# Patient Record
Sex: FEMALE | Race: WHITE | NOT HISPANIC OR LATINO | Employment: OTHER | URBAN - METROPOLITAN AREA
[De-identification: names, ages, dates, MRNs, and addresses within clinical notes are randomized per-mention and may not be internally consistent; named-entity substitution may affect disease eponyms.]

---

## 2017-01-04 ENCOUNTER — HOSPITAL ENCOUNTER (OUTPATIENT)
Dept: PHYSICAL THERAPY | Facility: HOSPITAL | Age: 77
Setting detail: THERAPIES SERIES
Discharge: HOME | End: 2017-01-04

## 2017-01-04 DIAGNOSIS — G89.29 CHRONIC BILATERAL LOW BACK PAIN, WITH SCIATICA PRESENCE UNSPECIFIED: Primary | ICD-10-CM

## 2017-01-04 DIAGNOSIS — M54.5 CHRONIC BILATERAL LOW BACK PAIN, WITH SCIATICA PRESENCE UNSPECIFIED: Primary | ICD-10-CM

## 2017-01-04 PROCEDURE — 97113 AQUATIC THERAPY/EXERCISES: CPT | Performed by: PHYSICAL THERAPIST

## 2017-01-04 NOTE — PROGRESS NOTES
Outpatient Physical Therapy Ortho Treatment Note  [facility]     Patient Name: Sachi Vora  : 1940  MRN: [id number]  Today's Date: 2017      Visit Date: 2017    Visit Dx:    ICD-10-CM ICD-9-CM   1. Chronic bilateral low back pain, with sciatica presence unspecified M54.5 724.2    G89.29 338.29       Patient Active Problem List   Diagnosis   • Osteoporosis, 2011--lumbar spine -2.8.  Right femoral neck -2.4.  Left femoral neck -2.4.  Patient receives Reclast infusions.   • Therapeutic drug monitoring   • Simple renal cyst   • Benign essential hypertension   • Carotid artery plaque, 10/03/2014--mild bilateral carotid artery plaque.   • Chronic gastritis   • Chronic lower back pain   • Chronic otitis externa   • Chronic renal insufficiency, stage II (mild), creatinine 1.12   • Depression with anxiety   • Gastroesophageal reflux disease with esophagitis   • Functional murmur, 07/15/2015--normal echocardiogram.   • Hyperlipidemia   • Menopausal state   • Chronic migraine   • Pancreatic divisum   • Pancreatic Duct Dilation   • Pedal edema   • Restless legs syndrome   • Bilateral sensorineural hearing loss   • Vitamin D deficiency   • Breast cancer screening   • Epigastric pain   • Chronic fatigue   • Hypersomnolence   • Chronic anemia        Past Medical History   Diagnosis Date   • Anemia    • Anxiety and depression    • Arthritis    • History of acute pyelonephritis 2001--patient was admitted to the hospital with fever, chills, leukocytosis and abdominal pain. Evaluation revealed acute left pyelonephritis pyelonephritis and sepsis syndrome.   • History of bone density study 2011--DEXA scan revealed a lumbar T score of -2.8, right femoral neck T score -2.4, left femoral neck T score -2.4. Osteoporosis of the lumbar spine and severe osteopenia of the hips bilaterally. Patient has been intolerant to Fosamax because of gastritis and gastroesophageal  reflux.   04/01/2009--treatment for oste   • History of cardiovascular stress test 5/13/2016 05/13/2016--exercise stress test with myocardial perfusion indicates normal study with no evidence of ischemia.  Left ventricular ejection fraction is hyperdynamic with an EF greater than 70%.  Impressions are consistent with a low risk study.  12/07/2011--stress Cardiolite negative for ischemia or previous infarction. Ejection fraction greater than 70%.   12/20/2009--stress Cardiolite negative for infarction or ischemia.   10/28/2005--stress Cardiolite showed no evidence of ischemia or infarction.   05/28/2002--unremarkable stress EKG.   • History of carotid Doppler/vascular screen 10/03/2014     10/03/2014--[facility] screening revealed mild bilateral carotid plaque, negative for atrial fibrillation, negative for AAA, negative for PAD, osteoporosis screen revealed osteopenia. Body mass index was 25 and considered to be moderate risk. 07/25/2012--vascular screen negative for carotid plaque, negative for abdominal aneurysm, negative for PAD   • History of chest pain 11/03/2014 11/03/2014--patient seen in follow-up and reports her epigastric pain/chest pain has resolved. I reviewed the results of the studies with her. It does not appear that her problem is related to biliary tract disease. She does have reflux and although the recent upper GI revealed minimal reflux, I do think her symptoms are related to esophageal reflux with esophageal spasm.   10/21/2014--air-contras   • History of chest x-ray 06/29/2015 06/29/2015--chest x-ray PA and lateral performed for exertional dyspnea reveals no active disease   • History of Dyspnea on exertion 06/29/2015 06/29/2015--patient presents with a 4-6 week history of exertional dyspnea that comes on with activity such as climbing stairs or walking her dog up an incline.  No chest pain.  Relieved with rest.  No cough.  She does have complaints of her feet and legs swelling  that is particularly worse at the end of the day and not improved overnight.  No orthopnea or PND.  Chest exam reveals faint rales at t   • History of echocardiogram 07/15/2015     07/15/2015--echocardiogram performed for murmur and dyspnea. Left ventricle size is normal. Left ventricular systolic function normal with ejection fraction 55%. Grade 1 diastolic dysfunction, abnormal relaxation pattern. There is trace tricuspid regurgitation. Estimated right ventricular systolic pressure is 25 mmHg which is normal.   • HIstory of Left lumbar radiculopathy Remote     Patient has multilevel degenerative disc disease and degenerative arthritis of the lumbar spine   • History of mammogram 03/29/2011 03/29/2011--negative mammogram.   • History of palpitations 12/07/2011     Patient has had multiple admissions to the hospital for complaints of chest pain and heart palpitations. She meant admitted at least on 3 occasions. She has had at least 3 stress Cardiolite and 1 stress ECG, the last Cardiolite being performed 12/07/2011 which was negative. Patient is also had Holter monitors which have been unremarkable.   • History of Pneumococcal vaccination given 11/20/2015 11/20/2015--PPSV 23 given. No further pneumococcal vaccinations required. 02/25/2015--Prevnar 13 given. Patient is pneumococcal vaccination jenn and therefore will need a PPSV 23 in 6-12 months.   • HIstory of Schatzki's ring 02/28/2012 02/28/2012--air-contrast upper GI revealed small to moderate sized reducible sliding hiatal herniation of the upper stomach with some demonstrated gastroesophageal reflux. No esophageal, gastric, or duodenal mass or mucosal ulceration was seen.  11/03/2008--EGD performed for evaluation of iron deficiency anemia revealed hiatal hernia without evidence of reflux, prepyloric antritis and   • History of Substernal precordial chest pain 5/5/2016 06/02/2016--patient seen in follow-up and reports she has recurrence of her  epigastric discomfort that is believed to be related to her hiatal hernia, reflux, and probably esophageal spasm.  I do think that her substernal chest pain is gastrointestinal related.  She has been off omeprazole for some time due to problems with the prescription.  She reinitiated this just yesterday.  05/13/2016--exercise stress test with myocardial perfusion indicates normal study with no evidence of ischemia.  Left ventricular ejection fraction is hyperdynamic with an EF greater than 70%.  Impressions are consistent with a low risk study.  05/05/2016--patient reports that over the past year she has had intermittent episodes of chest pain.  She describes a substernal pressure and this radiates into her left upper extremity as well as the jaw.  It lasts last than a minute.  The most recent episode was associated with pain in the right upper extremity that radiated up into the right jaw and then crossed over to the left jaw.  T   • History of Urinary urgency 11/20/2015 06/02/2016--patient seen in follow-up and reports her urinary symptoms have resolved essentially.  01/07/2016--patient seen in follow-up and reports she could not tolerate the Myrbetriq due to stomach issues.  However, she does report that her urinary symptoms have improved and are now tolerable.  11/20/2015--patient presents with approximately 2 month history of urinary urgency that is associated with intermittent hesitancy.  She describes the urge to urinate and she will go to the bathroom and then only be able to produce a few drops.  Other times she will get urgency and she will be able to completely void.  She has these symptoms at night as well and has nocturia approximately 2 times per night.  She denies dysuria.  She also has periodic nocturnal enuresis.  Urinalysis and urine culture sent.  Myrbetriq 25 mg per day initiated.  May increase to 50 mg per day if necessary.  Follow-up in 2 weeks to reassess.  Urinalysis revealed 3+ bacteria  but urine culture was negative.   • History of Zostavax administration 01/07/2016 01/07/2016--Zostavax given.   • Migraines    • Sleep apnea         Past Surgical History   Procedure Laterality Date   • Appendectomy  1965     1965   • Colonoscopy  11/03/2008 2008-- normal colonoscopy   • Colonoscopy  2001 2001--normal colonoscopy.   • Skin surgery  05/25/2010 05/25/2010--skin lesion excised from right lower extremity. 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   • Endoscopy  10/08/2014     02/28/2012--air-contrast upper GI revealed small to moderate sized reducible sliding hiatal herniation of the upper stomach with some demonstrated gastroesophageal reflux. No esophageal, gastric, or duodenal mass or mucosal ulceration was seen. 11/03/2008--EGD performed for evaluation of iron deficiency anemia revealed hiatal hernia without evidence of reflux, prepyloric antritis and   • Esophageal dilation  11/19/2001 11/19/2001--EGD revealed a very tortuous distal esophagus with a Schatzki's ring. No ulcer or erosions. No Dorado's mucosa. Stomach revealed patchy erythema and erosions in the antrum. Biopsy. Normal pylorus with no obstruction. Normal duodenum with no ulcers. The Schatzki's ring was dilated with a Rhodes dilator.;    • Knee arthroscopy Right 12/12/2011 12/12/2011--right knee arthroscopy with partial lateral and medial meniscectomies.   • Incontinence surgery  1979 1979--a bladder tack procedure for urinary stress incontinence   • Endoscopy  11/03/2008 11/03/2008--EGD performed for evaluation of iron deficiency anemia revealed hiatal hernia without evidence of reflux, prepyloric antritis and   • Endoscopy  11/19/2001 11/19/2001--EGD revealed a very tortuous distal esophagus with a Schatzki's ring. No ulcer or erosions. No Dorado's mucosa. Stomach revealed patchy erythema and erosions in the antrum. Biopsy. Normal pylorus with no obstruction. Normal duodenum  with no ulcers. The Schatzki's ring was dilated with a Rhodes dilator.;   • Total abdominal hysterectomy  1974 1974--total abdominal hysterectomy.   • Endoscopy N/A 9/27/2016     Procedure: ESOPHAGOGASTRODUODENOSCOPY with biopsy AND RESOLUTION CLIP X1;  Surgeon: Reji Johnson MD;  Location: [facility] ENDOSCOPY;  Service:                              PT Assessment/Plan       01/04/17 1402 12/28/16 1417       PT Assessment    Impairments  Balance;Impaired flexibility;Posture;Range of motion;Pain;Muscle strength  -VICKY     Assessment Comments Sachi was able to perform increased repetitions of aquatic exercises today and reported decrease in pain during treatment.  She was given information on area pools to be able to continue on her own after discharge.  -VICKY Sachi was treated in autic PT for 4 sessions, then had a 2 week lapse in treatment until returning today.  She missed some appts due to illness and travelling for [employer].  She states after her travels she did have increased back pain yesterday.  Today she reports her pain at a 5/10.  Oswestry score improved from 53% to 40%.  Significant improvement in balance is noted as follows: SLS on R and L is > 30 sec., Sharpened Rhomberg was 3 seconds, now 7 seconds.  The 5 X sit to stand test improved from 17.93 seconds to 9.75 seconds without UE assist. Sachi has been instructed in proper body mechanics and also given basic home exercises  for her back.  In addition, Sachi reports obtaining temporary decrease in her back pain from aquatic exercise, stating she feels better for the rest of the day.  Sachi demonstrates good progress towrds goals and is appropriate to continue skilled PT.  -VICKY     Please refer to paper survey for additional self-reported information  Yes  -VICKY     Rehab Potential  Good  -VICKY     Patient/caregiver participated in establishment of treatment plan and goals  Yes  -VICKY     Patient would benefit from skilled therapy intervention  Yes  -VICKY      PT Plan    PT Frequency  2x/week  -VICKY     Predicted Duration of Therapy Intervention (days/wks)  2-3 weeks  -VICKY     Planned CPT's?  PT AQUATIC THERAPY EA 15 MIN: [id number]  -VICKY     Physical Therapy Interventions (Optional Details)  aquatics exercise;stretching;strengthening;lumbar stabilization;patient/family education  -VICKY       User Key  (r) = Recorded By, (t) = Taken By, (c) = Cosigned By    Initials Name Provider Type    VICKY Gardner, PT Physical Therapist                    Exercises       01/04/17 1200          Subjective Comments    Subjective Comments My balance is better, I can stand to put my socks on now instead of sitting down to do it, but I have something nearby if I need to lean on.  -VICKY      Subjective Pain    Able to rate subjective pain? yes  -VICKY      Pre-Treatment Pain Level 7  -IVCKY      Post-Treatment Pain Level 5  -VICKY      Subjective Pain Comment I took my pain medicine about an hour ago but my back is still hurting. I think I'm always going to have pain at some level. It's been keeping me up at night.  -VICKY      Aquatics    Aquatics performed? Yes  -VICKY      Exercise 1    Exercise Name 1 See aquatic flow sheet.  -VICKY        User Key  (r) = Recorded By, (t) = Taken By, (c) = Cosigned By    Initials Name Provider Type    VICKY Gardner, PT Physical Therapist                               PT OP Goals       12/28/16 1300          PT Short Term Goals    STG 1 Pateint will report peak pain no > 4/10.  -VICKY      STG 1 Progress Ongoing  -VICKY      STG 1 Progress Comments Pain today is 5/10 and was higher yesterday after travelling for the holidays.  -VICKY      STG 2 Patient will participate in full session (40 min) of aquatic therapeutic exercise without exacerbation of symptoms.  -VICKY      STG 2 Progress Met  -VICKY      STG 3 Improve balance and proximal strength demonstrated by improvement in the 5 X sit to stand test from 17.93 sec. to 13 sec or less.  -VICKY      STG 3 Progress Met  -VICKY      STG 3 Progress  Comments 9.75 sec. without UE assist.  -VICKY      STG 4 Patient will be able to engage core muscles for resistive training in aquatic environment.  -VICKY      STG 4 Progress Met  -VICKY      Long Term Goals    LTG 1 Patient will increase overall function demonstrated by improvement in Oswestry score from 53% to 40% or less.  -VICKY      LTG 1 Progress Met  -VICKY      LTG 1 Progress Comments CASTILLO 40%  -VICKY      LTG 2 Improve balance demonstrated by SLS to 10 sec. each leg for safety with donning pants.  -VICKY      LTG 2 Progress Met  -VICKY      LTG 2 Progress Comments > 30 seconds on R and on L LE.  -VICKY      LTG 3 Improve sharpened Rhomberg from 3 sec to 8 sec.  -VICKY      LTG 3 Progress Partially Met  -VICKY      LTG 3 Progress Comments 7 seconds on 12.28.16  -VICKY      LTG 4 Patient will demonstrate independence with an advance aquatic exercise program to address core strength, hamstring flexibility and balance for self management of her condition.  -VICKY      LTG 4 Progress Ongoing  -VICKY      LTG 4 Progress Comments Patient seen 4 sessions, then had 2 week lapse in treatment due to illness and travelling for [place].   -VICKY        User Key  (r) = Recorded By, (t) = Taken By, (c) = Cosigned By    Initials Name Provider Type    VICKY Gardner, PT Physical Therapist                Therapy Education       01/04/17 1401    Therapy Education    Given Other (comment);Pain management   Area pools available for pool membership. TENS for pain control.  -VICKY    How Provided Verbal;Written  -VICKY    Provided to Patient  -VICKY    Level of Understanding Verbalized  -VICKY      User Key  (r) = Recorded By, (t) = Taken By, (c) = Cosigned By    Initials Name Provider Type    VICKY Gardner, PT Physical Therapist                Time Calculation:   Start Time: 1209  Stop Time: 1255  Time Calculation (min): 46 min    Therapy Charges for Today     Code Description Service Date Service Provider Modifiers Qty    [id number]  PT AQUATIC THERAPY EA 15 MIN 1/4/2017  Sebastian Gardner, PT GP 3                    Sebastian Gardner, PT  1/4/2017

## 2017-01-04 NOTE — PROGRESS NOTES
Outpatient Physical Therapy Ortho Treatment Note  [facility]     Patient Name: Sachi Vora  : 1940  MRN: [id number]  Today's Date: 2017      Visit Date: 2017    Visit Dx:    ICD-10-CM ICD-9-CM   1. Chronic bilateral low back pain, with sciatica presence unspecified M54.5 724.2    G89.29 338.29       Patient Active Problem List   Diagnosis   • Osteoporosis, 2011--lumbar spine -2.8.  Right femoral neck -2.4.  Left femoral neck -2.4.  Patient receives Reclast infusions.   • Therapeutic drug monitoring   • Simple renal cyst   • Benign essential hypertension   • Carotid artery plaque, 10/03/2014--mild bilateral carotid artery plaque.   • Chronic gastritis   • Chronic lower back pain   • Chronic otitis externa   • Chronic renal insufficiency, stage II (mild), creatinine 1.12   • Depression with anxiety   • Gastroesophageal reflux disease with esophagitis   • Functional murmur, 07/15/2015--normal echocardiogram.   • Hyperlipidemia   • Menopausal state   • Chronic migraine   • Pancreatic divisum   • Pancreatic Duct Dilation   • Pedal edema   • Restless legs syndrome   • Bilateral sensorineural hearing loss   • Vitamin D deficiency   • Breast cancer screening   • Epigastric pain   • Chronic fatigue   • Hypersomnolence   • Chronic anemia        Past Medical History   Diagnosis Date   • Anemia    • Anxiety and depression    • Arthritis    • History of acute pyelonephritis 2001--patient was admitted to the hospital with fever, chills, leukocytosis and abdominal pain. Evaluation revealed acute left pyelonephritis pyelonephritis and sepsis syndrome.   • History of bone density study 2011--DEXA scan revealed a lumbar T score of -2.8, right femoral neck T score -2.4, left femoral neck T score -2.4. Osteoporosis of the lumbar spine and severe osteopenia of the hips bilaterally. Patient has been intolerant to Fosamax because of gastritis and gastroesophageal  reflux.   04/01/2009--treatment for oste   • History of cardiovascular stress test 5/13/2016 05/13/2016--exercise stress test with myocardial perfusion indicates normal study with no evidence of ischemia.  Left ventricular ejection fraction is hyperdynamic with an EF greater than 70%.  Impressions are consistent with a low risk study.  12/07/2011--stress Cardiolite negative for ischemia or previous infarction. Ejection fraction greater than 70%.   12/20/2009--stress Cardiolite negative for infarction or ischemia.   10/28/2005--stress Cardiolite showed no evidence of ischemia or infarction.   05/28/2002--unremarkable stress EKG.   • History of carotid Doppler/vascular screen 10/03/2014     10/03/2014--[facility] screening revealed mild bilateral carotid plaque, negative for atrial fibrillation, negative for AAA, negative for PAD, osteoporosis screen revealed osteopenia. Body mass index was 25 and considered to be moderate risk. 07/25/2012--vascular screen negative for carotid plaque, negative for abdominal aneurysm, negative for PAD   • History of chest pain 11/03/2014 11/03/2014--patient seen in follow-up and reports her epigastric pain/chest pain has resolved. I reviewed the results of the studies with her. It does not appear that her problem is related to biliary tract disease. She does have reflux and although the recent upper GI revealed minimal reflux, I do think her symptoms are related to esophageal reflux with esophageal spasm.   10/21/2014--air-contras   • History of chest x-ray 06/29/2015 06/29/2015--chest x-ray PA and lateral performed for exertional dyspnea reveals no active disease   • History of Dyspnea on exertion 06/29/2015 06/29/2015--patient presents with a 4-6 week history of exertional dyspnea that comes on with activity such as climbing stairs or walking her dog up an incline.  No chest pain.  Relieved with rest.  No cough.  She does have complaints of her feet and legs swelling  that is particularly worse at the end of the day and not improved overnight.  No orthopnea or PND.  Chest exam reveals faint rales at t   • History of echocardiogram 07/15/2015     07/15/2015--echocardiogram performed for murmur and dyspnea. Left ventricle size is normal. Left ventricular systolic function normal with ejection fraction 55%. Grade 1 diastolic dysfunction, abnormal relaxation pattern. There is trace tricuspid regurgitation. Estimated right ventricular systolic pressure is 25 mmHg which is normal.   • HIstory of Left lumbar radiculopathy Remote     Patient has multilevel degenerative disc disease and degenerative arthritis of the lumbar spine   • History of mammogram 03/29/2011 03/29/2011--negative mammogram.   • History of palpitations 12/07/2011     Patient has had multiple admissions to the hospital for complaints of chest pain and heart palpitations. She meant admitted at least on 3 occasions. She has had at least 3 stress Cardiolite and 1 stress ECG, the last Cardiolite being performed 12/07/2011 which was negative. Patient is also had Holter monitors which have been unremarkable.   • History of Pneumococcal vaccination given 11/20/2015 11/20/2015--PPSV 23 given. No further pneumococcal vaccinations required. 02/25/2015--Prevnar 13 given. Patient is pneumococcal vaccination jenn and therefore will need a PPSV 23 in 6-12 months.   • HIstory of Schatzki's ring 02/28/2012 02/28/2012--air-contrast upper GI revealed small to moderate sized reducible sliding hiatal herniation of the upper stomach with some demonstrated gastroesophageal reflux. No esophageal, gastric, or duodenal mass or mucosal ulceration was seen.  11/03/2008--EGD performed for evaluation of iron deficiency anemia revealed hiatal hernia without evidence of reflux, prepyloric antritis and   • History of Substernal precordial chest pain 5/5/2016 06/02/2016--patient seen in follow-up and reports she has recurrence of her  epigastric discomfort that is believed to be related to her hiatal hernia, reflux, and probably esophageal spasm.  I do think that her substernal chest pain is gastrointestinal related.  She has been off omeprazole for some time due to problems with the prescription.  She reinitiated this just yesterday.  05/13/2016--exercise stress test with myocardial perfusion indicates normal study with no evidence of ischemia.  Left ventricular ejection fraction is hyperdynamic with an EF greater than 70%.  Impressions are consistent with a low risk study.  05/05/2016--patient reports that over the past year she has had intermittent episodes of chest pain.  She describes a substernal pressure and this radiates into her left upper extremity as well as the jaw.  It lasts last than a minute.  The most recent episode was associated with pain in the right upper extremity that radiated up into the right jaw and then crossed over to the left jaw.  T   • History of Urinary urgency 11/20/2015 06/02/2016--patient seen in follow-up and reports her urinary symptoms have resolved essentially.  01/07/2016--patient seen in follow-up and reports she could not tolerate the Myrbetriq due to stomach issues.  However, she does report that her urinary symptoms have improved and are now tolerable.  11/20/2015--patient presents with approximately 2 month history of urinary urgency that is associated with intermittent hesitancy.  She describes the urge to urinate and she will go to the bathroom and then only be able to produce a few drops.  Other times she will get urgency and she will be able to completely void.  She has these symptoms at night as well and has nocturia approximately 2 times per night.  She denies dysuria.  She also has periodic nocturnal enuresis.  Urinalysis and urine culture sent.  Myrbetriq 25 mg per day initiated.  May increase to 50 mg per day if necessary.  Follow-up in 2 weeks to reassess.  Urinalysis revealed 3+ bacteria  but urine culture was negative.   • History of Zostavax administration 01/07/2016 01/07/2016--Zostavax given.   • Migraines    • Sleep apnea         Past Surgical History   Procedure Laterality Date   • Appendectomy  1965     1965   • Colonoscopy  11/03/2008 2008-- normal colonoscopy   • Colonoscopy  2001 2001--normal colonoscopy.   • Skin surgery  05/25/2010 05/25/2010--skin lesion excised from right lower extremity. 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   • Endoscopy  10/08/2014     02/28/2012--air-contrast upper GI revealed small to moderate sized reducible sliding hiatal herniation of the upper stomach with some demonstrated gastroesophageal reflux. No esophageal, gastric, or duodenal mass or mucosal ulceration was seen. 11/03/2008--EGD performed for evaluation of iron deficiency anemia revealed hiatal hernia without evidence of reflux, prepyloric antritis and   • Esophageal dilation  11/19/2001 11/19/2001--EGD revealed a very tortuous distal esophagus with a Schatzki's ring. No ulcer or erosions. No Dorado's mucosa. Stomach revealed patchy erythema and erosions in the antrum. Biopsy. Normal pylorus with no obstruction. Normal duodenum with no ulcers. The Schatzki's ring was dilated with a Rhodes dilator.;    • Knee arthroscopy Right 12/12/2011 12/12/2011--right knee arthroscopy with partial lateral and medial meniscectomies.   • Incontinence surgery  1979 1979--a bladder tack procedure for urinary stress incontinence   • Endoscopy  11/03/2008 11/03/2008--EGD performed for evaluation of iron deficiency anemia revealed hiatal hernia without evidence of reflux, prepyloric antritis and   • Endoscopy  11/19/2001 11/19/2001--EGD revealed a very tortuous distal esophagus with a Schatzki's ring. No ulcer or erosions. No Dorado's mucosa. Stomach revealed patchy erythema and erosions in the antrum. Biopsy. Normal pylorus with no obstruction. Normal duodenum  with no ulcers. The Schatzki's ring was dilated with a Rhodes dilator.;   • Total abdominal hysterectomy  1974 1974--total abdominal hysterectomy.   • Endoscopy N/A 9/27/2016     Procedure: ESOPHAGOGASTRODUODENOSCOPY with biopsy AND RESOLUTION CLIP X1;  Surgeon: Reji Johnson MD;  Location: [facility] ENDOSCOPY;  Service:                              PT Assessment/Plan       01/04/17 1402 12/28/16 1417       PT Assessment    Impairments  Balance;Impaired flexibility;Posture;Range of motion;Pain;Muscle strength  -VICKY     Assessment Comments Sachi was able to perform increased repetitions of aquatic exercises today and reported decrease in pain during treatment.  She was given information on area pools to be able to continue on her own after discharge.  -VICKY Sachi was treated in autic PT for 4 sessions, then had a 2 week lapse in treatment until returning today.  She missed some appts due to illness and travelling for [place].  She states after her travels she did have increased back pain yesterday.  Today she reports her pain at a 5/10.  Oswestry score improved from 53% to 40%.  Significant improvement in balance is noted as follows: SLS on R and L is > 30 sec., Sharpened Rhomberg was 3 seconds, now 7 seconds.  The 5 X sit to stand test improved from 17.93 seconds to 9.75 seconds without UE assist. Sachi has been instructed in proper body mechanics and also given basic home exercises  for her back.  In addition, Sachi reports obtaining temporary decrease in her back pain from aquatic exercise, stating she feels better for the rest of the day.  Sachi demonstrates good progress towrds goals and is appropriate to continue skilled PT.  -VICKY     Please refer to paper survey for additional self-reported information  Yes  -VICKY     Rehab Potential  Good  -VICKY     Patient/caregiver participated in establishment of treatment plan and goals  Yes  -VICKY     Patient would benefit from skilled therapy intervention  Yes  -VICKY      PT Plan    PT Frequency  2x/week  -VICKY     Predicted Duration of Therapy Intervention (days/wks)  2-3 weeks  -VICKY     Planned CPT's?  PT AQUATIC THERAPY EA 15 MIN: 27432  -VICKY     Physical Therapy Interventions (Optional Details)  aquatics exercise;stretching;strengthening;lumbar stabilization;patient/family education  -VICKY       User Key  (r) = Recorded By, (t) = Taken By, (c) = Cosigned By    Initials Name Provider Type    VICKY Gardner, PT Physical Therapist                    Exercises       01/04/17 1200          Subjective Pain    Able to rate subjective pain? yes  -VICKY      Pre-Treatment Pain Level 7  -VICKY      Post-Treatment Pain Level 5  -VICKY      Subjective Pain Comment I took my pain medicine about an hour ago but my back is still hurting. I think I'm always going to have pain at some level.  -VICKY      Aquatics    Aquatics performed? Yes  -VICKY        User Key  (r) = Recorded By, (t) = Taken By, (c) = Cosigned By    Initials Name Provider Type    VICKY Gardner, PT Physical Therapist                               PT OP Goals       12/28/16 1300          PT Short Term Goals    STG 1 Pateint will report peak pain no > 4/10.  -VICKY      STG 1 Progress Ongoing  -VICKY      STG 1 Progress Comments Pain today is 5/10 and was higher yesterday after travelling for the holidays.  -VICKY      STG 2 Patient will participate in full session (40 min) of aquatic therapeutic exercise without exacerbation of symptoms.  -VICKY      STG 2 Progress Met  -VICKY      STG 3 Improve balance and proximal strength demonstrated by improvement in the 5 X sit to stand test from 17.93 sec. to 13 sec or less.  -VICKY      STG 3 Progress Met  -VICKY      STG 3 Progress Comments 9.75 sec. without UE assist.  -VICKY      STG 4 Patient will be able to engage core muscles for resistive training in aquatic environment.  -VICKY      STG 4 Progress Met  -VICKY      Long Term Goals    LTG 1 Patient will increase overall function demonstrated by improvement in Oswestry score  from 53% to 40% or less.  -VICKY      LTG 1 Progress Met  -VICKY      LTG 1 Progress Comments CASTILLO 40%  -VICKY      LTG 2 Improve balance demonstrated by SLS to 10 sec. each leg for safety with donning pants.  -VICKY      LTG 2 Progress Met  -VICKY      LTG 2 Progress Comments > 30 seconds on R and on L LE.  -VICKY      LTG 3 Improve sharpened Rhomberg from 3 sec to 8 sec.  -VICKY      LTG 3 Progress Partially Met  -VICKY      LTG 3 Progress Comments 7 seconds on 12.28.16  -VICKY      LTG 4 Patient will demonstrate independence with an advance aquatic exercise program to address core strength, hamstring flexibility and balance for self management of her condition.  -VICKY      LTG 4 Progress Ongoing  -VICKY      LTG 4 Progress Comments Patient seen 4 sessions, then had 2 week lapse in treatment due to illness and travelling for [place].   -VICKY        User Key  (r) = Recorded By, (t) = Taken By, (c) = Cosigned By    Initials Name Provider Type    VICKY Gardner, PT Physical Therapist                Therapy Education       01/04/17 1401    Therapy Education    Given Other (comment);Pain management   Area pools available for pool membership. TENS for pain control.  -VICKY    How Provided Verbal;Written  -VICKY    Provided to Patient  -VICKY    Level of Understanding Verbalized  -VICKY      User Key  (r) = Recorded By, (t) = Taken By, (c) = Cosigned By    Initials Name Provider Type    VICKY Gardner, PT Physical Therapist                Time Calculation:   Start Time: 1209  Stop Time: 1255  Time Calculation (min): 46 min    Therapy Charges for Today     Code Description Service Date Service Provider Modifiers Qty    [id number]  PT AQUATIC THERAPY EA 15 MIN 1/4/2017 Sebastian Gardner, PT GP 3                    Sebastian Gardner, PT  1/4/2017

## 2017-01-09 ENCOUNTER — HOSPITAL ENCOUNTER (OUTPATIENT)
Dept: PHYSICAL THERAPY | Facility: HOSPITAL | Age: 77
Setting detail: THERAPIES SERIES
End: 2017-01-09

## 2017-01-11 ENCOUNTER — HOSPITAL ENCOUNTER (OUTPATIENT)
Dept: PHYSICAL THERAPY | Facility: HOSPITAL | Age: 77
Setting detail: THERAPIES SERIES
Discharge: HOME | End: 2017-01-11

## 2017-01-11 DIAGNOSIS — G89.29 CHRONIC BILATERAL LOW BACK PAIN, WITH SCIATICA PRESENCE UNSPECIFIED: Primary | ICD-10-CM

## 2017-01-11 DIAGNOSIS — M54.5 CHRONIC BILATERAL LOW BACK PAIN, WITH SCIATICA PRESENCE UNSPECIFIED: Primary | ICD-10-CM

## 2017-01-11 PROCEDURE — 97113 AQUATIC THERAPY/EXERCISES: CPT | Performed by: PHYSICAL THERAPIST

## 2017-01-11 NOTE — PROGRESS NOTES
Outpatient Physical Therapy Ortho Treatment Note  [facility]     Patient Name: Sachi Vora  : 1940  MRN: [id number]  Today's Date: 2017      Visit Date: 2017    Visit Dx:    ICD-10-CM ICD-9-CM   1. Chronic bilateral low back pain, with sciatica presence unspecified M54.5 724.2    G89.29 338.29       Patient Active Problem List   Diagnosis   • Osteoporosis, 2011--lumbar spine -2.8.  Right femoral neck -2.4.  Left femoral neck -2.4.  Patient receives Reclast infusions.   • Therapeutic drug monitoring   • Simple renal cyst   • Benign essential hypertension   • Carotid artery plaque, 10/03/2014--mild bilateral carotid artery plaque.   • Chronic gastritis   • Chronic lower back pain   • Chronic otitis externa   • Chronic renal insufficiency, stage II (mild), creatinine 1.12   • Depression with anxiety   • Gastroesophageal reflux disease with esophagitis   • Functional murmur, 07/15/2015--normal echocardiogram.   • Hyperlipidemia   • Menopausal state   • Chronic migraine   • Pancreatic divisum   • Pancreatic Duct Dilation   • Pedal edema   • Restless legs syndrome   • Bilateral sensorineural hearing loss   • Vitamin D deficiency   • Breast cancer screening   • Epigastric pain   • Chronic fatigue   • Hypersomnolence   • Chronic anemia        Past Medical History   Diagnosis Date   • Anemia    • Anxiety and depression    • Arthritis    • History of acute pyelonephritis 2001--patient was admitted to the hospital with fever, chills, leukocytosis and abdominal pain. Evaluation revealed acute left pyelonephritis pyelonephritis and sepsis syndrome.   • History of bone density study 2011--DEXA scan revealed a lumbar T score of -2.8, right femoral neck T score -2.4, left femoral neck T score -2.4. Osteoporosis of the lumbar spine and severe osteopenia of the hips bilaterally. Patient has been intolerant to Fosamax because of gastritis and gastroesophageal  reflux.   04/01/2009--treatment for oste   • History of cardiovascular stress test 5/13/2016 05/13/2016--exercise stress test with myocardial perfusion indicates normal study with no evidence of ischemia.  Left ventricular ejection fraction is hyperdynamic with an EF greater than 70%.  Impressions are consistent with a low risk study.  12/07/2011--stress Cardiolite negative for ischemia or previous infarction. Ejection fraction greater than 70%.   12/20/2009--stress Cardiolite negative for infarction or ischemia.   10/28/2005--stress Cardiolite showed no evidence of ischemia or infarction.   05/28/2002--unremarkable stress EKG.   • History of carotid Doppler/vascular screen 10/03/2014     10/03/2014--[facility] screening revealed mild bilateral carotid plaque, negative for atrial fibrillation, negative for AAA, negative for PAD, osteoporosis screen revealed osteopenia. Body mass index was 25 and considered to be moderate risk. 07/25/2012--vascular screen negative for carotid plaque, negative for abdominal aneurysm, negative for PAD   • History of chest pain 11/03/2014 11/03/2014--patient seen in follow-up and reports her epigastric pain/chest pain has resolved. I reviewed the results of the studies with her. It does not appear that her problem is related to biliary tract disease. She does have reflux and although the recent upper GI revealed minimal reflux, I do think her symptoms are related to esophageal reflux with esophageal spasm.   10/21/2014--air-contras   • History of chest x-ray 06/29/2015 06/29/2015--chest x-ray PA and lateral performed for exertional dyspnea reveals no active disease   • History of Dyspnea on exertion 06/29/2015 06/29/2015--patient presents with a 4-6 week history of exertional dyspnea that comes on with activity such as climbing stairs or walking her dog up an incline.  No chest pain.  Relieved with rest.  No cough.  She does have complaints of her feet and legs swelling  that is particularly worse at the end of the day and not improved overnight.  No orthopnea or PND.  Chest exam reveals faint rales at t   • History of echocardiogram 07/15/2015     07/15/2015--echocardiogram performed for murmur and dyspnea. Left ventricle size is normal. Left ventricular systolic function normal with ejection fraction 55%. Grade 1 diastolic dysfunction, abnormal relaxation pattern. There is trace tricuspid regurgitation. Estimated right ventricular systolic pressure is 25 mmHg which is normal.   • HIstory of Left lumbar radiculopathy Remote     Patient has multilevel degenerative disc disease and degenerative arthritis of the lumbar spine   • History of mammogram 03/29/2011 03/29/2011--negative mammogram.   • History of palpitations 12/07/2011     Patient has had multiple admissions to the hospital for complaints of chest pain and heart palpitations. She meant admitted at least on 3 occasions. She has had at least 3 stress Cardiolite and 1 stress ECG, the last Cardiolite being performed 12/07/2011 which was negative. Patient is also had Holter monitors which have been unremarkable.   • History of Pneumococcal vaccination given 11/20/2015 11/20/2015--PPSV 23 given. No further pneumococcal vaccinations required. 02/25/2015--Prevnar 13 given. Patient is pneumococcal vaccination jenn and therefore will need a PPSV 23 in 6-12 months.   • HIstory of Schatzki's ring 02/28/2012 02/28/2012--air-contrast upper GI revealed small to moderate sized reducible sliding hiatal herniation of the upper stomach with some demonstrated gastroesophageal reflux. No esophageal, gastric, or duodenal mass or mucosal ulceration was seen.  11/03/2008--EGD performed for evaluation of iron deficiency anemia revealed hiatal hernia without evidence of reflux, prepyloric antritis and   • History of Substernal precordial chest pain 5/5/2016 06/02/2016--patient seen in follow-up and reports she has recurrence of her  epigastric discomfort that is believed to be related to her hiatal hernia, reflux, and probably esophageal spasm.  I do think that her substernal chest pain is gastrointestinal related.  She has been off omeprazole for some time due to problems with the prescription.  She reinitiated this just yesterday.  05/13/2016--exercise stress test with myocardial perfusion indicates normal study with no evidence of ischemia.  Left ventricular ejection fraction is hyperdynamic with an EF greater than 70%.  Impressions are consistent with a low risk study.  05/05/2016--patient reports that over the past year she has had intermittent episodes of chest pain.  She describes a substernal pressure and this radiates into her left upper extremity as well as the jaw.  It lasts last than a minute.  The most recent episode was associated with pain in the right upper extremity that radiated up into the right jaw and then crossed over to the left jaw.  T   • History of Urinary urgency 11/20/2015 06/02/2016--patient seen in follow-up and reports her urinary symptoms have resolved essentially.  01/07/2016--patient seen in follow-up and reports she could not tolerate the Myrbetriq due to stomach issues.  However, she does report that her urinary symptoms have improved and are now tolerable.  11/20/2015--patient presents with approximately 2 month history of urinary urgency that is associated with intermittent hesitancy.  She describes the urge to urinate and she will go to the bathroom and then only be able to produce a few drops.  Other times she will get urgency and she will be able to completely void.  She has these symptoms at night as well and has nocturia approximately 2 times per night.  She denies dysuria.  She also has periodic nocturnal enuresis.  Urinalysis and urine culture sent.  Myrbetriq 25 mg per day initiated.  May increase to 50 mg per day if necessary.  Follow-up in 2 weeks to reassess.  Urinalysis revealed 3+ bacteria  but urine culture was negative.   • History of Zostavax administration 01/07/2016 01/07/2016--Zostavax given.   • Migraines    • Sleep apnea         Past Surgical History   Procedure Laterality Date   • Appendectomy  1965     1965   • Colonoscopy  11/03/2008 2008-- normal colonoscopy   • Colonoscopy  2001 2001--normal colonoscopy.   • Skin surgery  05/25/2010 05/25/2010--skin lesion excised from right lower extremity. 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   • Endoscopy  10/08/2014     02/28/2012--air-contrast upper GI revealed small to moderate sized reducible sliding hiatal herniation of the upper stomach with some demonstrated gastroesophageal reflux. No esophageal, gastric, or duodenal mass or mucosal ulceration was seen. 11/03/2008--EGD performed for evaluation of iron deficiency anemia revealed hiatal hernia without evidence of reflux, prepyloric antritis and   • Esophageal dilation  11/19/2001 11/19/2001--EGD revealed a very tortuous distal esophagus with a Schatzki's ring. No ulcer or erosions. No Dorado's mucosa. Stomach revealed patchy erythema and erosions in the antrum. Biopsy. Normal pylorus with no obstruction. Normal duodenum with no ulcers. The Schatzki's ring was dilated with a Rhodes dilator.;    • Knee arthroscopy Right 12/12/2011 12/12/2011--right knee arthroscopy with partial lateral and medial meniscectomies.   • Incontinence surgery  1979 1979--a bladder tack procedure for urinary stress incontinence   • Endoscopy  11/03/2008 11/03/2008--EGD performed for evaluation of iron deficiency anemia revealed hiatal hernia without evidence of reflux, prepyloric antritis and   • Endoscopy  11/19/2001 11/19/2001--EGD revealed a very tortuous distal esophagus with a Schatzki's ring. No ulcer or erosions. No Dorado's mucosa. Stomach revealed patchy erythema and erosions in the antrum. Biopsy. Normal pylorus with no obstruction. Normal duodenum  with no ulcers. The Schatzki's ring was dilated with a Rhodes dilator.;   • Total abdominal hysterectomy  1974 1974--total abdominal hysterectomy.   • Endoscopy N/A 9/27/2016     Procedure: ESOPHAGOGASTRODUODENOSCOPY with biopsy AND RESOLUTION CLIP X1;  Surgeon: Reji Johnson MD;  Location: [facility] ENDOSCOPY;  Service:                              PT Assessment/Plan       01/11/17 1336 01/04/17 1402       PT Assessment    Assessment Comments Decreased pain today. Posture awareness improving. Patient feels benefit of exercise and plans to continue on her own after her last treatment which is Monday, (16th)  -VICKY Sachi was able to perform increased repetitions of aquatic exercises today and reported decrease in pain during treatment.  She was given information on area pools to be able to continue on her own after discharge.  -VICKY       User Key  (r) = Recorded By, (t) = Taken By, (c) = Cosigned By    Initials Name Provider Type    VICKY Gardner, PT Physical Therapist                    Exercises       01/11/17 1300          Subjective Pain    Able to rate subjective pain? yes  -VICKY      Pre-Treatment Pain Level 4  -VICKY      Post-Treatment Pain Level 3  -VICKY      Subjective Pain Comment Had to cancel last appt because I was sick. Back feels better today.  -VICKY      Aquatics    Aquatics performed? Yes  -VICKY      Exercise 1    Exercise Name 1 See aqua flow sheet.  -VICKY        User Key  (r) = Recorded By, (t) = Taken By, (c) = Cosigned By    Initials Name Provider Type    VICKY Gardner, PT Physical Therapist                                   Therapy Education       01/04/17 1401    Therapy Education    Given Other (comment);Pain management   Area pools available for pool membership. TENS for pain control.  -VICKY    How Provided Verbal;Written  -VICKY    Provided to Patient  -VICKY    Level of Understanding Verbalized  -VICKY      User Key  (r) = Recorded By, (t) = Taken By, (c) = Cosigned By    Initials Name Provider  Type    VICKY Sebastian Gardner, PT Physical Therapist                Time Calculation:   Start Time: 1300  Stop Time: 1345  Time Calculation (min): 45 min    Therapy Charges for Today     Code Description Service Date Service Provider Modifiers Qty    [id number] HC PT AQUATIC THERAPY EA 15 MIN 1/11/2017 Sebastian Gardner, PT GP 3                    Sebastian Gardner, PT  1/11/2017

## 2017-01-16 ENCOUNTER — HOSPITAL ENCOUNTER (OUTPATIENT)
Dept: PHYSICAL THERAPY | Facility: HOSPITAL | Age: 77
Setting detail: THERAPIES SERIES
Discharge: HOME | End: 2017-01-16

## 2017-01-16 DIAGNOSIS — M54.5 CHRONIC BILATERAL LOW BACK PAIN, WITH SCIATICA PRESENCE UNSPECIFIED: Primary | ICD-10-CM

## 2017-01-16 DIAGNOSIS — G89.29 CHRONIC BILATERAL LOW BACK PAIN, WITH SCIATICA PRESENCE UNSPECIFIED: Primary | ICD-10-CM

## 2017-01-16 PROCEDURE — 97113 AQUATIC THERAPY/EXERCISES: CPT | Performed by: PHYSICAL THERAPIST

## 2017-01-16 PROCEDURE — G8983 BODY POS D/C STATUS: HCPCS | Performed by: PHYSICAL THERAPIST

## 2017-01-16 PROCEDURE — G8982 BODY POS GOAL STATUS: HCPCS | Performed by: PHYSICAL THERAPIST

## 2017-01-16 NOTE — THERAPY DISCHARGE NOTE
Outpatient Physical Therapy Ortho Treatment Note/Discharge Summary  [facility]     Patient Name: Sachi Vora  : 1940  MRN: [id number]  Today's Date: 2017      Visit Date: 2017    Visit Dx:    ICD-10-CM ICD-9-CM   1. Chronic bilateral low back pain, with sciatica presence unspecified M54.5 724.2    G89.29 338.29       Patient Active Problem List   Diagnosis   • Osteoporosis, 2011--lumbar spine -2.8.  Right femoral neck -2.4.  Left femoral neck -2.4.  Patient receives Reclast infusions.   • Therapeutic drug monitoring   • Simple renal cyst   • Benign essential hypertension   • Carotid artery plaque, 10/03/2014--mild bilateral carotid artery plaque.   • Chronic gastritis   • Chronic lower back pain   • Chronic otitis externa   • Chronic renal insufficiency, stage II (mild), creatinine 1.12   • Depression with anxiety   • Gastroesophageal reflux disease with esophagitis   • Functional murmur, 07/15/2015--normal echocardiogram.   • Hyperlipidemia   • Menopausal state   • Chronic migraine   • Pancreatic divisum   • Pancreatic Duct Dilation   • Pedal edema   • Restless legs syndrome   • Bilateral sensorineural hearing loss   • Vitamin D deficiency   • Breast cancer screening   • Epigastric pain   • Chronic fatigue   • Hypersomnolence   • Chronic anemia        Past Medical History   Diagnosis Date   • Anemia    • Anxiety and depression    • Arthritis    • History of acute pyelonephritis 2001--patient was admitted to the hospital with fever, chills, leukocytosis and abdominal pain. Evaluation revealed acute left pyelonephritis pyelonephritis and sepsis syndrome.   • History of bone density study 2011--DEXA scan revealed a lumbar T score of -2.8, right femoral neck T score -2.4, left femoral neck T score -2.4. Osteoporosis of the lumbar spine and severe osteopenia of the hips bilaterally. Patient has been intolerant to Fosamax because of gastritis and  gastroesophageal reflux.   04/01/2009--treatment for oste   • History of cardiovascular stress test 5/13/2016 05/13/2016--exercise stress test with myocardial perfusion indicates normal study with no evidence of ischemia.  Left ventricular ejection fraction is hyperdynamic with an EF greater than 70%.  Impressions are consistent with a low risk study.  12/07/2011--stress Cardiolite negative for ischemia or previous infarction. Ejection fraction greater than 70%.   12/20/2009--stress Cardiolite negative for infarction or ischemia.   10/28/2005--stress Cardiolite showed no evidence of ischemia or infarction.   05/28/2002--unremarkable stress EKG.   • History of carotid Doppler/vascular screen 10/03/2014     10/03/2014--[facility] screening revealed mild bilateral carotid plaque, negative for atrial fibrillation, negative for AAA, negative for PAD, osteoporosis screen revealed osteopenia. Body mass index was 25 and considered to be moderate risk. 07/25/2012--vascular screen negative for carotid plaque, negative for abdominal aneurysm, negative for PAD   • History of chest pain 11/03/2014 11/03/2014--patient seen in follow-up and reports her epigastric pain/chest pain has resolved. I reviewed the results of the studies with her. It does not appear that her problem is related to biliary tract disease. She does have reflux and although the recent upper GI revealed minimal reflux, I do think her symptoms are related to esophageal reflux with esophageal spasm.   10/21/2014--air-contras   • History of chest x-ray 06/29/2015 06/29/2015--chest x-ray PA and lateral performed for exertional dyspnea reveals no active disease   • History of Dyspnea on exertion 06/29/2015 06/29/2015--patient presents with a 4-6 week history of exertional dyspnea that comes on with activity such as climbing stairs or walking her dog up an incline.  No chest pain.  Relieved with rest.  No cough.  She does have complaints of her feet and  legs swelling that is particularly worse at the end of the day and not improved overnight.  No orthopnea or PND.  Chest exam reveals faint rales at t   • History of echocardiogram 07/15/2015     07/15/2015--echocardiogram performed for murmur and dyspnea. Left ventricle size is normal. Left ventricular systolic function normal with ejection fraction 55%. Grade 1 diastolic dysfunction, abnormal relaxation pattern. There is trace tricuspid regurgitation. Estimated right ventricular systolic pressure is 25 mmHg which is normal.   • HIstory of Left lumbar radiculopathy Remote     Patient has multilevel degenerative disc disease and degenerative arthritis of the lumbar spine   • History of mammogram 03/29/2011 03/29/2011--negative mammogram.   • History of palpitations 12/07/2011     Patient has had multiple admissions to the hospital for complaints of chest pain and heart palpitations. She meant admitted at least on 3 occasions. She has had at least 3 stress Cardiolite and 1 stress ECG, the last Cardiolite being performed 12/07/2011 which was negative. Patient is also had Holter monitors which have been unremarkable.   • History of Pneumococcal vaccination given 11/20/2015 11/20/2015--PPSV 23 given. No further pneumococcal vaccinations required. 02/25/2015--Prevnar 13 given. Patient is pneumococcal vaccination jenn and therefore will need a PPSV 23 in 6-12 months.   • HIstory of Schatzki's ring 02/28/2012 02/28/2012--air-contrast upper GI revealed small to moderate sized reducible sliding hiatal herniation of the upper stomach with some demonstrated gastroesophageal reflux. No esophageal, gastric, or duodenal mass or mucosal ulceration was seen.  11/03/2008--EGD performed for evaluation of iron deficiency anemia revealed hiatal hernia without evidence of reflux, prepyloric antritis and   • History of Substernal precordial chest pain 5/5/2016 06/02/2016--patient seen in follow-up and reports she has  recurrence of her epigastric discomfort that is believed to be related to her hiatal hernia, reflux, and probably esophageal spasm.  I do think that her substernal chest pain is gastrointestinal related.  She has been off omeprazole for some time due to problems with the prescription.  She reinitiated this just yesterday.  05/13/2016--exercise stress test with myocardial perfusion indicates normal study with no evidence of ischemia.  Left ventricular ejection fraction is hyperdynamic with an EF greater than 70%.  Impressions are consistent with a low risk study.  05/05/2016--patient reports that over the past year she has had intermittent episodes of chest pain.  She describes a substernal pressure and this radiates into her left upper extremity as well as the jaw.  It lasts last than a minute.  The most recent episode was associated with pain in the right upper extremity that radiated up into the right jaw and then crossed over to the left jaw.  T   • History of Urinary urgency 11/20/2015 06/02/2016--patient seen in follow-up and reports her urinary symptoms have resolved essentially.  01/07/2016--patient seen in follow-up and reports she could not tolerate the Myrbetriq due to stomach issues.  However, she does report that her urinary symptoms have improved and are now tolerable.  11/20/2015--patient presents with approximately 2 month history of urinary urgency that is associated with intermittent hesitancy.  She describes the urge to urinate and she will go to the bathroom and then only be able to produce a few drops.  Other times she will get urgency and she will be able to completely void.  She has these symptoms at night as well and has nocturia approximately 2 times per night.  She denies dysuria.  She also has periodic nocturnal enuresis.  Urinalysis and urine culture sent.  Myrbetriq 25 mg per day initiated.  May increase to 50 mg per day if necessary.  Follow-up in 2 weeks to reassess.  Urinalysis  revealed 3+ bacteria but urine culture was negative.   • History of Zostavax administration 01/07/2016 01/07/2016--Zostavax given.   • Migraines    • Sleep apnea         Past Surgical History   Procedure Laterality Date   • Appendectomy  1965     1965   • Colonoscopy  11/03/2008 2008-- normal colonoscopy   • Colonoscopy  2001 2001--normal colonoscopy.   • Skin surgery  05/25/2010 05/25/2010--skin lesion excised from right lower extremity. 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   • Endoscopy  10/08/2014     02/28/2012--air-contrast upper GI revealed small to moderate sized reducible sliding hiatal herniation of the upper stomach with some demonstrated gastroesophageal reflux. No esophageal, gastric, or duodenal mass or mucosal ulceration was seen. 11/03/2008--EGD performed for evaluation of iron deficiency anemia revealed hiatal hernia without evidence of reflux, prepyloric antritis and   • Esophageal dilation  11/19/2001 11/19/2001--EGD revealed a very tortuous distal esophagus with a Schatzki's ring. No ulcer or erosions. No Dorado's mucosa. Stomach revealed patchy erythema and erosions in the antrum. Biopsy. Normal pylorus with no obstruction. Normal duodenum with no ulcers. The Schatzki's ring was dilated with a Rhodes dilator.;    • Knee arthroscopy Right 12/12/2011 12/12/2011--right knee arthroscopy with partial lateral and medial meniscectomies.   • Incontinence surgery  1979 1979--a bladder tack procedure for urinary stress incontinence   • Endoscopy  11/03/2008 11/03/2008--EGD performed for evaluation of iron deficiency anemia revealed hiatal hernia without evidence of reflux, prepyloric antritis and   • Endoscopy  11/19/2001 11/19/2001--EGD revealed a very tortuous distal esophagus with a Schatzki's ring. No ulcer or erosions. No Dorado's mucosa. Stomach revealed patchy erythema and erosions in the antrum. Biopsy. Normal pylorus with no  obstruction. Normal duodenum with no ulcers. The Schatzki's ring was dilated with a Rhodes dilator.;   • Total abdominal hysterectomy  1974 1974--total abdominal hysterectomy.   • Endoscopy N/A 9/27/2016     Procedure: ESOPHAGOGASTRODUODENOSCOPY with biopsy AND RESOLUTION CLIP X1;  Surgeon: Reji Johnson MD;  Location: [facility] ENDOSCOPY;  Service:              PT Ortho       01/16/17 1700    Myotomal Screen- Lower Quarter Clearing    Hip flexion (L2) Bilateral:;WNL  -VICKY    Knee extension (L3) Bilateral:;WNL  -VICKY    Ankle DF (L4) Bilateral:;WNL  -VICKY    Great toe extension (L5) Bilateral:;5 (Normal)  -VICKY    Knee flexion (S2) Bilateral:;WNL  -VICKY      User Key  (r) = Recorded By, (t) = Taken By, (c) = Cosigned By    Initials Name Provider Type    VICKY Gardner, PT Physical Therapist                            PT Assessment/Plan       01/11/17 1336          PT Assessment    Assessment Comments Decreased pain today. Posture awareness improving. Patient feels benefit of exercise and plans to continue on her own after her last treatment which is Monday, (16th)  -VICKY        User Key  (r) = Recorded By, (t) = Taken By, (c) = Cosigned By    Initials Name Provider Type    VICKY Gardner, PT Physical Therapist                    Exercises       01/16/17 1700          Subjective Comments    Subjective Comments I started having ringing in my ears and my eye hurts that is being treated for dry eye. I'm going to see the Dr about that. Not sure if I'm going to join a pool or not. I will work on the home exercises.  -VICKY      Subjective Pain    Able to rate subjective pain? yes  -VICKY      Pre-Treatment Pain Level 4  -VICKY      Post-Treatment Pain Level 3  -VICKY      Aquatics    Aquatics performed? Yes  -VICKY      Exercise 1    Exercise Name 1 See aqua flow sheet. Also issued additional home exercise with yellow theraband.  -VICKY        User Key  (r) = Recorded By, (t) = Taken By, (c) = Cosigned By    Initials Name Provider  Type    VICKY Gardner, PT Physical Therapist                               PT OP Goals       01/16/17 1700          PT Short Term Goals    STG 1 Pateint will report peak pain no > 4/10.  -VICKY      STG 1 Progress Not Met  -VICKY      STG 1 Progress Comments Peak pain can get up top 6,7/10  -VICKY      STG 2 Patient will participate in full session (40 min) of aquatic therapeutic exercise without exacerbation of symptoms.  -VICKY      STG 2 Progress Met  -VICKY      STG 3 Improve balance and proximal strength demonstrated by improvement in the 5 X sit to stand test from 17.93 sec. to 13 sec or less.  -VICKY      STG 3 Progress Met  -VICKY      STG 3 Progress Comments 9.75 sec without UE assist.  -VICKY      STG 4 Patient will be able to engage core muscles for resistive training in aquatic environment.  -VICKY      STG 4 Progress Met  -VICKY      Long Term Goals    LTG 1 Patient will increase overall function demonstrated by improvement in Oswestry score from 53% to 40% or less.  -VICKY      LTG 1 Progress Met  -VICKY      LTG 2 Improve balance demonstrated by SLS to 10 sec. each leg for safety with donning pants.  -VICKY      LTG 2 Progress Met  -VICKY      LTG 2 Progress Comments 25 sec each leg today  -VICKY      LTG 3 Improve sharpened Rhomberg from 3 sec to 8 sec.  -VICKY      LTG 3 Progress Met  -VICKY      LTG 3 Progress Comments 24 sec.  -VICKY      LTG 4 Patient will demonstrate independence with an advance aquatic exercise program to address core strength, hamstring flexibility and balance for self management of her condition.  -VICKY      LTG 4 Progress Met  -VICKY        User Key  (r) = Recorded By, (t) = Taken By, (c) = Cosigned By    Initials Name Provider Type    VICKY Gardner, PT Physical Therapist                Therapy Education       01/16/17 1828    Therapy Education    Given HEP  -VICKY    Program New  -VICKY    How Provided Verbal;Demonstration;Written  -VICKY    Provided to Patient  -VICKY    Level of Understanding Verbalized  -VICKY      User Key  (r) =  Recorded By, (t) = Taken By, (c) = Cosigned By    Initials Name Provider Type    VICKY Sebastian Gardner, PT Physical Therapist                Time Calculation:   Start Time: 1305  Stop Time: 1350  Time Calculation (min): 45 min    Therapy Charges for Today     Code Description Service Date Service Provider Modifiers Qty    97961541132 HC PT CHNG MAIN POS PROJECTED 1/16/2017 Sebastian Gardner, PT GP, CK 1    59598322042 HC PT CHNG MAIN POS DISCHARGE 1/16/2017 Sebastian Gardner, PT GP, CK 1    07404481883 HC PT AQUATIC THERAPY EA 15 MIN 1/16/2017 Sebastian Gardner, PT GP 3          PT G-Codes  PT Professional Judgement Used?: Yes  Outcome Measure Options: Shivani Dillon, Varinder Outcome Measure  Score: CASTILLO 40% 2 weeks ago and 50% today, SLS 25 sec each  Functional Limitation: Changing and maintaining body position  Changing and Maintaining Body Position Goal Status (): At least 40 percent but less than 60 percent impaired, limited or restricted  Changing and Maintaining Body Position Discharge Status (): At least 40 percent but less than 60 percent impaired, limited or restricted     OP PT Discharge Summary  Date of Discharge: 01/16/17  Reason for Discharge: Patient/Caregiver request  Outcomes Achieved: Patient able to partially acheive established goals  Discharge Destination: Home with home program  Discharge Instructions: Sachi demonstrates good progress with her balance.  She continues to report back pain that varies from 3-7/10.  She feels the water exercise is a good way to manage her symptoms and improve her strength.  Sachi may join a pool, but is not sure at this time.  She was issued home exercises as well.      Sebastian Gardner, PT  1/16/2017

## 2017-01-20 RX ORDER — CLONAZEPAM 1 MG/1
TABLET ORAL
Qty: 30 TABLET | Refills: 0 | OUTPATIENT
Start: 2017-01-20 | End: 2017-02-13 | Stop reason: SDUPTHER

## 2017-02-03 ENCOUNTER — TELEPHONE (OUTPATIENT)
Dept: INTERNAL MEDICINE | Facility: CLINIC | Age: 77
End: 2017-02-03

## 2017-02-03 RX ORDER — LEVOFLOXACIN 750 MG/1
750 TABLET ORAL DAILY
Qty: 8 TABLET | Refills: 0 | Status: SHIPPED | OUTPATIENT
Start: 2017-02-03 | End: 2017-02-07

## 2017-02-03 NOTE — TELEPHONE ENCOUNTER
Pt. C/O sinus and chest congestion. She is leaving for vacation on Monday and would like to have something called in or work her in this afternoon. What would you prefer? Call her at [phone].

## 2017-02-07 ENCOUNTER — TELEPHONE (OUTPATIENT)
Dept: INTERNAL MEDICINE | Facility: CLINIC | Age: 77
End: 2017-02-07

## 2017-02-07 RX ORDER — AZITHROMYCIN 250 MG/1
TABLET, FILM COATED ORAL
Qty: 6 TABLET | Refills: 0 | Status: SHIPPED | OUTPATIENT
Start: 2017-02-07 | End: 2017-02-15 | Stop reason: HOSPADM

## 2017-02-07 NOTE — TELEPHONE ENCOUNTER
Pt is in [place] and said that the Levaquin is causing her legs to cramp up and giving her diarrhea.. We gave her a RX on 2-3-17. Can we send something else in? [phone]

## 2017-02-13 RX ORDER — CLONAZEPAM 1 MG/1
1 TABLET ORAL NIGHTLY PRN
Qty: 30 TABLET | Refills: 2 | OUTPATIENT
Start: 2017-02-13 | End: 2017-04-11 | Stop reason: SDUPTHER

## 2017-02-15 ENCOUNTER — OFFICE VISIT (OUTPATIENT)
Dept: INTERNAL MEDICINE | Facility: CLINIC | Age: 77
End: 2017-02-15

## 2017-02-15 ENCOUNTER — HOSPITAL ENCOUNTER (OUTPATIENT)
Dept: CARDIOLOGY | Facility: HOSPITAL | Age: 77
Discharge: HOME | End: 2017-02-15
Admitting: INTERNAL MEDICINE

## 2017-02-15 VITALS
WEIGHT: 129 LBS | DIASTOLIC BLOOD PRESSURE: 62 MMHG | SYSTOLIC BLOOD PRESSURE: 108 MMHG | HEART RATE: 76 BPM | BODY MASS INDEX: 24.35 KG/M2 | OXYGEN SATURATION: 94 % | HEIGHT: 61 IN

## 2017-02-15 DIAGNOSIS — M79.662 PAIN OF LEFT CALF: ICD-10-CM

## 2017-02-15 DIAGNOSIS — R06.02 SHORTNESS OF BREATH: Primary | ICD-10-CM

## 2017-02-15 DIAGNOSIS — K29.51 CHRONIC GASTRITIS WITH BLEEDING, UNSPECIFIED GASTRITIS TYPE: Chronic | ICD-10-CM

## 2017-02-15 DIAGNOSIS — D64.9 CHRONIC ANEMIA: Chronic | ICD-10-CM

## 2017-02-15 DIAGNOSIS — N18.2 CHRONIC RENAL INSUFFICIENCY, STAGE II (MILD): Chronic | ICD-10-CM

## 2017-02-15 DIAGNOSIS — K25.7 CHRONIC GASTRIC ULCER: ICD-10-CM

## 2017-02-15 DIAGNOSIS — K21.00 GASTROESOPHAGEAL REFLUX DISEASE WITH ESOPHAGITIS: Chronic | ICD-10-CM

## 2017-02-15 DIAGNOSIS — I82.4Z2 ACUTE DEEP VEIN THROMBOSIS (DVT) OF DISTAL END OF LEFT LOWER EXTREMITY (HCC): ICD-10-CM

## 2017-02-15 DIAGNOSIS — M79.662 TENDERNESS OF LEFT CALF: ICD-10-CM

## 2017-02-15 DIAGNOSIS — I10 BENIGN ESSENTIAL HYPERTENSION: Chronic | ICD-10-CM

## 2017-02-15 DIAGNOSIS — M54.5 CHRONIC LOW BACK PAIN, UNSPECIFIED BACK PAIN LATERALITY, WITH SCIATICA PRESENCE UNSPECIFIED: Chronic | ICD-10-CM

## 2017-02-15 DIAGNOSIS — G89.29 CHRONIC LOW BACK PAIN, UNSPECIFIED BACK PAIN LATERALITY, WITH SCIATICA PRESENCE UNSPECIFIED: Chronic | ICD-10-CM

## 2017-02-15 DIAGNOSIS — M79.89 CALF SWELLING: ICD-10-CM

## 2017-02-15 DIAGNOSIS — R10.13 EPIGASTRIC PAIN: ICD-10-CM

## 2017-02-15 DIAGNOSIS — F41.8 DEPRESSION WITH ANXIETY: Chronic | ICD-10-CM

## 2017-02-15 DIAGNOSIS — R53.82 CHRONIC FATIGUE: Chronic | ICD-10-CM

## 2017-02-15 DIAGNOSIS — G47.10 HYPERSOMNOLENCE: Chronic | ICD-10-CM

## 2017-02-15 LAB
BH CV LOW VAS LEFT POSTERIOR TIBIAL VESSEL: 1
BH CV LOWER VASCULAR LEFT COMMON FEMORAL AUGMENT: NORMAL
BH CV LOWER VASCULAR LEFT COMMON FEMORAL COMPETENT: NORMAL
BH CV LOWER VASCULAR LEFT COMMON FEMORAL COMPRESS: NORMAL
BH CV LOWER VASCULAR LEFT COMMON FEMORAL PHASIC: NORMAL
BH CV LOWER VASCULAR LEFT COMMON FEMORAL SPONT: NORMAL
BH CV LOWER VASCULAR LEFT DISTAL FEMORAL COMPRESS: NORMAL
BH CV LOWER VASCULAR LEFT GASTRONEMIUS COMPRESS: NORMAL
BH CV LOWER VASCULAR LEFT GREATER SAPH AK COMPRESS: NORMAL
BH CV LOWER VASCULAR LEFT GREATER SAPH BK COMPRESS: NORMAL
BH CV LOWER VASCULAR LEFT LESSER SAPH COMPRESS: NORMAL
BH CV LOWER VASCULAR LEFT MID FEMORAL AUGMENT: NORMAL
BH CV LOWER VASCULAR LEFT MID FEMORAL COMPETENT: NORMAL
BH CV LOWER VASCULAR LEFT MID FEMORAL COMPRESS: NORMAL
BH CV LOWER VASCULAR LEFT MID FEMORAL PHASIC: NORMAL
BH CV LOWER VASCULAR LEFT MID FEMORAL SPONT: NORMAL
BH CV LOWER VASCULAR LEFT PERONEAL COMPRESS: NORMAL
BH CV LOWER VASCULAR LEFT POPLITEAL AUGMENT: NORMAL
BH CV LOWER VASCULAR LEFT POPLITEAL COMPETENT: NORMAL
BH CV LOWER VASCULAR LEFT POPLITEAL COMPRESS: NORMAL
BH CV LOWER VASCULAR LEFT POPLITEAL PHASIC: NORMAL
BH CV LOWER VASCULAR LEFT POPLITEAL SPONT: NORMAL
BH CV LOWER VASCULAR LEFT POSTERIOR TIBIAL COMPRESS: NORMAL
BH CV LOWER VASCULAR LEFT POSTERIOR TIBIAL THROMBUS: NORMAL
BH CV LOWER VASCULAR LEFT PROXIMAL FEMORAL COMPRESS: NORMAL
BH CV LOWER VASCULAR LEFT SAPHENOFEMORAL JUNCTION AUGMENT: NORMAL
BH CV LOWER VASCULAR LEFT SAPHENOFEMORAL JUNCTION COMPETENT: NORMAL
BH CV LOWER VASCULAR LEFT SAPHENOFEMORAL JUNCTION COMPRESS: NORMAL
BH CV LOWER VASCULAR LEFT SAPHENOFEMORAL JUNCTION PHASIC: NORMAL
BH CV LOWER VASCULAR LEFT SAPHENOFEMORAL JUNCTION SPONT: NORMAL
BH CV LOWER VASCULAR RIGHT COMMON FEMORAL AUGMENT: NORMAL
BH CV LOWER VASCULAR RIGHT COMMON FEMORAL COMPETENT: NORMAL
BH CV LOWER VASCULAR RIGHT COMMON FEMORAL COMPRESS: NORMAL
BH CV LOWER VASCULAR RIGHT COMMON FEMORAL PHASIC: NORMAL
BH CV LOWER VASCULAR RIGHT COMMON FEMORAL SPONT: NORMAL

## 2017-02-15 PROCEDURE — 99215 OFFICE O/P EST HI 40 MIN: CPT | Performed by: INTERNAL MEDICINE

## 2017-02-15 PROCEDURE — 99354 PR PROLONGED SVC OUTPATIENT SETTING 1ST HOUR: CPT | Performed by: INTERNAL MEDICINE

## 2017-02-15 PROCEDURE — 93971 EXTREMITY STUDY: CPT

## 2017-02-15 RX ORDER — VENLAFAXINE HYDROCHLORIDE 75 MG/1
75 CAPSULE, EXTENDED RELEASE ORAL DAILY
COMMUNITY
End: 2018-04-20 | Stop reason: DRUGHIGH

## 2017-02-15 RX ORDER — CYCLOSPORINE 0.5 MG/ML
1 EMULSION OPHTHALMIC 2 TIMES DAILY
COMMUNITY

## 2017-02-15 RX ORDER — SUCRALFATE 1 G/1
TABLET ORAL
Qty: 120 TABLET | Refills: 5 | Status: SHIPPED | OUTPATIENT
Start: 2017-02-15 | End: 2017-10-30 | Stop reason: HOSPADM

## 2017-02-15 NOTE — PROGRESS NOTES
[facility] doppler completed.  Preliminary results:  LT leg is positive for new DVT at ankle-foot with no extension into the popliteal.     Results given to Dr. Cruzito Weir at 1752. He instructed the patient to meet him at his office immediately to begin treatment. The patient and her  understood the instructions and left at 1755.

## 2017-02-15 NOTE — PROGRESS NOTES
02/15/2017    Patient Information  Sachi Vora                                                                                          [address] [place] [address]      1940  [phone]      Chief Complaint:     Complaining of diffuse myalgias/tendon pain, pain, swelling, tenderness in the left calf, marked chronic fatigue, shortness of breath.    History of Present Illness:    Patient with a history of chronic gastritis and chronic gastric ulcer that was diagnosed nearly 6 months ago through EGD, chronic fatigue and hypersomnolence, chronic anemia, renal insufficiency, depression with anxiety, esophageal reflux, chronic lower back pain, hypertension.  She presents today with multiple complaints that will be better described below.  Her past medical history reviewed and updated where necessary.  I last evaluated her in September of last year at which time she was having continued epigastric pain.  I referred her to the general surgeon who performed an EGD which revealed gastritis as well as a gastric ulcer.  I had placed her on PPI therapy as well as Carafate and apparently patient is only taking omeprazole at the present time but her epigastric pain is much better.  However, she presents with complaints of diffuse myalgias and arthralgias as well as tendinopathy symptoms and she felt this was related to Levaquin that I gave her over the phone for a sinus infection just prior to her going to a trip to [place] via airplane.  When she arrived in [place] she started complaining of the muscular and tendon pain particularly involving the left calf.  This persists.  She also had complaints of shortness of breath but no chest pain.  This situation is well described under the diagnoses of shortness of breath, left calf pain, Swelling, and tenderness of the left calf.  The documentation regarding the gastric ulcer and chronic gastritis is well documented under those specific diagnoses.   Past medical history reviewed and updated where necessary.    Review of Systems   Constitution: Positive for weakness and malaise/fatigue.   HENT: Negative.    Eyes: Negative.    Cardiovascular: Negative.    Respiratory: Positive for shortness of breath.    Endocrine: Negative.    Hematologic/Lymphatic: Negative.    Skin: Negative.    Musculoskeletal: Positive for joint pain, muscle cramps, muscle weakness and myalgias.   Gastrointestinal: Negative.    Genitourinary: Negative.    Psychiatric/Behavioral: Negative.    Allergic/Immunologic: Negative.        Active Problems:    Patient Active Problem List   Diagnosis   • Osteoporosis, 03/29/2011--lumbar spine -2.8.  Right femoral neck -2.4.  Left femoral neck -2.4.  Patient receives Reclast infusions.   • Therapeutic drug monitoring   • Simple renal cyst   • Benign essential hypertension   • Carotid artery plaque, 10/03/2014--mild bilateral carotid artery plaque.   • Chronic gastritis   • Chronic lower back pain   • Chronic otitis externa   • Chronic renal insufficiency, stage II (mild), creatinine 1.12   • Depression with anxiety   • Gastroesophageal reflux disease with esophagitis   • Functional murmur, 07/15/2015--normal echocardiogram.   • Hyperlipidemia   • Menopausal state   • Chronic migraine   • Pancreatic divisum   • Pancreatic Duct Dilation   • Pedal edema   • Restless legs syndrome   • Bilateral sensorineural hearing loss   • Vitamin D deficiency   • Breast cancer screening   • Chronic gastric ulcer   • Chronic fatigue   • Hypersomnolence   • Chronic anemia   • Pain of left calf   • Tenderness of left calf   • Calf swelling   • Shortness of breath   • Acute deep vein thrombosis (DVT) of distal end of left lower extremity         Past Medical History   Diagnosis Date   • History of acute pyelonephritis 11/29/2001 11/29/2001--patient was admitted to the hospital with fever, chills, leukocytosis and abdominal pain. Evaluation revealed acute left  pyelonephritis pyelonephritis and sepsis syndrome.   • History of bone density study 03/29/2011 03/29/2011--DEXA scan revealed a lumbar T score of -2.8, right femoral neck T score -2.4, left femoral neck T score -2.4. Osteoporosis of the lumbar spine and severe osteopenia of the hips bilaterally. Patient has been intolerant to Fosamax because of gastritis and gastroesophageal reflux.   04/01/2009--treatment for oste   • History of cardiovascular stress test 5/13/2016 05/13/2016--exercise stress test with myocardial perfusion indicates normal study with no evidence of ischemia.  Left ventricular ejection fraction is hyperdynamic with an EF greater than 70%.  Impressions are consistent with a low risk study.  12/07/2011--stress Cardiolite negative for ischemia or previous infarction. Ejection fraction greater than 70%.   12/20/2009--stress Cardiolite negative for infarction or ischemia.   10/28/2005--stress Cardiolite showed no evidence of ischemia or infarction.   05/28/2002--unremarkable stress EKG.   • History of carotid Doppler/vascular screen 10/03/2014     10/03/2014--[facility] screening revealed mild bilateral carotid plaque, negative for atrial fibrillation, negative for AAA, negative for PAD, osteoporosis screen revealed osteopenia. Body mass index was 25 and considered to be moderate risk. 07/25/2012--vascular screen negative for carotid plaque, negative for abdominal aneurysm, negative for PAD   • History of chest pain 11/03/2014 11/03/2014--patient seen in follow-up and reports her epigastric pain/chest pain has resolved. I reviewed the results of the studies with her. It does not appear that her problem is related to biliary tract disease. She does have reflux and although the recent upper GI revealed minimal reflux, I do think her symptoms are related to esophageal reflux with esophageal spasm.   10/21/2014--air-contras   • History of chest x-ray 06/29/2015 06/29/2015--chest x-ray PA and  lateral performed for exertional dyspnea reveals no active disease   • History of Dyspnea on exertion 06/29/2015 06/29/2015--patient presents with a 4-6 week history of exertional dyspnea that comes on with activity such as climbing stairs or walking her dog up an incline.  No chest pain.  Relieved with rest.  No cough.  She does have complaints of her feet and legs swelling that is particularly worse at the end of the day and not improved overnight.  No orthopnea or PND.  Chest exam reveals faint rales at t   • History of echocardiogram 07/15/2015     07/15/2015--echocardiogram performed for murmur and dyspnea. Left ventricle size is normal. Left ventricular systolic function normal with ejection fraction 55%. Grade 1 diastolic dysfunction, abnormal relaxation pattern. There is trace tricuspid regurgitation. Estimated right ventricular systolic pressure is 25 mmHg which is normal.   • HIstory of Left lumbar radiculopathy Remote     Patient has multilevel degenerative disc disease and degenerative arthritis of the lumbar spine   • History of mammogram 03/29/2011 03/29/2011--negative mammogram.   • History of palpitations 12/07/2011     Patient has had multiple admissions to the hospital for complaints of chest pain and heart palpitations. She meant admitted at least on 3 occasions. She has had at least 3 stress Cardiolite and 1 stress ECG, the last Cardiolite being performed 12/07/2011 which was negative. Patient is also had Holter monitors which have been unremarkable.   • History of pneumococcal vaccination 11/20/2015 11/20/2015--PPSV 23 given. No further pneumococcal vaccinations required. 02/25/2015--Prevnar 13 given. Patient is pneumococcal vaccination jenn and therefore will need a PPSV 23 in 6-12 months.   • HIstory of Schatzki's ring 02/28/2012 02/28/2012--air-contrast upper GI revealed small to moderate sized reducible sliding hiatal herniation of the upper stomach with some demonstrated  gastroesophageal reflux. No esophageal, gastric, or duodenal mass or mucosal ulceration was seen.  11/03/2008--EGD performed for evaluation of iron deficiency anemia revealed hiatal hernia without evidence of reflux, prepyloric antritis and   • History of Substernal precordial chest pain 5/5/2016 06/02/2016--patient seen in follow-up and reports she has recurrence of her epigastric discomfort that is believed to be related to her hiatal hernia, reflux, and probably esophageal spasm.  I do think that her substernal chest pain is gastrointestinal related.  She has been off omeprazole for some time due to problems with the prescription.  She reinitiated this just yesterday.  05/13/2016--exercise stress test with myocardial perfusion indicates normal study with no evidence of ischemia.  Left ventricular ejection fraction is hyperdynamic with an EF greater than 70%.  Impressions are consistent with a low risk study.  05/05/2016--patient reports that over the past year she has had intermittent episodes of chest pain.  She describes a substernal pressure and this radiates into her left upper extremity as well as the jaw.  It lasts last than a minute.  The most recent episode was associated with pain in the right upper extremity that radiated up into the right jaw and then crossed over to the left jaw.  T   • History of Urinary urgency 11/20/2015 06/02/2016--patient seen in follow-up and reports her urinary symptoms have resolved essentially.  01/07/2016--patient seen in follow-up and reports she could not tolerate the Myrbetriq due to stomach issues.  However, she does report that her urinary symptoms have improved and are now tolerable.  11/20/2015--patient presents with approximately 2 month history of urinary urgency that is associated with intermittent hesitancy.  She describes the urge to urinate and she will go to the bathroom and then only be able to produce a few drops.  Other times she will get urgency and  she will be able to completely void.  She has these symptoms at night as well and has nocturia approximately 2 times per night.  She denies dysuria.  She also has periodic nocturnal enuresis.  Urinalysis and urine culture sent.  Myrbetriq 25 mg per day initiated.  May increase to 50 mg per day if necessary.  Follow-up in 2 weeks to reassess.  Urinalysis revealed 3+ bacteria but urine culture was negative.   • History of Zostavax administration 01/07/2016 01/07/2016--Zostavax given.         Past Surgical History   Procedure Laterality Date   • Appendectomy  1965     1965   • Colonoscopy  11/03/2008 2008-- normal colonoscopy   • Colonoscopy  2001 2001--normal colonoscopy.   • Skin surgery  05/25/2010 05/25/2010--skin lesion excised from right lower extremity. 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   • Endoscopy  10/08/2014     02/28/2012--air-contrast upper GI revealed small to moderate sized reducible sliding hiatal herniation of the upper stomach with some demonstrated gastroesophageal reflux. No esophageal, gastric, or duodenal mass or mucosal ulceration was seen. 11/03/2008--EGD performed for evaluation of iron deficiency anemia revealed hiatal hernia without evidence of reflux, prepyloric antritis and   • Esophageal dilation  11/19/2001 11/19/2001--EGD revealed a very tortuous distal esophagus with a Schatzki's ring. No ulcer or erosions. No Dorado's mucosa. Stomach revealed patchy erythema and erosions in the antrum. Biopsy. Normal pylorus with no obstruction. Normal duodenum with no ulcers. The Schatzki's ring was dilated with a Rhodes dilator.;    • Knee arthroscopy Right 12/12/2011 12/12/2011--right knee arthroscopy with partial lateral and medial meniscectomies.   • Incontinence surgery  1979 1979--a bladder tack procedure for urinary stress incontinence   • Endoscopy  11/03/2008 11/03/2008--EGD performed for evaluation of iron deficiency anemia  revealed hiatal hernia without evidence of reflux, prepyloric antritis and   • Endoscopy  11/19/2001 11/19/2001--EGD revealed a very tortuous distal esophagus with a Schatzki's ring. No ulcer or erosions. No Dorado's mucosa. Stomach revealed patchy erythema and erosions in the antrum. Biopsy. Normal pylorus with no obstruction. Normal duodenum with no ulcers. The Schatzki's ring was dilated with a Rhodes dilator.;   • Total abdominal hysterectomy  1974 1974--total abdominal hysterectomy.   • Endoscopy N/A 9/27/2016     Procedure: ESOPHAGOGASTRODUODENOSCOPY with biopsy AND RESOLUTION CLIP X1;  Surgeon: Reji Johnson MD;  Location: [facility] ENDOSCOPY;  Service:          Allergies   Allergen Reactions   • Penicillins Itching   • Tetanus Toxoids Itching           Current Outpatient Prescriptions:   •  aspirin 81 MG EC tablet, Take 81 mg by mouth daily., Disp: , Rfl:   •  clonazePAM (KlonoPIN) 1 MG tablet, Take 1 tablet by mouth At Night As Needed for anxiety., Disp: 30 tablet, Rfl: 2  •  cycloSPORINE (RESTASIS) 0.05 % ophthalmic emulsion, 1 drop 2 (Two) Times a Day., Disp: , Rfl:   •  diclofenac (VOLTAREN) 1 % gel gel, Apply 4 g topically 3 (three) times a day as needed., Disp: , Rfl:   •  estradiol (ESTRACE) 1 MG tablet, TAKE 1 TABLET BY MOUTH DAILY, Disp: 90 tablet, Rfl: 1  •  gabapentin (NEURONTIN) 300 MG capsule, TK ONE C PO  TID PRN, Disp: , Rfl: 0  •  HYDROcodone-acetaminophen (NORCO)  MG per tablet, Take 1 tablet by mouth every 6 (six) hours as needed for moderate pain (4-6)., Disp: , Rfl:   •  lisinopril (PRINIVIL,ZESTRIL) 20 MG tablet, Take 10 mg by mouth daily., Disp: , Rfl:   •  Misc Natural Products (COLON CLEANSE PO), Take 4 tablets by mouth every night., Disp: , Rfl:   •  NON FORMULARY, Take 1 tablet by mouth daily. Eye vitamin, Disp: , Rfl:   •  omeprazole (PriLOSEC) 40 MG capsule, One by mouth daily before the first meal, Disp: 90 capsule, Rfl: 3  •  topiramate (TOPAMAX) 100 MG  "tablet, TAKE 1 TABLET BY MOUTH DAILY, Disp: 90 tablet, Rfl: 0  •  triamterene-hydrochlorothiazide (DYAZIDE) 37.5-25 MG per capsule, One by mouth daily for blood pressure and leg swelling, Disp: 90 capsule, Rfl: 3  •  venlafaxine XR (EFFEXOR-XR) 150 MG 24 hr capsule, TK ONE C PO  QHS, Disp: , Rfl: 0  •  venlafaxine XR (EFFEXOR-XR) 75 MG 24 hr capsule, Take 75 mg by mouth Daily., Disp: , Rfl:       Family History   Problem Relation Age of Onset   • Heart attack Mother      dies age 47 from heart attack   • Heart attack Maternal Aunt      dies age 60 from heart attack         Social History     Social History   • Marital status:      Spouse name: N/A   • Number of children: N/A   • Years of education: N/A     Occupational History   • homemaker      Social History Main Topics   • Smoking status: Never Smoker   • Smokeless tobacco: Never Used   • Alcohol use Yes      Comment: rarely   • Drug use: No   • Sexual activity: Yes     Partners: Male     Other Topics Concern   • Not on file     Social History Narrative         Vitals:    02/15/17 1500   BP: 108/62   Pulse: 76   SpO2: 94%   Weight: 129 lb (58.5 kg)   Height: 60.5\" (153.7 cm)          Physical Exam:    General: Alert and oriented x 3.  No acute distress, although she is clearly depressed and somewhat tearful at times.  Normal affect.  HEENT: Pupils equal, round, reactive to light; extraocular movements intact; sclerae nonicteric; pharynx, ear canals and TMs normal.  Neck: Without JVD, thyromegaly, bruit, or adenopathy.  Lungs: Clear to auscultation in all fields.  Heart: Regular rate and rhythm without murmur, rub, gallop, or click.  Abdomen: Soft, nontender, without hepatosplenomegaly or hernia.  Bowel sounds normal.  : Deferred.  Rectal: Deferred.  Extremities: Without clubbing, cyanosis, edema, or pulse deficit.  Neurologic: Intact without focal deficit.  Normal station and gait observed during ingress and egress from the examination room.  Skin: " Without significant lesion.  Musculoskeletal: Unremarkable except there is definite tenderness and probable swelling involving her left calf.  I see no active synovitis.    Lab/other results:    I reviewed the results of the recent EGD and the pathology reports.    Assessment/Plan:     Diagnosis Plan   1. Shortness of breath  Duplex Venous Lower Extremity - Left CAR   2. Pain of left calf  Duplex Venous Lower Extremity - Left CAR   3. Calf swelling  Duplex Venous Lower Extremity - Left CAR   4. Tenderness of left calf  Duplex Venous Lower Extremity - Left CAR   5. Chronic gastric ulcer     6. Chronic gastritis with bleeding, unspecified gastritis type  Iron Profile   7. Chronic fatigue  CK    Comprehensive Metabolic Panel    C-reactive Protein    TSH    T4, Free    T3, Free    Sedimentation Rate   8. Hypersomnolence     9. Chronic anemia  CBC (No Diff)    Iron Profile   10. Benign essential hypertension     11. Chronic renal insufficiency, stage II (mild), creatinine 1.12  Comprehensive Metabolic Panel   12. Depression with anxiety     13. Gastroesophageal reflux disease with esophagitis     14. Chronic low back pain, unspecified back pain laterality, with sciatica presence unspecified     15. Acute deep vein thrombosis (DVT) of distal end of left lower extremity       Patient continues to have chronic fatigue that I suspect is multifactorial.  I'm certain that her depression is not in remission and playing some role but I do not think that a psychiatric issue is the only problem here.  She has new complaints of left calf pain and swelling and tenderness with associated shortness of breath that occurred after a airplane flight out to [place].  Obviously concern for DVT and pulmonary embolism needs to be entertained.  Her gastrointestinal symptoms from the gastric ulcer and gastritis has definitely improved and currently she is only taking omeprazole which may very well be fine, although I would feel better if she  were back on the Carafate as directed given her history of chronic gastritis.  I do feel that she needs to be assessed for nocturnal hypoxemia but the overnight oximetry test has not been performed after 2 attempted orders.  I will try to get to the bottom of this.  Her chronic anemia could be playing a role as well.  Also her blood pressure seemed extremely low and could be playing a role in her fatigue as well.  This would certainly worsen her chronic renal insufficiency which has been mild.  Patient does have chronic lower back pain and reports that she's been trying to take less of the hydrocodone.    Plan is as follows: Stat Doppler study of the left lower extremity.  Lab work today including CBC, CMP, CRP, d-dimer, thyroid function tests, iron studies.  Patient needs to follow-up in the next day or 2 after the results of the lab work are known.  Of course, if her Doppler study returned positive and we will treat appropriately.  If d-dimer comes back elevated, even if the Doppler study is negative.  We may need to proceed with CTA of the chest.  Further to follow.      Addendum: I just received a call from the radiology department and patient is positive for DVT but this is confined to below the calf.  I had the patient return back to the office to discuss the situation with her and her  face-to-face.  Somewhat difficult clinical decision given that the patient does have a history of chronic gastritis and a history of a chronic gastric ulcer that should have had time enough to heal, but this has not been documented.  I am concerned that her clot actually may have been bigger given  the duration of her symptoms of almost 2 weeks.  Because of her shortness of breath, I am concerned about the possibility of pulmonary embolism.  I have ordered a d-dimer and we will review that result tomorrow, but she may need a CTA of the chest.  In the meantime, until further evaluation, I will start her on Eliquis 5 mg by  mouth twice a day.  Risks, benefits, side effects, potential alternatives discussed.  Patient understands the risk of bleeding and also understands the signs and symptoms of bleeding, particularly gastrointestinal bleeding.  Also patient must start back on her Carafate 1 by mouth 4 times a day at mealtimes and bedtime.  Further to follow pending the lab results.  I will notify patient in the morning whether or not we will proceed with CTA of the chest.  Patient instructed to discontinue aspirin.    Addendum #2: Upon further consideration I have decided to go ahead and proceed with CTA of the chest which I will order stat for tomorrow.  Also note that additional in evaluation and management code warranted for prolonged services.  Patient was in direct contact for most of this visit except for the time she was in the radiology department getting the Doppler venous study.  She will return to the office and we had additional face-to-face contact.  The total amount of time spent face-to-face with patient was 95 minutes.      Procedures

## 2017-02-16 ENCOUNTER — HOSPITAL ENCOUNTER (OUTPATIENT)
Dept: CT IMAGING | Facility: HOSPITAL | Age: 77
Discharge: HOME | End: 2017-02-16
Admitting: INTERNAL MEDICINE

## 2017-02-16 PROCEDURE — 0 IOPAMIDOL 61 % SOLUTION: Performed by: INTERNAL MEDICINE

## 2017-02-16 PROCEDURE — 82565 ASSAY OF CREATININE: CPT

## 2017-02-16 PROCEDURE — 71275 CT ANGIOGRAPHY CHEST: CPT

## 2017-02-16 RX ADMIN — IOPAMIDOL 95 ML: 612 INJECTION, SOLUTION INTRAVENOUS at 12:45

## 2017-02-17 ENCOUNTER — HOSPITAL ENCOUNTER (OUTPATIENT)
Dept: ULTRASOUND IMAGING | Facility: HOSPITAL | Age: 77
Discharge: HOME | End: 2017-02-17
Admitting: INTERNAL MEDICINE

## 2017-02-17 ENCOUNTER — OFFICE VISIT (OUTPATIENT)
Dept: INTERNAL MEDICINE | Facility: CLINIC | Age: 77
End: 2017-02-17

## 2017-02-17 DIAGNOSIS — M79.89 CALF SWELLING: ICD-10-CM

## 2017-02-17 DIAGNOSIS — K25.7 CHRONIC GASTRIC ULCER: ICD-10-CM

## 2017-02-17 DIAGNOSIS — N28.1 SIMPLE RENAL CYST: Chronic | ICD-10-CM

## 2017-02-17 DIAGNOSIS — E04.1 RIGHT THYROID NODULE: ICD-10-CM

## 2017-02-17 DIAGNOSIS — I82.4Z2 ACUTE DEEP VEIN THROMBOSIS (DVT) OF DISTAL END OF LEFT LOWER EXTREMITY (HCC): Primary | ICD-10-CM

## 2017-02-17 DIAGNOSIS — M79.662 TENDERNESS OF LEFT CALF: ICD-10-CM

## 2017-02-17 PROBLEM — R06.02 SHORTNESS OF BREATH: Status: RESOLVED | Noted: 2017-02-15 | Resolved: 2017-02-17

## 2017-02-17 LAB
ALBUMIN SERPL-MCNC: 3.8 G/DL (ref 3.5–5.2)
ALBUMIN/GLOB SERPL: 1.6 G/DL
ALP SERPL-CCNC: 53 U/L (ref 39–117)
ALT SERPL-CCNC: 16 U/L (ref 1–33)
AST SERPL-CCNC: 15 U/L (ref 1–32)
BILIRUB SERPL-MCNC: 0.3 MG/DL (ref 0.1–1.2)
BUN SERPL-MCNC: 20 MG/DL (ref 8–23)
BUN/CREAT SERPL: 22 (ref 7–25)
CALCIUM SERPL-MCNC: 9.3 MG/DL (ref 8.6–10.5)
CHLORIDE SERPL-SCNC: 102 MMOL/L (ref 98–107)
CK SERPL-CCNC: 39 U/L (ref 20–180)
CO2 SERPL-SCNC: 28.1 MMOL/L (ref 22–29)
CREAT BLDA-MCNC: 0.9 MG/DL (ref 0.6–1.3)
CREAT SERPL-MCNC: 0.91 MG/DL (ref 0.57–1)
CRP SERPL-MCNC: 0.53 MG/DL (ref 0–0.5)
ERYTHROCYTE [DISTWIDTH] IN BLOOD BY AUTOMATED COUNT: 18.6 % (ref 11.7–13)
ERYTHROCYTE [SEDIMENTATION RATE] IN BLOOD BY WESTERGREN METHOD: 10 MM/HR (ref 0–30)
GLOBULIN SER CALC-MCNC: 2.4 GM/DL
GLUCOSE SERPL-MCNC: 87 MG/DL (ref 65–99)
HCT VFR BLD AUTO: 37.9 % (ref 35.6–45.5)
HGB BLD-MCNC: 11.6 G/DL (ref 11.9–15.5)
IRON SATN MFR SERPL: 7 % (ref 20–50)
IRON SERPL-MCNC: 41 MCG/DL (ref 37–145)
MCH RBC QN AUTO: 25.3 PG (ref 26.9–32)
MCHC RBC AUTO-ENTMCNC: 30.6 G/DL (ref 32.4–36.3)
MCV RBC AUTO: 82.8 FL (ref 80.5–98.2)
PLATELET # BLD AUTO: 401 10*3/MM3 (ref 140–500)
POTASSIUM SERPL-SCNC: 4 MMOL/L (ref 3.5–5.2)
PROT SERPL-MCNC: 6.2 G/DL (ref 6–8.5)
RBC # BLD AUTO: 4.58 10*6/MM3 (ref 3.9–5.2)
SODIUM SERPL-SCNC: 142 MMOL/L (ref 136–145)
T3FREE SERPL-MCNC: 2.6 PG/ML (ref 2–4.4)
T4 FREE SERPL-MCNC: 1.09 NG/DL (ref 0.93–1.7)
TIBC SERPL-MCNC: 615 MCG/DL
TSH SERPL DL<=0.005 MIU/L-ACNC: 1.12 MIU/ML (ref 0.27–4.2)
UIBC SERPL-MCNC: 574 MCG/DL
WBC # BLD AUTO: 7.27 10*3/MM3 (ref 4.5–10.7)

## 2017-02-17 PROCEDURE — 76536 US EXAM OF HEAD AND NECK: CPT

## 2017-02-17 PROCEDURE — 99214 OFFICE O/P EST MOD 30 MIN: CPT | Performed by: INTERNAL MEDICINE

## 2017-02-17 RX ORDER — VITAMIN E 268 MG
400 CAPSULE ORAL DAILY
COMMUNITY
End: 2017-06-07 | Stop reason: ALTCHOICE

## 2017-02-21 ENCOUNTER — TELEPHONE (OUTPATIENT)
Dept: INTERNAL MEDICINE | Facility: CLINIC | Age: 77
End: 2017-02-21

## 2017-02-23 ENCOUNTER — HOSPITAL ENCOUNTER (OUTPATIENT)
Dept: CARDIOLOGY | Facility: HOSPITAL | Age: 77
Discharge: HOME | End: 2017-02-23
Admitting: INTERNAL MEDICINE

## 2017-02-23 DIAGNOSIS — I82.4Z2 ACUTE DEEP VEIN THROMBOSIS (DVT) OF DISTAL END OF LEFT LOWER EXTREMITY (HCC): ICD-10-CM

## 2017-02-23 PROCEDURE — 93971 EXTREMITY STUDY: CPT

## 2017-03-13 VITALS
HEIGHT: 61 IN | HEART RATE: 89 BPM | OXYGEN SATURATION: 95 % | BODY MASS INDEX: 24.35 KG/M2 | WEIGHT: 129 LBS | SYSTOLIC BLOOD PRESSURE: 110 MMHG | DIASTOLIC BLOOD PRESSURE: 66 MMHG

## 2017-03-21 ENCOUNTER — OFFICE VISIT (OUTPATIENT)
Dept: INTERNAL MEDICINE | Facility: CLINIC | Age: 77
End: 2017-03-21

## 2017-03-21 ENCOUNTER — HOSPITAL ENCOUNTER (OUTPATIENT)
Dept: CARDIOLOGY | Facility: HOSPITAL | Age: 77
Discharge: HOME | End: 2017-03-21
Admitting: INTERNAL MEDICINE

## 2017-03-21 VITALS
HEART RATE: 74 BPM | BODY MASS INDEX: 24.35 KG/M2 | SYSTOLIC BLOOD PRESSURE: 90 MMHG | WEIGHT: 129 LBS | OXYGEN SATURATION: 98 % | HEIGHT: 61 IN | DIASTOLIC BLOOD PRESSURE: 58 MMHG

## 2017-03-21 DIAGNOSIS — G47.10 HYPERSOMNOLENCE: Chronic | ICD-10-CM

## 2017-03-21 DIAGNOSIS — E04.2 MULTINODULAR GOITER: ICD-10-CM

## 2017-03-21 DIAGNOSIS — K29.51 CHRONIC GASTRITIS WITH BLEEDING, UNSPECIFIED GASTRITIS TYPE: Chronic | ICD-10-CM

## 2017-03-21 DIAGNOSIS — I10 BENIGN ESSENTIAL HYPERTENSION: Chronic | ICD-10-CM

## 2017-03-21 DIAGNOSIS — K25.7 CHRONIC GASTRIC ULCER: Chronic | ICD-10-CM

## 2017-03-21 DIAGNOSIS — I82.4Z2 ACUTE DEEP VEIN THROMBOSIS (DVT) OF DISTAL END OF LEFT LOWER EXTREMITY (HCC): Primary | ICD-10-CM

## 2017-03-21 DIAGNOSIS — R53.82 CHRONIC FATIGUE: Chronic | ICD-10-CM

## 2017-03-21 DIAGNOSIS — Z12.31 ENCOUNTER FOR SCREENING MAMMOGRAM FOR HIGH-RISK PATIENT: ICD-10-CM

## 2017-03-21 PROBLEM — M79.89 CALF SWELLING: Status: RESOLVED | Noted: 2017-02-15 | Resolved: 2017-03-21

## 2017-03-21 PROBLEM — M79.662 TENDERNESS OF LEFT CALF: Status: RESOLVED | Noted: 2017-02-15 | Resolved: 2017-03-21

## 2017-03-21 PROBLEM — E04.1 RIGHT THYROID NODULE: Chronic | Status: ACTIVE | Noted: 2017-02-17

## 2017-03-21 LAB
BH CV LOWER VASCULAR LEFT COMMON FEMORAL AUGMENT: NORMAL
BH CV LOWER VASCULAR LEFT COMMON FEMORAL COMPETENT: NORMAL
BH CV LOWER VASCULAR LEFT COMMON FEMORAL COMPRESS: NORMAL
BH CV LOWER VASCULAR LEFT COMMON FEMORAL PHASIC: NORMAL
BH CV LOWER VASCULAR LEFT COMMON FEMORAL SPONT: NORMAL
BH CV LOWER VASCULAR LEFT DISTAL FEMORAL COMPRESS: NORMAL
BH CV LOWER VASCULAR LEFT GASTRONEMIUS COMPRESS: NORMAL
BH CV LOWER VASCULAR LEFT GREATER SAPH AK COMPRESS: NORMAL
BH CV LOWER VASCULAR LEFT GREATER SAPH BK COMPRESS: NORMAL
BH CV LOWER VASCULAR LEFT MID FEMORAL AUGMENT: NORMAL
BH CV LOWER VASCULAR LEFT MID FEMORAL COMPETENT: NORMAL
BH CV LOWER VASCULAR LEFT MID FEMORAL COMPRESS: NORMAL
BH CV LOWER VASCULAR LEFT MID FEMORAL PHASIC: NORMAL
BH CV LOWER VASCULAR LEFT MID FEMORAL SPONT: NORMAL
BH CV LOWER VASCULAR LEFT PERONEAL COMPRESS: NORMAL
BH CV LOWER VASCULAR LEFT POPLITEAL AUGMENT: NORMAL
BH CV LOWER VASCULAR LEFT POPLITEAL COMPETENT: NORMAL
BH CV LOWER VASCULAR LEFT POPLITEAL COMPRESS: NORMAL
BH CV LOWER VASCULAR LEFT POPLITEAL PHASIC: NORMAL
BH CV LOWER VASCULAR LEFT POPLITEAL SPONT: NORMAL
BH CV LOWER VASCULAR LEFT POSTERIOR TIBIAL COMPRESS: NORMAL
BH CV LOWER VASCULAR LEFT PROXIMAL FEMORAL COMPRESS: NORMAL
BH CV LOWER VASCULAR LEFT SAPHENOFEMORAL JUNCTION AUGMENT: NORMAL
BH CV LOWER VASCULAR LEFT SAPHENOFEMORAL JUNCTION COMPETENT: NORMAL
BH CV LOWER VASCULAR LEFT SAPHENOFEMORAL JUNCTION COMPRESS: NORMAL
BH CV LOWER VASCULAR LEFT SAPHENOFEMORAL JUNCTION PHASIC: NORMAL
BH CV LOWER VASCULAR LEFT SAPHENOFEMORAL JUNCTION SPONT: NORMAL
BH CV LOWER VASCULAR RIGHT COMMON FEMORAL AUGMENT: NORMAL
BH CV LOWER VASCULAR RIGHT COMMON FEMORAL COMPETENT: NORMAL
BH CV LOWER VASCULAR RIGHT COMMON FEMORAL COMPRESS: NORMAL
BH CV LOWER VASCULAR RIGHT COMMON FEMORAL PHASIC: NORMAL
BH CV LOWER VASCULAR RIGHT COMMON FEMORAL SPONT: NORMAL

## 2017-03-21 PROCEDURE — 99214 OFFICE O/P EST MOD 30 MIN: CPT | Performed by: INTERNAL MEDICINE

## 2017-03-21 PROCEDURE — 93971 EXTREMITY STUDY: CPT

## 2017-03-21 RX ORDER — BUSPIRONE HYDROCHLORIDE 15 MG/1
TABLET ORAL
COMMUNITY
Start: 2017-03-19 | End: 2017-06-07 | Stop reason: ALTCHOICE

## 2017-03-21 NOTE — PROGRESS NOTES
03/21/2017    Patient Information  Sachi Vora                                                                                          [address]   [place] [address]      1940  [phone]      Chief Complaint:     Follow-up acute DVT of left lower extremity, thyroid ultrasound for possible thyroid nodule, chronic fatigue/hypersomnolence, history of chronic gastric ulcer and gastritis.  Complaining of continued fatigue.    History of Present Illness:    Patient with a history of multiple medical issues including osteoporosis, hypertension, carotid artery plaque, chronic gastritis and chronic gastric ulcer, chronic lower back pain, chronic renal insufficiency, depression with anxiety, esophageal reflux, hyperlipidemia, chronic migraine, pancreatic divisum with pancreatic duct dilatation, pedal edema, restless leg syndrome, chronic fatigue and hypersomnolence, chronic anemia, recent discovery of DVT of the left lower extremity, recent discovery of multinodular goiter.  She presents today to follow-up on several issues that will be described below.  She currently is feeling better in regards to her complaints of the left lower extremity but continues to have complaints of fatigue.  Her past medical history extensively reviewed and updated where necessary including health maintenance parameters.  This reveals she needs a mammogram and also needs a DEXA scan.  However, I want to hold off on the DEXA scan until after she receives her next Reclast infusion in April of this year.    Review of Systems   Constitution: Positive for weakness and malaise/fatigue.   HENT: Negative.    Eyes: Negative.    Cardiovascular: Negative.    Respiratory: Negative.    Endocrine: Negative.    Hematologic/Lymphatic: Negative.    Skin: Negative.    Musculoskeletal: Positive for back pain.   Gastrointestinal: Negative.    Genitourinary: Negative.    Psychiatric/Behavioral: Positive for depression. The patient is  nervous/anxious.    Allergic/Immunologic: Negative.        Active Problems:    Patient Active Problem List   Diagnosis   • Osteoporosis, 03/29/2011--lumbar spine -2.8.  Right femoral neck -2.4.  Left femoral neck -2.4.  Patient receives Reclast infusions.   • Therapeutic drug monitoring   • Simple renal cyst   • Benign essential hypertension   • Carotid artery plaque, 10/03/2014--mild bilateral carotid artery plaque.   • Chronic gastritis   • Chronic lower back pain   • Chronic otitis externa   • Chronic renal insufficiency, stage II (mild), creatinine 1.12   • Depression with anxiety   • Gastroesophageal reflux disease with esophagitis   • Functional murmur, 07/15/2015--normal echocardiogram.   • Hyperlipidemia   • Menopausal state   • Chronic migraine   • Pancreatic divisum   • Pancreatic Duct Dilation   • Pedal edema   • Restless legs syndrome   • Bilateral sensorineural hearing loss   • Vitamin D deficiency   • Breast cancer screening   • Chronic gastric ulcer   • Chronic fatigue   • Hypersomnolence   • Chronic anemia   • Acute deep vein thrombosis (DVT) of distal end of left lower extremity   • Multinodular goiter         Past Medical History   Diagnosis Date   • History of acute pyelonephritis 11/29/2001 11/29/2001--patient was admitted to the hospital with fever, chills, leukocytosis and abdominal pain. Evaluation revealed acute left pyelonephritis pyelonephritis and sepsis syndrome.   • History of bone density study 03/29/2011 03/29/2011--DEXA scan revealed a lumbar T score of -2.8, right femoral neck T score -2.4, left femoral neck T score -2.4. Osteoporosis of the lumbar spine and severe osteopenia of the hips bilaterally. Patient has been intolerant to Fosamax because of gastritis and gastroesophageal reflux.   04/01/2009--treatment for oste   • History of cardiovascular stress test 5/13/2016 05/13/2016--exercise stress test with myocardial perfusion indicates normal study with no evidence of  ischemia.  Left ventricular ejection fraction is hyperdynamic with an EF greater than 70%.  Impressions are consistent with a low risk study.  12/07/2011--stress Cardiolite negative for ischemia or previous infarction. Ejection fraction greater than 70%.   12/20/2009--stress Cardiolite negative for infarction or ischemia.   10/28/2005--stress Cardiolite showed no evidence of ischemia or infarction.   05/28/2002--unremarkable stress EKG.   • History of carotid Doppler/vascular screen 10/03/2014     10/03/2014--[facility] screening revealed mild bilateral carotid plaque, negative for atrial fibrillation, negative for AAA, negative for PAD, osteoporosis screen revealed osteopenia. Body mass index was 25 and considered to be moderate risk. 07/25/2012--vascular screen negative for carotid plaque, negative for abdominal aneurysm, negative for PAD   • History of chest pain 11/03/2014 11/03/2014--patient seen in follow-up and reports her epigastric pain/chest pain has resolved. I reviewed the results of the studies with her. It does not appear that her problem is related to biliary tract disease. She does have reflux and although the recent upper GI revealed minimal reflux, I do think her symptoms are related to esophageal reflux with esophageal spasm.   10/21/2014--air-contras   • History of chest x-ray 06/29/2015 06/29/2015--chest x-ray PA and lateral performed for exertional dyspnea reveals no active disease   • History of Dyspnea on exertion 06/29/2015 06/29/2015--patient presents with a 4-6 week history of exertional dyspnea that comes on with activity such as climbing stairs or walking her dog up an incline.  No chest pain.  Relieved with rest.  No cough.  She does have complaints of her feet and legs swelling that is particularly worse at the end of the day and not improved overnight.  No orthopnea or PND.  Chest exam reveals faint rales at t   • History of echocardiogram 07/15/2015      07/15/2015--echocardiogram performed for murmur and dyspnea. Left ventricle size is normal. Left ventricular systolic function normal with ejection fraction 55%. Grade 1 diastolic dysfunction, abnormal relaxation pattern. There is trace tricuspid regurgitation. Estimated right ventricular systolic pressure is 25 mmHg which is normal.   • HIstory of Left lumbar radiculopathy Remote     Patient has multilevel degenerative disc disease and degenerative arthritis of the lumbar spine   • History of mammogram 03/29/2011 03/29/2011--negative mammogram.   • History of palpitations 12/07/2011     Patient has had multiple admissions to the hospital for complaints of chest pain and heart palpitations. She meant admitted at least on 3 occasions. She has had at least 3 stress Cardiolite and 1 stress ECG, the last Cardiolite being performed 12/07/2011 which was negative. Patient is also had Holter monitors which have been unremarkable.   • History of pneumococcal vaccination 11/20/2015 11/20/2015--PPSV 23 given. No further pneumococcal vaccinations required. 02/25/2015--Prevnar 13 given. Patient is pneumococcal vaccination jenn and therefore will need a PPSV 23 in 6-12 months.   • HIstory of Schatzki's ring 02/28/2012 02/28/2012--air-contrast upper GI revealed small to moderate sized reducible sliding hiatal herniation of the upper stomach with some demonstrated gastroesophageal reflux. No esophageal, gastric, or duodenal mass or mucosal ulceration was seen.  11/03/2008--EGD performed for evaluation of iron deficiency anemia revealed hiatal hernia without evidence of reflux, prepyloric antritis and   • History of Substernal precordial chest pain 5/5/2016 06/02/2016--patient seen in follow-up and reports she has recurrence of her epigastric discomfort that is believed to be related to her hiatal hernia, reflux, and probably esophageal spasm.  I do think that her substernal chest pain is gastrointestinal related.   She has been off omeprazole for some time due to problems with the prescription.  She reinitiated this just yesterday.  05/13/2016--exercise stress test with myocardial perfusion indicates normal study with no evidence of ischemia.  Left ventricular ejection fraction is hyperdynamic with an EF greater than 70%.  Impressions are consistent with a low risk study.  05/05/2016--patient reports that over the past year she has had intermittent episodes of chest pain.  She describes a substernal pressure and this radiates into her left upper extremity as well as the jaw.  It lasts last than a minute.  The most recent episode was associated with pain in the right upper extremity that radiated up into the right jaw and then crossed over to the left jaw.  T   • History of Urinary urgency 11/20/2015 06/02/2016--patient seen in follow-up and reports her urinary symptoms have resolved essentially.  01/07/2016--patient seen in follow-up and reports she could not tolerate the Myrbetriq due to stomach issues.  However, she does report that her urinary symptoms have improved and are now tolerable.  11/20/2015--patient presents with approximately 2 month history of urinary urgency that is associated with intermittent hesitancy.  She describes the urge to urinate and she will go to the bathroom and then only be able to produce a few drops.  Other times she will get urgency and she will be able to completely void.  She has these symptoms at night as well and has nocturia approximately 2 times per night.  She denies dysuria.  She also has periodic nocturnal enuresis.  Urinalysis and urine culture sent.  Myrbetriq 25 mg per day initiated.  May increase to 50 mg per day if necessary.  Follow-up in 2 weeks to reassess.  Urinalysis revealed 3+ bacteria but urine culture was negative.   • History of Zostavax administration 01/07/2016 01/07/2016--Zostavax given.         Past Surgical History   Procedure Laterality Date   •  Appendectomy  1965     1965   • Colonoscopy  11/03/2008 2008-- normal colonoscopy   • Colonoscopy  2001 2001--normal colonoscopy.   • Skin surgery  05/25/2010 05/25/2010--skin lesion excised from right lower extremity. 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   • Endoscopy  10/08/2014     02/28/2012--air-contrast upper GI revealed small to moderate sized reducible sliding hiatal herniation of the upper stomach with some demonstrated gastroesophageal reflux. No esophageal, gastric, or duodenal mass or mucosal ulceration was seen. 11/03/2008--EGD performed for evaluation of iron deficiency anemia revealed hiatal hernia without evidence of reflux, prepyloric antritis and   • Esophageal dilation  11/19/2001 11/19/2001--EGD revealed a very tortuous distal esophagus with a Schatzki's ring. No ulcer or erosions. No Dorado's mucosa. Stomach revealed patchy erythema and erosions in the antrum. Biopsy. Normal pylorus with no obstruction. Normal duodenum with no ulcers. The Schatzki's ring was dilated with a Rhodes dilator.;    • Knee arthroscopy Right 12/12/2011 12/12/2011--right knee arthroscopy with partial lateral and medial meniscectomies.   • Incontinence surgery  1979 1979--a bladder tack procedure for urinary stress incontinence   • Endoscopy  11/03/2008 11/03/2008--EGD performed for evaluation of iron deficiency anemia revealed hiatal hernia without evidence of reflux, prepyloric antritis and   • Endoscopy  11/19/2001 11/19/2001--EGD revealed a very tortuous distal esophagus with a Schatzki's ring. No ulcer or erosions. No Dorado's mucosa. Stomach revealed patchy erythema and erosions in the antrum. Biopsy. Normal pylorus with no obstruction. Normal duodenum with no ulcers. The Schatzki's ring was dilated with a Rhodes dilator.;   • Total abdominal hysterectomy  1974 1974--total abdominal hysterectomy.   • Endoscopy N/A 9/27/2016     Procedure:  ESOPHAGOGASTRODUODENOSCOPY with biopsy AND RESOLUTION CLIP X1;  Surgeon: Reji Johnson MD;  Location: [facility] ENDOSCOPY;  Service:          Allergies   Allergen Reactions   • Penicillins Itching   • Tetanus Toxoids Itching           Current Outpatient Prescriptions:   •  apixaban (ELIQUIS) 2.5 MG tablet tablet, Take 1 tablet by mouth 2 (Two) Times a Day., Disp: 60 tablet, Rfl:   •  busPIRone (BUSPAR) 15 MG tablet, , Disp: , Rfl:   •  Cholecalciferol (VITAMIN D3) 5000 UNITS capsule capsule, Take 5,000 Units by mouth Daily., Disp: , Rfl:   •  clonazePAM (KlonoPIN) 1 MG tablet, Take 1 tablet by mouth At Night As Needed for anxiety., Disp: 30 tablet, Rfl: 2  •  cycloSPORINE (RESTASIS) 0.05 % ophthalmic emulsion, 1 drop 2 (Two) Times a Day., Disp: , Rfl:   •  diclofenac (VOLTAREN) 1 % gel gel, Apply 4 g topically 3 (three) times a day as needed., Disp: , Rfl:   •  estradiol (ESTRACE) 1 MG tablet, TAKE 1 TABLET BY MOUTH DAILY, Disp: 90 tablet, Rfl: 1  •  gabapentin (NEURONTIN) 300 MG capsule, TK ONE C PO  TID PRN, Disp: , Rfl: 0  •  HYDROcodone-acetaminophen (NORCO)  MG per tablet, Take 1 tablet by mouth every 6 (six) hours as needed for moderate pain (4-6)., Disp: , Rfl:   •  lisinopril (PRINIVIL,ZESTRIL) 20 MG tablet, Take 10 mg by mouth daily., Disp: , Rfl:   •  Misc Natural Products (COLON CLEANSE PO), Take 4 tablets by mouth every night., Disp: , Rfl:   •  NON FORMULARY, Take 1 tablet by mouth daily. Eye vitamin, Disp: , Rfl:   •  omeprazole (PriLOSEC) 40 MG capsule, One by mouth daily before the first meal, Disp: 90 capsule, Rfl: 3  •  sucralfate (CARAFATE) 1 G tablet, One by mouth 4 times daily before meals and at bedtime., Disp: 120 tablet, Rfl: 5  •  topiramate (TOPAMAX) 100 MG tablet, TAKE 1 TABLET BY MOUTH DAILY, Disp: 90 tablet, Rfl: 0  •  triamterene-hydrochlorothiazide (DYAZIDE) 37.5-25 MG per capsule, One by mouth daily for blood pressure and leg swelling, Disp: 90 capsule, Rfl: 3  •   "venlafaxine XR (EFFEXOR-XR) 150 MG 24 hr capsule, TK ONE C PO  QHS, Disp: , Rfl: 0  •  venlafaxine XR (EFFEXOR-XR) 75 MG 24 hr capsule, Take 75 mg by mouth Daily., Disp: , Rfl:   •  vitamin E 400 UNIT capsule, Take 400 Units by mouth Daily., Disp: , Rfl:       Family History   Problem Relation Age of Onset   • Heart attack Mother      dies age 47 from heart attack   • Heart attack Maternal Aunt      dies age 60 from heart attack         Social History     Social History   • Marital status:      Spouse name: N/A   • Number of children: N/A   • Years of education: N/A     Occupational History   • homemaker      Social History Main Topics   • Smoking status: Never Smoker   • Smokeless tobacco: Never Used   • Alcohol use Yes      Comment: rarely   • Drug use: No   • Sexual activity: Yes     Partners: Male     Other Topics Concern   • Not on file     Social History Narrative         Vitals:    03/21/17 0919   BP: 90/58   Pulse: 74   SpO2: 98%   Weight: 129 lb (58.5 kg)   Height: 60.5\" (153.7 cm)          Physical Exam:    General: Alert and oriented x 3.  No acute distress.  Normal affect.  HEENT: Pupils equal, round, reactive to light; extraocular movements intact; sclerae nonicteric; pharynx, ear canals and TMs normal.  Neck: Without JVD, thyromegaly, bruit, or adenopathy.  Lungs: Clear to auscultation in all fields.  Heart: Regular rate and rhythm without murmur, rub, gallop, or click.  Abdomen: Soft, nontender, without hepatosplenomegaly or hernia.  Bowel sounds normal.  : Deferred.  Rectal: Deferred.  Extremities: Without clubbing, cyanosis,  or pulse deficit. There is trace to perhaps 1+ lower extremity edema that extends up to about the level.  No significant tenderness noted of the left calf. Neurologic: Intact without focal deficit.  Normal station and gait observed during ingress and egress from the examination room.  Skin: Without significant lesion.  Musculoskeletal: Unremarkable.      Lab/other " results:    I reviewed the results of most recent Doppler venous study with the patient.  We also reviewed the thyroid ultrasound.    Assessment/Plan:     Diagnosis Plan   1. Acute deep vein thrombosis (DVT) of distal end of left lower extremity     2. Multinodular goiter     3. Chronic fatigue     4. Hypersomnolence     5. Chronic gastric ulcer     6. Chronic gastritis with bleeding, unspecified gastritis type     7. Benign essential hypertension         Patient with questionably acute DVT of left lower extremity.  I'm beginning to think this may be a chronic condition.  I would like to get her off of anticoagulation as soon as possible.  We may need to get an opinion from the vascular surgeon. We will repeat a vascular study as soon as possible and hopefully the clock will resolve and we can discontinue anticoagulation.  If not, we may discontinue the anticoagulation anyway.  This is particularly important given her history of chronic gastric ulcer and chronic gastritis although her gastrointestinal symptoms seem to be controlled with PPI therapy and H2 blockers.risk of bleeding is definitely increased.  Patient continues to have complaints of chronic fatigue and hypersomnolence.  The overnight oximetry was never performed. Patient was never contacted.  I may consider reordering this study but her blood pressure is running low and I think this could be a big contributor to her symptoms.    Plan is as follows: Doppler venous study ordered of the left lower extremity.  Discontinue lisinopril altogether.  I will have patient follow-up in about 4 weeks to reassess her fatigue, the DVT, and her blood pressure.  Further to follow.  Mammogram ordered.    Addendum: Preliminary Doppler venous study is negative for DVT.  It appears that the clot has resolved.  I informed patient to stay on the Eliquis until I get a formal report back.  I'll call her as soon as I obtain this report.      Procedures

## 2017-03-27 RX ORDER — TOPIRAMATE 100 MG/1
100 TABLET, FILM COATED ORAL DAILY
Qty: 90 TABLET | Refills: 0 | Status: SHIPPED | OUTPATIENT
Start: 2017-03-27 | End: 2017-07-13 | Stop reason: SDUPTHER

## 2017-04-03 ENCOUNTER — TELEPHONE (OUTPATIENT)
Dept: INTERNAL MEDICINE | Facility: CLINIC | Age: 77
End: 2017-04-03

## 2017-04-03 NOTE — TELEPHONE ENCOUNTER
Pt had brought in an order from her Psychiatrist to check her thyroid due to fatigue. I've provided her a copy of latest thyroid test.  Recommended that she visit [facility] to finish any other labs that her psychiatrist ordered.  Pt states understanding

## 2017-04-04 ENCOUNTER — HOSPITAL ENCOUNTER (OUTPATIENT)
Dept: MAMMOGRAPHY | Facility: HOSPITAL | Age: 77
Discharge: HOME | End: 2017-04-04
Admitting: INTERNAL MEDICINE

## 2017-04-04 ENCOUNTER — TRANSCRIBE ORDERS (OUTPATIENT)
Dept: ADMINISTRATIVE | Facility: HOSPITAL | Age: 77
End: 2017-04-04

## 2017-04-04 DIAGNOSIS — Z12.31 ENCOUNTER FOR SCREENING MAMMOGRAM FOR HIGH-RISK PATIENT: ICD-10-CM

## 2017-04-04 PROCEDURE — G0202 SCR MAMMO BI INCL CAD: HCPCS

## 2017-04-11 ENCOUNTER — OFFICE VISIT (OUTPATIENT)
Dept: INTERNAL MEDICINE | Facility: CLINIC | Age: 77
End: 2017-04-11

## 2017-04-11 VITALS
HEART RATE: 79 BPM | SYSTOLIC BLOOD PRESSURE: 120 MMHG | BODY MASS INDEX: 25.11 KG/M2 | WEIGHT: 133 LBS | HEIGHT: 61 IN | OXYGEN SATURATION: 98 % | DIASTOLIC BLOOD PRESSURE: 64 MMHG

## 2017-04-11 DIAGNOSIS — M81.0 OSTEOPOROSIS: Primary | Chronic | ICD-10-CM

## 2017-04-11 DIAGNOSIS — E55.9 VITAMIN D DEFICIENCY: Chronic | ICD-10-CM

## 2017-04-11 DIAGNOSIS — G47.10 HYPERSOMNOLENCE: Chronic | ICD-10-CM

## 2017-04-11 DIAGNOSIS — E78.5 HYPERLIPIDEMIA, UNSPECIFIED HYPERLIPIDEMIA TYPE: Chronic | ICD-10-CM

## 2017-04-11 DIAGNOSIS — D64.9 CHRONIC ANEMIA: Chronic | ICD-10-CM

## 2017-04-11 DIAGNOSIS — M81.0 OSTEOPOROSIS: Chronic | ICD-10-CM

## 2017-04-11 DIAGNOSIS — E04.2 MULTINODULAR GOITER: Chronic | ICD-10-CM

## 2017-04-11 DIAGNOSIS — F41.1 GENERALIZED ANXIETY DISORDER: Chronic | ICD-10-CM

## 2017-04-11 DIAGNOSIS — F41.8 DEPRESSION WITH ANXIETY: Chronic | ICD-10-CM

## 2017-04-11 DIAGNOSIS — R53.82 CHRONIC FATIGUE: Primary | Chronic | ICD-10-CM

## 2017-04-11 DIAGNOSIS — N18.2 CHRONIC RENAL INSUFFICIENCY, STAGE II (MILD): Chronic | ICD-10-CM

## 2017-04-11 DIAGNOSIS — I82.4Z2 ACUTE DEEP VEIN THROMBOSIS (DVT) OF DISTAL END OF LEFT LOWER EXTREMITY (HCC): ICD-10-CM

## 2017-04-11 DIAGNOSIS — I10 BENIGN ESSENTIAL HYPERTENSION: Chronic | ICD-10-CM

## 2017-04-11 PROBLEM — Z92.89 HISTORY OF MAMMOGRAM: Status: ACTIVE | Noted: 2017-04-05

## 2017-04-11 PROBLEM — Z92.89 HISTORY OF MAMMOGRAM: Status: RESOLVED | Noted: 2017-04-05 | Resolved: 2017-04-11

## 2017-04-11 PROCEDURE — 99214 OFFICE O/P EST MOD 30 MIN: CPT | Performed by: INTERNAL MEDICINE

## 2017-04-11 RX ORDER — ASPIRIN 81 MG/1
81 TABLET ORAL DAILY
COMMUNITY

## 2017-04-11 RX ORDER — CLONAZEPAM 1 MG/1
TABLET ORAL
Qty: 60 TABLET | Refills: 2 | Status: SHIPPED | OUTPATIENT
Start: 2017-04-11 | End: 2017-11-18 | Stop reason: SDUPTHER

## 2017-04-11 RX ORDER — ZOLEDRONIC ACID 5 MG/100ML
5 INJECTION, SOLUTION INTRAVENOUS ONCE
Qty: 100 ML | Refills: 0 | Status: SHIPPED | OUTPATIENT
Start: 2017-04-11 | End: 2017-04-11

## 2017-04-11 NOTE — PROGRESS NOTES
04/11/2017    Patient Information  Sachi Vora                                                                                          [address] [place] [address]      1940  [phone]      Chief Complaint:     Follow-up chronic fatigue, hypersomnolence, hypertension, recent medication adjustment, chronic renal insufficiency, osteoporosis, recent acute DVT of the left lower extremity, depression with anxiety.  Patient complaining of continued fatigue although she feels better.    History of Present Illness:    Patient with a history of osteoporosis, hypertension, carotid artery plaque, chronic gastritis and history of ulcer, chronic lower back pain, chronic renal insufficiency, depression with anxiety, reflux, hyperlipidemia, migraine headaches, pancreatic divisum, pedal edema, restless leg syndrome, chronic fatigue and hypersomnolence, hypertension with a low blood pressure.  She presents today to follow-up on her blood pressure as well as her chronic fatigue/hypersomnolence.  This will be described in detail below.  Patient also has osteoporosis and is questioning whether or not she needs Reclast infusion.  Her past medical history reviewed and updated where necessary including health maintenance parameters.  This reveals she needs a DEXA scan but I am holding off on that until after another Reclast infusion.    The history regarding chronic fatigue and hypersomnolence is as follows:    04/11/2017--patient seen in follow-up and her blood pressure is now at a reasonable level at 120/64.  She reports she still feels somewhat fatigued but she is much better.  Patient has not been doing any regular exercise and I do think that that would be helpful to reduce her feeling of fatigue.  She is on multiple medications that could be contributing to the fatigue including clonazepam and hydrocodone, but these are medications we really cannot discontinue.    03/21/2017--patient seen in  follow-up and continues to have fatigue.  Her blood pressure remains on the low side and I think this may very well be contributing to her symptoms.  She is currently taking Dyazide 37.5 along with lisinopril 20 mg per day.  Lisinopril discontinued altogether and we will reassess the situation in about one month.    02/15/2017--patient seen in follow-up and continues to have chronic fatigue and weakness.  Once again the overnight oximetry was not performed.  The exact reason is unclear.  Patient did not contact our office to inform us of this problem.    08/23/2016--overnight oximetry was never performed.  Patient indicates no one ever contacted her.  We will attempt to reschedule this.    04/21/2016--patient reports approximately 2 month history of profound fatigue.  This is generalized and she just feels exhausted.  Sleeping quite a bit as well.  She has good days and bad days.  No other associated symptoms with this.  Her blood pressure today is somewhat on the low side with a systolic initially of 94.  Repeat blood pressure by me revealed a systolic of 112.  Lisinopril HCT discontinued.  Also patient had recent CBC which was normal except for slightly low hemoglobin.  CMP was normal except for low potassium at 3.3.  TSH was normal.    Review of Systems   Constitution: Positive for malaise/fatigue.   HENT: Negative.    Eyes: Negative.    Cardiovascular: Negative.    Respiratory: Negative.    Endocrine: Negative.    Hematologic/Lymphatic: Negative.    Skin: Negative.    Musculoskeletal: Negative.    Gastrointestinal: Negative.    Genitourinary: Negative.    Neurological: Negative.    Psychiatric/Behavioral: Negative.    Allergic/Immunologic: Negative.        Active Problems:    Patient Active Problem List   Diagnosis   • Osteoporosis, 03/29/2011--lumbar spine -2.8.  Right femoral neck -2.4.  Left femoral neck -2.4.  Patient receives Reclast infusions.   • Therapeutic drug monitoring   • Simple renal cyst   •  Benign essential hypertension   • Carotid artery plaque, 10/03/2014--mild bilateral carotid artery plaque.   • Chronic gastritis   • Chronic lower back pain   • Chronic otitis externa   • Chronic renal insufficiency, stage II (mild), creatinine 1.12   • Depression with anxiety   • Gastroesophageal reflux disease with esophagitis   • Functional murmur, 07/15/2015--normal echocardiogram.   • Hyperlipidemia   • Menopausal state   • Chronic migraine   • Pancreatic divisum   • Pancreatic Duct Dilation   • Pedal edema   • Restless legs syndrome   • Bilateral sensorineural hearing loss   • Vitamin D deficiency   • Breast cancer screening   • Chronic gastric ulcer   • Chronic fatigue   • Hypersomnolence   • Chronic anemia   • Multinodular goiter   • Generalized anxiety disorder         Past Medical History:   Diagnosis Date   • History of Acute deep vein thrombosis (DVT) of distal end of left lower extremity 2/15/2017    03/21/2017 patient seen in follow-up and she is tolerating the Eliquis well without signs or symptoms of bleeding.  Her calf swelling and tenderness is better but not totally resolved.  I suspect that the DVT is chronic and may not resolve at all.  I will order a repeat venous study and then proceed from there.  02/23/2017--repeat Doppler venous study of the left lower extremity reveals a chronic left lower extremity DVT in the posterior tibial.  All other left sided veins appeared normal.  Fluid collection in the left calf noted.  02/17/2017--patient seen in follow-up and reports her left lower extremity symptoms are about the same.  She continues to have complaints of profound fatigue which I think is multifactorial including underlying depression that is not in remission.  Review the results of the CTA of the chest including the possible thyroid lesion.  I do not think this is contributing to any of her symptoms of fatigue particularly given the fact that her thyroid function tests are normal.  I expla    • History of acute pyelonephritis 11/29/2001 11/29/2001--patient was admitted to the hospital with fever, chills, leukocytosis and abdominal pain. Evaluation revealed acute left pyelonephritis pyelonephritis and sepsis syndrome.   • History of bone density study 03/29/2011 03/29/2011--DEXA scan revealed a lumbar T score of -2.8, right femoral neck T score -2.4, left femoral neck T score -2.4. Osteoporosis of the lumbar spine and severe osteopenia of the hips bilaterally. Patient has been intolerant to Fosamax because of gastritis and gastroesophageal reflux.   04/01/2009--treatment for oste   • History of cardiovascular stress test 5/13/2016 05/13/2016--exercise stress test with myocardial perfusion indicates normal study with no evidence of ischemia.  Left ventricular ejection fraction is hyperdynamic with an EF greater than 70%.  Impressions are consistent with a low risk study.  12/07/2011--stress Cardiolite negative for ischemia or previous infarction. Ejection fraction greater than 70%.   12/20/2009--stress Cardiolite negative for infarction or ischemia.   10/28/2005--stress Cardiolite showed no evidence of ischemia or infarction.   05/28/2002--unremarkable stress EKG.   • History of carotid Doppler/vascular screen 10/03/2014    10/03/2014--[facility] screening revealed mild bilateral carotid plaque, negative for atrial fibrillation, negative for AAA, negative for PAD, osteoporosis screen revealed osteopenia. Body mass index was 25 and considered to be moderate risk. 07/25/2012--vascular screen negative for carotid plaque, negative for abdominal aneurysm, negative for PAD   • History of chest pain 11/03/2014 11/03/2014--patient seen in follow-up and reports her epigastric pain/chest pain has resolved. I reviewed the results of the studies with her. It does not appear that her problem is related to biliary tract disease. She does have reflux and although the recent upper GI revealed minimal reflux, I  do think her symptoms are related to esophageal reflux with esophageal spasm.   10/21/2014--air-contras   • History of chest x-ray 06/29/2015 06/29/2015--chest x-ray PA and lateral performed for exertional dyspnea reveals no active disease   • History of Dyspnea on exertion 06/29/2015 06/29/2015--patient presents with a 4-6 week history of exertional dyspnea that comes on with activity such as climbing stairs or walking her dog up an incline.  No chest pain.  Relieved with rest.  No cough.  She does have complaints of her feet and legs swelling that is particularly worse at the end of the day and not improved overnight.  No orthopnea or PND.  Chest exam reveals faint rales at t   • History of echocardiogram 07/15/2015    07/15/2015--echocardiogram performed for murmur and dyspnea. Left ventricle size is normal. Left ventricular systolic function normal with ejection fraction 55%. Grade 1 diastolic dysfunction, abnormal relaxation pattern. There is trace tricuspid regurgitation. Estimated right ventricular systolic pressure is 25 mmHg which is normal.   • HIstory of Left lumbar radiculopathy Remote    Patient has multilevel degenerative disc disease and degenerative arthritis of the lumbar spine   • History of mammogram 4/5/2017 04/05/2017--negative mammogram.  03/29/2011--negative mammogram.   • History of palpitations 12/07/2011    Patient has had multiple admissions to the hospital for complaints of chest pain and heart palpitations. She meant admitted at least on 3 occasions. She has had at least 3 stress Cardiolite and 1 stress ECG, the last Cardiolite being performed 12/07/2011 which was negative. Patient is also had Holter monitors which have been unremarkable.   • History of pneumococcal vaccination 11/20/2015 11/20/2015--PPSV 23 given. No further pneumococcal vaccinations required. 02/25/2015--Prevnar 13 given. Patient is pneumococcal vaccination jenn and therefore will need a PPSV 23 in 6-12  months.   • HIstory of Schatzki's ring 02/28/2012 02/28/2012--air-contrast upper GI revealed small to moderate sized reducible sliding hiatal herniation of the upper stomach with some demonstrated gastroesophageal reflux. No esophageal, gastric, or duodenal mass or mucosal ulceration was seen.  11/03/2008--EGD performed for evaluation of iron deficiency anemia revealed hiatal hernia without evidence of reflux, prepyloric antritis and   • History of Substernal precordial chest pain 5/5/2016 06/02/2016--patient seen in follow-up and reports she has recurrence of her epigastric discomfort that is believed to be related to her hiatal hernia, reflux, and probably esophageal spasm.  I do think that her substernal chest pain is gastrointestinal related.  She has been off omeprazole for some time due to problems with the prescription.  She reinitiated this just yesterday.  05/13/2016--exercise stress test with myocardial perfusion indicates normal study with no evidence of ischemia.  Left ventricular ejection fraction is hyperdynamic with an EF greater than 70%.  Impressions are consistent with a low risk study.  05/05/2016--patient reports that over the past year she has had intermittent episodes of chest pain.  She describes a substernal pressure and this radiates into her left upper extremity as well as the jaw.  It lasts last than a minute.  The most recent episode was associated with pain in the right upper extremity that radiated up into the right jaw and then crossed over to the left jaw.  T   • History of Urinary urgency 11/20/2015 06/02/2016--patient seen in follow-up and reports her urinary symptoms have resolved essentially.  01/07/2016--patient seen in follow-up and reports she could not tolerate the Myrbetriq due to stomach issues.  However, she does report that her urinary symptoms have improved and are now tolerable.  11/20/2015--patient presents with approximately 2 month history of urinary urgency  that is associated with intermittent hesitancy.  She describes the urge to urinate and she will go to the bathroom and then only be able to produce a few drops.  Other times she will get urgency and she will be able to completely void.  She has these symptoms at night as well and has nocturia approximately 2 times per night.  She denies dysuria.  She also has periodic nocturnal enuresis.  Urinalysis and urine culture sent.  Myrbetriq 25 mg per day initiated.  May increase to 50 mg per day if necessary.  Follow-up in 2 weeks to reassess.  Urinalysis revealed 3+ bacteria but urine culture was negative.   • History of Zostavax administration 01/07/2016 01/07/2016--Zostavax given.         Past Surgical History:   Procedure Laterality Date   • APPENDECTOMY  1965    1965   • COLONOSCOPY  11/03/2008 2008-- normal colonoscopy   • COLONOSCOPY  2001 2001--normal colonoscopy.   • ENDOSCOPY  10/08/2014    02/28/2012--air-contrast upper GI revealed small to moderate sized reducible sliding hiatal herniation of the upper stomach with some demonstrated gastroesophageal reflux. No esophageal, gastric, or duodenal mass or mucosal ulceration was seen. 11/03/2008--EGD performed for evaluation of iron deficiency anemia revealed hiatal hernia without evidence of reflux, prepyloric antritis and   • ENDOSCOPY  11/03/2008 11/03/2008--EGD performed for evaluation of iron deficiency anemia revealed hiatal hernia without evidence of reflux, prepyloric antritis and   • ENDOSCOPY  11/19/2001 11/19/2001--EGD revealed a very tortuous distal esophagus with a Schatzki's ring. No ulcer or erosions. No Dorado's mucosa. Stomach revealed patchy erythema and erosions in the antrum. Biopsy. Normal pylorus with no obstruction. Normal duodenum with no ulcers. The Schatzki's ring was dilated with a Rhodes dilator.;   • ENDOSCOPY N/A 9/27/2016    Procedure: ESOPHAGOGASTRODUODENOSCOPY with biopsy AND RESOLUTION CLIP X1;  Surgeon: Reji  Kalia Johnson MD;  Location: [facility] ENDOSCOPY;  Service:    • ESOPHAGEAL DILATATION  11/19/2001 11/19/2001--EGD revealed a very tortuous distal esophagus with a Schatzki's ring. No ulcer or erosions. No Dorado's mucosa. Stomach revealed patchy erythema and erosions in the antrum. Biopsy. Normal pylorus with no obstruction. Normal duodenum with no ulcers. The Schatzki's ring was dilated with a Rhodes dilator.;    • INCONTINENCE SURGERY  1979 1979--a bladder tack procedure for urinary stress incontinence   • KNEE ARTHROSCOPY Right 12/12/2011 12/12/2011--right knee arthroscopy with partial lateral and medial meniscectomies.   • OOPHORECTOMY      age 34   • SKIN SURGERY  05/25/2010 05/25/2010--skin lesion excised from right lower extremity. 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   • TOTAL ABDOMINAL HYSTERECTOMY  1974 1974--total abdominal hysterectomy.         Allergies   Allergen Reactions   • Penicillins Itching   • Tetanus Toxoids Itching           Current Outpatient Prescriptions:   •  aspirin 81 MG EC tablet, Take 81 mg by mouth Daily., Disp: , Rfl:   •  busPIRone (BUSPAR) 15 MG tablet, , Disp: , Rfl:   •  Cholecalciferol (VITAMIN D3) 5000 UNITS capsule capsule, Take 5,000 Units by mouth Daily., Disp: , Rfl:   •  clonazePAM (KlonoPIN) 1 MG tablet, Take 1 tablet by mouth At Night As Needed for anxiety., Disp: 30 tablet, Rfl: 2  •  cycloSPORINE (RESTASIS) 0.05 % ophthalmic emulsion, 1 drop 2 (Two) Times a Day., Disp: , Rfl:   •  diclofenac (VOLTAREN) 1 % gel gel, Apply 4 g topically 3 (three) times a day as needed., Disp: , Rfl:   •  estradiol (ESTRACE) 1 MG tablet, TAKE 1 TABLET BY MOUTH DAILY, Disp: 90 tablet, Rfl: 1  •  gabapentin (NEURONTIN) 300 MG capsule, TK ONE C PO  TID PRN, Disp: , Rfl: 0  •  HYDROcodone-acetaminophen (NORCO)  MG per tablet, Take 1 tablet by mouth every 6 (six) hours as needed for moderate pain (4-6)., Disp: , Rfl:   •  Misc Natural Products  "(COLON CLEANSE PO), Take 4 tablets by mouth every night., Disp: , Rfl:   •  omeprazole (PriLOSEC) 40 MG capsule, One by mouth daily before the first meal, Disp: 90 capsule, Rfl: 3  •  sucralfate (CARAFATE) 1 G tablet, One by mouth 4 times daily before meals and at bedtime., Disp: 120 tablet, Rfl: 5  •  topiramate (TOPAMAX) 100 MG tablet, Take 1 tablet by mouth Daily., Disp: 90 tablet, Rfl: 0  •  triamterene-hydrochlorothiazide (DYAZIDE) 37.5-25 MG per capsule, One by mouth daily for blood pressure and leg swelling, Disp: 90 capsule, Rfl: 3  •  venlafaxine XR (EFFEXOR-XR) 150 MG 24 hr capsule, TK ONE C PO  QHS, Disp: , Rfl: 0  •  venlafaxine XR (EFFEXOR-XR) 75 MG 24 hr capsule, Take 75 mg by mouth Daily., Disp: , Rfl:   •  vitamin E 400 UNIT capsule, Take 400 Units by mouth Daily., Disp: , Rfl:       Family History   Problem Relation Age of Onset   • Heart attack Mother      dies age 47 from heart attack   • Heart attack Maternal Aunt      dies age 60 from heart attack   • Ovarian cancer Maternal Grandmother          Social History     Social History   • Marital status:      Spouse name: N/A   • Number of children: N/A   • Years of education: N/A     Occupational History   • homemaker      Social History Main Topics   • Smoking status: Never Smoker   • Smokeless tobacco: Never Used   • Alcohol use Yes      Comment: rarely   • Drug use: No   • Sexual activity: Yes     Partners: Male     Other Topics Concern   • Not on file     Social History Narrative         Vitals:    04/11/17 1303   BP: 120/64   Pulse: 79   SpO2: 98%   Weight: 133 lb (60.3 kg)   Height: 60.5\" (153.7 cm)          Physical Exam:    General: Alert and oriented x 3.  No acute distress.  Normal affect.  HEENT: Pupils equal, round, reactive to light; extraocular movements intact; sclerae nonicteric; pharynx, ear canals and TMs normal.  Neck: Without JVD, thyromegaly, bruit, or adenopathy.  Lungs: Clear to auscultation in all fields.  Heart: " Regular rate and rhythm without murmur, rub, gallop, or click.  Abdomen: Soft, nontender, without hepatosplenomegaly or hernia.  Bowel sounds normal.  : Deferred.  Rectal: Deferred.  Extremities: Without clubbing, cyanosis, edema, or pulse deficit.  Neurologic: Intact without focal deficit.  Normal station and gait observed during ingress and egress from the examination room.  Skin: Without significant lesion.  Musculoskeletal: Unremarkable.      Lab/other results:    I reviewed the results of her recent mammogram which was negative.    Assessment/Plan:     Diagnosis Plan   1. Chronic fatigue     2. Hypersomnolence     3. Benign essential hypertension     4. Chronic renal insufficiency, stage II (mild), creatinine 1.12     5. Osteoporosis, 03/29/2011--lumbar spine -2.8.  Right femoral neck -2.4.  Left femoral neck -2.4.  Patient receives Reclast infusions.     6. History of Acute deep vein thrombosis (DVT) of distal end of left lower extremity     7. Generalized anxiety disorder     8. Depression with anxiety     9. Multinodular goiter     10. Chronic anemia     11. Vitamin D deficiency         Patient's chronic fatigue and hypersomnolence has improved after reduction of blood pressure medication.  Her blood pressure remains controlled.  She has chronic renal insufficiency that probably has improved after discontinuation of the lisinopril.  Patient has significant osteoporosis and needs an order for Reclast.  She had a recent DVT that was isolated below the left calf and this has resolved after a short course of oral anticoagulates.  She's had a long history of generalized anxiety and depression and needs a refill on her clonazepam.  She has a new diagnosis of multinodular goiter and needs repeat thyroid function tests.  Her chronic anemia needs to be reassessed as well.    Plan is as follows: I recommended that patient start a regular exercise program that includes some sort of light aerobics such as brisk  walking or riding a stationary bike.  I think she should avoid any hard impact however.  Although this is not to minimize the importance of weightbearing for her osteoporosis.  Reclast infusion ordered.  Check lab work today and follow-up on the phone for the results.  Refill clonazepam.  Patient will otherwise follow up in 6 months with lab prior or follow-up as needed.          Procedures

## 2017-04-12 ENCOUNTER — RESULTS ENCOUNTER (OUTPATIENT)
Dept: INTERNAL MEDICINE | Facility: CLINIC | Age: 77
End: 2017-04-12

## 2017-04-12 DIAGNOSIS — E55.9 VITAMIN D DEFICIENCY: Chronic | ICD-10-CM

## 2017-04-12 DIAGNOSIS — E04.2 MULTINODULAR GOITER: Chronic | ICD-10-CM

## 2017-04-12 DIAGNOSIS — E78.5 HYPERLIPIDEMIA, UNSPECIFIED HYPERLIPIDEMIA TYPE: Chronic | ICD-10-CM

## 2017-04-12 DIAGNOSIS — D64.9 CHRONIC ANEMIA: Chronic | ICD-10-CM

## 2017-04-12 DIAGNOSIS — N18.2 CHRONIC RENAL INSUFFICIENCY, STAGE II (MILD): Chronic | ICD-10-CM

## 2017-04-12 LAB
ALBUMIN SERPL-MCNC: 4.5 G/DL (ref 3.5–5.2)
ALBUMIN/GLOB SERPL: 1.6 G/DL
ALP SERPL-CCNC: 45 U/L (ref 39–117)
ALT SERPL-CCNC: 18 U/L (ref 1–33)
AST SERPL-CCNC: 21 U/L (ref 1–32)
BILIRUB SERPL-MCNC: 0.2 MG/DL (ref 0.1–1.2)
BUN SERPL-MCNC: 17 MG/DL (ref 8–23)
BUN/CREAT SERPL: 17.7 (ref 7–25)
CALCIUM SERPL-MCNC: 9.6 MG/DL (ref 8.6–10.5)
CHLORIDE SERPL-SCNC: 99 MMOL/L (ref 98–107)
CO2 SERPL-SCNC: 25.7 MMOL/L (ref 22–29)
CREAT SERPL-MCNC: 0.96 MG/DL (ref 0.57–1)
ERYTHROCYTE [DISTWIDTH] IN BLOOD BY AUTOMATED COUNT: 17.5 % (ref 11.7–13)
GLOBULIN SER CALC-MCNC: 2.9 GM/DL
GLUCOSE SERPL-MCNC: 87 MG/DL (ref 65–99)
HCT VFR BLD AUTO: 35.7 % (ref 35.6–45.5)
HGB BLD-MCNC: 10.8 G/DL (ref 11.9–15.5)
MCH RBC QN AUTO: 25 PG (ref 26.9–32)
MCHC RBC AUTO-ENTMCNC: 30.3 G/DL (ref 32.4–36.3)
MCV RBC AUTO: 82.6 FL (ref 80.5–98.2)
PLATELET # BLD AUTO: 418 10*3/MM3 (ref 140–500)
POTASSIUM SERPL-SCNC: 4 MMOL/L (ref 3.5–5.2)
PROT SERPL-MCNC: 7.4 G/DL (ref 6–8.5)
RBC # BLD AUTO: 4.32 10*6/MM3 (ref 3.9–5.2)
SODIUM SERPL-SCNC: 140 MMOL/L (ref 136–145)
T3FREE SERPL-MCNC: 3 PG/ML (ref 2–4.4)
T4 FREE SERPL-MCNC: 1.15 NG/DL (ref 0.93–1.7)
TSH SERPL DL<=0.005 MIU/L-ACNC: 1.44 MIU/ML (ref 0.27–4.2)
WBC # BLD AUTO: 7.93 10*3/MM3 (ref 4.5–10.7)

## 2017-04-16 ENCOUNTER — RESULTS ENCOUNTER (OUTPATIENT)
Dept: INTERNAL MEDICINE | Facility: CLINIC | Age: 77
End: 2017-04-16

## 2017-04-16 DIAGNOSIS — M81.0 OSTEOPOROSIS: Chronic | ICD-10-CM

## 2017-04-18 PROBLEM — M81.0 OSTEOPOROSIS: Status: ACTIVE | Noted: 2017-04-18

## 2017-04-19 ENCOUNTER — HOSPITAL ENCOUNTER (OUTPATIENT)
Dept: INFUSION THERAPY | Facility: HOSPITAL | Age: 77
Discharge: HOME | End: 2017-04-19
Admitting: INTERNAL MEDICINE

## 2017-04-19 ENCOUNTER — TELEPHONE (OUTPATIENT)
Dept: INTERNAL MEDICINE | Facility: CLINIC | Age: 77
End: 2017-04-19

## 2017-04-19 VITALS
RESPIRATION RATE: 20 BRPM | SYSTOLIC BLOOD PRESSURE: 137 MMHG | HEART RATE: 73 BPM | TEMPERATURE: 97.2 F | OXYGEN SATURATION: 100 % | DIASTOLIC BLOOD PRESSURE: 68 MMHG

## 2017-04-19 DIAGNOSIS — M81.0 OSTEOPOROSIS: ICD-10-CM

## 2017-04-19 PROCEDURE — 25010000002 ZOLEDRONIC ACID 5 MG/100ML SOLUTION: Performed by: INTERNAL MEDICINE

## 2017-04-19 PROCEDURE — 96365 THER/PROPH/DIAG IV INF INIT: CPT

## 2017-04-19 RX ORDER — ZOLEDRONIC ACID 5 MG/100ML
5 INJECTION, SOLUTION INTRAVENOUS ONCE
Status: COMPLETED | OUTPATIENT
Start: 2017-04-19 | End: 2017-04-19

## 2017-04-19 RX ORDER — ZOLEDRONIC ACID 5 MG/100ML
5 INJECTION, SOLUTION INTRAVENOUS ONCE
Status: CANCELLED | OUTPATIENT
Start: 2018-04-19

## 2017-04-19 RX ADMIN — ZOLEDRONIC ACID 5 MG: 5 INJECTION, SOLUTION INTRAVENOUS at 12:55

## 2017-04-19 NOTE — TELEPHONE ENCOUNTER
Pt said that she was told to take iron but she said that iron makes her sick and she can not take it. Is there anything else she can take? Call her at [phone].

## 2017-04-20 ENCOUNTER — TELEPHONE (OUTPATIENT)
Dept: INTERNAL MEDICINE | Facility: CLINIC | Age: 77
End: 2017-04-20

## 2017-04-20 NOTE — TELEPHONE ENCOUNTER
Daughter had back surgery and pt has been changing bandages. Her daughter was just DX'ed with shingles. Should she be concerned. Should she get shingles shot because of the exposure. Call her at [phone].

## 2017-05-31 ENCOUNTER — OFFICE VISIT (OUTPATIENT)
Dept: INTERNAL MEDICINE | Facility: CLINIC | Age: 77
End: 2017-05-31

## 2017-05-31 VITALS
HEIGHT: 61 IN | SYSTOLIC BLOOD PRESSURE: 122 MMHG | OXYGEN SATURATION: 94 % | WEIGHT: 132 LBS | BODY MASS INDEX: 24.92 KG/M2 | HEART RATE: 79 BPM | DIASTOLIC BLOOD PRESSURE: 70 MMHG

## 2017-05-31 DIAGNOSIS — I10 BENIGN ESSENTIAL HYPERTENSION: Chronic | ICD-10-CM

## 2017-05-31 DIAGNOSIS — D64.9 CHRONIC ANEMIA: Chronic | ICD-10-CM

## 2017-05-31 DIAGNOSIS — G47.10 HYPERSOMNOLENCE: Primary | Chronic | ICD-10-CM

## 2017-05-31 DIAGNOSIS — R53.82 CHRONIC FATIGUE: Chronic | ICD-10-CM

## 2017-05-31 PROCEDURE — 99214 OFFICE O/P EST MOD 30 MIN: CPT | Performed by: INTERNAL MEDICINE

## 2017-06-01 DIAGNOSIS — D50.0 IRON DEFICIENCY ANEMIA DUE TO CHRONIC BLOOD LOSS: Primary | ICD-10-CM

## 2017-06-01 PROBLEM — D50.9 IRON DEFICIENCY ANEMIA: Status: ACTIVE | Noted: 2017-06-01

## 2017-06-01 LAB
ALBUMIN SERPL-MCNC: 4 G/DL (ref 3.5–5.2)
ALBUMIN/GLOB SERPL: 1.4 G/DL
ALP SERPL-CCNC: 62 U/L (ref 39–117)
ALT SERPL-CCNC: 17 U/L (ref 1–33)
AST SERPL-CCNC: 20 U/L (ref 1–32)
BILIRUB SERPL-MCNC: 0.3 MG/DL (ref 0.1–1.2)
BUN SERPL-MCNC: 13 MG/DL (ref 8–23)
BUN/CREAT SERPL: 13.7 (ref 7–25)
CALCIUM SERPL-MCNC: 9.8 MG/DL (ref 8.6–10.5)
CHLORIDE SERPL-SCNC: 100 MMOL/L (ref 98–107)
CO2 SERPL-SCNC: 28 MMOL/L (ref 22–29)
CREAT SERPL-MCNC: 0.95 MG/DL (ref 0.57–1)
CRP SERPL-MCNC: 4.23 MG/DL (ref 0–0.5)
ERYTHROCYTE [DISTWIDTH] IN BLOOD BY AUTOMATED COUNT: 16.7 % (ref 11.7–13)
ERYTHROCYTE [SEDIMENTATION RATE] IN BLOOD BY WESTERGREN METHOD: 31 MM/HR (ref 0–30)
GLOBULIN SER CALC-MCNC: 2.9 GM/DL
GLUCOSE SERPL-MCNC: 94 MG/DL (ref 65–99)
HCT VFR BLD AUTO: 33.8 % (ref 35.6–45.5)
HGB BLD-MCNC: 10.5 G/DL (ref 11.9–15.5)
IRON SATN MFR SERPL: 4 % (ref 20–50)
IRON SERPL-MCNC: 25 MCG/DL (ref 37–145)
MCH RBC QN AUTO: 24.8 PG (ref 26.9–32)
MCHC RBC AUTO-ENTMCNC: 31.1 G/DL (ref 32.4–36.3)
MCV RBC AUTO: 79.9 FL (ref 80.5–98.2)
PLATELET # BLD AUTO: 408 10*3/MM3 (ref 140–500)
POTASSIUM SERPL-SCNC: 3.9 MMOL/L (ref 3.5–5.2)
PROT SERPL-MCNC: 6.9 G/DL (ref 6–8.5)
RBC # BLD AUTO: 4.23 10*6/MM3 (ref 3.9–5.2)
SODIUM SERPL-SCNC: 144 MMOL/L (ref 136–145)
TIBC SERPL-MCNC: 679 MCG/DL
UIBC SERPL-MCNC: 654 MCG/DL
WBC # BLD AUTO: 11.18 10*3/MM3 (ref 4.5–10.7)

## 2017-06-03 LAB
2ME-CITRATE SERPL-MCNC: 109 NMOL/L (ref 60–228)
CYSTATHIONIN SERPL-SCNC: 135 NMOL/L (ref 44–342)
HCYS SERPL-SCNC: 7.5 UMOL/L (ref 5.1–13.9)
Lab: NORMAL
Lab: NORMAL
METHYLMALONATE SERPL-SCNC: 111 NMOL/L (ref 0–378)

## 2017-06-07 ENCOUNTER — OFFICE VISIT (OUTPATIENT)
Dept: SURGERY | Facility: CLINIC | Age: 77
End: 2017-06-07

## 2017-06-07 VITALS — HEART RATE: 78 BPM | OXYGEN SATURATION: 95 % | BODY MASS INDEX: 24.7 KG/M2 | WEIGHT: 130.8 LBS | HEIGHT: 61 IN

## 2017-06-07 DIAGNOSIS — D50.9 IRON DEFICIENCY ANEMIA, UNSPECIFIED IRON DEFICIENCY ANEMIA TYPE: Primary | ICD-10-CM

## 2017-06-07 PROCEDURE — 99214 OFFICE O/P EST MOD 30 MIN: CPT | Performed by: SURGERY

## 2017-06-07 RX ORDER — FERROUS SULFATE 325(65) MG
325 TABLET ORAL
COMMUNITY
End: 2017-10-30 | Stop reason: HOSPADM

## 2017-06-07 RX ORDER — ACETAMINOPHEN,DIPHENHYDRAMINE HCL 500; 25 MG/1; MG/1
1 TABLET, FILM COATED ORAL NIGHTLY PRN
COMMUNITY
End: 2019-02-04 | Stop reason: HOSPADM

## 2017-06-07 RX ORDER — VENLAFAXINE HYDROCHLORIDE 150 MG/1
150 CAPSULE, EXTENDED RELEASE ORAL NIGHTLY
COMMUNITY
End: 2018-11-13

## 2017-06-08 NOTE — PROGRESS NOTES
Cc: Fatigued and Iron deficiency anemia.    HPI: The patient is a very pleasant 76-year-old female that was referred to me by Dr. Cruzito Weir because of iron deficiency anemia.  The patient reports she has been feeling diet without any energy for approximately 3 or 4 months.  This is associated with cold intolerance.  She was found to have a iron deficiency anemia by Dr. Weir and started on iron pills.  Since then she reports dark stool that she thinks they are related to iron pills.  They only if symptoms she reports is abdominal pain located in the left upper quadrant that happened only with bending over.  A sharp and he resolved spontaneously after some seconds.  She has been eating and drinking without any problem and reports no weight loss.  She denies any nausea or vomiting.  She has history of epigastric abdominal pain and I have seen the patient in September 2016.  At that time a CT scan of the abdomen and pelvis was performed that show small hepatic cyst and splenic granulomas.  She had a moderate size hiatal hernia.  She underwent an upper endoscopy she was found to have an small to moderate sized hiatal hernia and nonbleeding gastric ulcers.  She was placed on antiacid and Carafate with good response on the symptoms.  She has also history of Schatzki ring that was dilated in 2012.  She had a normal colonoscopy in 2008.     PMH: Osteoporosis, hypertension, carotid disease, chronic gastritis, depression, anxiety, GERD, hyperlipidemia, migraine, restless legs syndrome, chronic fatigue and hypersomnolence, multinodular goiter, and chronic pain requiring multiple medications.     PSH: Appendectomy, colonoscopy 2001 and 2008, upper endoscopy 2012, 2008, 2001, 2016, esophageal dilation 2001, bladder sling, knee arthroscopy, oophorectomy, skin lesion excision, total abdominal hysterectomy    Medications were reviewed as well as allergies.  Patient is taking sucralfate and Prilosec 40 mg daily    SH and FH:  Coronary artery disease on her mother and grandmother.  Ovarian cancer grandmother.  The patient is , never smoked    ROS:   Constitutional: denies any weight changes, reports chills and fatigue  Eyes: : Reports eye discharge, itching, pain, redness, reports light sensitivity and visual disturbance  Cardiovascular: denies chest pain, palpitations, edemas.  Respiratory: Reports sleep apnea, cough, shortness of breath  Gastrointestinal: denies N&V, abd pain, diarrhea, constipation.  Genitourinary: Reports difficulty urinating and vaginal discharge  Endocrine: Reports cold intolerance and excess thirst  Hem: denies excessive bruising and postop bleeding  Musculoskeletal: Reports joint and back pain, reports muscle and neck pain  Neuro: denies seizures, CVA, paresthesia.  Reports weakness.   Skin: denies change in nevi, rashes, masses.  Psychiatric: Reports decreased concentration, anxiety, sleep disturbance and depression    PE: The patient is alert oriented ×3, she's not in any acute distress.  Breathing comfortable without any effort.  Cardiovascular regular rate and rhythm and no murmurs  Abdomen is soft and nontender, she's nondistended, bowel sounds are positive.    Diagnostic workup:   TIBC mcg/dL 679   UIBC mcg/dL 654   Iron 37 - 145 mcg/dL 25 (L)   Iron Saturation 20 - 50 % 4 (L)     WBC 4.50 - 10.70 10*3/mm3 11.18 (H)   RBC 3.90 - 5.20 10*6/mm3 4.23   Hemoglobin 11.9 - 15.5 g/dL 10.5 (L)   Hematocrit 35.6 - 45.5 % 33.8 (L     Assessment and plan    The patient is a very pleasant 76-year-old female with fatigue and iron deficiency anemia.  She had a colonoscopy in 2008 that was normal.  She is due for another colonoscopy.  She was found to have gastric ulcers before as well as hiatal hernia.  I recommend that she undergoes upper endoscopy and colonoscopy to rule out a source of bleeding at the GI tract for her iron deficiency anemia.    The risks and benefits of the procedure were explained to the  patient including bleeding infection and perforation.  She verbalized understanding and agreed with the plan    Reji Johnson MD  General, Minimally Invasive and Endoscopic Surgery  [facility]    [address], [address]  [place], [place], [address]  P: [phone]  F: [phone]

## 2017-06-13 ENCOUNTER — ANESTHESIA (OUTPATIENT)
Dept: GASTROENTEROLOGY | Facility: HOSPITAL | Age: 77
End: 2017-06-13

## 2017-06-13 ENCOUNTER — HOSPITAL ENCOUNTER (OUTPATIENT)
Facility: HOSPITAL | Age: 77
Setting detail: HOSPITAL OUTPATIENT SURGERY
Discharge: HOME | End: 2017-06-13
Attending: SURGERY | Admitting: SURGERY

## 2017-06-13 ENCOUNTER — ANESTHESIA EVENT (OUTPATIENT)
Dept: GASTROENTEROLOGY | Facility: HOSPITAL | Age: 77
End: 2017-06-13

## 2017-06-13 VITALS
BODY MASS INDEX: 23.98 KG/M2 | HEIGHT: 61 IN | SYSTOLIC BLOOD PRESSURE: 129 MMHG | HEART RATE: 84 BPM | RESPIRATION RATE: 16 BRPM | WEIGHT: 127 LBS | TEMPERATURE: 97.9 F | DIASTOLIC BLOOD PRESSURE: 70 MMHG | OXYGEN SATURATION: 99 %

## 2017-06-13 DIAGNOSIS — D50.9 IRON DEFICIENCY ANEMIA, UNSPECIFIED IRON DEFICIENCY ANEMIA TYPE: ICD-10-CM

## 2017-06-13 PROCEDURE — 88312 SPECIAL STAINS GROUP 1: CPT | Performed by: SURGERY

## 2017-06-13 PROCEDURE — 45382 COLONOSCOPY W/CONTROL BLEED: CPT | Performed by: SURGERY

## 2017-06-13 PROCEDURE — 45380 COLONOSCOPY AND BIOPSY: CPT | Performed by: SURGERY

## 2017-06-13 PROCEDURE — 88305 TISSUE EXAM BY PATHOLOGIST: CPT | Performed by: SURGERY

## 2017-06-13 PROCEDURE — 43239 EGD BIOPSY SINGLE/MULTIPLE: CPT | Performed by: SURGERY

## 2017-06-13 PROCEDURE — 25010000002 PROPOFOL 10 MG/ML EMULSION: Performed by: ANESTHESIOLOGY

## 2017-06-13 DEVICE — DEV CLIP ENDO RESOLUTION360 CONTRL ROT 235CM: Type: IMPLANTABLE DEVICE | Status: FUNCTIONAL

## 2017-06-13 RX ORDER — SODIUM CHLORIDE, SODIUM LACTATE, POTASSIUM CHLORIDE, CALCIUM CHLORIDE 600; 310; 30; 20 MG/100ML; MG/100ML; MG/100ML; MG/100ML
1000 INJECTION, SOLUTION INTRAVENOUS CONTINUOUS PRN
Status: DISCONTINUED | OUTPATIENT
Start: 2017-06-13 | End: 2017-06-13 | Stop reason: HOSPADM

## 2017-06-13 RX ORDER — PROPOFOL 10 MG/ML
VIAL (ML) INTRAVENOUS CONTINUOUS PRN
Status: DISCONTINUED | OUTPATIENT
Start: 2017-06-13 | End: 2017-06-13 | Stop reason: SURG

## 2017-06-13 RX ORDER — LIDOCAINE HYDROCHLORIDE 20 MG/ML
INJECTION, SOLUTION INFILTRATION; PERINEURAL AS NEEDED
Status: DISCONTINUED | OUTPATIENT
Start: 2017-06-13 | End: 2017-06-13 | Stop reason: SURG

## 2017-06-13 RX ORDER — PROPOFOL 10 MG/ML
VIAL (ML) INTRAVENOUS AS NEEDED
Status: DISCONTINUED | OUTPATIENT
Start: 2017-06-13 | End: 2017-06-13 | Stop reason: SURG

## 2017-06-13 RX ORDER — GLYCOPYRROLATE 0.2 MG/ML
INJECTION INTRAMUSCULAR; INTRAVENOUS AS NEEDED
Status: DISCONTINUED | OUTPATIENT
Start: 2017-06-13 | End: 2017-06-13 | Stop reason: SURG

## 2017-06-13 RX ADMIN — SODIUM CHLORIDE, POTASSIUM CHLORIDE, SODIUM LACTATE AND CALCIUM CHLORIDE 1000 ML: 600; 310; 30; 20 INJECTION, SOLUTION INTRAVENOUS at 07:45

## 2017-06-13 RX ADMIN — PROPOFOL 180 MCG/KG/MIN: 10 INJECTION, EMULSION INTRAVENOUS at 08:13

## 2017-06-13 RX ADMIN — PROPOFOL 100 MG: 10 INJECTION, EMULSION INTRAVENOUS at 08:13

## 2017-06-13 RX ADMIN — LIDOCAINE HYDROCHLORIDE 60 MG: 20 INJECTION, SOLUTION INFILTRATION; PERINEURAL at 08:13

## 2017-06-13 RX ADMIN — GLYCOPYRROLATE 0.2 MG: 0.2 INJECTION INTRAMUSCULAR; INTRAVENOUS at 08:35

## 2017-06-13 RX ADMIN — GLYCOPYRROLATE 0.2 MG: 0.2 INJECTION INTRAMUSCULAR; INTRAVENOUS at 08:13

## 2017-06-13 NOTE — ANESTHESIA POSTPROCEDURE EVALUATION
Patient: Sachi Vora    Procedure Summary     Date Anesthesia Start Anesthesia Stop Room / Location    06/13/17 0810 0857  [facility] /  [facility]       Procedure Diagnosis Surgeon Provider    COLONOSCOPY TO CECUM AND INTO TERMINAL ILEUM WITH CLIP PLACEMENT X2 TO HEPATIC FLEXURE SITE (N/A ); ESOPHAGOGASTRODUODENOSCOPY WITH COLD BIOPSIES (N/A Esophagus) Iron deficiency anemia, unspecified iron deficiency anemia type  (Iron deficiency anemia, unspecified iron deficiency anemia type [D50.9]) MD Ady Shah MD          Anesthesia Type: MAC  Last vitals  /60 (06/13/17 0856)    Temp      Pulse 77 (06/13/17 0856)   Resp 14 (06/13/17 0856)    SpO2 100 % (06/13/17 0856)      Post Anesthesia Care and Evaluation    Patient location during evaluation: PHASE II  Patient participation: complete - patient participated  Level of consciousness: awake  Pain management: adequate  Airway patency: patent  Anesthetic complications: No anesthetic complications    Cardiovascular status: acceptable  Respiratory status: acceptable  Hydration status: acceptable

## 2017-06-13 NOTE — ANESTHESIA PREPROCEDURE EVALUATION
Anesthesia Evaluation     history of anesthetic complications: PONV         Airway   Mallampati: II  TM distance: >3 FB  Neck ROM: full  Dental - normal exam     Pulmonary - normal exam   (+) sleep apnea,   Cardiovascular - normal exam    (+) hypertension, valvular problems/murmurs murmur, PVD (Carotid artery plaque, 10/03/2014--mild bilateral carotid artery plaque.), hyperlipidemia      Neuro/Psych  (+) headaches (  Chronic migraine ), numbness, psychiatric history Anxiety,    GI/Hepatic/Renal/Endo    (+)  GERD, renal disease CRI,     Musculoskeletal     Abdominal  - normal exam   Substance History      OB/GYN          Other                                        Anesthesia Plan    ASA 3     MAC     Anesthetic plan and risks discussed with patient.

## 2017-06-13 NOTE — H&P (VIEW-ONLY)
Cc: Fatigued and Iron deficiency anemia.    HPI: The patient is a very pleasant 76-year-old female that was referred to me by Dr. Cruzito Weir because of iron deficiency anemia.  The patient reports she has been feeling diet without any energy for approximately 3 or 4 months.  This is associated with cold intolerance.  She was found to have a iron deficiency anemia by Dr. Weir and started on iron pills.  Since then she reports dark stool that she thinks they are related to iron pills.  They only if symptoms she reports is abdominal pain located in the left upper quadrant that happened only with bending over.  A sharp and he resolved spontaneously after some seconds.  She has been eating and drinking without any problem and reports no weight loss.  She denies any nausea or vomiting.  She has history of epigastric abdominal pain and I have seen the patient in September 2016.  At that time a CT scan of the abdomen and pelvis was performed that show small hepatic cyst and splenic granulomas.  She had a moderate size hiatal hernia.  She underwent an upper endoscopy she was found to have an small to moderate sized hiatal hernia and nonbleeding gastric ulcers.  She was placed on antiacid and Carafate with good response on the symptoms.  She has also history of Schatzki ring that was dilated in 2012.  She had a normal colonoscopy in 2008.     PMH: Osteoporosis, hypertension, carotid disease, chronic gastritis, depression, anxiety, GERD, hyperlipidemia, migraine, restless legs syndrome, chronic fatigue and hypersomnolence, multinodular goiter, and chronic pain requiring multiple medications.     PSH: Appendectomy, colonoscopy 2001 and 2008, upper endoscopy 2012, 2008, 2001, 2016, esophageal dilation 2001, bladder sling, knee arthroscopy, oophorectomy, skin lesion excision, total abdominal hysterectomy    Medications were reviewed as well as allergies.  Patient is taking sucralfate and Prilosec 40 mg daily    SH and FH:  Coronary artery disease on her mother and grandmother.  Ovarian cancer grandmother.  The patient is , never smoked    ROS:   Constitutional: denies any weight changes, reports chills and fatigue  Eyes: : Reports eye discharge, itching, pain, redness, reports light sensitivity and visual disturbance  Cardiovascular: denies chest pain, palpitations, edemas.  Respiratory: Reports sleep apnea, cough, shortness of breath  Gastrointestinal: denies N&V, abd pain, diarrhea, constipation.  Genitourinary: Reports difficulty urinating and vaginal discharge  Endocrine: Reports cold intolerance and excess thirst  Hem: denies excessive bruising and postop bleeding  Musculoskeletal: Reports joint and back pain, reports muscle and neck pain  Neuro: denies seizures, CVA, paresthesia.  Reports weakness.   Skin: denies change in nevi, rashes, masses.  Psychiatric: Reports decreased concentration, anxiety, sleep disturbance and depression    PE: The patient is alert oriented ×3, she's not in any acute distress.  Breathing comfortable without any effort.  Cardiovascular regular rate and rhythm and no murmurs  Abdomen is soft and nontender, she's nondistended, bowel sounds are positive.    Diagnostic workup:   TIBC mcg/dL 679   UIBC mcg/dL 654   Iron 37 - 145 mcg/dL 25 (L)   Iron Saturation 20 - 50 % 4 (L)     WBC 4.50 - 10.70 10*3/mm3 11.18 (H)   RBC 3.90 - 5.20 10*6/mm3 4.23   Hemoglobin 11.9 - 15.5 g/dL 10.5 (L)   Hematocrit 35.6 - 45.5 % 33.8 (L     Assessment and plan    The patient is a very pleasant 76-year-old female with fatigue and iron deficiency anemia.  She had a colonoscopy in 2008 that was normal.  She is due for another colonoscopy.  She was found to have gastric ulcers before as well as hiatal hernia.  I recommend that she undergoes upper endoscopy and colonoscopy to rule out a source of bleeding at the GI tract for her iron deficiency anemia.    The risks and benefits of the procedure were explained to the  patient including bleeding infection and perforation.  She verbalized understanding and agreed with the plan    Reji Johnson MD  General, Minimally Invasive and Endoscopic Surgery  [facility]    [address], [address]  [place], [place], [address]  P: [phone]  F: [phone]

## 2017-06-13 NOTE — PLAN OF CARE
Problem: Patient Care Overview (Adult)  Goal: Plan of Care Review  Outcome: Ongoing (interventions implemented as appropriate)    06/13/17 0705   Coping/Psychosocial Response Interventions   Plan Of Care Reviewed With patient   Patient Care Overview   Progress no change       Goal: Adult Individualization and Mutuality  Outcome: Ongoing (interventions implemented as appropriate)  Goal: Discharge Needs Assessment  Outcome: Ongoing (interventions implemented as appropriate)    Problem: GI Endoscopy (Adult)  Goal: Signs and Symptoms of Listed Potential Problems Will be Absent or Manageable (GI Endoscopy)  Outcome: Ongoing (interventions implemented as appropriate)

## 2017-06-14 LAB
CYTO UR: NORMAL
LAB AP CASE REPORT: NORMAL
Lab: NORMAL
PATH REPORT.FINAL DX SPEC: NORMAL
PATH REPORT.GROSS SPEC: NORMAL

## 2017-06-16 ENCOUNTER — TELEPHONE (OUTPATIENT)
Dept: SURGERY | Facility: CLINIC | Age: 77
End: 2017-06-16

## 2017-07-13 DIAGNOSIS — K21.00 GASTROESOPHAGEAL REFLUX DISEASE WITH ESOPHAGITIS: ICD-10-CM

## 2017-07-13 RX ORDER — TOPIRAMATE 100 MG/1
TABLET, FILM COATED ORAL
Qty: 90 TABLET | Refills: 0 | Status: SHIPPED | OUTPATIENT
Start: 2017-07-13 | End: 2017-10-09 | Stop reason: SDUPTHER

## 2017-07-13 RX ORDER — OMEPRAZOLE 40 MG/1
CAPSULE, DELAYED RELEASE ORAL
Qty: 90 CAPSULE | Refills: 0 | Status: SHIPPED | OUTPATIENT
Start: 2017-07-13 | End: 2017-10-09 | Stop reason: SDUPTHER

## 2017-07-27 DIAGNOSIS — I10 BENIGN ESSENTIAL HYPERTENSION: ICD-10-CM

## 2017-07-27 DIAGNOSIS — R60.0 PEDAL EDEMA: ICD-10-CM

## 2017-07-27 RX ORDER — TRIAMTERENE AND HYDROCHLOROTHIAZIDE 37.5; 25 MG/1; MG/1
CAPSULE ORAL
Qty: 90 CAPSULE | Refills: 0 | Status: SHIPPED | OUTPATIENT
Start: 2017-07-27 | End: 2017-10-22 | Stop reason: SDUPTHER

## 2017-08-10 ENCOUNTER — TELEPHONE (OUTPATIENT)
Dept: SURGERY | Facility: CLINIC | Age: 77
End: 2017-08-10

## 2017-08-10 NOTE — TELEPHONE ENCOUNTER
I called and spoke with Sachi Vora regarding his endoscopy and colonoscopy results.     Final Diagnosis   1. DUODENUM, BIOPSY:                         NO VILLOUS BLUNTING OR CRYPT HYPERPLASIA.                         NO INCREASE OF INTRAEPITHELIAL LYMPHOCYTES.                         NO ULCERS, GRANULOMAS, OR PARASITES.     2. DUODENAL BULB, BIOPSY:                         MILD ARCHITECTURAL DISTORTION, NON-SPECIFIC.                         BENIGN APPEARING LYMPHOID AGGREGATE.                         NO ACTIVE INFLAMMATION OR ULCERATION.     3. GASTRIC PRE-PYLORIC BIOPSIES:                         FOCAL MINIMAL CHRONIC INFLAMMATION AND REACTIVE CHANGE, NON-SPECIFIC.                         NO INTESTINAL METAPLASIA.                         NO IDENTIFIED HELICOBACTER ORGANISMS WITH A SPECIAL STAIN.     4. GASTRIC FUNDUS, BIOPSIES:                         NO ACTIVE INFLAMMATION OR ULCERATION.                         NO INTESTINAL METAPLASIA.      5. GASTROESOPHAGEAL JUNCTION, BIOPSIES:                         STRIPS OF UNREMARKABLE SQUAMOUS EPITHELIUM.                         NO INCREASE OF EOSINOPHILS.                         NO INFLAMMATION OR ULCERATION.                         NO GLANDULAR MUCOSA FOR EVALUATION.     6. RANDOM COLON BIOPSIES:                         CHANGES CONSISTENT WITH MELANOSIS COLI.     I have discussed with the patient about the findings.  She has been taking Prilosec and sucralfate.  On upper endoscopy there was no evidence of Gastric ulcers.  I recommended that she stops taking sucralfate and continue taking Prilosec.  She should slowly wean Prilosec as tolerated.  If she continued to have persistent iron deficiency anemia she will need to have Hemoccult analysis.  If positive she will need to have a capsule endoscopy.  I discussed with her about the need to follow-up with Dr. Cruziot Weir for this.    Regarding her constipation she reports not good response to MiraLAX and Metamucil.  She  tried colon cleanse in the past with good response.  I have recommended she continue with that.      No need to continue screening colonoscopy    Pt. verbalized understanding and agreed    Reji Johnson MD  General, Minimally Invasive and Endoscopic Surgery  [facility]    [address] [place] [address], [address]  [place], [place], [address]  P: [phone]  F: [phone]

## 2017-10-09 DIAGNOSIS — K21.00 GASTROESOPHAGEAL REFLUX DISEASE WITH ESOPHAGITIS: ICD-10-CM

## 2017-10-10 RX ORDER — OMEPRAZOLE 40 MG/1
CAPSULE, DELAYED RELEASE ORAL
Qty: 90 CAPSULE | Refills: 0 | Status: SHIPPED | OUTPATIENT
Start: 2017-10-10 | End: 2018-09-06

## 2017-10-10 RX ORDER — TOPIRAMATE 100 MG/1
TABLET, FILM COATED ORAL
Qty: 90 TABLET | Refills: 0 | Status: SHIPPED | OUTPATIENT
Start: 2017-10-10 | End: 2018-05-04 | Stop reason: SDUPTHER

## 2017-10-12 LAB
25(OH)D3+25(OH)D2 SERPL-MCNC: 57.4 NG/ML (ref 30–100)
ALBUMIN SERPL-MCNC: 4.1 G/DL (ref 3.5–5.2)
ALBUMIN/GLOB SERPL: 1.6 G/DL
ALP SERPL-CCNC: 46 U/L (ref 39–117)
ALT SERPL-CCNC: 23 U/L (ref 1–33)
AST SERPL-CCNC: 22 U/L (ref 1–32)
BILIRUB SERPL-MCNC: 0.3 MG/DL (ref 0.1–1.2)
BUN SERPL-MCNC: 14 MG/DL (ref 8–23)
BUN/CREAT SERPL: 13.3 (ref 7–25)
CALCIUM SERPL-MCNC: 9.5 MG/DL (ref 8.6–10.5)
CHLORIDE SERPL-SCNC: 100 MMOL/L (ref 98–107)
CHOLEST SERPL-MCNC: 232 MG/DL (ref 100–199)
CO2 SERPL-SCNC: 28.1 MMOL/L (ref 22–29)
CREAT SERPL-MCNC: 1.05 MG/DL (ref 0.57–1)
ERYTHROCYTE [DISTWIDTH] IN BLOOD BY AUTOMATED COUNT: 16 % (ref 11.7–13)
GLOBULIN SER CALC-MCNC: 2.6 GM/DL
GLUCOSE SERPL-MCNC: 87 MG/DL (ref 65–99)
HCT VFR BLD AUTO: 39.2 % (ref 35.6–45.5)
HDL SERPL-SCNC: 59.8 UMOL/L
HDLC SERPL-MCNC: 97 MG/DL
HGB BLD-MCNC: 12.7 G/DL (ref 11.9–15.5)
LDL SERPL QN: 20.9 NM
LDL SERPL-SCNC: 1135 NMOL/L
LDL SMALL SERPL-SCNC: 481 NMOL/L
LDLC SERPL CALC-MCNC: 100 MG/DL (ref 0–99)
MCH RBC QN AUTO: 29 PG (ref 26.9–32)
MCHC RBC AUTO-ENTMCNC: 32.4 G/DL (ref 32.4–36.3)
MCV RBC AUTO: 89.5 FL (ref 80.5–98.2)
PLATELET # BLD AUTO: 339 10*3/MM3 (ref 140–500)
POTASSIUM SERPL-SCNC: 4 MMOL/L (ref 3.5–5.2)
PROT SERPL-MCNC: 6.7 G/DL (ref 6–8.5)
RBC # BLD AUTO: 4.38 10*6/MM3 (ref 3.9–5.2)
SODIUM SERPL-SCNC: 143 MMOL/L (ref 136–145)
T3FREE SERPL-MCNC: 3.4 PG/ML (ref 2–4.4)
T4 FREE SERPL-MCNC: 1.19 NG/DL (ref 0.93–1.7)
TRIGL SERPL-MCNC: 173 MG/DL (ref 0–149)
TSH SERPL DL<=0.005 MIU/L-ACNC: 1.16 MIU/ML (ref 0.27–4.2)
WBC # BLD AUTO: 7.44 10*3/MM3 (ref 4.5–10.7)

## 2017-10-22 DIAGNOSIS — I10 BENIGN ESSENTIAL HYPERTENSION: ICD-10-CM

## 2017-10-22 DIAGNOSIS — R60.0 PEDAL EDEMA: ICD-10-CM

## 2017-10-23 RX ORDER — TRIAMTERENE AND HYDROCHLOROTHIAZIDE 37.5; 25 MG/1; MG/1
CAPSULE ORAL
Qty: 90 CAPSULE | Refills: 0 | Status: SHIPPED | OUTPATIENT
Start: 2017-10-23 | End: 2018-01-18 | Stop reason: SDUPTHER

## 2017-10-27 RX ORDER — ESTRADIOL 1 MG/1
TABLET ORAL
Qty: 90 TABLET | Refills: 0 | Status: SHIPPED | OUTPATIENT
Start: 2017-10-27 | End: 2018-05-02 | Stop reason: SDUPTHER

## 2017-10-30 ENCOUNTER — OFFICE VISIT (OUTPATIENT)
Dept: INTERNAL MEDICINE | Facility: CLINIC | Age: 77
End: 2017-10-30

## 2017-10-30 VITALS
DIASTOLIC BLOOD PRESSURE: 72 MMHG | HEART RATE: 86 BPM | HEIGHT: 61 IN | BODY MASS INDEX: 25.86 KG/M2 | OXYGEN SATURATION: 99 % | SYSTOLIC BLOOD PRESSURE: 140 MMHG | WEIGHT: 137 LBS

## 2017-10-30 DIAGNOSIS — N18.2 CHRONIC RENAL INSUFFICIENCY, STAGE II (MILD): Chronic | ICD-10-CM

## 2017-10-30 DIAGNOSIS — H90.3 BILATERAL SENSORINEURAL HEARING LOSS: Chronic | ICD-10-CM

## 2017-10-30 DIAGNOSIS — M54.5 CHRONIC LOW BACK PAIN, UNSPECIFIED BACK PAIN LATERALITY, WITH SCIATICA PRESENCE UNSPECIFIED: Chronic | ICD-10-CM

## 2017-10-30 DIAGNOSIS — E04.2 MULTINODULAR GOITER: Chronic | ICD-10-CM

## 2017-10-30 DIAGNOSIS — IMO0002 CHRONIC MIGRAINE: Chronic | ICD-10-CM

## 2017-10-30 DIAGNOSIS — Z51.81 THERAPEUTIC DRUG MONITORING: ICD-10-CM

## 2017-10-30 DIAGNOSIS — R60.0 PEDAL EDEMA: Chronic | ICD-10-CM

## 2017-10-30 DIAGNOSIS — G25.81 RESTLESS LEGS SYNDROME: Chronic | ICD-10-CM

## 2017-10-30 DIAGNOSIS — G47.10 HYPERSOMNOLENCE: Chronic | ICD-10-CM

## 2017-10-30 DIAGNOSIS — E55.9 VITAMIN D DEFICIENCY: Chronic | ICD-10-CM

## 2017-10-30 DIAGNOSIS — N95.1 MENOPAUSAL STATE: Chronic | ICD-10-CM

## 2017-10-30 DIAGNOSIS — K21.00 GASTROESOPHAGEAL REFLUX DISEASE WITH ESOPHAGITIS: Chronic | ICD-10-CM

## 2017-10-30 DIAGNOSIS — M81.0 AGE-RELATED OSTEOPOROSIS WITHOUT CURRENT PATHOLOGICAL FRACTURE: Chronic | ICD-10-CM

## 2017-10-30 DIAGNOSIS — F41.8 DEPRESSION WITH ANXIETY: Chronic | ICD-10-CM

## 2017-10-30 DIAGNOSIS — K86.89 DILATION OF PANCREATIC DUCT: Chronic | ICD-10-CM

## 2017-10-30 DIAGNOSIS — D50.0 IRON DEFICIENCY ANEMIA DUE TO CHRONIC BLOOD LOSS: Chronic | ICD-10-CM

## 2017-10-30 DIAGNOSIS — Z23 NEED FOR INFLUENZA VACCINATION: ICD-10-CM

## 2017-10-30 DIAGNOSIS — Z00.00 INITIAL MEDICARE ANNUAL WELLNESS VISIT: Primary | ICD-10-CM

## 2017-10-30 DIAGNOSIS — K29.51 CHRONIC GASTRITIS WITH BLEEDING, UNSPECIFIED GASTRITIS TYPE: Chronic | ICD-10-CM

## 2017-10-30 DIAGNOSIS — K25.7 CHRONIC GASTRIC ULCER WITHOUT HEMORRHAGE AND WITHOUT PERFORATION: Chronic | ICD-10-CM

## 2017-10-30 DIAGNOSIS — D64.9 CHRONIC ANEMIA: Chronic | ICD-10-CM

## 2017-10-30 DIAGNOSIS — I65.23 ATHEROSCLEROSIS OF BOTH CAROTID ARTERIES: Chronic | ICD-10-CM

## 2017-10-30 DIAGNOSIS — E78.5 HYPERLIPIDEMIA, UNSPECIFIED HYPERLIPIDEMIA TYPE: Chronic | ICD-10-CM

## 2017-10-30 DIAGNOSIS — R53.82 CHRONIC FATIGUE: Chronic | ICD-10-CM

## 2017-10-30 DIAGNOSIS — F41.1 GENERALIZED ANXIETY DISORDER: Chronic | ICD-10-CM

## 2017-10-30 DIAGNOSIS — Z78.9 STATIN INTOLERANCE: ICD-10-CM

## 2017-10-30 DIAGNOSIS — G89.29 CHRONIC LOW BACK PAIN, UNSPECIFIED BACK PAIN LATERALITY, WITH SCIATICA PRESENCE UNSPECIFIED: Chronic | ICD-10-CM

## 2017-10-30 DIAGNOSIS — I10 BENIGN ESSENTIAL HYPERTENSION: Chronic | ICD-10-CM

## 2017-10-30 PROBLEM — D50.9 IRON DEFICIENCY ANEMIA: Chronic | Status: ACTIVE | Noted: 2017-06-01

## 2017-10-30 PROCEDURE — 99215 OFFICE O/P EST HI 40 MIN: CPT | Performed by: INTERNAL MEDICINE

## 2017-10-30 PROCEDURE — 90662 IIV NO PRSV INCREASED AG IM: CPT | Performed by: INTERNAL MEDICINE

## 2017-10-30 PROCEDURE — G0008 ADMIN INFLUENZA VIRUS VAC: HCPCS | Performed by: INTERNAL MEDICINE

## 2017-10-30 PROCEDURE — G0439 PPPS, SUBSEQ VISIT: HCPCS | Performed by: INTERNAL MEDICINE

## 2017-10-30 RX ORDER — PHENOL 1.4 %
AEROSOL, SPRAY (ML) MUCOUS MEMBRANE
Qty: 60 TABLET | Refills: 0
Start: 2017-10-30 | End: 2017-10-30

## 2017-10-30 RX ORDER — IBUPROFEN 200 MG
CAPSULE ORAL
Qty: 60 TABLET | Refills: 1
Start: 2017-10-30 | End: 2019-01-17

## 2017-10-30 NOTE — PROGRESS NOTES
10/30/2017    Patient Information  Sachi Vora                                                                                          [address]   [place] [address]      1940  [phone]      Chief Complaint:     Initial Medicare wellness visit.  Follow-up hyperlipidemia, esophageal reflux, chronic renal insufficiency, chronic lower back pain, history of chronic gastritis, carotid artery plaque, hypertension, osteoporosis, chronic migraine headaches, pancreatic duct dilatation, pedal edema, restless leg syndrome, bilateral sensorineural hearing loss, vitamin D deficiency, history of chronic gastric ulcer, recent colonoscopy and EGD, chronic fatigue and hypersomnolence, chronic anemia and iron deficiency anemia, multinodular goiter, generalized anxiety with depression.  Patient has no new acute complaints but continues to have marked fatigue and a sensation of feeling tired.  She had an overnight oximetry test that we will review as well.    History of Present Illness:    Patient with a history of medical problems as outlined in the chief complaint presents today to follow-up on several issues.  She had lab work prior to this visit in order to monitor her chronic medical issues.  All of the issues that will be addressed today will be documented below.  Past medical history reviewed and updated where necessary including health maintenance parameters reveals she needs an influenza vaccination as well as her Medicare wellness visit, both of which we will do today.  She also needs a DEXA scan.    The history regarding chronic fatigue and hypersomnolence:    06/14/2017--overnight oximetry revealed oxygen desaturation index of only 0.44.  There were only 10 total desaturations during the period of testing which lasted 6 hours and 46 minutes.    05/31/2017--patient seen in follow-up and reports she continues to have chronic fatigue as well as hypersomnolence.  Once again, she is on multiple  medications that are undoubtedly contributing to this problem including clonazepam and hydrocodone but I doubt that these could be discontinued.  Her CBC back in April revealed a low hemoglobin of 10.8 but hematocrit was normal at 35.7.  Thyroid function tests were normal and CMP normal.  Overnight oximetry test ordered.  Repeat CBC.  Iron studies.  Sedimentation rate and CRP.    04/11/2017--patient seen in follow-up and her blood pressure is now at a reasonable level at 120/64.  She reports she still feels somewhat fatigued but she is much better.  Patient has not been doing any regular exercise and I do think that that would be helpful to reduce her feeling of fatigue.  She is on multiple medications that could be contributing to the fatigue including clonazepam and hydrocodone, but these are medications we really cannot discontinue.    03/21/2017--patient seen in follow-up and continues to have fatigue.  Her blood pressure remains on the low side and I think this may very well be contributing to her symptoms.  She is currently taking Dyazide 37.5 along with lisinopril 20 mg per day.  Lisinopril discontinued altogether and we will reassess the situation in about one month.    02/15/2017--patient seen in follow-up and continues to have chronic fatigue and weakness.  Once again the overnight oximetry was not performed.  The exact reason is unclear.  Patient did not contact our office to inform us of this problem.    08/23/2016--overnight oximetry was never performed.  Patient indicates no one ever contacted her.  We will attempt to reschedule this.    05/05/2016--patient presented 04/21/2016 with complaints of at least a two-month history of profound but yet nonspecific fatigue.  She feels as if she has just no energy at all.  They're sometimes associated hypersomnolence.  She has good days and bad days and otherwise there's really no other associated symptoms.  She did have recent blood work which looked good.  Her   was with her at the initial evaluation and he has not noticed any significant snoring or witnessed apnea.  Patient reports she now has to take naps during the day because she is so tired.  Even though my index of suspicion for sleep apnea is not high, we will go ahead and order an overnight oximetry test which can be performed at the patient's home.    04/21/2016--patient reports approximately 2 month history of profound fatigue.  This is generalized and she just feels exhausted.  Sleeping quite a bit as well.  She has good days and bad days.  No other associated symptoms with this.  Her blood pressure today is somewhat on the low side with a systolic initially of 94.  Repeat blood pressure by me revealed a systolic of 112.  Lisinopril HCT discontinued.  Also patient had recent CBC which was normal except for slightly low hemoglobin.  CMP was normal except for low potassium at 3.3.  TSH was normal.    The history regarding chronic anemia/iron deficiency anemia:    10/30/2017--CBC reveals a normal hemoglobin of 12.7, hematocrit normal at 39.2.  RDW slightly elevated at 16.  Patient reports she has difficulty tolerating iron supplementation and we will therefore discontinue it and continue to monitor.    06/13/2017--colonoscopy revealed melanosis.  Biopsied.  There was friability with contact bleeding in the ascending colon.  Biopsied.  Pathology returned consistent with melanosis coli.    06/13/2017--EGD revealed normal esophagus.  Biopsies taken.  There was a medium-sized hiatal hernia.  Localized mild inflammation and linear erosions were found in the prepyloric region.  Biopsied.  Examined duodenum was normal.  Random biopsies taken.  Pathology of the gastroesophageal junction was unremarkable as was the gastric fundus.  Prepyloric biopsy revealed minimal chronic inflammation and reactive change.  H pylori negative.  Duodenal biopsies are negative.    04/12/2017--hemoglobin low at 10.8, hematocrit is  normal at 35.7.  Iron sulfate 325 mg per day initiated.    08/23/2016--routine follow-up.  Patient continues to have epigastric abdominal pain believed to be related to reflux with possible esophageal spasm.  Hemoglobin noted to be low at 10.6 with a hematocrit low at 33.7 and RDW elevated at 16.2.  Homocystine, methylmalonic acid, iron studies ordered.  Homocystine and methylmalonic acid were normal.  Iron saturation was low.  Repeat CBC ordered.  EGD ordered as soon as possible.  Carafate was added to the omeprazole at this time.    The history regarding osteoporosis:    10/30/2017--patient seen in follow-up and seems to tolerate Reclast infusion.  Repeat DEXA scan ordered.    04/19/2017--Reclast infusion.    04/05/2016--Reclast infusion.    06/04/2012--Reclast infusion.    05/26/2011--Reclast infusion.    03/29/2011--DEXA scan revealed a lumbar T score of -2.8, right femoral neck T score -2.4, left femoral neck T score -2.4.  Osteoporosis of the lumbar spine and severe osteopenia of the hips bilaterally.  Patient has been intolerant to Fosamax because of gastritis and gastroesophageal reflux.    04/01/2009--treatment for osteoporosis begun with Fosamax.    02/09/2009--DEXA scan revealed lumbar spine T score of -3.3.  Left femoral neck T score -2.5.  Right hip T score -2.6.  Osteoporosis.    History regarding chronic gastritis and chronic gastric ulcer:    06/13/2017--EGD revealed normal esophagus.  Biopsies taken.  There was a medium-sized hiatal hernia.  Localized mild inflammation and linear erosions were found in the prepyloric region.  Biopsied.  Examined duodenum was normal.  Random biopsies taken.  Pathology of the gastroesophageal junction was unremarkable as was the gastric fundus.  Prepyloric biopsy revealed minimal chronic inflammation and reactive change.  H pylori negative.  Duodenal biopsies are negative.    02/15/2017--patient seen in follow-up in nearly 6 months later.  She has complaints of not  feeling well all over.  She has complaints of diffuse myalgias and possibly tendinopathy related to Levaquin that I called in prior to her going to [place] for vacation.  She reports that 3 or 4 days after starting the Levaquin she began to have the musculoskeletal symptoms and she reports that she continues to have them presently.  Symptoms are worse involving her left calf area.  Examination reveals significant tenderness involving the left calf and the left calf seems a little larger than the right.  She also has complaints of shortness of breath but no chest pain.  She is complaining of double vision and generalized weakness and fatigue.  She was complaining of chronic fatigue at the last visit back in September and I ordered an overnight oximetry test but apparently this was never done for reasons that are not clear to me.  Her current oxygen saturation is 94% and normally it is 100%.  Plan is as follows: Extensive lab work including CBC, CMP, thyroid function tests, d-dimer, CRP and sedimentation rate, CPK.  Stat Doppler venous study of the left lower extremity.  Further to follow pending results and tentative plan to follow up one to 2 days after the lab results are known.  If the Doppler study returns positive then we will deal with that immediately.    09/27/2016--EGD revealed a normal oropharynx, esophagus, and medium sized hiatal hernia.  Nonbleeding gastric ulcer with no stigmata of bleeding.  Biopsy.  Gastritis.  Biopsy.  Normal examined duodenum.  Biopsied.  Pathology returned mild to moderate chronic active gastritis with ulceration from the stomach antral ulcer biopsy.  Gastroesophageal junction biopsy revealed minimal mixed inflammation.  Prepyloric stomach biopsy revealed mild chronic gastritis.  H pylori negative.  Duodenal biopsy was negative.    08/23/2016--patient reports she continues to have epigastric pain and that the omeprazole is not helping at all.  EGD ordered ASAP.  Carafate 1 g by  mouth before meals and daily at bedtime.  Check lipase today.  Also patient noted to be anemic which is new and anemia labs ordered including iron studies.    06/02/2016--patient reports return of her epigastric discomfort and feels that her hiatal hernia is acting up again.  She just started back on omeprazole 40 mg per day yesterday.  Apparently there was a problem with the prescription and she had been off of the omeprazole for some time.  I will fix this today.    11/03/2014--patient seen in follow-up and reports her epigastric pain/chest pain has resolved. I reviewed the results of the studies with her. It does not appear that her problem is related to biliary tract disease. She does have reflux and although the recent upper GI revealed minimal reflux, I do think her symptoms are related to esophageal reflux with esophageal spasm.     10/21/2014--air-contrast upper GI series revealed trace upper laryngeal penetration. Persistent small partially reducible sliding hiatal hernia the upper stomach with minimal gastroesophageal reflux. No esophageal, gastric, or duodenal mass or mucosal ulceration was seen. The current exam incidentally demonstrated abnormally rapid small bowel transit. HIDA scan was normal with an ejection fraction of 93%. Ultrasound of the right upper quadrant revealed a negative gallbladder. Tiny hepatic cyst noted.  Omeprazole 40 mg per day initiated.    10/08/2014--patient was seen in follow-up and had not had the upper GI, ultrasound, CCK HIDA performed because she was too busy being evaluated by the specialist for a renal cyst and pancreatic divisum which had already been previously evaluated. The studies were ordered.     04/14/2014--patient presented to the office with complaints of a single episode of epigastric/chest pain with radiation into the jaw and neck. It occurred the week prior and lasted approximately 10 minutes. She has had multiple such episodes and they are exactly the same as  her previous episodes. She has had multiple cardiac evaluations including stress tests that have been negative. I reviewed her records and did find a HIDA scan that was abnormal back in early 2000 that was ordered by her previous physician. She did have a HIDA scan that was normal back in 2005. I felt like her symptoms were probably related to a gastrointestinal source, most likely esophageal reflux with spasm. These were occurring despite the use of proton pump inhibitors. Another consideration would be gallbladder disease. Given her extensive cardiac evaluation I doubted this was a cardiac problem. I ordered an ultrasound of the right upper quadrant, CCK HIDA, upper GI. Follow up after results known.    02/28/2012--air-contrast upper GI revealed small to moderate sized reducible sliding hiatal herniation of the upper stomach with some demonstrated gastroesophageal reflux.  No esophageal, gastric, or duodenal mass or mucosal ulceration was seen.    11/03/2008--EGD performed for evaluation of iron deficiency anemia revealed hiatal hernia without evidence of reflux, prepyloric antritis and superficial ulceration, normal duodenum.  Pathology returned erosive chronic gastritis.    Helicobacter negative.    11/19/2001--EGD revealed a very tortuous distal esophagus with a Schatzki's ring.  No ulcer or erosions.  No Dorado's mucosa.  Stomach revealed patchy erythema and erosions in the antrum.  Biopsy.  Normal pylorus with no obstruction.  Normal duodenum with no ulcers.  The Schatzki's ring was dilated with a Rhodes dilator.      Review of Systems   Constitution: Positive for weakness and malaise/fatigue.   HENT: Negative.    Eyes: Negative.    Cardiovascular: Negative.    Respiratory: Negative.    Endocrine: Negative.    Hematologic/Lymphatic: Negative.    Skin: Negative.    Musculoskeletal: Positive for arthritis and back pain.   Gastrointestinal: Positive for abdominal pain.   Genitourinary: Negative.   "  Psychiatric/Behavioral: Positive for depression. The patient is nervous/anxious.    Allergic/Immunologic: Negative.        Active Problems:    Patient Active Problem List   Diagnosis   • Osteoporosis, 03/29/2011--lumbar spine -2.8.  Right femoral neck -2.4.  Left femoral neck -2.4.  Patient receives Reclast infusions.   • Therapeutic drug monitoring   • Simple renal cyst   • Benign essential hypertension   • Carotid artery plaque, 10/03/2014--mild bilateral carotid artery plaque.   • Chronic gastritis   • Chronic lower back pain   • Chronic otitis externa   • Chronic renal insufficiency, stage II (mild), creatinine 1.12   • Depression with anxiety   • Gastroesophageal reflux disease with esophagitis   • Functional murmur, 07/15/2015--normal echocardiogram.   • Hyperlipidemia   • Menopausal state   • Chronic migraine   • Pancreatic Duct Dilation   • Pedal edema   • Restless legs syndrome   • Bilateral sensorineural hearing loss   • Vitamin D deficiency   • Chronic gastric ulcer   • Chronic fatigue   • Hypersomnolence   • Chronic anemia   • Multinodular goiter   • Generalized anxiety disorder   • Iron deficiency anemia   • Statin intolerance         Past Medical History:   Diagnosis Date   • Benign essential hypertension 4/8/2016   • Bilateral sensorineural hearing loss 3/31/2011    03/31/2011--etiology reveals reverse \"cookie bite\" type of hearing loss for both ears of mild/moderate degree, mostly sensorineural.  Speech discrimination 100% on the right, 96% on the left.   • Carotid artery plaque, 10/03/2014--mild bilateral carotid artery plaque. 7/25/2012    10/03/2014--[facility] screening revealed mild bilateral carotid plaque, negative for atrial fibrillation, negative for AAA, negative for PAD, osteoporosis screen revealed osteopenia.  Body mass index was 25 and considered to be moderate risk.  07/25/2012--vascular screen negative for carotid plaque, negative for abdominal aneurysm, negative for PAD Description: " 10/03/2014--[facility] screening revealed mild bilateral carotid plaque, negative for atrial fibrillation, negative for AAA, negative for PAD, osteoporosis screen revealed osteopenia.  Body mass index was 25 and considered to be moderate risk.  07/25/2012--vascular screen negative for carotid plaque, negative for abdominal aneurysm, negative for PAD   • Chronic anemia 8/23/2016 06/13/2017--colonoscopy revealed melanosis.  Biopsied.  There was friability with contact bleeding in the ascending colon.  Biopsied.  Pathology returned consistent with melanosis coli.  06/13/2017--EGD revealed normal esophagus.  Biopsies taken.  There was a medium-sized hiatal hernia.  Localized mild inflammation and linear erosions were found in the prepyloric region.  Biopsied.  Examined duodenum was normal.  Random biopsies taken.  Pathology of the gastroesophageal junction was unremarkable as was the gastric fundus.  Prepyloric biopsy revealed minimal chronic inflammation and reactive change.  H pylori negative.  Duodenal biopsies are negative.  04/12/2017--hemoglobin low at 10.8, hematocrit is normal at 35.7.  Iron sulfate 325 mg per day initiated.  08/23/2016--routine follow-up.  Patient continues to have epigastric abdominal pain believed to be related to reflux with possible esophageal spasm.  Hemoglobin noted to be low at 10.6 with a hematocrit low at 33.7 and RDW elevated at 16.2.  Homocysti   • Chronic fatigue 4/21/2016 06/14/2017--overnight oximetry revealed oxygen desaturation index of only 0.44.  There were only 10 total desaturations during the period of testing which lasted 6 hours and 46 minutes.  05/31/2017--patient seen in follow-up and reports she continues to have chronic fatigue as well as hypersomnolence.  Once again, she is on multiple medications that are undoubtedly contributing to this problem including clonazepam and hydrocodone but I doubt that these could be discontinued.  Her CBC back in April revealed a  low hemoglobin of 10.8 but hematocrit was normal at 35.7.  Thyroid function tests were normal and CMP normal.  Overnight oximetry test ordered.  Repeat CBC.  Iron studies.  Sedimentation rate and CRP.  04/11/2017--patient seen in follow-up and her blood pressure is now at a reasonable level at 120/64.  She reports she still feels somewhat fatigued but she is much better.  Patient has not been doing any regular exercise and I do think that that would be helpful to reduce her feeling of fatigue.  She is   • Chronic gastric ulcer 4/14/2014    02/15/2017--patient seen in follow-up in nearly 6 months later.  She has complaints of not feeling well all over.  She has complaints of diffuse myalgias and possibly tendinopathy related to Levaquin that I called in prior to her going to [place] for vacation.  She reports that 3 or 4 days after starting the Levaquin she began to have the musculoskeletal symptoms and she reports that she continues to have them presently.  Symptoms are worse involving her left calf area.  Examination reveals significant tenderness involving the left calf and the left calf seems a little larger than the right.  She also has complaints of shortness of breath but no chest pain.  She is complaining of double vision and generalized weakness and fatigue.  She was complaining of chronic fatigue at the last visit back in September and I ordered an overnight oximetry test but apparently this was never done for reasons that are not clear to me.  Her current oxygen saturation is 94% and normally it is 100%.  Plan is as follows: Extensi   • Chronic gastritis 11/19/2001    02/15/2017--patient seen in follow-up in nearly 6 months later.  She has complaints of not feeling well all over.  She has complaints of diffuse myalgias and possibly tendinopathy related to Levaquin that I called in prior to her going to [place] for vacation.  She reports that 3 or 4 days after starting the Levaquin she began to have the  musculoskeletal symptoms and she reports that she continues to have them presently.  Symptoms are worse involving her left calf area.  Examination reveals significant tenderness involving the left calf and the left calf seems a little larger than the right.  She also has complaints of shortness of breath but no chest pain.  She is complaining of double vision and generalized weakness and fatigue.  She was complaining of chronic fatigue at the last visit back in September and I ordered an overnight oximetry test but apparently this was never done for reasons that are not clear to me.  Her current oxygen saturation is 94% and normally it is 100%.  Plan is as follows: Extensi   • Chronic lower back pain 1/31/2006 08/11/2014--MRI of the lumbar spine reveals the conus terminates at L2 and is normal.  Cauda equina unremarkable.  Stable to moderate degenerative disc disease at L5-S1.  No acute fracture or pars defect is demonstrated.  Small synovial cysts are seen posterior to the L4-L5 facet.  The perivertebral soft tissues are unremarkable.  T12-L1, L1-L2, L2-L3 are negative.  L3-L4 a broad-based disc bulge resulting in mild bilateral neural foraminal narrowing.  L4-L5 reveals a broad-based disc bulge facet disease resulting in mild bilateral neural foraminal narrowing.  L5-S1 reveals a broad-based disc bulge, posterior osseous slipping, and facet disease resulting in mild to moderate bilateral neural foraminal narrowing.  Assessment is stable mild to moderate degenerative spondylosis.  Small synovial cysts are seen posterior to the L4-L5 facets.  08/11/2014--MRI of the thoracic spine reveals mild to moderate thoracic kyphosis.  No fracture.  At T5--T6 there is a moderate sized left paracentral disc protrusion which i   • Chronic migraine 11/3/2009    01/18/2010--MRI of the brain performed for headaches and memory loss.  Mild small vessel disease in the cerebral and central pontine white matter.  There is an ovoid,  somewhat pancake-shaped area of signal abnormality in the anterior inferior right temporal lobe subcortical to the juxtacortical white matter that measures 1.2 x 1 cm and anterior posterior and medial lateral dimension but only measures 3 mm in cranial caudal dimension.  I suspect that this is a benign cyst or a cystic area of encephalomalacia.  The remainder of the MRI of the head is within normal limits.  11/03/2009--CT scan of the brain without contrast performed for headache after a fall.  Mild diffuse atrophy.  No acute abnormality noted.; Description: Patient has had a long history of migraine headaches.  MRI and CT scan of the brain have been essentially negative.   • Chronic otitis externa 4/8/2016   • Chronic renal insufficiency, stage II (mild), creatinine 1.12 11/14/2015 11/14/2015--serum creatinine mildly elevated at 1.12.   • Depression with anxiety 4/8/2016   • Functional murmur, 07/15/2015--normal echocardiogram. 7/15/2015    07/15/2015--echocardiogram reveals normal left ventricular size and function with ejection fraction 55%.  Grade 1 diastolic dysfunction, abnormal relaxation pattern.  Trace tricuspid regurgitation.  Estimated right ventricular systolic pressure is 25 mmHg which is normal.   • Gastroesophageal reflux disease with esophagitis 11/19/2001 06/13/2017--EGD revealed normal esophagus.  Biopsies taken.  There was a medium-sized hiatal hernia.  Localized mild inflammation and linear erosions were found in the prepyloric region.  Biopsied.  Examined duodenum was normal.  Random biopsies taken.  Pathology of the gastroesophageal junction was unremarkable as was the gastric fundus.  Prepyloric biopsy revealed minimal chronic inflammation and reactive change.  H pylori negative.  Duodenal biopsies are negative.  11/03/2014--patient seen in follow-up and reports her epigastric pain/chest pain has resolved.  I reviewed the results of the studies with her.  It does not appear that her  problem is related to biliary tract disease.  She does have reflux and although the recent upper GI revealed minimal reflux, I do think her symptoms are related to esophageal reflux with esophageal spasm.  10/21/2014--air-contrast upper GI series revealed trace upper laryngeal penetration.  Persistent small partially reducible sliding hiatal hernia the upper stomach with    • Generalized anxiety disorder 4/11/2017   • History of Acute deep vein thrombosis (DVT) of distal end of left lower extremity 2/15/2017    03/21/2017 patient seen in follow-up and she is tolerating the Eliquis well without signs or symptoms of bleeding.  Her calf swelling and tenderness is better but not totally resolved.  I suspect that the DVT is chronic and may not resolve at all.  I will order a repeat venous study and then proceed from there.  02/23/2017--repeat Doppler venous study of the left lower extremity reveals a chronic left lower extremity DVT in the posterior tibial.  All other left sided veins appeared normal.  Fluid collection in the left calf noted.  02/17/2017--patient seen in follow-up and reports her left lower extremity symptoms are about the same.  She continues to have complaints of profound fatigue which I think is multifactorial including underlying depression that is not in remission.  Review the results of the CTA of the chest including the possible thyroid lesion.  I do not think this is contributing to any of her symptoms of fatigue particularly given the fact that her thyroid function tests are normal.  I expla   • History of acute pyelonephritis 11/29/2001 11/29/2001--patient was admitted to the hospital with fever, chills, leukocytosis and abdominal pain. Evaluation revealed acute left pyelonephritis pyelonephritis and sepsis syndrome.   • History of bone density study 03/29/2011 03/29/2011--DEXA scan revealed a lumbar T score of -2.8, right femoral neck T score -2.4, left femoral neck T score -2.4.  Osteoporosis of the lumbar spine and severe osteopenia of the hips bilaterally. Patient has been intolerant to Fosamax because of gastritis and gastroesophageal reflux.   04/01/2009--treatment for oste   • History of cardiovascular stress test 5/13/2016 05/13/2016--exercise stress test with myocardial perfusion indicates normal study with no evidence of ischemia.  Left ventricular ejection fraction is hyperdynamic with an EF greater than 70%.  Impressions are consistent with a low risk study.  12/07/2011--stress Cardiolite negative for ischemia or previous infarction. Ejection fraction greater than 70%.   12/20/2009--stress Cardiolite negative for infarction or ischemia.   10/28/2005--stress Cardiolite showed no evidence of ischemia or infarction.   05/28/2002--unremarkable stress EKG.   • History of carotid Doppler/vascular screen 10/03/2014    10/03/2014--[facility] screening revealed mild bilateral carotid plaque, negative for atrial fibrillation, negative for AAA, negative for PAD, osteoporosis screen revealed osteopenia. Body mass index was 25 and considered to be moderate risk. 07/25/2012--vascular screen negative for carotid plaque, negative for abdominal aneurysm, negative for PAD   • History of chest pain 11/03/2014 11/03/2014--patient seen in follow-up and reports her epigastric pain/chest pain has resolved. I reviewed the results of the studies with her. It does not appear that her problem is related to biliary tract disease. She does have reflux and although the recent upper GI revealed minimal reflux, I do think her symptoms are related to esophageal reflux with esophageal spasm.   10/21/2014--air-contras   • History of chest x-ray 06/29/2015 06/29/2015--chest x-ray PA and lateral performed for exertional dyspnea reveals no active disease   • History of Dyspnea on exertion 06/29/2015 06/29/2015--patient presents with a 4-6 week history of exertional dyspnea that comes on with activity such as  climbing stairs or walking her dog up an incline.  No chest pain.  Relieved with rest.  No cough.  She does have complaints of her feet and legs swelling that is particularly worse at the end of the day and not improved overnight.  No orthopnea or PND.  Chest exam reveals faint rales at t   • History of echocardiogram 07/15/2015    07/15/2015--echocardiogram performed for murmur and dyspnea. Left ventricle size is normal. Left ventricular systolic function normal with ejection fraction 55%. Grade 1 diastolic dysfunction, abnormal relaxation pattern. There is trace tricuspid regurgitation. Estimated right ventricular systolic pressure is 25 mmHg which is normal.   • HIstory of Left lumbar radiculopathy Remote    Patient has multilevel degenerative disc disease and degenerative arthritis of the lumbar spine   • History of mammogram 4/5/2017 04/05/2017--negative mammogram.  03/29/2011--negative mammogram.   • History of palpitations 12/07/2011    Patient has had multiple admissions to the hospital for complaints of chest pain and heart palpitations. She meant admitted at least on 3 occasions. She has had at least 3 stress Cardiolite and 1 stress ECG, the last Cardiolite being performed 12/07/2011 which was negative. Patient is also had Holter monitors which have been unremarkable.   • History of pneumococcal vaccination 11/20/2015 11/20/2015--PPSV 23 given. No further pneumococcal vaccinations required. 02/25/2015--Prevnar 13 given. Patient is pneumococcal vaccination jenn and therefore will need a PPSV 23 in 6-12 months.   • HIstory of Schatzki's ring 02/28/2012 02/28/2012--air-contrast upper GI revealed small to moderate sized reducible sliding hiatal herniation of the upper stomach with some demonstrated gastroesophageal reflux. No esophageal, gastric, or duodenal mass or mucosal ulceration was seen.  11/03/2008--EGD performed for evaluation of iron deficiency anemia revealed hiatal hernia without evidence  of reflux, prepyloric antritis and   • History of Substernal precordial chest pain 5/5/2016 06/02/2016--patient seen in follow-up and reports she has recurrence of her epigastric discomfort that is believed to be related to her hiatal hernia, reflux, and probably esophageal spasm.  I do think that her substernal chest pain is gastrointestinal related.  She has been off omeprazole for some time due to problems with the prescription.  She reinitiated this just yesterday.  05/13/2016--exercise stress test with myocardial perfusion indicates normal study with no evidence of ischemia.  Left ventricular ejection fraction is hyperdynamic with an EF greater than 70%.  Impressions are consistent with a low risk study.  05/05/2016--patient reports that over the past year she has had intermittent episodes of chest pain.  She describes a substernal pressure and this radiates into her left upper extremity as well as the jaw.  It lasts last than a minute.  The most recent episode was associated with pain in the right upper extremity that radiated up into the right jaw and then crossed over to the left jaw.  T   • History of Urinary urgency 11/20/2015 06/02/2016--patient seen in follow-up and reports her urinary symptoms have resolved essentially.  01/07/2016--patient seen in follow-up and reports she could not tolerate the Myrbetriq due to stomach issues.  However, she does report that her urinary symptoms have improved and are now tolerable.  11/20/2015--patient presents with approximately 2 month history of urinary urgency that is associated with intermittent hesitancy.  She describes the urge to urinate and she will go to the bathroom and then only be able to produce a few drops.  Other times she will get urgency and she will be able to completely void.  She has these symptoms at night as well and has nocturia approximately 2 times per night.  She denies dysuria.  She also has periodic nocturnal enuresis.  Urinalysis and  urine culture sent.  Myrbetriq 25 mg per day initiated.  May increase to 50 mg per day if necessary.  Follow-up in 2 weeks to reassess.  Urinalysis revealed 3+ bacteria but urine culture was negative.   • History of Zostavax administration 01/07/2016 01/07/2016--Zostavax given.   • Hyperlipidemia 4/8/2016   • Hypersomnolence 5/5/2016 06/14/2017--overnight oximetry revealed oxygen desaturation index of only 0.44.  There were only 10 total desaturations during the period of testing which lasted 6 hours and 46 minutes.  05/31/2017--patient seen in follow-up and reports she continues to have chronic fatigue as well as hypersomnolence.  Once again, she is on multiple medications that are undoubtedly contributing to this problem including clonazepam and hydrocodone but I doubt that these could be discontinued.  Her CBC back in April revealed a low hemoglobin of 10.8 but hematocrit was normal at 35.7.  Thyroid function tests were normal and CMP normal.  Overnight oximetry test ordered.  Repeat CBC.  Iron studies.  Sedimentation rate and CRP.  04/11/2017--patient seen in follow-up and her blood pressure is now at a reasonable level at 120/64.  She reports she still feels somewhat fatigued but she is much better.  Patient has not been doing any regular exercise and I do think that that would be helpful to reduce her feeling of fatigue.  She is   • Menopausal state 4/8/2016   • Multinodular goiter 2/17/2017 02/21/2017--thyroid ultrasound reveals multinodular thyroid with largest nodule measuring on the order of 1.6 cm in greatest dimension.  02/17/2017--patient seen in follow-up for DVT and CTA of the chest.  An incidental finding on the CTA of the chest reveals a 1.7 cm lesion in the right lobe of thyroid.  Note that thyroid function tests are currently normal.  Ultrasound of the thyroid ordered.   • Osteoporosis, 03/29/2011--lumbar spine -2.8.  Right femoral neck -2.4.  Left femoral neck -2.4.  Patient receives  Reclast infusions. 2/9/2009 04/19/2017--Reclast infusion.  04/05/2016--Reclast infusion.  06/04/2012--Reclast infusion.  05/26/2011--Reclast infusion.  03/29/2011--DEXA scan revealed a lumbar T score of -2.8, right femoral neck T score -2.4, left femoral neck T score -2.4.  Osteoporosis of the lumbar spine and severe osteopenia of the hips bilaterally.  Patient has been intolerant to Fosamax because of gastritis and gastroesophageal reflux.  04/01/2009--treatment for osteoporosis begun with Fosamax.  02/09/2009--DEXA scan revealed lumbar spine T score of -3.3.  Left femoral neck T score -2.5.  Right hip T score -2.6.  Osteoporosis.    • Pancreatic Duct Dilation 11/30/2001 09/29/2014--patient was again evaluated by the urologist for a renal cyst.  CT scan of the abdomen and pelvis reveals a left renal cyst measuring 5.1 x 4.9 cm.  There were subcentimeter hypodense renal lesions that are too small to further characterize and are presumably related to cysts.  Recommend attention to follow up.  Distal dilated pancreatic duct noted.  The common bile duct is the upper limits of normal in size.  The ampullary region is not well evaluated.  Comparison with prior imaging is recommended if available.  If there is no prior film available for comparison, and ERCP or MRCP could be performed to further evaluate.  Small hiatal hernia noted.  03/05/2012--CT scan of the abdomen with contrast, pancreatic protocol.  This reveals some fullness of the pancreatic ductal system and apparent pancreatic divisum with separate entrance of the accessory pancreatic duct extending into the duodenum distal to the main pancreatic duct.  There is fullness of the duct diffusely but similar to the previous s   • Pedal edema 6/29/2015 06/29/2015--patient presents with a 4-6 week history of exertional dyspnea that comes on with activity such as climbing stairs or walking her dog up an incline.  No chest pain.  Relieved with rest.  No cough.   She does have complaints of her feet and legs swelling that is particularly worse at the end of the day and not improved overnight.  No orthopnea or PND.  Chest exam reveals faint rales at the bases.  Otherwise clear.  Heart is regular and I do not appreciate a heart murmur.  Chest x-ray PA and lateral ordered.  Echocardiogram ordered.   • Restless legs syndrome 4/8/2016   • Simple renal cyst 7/20/2009 09/29/2014--patient was again evaluated by the urologist for a renal cyst.  CT scan of the abdomen and pelvis reveals a left renal cyst measuring 5.1 x 4.9 cm.  There were subcentimeter hypodense renal lesions that are too small to further characterize and are presumably related to cysts.  Recommend attention to follow up.  Distal dilated pancreatic duct noted.  The common bile duct is the upper limits of normal in size.  The ampullary region is not well evaluated.  Comparison with prior imaging is recommended if available.  If there is no prior film available for comparison, and ERCP or MRCP could be performed to further evaluate.  Small hiatal hernia noted.  07/20/2009--patient was noted to have a left renal mass consistent with a cyst.  This was evaluated by the urologist 07/20/2009.   • Statin intolerance 10/30/2017   • Vitamin D deficiency 4/8/2016         Past Surgical History:   Procedure Laterality Date   • APPENDECTOMY  1965    1965   • CATARACT EXTRACTION Bilateral Remote    Remote bilateral cataract extirpation with intraocular lens implantation.   • COLONOSCOPY  11/03/2008 2008--normal colonoscopy   • COLONOSCOPY  2001 2001--normal colonoscopy.   • COLONOSCOPY N/A 6/13/2017 06/13/2017--colonoscopy revealed melanosis.  Biopsied.  There was friability with contact bleeding in the ascending colon.  Biopsied.  Pathology returned consistent with melanosis coli.   • ENDOSCOPY  10/08/2014    02/28/2012--air-contrast upper GI revealed small to moderate sized reducible sliding hiatal herniation of the  upper stomach with some demonstrated gastroesophageal reflux. No esophageal, gastric, or duodenal mass or mucosal ulceration was seen. 11/03/2008--EGD performed for evaluation of iron deficiency anemia revealed hiatal hernia without evidence of reflux, prepyloric antritis and   • ENDOSCOPY  11/03/2008 11/03/2008--EGD performed for evaluation of iron deficiency anemia revealed hiatal hernia without evidence of reflux, prepyloric antritis and   • ENDOSCOPY  11/19/2001 11/19/2001--EGD revealed a very tortuous distal esophagus with a Schatzki's ring. No ulcer or erosions. No Dorado's mucosa. Stomach revealed patchy erythema and erosions in the antrum. Biopsy. Normal pylorus with no obstruction. Normal duodenum with no ulcers. The Schatzki's ring was dilated with a Rhodes dilator.;   • ENDOSCOPY N/A 9/27/2016 09/27/2016--EGD revealed a normal oropharynx, esophagus, and medium sized hiatal hernia.  Nonbleeding gastric ulcer with no stigmata of bleeding.  Biopsy.  Gastritis.  Biopsy.  Normal examined duodenum.  Biopsied.  Pathology returned mild to moderate chronic active gastritis with ulceration from the stomach antral ulcer biopsy.  Gastroesophageal junction biopsy revealed minimal mixed inflammation.   • ENDOSCOPY N/A 6/13/2017 06/13/2017--colonoscopy revealed melanosis.  Biopsied.  There was friability with contact bleeding in the ascending colon.  Biopsied.  Pathology returned consistent with melanosis coli.   • ESOPHAGEAL DILATATION  11/19/2001 11/19/2001--EGD revealed a very tortuous distal esophagus with a Schatzki's ring. No ulcer or erosions. No Dorado's mucosa. Stomach revealed patchy erythema and erosions in the antrum. Biopsy. Normal pylorus with no obstruction. Normal duodenum with no ulcers. The Schatzki's ring was dilated with a Rhodes dilator.;    • INCONTINENCE SURGERY  1979 1979--a bladder tack procedure for urinary stress incontinence   • KNEE ARTHROSCOPY Right 12/12/2011      12/12/2011--right knee arthroscopy with partial lateral and medial meniscectomies.   • SKIN SURGERY  05/25/2010 05/25/2010--skin lesion excised from right lower extremity. 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   • TOTAL ABDOMINAL HYSTERECTOMY  1974 1974--total abdominal hysterectomy.         Allergies   Allergen Reactions   • Penicillins Itching   • Tetanus Toxoids Itching           Current Outpatient Prescriptions:   •  aspirin 81 MG EC tablet, Take 81 mg by mouth Daily., Disp: , Rfl:   •  CALCIUM PO, Take 6 tablets by mouth Daily., Disp: , Rfl:   •  clonazePAM (KlonoPIN) 1 MG tablet, Take 1/2-1 by mouth twice a day when necessary anxiety, Disp: 60 tablet, Rfl: 2  •  cycloSPORINE (RESTASIS) 0.05 % ophthalmic emulsion, 1 drop 2 (Two) Times a Day., Disp: , Rfl:   •  diphenhydrAMINE-acetaminophen (TYLENOL PM EXTRA STRENGTH)  MG tablet per tablet, Take 1 tablet by mouth At Night As Needed for Sleep., Disp: , Rfl:   •  estradiol (ESTRACE) 1 MG tablet, TAKE 1 TABLET BY MOUTH DAILY, Disp: 90 tablet, Rfl: 0  •  Ginkgo Biloba (GINKOBA PO), Take 120 mg by mouth Daily., Disp: , Rfl:   •  HYDROcodone-acetaminophen (NORCO)  MG per tablet, Take 1 tablet by mouth every 6 (six) hours as needed for moderate pain (4-6)., Disp: , Rfl:   •  Misc Natural Products (COLON CLEANSE PO), Take 4 tablets by mouth every night., Disp: , Rfl:   •  omeprazole (priLOSEC) 40 MG capsule, TAKE 1 CAPSULE DAILY BEFORETHE FIRST MEAL, Disp: 90 capsule, Rfl: 0  •  PATIENT SUPPLIED MEDICATION, Take 4 capsules by mouth Daily. HydroEye (support for dry eye), Disp: , Rfl:   •  topiramate (TOPAMAX) 100 MG tablet, TAKE 1 TABLET DAILY, Disp: 90 tablet, Rfl: 0  •  triamterene-hydrochlorothiazide (DYAZIDE) 37.5-25 MG per capsule, TAKE ONE CAPSULE BY MOUTH DAILY FOR BLOOD PRESSURE AND LEG SWELLING, Disp: 90 capsule, Rfl: 0  •  venlafaxine XR (EFFEXOR-XR) 150 MG 24 hr capsule, Take 150 mg by mouth Every Night., Disp: , Rfl:  "  •  venlafaxine XR (EFFEXOR-XR) 75 MG 24 hr capsule, Take 75 mg by mouth Daily., Disp: , Rfl:       Family History   Problem Relation Age of Onset   • Heart attack Mother      dies age 47 from heart attack   • Heart disease Mother    • Bleeding Disorder Mother    • Heart attack Maternal Aunt      dies age 60 from heart attack   • Ovarian cancer Maternal Grandmother    • Heart disease Father          Social History     Social History   • Marital status:      Spouse name: N/A   • Number of children: N/A   • Years of education: N/A     Occupational History   • homemaker      Social History Main Topics   • Smoking status: Never Smoker   • Smokeless tobacco: Never Used   • Alcohol use No   • Drug use: No   • Sexual activity: Yes     Partners: Male     Other Topics Concern   • Not on file     Social History Narrative         Vitals:    10/30/17 1305   BP: 140/72   BP Location: Right arm   Patient Position: Sitting   Cuff Size: Adult   Pulse: 86   SpO2: 99%   Weight: 137 lb (62.1 kg)   Height: 60.5\" (153.7 cm)          Physical Exam:    General: Alert and oriented x 3.  No acute distress.  Normal affect.  HEENT: Pupils equal, round, reactive to light; extraocular movements intact; sclerae nonicteric; pharynx, ear canals and TMs normal.  Neck: Without JVD, thyromegaly, bruit, or adenopathy.  Lungs: Clear to auscultation in all fields.  Heart: Regular rate and rhythm without murmur, rub, gallop, or click.  Abdomen: Soft, nontender, without hepatosplenomegaly or hernia.  Bowel sounds normal.  : Deferred.  Rectal: Deferred.  Extremities: Without clubbing, cyanosis, edema, or pulse deficit.  Neurologic: Intact without focal deficit.  Normal station and gait observed during ingress and egress from the examination room.  Skin: Without significant lesion.  Musculoskeletal: Unremarkable.      Lab/other results:    NMR reveals a total cholesterol 232.  Triglycerides mildly elevated at 173.  LDL particle number is mildly " elevated at 1135.  However, small LDL particle number is excellent at 481 and her HDL particle number is outstanding at 59.8.  CMP normal except creatinine slightly elevated at 1.05.  CBC is normal.  Thyroid function tests normal.  Vitamin D normal.    I reviewed the overnight oximetry test which was essentially normal.    I reviewed the EGD which revealed a hiatal hernia and localized mild prepyloric gastritis.    I reviewed the colonoscopy which was negative except for evidence of melanosis coli.    I reviewed her recent mammogram which was normal.    Assessment/Plan:     Diagnosis Plan   1. Initial Medicare annual wellness visit     2. Hyperlipidemia, unspecified hyperlipidemia type     3. Gastroesophageal reflux disease with esophagitis     4. Chronic renal insufficiency, stage II (mild), creatinine 1.12     5. Chronic low back pain, unspecified back pain laterality, with sciatica presence unspecified     6. Chronic gastritis with bleeding, unspecified gastritis type     7. Carotid artery plaque, 10/03/2014--mild bilateral carotid artery plaque.     8. Benign essential hypertension     9. Age-related osteoporosis without current pathological fracture     10. Chronic migraine     11. Pancreatic Duct Dilation     12. Pedal edema     13. Restless legs syndrome     14. Bilateral sensorineural hearing loss     15. Vitamin D deficiency     16. Chronic gastric ulcer without hemorrhage and without perforation     17. Chronic fatigue     18. Hypersomnolence     19. Chronic anemia     20. Iron deficiency anemia due to chronic blood loss     21. Multinodular goiter     22. Generalized anxiety disorder     23. Depression with anxiety     24. Therapeutic drug monitoring     25. Statin intolerance         The initial Medicare wellness visit is documented on a separate note.    Patient has hyperlipidemia but is statin intolerant.  However, her NMR profile is actually quite good.  Patient continues to have reflux and had a  recent EGD which revealed no evidence of esophagitis.  There was mild prepyloric gastritis.  Helicobacter pylori negative.  Patient also had a recent colonoscopy which was consistent with melanosis coli but otherwise unremarkable.  The exact etiology of her chronic abdominal pain is not clear.  Her chronic renal insufficiency is extremely mild and stable.  She continues to suffer chronic lower back pain and this is being managed by pain management physician.  She has very mild bilateral carotid artery plaque that will probably be reassessed next year with a Doppler study.  Blood pressure well controlled.  Patient has osteoporosis and needs a repeat DEXA scan.  Also I clarified the amount of calcium that she should be taking.  She has chronic migraine headaches being treated with Topamax and her restless legs is being treated with Requip.  She continues to suffer from chronic fatigue and hypersomnolence, the exact etiology of this is unclear.  I suspect that it is multifactorial and is related to lack of exercise and probably pain medications including the chronic use of narcotics.  Her chronic anemia has resolved along with her iron deficiency.  Patient is having trouble tolerating iron supplementation and therefore this will be discontinued.  She has a multinodular goiter that was evaluated with a thyroid ultrasound earlier this year.  Patient has depression with anxiety that is treated with rather high dose of venlafaxine.    Plan is as follows: Patient instructed to take calcium citrate 950 mg, 2 by mouth daily.  High-dose influenza vaccination given.  DEXA scan ordered.  Patient can follow-up on the phone for the results.  Otherwise, I will have her follow-up in one year and we will do her subsequent Medicare wellness visit at that time.    Note: Greater than 40 minutes was spent today with patient including reviewing lab work, colonoscopy, EGD, overnight oximetry, mammogram, general surgery consultation,  reviewing medications, counseling patient regarding her diagnoses and prognoses as well as future diagnostic testing.  [id number] level of service justified.    Procedures

## 2017-10-30 NOTE — PROGRESS NOTES
QUICK REFERENCE INFORMATION:  The ABCs of the Annual Wellness Visit    Initial Medicare Wellness Visit    HEALTH RISK ASSESSMENT    1940    Recent Hospitalizations:  No hospitalization(s) within the last year..        Current Medical Providers:  Patient Care Team:  Cruzito Weir MD as PCP - General (Internal Medicine)  Cruzito Weir MD as PCP - Claims Attributed        Smoking Status:  History   Smoking Status   • Never Smoker   Smokeless Tobacco   • Never Used       Alcohol Consumption:  History   Alcohol Use No       Depression Screen:   PHQ-2/PHQ-9 Depression Screening 10/30/2017   Little interest or pleasure in doing things 0   Feeling down, depressed, or hopeless 1   Total Score 1       Health Habits and Functional and Cognitive Screening:  Functional & Cognitive Status 10/30/2017   Do you have difficulty preparing food and eating? No   Do you have difficulty bathing yourself, getting dressed or grooming yourself? No   Do you have difficulty using the toilet? No   Do you have difficulty moving around from place to place? No   Do you have trouble with steps or getting out of a bed or a chair? No   In the past year have you fallen or experienced a near fall? No   Current Diet Well Balanced Diet   Dental Exam Up to date   Eye Exam Up to date   Exercise (times per week) 2 times per week   Do you need help using the phone?  No   Are you deaf or do you have serious difficulty hearing?  No   Do you need help with transportation? No   Do you need help shopping? No   Do you need help preparing meals?  No   Do you need help with housework?  No   Do you need help with laundry? No   Do you need help taking your medications? No   Do you need help managing money? No   Have you felt unusual stress, anger or loneliness in the last month? No   Who do you live with? Spouse   If you need help, do you have trouble finding someone available to you? No   Have you been bothered in the last four weeks by sexual problems?  Yes   Do you have difficulty concentrating, remembering or making decisions? No           Does the patient have evidence of cognitive impairment? No    Asiprin use counseling: Taking ASA appropriately as indicated      Recent Lab Results:    Visual Acuity:  No exam data present    Age-appropriate Screening Schedule:  Refer to the list below for future screening recommendations based on patient's age, sex and/or medical conditions. Orders for these recommended tests are listed in the plan section. The patient has been provided with a written plan.    Health Maintenance   Topic Date Due   • DXA SCAN  08/23/2016   • LIPID PANEL  10/11/2018   • MAMMOGRAM  04/04/2019   • TDAP/TD VACCINES (2 - Td) 02/15/2027   • COLONOSCOPY  06/13/2027   • INFLUENZA VACCINE  Completed   • PNEUMOCOCCAL VACCINES (65+ LOW/MEDIUM RISK)  Completed   • ZOSTER VACCINE  Completed        Subjective   History of Present Illness    Sachi Vora is a 77 y.o. female who presents for an Annual Wellness Visit.    The following portions of the patient's history were reviewed and updated as appropriate: allergies, current medications, past family history, past medical history, past social history, past surgical history and problem list.    Outpatient Medications Prior to Visit   Medication Sig Dispense Refill   • aspirin 81 MG EC tablet Take 81 mg by mouth Daily.     • clonazePAM (KlonoPIN) 1 MG tablet Take 1/2-1 by mouth twice a day when necessary anxiety 60 tablet 2   • cycloSPORINE (RESTASIS) 0.05 % ophthalmic emulsion 1 drop 2 (Two) Times a Day.     • diphenhydrAMINE-acetaminophen (TYLENOL PM EXTRA STRENGTH)  MG tablet per tablet Take 1 tablet by mouth At Night As Needed for Sleep.     • estradiol (ESTRACE) 1 MG tablet TAKE 1 TABLET BY MOUTH DAILY 90 tablet 0   • Ginkgo Biloba (GINKOBA PO) Take 120 mg by mouth Daily.     • HYDROcodone-acetaminophen (NORCO)  MG per tablet Take 1 tablet by mouth every 6 (six) hours as needed for moderate pain  (4-6).     • omeprazole (priLOSEC) 40 MG capsule TAKE 1 CAPSULE DAILY BEFORETHE FIRST MEAL 90 capsule 0   • topiramate (TOPAMAX) 100 MG tablet TAKE 1 TABLET DAILY 90 tablet 0   • triamterene-hydrochlorothiazide (DYAZIDE) 37.5-25 MG per capsule TAKE ONE CAPSULE BY MOUTH DAILY FOR BLOOD PRESSURE AND LEG SWELLING 90 capsule 0   • venlafaxine XR (EFFEXOR-XR) 150 MG 24 hr capsule Take 150 mg by mouth Every Night.     • venlafaxine XR (EFFEXOR-XR) 75 MG 24 hr capsule Take 75 mg by mouth Daily.     • CALCIUM PO Take 6 tablets by mouth Daily.     • diclofenac (VOLTAREN) 1 % gel gel Apply 4 g topically 3 (three) times a day as needed.     • ferrous sulfate 325 (65 FE) MG tablet Take 325 mg by mouth Daily With Breakfast.     • Misc Natural Products (COLON CLEANSE PO) Take 4 tablets by mouth every night.     • PATIENT SUPPLIED MEDICATION Take 4 capsules by mouth Daily. HydroEye (support for dry eye)     • sucralfate (CARAFATE) 1 G tablet One by mouth 4 times daily before meals and at bedtime. 120 tablet 5     No facility-administered medications prior to visit.        Patient Active Problem List   Diagnosis   • Osteoporosis, 03/29/2011--lumbar spine -2.8.  Right femoral neck -2.4.  Left femoral neck -2.4.  Patient receives Reclast infusions.   • Therapeutic drug monitoring   • Simple renal cyst   • Benign essential hypertension   • Carotid artery plaque, 10/03/2014--mild bilateral carotid artery plaque.   • Chronic gastritis   • Chronic lower back pain   • Chronic otitis externa   • Chronic renal insufficiency, stage II (mild), creatinine 1.12   • Depression with anxiety   • Gastroesophageal reflux disease with esophagitis   • Functional murmur, 07/15/2015--normal echocardiogram.   • Hyperlipidemia   • Menopausal state   • Chronic migraine   • Pancreatic Duct Dilation   • Pedal edema   • Restless legs syndrome   • Bilateral sensorineural hearing loss   • Vitamin D deficiency   • Chronic gastric ulcer   • Chronic fatigue   •  "Hypersomnolence   • Chronic anemia   • Multinodular goiter   • Generalized anxiety disorder   • Iron deficiency anemia   • Statin intolerance       Advance Care Planning:  has an advance directive - a copy has been provided and is in file    Identification of Risk Factors:  Risk factors include: weight , cardiovascular risk, increased fall risk, chronic pain, depression and polypharmacy.    Review of Systems   Constitutional: Positive for fatigue.   Musculoskeletal: Positive for arthralgias and back pain.   All other systems reviewed and are negative.      Compared to one year ago, the patient feels her physical health is better.  Compared to one year ago, the patient feels her mental health is better.    Objective       Physical Exam    General: Alert and oriented x 3.  No acute distress.  Normal affect.  HEENT: Pupils equal, round, reactive to light; extraocular movements intact; sclerae nonicteric; pharynx, ear canals and TMs normal.  Neck: Without JVD, thyromegaly, bruit, or adenopathy.  Lungs: Clear to auscultation in all fields.  Heart: Regular rate and rhythm without murmur, rub, gallop, or click.  Abdomen: Soft, nontender, without hepatosplenomegaly or hernia.  Bowel sounds normal.  : Deferred.  Rectal: Deferred.  Extremities: Without clubbing, cyanosis, edema, or pulse deficit.  Neurologic: Intact without focal deficit.  Normal station and gait observed during ingress and egress from the examination room.  Skin: Without significant lesion.  Musculoskeletal: Unremarkable.    Vitals:    10/30/17 1305   BP: 140/72   BP Location: Right arm   Patient Position: Sitting   Cuff Size: Adult   Pulse: 86   SpO2: 99%   Weight: 137 lb (62.1 kg)   Height: 60.5\" (153.7 cm)   PainSc:   2       Body mass index is 26.32 kg/(m^2).  Discussed the patient's BMI with her. The BMI is above average; BMI management plan is completed.    Assessment/Plan   Patient Self-Management and Personalized Health Advice  The patient has been " provided with information about: diet, exercise, weight management, the relationship between weight and GERD and fall prevention and preventive services including:   · Bone densitometry screening, Diabetes screening, see lab orders, Exercise counseling provided, Fall Risk assessment done, Glaucoma screening recommended, Influenza vaccine.    Visit Diagnoses:    ICD-10-CM ICD-9-CM   1. Initial Medicare annual wellness visit Z00.00 V70.0   2. Hyperlipidemia, unspecified hyperlipidemia type E78.5 272.4   3. Gastroesophageal reflux disease with esophagitis K21.0 530.11   4. Chronic renal insufficiency, stage II (mild), creatinine 1.12 N18.2 585.2   5. Chronic low back pain, unspecified back pain laterality, with sciatica presence unspecified M54.5 724.2    G89.29 338.29   6. Chronic gastritis with bleeding, unspecified gastritis type K29.51 535.11   7. Carotid artery plaque, 10/03/2014--mild bilateral carotid artery plaque. I65.23 433.10     433.30   8. Benign essential hypertension I10 401.1   9. Age-related osteoporosis without current pathological fracture M81.0 733.01   10. Chronic migraine G43.709 346.70   11. Pancreatic Duct Dilation K86.89 577.8   12. Pedal edema R60.0 782.3   13. Restless legs syndrome G25.81 333.94   14. Bilateral sensorineural hearing loss H90.3 389.18   15. Vitamin D deficiency E55.9 268.9   16. Chronic gastric ulcer without hemorrhage and without perforation K25.7 531.70   17. Chronic fatigue R53.82 780.79   18. Hypersomnolence G47.10 780.54   19. Chronic anemia D64.9 285.9   20. Iron deficiency anemia due to chronic blood loss D50.0 280.0   21. Multinodular goiter E04.2 241.1   22. Generalized anxiety disorder F41.1 300.02   23. Depression with anxiety F41.8 300.4   24. Therapeutic drug monitoring Z51.81 V58.83   25. Statin intolerance Z78.9 995.27       No orders of the defined types were placed in this encounter.      Outpatient Encounter Prescriptions as of 10/30/2017   Medication Sig  Dispense Refill   • aspirin 81 MG EC tablet Take 81 mg by mouth Daily.     • clonazePAM (KlonoPIN) 1 MG tablet Take 1/2-1 by mouth twice a day when necessary anxiety 60 tablet 2   • cycloSPORINE (RESTASIS) 0.05 % ophthalmic emulsion 1 drop 2 (Two) Times a Day.     • diphenhydrAMINE-acetaminophen (TYLENOL PM EXTRA STRENGTH)  MG tablet per tablet Take 1 tablet by mouth At Night As Needed for Sleep.     • estradiol (ESTRACE) 1 MG tablet TAKE 1 TABLET BY MOUTH DAILY 90 tablet 0   • Ginkgo Biloba (GINKOBA PO) Take 120 mg by mouth Daily.     • HYDROcodone-acetaminophen (NORCO)  MG per tablet Take 1 tablet by mouth every 6 (six) hours as needed for moderate pain (4-6).     • omeprazole (priLOSEC) 40 MG capsule TAKE 1 CAPSULE DAILY BEFORETHE FIRST MEAL 90 capsule 0   • topiramate (TOPAMAX) 100 MG tablet TAKE 1 TABLET DAILY 90 tablet 0   • triamterene-hydrochlorothiazide (DYAZIDE) 37.5-25 MG per capsule TAKE ONE CAPSULE BY MOUTH DAILY FOR BLOOD PRESSURE AND LEG SWELLING 90 capsule 0   • venlafaxine XR (EFFEXOR-XR) 150 MG 24 hr capsule Take 150 mg by mouth Every Night.     • venlafaxine XR (EFFEXOR-XR) 75 MG 24 hr capsule Take 75 mg by mouth Daily.     • [DISCONTINUED] CALCIUM PO Take 6 tablets by mouth Daily.     • [DISCONTINUED] diclofenac (VOLTAREN) 1 % gel gel Apply 4 g topically 3 (three) times a day as needed.     • [DISCONTINUED] ferrous sulfate 325 (65 FE) MG tablet Take 325 mg by mouth Daily With Breakfast.     • [DISCONTINUED] Misc Natural Products (COLON CLEANSE PO) Take 4 tablets by mouth every night.     • [DISCONTINUED] PATIENT SUPPLIED MEDICATION Take 4 capsules by mouth Daily. HydroEye (support for dry eye)     • [DISCONTINUED] sucralfate (CARAFATE) 1 G tablet One by mouth 4 times daily before meals and at bedtime. 120 tablet 5   • calcium citrate (CALCITRATE) 950 MG tablet 2 by mouth daily 60 tablet 1   • [DISCONTINUED] calcium carbonate (CALCIUM 600 HIGH POTENCY) 600 MG tablet Take 3 by mouth  daily 60 tablet 0     No facility-administered encounter medications on file as of 10/30/2017.        Reviewed use of high risk medication in the elderly: yes  Reviewed for potential of harmful drug interactions in the elderly: yes    Follow Up:  No Follow-up on file.     An After Visit Summary and PPPS with all of these plans were given to the patient.

## 2017-11-03 ENCOUNTER — HOSPITAL ENCOUNTER (OUTPATIENT)
Dept: BONE DENSITY | Facility: HOSPITAL | Age: 77
Discharge: HOME | End: 2017-11-03
Admitting: INTERNAL MEDICINE

## 2017-11-03 PROCEDURE — 77080 DXA BONE DENSITY AXIAL: CPT

## 2017-11-12 DIAGNOSIS — K21.00 GASTROESOPHAGEAL REFLUX DISEASE WITH ESOPHAGITIS: ICD-10-CM

## 2017-11-13 RX ORDER — TOPIRAMATE 100 MG/1
TABLET, FILM COATED ORAL
Qty: 90 TABLET | Refills: 3 | Status: SHIPPED | OUTPATIENT
Start: 2017-11-13 | End: 2018-12-05 | Stop reason: SDUPTHER

## 2017-11-13 RX ORDER — OMEPRAZOLE 40 MG/1
CAPSULE, DELAYED RELEASE ORAL
Qty: 90 CAPSULE | Refills: 3 | Status: SHIPPED | OUTPATIENT
Start: 2017-11-13 | End: 2018-05-04 | Stop reason: SDUPTHER

## 2017-11-14 ENCOUNTER — APPOINTMENT (OUTPATIENT)
Dept: BONE DENSITY | Facility: HOSPITAL | Age: 77
End: 2017-11-14

## 2017-11-18 DIAGNOSIS — F41.1 GENERALIZED ANXIETY DISORDER: Chronic | ICD-10-CM

## 2017-11-20 ENCOUNTER — TELEPHONE (OUTPATIENT)
Dept: INTERNAL MEDICINE | Facility: CLINIC | Age: 77
End: 2017-11-20

## 2017-11-20 RX ORDER — CLONAZEPAM 1 MG/1
TABLET ORAL
Qty: 60 TABLET | Refills: 3 | OUTPATIENT
Start: 2017-11-20 | End: 2018-03-24 | Stop reason: SDUPTHER

## 2017-11-20 NOTE — TELEPHONE ENCOUNTER
Lumbar T score is  -2.2, an 8.7% interval increase.      Left hip density is lowest at the  femoral neck where the T score is  -2.1. There has been an 8% interval increase in the total hip density..       Right hip density is lowest at the  femoral neck where the T score is  -2.3, representing a 9.5% interval increase in total hip density..       IMPRESSION:  Osteopenia with a statistically significant interval  increase in the bone density.    Inform patient her bone density has actually improved.

## 2018-01-18 DIAGNOSIS — R60.0 PEDAL EDEMA: ICD-10-CM

## 2018-01-18 DIAGNOSIS — I10 BENIGN ESSENTIAL HYPERTENSION: ICD-10-CM

## 2018-01-18 RX ORDER — TRIAMTERENE AND HYDROCHLOROTHIAZIDE 37.5; 25 MG/1; MG/1
CAPSULE ORAL
Qty: 90 CAPSULE | Refills: 2 | Status: SHIPPED | OUTPATIENT
Start: 2018-01-18 | End: 2018-09-16 | Stop reason: SDUPTHER

## 2018-03-24 DIAGNOSIS — F41.1 GENERALIZED ANXIETY DISORDER: Chronic | ICD-10-CM

## 2018-03-26 ENCOUNTER — TELEPHONE (OUTPATIENT)
Dept: INTERNAL MEDICINE | Facility: CLINIC | Age: 78
End: 2018-03-26

## 2018-03-26 DIAGNOSIS — M81.0 AGE RELATED OSTEOPOROSIS, UNSPECIFIED PATHOLOGICAL FRACTURE PRESENCE: Primary | Chronic | ICD-10-CM

## 2018-03-26 DIAGNOSIS — M81.0 AGE-RELATED OSTEOPOROSIS WITHOUT CURRENT PATHOLOGICAL FRACTURE: Primary | Chronic | ICD-10-CM

## 2018-03-26 DIAGNOSIS — I65.23 ATHEROSCLEROSIS OF BOTH CAROTID ARTERIES: Chronic | ICD-10-CM

## 2018-03-26 DIAGNOSIS — N95.1 MENOPAUSAL STATE: Chronic | ICD-10-CM

## 2018-03-26 RX ORDER — CLONAZEPAM 1 MG/1
TABLET ORAL
Qty: 60 TABLET | Refills: 5 | Status: SHIPPED | OUTPATIENT
Start: 2018-03-26 | End: 2018-09-24 | Stop reason: SDUPTHER

## 2018-03-26 RX ORDER — ZOLEDRONIC ACID 5 MG/100ML
5 INJECTION, SOLUTION INTRAVENOUS ONCE
Qty: 100 ML | Refills: 0 | Status: SHIPPED | OUTPATIENT
Start: 2018-03-26 | End: 2018-03-26

## 2018-03-26 NOTE — TELEPHONE ENCOUNTER
She needs Reclast and needs a carotid Doppler study.  I put in an order for both.  She does not need a mammogram at this time.

## 2018-03-26 NOTE — TELEPHONE ENCOUNTER
Pt received notice in mail that she is due for her reclast, mammogram, and Lifescan. She said that she had a mammogram last  Year and she thought she only had to do it every 5 years. She didn't know if you wanted her to have the reclast again. And she doesn't know anything about the life scan test. What does she need. Please orders in. Call [phone].

## 2018-04-02 ENCOUNTER — HOSPITAL ENCOUNTER (OUTPATIENT)
Dept: CARDIOLOGY | Facility: HOSPITAL | Age: 78
Discharge: HOME | End: 2018-04-02
Admitting: INTERNAL MEDICINE

## 2018-04-02 LAB
BH CV XLRA MEAS LEFT CCA RATIO VEL: 60.7 CM/SEC
BH CV XLRA MEAS LEFT DIST CCA EDV: 18 CM/SEC
BH CV XLRA MEAS LEFT DIST CCA PSV: 60.7 CM/SEC
BH CV XLRA MEAS LEFT DIST ICA EDV: -34.7 CM/SEC
BH CV XLRA MEAS LEFT DIST ICA PSV: -89.9 CM/SEC
BH CV XLRA MEAS LEFT ICA RATIO VEL: -93.4 CM/SEC
BH CV XLRA MEAS LEFT ICA/CCA RATIO: -1.5
BH CV XLRA MEAS LEFT MID ICA EDV: -36.8 CM/SEC
BH CV XLRA MEAS LEFT MID ICA PSV: -93.4 CM/SEC
BH CV XLRA MEAS LEFT PROX CCA EDV: 17 CM/SEC
BH CV XLRA MEAS LEFT PROX CCA PSV: 67.9 CM/SEC
BH CV XLRA MEAS LEFT PROX ECA EDV: -12.3 CM/SEC
BH CV XLRA MEAS LEFT PROX ECA PSV: -64 CM/SEC
BH CV XLRA MEAS LEFT PROX ICA EDV: 28.3 CM/SEC
BH CV XLRA MEAS LEFT PROX ICA PSV: 78.5 CM/SEC
BH CV XLRA MEAS LEFT PROX SCLA EDV: 9.5 CM/SEC
BH CV XLRA MEAS LEFT PROX SCLA PSV: 155 CM/SEC
BH CV XLRA MEAS LEFT VERTEBRAL A EDV: 16.6 CM/SEC
BH CV XLRA MEAS LEFT VERTEBRAL A PSV: 49.8 CM/SEC
BH CV XLRA MEAS RIGHT CCA RATIO VEL: 87 CM/SEC
BH CV XLRA MEAS RIGHT DIST CCA EDV: 26.2 CM/SEC
BH CV XLRA MEAS RIGHT DIST CCA PSV: 87 CM/SEC
BH CV XLRA MEAS RIGHT DIST ICA EDV: -31.1 CM/SEC
BH CV XLRA MEAS RIGHT DIST ICA PSV: -89.2 CM/SEC
BH CV XLRA MEAS RIGHT ICA RATIO VEL: -89.2 CM/SEC
BH CV XLRA MEAS RIGHT ICA/CCA RATIO: -1
BH CV XLRA MEAS RIGHT MID ICA EDV: -20.4 CM/SEC
BH CV XLRA MEAS RIGHT MID ICA PSV: -46 CM/SEC
BH CV XLRA MEAS RIGHT PROX CCA EDV: 24.8 CM/SEC
BH CV XLRA MEAS RIGHT PROX CCA PSV: 90.6 CM/SEC
BH CV XLRA MEAS RIGHT PROX ECA EDV: -16.3 CM/SEC
BH CV XLRA MEAS RIGHT PROX ECA PSV: -75.7 CM/SEC
BH CV XLRA MEAS RIGHT PROX ICA EDV: -15.2 CM/SEC
BH CV XLRA MEAS RIGHT PROX ICA PSV: -61.6 CM/SEC
BH CV XLRA MEAS RIGHT PROX SCLA EDV: 12.3 CM/SEC
BH CV XLRA MEAS RIGHT PROX SCLA PSV: 70.2 CM/SEC
BH CV XLRA MEAS RIGHT VERTEBRAL A EDV: 17.7 CM/SEC
BH CV XLRA MEAS RIGHT VERTEBRAL A PSV: 53.8 CM/SEC
LEFT ARM BP: NORMAL MMHG
RIGHT ARM BP: NORMAL MMHG

## 2018-04-02 PROCEDURE — 93880 EXTRACRANIAL BILAT STUDY: CPT

## 2018-04-18 PROBLEM — M85.80 OSTEOPENIA: Status: ACTIVE | Noted: 2018-04-18

## 2018-04-18 PROBLEM — Z78.0 POSTMENOPAUSAL STATE: Status: ACTIVE | Noted: 2018-04-18

## 2018-04-18 RX ORDER — ZOLEDRONIC ACID 5 MG/100ML
5 INJECTION, SOLUTION INTRAVENOUS ONCE
Status: CANCELLED | OUTPATIENT
Start: 2018-04-20

## 2018-04-18 RX ORDER — SODIUM CHLORIDE 9 MG/ML
250 INJECTION, SOLUTION INTRAVENOUS ONCE
Status: CANCELLED | OUTPATIENT
Start: 2018-04-20

## 2018-04-19 DIAGNOSIS — M81.0 AGE RELATED OSTEOPOROSIS, UNSPECIFIED PATHOLOGICAL FRACTURE PRESENCE: Primary | ICD-10-CM

## 2018-04-20 ENCOUNTER — HOSPITAL ENCOUNTER (OUTPATIENT)
Dept: INFUSION THERAPY | Facility: HOSPITAL | Age: 78
Discharge: HOME | End: 2018-04-20
Admitting: INTERNAL MEDICINE

## 2018-04-20 VITALS
BODY MASS INDEX: 26.89 KG/M2 | TEMPERATURE: 96.5 F | DIASTOLIC BLOOD PRESSURE: 54 MMHG | SYSTOLIC BLOOD PRESSURE: 101 MMHG | HEART RATE: 80 BPM | RESPIRATION RATE: 16 BRPM | WEIGHT: 140 LBS | OXYGEN SATURATION: 98 %

## 2018-04-20 DIAGNOSIS — M81.0 AGE-RELATED OSTEOPOROSIS WITHOUT CURRENT PATHOLOGICAL FRACTURE: ICD-10-CM

## 2018-04-20 DIAGNOSIS — Z78.0 POSTMENOPAUSAL STATE: ICD-10-CM

## 2018-04-20 LAB
ANION GAP SERPL CALCULATED.3IONS-SCNC: 14.2 MMOL/L
BUN BLD-MCNC: 25 MG/DL (ref 8–23)
BUN/CREAT SERPL: 19.4 (ref 7–25)
CALCIUM SPEC-SCNC: 9.2 MG/DL (ref 8.6–10.5)
CHLORIDE SERPL-SCNC: 100 MMOL/L (ref 98–107)
CO2 SERPL-SCNC: 25.8 MMOL/L (ref 22–29)
CREAT BLD-MCNC: 1.29 MG/DL (ref 0.57–1)
GFR SERPL CREATININE-BSD FRML MDRD: 40 ML/MIN/1.73
GLUCOSE BLD-MCNC: 89 MG/DL (ref 65–99)
POTASSIUM BLD-SCNC: 4.1 MMOL/L (ref 3.5–5.2)
SODIUM BLD-SCNC: 140 MMOL/L (ref 136–145)

## 2018-04-20 PROCEDURE — 80048 BASIC METABOLIC PNL TOTAL CA: CPT | Performed by: INTERNAL MEDICINE

## 2018-04-20 PROCEDURE — 96365 THER/PROPH/DIAG IV INF INIT: CPT

## 2018-04-20 PROCEDURE — 25010000002 ZOLEDRONIC ACID 5 MG/100ML SOLUTION: Performed by: INTERNAL MEDICINE

## 2018-04-20 RX ORDER — ZOLEDRONIC ACID 5 MG/100ML
5 INJECTION, SOLUTION INTRAVENOUS ONCE
Status: COMPLETED | OUTPATIENT
Start: 2018-04-20 | End: 2018-04-20

## 2018-04-20 RX ORDER — ZOLEDRONIC ACID 5 MG/100ML
5 INJECTION, SOLUTION INTRAVENOUS ONCE
Status: CANCELLED | OUTPATIENT
Start: 2018-04-20

## 2018-04-20 RX ADMIN — ZOLEDRONIC ACID 5 MG: 5 INJECTION INTRAVENOUS at 12:01

## 2018-04-20 NOTE — PATIENT INSTRUCTIONS
Zoledronic Acid injection (Paget's Disease, Osteoporosis)  What is this medicine?  ZOLEDRONIC ACID (FAVIO luis daniel martinez ik AS id) lowers the amount of calcium loss from bone. It is used to treat Paget's disease and osteoporosis in women.  This medicine may be used for other purposes; ask your health care provider or pharmacist if you have questions.  COMMON BRAND NAME(S): Reclast, Zometa  What should I tell my health care provider before I take this medicine?  They need to know if you have any of these conditions:  -aspirin-sensitive asthma  -cancer, especially if you are receiving medicines used to treat cancer  -dental disease or wear dentures  -infection  -kidney disease  -low levels of calcium in the blood  -past surgery on the parathyroid gland or intestines  -receiving corticosteroids like dexamethasone or prednisone  -an unusual or allergic reaction to zoledronic acid, other medicines, foods, dyes, or preservatives  -pregnant or trying to get pregnant  -breast-feeding  How should I use this medicine?  This medicine is for infusion into a vein. It is given by a health care professional in a hospital or clinic setting.  Talk to your pediatrician regarding the use of this medicine in children. This medicine is not approved for use in children.  Overdosage: If you think you have taken too much of this medicine contact a poison control center or emergency room at once.  NOTE: This medicine is only for you. Do not share this medicine with others.  What if I miss a dose?  It is important not to miss your dose. Call your doctor or health care professional if you are unable to keep an appointment.  What may interact with this medicine?  -certain antibiotics given by injection  -NSAIDs, medicines for pain and inflammation, like ibuprofen or naproxen  -some diuretics like bumetanide, furosemide  -teriparatide  This list may not describe all possible interactions. Give your health care provider a list of all the medicines,  herbs, non-prescription drugs, or dietary supplements you use. Also tell them if you smoke, drink alcohol, or use illegal drugs. Some items may interact with your medicine.  What should I watch for while using this medicine?  Visit your doctor or health care professional for regular checkups. It may be some time before you see the benefit from this medicine. Do not stop taking your medicine unless your doctor tells you to. Your doctor may order blood tests or other tests to see how you are doing.  Women should inform their doctor if they wish to become pregnant or think they might be pregnant. There is a potential for serious side effects to an unborn child. Talk to your health care professional or pharmacist for more information.  You should make sure that you get enough calcium and vitamin D while you are taking this medicine. Discuss the foods you eat and the vitamins you take with your health care professional.  Some people who take this medicine have severe bone, joint, and/or muscle pain. This medicine may also increase your risk for jaw problems or a broken thigh bone. Tell your doctor right away if you have severe pain in your jaw, bones, joints, or muscles. Tell your doctor if you have any pain that does not go away or that gets worse.  Tell your dentist and dental surgeon that you are taking this medicine. You should not have major dental surgery while on this medicine. See your dentist to have a dental exam and fix any dental problems before starting this medicine. Take good care of your teeth while on this medicine. Make sure you see your dentist for regular follow-up appointments.  What side effects may I notice from receiving this medicine?  Side effects that you should report to your doctor or health care professional as soon as possible:  -allergic reactions like skin rash, itching or hives, swelling of the face, lips, or tongue  -anxiety, confusion, or depression  -breathing problems  -changes in  vision  -eye pain  -feeling faint or lightheaded, falls  -jaw pain, especially after dental work  -mouth sores  -muscle cramps, stiffness, or weakness  -redness, blistering, peeling or loosening of the skin, including inside the mouth  -trouble passing urine or change in the amount of urine  Side effects that usually do not require medical attention (report to your doctor or health care professional if they continue or are bothersome):  -bone, joint, or muscle pain  -constipation  -diarrhea  -fever  -hair loss  -irritation at site where injected  -loss of appetite  -nausea, vomiting  -stomach upset  -trouble sleeping  -trouble swallowing  -weak or tired  This list may not describe all possible side effects. Call your doctor for medical advice about side effects. You may report side effects to FDA at 3-857-FDA-2170.  Where should I keep my medicine?  This drug is given in a hospital or clinic and will not be stored at home.  NOTE: This sheet is a summary. It may not cover all possible information. If you have questions about this medicine, talk to your doctor, pharmacist, or health care provider.  © 2018 [employer] (2015-05-16 14:19:57)

## 2018-05-02 RX ORDER — ESTRADIOL 1 MG/1
TABLET ORAL
Qty: 90 TABLET | Refills: 1 | Status: SHIPPED | OUTPATIENT
Start: 2018-05-02 | End: 2018-10-27 | Stop reason: SDUPTHER

## 2018-05-04 ENCOUNTER — OFFICE VISIT (OUTPATIENT)
Dept: INTERNAL MEDICINE | Facility: CLINIC | Age: 78
End: 2018-05-04

## 2018-05-04 VITALS
DIASTOLIC BLOOD PRESSURE: 70 MMHG | SYSTOLIC BLOOD PRESSURE: 130 MMHG | BODY MASS INDEX: 26.43 KG/M2 | HEIGHT: 61 IN | HEART RATE: 83 BPM | OXYGEN SATURATION: 96 % | WEIGHT: 140 LBS

## 2018-05-04 DIAGNOSIS — R63.5 ABNORMAL WEIGHT GAIN: ICD-10-CM

## 2018-05-04 DIAGNOSIS — I65.23 ATHEROSCLEROSIS OF BOTH CAROTID ARTERIES: Primary | Chronic | ICD-10-CM

## 2018-05-04 DIAGNOSIS — R41.3 MEMORY LOSS: ICD-10-CM

## 2018-05-04 DIAGNOSIS — E04.2 MULTINODULAR GOITER: Chronic | ICD-10-CM

## 2018-05-04 PROBLEM — Z78.0 POSTMENOPAUSAL STATE: Chronic | Status: ACTIVE | Noted: 2018-04-18

## 2018-05-04 PROBLEM — M85.80 OSTEOPENIA: Status: RESOLVED | Noted: 2018-04-18 | Resolved: 2018-05-04

## 2018-05-04 PROCEDURE — 99214 OFFICE O/P EST MOD 30 MIN: CPT | Performed by: INTERNAL MEDICINE

## 2018-05-04 RX ORDER — GABAPENTIN 100 MG/1
100 CAPSULE ORAL NIGHTLY
Refills: 2 | COMMUNITY
Start: 2018-03-20 | End: 2020-11-05 | Stop reason: SDUPTHER

## 2018-05-04 RX ORDER — BUPROPION HYDROCHLORIDE 150 MG/1
150 TABLET ORAL 2 TIMES DAILY
Refills: 0 | COMMUNITY
Start: 2018-02-23 | End: 2021-04-28

## 2018-05-04 RX ORDER — VENLAFAXINE 75 MG/1
75 TABLET ORAL DAILY
COMMUNITY
End: 2021-04-28

## 2018-05-04 NOTE — PROGRESS NOTES
05/04/2018    Patient Information  Sachi Vora                                                                                          [address]   [place] [address]      1940  [phone]      Chief Complaint:     Follow-up carotid artery plaque, carotid Doppler study, goiter, complaints of weight gain.    History of Present Illness:    Patient with a history of osteoporosis, hypertension, carotid artery plaque, chronic renal insufficiency, depression with anxiety, esophageal reflux, migraine headaches, pedal edema.  She presents today to follow-up on recent carotid Doppler study and a history of carotid artery plaque.  She has complaints of abnormal weight gain as described below.  Patient also reports she has sensation of feeling off balance more than usual.  She is also complaining of problems with her memory.  Past medical history reviewed and updated where necessary including health maintenance parameters.  This reveals she is up-to-date.    The history regarding abnormal weight gain:    05/05/2018--patient reports abnormal weight gain is quite watching her diet.  She weighed 127 pounds about one year ago.  She weighed 137 pounds 10/30/2017 and currently weighs 140 pounds.  We will check thyroid function tests today.    Review of Systems   Constitution: Negative.   HENT: Negative.    Eyes: Negative.    Cardiovascular: Negative.    Respiratory: Negative.    Endocrine: Negative.    Hematologic/Lymphatic: Negative.    Skin: Negative.    Musculoskeletal: Negative.    Gastrointestinal: Negative.    Genitourinary: Negative.    Neurological: Negative.    Psychiatric/Behavioral: Positive for memory loss.   Allergic/Immunologic: Negative.        Active Problems:    Patient Active Problem List   Diagnosis   • Osteoporosis, 03/29/2011--lumbar spine -2.8.  Right femoral neck -2.4.  Left femoral neck -2.4.  Patient receives Reclast infusions.   • Therapeutic drug monitoring   • Simple renal  "cyst   • Benign essential hypertension   • Carotid artery plaque, 04/02/2018--mild right ICA plaque, normal left ICA 10/03/2014--mild bilateral carotid artery plaque.   • Chronic gastritis   • Chronic lower back pain   • Chronic otitis externa   • Chronic renal insufficiency, stage II (mild), creatinine 1.12   • Depression with anxiety   • Gastroesophageal reflux disease with esophagitis   • Functional murmur, 07/15/2015--normal echocardiogram.   • Hyperlipidemia   • Menopausal state   • Chronic migraine   • Pancreatic Duct Dilation   • Pedal edema   • Restless legs syndrome   • Bilateral sensorineural hearing loss   • Vitamin D deficiency   • Chronic gastric ulcer   • Chronic fatigue   • Hypersomnolence   • Chronic anemia   • Multinodular goiter   • Generalized anxiety disorder   • Iron deficiency anemia   • Statin intolerance   • Postmenopausal state   • Abnormal weight gain         Past Medical History:   Diagnosis Date   • Benign essential hypertension 4/8/2016   • Bilateral sensorineural hearing loss 3/31/2011    03/31/2011--etiology reveals reverse \"cookie bite\" type of hearing loss for both ears of mild/moderate degree, mostly sensorineural.  Speech discrimination 100% on the right, 96% on the left.   • Carotid artery plaque, 10/03/2014--mild bilateral carotid artery plaque. 7/25/2012    10/03/2014--[facility] screening revealed mild bilateral carotid plaque, negative for atrial fibrillation, negative for AAA, negative for PAD, osteoporosis screen revealed osteopenia.  Body mass index was 25 and considered to be moderate risk.  07/25/2012--vascular screen negative for carotid plaque, negative for abdominal aneurysm, negative for PAD Description: 10/03/2014--[facility] screening revealed mild bilateral carotid plaque, negative for atrial fibrillation, negative for AAA, negative for PAD, osteoporosis screen revealed osteopenia.  Body mass index was 25 and considered to be moderate risk.  07/25/2012--vascular screen " negative for carotid plaque, negative for abdominal aneurysm, negative for PAD   • Chronic anemia 8/23/2016 06/13/2017--colonoscopy revealed melanosis.  Biopsied.  There was friability with contact bleeding in the ascending colon.  Biopsied.  Pathology returned consistent with melanosis coli.  06/13/2017--EGD revealed normal esophagus.  Biopsies taken.  There was a medium-sized hiatal hernia.  Localized mild inflammation and linear erosions were found in the prepyloric region.  Biopsied.  Examined duodenum was normal.  Random biopsies taken.  Pathology of the gastroesophageal junction was unremarkable as was the gastric fundus.  Prepyloric biopsy revealed minimal chronic inflammation and reactive change.  H pylori negative.  Duodenal biopsies are negative.  04/12/2017--hemoglobin low at 10.8, hematocrit is normal at 35.7.  Iron sulfate 325 mg per day initiated.  08/23/2016--routine follow-up.  Patient continues to have epigastric abdominal pain believed to be related to reflux with possible esophageal spasm.  Hemoglobin noted to be low at 10.6 with a hematocrit low at 33.7 and RDW elevated at 16.2.  Homocysti   • Chronic fatigue 4/21/2016 06/14/2017--overnight oximetry revealed oxygen desaturation index of only 0.44.  There were only 10 total desaturations during the period of testing which lasted 6 hours and 46 minutes.  05/31/2017--patient seen in follow-up and reports she continues to have chronic fatigue as well as hypersomnolence.  Once again, she is on multiple medications that are undoubtedly contributing to this problem including clonazepam and hydrocodone but I doubt that these could be discontinued.  Her CBC back in April revealed a low hemoglobin of 10.8 but hematocrit was normal at 35.7.  Thyroid function tests were normal and CMP normal.  Overnight oximetry test ordered.  Repeat CBC.  Iron studies.  Sedimentation rate and CRP.  04/11/2017--patient seen in follow-up and her blood pressure is now at  a reasonable level at 120/64.  She reports she still feels somewhat fatigued but she is much better.  Patient has not been doing any regular exercise and I do think that that would be helpful to reduce her feeling of fatigue.  She is   • Chronic gastric ulcer 4/14/2014    02/15/2017--patient seen in follow-up in nearly 6 months later.  She has complaints of not feeling well all over.  She has complaints of diffuse myalgias and possibly tendinopathy related to Levaquin that I called in prior to her going to [place] for vacation.  She reports that 3 or 4 days after starting the Levaquin she began to have the musculoskeletal symptoms and she reports that she continues to have them presently.  Symptoms are worse involving her left calf area.  Examination reveals significant tenderness involving the left calf and the left calf seems a little larger than the right.  She also has complaints of shortness of breath but no chest pain.  She is complaining of double vision and generalized weakness and fatigue.  She was complaining of chronic fatigue at the last visit back in September and I ordered an overnight oximetry test but apparently this was never done for reasons that are not clear to me.  Her current oxygen saturation is 94% and normally it is 100%.  Plan is as follows: Extensi   • Chronic gastritis 11/19/2001    02/15/2017--patient seen in follow-up in nearly 6 months later.  She has complaints of not feeling well all over.  She has complaints of diffuse myalgias and possibly tendinopathy related to Levaquin that I called in prior to her going to [place] for vacation.  She reports that 3 or 4 days after starting the Levaquin she began to have the musculoskeletal symptoms and she reports that she continues to have them presently.  Symptoms are worse involving her left calf area.  Examination reveals significant tenderness involving the left calf and the left calf seems a little larger than the right.  She also has  complaints of shortness of breath but no chest pain.  She is complaining of double vision and generalized weakness and fatigue.  She was complaining of chronic fatigue at the last visit back in September and I ordered an overnight oximetry test but apparently this was never done for reasons that are not clear to me.  Her current oxygen saturation is 94% and normally it is 100%.  Plan is as follows: Extensi   • Chronic lower back pain 1/31/2006 08/11/2014--MRI of the lumbar spine reveals the conus terminates at L2 and is normal.  Cauda equina unremarkable.  Stable to moderate degenerative disc disease at L5-S1.  No acute fracture or pars defect is demonstrated.  Small synovial cysts are seen posterior to the L4-L5 facet.  The perivertebral soft tissues are unremarkable.  T12-L1, L1-L2, L2-L3 are negative.  L3-L4 a broad-based disc bulge resulting in mild bilateral neural foraminal narrowing.  L4-L5 reveals a broad-based disc bulge facet disease resulting in mild bilateral neural foraminal narrowing.  L5-S1 reveals a broad-based disc bulge, posterior osseous slipping, and facet disease resulting in mild to moderate bilateral neural foraminal narrowing.  Assessment is stable mild to moderate degenerative spondylosis.  Small synovial cysts are seen posterior to the L4-L5 facets.  08/11/2014--MRI of the thoracic spine reveals mild to moderate thoracic kyphosis.  No fracture.  At T5--T6 there is a moderate sized left paracentral disc protrusion which i   • Chronic migraine 11/3/2009    01/18/2010--MRI of the brain performed for headaches and memory loss.  Mild small vessel disease in the cerebral and central pontine white matter.  There is an ovoid, somewhat pancake-shaped area of signal abnormality in the anterior inferior right temporal lobe subcortical to the juxtacortical white matter that measures 1.2 x 1 cm and anterior posterior and medial lateral dimension but only measures 3 mm in cranial caudal dimension.  I  suspect that this is a benign cyst or a cystic area of encephalomalacia.  The remainder of the MRI of the head is within normal limits.  11/03/2009--CT scan of the brain without contrast performed for headache after a fall.  Mild diffuse atrophy.  No acute abnormality noted.; Description: Patient has had a long history of migraine headaches.  MRI and CT scan of the brain have been essentially negative.   • Chronic otitis externa 4/8/2016   • Chronic renal insufficiency, stage II (mild), creatinine 1.12 11/14/2015 11/14/2015--serum creatinine mildly elevated at 1.12.   • Depression with anxiety 4/8/2016   • Functional murmur, 07/15/2015--normal echocardiogram. 7/15/2015    07/15/2015--echocardiogram reveals normal left ventricular size and function with ejection fraction 55%.  Grade 1 diastolic dysfunction, abnormal relaxation pattern.  Trace tricuspid regurgitation.  Estimated right ventricular systolic pressure is 25 mmHg which is normal.   • Gastroesophageal reflux disease with esophagitis 11/19/2001 06/13/2017--EGD revealed normal esophagus.  Biopsies taken.  There was a medium-sized hiatal hernia.  Localized mild inflammation and linear erosions were found in the prepyloric region.  Biopsied.  Examined duodenum was normal.  Random biopsies taken.  Pathology of the gastroesophageal junction was unremarkable as was the gastric fundus.  Prepyloric biopsy revealed minimal chronic inflammation and reactive change.  H pylori negative.  Duodenal biopsies are negative.  11/03/2014--patient seen in follow-up and reports her epigastric pain/chest pain has resolved.  I reviewed the results of the studies with her.  It does not appear that her problem is related to biliary tract disease.  She does have reflux and although the recent upper GI revealed minimal reflux, I do think her symptoms are related to esophageal reflux with esophageal spasm.  10/21/2014--air-contrast upper GI series revealed trace upper laryngeal  penetration.  Persistent small partially reducible sliding hiatal hernia the upper stomach with    • Generalized anxiety disorder 4/11/2017   • History of Acute deep vein thrombosis (DVT) of distal end of left lower extremity 2/15/2017    03/21/2017 patient seen in follow-up and she is tolerating the Eliquis well without signs or symptoms of bleeding.  Her calf swelling and tenderness is better but not totally resolved.  I suspect that the DVT is chronic and may not resolve at all.  I will order a repeat venous study and then proceed from there.  02/23/2017--repeat Doppler venous study of the left lower extremity reveals a chronic left lower extremity DVT in the posterior tibial.  All other left sided veins appeared normal.  Fluid collection in the left calf noted.  02/17/2017--patient seen in follow-up and reports her left lower extremity symptoms are about the same.  She continues to have complaints of profound fatigue which I think is multifactorial including underlying depression that is not in remission.  Review the results of the CTA of the chest including the possible thyroid lesion.  I do not think this is contributing to any of her symptoms of fatigue particularly given the fact that her thyroid function tests are normal.  I expla   • History of acute pyelonephritis 11/29/2001 11/29/2001--patient was admitted to the hospital with fever, chills, leukocytosis and abdominal pain. Evaluation revealed acute left pyelonephritis pyelonephritis and sepsis syndrome.   • History of bone density study 03/29/2011 03/29/2011--DEXA scan revealed a lumbar T score of -2.8, right femoral neck T score -2.4, left femoral neck T score -2.4. Osteoporosis of the lumbar spine and severe osteopenia of the hips bilaterally. Patient has been intolerant to Fosamax because of gastritis and gastroesophageal reflux.   04/01/2009--treatment for oste   • History of cardiovascular stress test 5/13/2016 05/13/2016--exercise stress  test with myocardial perfusion indicates normal study with no evidence of ischemia.  Left ventricular ejection fraction is hyperdynamic with an EF greater than 70%.  Impressions are consistent with a low risk study.  12/07/2011--stress Cardiolite negative for ischemia or previous infarction. Ejection fraction greater than 70%.   12/20/2009--stress Cardiolite negative for infarction or ischemia.   10/28/2005--stress Cardiolite showed no evidence of ischemia or infarction.   05/28/2002--unremarkable stress EKG.   • History of carotid Doppler/vascular screen 10/03/2014    10/03/2014--[facility] screening revealed mild bilateral carotid plaque, negative for atrial fibrillation, negative for AAA, negative for PAD, osteoporosis screen revealed osteopenia. Body mass index was 25 and considered to be moderate risk. 07/25/2012--vascular screen negative for carotid plaque, negative for abdominal aneurysm, negative for PAD   • History of echocardiogram 07/15/2015    07/15/2015--echocardiogram performed for murmur and dyspnea. Left ventricle size is normal. Left ventricular systolic function normal with ejection fraction 55%. Grade 1 diastolic dysfunction, abnormal relaxation pattern. There is trace tricuspid regurgitation. Estimated right ventricular systolic pressure is 25 mmHg which is normal.   • HIstory of Left lumbar radiculopathy Remote    Patient has multilevel degenerative disc disease and degenerative arthritis of the lumbar spine   • History of mammogram 4/5/2017 04/05/2017--negative mammogram.  03/29/2011--negative mammogram.   • History of palpitations 12/07/2011    Patient has had multiple admissions to the hospital for complaints of chest pain and heart palpitations. She meant admitted at least on 3 occasions. She has had at least 3 stress Cardiolite and 1 stress ECG, the last Cardiolite being performed 12/07/2011 which was negative. Patient is also had Holter monitors which have been unremarkable.   • History  of pneumococcal vaccination 11/20/2015 11/20/2015--PPSV 23 given. No further pneumococcal vaccinations required. 02/25/2015--Prevnar 13 given. Patient is pneumococcal vaccination jenn and therefore will need a PPSV 23 in 6-12 months.   • HIstory of Schatzki's ring 02/28/2012 02/28/2012--air-contrast upper GI revealed small to moderate sized reducible sliding hiatal herniation of the upper stomach with some demonstrated gastroesophageal reflux. No esophageal, gastric, or duodenal mass or mucosal ulceration was seen.  11/03/2008--EGD performed for evaluation of iron deficiency anemia revealed hiatal hernia without evidence of reflux, prepyloric antritis and   • History of Substernal precordial chest pain 5/5/2016 06/02/2016--patient seen in follow-up and reports she has recurrence of her epigastric discomfort that is believed to be related to her hiatal hernia, reflux, and probably esophageal spasm.  I do think that her substernal chest pain is gastrointestinal related.  She has been off omeprazole for some time due to problems with the prescription.  She reinitiated this just yesterday.  05/13/2016--exercise stress test with myocardial perfusion indicates normal study with no evidence of ischemia.  Left ventricular ejection fraction is hyperdynamic with an EF greater than 70%.  Impressions are consistent with a low risk study.  05/05/2016--patient reports that over the past year she has had intermittent episodes of chest pain.  She describes a substernal pressure and this radiates into her left upper extremity as well as the jaw.  It lasts last than a minute.  The most recent episode was associated with pain in the right upper extremity that radiated up into the right jaw and then crossed over to the left jaw.  T   • History of Urinary urgency 11/20/2015 06/02/2016--patient seen in follow-up and reports her urinary symptoms have resolved essentially.  01/07/2016--patient seen in follow-up and reports she  could not tolerate the Myrbetriq due to stomach issues.  However, she does report that her urinary symptoms have improved and are now tolerable.  11/20/2015--patient presents with approximately 2 month history of urinary urgency that is associated with intermittent hesitancy.  She describes the urge to urinate and she will go to the bathroom and then only be able to produce a few drops.  Other times she will get urgency and she will be able to completely void.  She has these symptoms at night as well and has nocturia approximately 2 times per night.  She denies dysuria.  She also has periodic nocturnal enuresis.  Urinalysis and urine culture sent.  Myrbetriq 25 mg per day initiated.  May increase to 50 mg per day if necessary.  Follow-up in 2 weeks to reassess.  Urinalysis revealed 3+ bacteria but urine culture was negative.   • History of Zostavax administration 01/07/2016 01/07/2016--Zostavax given.   • Hyperlipidemia 4/8/2016   • Hypersomnolence 5/5/2016 06/14/2017--overnight oximetry revealed oxygen desaturation index of only 0.44.  There were only 10 total desaturations during the period of testing which lasted 6 hours and 46 minutes.  05/31/2017--patient seen in follow-up and reports she continues to have chronic fatigue as well as hypersomnolence.  Once again, she is on multiple medications that are undoubtedly contributing to this problem including clonazepam and hydrocodone but I doubt that these could be discontinued.  Her CBC back in April revealed a low hemoglobin of 10.8 but hematocrit was normal at 35.7.  Thyroid function tests were normal and CMP normal.  Overnight oximetry test ordered.  Repeat CBC.  Iron studies.  Sedimentation rate and CRP.  04/11/2017--patient seen in follow-up and her blood pressure is now at a reasonable level at 120/64.  She reports she still feels somewhat fatigued but she is much better.  Patient has not been doing any regular exercise and I do think that that would be  helpful to reduce her feeling of fatigue.  She is   • Menopausal state 4/8/2016   • Multinodular goiter 2/17/2017 02/21/2017--thyroid ultrasound reveals multinodular thyroid with largest nodule measuring on the order of 1.6 cm in greatest dimension.  02/17/2017--patient seen in follow-up for DVT and CTA of the chest.  An incidental finding on the CTA of the chest reveals a 1.7 cm lesion in the right lobe of thyroid.  Note that thyroid function tests are currently normal.  Ultrasound of the thyroid ordered.   • Osteoporosis, 03/29/2011--lumbar spine -2.8.  Right femoral neck -2.4.  Left femoral neck -2.4.  Patient receives Reclast infusions. 2/9/2009 04/19/2017--Reclast infusion.  04/05/2016--Reclast infusion.  06/04/2012--Reclast infusion.  05/26/2011--Reclast infusion.  03/29/2011--DEXA scan revealed a lumbar T score of -2.8, right femoral neck T score -2.4, left femoral neck T score -2.4.  Osteoporosis of the lumbar spine and severe osteopenia of the hips bilaterally.  Patient has been intolerant to Fosamax because of gastritis and gastroesophageal reflux.  04/01/2009--treatment for osteoporosis begun with Fosamax.  02/09/2009--DEXA scan revealed lumbar spine T score of -3.3.  Left femoral neck T score -2.5.  Right hip T score -2.6.  Osteoporosis.    • Pancreatic Duct Dilation 11/30/2001 09/29/2014--patient was again evaluated by the urologist for a renal cyst.  CT scan of the abdomen and pelvis reveals a left renal cyst measuring 5.1 x 4.9 cm.  There were subcentimeter hypodense renal lesions that are too small to further characterize and are presumably related to cysts.  Recommend attention to follow up.  Distal dilated pancreatic duct noted.  The common bile duct is the upper limits of normal in size.  The ampullary region is not well evaluated.  Comparison with prior imaging is recommended if available.  If there is no prior film available for comparison, and ERCP or MRCP could be performed to further  evaluate.  Small hiatal hernia noted.  03/05/2012--CT scan of the abdomen with contrast, pancreatic protocol.  This reveals some fullness of the pancreatic ductal system and apparent pancreatic divisum with separate entrance of the accessory pancreatic duct extending into the duodenum distal to the main pancreatic duct.  There is fullness of the duct diffusely but similar to the previous s   • Pedal edema 6/29/2015 06/29/2015--patient presents with a 4-6 week history of exertional dyspnea that comes on with activity such as climbing stairs or walking her dog up an incline.  No chest pain.  Relieved with rest.  No cough.  She does have complaints of her feet and legs swelling that is particularly worse at the end of the day and not improved overnight.  No orthopnea or PND.  Chest exam reveals faint rales at the bases.  Otherwise clear.  Heart is regular and I do not appreciate a heart murmur.  Chest x-ray PA and lateral ordered.  Echocardiogram ordered.   • Restless legs syndrome 4/8/2016   • Simple renal cyst 7/20/2009 09/29/2014--patient was again evaluated by the urologist for a renal cyst.  CT scan of the abdomen and pelvis reveals a left renal cyst measuring 5.1 x 4.9 cm.  There were subcentimeter hypodense renal lesions that are too small to further characterize and are presumably related to cysts.  Recommend attention to follow up.  Distal dilated pancreatic duct noted.  The common bile duct is the upper limits of normal in size.  The ampullary region is not well evaluated.  Comparison with prior imaging is recommended if available.  If there is no prior film available for comparison, and ERCP or MRCP could be performed to further evaluate.  Small hiatal hernia noted.  07/20/2009--patient was noted to have a left renal mass consistent with a cyst.  This was evaluated by the urologist 07/20/2009.   • Statin intolerance 10/30/2017   • Vitamin D deficiency 4/8/2016         Past Surgical History:   Procedure  Laterality Date   • APPENDECTOMY  1965 1965   • CATARACT EXTRACTION Bilateral Remote    Remote bilateral cataract extirpation with intraocular lens implantation.   • COLONOSCOPY  11/03/2008 2008--normal colonoscopy   • COLONOSCOPY  2001 2001--normal colonoscopy.   • COLONOSCOPY N/A 6/13/2017 06/13/2017--colonoscopy revealed melanosis.  Biopsied.  There was friability with contact bleeding in the ascending colon.  Biopsied.  Pathology returned consistent with melanosis coli.   • ENDOSCOPY  10/08/2014    02/28/2012--air-contrast upper GI revealed small to moderate sized reducible sliding hiatal herniation of the upper stomach with some demonstrated gastroesophageal reflux. No esophageal, gastric, or duodenal mass or mucosal ulceration was seen. 11/03/2008--EGD performed for evaluation of iron deficiency anemia revealed hiatal hernia without evidence of reflux, prepyloric antritis and   • ENDOSCOPY  11/03/2008 11/03/2008--EGD performed for evaluation of iron deficiency anemia revealed hiatal hernia without evidence of reflux, prepyloric antritis and   • ENDOSCOPY  11/19/2001 11/19/2001--EGD revealed a very tortuous distal esophagus with a Schatzki's ring. No ulcer or erosions. No Dorado's mucosa. Stomach revealed patchy erythema and erosions in the antrum. Biopsy. Normal pylorus with no obstruction. Normal duodenum with no ulcers. The Schatzki's ring was dilated with a Rhodes dilator.;   • ENDOSCOPY N/A 9/27/2016 09/27/2016--EGD revealed a normal oropharynx, esophagus, and medium sized hiatal hernia.  Nonbleeding gastric ulcer with no stigmata of bleeding.  Biopsy.  Gastritis.  Biopsy.  Normal examined duodenum.  Biopsied.  Pathology returned mild to moderate chronic active gastritis with ulceration from the stomach antral ulcer biopsy.  Gastroesophageal junction biopsy revealed minimal mixed inflammation.   • ENDOSCOPY N/A 6/13/2017 06/13/2017--colonoscopy revealed melanosis.  Biopsied.   There was friability with contact bleeding in the ascending colon.  Biopsied.  Pathology returned consistent with melanosis coli.   • ESOPHAGEAL DILATATION  11/19/2001 11/19/2001--EGD revealed a very tortuous distal esophagus with a Schatzki's ring. No ulcer or erosions. No Dorado's mucosa. Stomach revealed patchy erythema and erosions in the antrum. Biopsy. Normal pylorus with no obstruction. Normal duodenum with no ulcers. The Schatzki's ring was dilated with a Rhodes dilator.;    • INCONTINENCE SURGERY  1979 1979--a bladder tack procedure for urinary stress incontinence   • KNEE ARTHROSCOPY Right 12/12/2011 12/12/2011--right knee arthroscopy with partial lateral and medial meniscectomies.   • SKIN SURGERY  05/25/2010 05/25/2010--skin lesion excised from right lower extremity. 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   • TOTAL ABDOMINAL HYSTERECTOMY  1974 1974--total abdominal hysterectomy.         Allergies   Allergen Reactions   • Penicillins Itching   • Tetanus Toxoids Itching           Current Outpatient Prescriptions:   •  aspirin 81 MG EC tablet, Take 81 mg by mouth Daily., Disp: , Rfl:   •  buPROPion XL (WELLBUTRIN XL) 150 MG 24 hr tablet, 1 tablet Daily., Disp: , Rfl: 0  •  calcium citrate (CALCITRATE) 950 MG tablet, 2 by mouth daily, Disp: 60 tablet, Rfl: 1  •  clonazePAM (KlonoPIN) 1 MG tablet, TAKE 1/2- 1 TABLET BY MOUTH TWICE DAILY AS NEEDED, Disp: 60 tablet, Rfl: 5  •  cycloSPORINE (RESTASIS) 0.05 % ophthalmic emulsion, 1 drop 2 (Two) Times a Day., Disp: , Rfl:   •  diclofenac (VOLTAREN) 1 % gel gel, APPLY 4 GM QID PRN TO AFFECTED JOINTS, Disp: , Rfl: 5  •  diphenhydrAMINE-acetaminophen (TYLENOL PM EXTRA STRENGTH)  MG tablet per tablet, Take 1 tablet by mouth At Night As Needed for Sleep., Disp: , Rfl:   •  estradiol (ESTRACE) 1 MG tablet, TAKE 1 TABLET BY MOUTH DAILY, Disp: 90 tablet, Rfl: 1  •  gabapentin (NEURONTIN) 100 MG capsule, 1 capsule Daily.,  "Disp: , Rfl: 2  •  Ginkgo Biloba (GINKOBA PO), Take 120 mg by mouth Daily., Disp: , Rfl:   •  HYDROcodone-acetaminophen (NORCO)  MG per tablet, Take 1 tablet by mouth every 6 (six) hours as needed for moderate pain (4-6)., Disp: , Rfl:   •  omeprazole (priLOSEC) 40 MG capsule, TAKE 1 CAPSULE DAILY BEFORETHE FIRST MEAL, Disp: 90 capsule, Rfl: 0  •  topiramate (TOPAMAX) 100 MG tablet, TAKE 1 TABLET DAILY, Disp: 90 tablet, Rfl: 3  •  triamterene-hydrochlorothiazide (DYAZIDE) 37.5-25 MG per capsule, TAKE ONE CAPSULE BY MOUTH DAILY FOR BLOOD PRESSURE AND LEG SWELLING, Disp: 90 capsule, Rfl: 2  •  venlafaxine (EFFEXOR) 75 MG tablet, Take 75 mg by mouth Daily., Disp: , Rfl:   •  venlafaxine XR (EFFEXOR-XR) 150 MG 24 hr capsule, Take 150 mg by mouth Every Night., Disp: , Rfl:       Family History   Problem Relation Age of Onset   • Heart attack Mother      dies age 47 from heart attack   • Heart disease Mother    • Bleeding Disorder Mother    • Heart attack Maternal Aunt      dies age 60 from heart attack   • Ovarian cancer Maternal Grandmother    • Heart disease Father          Social History     Social History   • Marital status:      Spouse name: N/A   • Number of children: N/A   • Years of education: N/A     Occupational History   • homemaker      Social History Main Topics   • Smoking status: Never Smoker   • Smokeless tobacco: Never Used   • Alcohol use No   • Drug use: No   • Sexual activity: Yes     Partners: Male     Other Topics Concern   • Not on file     Social History Narrative   • No narrative on file         Vitals:    05/04/18 1337   BP: 130/70   Pulse: 83   SpO2: 96%   Weight: 63.5 kg (140 lb)   Height: 153.7 cm (60.51\")          Physical Exam:    General: Alert and oriented x 3.  No acute distress.  Normal affect.  HEENT: Pupils equal, round, reactive to light; extraocular movements intact; sclerae nonicteric; pharynx, ear canals and TMs normal.  Neck: Without JVD, thyromegaly, bruit, or " adenopathy.  Lungs: Clear to auscultation in all fields.  Heart: Regular rate and rhythm without murmur, rub, gallop, or click.  Abdomen: Soft, nontender, without hepatosplenomegaly or hernia.  Bowel sounds normal.  : Deferred.  Rectal: Deferred.  Extremities: Without clubbing, cyanosis, edema, or pulse deficit.  Neurologic: Intact without focal deficit.  Romberg negative.  Normal station and gait observed during ingress and egress from the examination room.  Skin: Without significant lesion.  Musculoskeletal: Unremarkable.      Lab/other results:    I reviewed the results of the carotid Doppler study.    Assessment/Plan:     Diagnosis Plan   1. Carotid artery plaque, 04/02/2018--mild right ICA plaque, normal left ICA 10/03/2014--mild bilateral carotid artery plaque.     2. Abnormal weight gain     3. Multinodular goiter         Patient has mild right carotid artery plaque whereas previously she was reported to have mild bilateral carotid artery plaque.  We will reassess this in about 2 years.  She has new complaints of abnormal weight gain and we need to reassess thyroid functions.  She does have a history of multinodular goiter.  Patient also has complaints of memory loss and needs further evaluation.    Plan is as follows: Patient referred for neuropsychological testing.  I will have her follow-up after the results are known.  We will check thyroid function tests today and she can follow-up on the phone for the results.      Procedures

## 2018-05-05 LAB
T3FREE SERPL-MCNC: 2.7 PG/ML (ref 2–4.4)
T4 FREE SERPL-MCNC: 1.28 NG/DL (ref 0.93–1.7)
TSH SERPL DL<=0.005 MIU/L-ACNC: 0.97 MIU/ML (ref 0.27–4.2)

## 2018-05-09 ENCOUNTER — TELEPHONE (OUTPATIENT)
Dept: INTERNAL MEDICINE | Facility: CLINIC | Age: 78
End: 2018-05-09

## 2018-08-29 ENCOUNTER — HOSPITAL ENCOUNTER (OUTPATIENT)
Dept: GENERAL RADIOLOGY | Facility: HOSPITAL | Age: 78
Discharge: HOME | End: 2018-08-29
Admitting: NURSE PRACTITIONER

## 2018-08-29 DIAGNOSIS — M47.16 OSTEOARTHRITIS OF SPINE WITH MYELOPATHY, LUMBOSACRAL REGION: ICD-10-CM

## 2018-08-29 DIAGNOSIS — Z79.891 LONG-TERM CURRENT USE OF OPIATE ANALGESIC: ICD-10-CM

## 2018-08-29 DIAGNOSIS — M54.41 ACUTE BACK PAIN WITH SCIATICA, RIGHT: ICD-10-CM

## 2018-08-29 DIAGNOSIS — M51.36 DDD (DEGENERATIVE DISC DISEASE), LUMBAR: ICD-10-CM

## 2018-08-29 DIAGNOSIS — M54.14 RADICULOPATHY, THORACIC REGION: ICD-10-CM

## 2018-08-29 PROCEDURE — 72110 X-RAY EXAM L-2 SPINE 4/>VWS: CPT

## 2018-09-04 ENCOUNTER — TRANSCRIBE ORDERS (OUTPATIENT)
Dept: ADMINISTRATIVE | Facility: HOSPITAL | Age: 78
End: 2018-09-04

## 2018-09-04 DIAGNOSIS — M54.16 LUMBAR RADICULOPATHY: Primary | ICD-10-CM

## 2018-09-06 ENCOUNTER — OFFICE VISIT (OUTPATIENT)
Dept: INTERNAL MEDICINE | Facility: CLINIC | Age: 78
End: 2018-09-06

## 2018-09-06 VITALS
OXYGEN SATURATION: 98 % | DIASTOLIC BLOOD PRESSURE: 80 MMHG | HEART RATE: 90 BPM | SYSTOLIC BLOOD PRESSURE: 140 MMHG | HEIGHT: 61 IN

## 2018-09-06 DIAGNOSIS — K21.00 GASTROESOPHAGEAL REFLUX DISEASE WITH ESOPHAGITIS: Chronic | ICD-10-CM

## 2018-09-06 DIAGNOSIS — I10 BENIGN ESSENTIAL HYPERTENSION: Chronic | ICD-10-CM

## 2018-09-06 DIAGNOSIS — R10.13 EPIGASTRIC ABDOMINAL PAIN: Primary | ICD-10-CM

## 2018-09-06 DIAGNOSIS — K29.51 CHRONIC GASTRITIS WITH BLEEDING, UNSPECIFIED GASTRITIS TYPE: Chronic | ICD-10-CM

## 2018-09-06 DIAGNOSIS — D50.0 IRON DEFICIENCY ANEMIA DUE TO CHRONIC BLOOD LOSS: Chronic | ICD-10-CM

## 2018-09-06 DIAGNOSIS — K86.89 DILATION OF PANCREATIC DUCT: Chronic | ICD-10-CM

## 2018-09-06 DIAGNOSIS — K25.7 CHRONIC GASTRIC ULCER WITHOUT HEMORRHAGE AND WITHOUT PERFORATION: Chronic | ICD-10-CM

## 2018-09-06 DIAGNOSIS — N18.2 CHRONIC RENAL INSUFFICIENCY, STAGE II (MILD): Chronic | ICD-10-CM

## 2018-09-06 DIAGNOSIS — Z23 NEED FOR INFLUENZA VACCINATION: ICD-10-CM

## 2018-09-06 DIAGNOSIS — D64.9 CHRONIC ANEMIA: Chronic | ICD-10-CM

## 2018-09-06 DIAGNOSIS — R53.82 CHRONIC FATIGUE: Chronic | ICD-10-CM

## 2018-09-06 PROCEDURE — 90662 IIV NO PRSV INCREASED AG IM: CPT | Performed by: INTERNAL MEDICINE

## 2018-09-06 PROCEDURE — G0008 ADMIN INFLUENZA VIRUS VAC: HCPCS | Performed by: INTERNAL MEDICINE

## 2018-09-06 PROCEDURE — 99214 OFFICE O/P EST MOD 30 MIN: CPT | Performed by: INTERNAL MEDICINE

## 2018-09-06 RX ORDER — DEXLANSOPRAZOLE 60 MG/1
CAPSULE, DELAYED RELEASE ORAL
Qty: 30 CAPSULE | Refills: 0 | COMMUNITY
Start: 2018-09-06 | End: 2018-10-09 | Stop reason: SDUPTHER

## 2018-09-07 LAB
ALBUMIN SERPL-MCNC: 4.5 G/DL (ref 3.5–5.2)
ALBUMIN/GLOB SERPL: 2 G/DL
ALP SERPL-CCNC: 64 U/L (ref 39–117)
ALT SERPL-CCNC: 25 U/L (ref 1–33)
AST SERPL-CCNC: 23 U/L (ref 1–32)
BASOPHILS # BLD AUTO: 0.05 10*3/MM3 (ref 0–0.2)
BASOPHILS NFR BLD AUTO: 0.5 % (ref 0–1.5)
BILIRUB SERPL-MCNC: 0.2 MG/DL (ref 0.1–1.2)
BUN SERPL-MCNC: 20 MG/DL (ref 8–23)
BUN/CREAT SERPL: 18.7 (ref 7–25)
CALCIUM SERPL-MCNC: 9.2 MG/DL (ref 8.6–10.5)
CHLORIDE SERPL-SCNC: 98 MMOL/L (ref 98–107)
CO2 SERPL-SCNC: 26.1 MMOL/L (ref 22–29)
CREAT SERPL-MCNC: 1.07 MG/DL (ref 0.57–1)
EOSINOPHIL # BLD AUTO: 0.17 10*3/MM3 (ref 0–0.7)
EOSINOPHIL NFR BLD AUTO: 1.7 % (ref 0.3–6.2)
ERYTHROCYTE [DISTWIDTH] IN BLOOD BY AUTOMATED COUNT: 16.9 % (ref 11.7–13)
GLOBULIN SER CALC-MCNC: 2.3 GM/DL
GLUCOSE SERPL-MCNC: 76 MG/DL (ref 65–99)
HCT VFR BLD AUTO: 36.6 % (ref 35.6–45.5)
HGB BLD-MCNC: 11.5 G/DL (ref 11.9–15.5)
IMM GRANULOCYTES # BLD: 0.03 10*3/MM3 (ref 0–0.03)
IMM GRANULOCYTES NFR BLD: 0.3 % (ref 0–0.5)
IRON SATN MFR SERPL: 5 % (ref 20–50)
IRON SERPL-MCNC: 33 MCG/DL (ref 37–145)
LIPASE SERPL-CCNC: 39 U/L (ref 13–60)
LYMPHOCYTES # BLD AUTO: 3.12 10*3/MM3 (ref 0.9–4.8)
LYMPHOCYTES NFR BLD AUTO: 30.9 % (ref 19.6–45.3)
MCH RBC QN AUTO: 26.4 PG (ref 26.9–32)
MCHC RBC AUTO-ENTMCNC: 31.4 G/DL (ref 32.4–36.3)
MCV RBC AUTO: 83.9 FL (ref 80.5–98.2)
MONOCYTES # BLD AUTO: 0.65 10*3/MM3 (ref 0.2–1.2)
MONOCYTES NFR BLD AUTO: 6.4 % (ref 5–12)
NEUTROPHILS # BLD AUTO: 6.11 10*3/MM3 (ref 1.9–8.1)
NEUTROPHILS NFR BLD AUTO: 60.5 % (ref 42.7–76)
PLATELET # BLD AUTO: 341 10*3/MM3 (ref 140–500)
POTASSIUM SERPL-SCNC: 3.4 MMOL/L (ref 3.5–5.2)
PROT SERPL-MCNC: 6.8 G/DL (ref 6–8.5)
RBC # BLD AUTO: 4.36 10*6/MM3 (ref 3.9–5.2)
SODIUM SERPL-SCNC: 140 MMOL/L (ref 136–145)
TIBC SERPL-MCNC: 646 MCG/DL
UIBC SERPL-MCNC: 613 MCG/DL
WBC # BLD AUTO: 10.1 10*3/MM3 (ref 4.5–10.7)

## 2018-09-10 ENCOUNTER — HOSPITAL ENCOUNTER (OUTPATIENT)
Dept: MRI IMAGING | Facility: HOSPITAL | Age: 78
Discharge: HOME | End: 2018-09-10
Attending: ANESTHESIOLOGY | Admitting: ANESTHESIOLOGY

## 2018-09-10 ENCOUNTER — HOSPITAL ENCOUNTER (OUTPATIENT)
Dept: CT IMAGING | Facility: HOSPITAL | Age: 78
Discharge: HOME | End: 2018-09-10

## 2018-09-10 DIAGNOSIS — K25.7 CHRONIC GASTRIC ULCER WITHOUT HEMORRHAGE AND WITHOUT PERFORATION: Chronic | ICD-10-CM

## 2018-09-10 DIAGNOSIS — M54.16 LUMBAR RADICULOPATHY: ICD-10-CM

## 2018-09-10 DIAGNOSIS — R10.13 EPIGASTRIC ABDOMINAL PAIN: ICD-10-CM

## 2018-09-10 DIAGNOSIS — K86.89 DILATION OF PANCREATIC DUCT: Chronic | ICD-10-CM

## 2018-09-10 DIAGNOSIS — K29.51 CHRONIC GASTRITIS WITH BLEEDING, UNSPECIFIED GASTRITIS TYPE: Chronic | ICD-10-CM

## 2018-09-10 DIAGNOSIS — K21.00 GASTROESOPHAGEAL REFLUX DISEASE WITH ESOPHAGITIS: Chronic | ICD-10-CM

## 2018-09-10 LAB — CREAT BLDA-MCNC: 1.1 MG/DL (ref 0.6–1.3)

## 2018-09-10 PROCEDURE — 72148 MRI LUMBAR SPINE W/O DYE: CPT

## 2018-09-10 PROCEDURE — 25010000002 IOPAMIDOL 61 % SOLUTION: Performed by: INTERNAL MEDICINE

## 2018-09-10 PROCEDURE — 74177 CT ABD & PELVIS W/CONTRAST: CPT

## 2018-09-10 PROCEDURE — 82565 ASSAY OF CREATININE: CPT

## 2018-09-10 RX ADMIN — IOPAMIDOL 85 ML: 612 INJECTION, SOLUTION INTRAVENOUS at 16:17

## 2018-09-11 NOTE — PROGRESS NOTES
She said that you started her on iron 325mg in the past and she could not take it. It made her sick to her stomach. Can you suggest anything else? She has an appt with Dr. Johnson on 9-19-18.

## 2018-09-12 ENCOUNTER — TELEPHONE (OUTPATIENT)
Dept: INTERNAL MEDICINE | Facility: CLINIC | Age: 78
End: 2018-09-12

## 2018-09-12 NOTE — TELEPHONE ENCOUNTER
If she cannot take the iron then there is not much else we can do.  Her anemia is only mild and her iron deficiency is only mild.

## 2018-09-12 NOTE — TELEPHONE ENCOUNTER
I sent you a message about her iron 325mg but have not got a message back. She said that you started her on that before and she could not take it. It made her sick to her stomach. What do you suggest now. Look at lipase lab results from 9-6-18 where we were communicating back and forth.

## 2018-09-16 DIAGNOSIS — R60.0 PEDAL EDEMA: ICD-10-CM

## 2018-09-16 DIAGNOSIS — I10 BENIGN ESSENTIAL HYPERTENSION: ICD-10-CM

## 2018-09-17 RX ORDER — TRIAMTERENE AND HYDROCHLOROTHIAZIDE 37.5; 25 MG/1; MG/1
CAPSULE ORAL
Qty: 90 CAPSULE | Refills: 0 | Status: SHIPPED | OUTPATIENT
Start: 2018-09-17 | End: 2018-11-02 | Stop reason: SDUPTHER

## 2018-09-19 ENCOUNTER — OFFICE VISIT (OUTPATIENT)
Dept: SURGERY | Facility: CLINIC | Age: 78
End: 2018-09-19

## 2018-09-19 VITALS — WEIGHT: 137.6 LBS | HEIGHT: 61 IN | HEART RATE: 80 BPM | OXYGEN SATURATION: 95 % | BODY MASS INDEX: 25.98 KG/M2

## 2018-09-19 DIAGNOSIS — R10.13 EPIGASTRIC PAIN: Primary | ICD-10-CM

## 2018-09-19 DIAGNOSIS — K44.9 HIATAL HERNIA: ICD-10-CM

## 2018-09-19 PROCEDURE — 99214 OFFICE O/P EST MOD 30 MIN: CPT | Performed by: SURGERY

## 2018-09-19 RX ORDER — CHOLECALCIFEROL (VITAMIN D3) 25 MCG
500 TABLET,CHEWABLE ORAL DAILY
COMMUNITY
End: 2019-02-04 | Stop reason: HOSPADM

## 2018-09-19 NOTE — PROGRESS NOTES
CC: Epigastric abdominal pain     HPI: Patient is here today referred by Dr. Cruzito Weir for evaluation of epigastric abdominal pain.  She started having intermittent episodes of epigastric abdominal pain that started 2 weeks ago.  The pain happens to 3 times a day and it's Dull and squeezing in nature that'll lasts for several minutes.  The pain does not radiate.  The intensity is 6-7 out of 10.  Spicy foods makes the pain worse, and time makes pain better.  This has not been associated with any nausea or vomiting.  She was started on Dexilant by Dr. Cruzito Weir with improvement of her symptoms.  She also reports intermittent episodes of diarrhea after starting Dexilant.  She has history of gastric ulcers and iron deficiency anemia.  She underwent upper endoscopy and colonoscopy last year.  Upper endoscopy show chronic mild inflammation in the prepyloric region and melanosis colli on colonoscopy.  She underwent CT scan of the abdomen and pelvis that showed a moderate size hiatal hernia.  She was found also to have a stable liver cyst and exophytic left kidney cyst.      PMH: Osteoporosis, hypertension, carotid disease, chronic gastritis, depression, anxiety, GERD, hyperlipidemia, migraine, restless legs syndrome, chronic fatigue and hypersomnolence, multinodular goiter, and chronic pain requiring multiple medications.      PSH: Appendectomy, colonoscopy 2001 and 2008, 2017,  upper endoscopy 2012, 2008, 2001, 2016,2017,  esophageal dilation 2001, bladder sling, knee arthroscopy, oophorectomy, skin lesion excision, total abdominal hysterectomy    MEDS: Reviewed in Epic     ALL: Reviewed in [facility]    FH and SH: Coronary artery disease on her mother and grandmother.  Ovarian cancer grandmother.  The patient is , never smoked     ROS:   Constitutional: Reports fatigue and weight loss  Cardiovascular: Reports chest pain, denies palpitations, edemas.  Respiratory: denies cough, sputum, SOB.  Gastrointestinal: As  "per history of present illness  Genitourinary: Reports urinary hesitancy and vaginal discharge  Endocrine: denies cold intolerance, lethargy and flushing.  Hem: Reports excessive bruising and denies postop bleeding.  Musculoskeletal: Reports joint, back, neck, muscle pain.  Reports gait problem and neck stiffness  Neuro: Reports dizziness, headaches, lightheadedness and weakness   Skin: denies change in nevi, rashes, masses.  Psychiatric: Reports decreased concentration, anxiety, sleep disturbance and depression     PE:   Vitals:    09/19/18 0853   Pulse: 80   SpO2: 95%   Weight: 62.4 kg (137 lb 9.6 oz)   Height: 153.7 cm (60.5\")     Alert and oriented ×3, no acute distress.  Head is normocephalic and atraumatic.  Neck is supple there is no thyromegaly or lymphadenopathy  Chest is clear bilaterally there is no added sounds  Regular rate and rhythm, no murmurs  Abdomen is soft and nondistended, bowel sounds are positive.    Mild abdominal tenderness over the epigastric area.  No right upper quadrant pain.  Infraumbilical incision well healed, no hernias  No clubbing cyanosis or edema in lower or upper extremities    Diagnostic studies:   Imaging review by me  CT abdomen and pelvis (9/6/18): Moderate size hiatal hernia    Upper GI(10/2014): Sliding hiatal hernia and moderate reflux    Labs(9/6/18): Hemoglobin 11.5  White blood cell 10  Platelets 341  CMP within normal limits  Iron levels and saturation slightly decreased    Assessment and plan    The patient is a very pleasant 77-year-old female with epigastric pain and moderate size hiatal hernia.  Patient symptoms seem to be related to her hiatal hernia.  Gallbladder disease should be rule out.  CT scan of the abdomen did not show any evidence of cholelithiasis and CMP is within normal limits.  Furthermore, patient symptoms improved while on Dexilant.  I have discussed with the patient that I think her symptoms may related to her hiatal hernia.  I think she should " have further workup.    - Ultrasound gallbladder to rule out gallstone disease  - Esophagogram  - Esophageal manometry    After these results are back I will see the patient in my office for surgical discussion    - For now continue taking Dexilant as prescribed  - Eat small meals and avoid spicy foods  - Call my office or come to the emergency room his symptoms significantly worsened.  Discussed the risk of a gastric volvulus and ischemia     Reji Johnson MD  General, Minimally Invasive and Endoscopic Surgery  [facility]     [address], [address]  [place], [place], [address]  P: [phone]  F: [phone]

## 2018-09-24 DIAGNOSIS — F41.1 GENERALIZED ANXIETY DISORDER: Chronic | ICD-10-CM

## 2018-09-24 RX ORDER — CLONAZEPAM 1 MG/1
TABLET ORAL
Qty: 60 TABLET | Refills: 2 | OUTPATIENT
Start: 2018-09-24 | End: 2018-11-13 | Stop reason: SDUPTHER

## 2018-10-01 ENCOUNTER — HOSPITAL ENCOUNTER (OUTPATIENT)
Dept: GENERAL RADIOLOGY | Facility: HOSPITAL | Age: 78
Discharge: HOME | End: 2018-10-01
Admitting: NURSE PRACTITIONER

## 2018-10-01 DIAGNOSIS — E55.9 VITAMIN D DEFICIENCY: Chronic | ICD-10-CM

## 2018-10-01 DIAGNOSIS — Z51.81 THERAPEUTIC DRUG MONITORING: ICD-10-CM

## 2018-10-01 DIAGNOSIS — R52 PAIN: ICD-10-CM

## 2018-10-01 DIAGNOSIS — D50.0 IRON DEFICIENCY ANEMIA DUE TO CHRONIC BLOOD LOSS: Chronic | ICD-10-CM

## 2018-10-01 DIAGNOSIS — E78.5 HYPERLIPIDEMIA, UNSPECIFIED HYPERLIPIDEMIA TYPE: Chronic | ICD-10-CM

## 2018-10-01 DIAGNOSIS — D64.9 CHRONIC ANEMIA: Chronic | ICD-10-CM

## 2018-10-01 DIAGNOSIS — R53.82 CHRONIC FATIGUE: Chronic | ICD-10-CM

## 2018-10-01 PROCEDURE — 72072 X-RAY EXAM THORAC SPINE 3VWS: CPT

## 2018-10-04 LAB
25(OH)D3+25(OH)D2 SERPL-MCNC: 54.3 NG/ML (ref 30–100)
ALBUMIN SERPL-MCNC: 4.1 G/DL (ref 3.5–4.8)
ALBUMIN/GLOB SERPL: 1.8 {RATIO} (ref 1.2–2.2)
ALP SERPL-CCNC: 61 IU/L (ref 39–117)
ALT SERPL-CCNC: 32 IU/L (ref 0–32)
AST SERPL-CCNC: 25 IU/L (ref 0–40)
BILIRUB SERPL-MCNC: 0.2 MG/DL (ref 0–1.2)
BUN SERPL-MCNC: 23 MG/DL (ref 8–27)
BUN/CREAT SERPL: 20 (ref 12–28)
CALCIUM SERPL-MCNC: 9.1 MG/DL (ref 8.7–10.3)
CHLORIDE SERPL-SCNC: 102 MMOL/L (ref 96–106)
CHOLEST SERPL-MCNC: 220 MG/DL (ref 100–199)
CO2 SERPL-SCNC: 23 MMOL/L (ref 20–29)
CREAT SERPL-MCNC: 1.14 MG/DL (ref 0.57–1)
ERYTHROCYTE [DISTWIDTH] IN BLOOD BY AUTOMATED COUNT: 17.2 % (ref 12.3–15.4)
GLOBULIN SER CALC-MCNC: 2.3 G/DL (ref 1.5–4.5)
GLUCOSE SERPL-MCNC: 80 MG/DL (ref 65–99)
HCT VFR BLD AUTO: 35.4 % (ref 34–46.6)
HDL SERPL-SCNC: 50.4 UMOL/L
HDLC SERPL-MCNC: 101 MG/DL
HGB BLD-MCNC: 11.2 G/DL (ref 11.1–15.9)
LDL SERPL QN: 21.2 NM
LDL SERPL-SCNC: 954 NMOL/L
LDL SMALL SERPL-SCNC: <90 NMOL/L
LDLC SERPL CALC-MCNC: 83 MG/DL (ref 0–99)
MCH RBC QN AUTO: 25.2 PG (ref 26.6–33)
MCHC RBC AUTO-ENTMCNC: 31.6 G/DL (ref 31.5–35.7)
MCV RBC AUTO: 80 FL (ref 79–97)
PLATELET # BLD AUTO: 392 X10E3/UL (ref 150–379)
POTASSIUM SERPL-SCNC: 3.7 MMOL/L (ref 3.5–5.2)
PROT SERPL-MCNC: 6.4 G/DL (ref 6–8.5)
RBC # BLD AUTO: 4.44 X10E6/UL (ref 3.77–5.28)
SODIUM SERPL-SCNC: 143 MMOL/L (ref 134–144)
SPECIMEN STATUS: NORMAL
T3FREE SERPL-MCNC: 2.8 PG/ML (ref 2–4.4)
T4 FREE SERPL-MCNC: 1.18 NG/DL (ref 0.82–1.77)
TRIGL SERPL-MCNC: 180 MG/DL (ref 0–149)
TSH SERPL DL<=0.005 MIU/L-ACNC: 1.03 UIU/ML (ref 0.45–4.5)
WBC # BLD AUTO: 8.3 X10E3/UL (ref 3.4–10.8)

## 2018-10-09 ENCOUNTER — OFFICE VISIT (OUTPATIENT)
Dept: INTERNAL MEDICINE | Facility: CLINIC | Age: 78
End: 2018-10-09

## 2018-10-09 VITALS
HEIGHT: 61 IN | HEART RATE: 73 BPM | WEIGHT: 134.4 LBS | BODY MASS INDEX: 25.37 KG/M2 | SYSTOLIC BLOOD PRESSURE: 118 MMHG | OXYGEN SATURATION: 98 % | DIASTOLIC BLOOD PRESSURE: 60 MMHG

## 2018-10-09 DIAGNOSIS — Z78.9 STATIN INTOLERANCE: Chronic | ICD-10-CM

## 2018-10-09 DIAGNOSIS — G25.81 RESTLESS LEGS SYNDROME: Chronic | ICD-10-CM

## 2018-10-09 DIAGNOSIS — G89.29 CHRONIC LOW BACK PAIN, UNSPECIFIED BACK PAIN LATERALITY, WITH SCIATICA PRESENCE UNSPECIFIED: Chronic | ICD-10-CM

## 2018-10-09 DIAGNOSIS — R10.13 EPIGASTRIC ABDOMINAL PAIN: ICD-10-CM

## 2018-10-09 DIAGNOSIS — D64.9 CHRONIC ANEMIA: Chronic | ICD-10-CM

## 2018-10-09 DIAGNOSIS — I65.23 ATHEROSCLEROSIS OF BOTH CAROTID ARTERIES: Chronic | ICD-10-CM

## 2018-10-09 DIAGNOSIS — R60.0 PEDAL EDEMA: Chronic | ICD-10-CM

## 2018-10-09 DIAGNOSIS — M81.0 AGE RELATED OSTEOPOROSIS, UNSPECIFIED PATHOLOGICAL FRACTURE PRESENCE: Chronic | ICD-10-CM

## 2018-10-09 DIAGNOSIS — IMO0002 CHRONIC MIGRAINE: Chronic | ICD-10-CM

## 2018-10-09 DIAGNOSIS — E04.2 MULTINODULAR GOITER: Chronic | ICD-10-CM

## 2018-10-09 DIAGNOSIS — R01.0 FUNCTIONAL MURMUR: Chronic | ICD-10-CM

## 2018-10-09 DIAGNOSIS — F41.1 GENERALIZED ANXIETY DISORDER: Chronic | ICD-10-CM

## 2018-10-09 DIAGNOSIS — K44.9 HIATAL HERNIA: ICD-10-CM

## 2018-10-09 DIAGNOSIS — I10 BENIGN ESSENTIAL HYPERTENSION: Chronic | ICD-10-CM

## 2018-10-09 DIAGNOSIS — E78.5 HYPERLIPIDEMIA, UNSPECIFIED HYPERLIPIDEMIA TYPE: Primary | Chronic | ICD-10-CM

## 2018-10-09 DIAGNOSIS — N18.2 CHRONIC RENAL INSUFFICIENCY, STAGE II (MILD): Chronic | ICD-10-CM

## 2018-10-09 DIAGNOSIS — D50.0 IRON DEFICIENCY ANEMIA DUE TO CHRONIC BLOOD LOSS: Chronic | ICD-10-CM

## 2018-10-09 DIAGNOSIS — Z51.81 THERAPEUTIC DRUG MONITORING: ICD-10-CM

## 2018-10-09 DIAGNOSIS — M54.5 CHRONIC LOW BACK PAIN, UNSPECIFIED BACK PAIN LATERALITY, WITH SCIATICA PRESENCE UNSPECIFIED: Chronic | ICD-10-CM

## 2018-10-09 DIAGNOSIS — K21.00 GASTROESOPHAGEAL REFLUX DISEASE WITH ESOPHAGITIS: Chronic | ICD-10-CM

## 2018-10-09 DIAGNOSIS — E55.9 VITAMIN D DEFICIENCY: Chronic | ICD-10-CM

## 2018-10-09 PROBLEM — R63.5 ABNORMAL WEIGHT GAIN: Status: RESOLVED | Noted: 2018-05-04 | Resolved: 2018-10-09

## 2018-10-09 PROBLEM — R41.3 MEMORY LOSS: Status: RESOLVED | Noted: 2018-05-04 | Resolved: 2018-10-09

## 2018-10-09 PROCEDURE — 99214 OFFICE O/P EST MOD 30 MIN: CPT | Performed by: INTERNAL MEDICINE

## 2018-10-09 RX ORDER — DEXLANSOPRAZOLE 60 MG/1
CAPSULE, DELAYED RELEASE ORAL
Qty: 30 CAPSULE | Refills: 6 | Status: SHIPPED | OUTPATIENT
Start: 2018-10-09 | End: 2019-01-17

## 2018-10-09 NOTE — PROGRESS NOTES
10/09/2018    Patient Information  Sachi Vora                                                                                          [address]   [place] [address]      1940  [phone]      Chief Complaint:     Follow-up hyperlipidemia, hypertension, carotid artery plaque, mild chronic renal insufficiency, esophageal reflux and hiatal hernia associated with epigastric abdominal pain, migraine headaches, pedal edema, restless legs, chronic anemia, multinodular goiter, iron deficiency, statin intolerance, chronic lower back pain and osteoporosis.     History of Present Illness:    Patient with a history of medical problems as outlined in chief complaint presents today for a follow-up with lab prior in order to monitor her chronic medical issues.  Patient has significant reflux and a hiatal hernia and this is being evaluated by the general surgeon.  She had some tests done and is supposed to have a follow-up with him in the near future.  I gave her Dexilant and she reports that works much better than previous medication she was taking.  I will give her a prescription for this if her insurance will cover it.  If not, we will try to give her samples.  Past medical history reviewed and updated where necessary including health maintenance parameters.  This reveals she is up-to-date or else accounted for.  She is due her subsequent Medicare wellness visit at the end of this month which is unfortunate.    Review of Systems   Constitution: Negative.   HENT: Negative.    Eyes: Negative.    Cardiovascular: Negative.    Respiratory: Negative.    Endocrine: Negative.    Hematologic/Lymphatic: Negative.    Skin: Negative.    Musculoskeletal: Negative.    Gastrointestinal: Negative.    Genitourinary: Negative.    Neurological: Negative.    Psychiatric/Behavioral: Negative.    Allergic/Immunologic: Negative.        Active Problems:    Patient Active Problem List   Diagnosis   • Osteoporosis,  "03/29/2011--lumbar spine -2.8.  Right femoral neck -2.4.  Left femoral neck -2.4.  Patient receives Reclast infusions.   • Therapeutic drug monitoring   • Simple renal cyst   • Benign essential hypertension   • Carotid artery plaque, 04/02/2018--mild right ICA plaque, normal left ICA 10/03/2014--mild bilateral carotid artery plaque.   • Chronic gastritis   • Chronic lower back pain   • Chronic otitis externa   • Chronic renal insufficiency, stage II (mild), creatinine 1.12   • Depression with anxiety   • Gastroesophageal reflux disease with esophagitis   • Functional murmur, 07/15/2015--normal echocardiogram.   • Hyperlipidemia   • Menopausal state   • Chronic migraine   • Pancreatic Duct Dilation   • Pedal edema   • Restless legs syndrome   • Bilateral sensorineural hearing loss   • Vitamin D deficiency   • Chronic gastric ulcer   • Chronic fatigue   • Hypersomnolence   • Chronic anemia   • Multinodular goiter   • Generalized anxiety disorder   • Iron deficiency anemia   • Statin intolerance   • Postmenopausal state   • Epigastric abdominal pain   • Hiatal hernia         Past Medical History:   Diagnosis Date   • Benign essential hypertension 4/8/2016   • Bilateral sensorineural hearing loss 3/31/2011    03/31/2011--etiology reveals reverse \"cookie bite\" type of hearing loss for both ears of mild/moderate degree, mostly sensorineural.  Speech discrimination 100% on the right, 96% on the left.   • Carotid artery plaque, 10/03/2014--mild bilateral carotid artery plaque. 7/25/2012    10/03/2014--[facility] screening revealed mild bilateral carotid plaque, negative for atrial fibrillation, negative for AAA, negative for PAD, osteoporosis screen revealed osteopenia.  Body mass index was 25 and considered to be moderate risk.  07/25/2012--vascular screen negative for carotid plaque, negative for abdominal aneurysm, negative for PAD Description: 10/03/2014--[facility] screening revealed mild bilateral carotid plaque, negative " for atrial fibrillation, negative for AAA, negative for PAD, osteoporosis screen revealed osteopenia.  Body mass index was 25 and considered to be moderate risk.  07/25/2012--vascular screen negative for carotid plaque, negative for abdominal aneurysm, negative for PAD   • Chronic anemia 8/23/2016 06/13/2017--colonoscopy revealed melanosis.  Biopsied.  There was friability with contact bleeding in the ascending colon.  Biopsied.  Pathology returned consistent with melanosis coli.  06/13/2017--EGD revealed normal esophagus.  Biopsies taken.  There was a medium-sized hiatal hernia.  Localized mild inflammation and linear erosions were found in the prepyloric region.  Biopsied.  Examined duodenum was normal.  Random biopsies taken.  Pathology of the gastroesophageal junction was unremarkable as was the gastric fundus.  Prepyloric biopsy revealed minimal chronic inflammation and reactive change.  H pylori negative.  Duodenal biopsies are negative.  04/12/2017--hemoglobin low at 10.8, hematocrit is normal at 35.7.  Iron sulfate 325 mg per day initiated.  08/23/2016--routine follow-up.  Patient continues to have epigastric abdominal pain believed to be related to reflux with possible esophageal spasm.  Hemoglobin noted to be low at 10.6 with a hematocrit low at 33.7 and RDW elevated at 16.2.  Homocysti   • Chronic fatigue 4/21/2016 06/14/2017--overnight oximetry revealed oxygen desaturation index of only 0.44.  There were only 10 total desaturations during the period of testing which lasted 6 hours and 46 minutes.  05/31/2017--patient seen in follow-up and reports she continues to have chronic fatigue as well as hypersomnolence.  Once again, she is on multiple medications that are undoubtedly contributing to this problem including clonazepam and hydrocodone but I doubt that these could be discontinued.  Her CBC back in April revealed a low hemoglobin of 10.8 but hematocrit was normal at 35.7.  Thyroid function tests  were normal and CMP normal.  Overnight oximetry test ordered.  Repeat CBC.  Iron studies.  Sedimentation rate and CRP.  04/11/2017--patient seen in follow-up and her blood pressure is now at a reasonable level at 120/64.  She reports she still feels somewhat fatigued but she is much better.  Patient has not been doing any regular exercise and I do think that that would be helpful to reduce her feeling of fatigue.  She is   • Chronic gastric ulcer 4/14/2014    02/15/2017--patient seen in follow-up in nearly 6 months later.  She has complaints of not feeling well all over.  She has complaints of diffuse myalgias and possibly tendinopathy related to Levaquin that I called in prior to her going to [place] for vacation.  She reports that 3 or 4 days after starting the Levaquin she began to have the musculoskeletal symptoms and she reports that she continues to have them presently.  Symptoms are worse involving her left calf area.  Examination reveals significant tenderness involving the left calf and the left calf seems a little larger than the right.  She also has complaints of shortness of breath but no chest pain.  She is complaining of double vision and generalized weakness and fatigue.  She was complaining of chronic fatigue at the last visit back in September and I ordered an overnight oximetry test but apparently this was never done for reasons that are not clear to me.  Her current oxygen saturation is 94% and normally it is 100%.  Plan is as follows: Extensi   • Chronic gastritis 11/19/2001    02/15/2017--patient seen in follow-up in nearly 6 months later.  She has complaints of not feeling well all over.  She has complaints of diffuse myalgias and possibly tendinopathy related to Levaquin that I called in prior to her going to [employer] for vacation.  She reports that 3 or 4 days after starting the Levaquin she began to have the musculoskeletal symptoms and she reports that she continues to have them  presently.  Symptoms are worse involving her left calf area.  Examination reveals significant tenderness involving the left calf and the left calf seems a little larger than the right.  She also has complaints of shortness of breath but no chest pain.  She is complaining of double vision and generalized weakness and fatigue.  She was complaining of chronic fatigue at the last visit back in September and I ordered an overnight oximetry test but apparently this was never done for reasons that are not clear to me.  Her current oxygen saturation is 94% and normally it is 100%.  Plan is as follows: Extensi   • Chronic lower back pain 1/31/2006 08/11/2014--MRI of the lumbar spine reveals the conus terminates at L2 and is normal.  Cauda equina unremarkable.  Stable to moderate degenerative disc disease at L5-S1.  No acute fracture or pars defect is demonstrated.  Small synovial cysts are seen posterior to the L4-L5 facet.  The perivertebral soft tissues are unremarkable.  T12-L1, L1-L2, L2-L3 are negative.  L3-L4 a broad-based disc bulge resulting in mild bilateral neural foraminal narrowing.  L4-L5 reveals a broad-based disc bulge facet disease resulting in mild bilateral neural foraminal narrowing.  L5-S1 reveals a broad-based disc bulge, posterior osseous slipping, and facet disease resulting in mild to moderate bilateral neural foraminal narrowing.  Assessment is stable mild to moderate degenerative spondylosis.  Small synovial cysts are seen posterior to the L4-L5 facets.  08/11/2014--MRI of the thoracic spine reveals mild to moderate thoracic kyphosis.  No fracture.  At T5--T6 there is a moderate sized left paracentral disc protrusion which i   • Chronic migraine 11/3/2009    01/18/2010--MRI of the brain performed for headaches and memory loss.  Mild small vessel disease in the cerebral and central pontine white matter.  There is an ovoid, somewhat pancake-shaped area of signal abnormality in the anterior inferior  right temporal lobe subcortical to the juxtacortical white matter that measures 1.2 x 1 cm and anterior posterior and medial lateral dimension but only measures 3 mm in cranial caudal dimension.  I suspect that this is a benign cyst or a cystic area of encephalomalacia.  The remainder of the MRI of the head is within normal limits.  11/03/2009--CT scan of the brain without contrast performed for headache after a fall.  Mild diffuse atrophy.  No acute abnormality noted.; Description: Patient has had a long history of migraine headaches.  MRI and CT scan of the brain have been essentially negative.   • Chronic otitis externa 4/8/2016   • Chronic renal insufficiency, stage II (mild), creatinine 1.12 11/14/2015 11/14/2015--serum creatinine mildly elevated at 1.12.   • Depression with anxiety 4/8/2016   • Functional murmur, 07/15/2015--normal echocardiogram. 7/15/2015    07/15/2015--echocardiogram reveals normal left ventricular size and function with ejection fraction 55%.  Grade 1 diastolic dysfunction, abnormal relaxation pattern.  Trace tricuspid regurgitation.  Estimated right ventricular systolic pressure is 25 mmHg which is normal.   • Gastroesophageal reflux disease with esophagitis 11/19/2001 06/13/2017--EGD revealed normal esophagus.  Biopsies taken.  There was a medium-sized hiatal hernia.  Localized mild inflammation and linear erosions were found in the prepyloric region.  Biopsied.  Examined duodenum was normal.  Random biopsies taken.  Pathology of the gastroesophageal junction was unremarkable as was the gastric fundus.  Prepyloric biopsy revealed minimal chronic inflammation and reactive change.  H pylori negative.  Duodenal biopsies are negative.  11/03/2014--patient seen in follow-up and reports her epigastric pain/chest pain has resolved.  I reviewed the results of the studies with her.  It does not appear that her problem is related to biliary tract disease.  She does have reflux and although  the recent upper GI revealed minimal reflux, I do think her symptoms are related to esophageal reflux with esophageal spasm.  10/21/2014--air-contrast upper GI series revealed trace upper laryngeal penetration.  Persistent small partially reducible sliding hiatal hernia the upper stomach with    • Generalized anxiety disorder 4/11/2017   • History of Acute deep vein thrombosis (DVT) of distal end of left lower extremity 2/15/2017    03/21/2017 patient seen in follow-up and she is tolerating the Eliquis well without signs or symptoms of bleeding.  Her calf swelling and tenderness is better but not totally resolved.  I suspect that the DVT is chronic and may not resolve at all.  I will order a repeat venous study and then proceed from there.  02/23/2017--repeat Doppler venous study of the left lower extremity reveals a chronic left lower extremity DVT in the posterior tibial.  All other left sided veins appeared normal.  Fluid collection in the left calf noted.  02/17/2017--patient seen in follow-up and reports her left lower extremity symptoms are about the same.  She continues to have complaints of profound fatigue which I think is multifactorial including underlying depression that is not in remission.  Review the results of the CTA of the chest including the possible thyroid lesion.  I do not think this is contributing to any of her symptoms of fatigue particularly given the fact that her thyroid function tests are normal.  I expla   • History of palpitations 12/07/2011    Patient has had multiple admissions to the hospital for complaints of chest pain and heart palpitations. She meant admitted at least on 3 occasions. She has had at least 3 stress Cardiolite and 1 stress ECG, the last Cardiolite being performed 12/07/2011 which was negative. Patient is also had Holter monitors which have been unremarkable.   • HIstory of Schatzki's ring 02/28/2012 02/28/2012--air-contrast upper GI revealed small to moderate  sized reducible sliding hiatal herniation of the upper stomach with some demonstrated gastroesophageal reflux. No esophageal, gastric, or duodenal mass or mucosal ulceration was seen.  11/03/2008--EGD performed for evaluation of iron deficiency anemia revealed hiatal hernia without evidence of reflux, prepyloric antritis and   • Hyperlipidemia 4/8/2016   • Hypersomnolence 5/5/2016 06/14/2017--overnight oximetry revealed oxygen desaturation index of only 0.44.  There were only 10 total desaturations during the period of testing which lasted 6 hours and 46 minutes.  05/31/2017--patient seen in follow-up and reports she continues to have chronic fatigue as well as hypersomnolence.  Once again, she is on multiple medications that are undoubtedly contributing to this problem including clonazepam and hydrocodone but I doubt that these could be discontinued.  Her CBC back in April revealed a low hemoglobin of 10.8 but hematocrit was normal at 35.7.  Thyroid function tests were normal and CMP normal.  Overnight oximetry test ordered.  Repeat CBC.  Iron studies.  Sedimentation rate and CRP.  04/11/2017--patient seen in follow-up and her blood pressure is now at a reasonable level at 120/64.  She reports she still feels somewhat fatigued but she is much better.  Patient has not been doing any regular exercise and I do think that that would be helpful to reduce her feeling of fatigue.  She is   • Menopausal state 4/8/2016   • Multinodular goiter 2/17/2017 02/21/2017--thyroid ultrasound reveals multinodular thyroid with largest nodule measuring on the order of 1.6 cm in greatest dimension.  02/17/2017--patient seen in follow-up for DVT and CTA of the chest.  An incidental finding on the CTA of the chest reveals a 1.7 cm lesion in the right lobe of thyroid.  Note that thyroid function tests are currently normal.  Ultrasound of the thyroid ordered.   • Osteoporosis, 03/29/2011--lumbar spine -2.8.  Right femoral neck -2.4.   Left femoral neck -2.4.  Patient receives Reclast infusions. 2/9/2009 04/19/2017--Reclast infusion.  04/05/2016--Reclast infusion.  06/04/2012--Reclast infusion.  05/26/2011--Reclast infusion.  03/29/2011--DEXA scan revealed a lumbar T score of -2.8, right femoral neck T score -2.4, left femoral neck T score -2.4.  Osteoporosis of the lumbar spine and severe osteopenia of the hips bilaterally.  Patient has been intolerant to Fosamax because of gastritis and gastroesophageal reflux.  04/01/2009--treatment for osteoporosis begun with Fosamax.  02/09/2009--DEXA scan revealed lumbar spine T score of -3.3.  Left femoral neck T score -2.5.  Right hip T score -2.6.  Osteoporosis.    • Pancreatic Duct Dilation 11/30/2001 09/29/2014--patient was again evaluated by the urologist for a renal cyst.  CT scan of the abdomen and pelvis reveals a left renal cyst measuring 5.1 x 4.9 cm.  There were subcentimeter hypodense renal lesions that are too small to further characterize and are presumably related to cysts.  Recommend attention to follow up.  Distal dilated pancreatic duct noted.  The common bile duct is the upper limits of normal in size.  The ampullary region is not well evaluated.  Comparison with prior imaging is recommended if available.  If there is no prior film available for comparison, and ERCP or MRCP could be performed to further evaluate.  Small hiatal hernia noted.  03/05/2012--CT scan of the abdomen with contrast, pancreatic protocol.  This reveals some fullness of the pancreatic ductal system and apparent pancreatic divisum with separate entrance of the accessory pancreatic duct extending into the duodenum distal to the main pancreatic duct.  There is fullness of the duct diffusely but similar to the previous s   • Pedal edema 6/29/2015 06/29/2015--patient presents with a 4-6 week history of exertional dyspnea that comes on with activity such as climbing stairs or walking her dog up an incline.  No  chest pain.  Relieved with rest.  No cough.  She does have complaints of her feet and legs swelling that is particularly worse at the end of the day and not improved overnight.  No orthopnea or PND.  Chest exam reveals faint rales at the bases.  Otherwise clear.  Heart is regular and I do not appreciate a heart murmur.  Chest x-ray PA and lateral ordered.  Echocardiogram ordered.   • Restless legs syndrome 4/8/2016   • Simple renal cyst 7/20/2009 09/29/2014--patient was again evaluated by the urologist for a renal cyst.  CT scan of the abdomen and pelvis reveals a left renal cyst measuring 5.1 x 4.9 cm.  There were subcentimeter hypodense renal lesions that are too small to further characterize and are presumably related to cysts.  Recommend attention to follow up.  Distal dilated pancreatic duct noted.  The common bile duct is the upper limits of normal in size.  The ampullary region is not well evaluated.  Comparison with prior imaging is recommended if available.  If there is no prior film available for comparison, and ERCP or MRCP could be performed to further evaluate.  Small hiatal hernia noted.  07/20/2009--patient was noted to have a left renal mass consistent with a cyst.  This was evaluated by the urologist 07/20/2009.   • Statin intolerance 10/30/2017   • Vitamin D deficiency 4/8/2016         Past Surgical History:   Procedure Laterality Date   • APPENDECTOMY  1965 1965   • CATARACT EXTRACTION Bilateral Remote    Remote bilateral cataract extirpation with intraocular lens implantation.   • COLONOSCOPY  11/03/2008 2008--normal colonoscopy   • COLONOSCOPY  2001 2001--normal colonoscopy.   • COLONOSCOPY N/A 6/13/2017 06/13/2017--colonoscopy revealed melanosis.  Biopsied.  There was friability with contact bleeding in the ascending colon.  Biopsied.  Pathology returned consistent with melanosis coli.   • ENDOSCOPY  10/08/2014    02/28/2012--air-contrast upper GI revealed small to moderate sized  reducible sliding hiatal herniation of the upper stomach with some demonstrated gastroesophageal reflux. No esophageal, gastric, or duodenal mass or mucosal ulceration was seen. 11/03/2008--EGD performed for evaluation of iron deficiency anemia revealed hiatal hernia without evidence of reflux, prepyloric antritis and   • ENDOSCOPY  11/03/2008 11/03/2008--EGD performed for evaluation of iron deficiency anemia revealed hiatal hernia without evidence of reflux, prepyloric antritis and   • ENDOSCOPY  11/19/2001 11/19/2001--EGD revealed a very tortuous distal esophagus with a Schatzki's ring. No ulcer or erosions. No Dorado's mucosa. Stomach revealed patchy erythema and erosions in the antrum. Biopsy. Normal pylorus with no obstruction. Normal duodenum with no ulcers. The Schatzki's ring was dilated with a Rhodes dilator.;   • ENDOSCOPY N/A 9/27/2016 09/27/2016--EGD revealed a normal oropharynx, esophagus, and medium sized hiatal hernia.  Nonbleeding gastric ulcer with no stigmata of bleeding.  Biopsy.  Gastritis.  Biopsy.  Normal examined duodenum.  Biopsied.  Pathology returned mild to moderate chronic active gastritis with ulceration from the stomach antral ulcer biopsy.  Gastroesophageal junction biopsy revealed minimal mixed inflammation.   • ENDOSCOPY N/A 6/13/2017 06/13/2017--colonoscopy revealed melanosis.  Biopsied.  There was friability with contact bleeding in the ascending colon.  Biopsied.  Pathology returned consistent with melanosis coli.   • ESOPHAGEAL DILATATION  11/19/2001 11/19/2001--EGD revealed a very tortuous distal esophagus with a Schatzki's ring. No ulcer or erosions. No Dorado's mucosa. Stomach revealed patchy erythema and erosions in the antrum. Biopsy. Normal pylorus with no obstruction. Normal duodenum with no ulcers. The Schatzki's ring was dilated with a Rhodes dilator.;    • INCONTINENCE SURGERY  1979 1979--a bladder tack procedure for urinary stress incontinence   •  KNEE ARTHROSCOPY Right 12/12/2011 12/12/2011--right knee arthroscopy with partial lateral and medial meniscectomies.   • SKIN SURGERY  05/25/2010 05/25/2010--skin lesion excised from right lower extremity. 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   • TOTAL ABDOMINAL HYSTERECTOMY  1974 1974--total abdominal hysterectomy.         Allergies   Allergen Reactions   • Penicillins Itching   • Tetanus Toxoids Itching           Current Outpatient Prescriptions:   •  aspirin 81 MG EC tablet, Take 81 mg by mouth Daily., Disp: , Rfl:   •  buPROPion XL (WELLBUTRIN XL) 150 MG 24 hr tablet, Take 1 tablet by mouth Daily., Disp: , Rfl: 0  •  calcium citrate (CALCITRATE) 950 MG tablet, 2 by mouth daily, Disp: 60 tablet, Rfl: 1  •  clonazePAM (KlonoPIN) 1 MG tablet, TAKE 1/2- 1 TABLET BY MOUTH TWICE DAILY, Disp: 60 tablet, Rfl: 2  •  cyanocobalamin (VITAMIN B-12) 2500 MCG tablet tablet, Take 500 mcg by mouth Daily., Disp: , Rfl:   •  cycloSPORINE (RESTASIS) 0.05 % ophthalmic emulsion, 1 drop 2 (Two) Times a Day., Disp: , Rfl:   •  dexlansoprazole (DEXILANT) 60 MG capsule, One by mouth daily before the first meal, Disp: 30 capsule, Rfl: 0  •  diclofenac (VOLTAREN) 1 % gel gel, APPLY 4 GM QID PRN TO AFFECTED JOINTS, Disp: , Rfl: 5  •  diphenhydrAMINE-acetaminophen (TYLENOL PM EXTRA STRENGTH)  MG tablet per tablet, Take 1 tablet by mouth At Night As Needed for Sleep., Disp: , Rfl:   •  estradiol (ESTRACE) 1 MG tablet, TAKE 1 TABLET BY MOUTH DAILY, Disp: 90 tablet, Rfl: 1  •  gabapentin (NEURONTIN) 300 MG capsule, Take 300 mg by mouth Every Night., Disp: , Rfl: 2  •  Ginkgo Biloba (GINKOBA PO), Take 120 mg by mouth Daily., Disp: , Rfl:   •  HYDROcodone-acetaminophen (NORCO)  MG per tablet, Take 1 tablet by mouth every 6 (six) hours as needed for moderate pain (4-6)., Disp: , Rfl:   •  Misc Natural Products (COLON CLEANSE PO), Take 3 capsules by mouth Every Night., Disp: , Rfl:   •  topiramate  "(TOPAMAX) 100 MG tablet, TAKE 1 TABLET DAILY, Disp: 90 tablet, Rfl: 3  •  triamterene-hydrochlorothiazide (DYAZIDE) 37.5-25 MG per capsule, TAKE ONE CAPSULE BY MOUTH DAILY FOR BLOOD PRESSURE AND LEG SWELLING, Disp: 90 capsule, Rfl: 0  •  venlafaxine (EFFEXOR) 75 MG tablet, Take 75 mg by mouth Daily., Disp: , Rfl:   •  venlafaxine XR (EFFEXOR-XR) 150 MG 24 hr capsule, Take 150 mg by mouth Every Night., Disp: , Rfl:       Family History   Problem Relation Age of Onset   • Heart attack Mother         dies age 47 from heart attack   • Heart disease Mother    • Bleeding Disorder Mother    • Heart attack Maternal Aunt         dies age 60 from heart attack   • Ovarian cancer Maternal Grandmother    • Heart disease Father          Social History     Social History   • Marital status:      Spouse name: N/A   • Number of children: N/A   • Years of education: N/A     Occupational History   • homemaker      Social History Main Topics   • Smoking status: Never Smoker   • Smokeless tobacco: Never Used   • Alcohol use No   • Drug use: No   • Sexual activity: Yes     Partners: Male     Other Topics Concern   • Not on file     Social History Narrative   • No narrative on file         Vitals:    10/09/18 1326   BP: 118/60   BP Location: Left arm   Pulse: 73   SpO2: 98%   Weight: 61 kg (134 lb 6.4 oz)   Height: 153.7 cm (60.51\")          Physical Exam:    General: Alert and oriented x 3.  No acute distress.  Normal affect.  HEENT: Pupils equal, round, reactive to light; extraocular movements intact; sclerae nonicteric; pharynx, ear canals and TMs normal.  Neck: Without JVD, thyromegaly, bruit, or adenopathy.  Lungs: Clear to auscultation in all fields.  Heart: Regular rate and rhythm without murmur, rub, gallop, or click.  Abdomen: Soft, nontender, without hepatosplenomegaly or hernia.  Bowel sounds normal.  : Deferred.  Rectal: Deferred.  Extremities: Without clubbing, cyanosis, edema, or pulse deficit.  Neurologic: Intact " without focal deficit.  Normal station and gait observed during ingress and egress from the examination room.  Skin: Without significant lesion.  Musculoskeletal: Unremarkable.      Lab/other results:    NMR reveals total cholesterol 220.  Triglycerides are elevated at 180.  However, LDL particle number is excellent at 954 and small LDL particle number excellent at less than 90.  HDL particle number is also excellent at 50.4.  CMP normal except creatinine slightly elevated at 1.14.  CBC essentially normal.  Slight elevation of platelets noted.  Thyroid function tests are normal.  Vitamin D normal.    Assessment/Plan:     Diagnosis Plan   1. Hyperlipidemia, unspecified hyperlipidemia type     2. Benign essential hypertension     3. Carotid artery plaque, 04/02/2018--mild right ICA plaque, normal left ICA 10/03/2014--mild bilateral carotid artery plaque.     4. Chronic renal insufficiency, stage II (mild), creatinine 1.12     5. Gastroesophageal reflux disease with esophagitis     6. Hiatal hernia     7. Epigastric abdominal pain     8. Chronic migraine     9. Pedal edema     10. Restless legs syndrome     11. Vitamin D deficiency     12. Chronic anemia     13. Multinodular goiter     14. Iron deficiency anemia due to chronic blood loss     15. Generalized anxiety disorder     16. Statin intolerance     17. Therapeutic drug monitoring     18. Functional murmur, 07/15/2015--normal echocardiogram.     19. Chronic low back pain, unspecified back pain laterality, with sciatica presence unspecified     20. Age related osteoporosis, unspecified pathological fracture presence       Patient has hyperlipidemia but is statin intolerant.  However, she does not need medication as her NMR profile is very good.  Blood pressure seems controlled.  Patient does have carotid artery plaque that was assessed with a carotid Doppler study in April of this year.  Her chronic renal insufficiency is mild and stable.  She has significant  reflux of Dexilant is working better than the other medication.  She also has a hiatal hernia and this situation is being evaluated by the general surgeon.  She has chronic migraine headaches treated with Topamax.  Pedal edema is currently not an issue.  His multinodular goiter this is been evaluated with an ultrasound.  Her iron deficiency anemia is in the process of being evaluated.  She has chronic lower back pain that requires pain medication periodically.  She also has osteoporosis and this is being treated with Reclast.    Plan is as follows: No change in current medical regimen.  Encouraged patient to follow-up with general surgeon.  I will have her follow-up sometime next month for her subsequent Medicare wellness visit.        Procedures

## 2018-10-12 ENCOUNTER — TELEPHONE (OUTPATIENT)
Dept: SURGERY | Facility: CLINIC | Age: 78
End: 2018-10-12

## 2018-10-29 RX ORDER — ESTRADIOL 1 MG/1
TABLET ORAL
Qty: 90 TABLET | Refills: 0 | Status: SHIPPED | OUTPATIENT
Start: 2018-10-29 | End: 2019-01-17

## 2018-11-02 DIAGNOSIS — I10 BENIGN ESSENTIAL HYPERTENSION: ICD-10-CM

## 2018-11-02 DIAGNOSIS — R60.0 PEDAL EDEMA: ICD-10-CM

## 2018-11-02 RX ORDER — TRIAMTERENE AND HYDROCHLOROTHIAZIDE 37.5; 25 MG/1; MG/1
CAPSULE ORAL
Qty: 90 CAPSULE | Refills: 0 | Status: SHIPPED | OUTPATIENT
Start: 2018-11-02 | End: 2018-12-16 | Stop reason: SDUPTHER

## 2018-11-13 ENCOUNTER — OFFICE VISIT (OUTPATIENT)
Dept: INTERNAL MEDICINE | Facility: CLINIC | Age: 78
End: 2018-11-13

## 2018-11-13 VITALS
HEIGHT: 60 IN | OXYGEN SATURATION: 96 % | DIASTOLIC BLOOD PRESSURE: 60 MMHG | SYSTOLIC BLOOD PRESSURE: 106 MMHG | HEART RATE: 84 BPM | BODY MASS INDEX: 26.7 KG/M2 | WEIGHT: 136 LBS

## 2018-11-13 DIAGNOSIS — N18.2 CHRONIC RENAL INSUFFICIENCY, STAGE II (MILD): Chronic | ICD-10-CM

## 2018-11-13 DIAGNOSIS — K25.7 CHRONIC GASTRIC ULCER WITHOUT HEMORRHAGE AND WITHOUT PERFORATION: Chronic | ICD-10-CM

## 2018-11-13 DIAGNOSIS — E04.2 MULTINODULAR GOITER: Chronic | ICD-10-CM

## 2018-11-13 DIAGNOSIS — G89.29 CHRONIC LOW BACK PAIN, UNSPECIFIED BACK PAIN LATERALITY, WITH SCIATICA PRESENCE UNSPECIFIED: Chronic | ICD-10-CM

## 2018-11-13 DIAGNOSIS — R60.0 PEDAL EDEMA: Chronic | ICD-10-CM

## 2018-11-13 DIAGNOSIS — K21.00 GASTROESOPHAGEAL REFLUX DISEASE WITH ESOPHAGITIS: Chronic | ICD-10-CM

## 2018-11-13 DIAGNOSIS — F41.8 DEPRESSION WITH ANXIETY: Chronic | ICD-10-CM

## 2018-11-13 DIAGNOSIS — IMO0002 CHRONIC MIGRAINE: Chronic | ICD-10-CM

## 2018-11-13 DIAGNOSIS — F41.1 GENERALIZED ANXIETY DISORDER: Chronic | ICD-10-CM

## 2018-11-13 DIAGNOSIS — Z78.9 STATIN INTOLERANCE: Chronic | ICD-10-CM

## 2018-11-13 DIAGNOSIS — I65.23 ATHEROSCLEROSIS OF BOTH CAROTID ARTERIES: Chronic | ICD-10-CM

## 2018-11-13 DIAGNOSIS — G25.81 RESTLESS LEGS SYNDROME: Chronic | ICD-10-CM

## 2018-11-13 DIAGNOSIS — I10 BENIGN ESSENTIAL HYPERTENSION: Chronic | ICD-10-CM

## 2018-11-13 DIAGNOSIS — M81.0 AGE RELATED OSTEOPOROSIS, UNSPECIFIED PATHOLOGICAL FRACTURE PRESENCE: Chronic | ICD-10-CM

## 2018-11-13 DIAGNOSIS — D50.0 IRON DEFICIENCY ANEMIA DUE TO CHRONIC BLOOD LOSS: Chronic | ICD-10-CM

## 2018-11-13 DIAGNOSIS — E55.9 VITAMIN D DEFICIENCY: Chronic | ICD-10-CM

## 2018-11-13 DIAGNOSIS — M54.5 CHRONIC LOW BACK PAIN, UNSPECIFIED BACK PAIN LATERALITY, WITH SCIATICA PRESENCE UNSPECIFIED: Chronic | ICD-10-CM

## 2018-11-13 DIAGNOSIS — E78.5 HYPERLIPIDEMIA, UNSPECIFIED HYPERLIPIDEMIA TYPE: Chronic | ICD-10-CM

## 2018-11-13 DIAGNOSIS — Z00.00 MEDICARE ANNUAL WELLNESS VISIT, SUBSEQUENT: Primary | ICD-10-CM

## 2018-11-13 DIAGNOSIS — D64.9 CHRONIC ANEMIA: Chronic | ICD-10-CM

## 2018-11-13 DIAGNOSIS — R53.82 CHRONIC FATIGUE: Chronic | ICD-10-CM

## 2018-11-13 PROCEDURE — 99214 OFFICE O/P EST MOD 30 MIN: CPT | Performed by: INTERNAL MEDICINE

## 2018-11-13 PROCEDURE — G0439 PPPS, SUBSEQ VISIT: HCPCS | Performed by: INTERNAL MEDICINE

## 2018-11-13 RX ORDER — CLONAZEPAM 1 MG/1
TABLET ORAL
Qty: 60 TABLET | Refills: 2 | Status: SHIPPED | OUTPATIENT
Start: 2018-11-13 | End: 2019-01-17

## 2018-11-13 NOTE — PROGRESS NOTES
11/13/2018    Patient Information  Sachi CUELLAR Vora                                                                                          [address]   [place] [address]      1940  [unfilled]  There is no work phone number on file.    Chief Complaint:     Subsequent Medicare wellness visit.  Follow-up hyperlipidemia, chronic renal insufficiency, osteoporosis, hypertension, carotid artery plaque, chronic lower back pain, depression with anxiety, esophageal reflux and hiatal hernia, chronic migraine headaches, pedal edema, restless legs, vitamin D deficiency, history of chronic gastric ulcer, chronic anemia, multinodular goiter, iron deficiency anemia, statin intolerance.    History of Present Illness:    Patient with a history of medical problems as outlined in the chief complaint presents today for her subsequent Medicare wellness visit.  Patient also had lab work in order to monitor her chronic medical issues.  Her past medical history reviewed and updated where necessary including health maintenance parameters.  This reveals she will be up-to-date after today's visit or else accounted for.    Review of Systems   Constitution: Negative.   HENT: Negative.    Eyes: Negative.    Cardiovascular: Negative.    Respiratory: Negative.    Endocrine: Negative.    Hematologic/Lymphatic: Negative.    Skin: Negative.    Musculoskeletal: Positive for arthritis, back pain and joint pain.   Gastrointestinal: Negative.    Genitourinary: Negative.    Neurological: Negative.    Psychiatric/Behavioral: Negative.    Allergic/Immunologic: Negative.        Active Problems:    Patient Active Problem List   Diagnosis   • Osteoporosis, 03/29/2011--lumbar spine -2.8.  Right femoral neck -2.4.  Left femoral neck -2.4.  Patient receives Reclast infusions.   • Therapeutic drug monitoring   • Simple renal cyst   • Benign essential hypertension   • Carotid artery plaque, 04/02/2018--mild right ICA plaque, normal left  "ICA 10/03/2014--mild bilateral carotid artery plaque.   • Chronic gastritis   • Chronic lower back pain   • Chronic otitis externa   • Chronic renal insufficiency, stage II (mild), creatinine 1.12   • Depression with anxiety   • Gastroesophageal reflux disease with esophagitis   • Functional murmur, 07/15/2015--normal echocardiogram.   • Hyperlipidemia   • Menopausal state   • Chronic migraine   • Pancreatic Duct Dilation   • Pedal edema   • Restless legs syndrome   • Bilateral sensorineural hearing loss   • Vitamin D deficiency   • Chronic gastric ulcer   • Chronic fatigue   • Hypersomnolence   • Chronic anemia   • Multinodular goiter   • Generalized anxiety disorder   • Iron deficiency anemia   • Statin intolerance   • Postmenopausal state   • Epigastric abdominal pain   • Hiatal hernia         Past Medical History:   Diagnosis Date   • Benign essential hypertension 4/8/2016   • Bilateral sensorineural hearing loss 3/31/2011    03/31/2011--etiology reveals reverse \"cookie bite\" type of hearing loss for both ears of mild/moderate degree, mostly sensorineural.  Speech discrimination 100% on the right, 96% on the left.   • Carotid artery plaque, 10/03/2014--mild bilateral carotid artery plaque. 7/25/2012    10/03/2014--[facility] screening revealed mild bilateral carotid plaque, negative for atrial fibrillation, negative for AAA, negative for PAD, osteoporosis screen revealed osteopenia.  Body mass index was 25 and considered to be moderate risk.  07/25/2012--vascular screen negative for carotid plaque, negative for abdominal aneurysm, negative for PAD Description: 10/03/2014--[facility] screening revealed mild bilateral carotid plaque, negative for atrial fibrillation, negative for AAA, negative for PAD, osteoporosis screen revealed osteopenia.  Body mass index was 25 and considered to be moderate risk.  07/25/2012--vascular screen negative for carotid plaque, negative for abdominal aneurysm, negative for PAD   • " Chronic anemia 8/23/2016 06/13/2017--colonoscopy revealed melanosis.  Biopsied.  There was friability with contact bleeding in the ascending colon.  Biopsied.  Pathology returned consistent with melanosis coli.  06/13/2017--EGD revealed normal esophagus.  Biopsies taken.  There was a medium-sized hiatal hernia.  Localized mild inflammation and linear erosions were found in the prepyloric region.  Biopsied.  Examined duodenum was normal.  Random biopsies taken.  Pathology of the gastroesophageal junction was unremarkable as was the gastric fundus.  Prepyloric biopsy revealed minimal chronic inflammation and reactive change.  H pylori negative.  Duodenal biopsies are negative.  04/12/2017--hemoglobin low at 10.8, hematocrit is normal at 35.7.  Iron sulfate 325 mg per day initiated.  08/23/2016--routine follow-up.  Patient continues to have epigastric abdominal pain believed to be related to reflux with possible esophageal spasm.  Hemoglobin noted to be low at 10.6 with a hematocrit low at 33.7 and RDW elevated at 16.2.  Homocysti   • Chronic fatigue 4/21/2016 06/14/2017--overnight oximetry revealed oxygen desaturation index of only 0.44.  There were only 10 total desaturations during the period of testing which lasted 6 hours and 46 minutes.  05/31/2017--patient seen in follow-up and reports she continues to have chronic fatigue as well as hypersomnolence.  Once again, she is on multiple medications that are undoubtedly contributing to this problem including clonazepam and hydrocodone but I doubt that these could be discontinued.  Her CBC back in April revealed a low hemoglobin of 10.8 but hematocrit was normal at 35.7.  Thyroid function tests were normal and CMP normal.  Overnight oximetry test ordered.  Repeat CBC.  Iron studies.  Sedimentation rate and CRP.  04/11/2017--patient seen in follow-up and her blood pressure is now at a reasonable level at 120/64.  She reports she still feels somewhat fatigued but  she is much better.  Patient has not been doing any regular exercise and I do think that that would be helpful to reduce her feeling of fatigue.  She is   • Chronic gastric ulcer 4/14/2014    02/15/2017--patient seen in follow-up in nearly 6 months later.  She has complaints of not feeling well all over.  She has complaints of diffuse myalgias and possibly tendinopathy related to Levaquin that I called in prior to her going to [place] for vacation.  She reports that 3 or 4 days after starting the Levaquin she began to have the musculoskeletal symptoms and she reports that she continues to have them presently.  Symptoms are worse involving her left calf area.  Examination reveals significant tenderness involving the left calf and the left calf seems a little larger than the right.  She also has complaints of shortness of breath but no chest pain.  She is complaining of double vision and generalized weakness and fatigue.  She was complaining of chronic fatigue at the last visit back in September and I ordered an overnight oximetry test but apparently this was never done for reasons that are not clear to me.  Her current oxygen saturation is 94% and normally it is 100%.  Plan is as follows: Extensi   • Chronic gastritis 11/19/2001    02/15/2017--patient seen in follow-up in nearly 6 months later.  She has complaints of not feeling well all over.  She has complaints of diffuse myalgias and possibly tendinopathy related to Levaquin that I called in prior to her going to [place] for vacation.  She reports that 3 or 4 days after starting the Levaquin she began to have the musculoskeletal symptoms and she reports that she continues to have them presently.  Symptoms are worse involving her left calf area.  Examination reveals significant tenderness involving the left calf and the left calf seems a little larger than the right.  She also has complaints of shortness of breath but no chest pain.  She is complaining of  double vision and generalized weakness and fatigue.  She was complaining of chronic fatigue at the last visit back in September and I ordered an overnight oximetry test but apparently this was never done for reasons that are not clear to me.  Her current oxygen saturation is 94% and normally it is 100%.  Plan is as follows: Extensi   • Chronic lower back pain 1/31/2006 08/11/2014--MRI of the lumbar spine reveals the conus terminates at L2 and is normal.  Cauda equina unremarkable.  Stable to moderate degenerative disc disease at L5-S1.  No acute fracture or pars defect is demonstrated.  Small synovial cysts are seen posterior to the L4-L5 facet.  The perivertebral soft tissues are unremarkable.  T12-L1, L1-L2, L2-L3 are negative.  L3-L4 a broad-based disc bulge resulting in mild bilateral neural foraminal narrowing.  L4-L5 reveals a broad-based disc bulge facet disease resulting in mild bilateral neural foraminal narrowing.  L5-S1 reveals a broad-based disc bulge, posterior osseous slipping, and facet disease resulting in mild to moderate bilateral neural foraminal narrowing.  Assessment is stable mild to moderate degenerative spondylosis.  Small synovial cysts are seen posterior to the L4-L5 facets.  08/11/2014--MRI of the thoracic spine reveals mild to moderate thoracic kyphosis.  No fracture.  At T5--T6 there is a moderate sized left paracentral disc protrusion which i   • Chronic migraine 11/3/2009    01/18/2010--MRI of the brain performed for headaches and memory loss.  Mild small vessel disease in the cerebral and central pontine white matter.  There is an ovoid, somewhat pancake-shaped area of signal abnormality in the anterior inferior right temporal lobe subcortical to the juxtacortical white matter that measures 1.2 x 1 cm and anterior posterior and medial lateral dimension but only measures 3 mm in cranial caudal dimension.  I suspect that this is a benign cyst or a cystic area of encephalomalacia.   The remainder of the MRI of the head is within normal limits.  11/03/2009--CT scan of the brain without contrast performed for headache after a fall.  Mild diffuse atrophy.  No acute abnormality noted.; Description: Patient has had a long history of migraine headaches.  MRI and CT scan of the brain have been essentially negative.   • Chronic otitis externa 4/8/2016   • Chronic renal insufficiency, stage II (mild), creatinine 1.12 11/14/2015 11/14/2015--serum creatinine mildly elevated at 1.12.   • Depression with anxiety 4/8/2016   • Functional murmur, 07/15/2015--normal echocardiogram. 7/15/2015    07/15/2015--echocardiogram reveals normal left ventricular size and function with ejection fraction 55%.  Grade 1 diastolic dysfunction, abnormal relaxation pattern.  Trace tricuspid regurgitation.  Estimated right ventricular systolic pressure is 25 mmHg which is normal.   • Gastroesophageal reflux disease with esophagitis 11/19/2001 06/13/2017--EGD revealed normal esophagus.  Biopsies taken.  There was a medium-sized hiatal hernia.  Localized mild inflammation and linear erosions were found in the prepyloric region.  Biopsied.  Examined duodenum was normal.  Random biopsies taken.  Pathology of the gastroesophageal junction was unremarkable as was the gastric fundus.  Prepyloric biopsy revealed minimal chronic inflammation and reactive change.  H pylori negative.  Duodenal biopsies are negative.  11/03/2014--patient seen in follow-up and reports her epigastric pain/chest pain has resolved.  I reviewed the results of the studies with her.  It does not appear that her problem is related to biliary tract disease.  She does have reflux and although the recent upper GI revealed minimal reflux, I do think her symptoms are related to esophageal reflux with esophageal spasm.  10/21/2014--air-contrast upper GI series revealed trace upper laryngeal penetration.  Persistent small partially reducible sliding hiatal hernia  the upper stomach with    • Generalized anxiety disorder 4/11/2017   • History of Acute deep vein thrombosis (DVT) of distal end of left lower extremity 2/15/2017    03/21/2017 patient seen in follow-up and she is tolerating the Eliquis well without signs or symptoms of bleeding.  Her calf swelling and tenderness is better but not totally resolved.  I suspect that the DVT is chronic and may not resolve at all.  I will order a repeat venous study and then proceed from there.  02/23/2017--repeat Doppler venous study of the left lower extremity reveals a chronic left lower extremity DVT in the posterior tibial.  All other left sided veins appeared normal.  Fluid collection in the left calf noted.  02/17/2017--patient seen in follow-up and reports her left lower extremity symptoms are about the same.  She continues to have complaints of profound fatigue which I think is multifactorial including underlying depression that is not in remission.  Review the results of the CTA of the chest including the possible thyroid lesion.  I do not think this is contributing to any of her symptoms of fatigue particularly given the fact that her thyroid function tests are normal.  I expla   • History of palpitations 12/07/2011    Patient has had multiple admissions to the hospital for complaints of chest pain and heart palpitations. She meant admitted at least on 3 occasions. She has had at least 3 stress Cardiolite and 1 stress ECG, the last Cardiolite being performed 12/07/2011 which was negative. Patient is also had Holter monitors which have been unremarkable.   • HIstory of Schatzki's ring 02/28/2012 02/28/2012--air-contrast upper GI revealed small to moderate sized reducible sliding hiatal herniation of the upper stomach with some demonstrated gastroesophageal reflux. No esophageal, gastric, or duodenal mass or mucosal ulceration was seen.  11/03/2008--EGD performed for evaluation of iron deficiency anemia revealed hiatal  hernia without evidence of reflux, prepyloric antritis and   • Hyperlipidemia 4/8/2016   • Hypersomnolence 5/5/2016 06/14/2017--overnight oximetry revealed oxygen desaturation index of only 0.44.  There were only 10 total desaturations during the period of testing which lasted 6 hours and 46 minutes.  05/31/2017--patient seen in follow-up and reports she continues to have chronic fatigue as well as hypersomnolence.  Once again, she is on multiple medications that are undoubtedly contributing to this problem including clonazepam and hydrocodone but I doubt that these could be discontinued.  Her CBC back in April revealed a low hemoglobin of 10.8 but hematocrit was normal at 35.7.  Thyroid function tests were normal and CMP normal.  Overnight oximetry test ordered.  Repeat CBC.  Iron studies.  Sedimentation rate and CRP.  04/11/2017--patient seen in follow-up and her blood pressure is now at a reasonable level at 120/64.  She reports she still feels somewhat fatigued but she is much better.  Patient has not been doing any regular exercise and I do think that that would be helpful to reduce her feeling of fatigue.  She is   • Menopausal state 4/8/2016   • Multinodular goiter 2/17/2017 02/21/2017--thyroid ultrasound reveals multinodular thyroid with largest nodule measuring on the order of 1.6 cm in greatest dimension.  02/17/2017--patient seen in follow-up for DVT and CTA of the chest.  An incidental finding on the CTA of the chest reveals a 1.7 cm lesion in the right lobe of thyroid.  Note that thyroid function tests are currently normal.  Ultrasound of the thyroid ordered.   • Osteoporosis, 03/29/2011--lumbar spine -2.8.  Right femoral neck -2.4.  Left femoral neck -2.4.  Patient receives Reclast infusions. 2/9/2009 04/19/2017--Reclast infusion.  04/05/2016--Reclast infusion.  06/04/2012--Reclast infusion.  05/26/2011--Reclast infusion.  03/29/2011--DEXA scan revealed a lumbar T score of -2.8, right femoral  neck T score -2.4, left femoral neck T score -2.4.  Osteoporosis of the lumbar spine and severe osteopenia of the hips bilaterally.  Patient has been intolerant to Fosamax because of gastritis and gastroesophageal reflux.  04/01/2009--treatment for osteoporosis begun with Fosamax.  02/09/2009--DEXA scan revealed lumbar spine T score of -3.3.  Left femoral neck T score -2.5.  Right hip T score -2.6.  Osteoporosis.    • Pancreatic Duct Dilation 11/30/2001 09/29/2014--patient was again evaluated by the urologist for a renal cyst.  CT scan of the abdomen and pelvis reveals a left renal cyst measuring 5.1 x 4.9 cm.  There were subcentimeter hypodense renal lesions that are too small to further characterize and are presumably related to cysts.  Recommend attention to follow up.  Distal dilated pancreatic duct noted.  The common bile duct is the upper limits of normal in size.  The ampullary region is not well evaluated.  Comparison with prior imaging is recommended if available.  If there is no prior film available for comparison, and ERCP or MRCP could be performed to further evaluate.  Small hiatal hernia noted.  03/05/2012--CT scan of the abdomen with contrast, pancreatic protocol.  This reveals some fullness of the pancreatic ductal system and apparent pancreatic divisum with separate entrance of the accessory pancreatic duct extending into the duodenum distal to the main pancreatic duct.  There is fullness of the duct diffusely but similar to the previous s   • Pedal edema 6/29/2015 06/29/2015--patient presents with a 4-6 week history of exertional dyspnea that comes on with activity such as climbing stairs or walking her dog up an incline.  No chest pain.  Relieved with rest.  No cough.  She does have complaints of her feet and legs swelling that is particularly worse at the end of the day and not improved overnight.  No orthopnea or PND.  Chest exam reveals faint rales at the bases.  Otherwise clear.  Heart  is regular and I do not appreciate a heart murmur.  Chest x-ray PA and lateral ordered.  Echocardiogram ordered.   • Restless legs syndrome 4/8/2016   • Simple renal cyst 7/20/2009 09/29/2014--patient was again evaluated by the urologist for a renal cyst.  CT scan of the abdomen and pelvis reveals a left renal cyst measuring 5.1 x 4.9 cm.  There were subcentimeter hypodense renal lesions that are too small to further characterize and are presumably related to cysts.  Recommend attention to follow up.  Distal dilated pancreatic duct noted.  The common bile duct is the upper limits of normal in size.  The ampullary region is not well evaluated.  Comparison with prior imaging is recommended if available.  If there is no prior film available for comparison, and ERCP or MRCP could be performed to further evaluate.  Small hiatal hernia noted.  07/20/2009--patient was noted to have a left renal mass consistent with a cyst.  This was evaluated by the urologist 07/20/2009.   • Statin intolerance 10/30/2017   • Vitamin D deficiency 4/8/2016         Past Surgical History:   Procedure Laterality Date   • APPENDECTOMY  1965    1965   • CATARACT EXTRACTION Bilateral Remote    Remote bilateral cataract extirpation with intraocular lens implantation.   • COLONOSCOPY  11/03/2008 2008--normal colonoscopy   • COLONOSCOPY  2001 2001--normal colonoscopy.   • ENDOSCOPY  10/08/2014    02/28/2012--air-contrast upper GI revealed small to moderate sized reducible sliding hiatal herniation of the upper stomach with some demonstrated gastroesophageal reflux. No esophageal, gastric, or duodenal mass or mucosal ulceration was seen. 11/03/2008--EGD performed for evaluation of iron deficiency anemia revealed hiatal hernia without evidence of reflux, prepyloric antritis and   • ENDOSCOPY  11/03/2008 11/03/2008--EGD performed for evaluation of iron deficiency anemia revealed hiatal hernia without evidence of reflux, prepyloric antritis  and   • ENDOSCOPY  11/19/2001 11/19/2001--EGD revealed a very tortuous distal esophagus with a Schatzki's ring. No ulcer or erosions. No Dorado's mucosa. Stomach revealed patchy erythema and erosions in the antrum. Biopsy. Normal pylorus with no obstruction. Normal duodenum with no ulcers. The Schatzki's ring was dilated with a Rhodes dilator.;   • ESOPHAGEAL DILATATION  11/19/2001 11/19/2001--EGD revealed a very tortuous distal esophagus with a Schatzki's ring. No ulcer or erosions. No Dorado's mucosa. Stomach revealed patchy erythema and erosions in the antrum. Biopsy. Normal pylorus with no obstruction. Normal duodenum with no ulcers. The Schatzki's ring was dilated with a Rhodes dilator.;    • INCONTINENCE SURGERY  1979 1979--a bladder tack procedure for urinary stress incontinence   • KNEE ARTHROSCOPY Right 12/12/2011 12/12/2011--right knee arthroscopy with partial lateral and medial meniscectomies.   • SKIN SURGERY  05/25/2010 05/25/2010--skin lesion excised from right lower extremity. 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   • TOTAL ABDOMINAL HYSTERECTOMY  1974 1974--total abdominal hysterectomy.         Allergies   Allergen Reactions   • Penicillins Itching   • Tetanus Toxoids Itching           Current Outpatient Medications:   •  aspirin 81 MG EC tablet, Take 81 mg by mouth Daily., Disp: , Rfl:   •  buPROPion XL (WELLBUTRIN XL) 150 MG 24 hr tablet, Take 1 tablet by mouth Daily., Disp: , Rfl: 0  •  calcium citrate (CALCITRATE) 950 MG tablet, 2 by mouth daily, Disp: 60 tablet, Rfl: 1  •  clonazePAM (KlonoPIN) 1 MG tablet, TAKE 1/2- 1 TABLET BY MOUTH TWICE DAILY, Disp: 60 tablet, Rfl: 2  •  cyanocobalamin (VITAMIN B-12) 2500 MCG tablet tablet, Take 500 mcg by mouth Daily., Disp: , Rfl:   •  cycloSPORINE (RESTASIS) 0.05 % ophthalmic emulsion, 1 drop 2 (Two) Times a Day., Disp: , Rfl:   •  dexlansoprazole (DEXILANT) 60 MG capsule, One by mouth daily before the first  meal, Disp: 30 capsule, Rfl: 6  •  diclofenac (VOLTAREN) 1 % gel gel, APPLY 4 GM QID PRN TO AFFECTED JOINTS, Disp: , Rfl: 5  •  diphenhydrAMINE-acetaminophen (TYLENOL PM EXTRA STRENGTH)  MG tablet per tablet, Take 1 tablet by mouth At Night As Needed for Sleep., Disp: , Rfl:   •  estradiol (ESTRACE) 1 MG tablet, TAKE 1 TABLET BY MOUTH DAILY, Disp: 90 tablet, Rfl: 0  •  gabapentin (NEURONTIN) 300 MG capsule, Take 300 mg by mouth Every Night., Disp: , Rfl: 2  •  HYDROcodone-acetaminophen (NORCO)  MG per tablet, Take 1 tablet by mouth every 6 (six) hours as needed for moderate pain (4-6)., Disp: , Rfl:   •  Misc Natural Products (COLON CLEANSE PO), Take 3 capsules by mouth Every Night., Disp: , Rfl:   •  topiramate (TOPAMAX) 100 MG tablet, TAKE 1 TABLET DAILY, Disp: 90 tablet, Rfl: 3  •  triamterene-hydrochlorothiazide (DYAZIDE) 37.5-25 MG per capsule, TAKE ONE CAPSULE BY MOUTH DAILY FOR BLOOD PRESSURE AND LEG SWELLING, Disp: 90 capsule, Rfl: 0  •  venlafaxine (EFFEXOR) 75 MG tablet, Take 75 mg by mouth Daily., Disp: , Rfl:   •  Ginkgo Biloba (GINKOBA PO), Take 120 mg by mouth Daily., Disp: , Rfl:       Family History   Problem Relation Age of Onset   • Heart attack Mother         dies age 47 from heart attack   • Heart disease Mother    • Bleeding Disorder Mother    • Heart attack Maternal Aunt         dies age 60 from heart attack   • Ovarian cancer Maternal Grandmother    • Heart disease Father          Social History     Socioeconomic History   • Marital status:      Spouse name: ander   • Number of children: 4   • Years of education: 14   • Highest education level: Associate degree: academic program   Social Needs   • Financial resource strain: Not hard at all   • Food insecurity - worry: Never true   • Food insecurity - inability: Never true   • Transportation needs - medical: Yes   • Transportation needs - non-medical: Yes   Occupational History   • Occupation: homemaker   Tobacco Use   •  "Smoking status: Never Smoker   • Smokeless tobacco: Never Used   Substance and Sexual Activity   • Alcohol use: No   • Drug use: No   • Sexual activity: Yes     Partners: Male   Other Topics Concern   • Not on file   Social History Narrative   • Not on file         Vitals:    11/13/18 1349   BP: 106/60   BP Location: Left arm   Pulse: 84   SpO2: 96%   Weight: 61.7 kg (136 lb)   Height: 153 cm (60.24\")          Physical Exam:    General: Alert and oriented x 3.  No acute distress.  Normal affect.  HEENT: Pupils equal, round, reactive to light; extraocular movements intact; sclerae nonicteric; pharynx, ear canals and TMs normal.  Neck: Without JVD, thyromegaly, bruit, or adenopathy.  Lungs: Clear to auscultation in all fields.  Heart: Regular rate and rhythm without murmur, rub, gallop, or click.  Abdomen: Soft, nontender, without hepatosplenomegaly or hernia.  Bowel sounds normal.  : Deferred.  Rectal: Deferred.  Extremities: Without clubbing, cyanosis, edema, or pulse deficit.  Neurologic: Intact without focal deficit.  Normal station and gait observed during ingress and egress from the examination room.  Skin: Without significant lesion.  Musculoskeletal: Unremarkable.      Lab/other results:    NMR reveals total cholesterol 220.  Triglycerides mildly elevated 180.  LDL particle number excellent at 954.  Small LDL particle number excellent at less than 90.  HDL particle number excellent at 50.4.  CMP normal except creatinine mildly elevated at 1.14.  CBC normal except platelets elevated at 392.  Thyroid function tests are normal.  Vitamin D normal.    Assessment/Plan:     Diagnosis Plan   1. Medicare annual wellness visit, subsequent     2. Hyperlipidemia, unspecified hyperlipidemia type     3. Chronic renal insufficiency, stage II (mild), creatinine 1.12     4. Age related osteoporosis, unspecified pathological fracture presence     5. Benign essential hypertension     6. Carotid artery plaque, 04/02/2018--mild " right ICA plaque, normal left ICA 10/03/2014--mild bilateral carotid artery plaque.     7. Chronic low back pain, unspecified back pain laterality, with sciatica presence unspecified     8. Depression with anxiety     9. Gastroesophageal reflux disease with esophagitis     10. Chronic migraine     11. Pedal edema     12. Restless legs syndrome     13. Vitamin D deficiency     14. Chronic gastric ulcer without hemorrhage and without perforation     15. Chronic anemia     16. Multinodular goiter     17. Iron deficiency anemia due to chronic blood loss     18. Statin intolerance     19. Generalized anxiety disorder     20. Chronic fatigue       The subsequent Medicare wellness visit is documented on a separate note.    Patient technically has hyperlipidemia but her NMR profile is very good.  Patient is statin intolerant and cannot take statin drugs anyway.  It is questionable whether she actually needs them anyway after reviewing the NMR.  Her chronic renal insufficiency is mild and stable.  Patient has osteoporosis and is up-to-date on her DEXA scan.  She is receiving Reclast infusions.  Blood pressure seems controlled.  Patient has carotid artery plaque and had a carotid Doppler study just a few months ago.  She continues to suffer from arthritis and chronic lower back pain.  She has esophageal reflux and needs to have a follow-up with the general surgeon.  Pedal edema seems well controlled.  Restless leg seems to be controlled with clonazepam.  She does have a chronic gastric ulcer and said problems with abdominal pain.  I have her on Dexilant which seems to work the best although it is too expensive.  I have given her samples of that I can try to keep her on this medication as long as possible in order to heal the ulcer.  Her chronic anemia appears to have resolved but we will continue to monitor.  Her goiter is stable.    Plan is as follows: Samples of Dexilant given.  No changes in current medical regimen.   Encouraged patient to follow-up with the general surgeon.  I will have patient follow-up with lab prior in 6 months or as needed.    Procedures

## 2018-11-13 NOTE — PROGRESS NOTES
QUICK REFERENCE INFORMATION:  The ABCs of the Annual Wellness Visit    Subsequent Medicare Wellness Visit    HEALTH RISK ASSESSMENT    1940    Recent Hospitalizations:  No hospitalization(s) within the last year..        Current Medical Providers:  Patient Care Team:  Cruzito Weir MD as PCP - General (Internal Medicine)  Shellie Lizama APRN as PCP - Claims Attributed        Smoking Status:  Social History     Tobacco Use   Smoking Status Never Smoker   Smokeless Tobacco Never Used       Alcohol Consumption:  Social History     Substance and Sexual Activity   Alcohol Use No       Depression Screen:   PHQ-2/PHQ-9 Depression Screening 11/13/2018   Little interest or pleasure in doing things 0   Feeling down, depressed, or hopeless 0   Trouble falling or staying asleep, or sleeping too much 1   Feeling tired or having little energy 2   Poor appetite or overeating 0   Feeling bad about yourself - or that you are a failure or have let yourself or your family down 0   Trouble concentrating on things, such as reading the newspaper or watching television 1   Moving or speaking so slowly that other people could have noticed. Or the opposite - being so fidgety or restless that you have been moving around a lot more than usual 0   Thoughts that you would be better off dead, or of hurting yourself in some way 0   Total Score 4       Health Habits and Functional and Cognitive Screening:  Functional & Cognitive Status 11/13/2018   Do you have difficulty preparing food and eating? No   Do you have difficulty bathing yourself, getting dressed or grooming yourself? No   Do you have difficulty using the toilet? No   Do you have difficulty moving around from place to place? No   Do you have trouble with steps or getting out of a bed or a chair? Yes   In the past year have you fallen or experienced a near fall? Yes   Current Diet Well Balanced Diet   Dental Exam Up to date   Eye Exam Up to date   Exercise (times per week)  4 times per week   Current Exercise Activities Include Cardiovasular Workout on Exercise Equipment   Do you need help using the phone?  No   Are you deaf or do you have serious difficulty hearing?  Yes   Do you need help with transportation? No   Do you need help shopping? No   Do you need help preparing meals?  No   Do you need help with housework?  No   Do you need help with laundry? No   Do you need help taking your medications? No   Do you need help managing money? No   Do you ever drive or ride in a car without wearing a seat belt? No   Have you felt unusual stress, anger or loneliness in the last month? Yes   Who do you live with? Spouse   If you need help, do you have trouble finding someone available to you? No   Have you been bothered in the last four weeks by sexual problems? No   Do you have difficulty concentrating, remembering or making decisions? No           Does the patient have evidence of cognitive impairment? No    Aspirin use counseling: Taking ASA appropriately as indicated      Recent Lab Results:  CMP:  Lab Results   Component Value Date    GLU 80 10/02/2018    BUN 23 10/02/2018    CREATININE 1.14 (H) 10/02/2018    EGFRIFNONA 47 (L) 10/02/2018    EGFRIFAFRI 54 (L) 10/02/2018    BCR 20 10/02/2018     10/02/2018    K 3.7 10/02/2018    CO2 23 10/02/2018    CALCIUM 9.1 10/02/2018    PROTENTOTREF 6.4 10/02/2018    ALBUMIN 4.1 10/02/2018    LABGLOBREF 2.3 10/02/2018    LABIL2 1.8 10/02/2018    BILITOT 0.2 10/02/2018    ALKPHOS 61 10/02/2018    AST 25 10/02/2018    ALT 32 10/02/2018     Lipid Panel:  Lab Results   Component Value Date    TRIG 180 (H) 10/02/2018     HbA1c:       Visual Acuity:  No exam data present    Age-appropriate Screening Schedule:  Refer to the list below for future screening recommendations based on patient's age, sex and/or medical conditions. Orders for these recommended tests are listed in the plan section. The patient has been provided with a written plan.    Health  Maintenance   Topic Date Due   • MAMMOGRAM  04/04/2019   • LIPID PANEL  10/02/2019   • DXA SCAN  11/03/2019   • TDAP/TD VACCINES (2 - Td) 02/15/2027   • COLONOSCOPY  06/13/2027   • INFLUENZA VACCINE  Completed   • PNEUMOCOCCAL VACCINES (65+ LOW/MEDIUM RISK)  Completed   • ZOSTER VACCINE  Discontinued        Subjective   History of Present Illness    Sachi Vora is a 78 y.o. female who presents for an Subsequent Wellness Visit.    The following portions of the patient's history were reviewed and updated as appropriate: allergies, current medications, past family history, past medical history, past social history, past surgical history and problem list.    Outpatient Medications Prior to Visit   Medication Sig Dispense Refill   • aspirin 81 MG EC tablet Take 81 mg by mouth Daily.     • buPROPion XL (WELLBUTRIN XL) 150 MG 24 hr tablet Take 1 tablet by mouth Daily.  0   • calcium citrate (CALCITRATE) 950 MG tablet 2 by mouth daily 60 tablet 1   • cyanocobalamin (VITAMIN B-12) 2500 MCG tablet tablet Take 500 mcg by mouth Daily.     • cycloSPORINE (RESTASIS) 0.05 % ophthalmic emulsion 1 drop 2 (Two) Times a Day.     • dexlansoprazole (DEXILANT) 60 MG capsule One by mouth daily before the first meal 30 capsule 6   • diclofenac (VOLTAREN) 1 % gel gel APPLY 4 GM QID PRN TO AFFECTED JOINTS  5   • diphenhydrAMINE-acetaminophen (TYLENOL PM EXTRA STRENGTH)  MG tablet per tablet Take 1 tablet by mouth At Night As Needed for Sleep.     • estradiol (ESTRACE) 1 MG tablet TAKE 1 TABLET BY MOUTH DAILY 90 tablet 0   • gabapentin (NEURONTIN) 300 MG capsule Take 300 mg by mouth Every Night.  2   • HYDROcodone-acetaminophen (NORCO)  MG per tablet Take 1 tablet by mouth every 6 (six) hours as needed for moderate pain (4-6).     • Misc Natural Products (COLON CLEANSE PO) Take 3 capsules by mouth Every Night.     • topiramate (TOPAMAX) 100 MG tablet TAKE 1 TABLET DAILY 90 tablet 3   • triamterene-hydrochlorothiazide (DYAZIDE)  37.5-25 MG per capsule TAKE ONE CAPSULE BY MOUTH DAILY FOR BLOOD PRESSURE AND LEG SWELLING 90 capsule 0   • venlafaxine (EFFEXOR) 75 MG tablet Take 75 mg by mouth Daily.     • clonazePAM (KlonoPIN) 1 MG tablet TAKE 1/2- 1 TABLET BY MOUTH TWICE DAILY 60 tablet 2   • Ginkgo Biloba (GINKOBA PO) Take 120 mg by mouth Daily.     • venlafaxine XR (EFFEXOR-XR) 150 MG 24 hr capsule Take 150 mg by mouth Every Night.       No facility-administered medications prior to visit.        Patient Active Problem List   Diagnosis   • Osteoporosis, 03/29/2011--lumbar spine -2.8.  Right femoral neck -2.4.  Left femoral neck -2.4.  Patient receives Reclast infusions.   • Therapeutic drug monitoring   • Simple renal cyst   • Benign essential hypertension   • Carotid artery plaque, 04/02/2018--mild right ICA plaque, normal left ICA 10/03/2014--mild bilateral carotid artery plaque.   • Chronic gastritis   • Chronic lower back pain   • Chronic otitis externa   • Chronic renal insufficiency, stage II (mild), creatinine 1.12   • Depression with anxiety   • Gastroesophageal reflux disease with esophagitis   • Functional murmur, 07/15/2015--normal echocardiogram.   • Hyperlipidemia   • Menopausal state   • Chronic migraine   • Pancreatic Duct Dilation   • Pedal edema   • Restless legs syndrome   • Bilateral sensorineural hearing loss   • Vitamin D deficiency   • Chronic gastric ulcer   • Chronic fatigue   • Hypersomnolence   • Chronic anemia   • Multinodular goiter   • Generalized anxiety disorder   • Iron deficiency anemia   • Statin intolerance   • Postmenopausal state   • Epigastric abdominal pain   • Hiatal hernia       Advance Care Planning:  has an advance directive - a copy has been provided and is in file    Identification of Risk Factors:  Risk factors include: weight , cardiovascular risk, increased fall risk and depression.    Review of Systems   Constitutional: Positive for fatigue.   Musculoskeletal: Positive for arthralgias, back  "pain and gait problem.   Psychiatric/Behavioral: Positive for dysphoric mood. The patient is nervous/anxious.    All other systems reviewed and are negative.      Compared to one year ago, the patient feels her physical health is worse.  Compared to one year ago, the patient feels her mental health is worse.    Objective       Physical Exam     General: Alert and oriented x 3.  No acute distress.  Normal affect.  HEENT: Pupils equal, round, reactive to light; extraocular movements intact; sclerae nonicteric; pharynx, ear canals and TMs normal.  Neck: Without JVD, thyromegaly, bruit, or adenopathy.  Lungs: Clear to auscultation in all fields.  Heart: Regular rate and rhythm without murmur, rub, gallop, or click.  Abdomen: Soft, nontender, without hepatosplenomegaly or hernia.  Bowel sounds normal.  : Deferred.  Rectal: Deferred.  Extremities: Without clubbing, cyanosis, edema, or pulse deficit.  Neurologic: Intact without focal deficit.  Normal station and gait observed during ingress and egress from the examination room.  Skin: Without significant lesion.  Musculoskeletal: Diffuse changes consistent with osteoarthritis.    Vitals:    11/13/18 1349   BP: 106/60   BP Location: Left arm   Pulse: 84   SpO2: 96%   Weight: 61.7 kg (136 lb)   Height: 153 cm (60.24\")   PainSc: 0-No pain       Patient's Body mass index is 26.35 kg/m². BMI is above normal parameters. Recommendations include: exercise counseling, nutrition counseling and referral to primary care.      Assessment/Plan   Patient Self-Management and Personalized Health Advice  The patient has been provided with information about: diet, exercise, weight management, prevention of cardiac or vascular disease and fall prevention and preventive services including:   · Counseling for cardiovascular disease risk reduction, Diabetes screening, see lab orders, Exercise counseling provided, Fall Risk assessment done, Glaucoma screening recommended, Nutrition counseling " provided.    Visit Diagnoses:    ICD-10-CM ICD-9-CM   1. Medicare annual wellness visit, subsequent Z00.00 V70.0   2. Hyperlipidemia, unspecified hyperlipidemia type E78.5 272.4   3. Chronic renal insufficiency, stage II (mild), creatinine 1.12 N18.2 585.2   4. Age related osteoporosis, unspecified pathological fracture presence M81.0 733.01   5. Benign essential hypertension I10 401.1   6. Carotid artery plaque, 04/02/2018--mild right ICA plaque, normal left ICA 10/03/2014--mild bilateral carotid artery plaque. I65.23 433.10     433.30   7. Chronic low back pain, unspecified back pain laterality, with sciatica presence unspecified M54.5 724.2    G89.29 338.29   8. Depression with anxiety F41.8 300.4   9. Gastroesophageal reflux disease with esophagitis K21.0 530.11   10. Chronic migraine G43.709 346.70   11. Pedal edema R60.0 782.3   12. Restless legs syndrome G25.81 333.94   13. Vitamin D deficiency E55.9 268.9   14. Chronic gastric ulcer without hemorrhage and without perforation K25.7 531.70   15. Chronic anemia D64.9 285.9   16. Multinodular goiter E04.2 241.1   17. Iron deficiency anemia due to chronic blood loss D50.0 280.0   18. Statin intolerance Z78.9 995.27   19. Generalized anxiety disorder F41.1 300.02   20. Chronic fatigue R53.82 780.79       Orders Placed This Encounter   Procedures   • CBC (No Diff)     Standing Status:   Future     Standing Expiration Date:   11/13/2020   • CK     Standing Status:   Future     Standing Expiration Date:   11/13/2020   • Comprehensive Metabolic Panel     Standing Status:   Future     Standing Expiration Date:   11/13/2020   • NMR LipoProfile     Standing Status:   Future     Standing Expiration Date:   11/13/2020   • TSH     Standing Status:   Future     Standing Expiration Date:   11/13/2020   • T4, Free     Standing Status:   Future     Standing Expiration Date:   11/13/2020   • T3, Free     Standing Status:   Future     Standing Expiration Date:   11/13/2020   •  Vitamin D 25 Hydroxy     Standing Status:   Future     Standing Expiration Date:   11/13/2020   • Iron Profile     Standing Status:   Future     Standing Expiration Date:   11/13/2020       Outpatient Encounter Medications as of 11/13/2018   Medication Sig Dispense Refill   • aspirin 81 MG EC tablet Take 81 mg by mouth Daily.     • buPROPion XL (WELLBUTRIN XL) 150 MG 24 hr tablet Take 1 tablet by mouth Daily.  0   • calcium citrate (CALCITRATE) 950 MG tablet 2 by mouth daily 60 tablet 1   • clonazePAM (KlonoPIN) 1 MG tablet One half to one by mouth twice a day when necessary 60 tablet 2   • cyanocobalamin (VITAMIN B-12) 2500 MCG tablet tablet Take 500 mcg by mouth Daily.     • cycloSPORINE (RESTASIS) 0.05 % ophthalmic emulsion 1 drop 2 (Two) Times a Day.     • dexlansoprazole (DEXILANT) 60 MG capsule One by mouth daily before the first meal 30 capsule 6   • diclofenac (VOLTAREN) 1 % gel gel APPLY 4 GM QID PRN TO AFFECTED JOINTS  5   • diphenhydrAMINE-acetaminophen (TYLENOL PM EXTRA STRENGTH)  MG tablet per tablet Take 1 tablet by mouth At Night As Needed for Sleep.     • estradiol (ESTRACE) 1 MG tablet TAKE 1 TABLET BY MOUTH DAILY 90 tablet 0   • gabapentin (NEURONTIN) 300 MG capsule Take 300 mg by mouth Every Night.  2   • HYDROcodone-acetaminophen (NORCO)  MG per tablet Take 1 tablet by mouth every 6 (six) hours as needed for moderate pain (4-6).     • Misc Natural Products (COLON CLEANSE PO) Take 3 capsules by mouth Every Night.     • topiramate (TOPAMAX) 100 MG tablet TAKE 1 TABLET DAILY 90 tablet 3   • triamterene-hydrochlorothiazide (DYAZIDE) 37.5-25 MG per capsule TAKE ONE CAPSULE BY MOUTH DAILY FOR BLOOD PRESSURE AND LEG SWELLING 90 capsule 0   • venlafaxine (EFFEXOR) 75 MG tablet Take 75 mg by mouth Daily.     • [DISCONTINUED] clonazePAM (KlonoPIN) 1 MG tablet TAKE 1/2- 1 TABLET BY MOUTH TWICE DAILY 60 tablet 2   • Ginkgo Biloba (GINKOBA PO) Take 120 mg by mouth Daily.     • [DISCONTINUED]  venlafaxine XR (EFFEXOR-XR) 150 MG 24 hr capsule Take 150 mg by mouth Every Night.       No facility-administered encounter medications on file as of 11/13/2018.        Reviewed use of high risk medication in the elderly: yes  Reviewed for potential of harmful drug interactions in the elderly: yes    Follow Up:  No Follow-up on file.     An After Visit Summary and PPPS with all of these plans were given to the patient.

## 2018-11-22 DIAGNOSIS — K21.00 GASTROESOPHAGEAL REFLUX DISEASE WITH ESOPHAGITIS: ICD-10-CM

## 2018-11-26 RX ORDER — OMEPRAZOLE 40 MG/1
CAPSULE, DELAYED RELEASE ORAL
Qty: 90 CAPSULE | Refills: 1 | OUTPATIENT
Start: 2018-11-26

## 2018-12-03 ENCOUNTER — HOSPITAL ENCOUNTER (OUTPATIENT)
Dept: ULTRASOUND IMAGING | Facility: HOSPITAL | Age: 78
Discharge: HOME | End: 2018-12-03
Attending: SURGERY | Admitting: SURGERY

## 2018-12-03 ENCOUNTER — HOSPITAL ENCOUNTER (OUTPATIENT)
Dept: GENERAL RADIOLOGY | Facility: HOSPITAL | Age: 78
Discharge: HOME | End: 2018-12-03
Attending: SURGERY

## 2018-12-03 DIAGNOSIS — R10.13 EPIGASTRIC PAIN: ICD-10-CM

## 2018-12-03 PROCEDURE — 63710000001 BARIUM SULFATE 96 % RECONSTITUTED SUSPENSION: Performed by: SURGERY

## 2018-12-03 PROCEDURE — 63710000001 BARIUM SULFATE 98 % RECONSTITUTED SUSPENSION: Performed by: SURGERY

## 2018-12-03 PROCEDURE — 74220 X-RAY XM ESOPHAGUS 1CNTRST: CPT

## 2018-12-03 PROCEDURE — A9270 NON-COVERED ITEM OR SERVICE: HCPCS | Performed by: SURGERY

## 2018-12-03 PROCEDURE — 63710000001 BARIUM SULFATE 700 MG TABLET: Performed by: SURGERY

## 2018-12-03 PROCEDURE — 63710000001 SOD BICARB-CITRIC ACID-SIMETHICONE 2.21-1.53-0.04 G PACK: Performed by: SURGERY

## 2018-12-03 PROCEDURE — 76705 ECHO EXAM OF ABDOMEN: CPT

## 2018-12-03 RX ADMIN — BARIUM SULFATE 135 ML: 980 POWDER, FOR SUSPENSION ORAL at 10:45

## 2018-12-03 RX ADMIN — BARIUM SULFATE 700 MG: 700 TABLET ORAL at 10:45

## 2018-12-03 RX ADMIN — BARIUM SULFATE 183 ML: 960 POWDER, FOR SUSPENSION ORAL at 10:45

## 2018-12-03 RX ADMIN — ANTACID/ANTIFLATULENT 1 TABLET: 380; 550; 10; 10 GRANULE, EFFERVESCENT ORAL at 10:45

## 2018-12-05 RX ORDER — TOPIRAMATE 100 MG/1
TABLET, FILM COATED ORAL
Qty: 90 TABLET | Refills: 1 | Status: SHIPPED | OUTPATIENT
Start: 2018-12-05 | End: 2019-01-17

## 2018-12-10 ENCOUNTER — TELEPHONE (OUTPATIENT)
Dept: SURGERY | Facility: CLINIC | Age: 78
End: 2018-12-10

## 2018-12-10 ENCOUNTER — TRANSCRIBE ORDERS (OUTPATIENT)
Dept: SURGERY | Facility: CLINIC | Age: 78
End: 2018-12-10

## 2018-12-10 DIAGNOSIS — K44.9 HIATAL HERNIA: Primary | ICD-10-CM

## 2018-12-10 NOTE — TELEPHONE ENCOUNTER
I spoke with the patient regarding her esophagogram as well as right upper quadrant ultrasound.  She has cholelithiasis and a large hiatal hernia.  She continued to have symptoms.  Discussed with her about the need to go ahead with a esophageal manometry and then plan for hiatal hernia repair and cholecystectomy.  Risk and benefits of the procedure were explained.  She verbalized understanding and agree.  My office will call her to schedule esophageal manometry.  She understood

## 2018-12-16 DIAGNOSIS — R60.0 PEDAL EDEMA: ICD-10-CM

## 2018-12-16 DIAGNOSIS — I10 BENIGN ESSENTIAL HYPERTENSION: ICD-10-CM

## 2018-12-17 RX ORDER — TRIAMTERENE AND HYDROCHLOROTHIAZIDE 37.5; 25 MG/1; MG/1
CAPSULE ORAL
Qty: 90 CAPSULE | Refills: 1 | Status: SHIPPED | OUTPATIENT
Start: 2018-12-17 | End: 2019-01-17

## 2018-12-18 ENCOUNTER — HOSPITAL ENCOUNTER (OUTPATIENT)
Facility: HOSPITAL | Age: 78
Setting detail: HOSPITAL OUTPATIENT SURGERY
Discharge: HOME | End: 2018-12-18
Attending: SURGERY | Admitting: FAMILY MEDICINE

## 2018-12-18 VITALS
HEIGHT: 60 IN | OXYGEN SATURATION: 97 % | WEIGHT: 139 LBS | TEMPERATURE: 98.5 F | SYSTOLIC BLOOD PRESSURE: 125 MMHG | RESPIRATION RATE: 14 BRPM | DIASTOLIC BLOOD PRESSURE: 72 MMHG | HEART RATE: 83 BPM | BODY MASS INDEX: 27.29 KG/M2

## 2018-12-18 DIAGNOSIS — K44.9 HIATAL HERNIA: ICD-10-CM

## 2018-12-18 PROCEDURE — 91010 ESOPHAGUS MOTILITY STUDY: CPT | Performed by: SURGERY

## 2018-12-18 PROCEDURE — 91010 ESOPHAGUS MOTILITY STUDY: CPT

## 2018-12-20 NOTE — PROCEDURES
/MIS: Esophageal Manometry    DATE OF SERVICE: 12/18/18    REASON FOR VISIT: High resolution Esophageal manometry motility study.      INDICATIONS FOR PROCEDURE: The patient is a 77 yo female undergoing evaluation esophageal function. This patient requires an esophageal manometry motility study. Informed consent was obtained from the patient.    DESCRIPTION OF PROCEDURE: See paper note    10 wet swallows were performed with the following resultant measurements:     The lower esophageal sphincter resting pressure was 23.4 mmHg.    With wet swallows the LES residual pressure was 17.5 mmHg representing 35% relaxation for a duration of 3.1 seconds.    The distal esophageal amplitudes averaged 120.8 mmHg    Peristalsis effectiveness was:  40% antegrade traveling at a velocity of 7.3 cm/sec.  60% simultaneous  0 % retrograde    Upper esophageal sphincter located at 18 cm with a normal mean basal pressure of 66.5 with adequate relaxation.     The probe was withdrawn and the patient tolerated this procedure well.    Impression:   - Hiatal hernia  - Normal UES relaxation  - Borderline adequate LES relaxation  - Poor esophageal contraction consistent with non-specific esophageal motility disorder    Reji Johnson MD, FACS  General, Minimally Invasive and Endoscopic Surgery  [facility]    [address] [place] [address], [address]  [place], [place], [address]  P: [phone]  F: [phone]

## 2018-12-28 ENCOUNTER — TELEPHONE (OUTPATIENT)
Dept: SURGERY | Facility: CLINIC | Age: 78
End: 2018-12-28

## 2019-01-04 ENCOUNTER — TELEPHONE (OUTPATIENT)
Dept: SURGERY | Facility: CLINIC | Age: 79
End: 2019-01-04

## 2019-01-04 NOTE — TELEPHONE ENCOUNTER
Spoke with patient about results. I recommend that she follow up in my office for plan for paraesophageal hernia repair and cholecystectomy. She understood.

## 2019-01-04 NOTE — ADDENDUM NOTE
Addended by: KRISTINE THOMAS on: 2/15/2017 06:18 PM     Modules accepted: Orders    
Addended by: KRISTINE THOMAS on: 2/15/2017 06:27 PM     Modules accepted: Orders    
Addended by: KRISTINE THOMAS on: 2/15/2017 06:51 PM     Modules accepted: Orders    
98

## 2019-01-15 ENCOUNTER — OFFICE VISIT (OUTPATIENT)
Dept: SURGERY | Facility: CLINIC | Age: 79
End: 2019-01-15

## 2019-01-15 VITALS — HEART RATE: 77 BPM | OXYGEN SATURATION: 97 % | WEIGHT: 140 LBS | BODY MASS INDEX: 27.48 KG/M2 | HEIGHT: 60 IN

## 2019-01-15 DIAGNOSIS — K80.20 CALCULUS OF GALLBLADDER WITHOUT CHOLECYSTITIS WITHOUT OBSTRUCTION: ICD-10-CM

## 2019-01-15 DIAGNOSIS — K44.9 PARAESOPHAGEAL HERNIA: Primary | ICD-10-CM

## 2019-01-15 PROCEDURE — 99213 OFFICE O/P EST LOW 20 MIN: CPT | Performed by: SURGERY

## 2019-01-15 RX ORDER — CEFAZOLIN SODIUM 2 G/100ML
2 INJECTION, SOLUTION INTRAVENOUS ONCE
Status: CANCELLED | OUTPATIENT
Start: 2019-01-21 | End: 2019-01-15

## 2019-01-15 NOTE — PROGRESS NOTES
CC: Follow-up evaluation for hiatal hernia and cholelithiasis     HPI: The patient is a very pleasant 78-year-old female that is here today for follow-up for discussion about hiatal hernia repair and cholecystectomy.  The patient has been having on and off episodes of epigastric abdominal pain that radiates to the right upper quadrant.  She was found to have cholelithiasis as well as a large hiatal hernia.  She was started on Dexilant that improved her symptoms.  She has been noticing oropharyngeal dysphagia mostly with pills.  She denies dysphagia to solids or liquids.  She has also noticed bitter taste in her mouth.  She has undergone upper endoscopy , upper GI evaluation and manometry.  She underwent right upper quadrant ultrasound that show evidence of cholelithiasis     PMH: Osteoporosis, hypertension, carotid disease, chronic gastritis, depression, anxiety, GERD, hyperlipidemia, migraine, restless legs syndrome, chronic fatigue and hypersomnolence, multinodular goiter, and chronic pain requiring multiple medications.      PSH: Appendectomy, colonoscopy 2001 and 2008, 2017,  upper endoscopy 2012, 2008, 2001, 2016,2017,  esophageal dilation 2001, bladder sling, knee arthroscopy, oophorectomy, skin lesion excision, total abdominal hysterectomy    MEDS: Reviewed in Epic     ALL: Reviewed in [facility]     FH and SH: Coronary artery disease on her mother and grandmother.  Ovarian cancer grandmother.  The patient is , never smoked     ROS:   Constitutional:  denies fatigue or weight loss  Cardiovascular: Reports chest pain, denies palpitations, edemas.  Respiratory: denies cough, sputum, SOB.  Gastrointestinal: As per history of present illness  Genitourinary: Denies urinary frequency or dysuria  Endocrine: denies cold intolerance, lethargy and flushing.  Hem: Reports excessive bruising and denies postop bleeding.  Musculoskeletal: Reports joint, back, neck, muscle pain.  Reports gait problem and neck  "stiffness  Neuro: Reports dizziness, headaches, lightheadedness and weakness   Skin: denies change in nevi, rashes, masses.  Psychiatric: Reports decreased concentration, anxiety, sleep disturbance and depression     PE:   Vitals:    01/15/19 1438   Pulse: 77   SpO2: 97%   Weight: 63.5 kg (140 lb)   Height: 152.4 cm (60\")   BMI 27  Alert and oriented ×3, no acute distress.  Head is normocephalic and atraumatic.  Neck is supple there is no thyromegaly or lymphadenopathy  Chest is clear bilaterally there is no added sounds  Regular rate and rhythm, no murmurs  Abdomen is soft and nontender, is nondistended, bowel sounds are positive.  There is no rebound or guarding is and there is no peritoneal signs.  No clubbing cyanosis or edema in lower or upper extremities    Diagnostic studies:   Diagnostic studies:   Imaging review by me  CT abdomen and pelvis (9/6/18): Moderate size hiatal hernia     Labs(9/6/18): Hemoglobin 11.5  White blood cell 10  Platelets 341  CMP within normal limits  Iron levels and saturation slightly decreased    Right upper quadrant ultrasound (9/19/18): Cholelithiasis and mildly dilated pancreatic duct    Upper GI esophagogram (9/19/18): Moderate sliding and paraesophageal hernia, distal esophageal spasticity and spastic dysmotility    Esophageal manometry showing esophageal dysmotility and normal upper esophageal sphincter relaxation and borderline LES is relaxation     Assessment and plan    The patient is a very pleasant 78-year-old female with a large paraesophageal hernia and what to me seems to be symptomatic cholelithiasis.  She has now oropharyngeal dysphagia that is likely due to irritation of the upper esophagus.  I discussed with the patient about treatment options.  I recommend that she undergoes laparoscopic paraesophageal hernia repair with possible mesh placement and toupee fundoplication.  I recommend that she also undergoes laparoscopic cholecystectomy with intraoperative " cholangiogram.  Risk and benefits of the procedure including bleeding, infections, intra-abdominal injuries, bile leak and bile injury, gas bloat syndrome, dysphagia, hernia recurrence were discussed in detail.  She verbalized understanding and agree with the plan    - Plan for laparoscopic paraesophageal hernia repair with possible mesh placement and toupee fundoplication, laparoscopic cholecystectomy with intraoperative cholangiogram  - Dietary guidelines pre-and postoperative discussed with the patient  - Surgery planning started     Reji Johnson MD  General, Minimally Invasive and Endoscopic Surgery  [facility]     [address] [place] [address], [address]  [place], [place], [address]  P: [phone]  F: [phone]

## 2019-01-17 ENCOUNTER — APPOINTMENT (OUTPATIENT)
Dept: PREADMISSION TESTING | Facility: HOSPITAL | Age: 79
End: 2019-01-17

## 2019-01-17 VITALS
HEART RATE: 87 BPM | OXYGEN SATURATION: 97 % | TEMPERATURE: 97.8 F | WEIGHT: 141 LBS | BODY MASS INDEX: 27.68 KG/M2 | HEIGHT: 60 IN | SYSTOLIC BLOOD PRESSURE: 112 MMHG | RESPIRATION RATE: 18 BRPM | DIASTOLIC BLOOD PRESSURE: 74 MMHG

## 2019-01-17 DIAGNOSIS — K80.20 CALCULUS OF GALLBLADDER WITHOUT CHOLECYSTITIS WITHOUT OBSTRUCTION: ICD-10-CM

## 2019-01-17 DIAGNOSIS — K44.9 PARAESOPHAGEAL HERNIA: ICD-10-CM

## 2019-01-17 LAB
ANION GAP SERPL CALCULATED.3IONS-SCNC: 14 MMOL/L
BILIRUB UR QL STRIP: NEGATIVE
BUN BLD-MCNC: 23 MG/DL (ref 8–23)
BUN/CREAT SERPL: 19.7 (ref 7–25)
CALCIUM SPEC-SCNC: 9 MG/DL (ref 8.6–10.5)
CHLORIDE SERPL-SCNC: 99 MMOL/L (ref 98–107)
CLARITY UR: CLEAR
CO2 SERPL-SCNC: 27 MMOL/L (ref 22–29)
COLOR UR: YELLOW
CREAT BLD-MCNC: 1.17 MG/DL (ref 0.57–1)
DEPRECATED RDW RBC AUTO: 51.1 FL (ref 37–54)
ERYTHROCYTE [DISTWIDTH] IN BLOOD BY AUTOMATED COUNT: 16.7 % (ref 11.7–13)
GFR SERPL CREATININE-BSD FRML MDRD: 45 ML/MIN/1.73
GLUCOSE BLD-MCNC: 79 MG/DL (ref 65–99)
GLUCOSE UR STRIP-MCNC: NEGATIVE MG/DL
HCT VFR BLD AUTO: 36.7 % (ref 35.6–45.5)
HGB BLD-MCNC: 11 G/DL (ref 11.9–15.5)
HGB UR QL STRIP.AUTO: NEGATIVE
KETONES UR QL STRIP: NEGATIVE
LEUKOCYTE ESTERASE UR QL STRIP.AUTO: NEGATIVE
MCH RBC QN AUTO: 25.1 PG (ref 26.9–32)
MCHC RBC AUTO-ENTMCNC: 30 G/DL (ref 32.4–36.3)
MCV RBC AUTO: 83.6 FL (ref 80.5–98.2)
NITRITE UR QL STRIP: NEGATIVE
PH UR STRIP.AUTO: <=5 [PH] (ref 5–8)
PLATELET # BLD AUTO: 336 10*3/MM3 (ref 140–500)
PMV BLD AUTO: 9.4 FL (ref 6–12)
POTASSIUM BLD-SCNC: 3.1 MMOL/L (ref 3.5–5.2)
PROT UR QL STRIP: NEGATIVE
RBC # BLD AUTO: 4.39 10*6/MM3 (ref 3.9–5.2)
SODIUM BLD-SCNC: 140 MMOL/L (ref 136–145)
SP GR UR STRIP: >=1.03 (ref 1–1.03)
UROBILINOGEN UR QL STRIP: NORMAL
WBC NRBC COR # BLD: 7.1 10*3/MM3 (ref 4.5–10.7)

## 2019-01-17 PROCEDURE — 81003 URINALYSIS AUTO W/O SCOPE: CPT | Performed by: SURGERY

## 2019-01-17 PROCEDURE — 93005 ELECTROCARDIOGRAM TRACING: CPT

## 2019-01-17 PROCEDURE — 80048 BASIC METABOLIC PNL TOTAL CA: CPT | Performed by: SURGERY

## 2019-01-17 PROCEDURE — 93010 ELECTROCARDIOGRAM REPORT: CPT | Performed by: INTERNAL MEDICINE

## 2019-01-17 PROCEDURE — 85027 COMPLETE CBC AUTOMATED: CPT | Performed by: SURGERY

## 2019-01-17 PROCEDURE — 36415 COLL VENOUS BLD VENIPUNCTURE: CPT

## 2019-01-17 RX ORDER — TOPIRAMATE 100 MG/1
100 TABLET, FILM COATED ORAL DAILY
COMMUNITY
End: 2019-05-15

## 2019-01-17 RX ORDER — CLONAZEPAM 1 MG/1
1 TABLET ORAL DAILY
COMMUNITY
End: 2019-05-28 | Stop reason: SDUPTHER

## 2019-01-17 RX ORDER — TRIAMTERENE AND HYDROCHLOROTHIAZIDE 37.5; 25 MG/1; MG/1
1 CAPSULE ORAL EVERY MORNING
COMMUNITY
End: 2019-02-12 | Stop reason: HOSPADM

## 2019-01-17 RX ORDER — ESTRADIOL 1 MG/1
1 TABLET ORAL DAILY
COMMUNITY
End: 2019-01-30 | Stop reason: SDUPTHER

## 2019-01-17 RX ORDER — DEXLANSOPRAZOLE 60 MG/1
60 CAPSULE, DELAYED RELEASE ORAL DAILY
COMMUNITY
End: 2019-02-19

## 2019-01-17 NOTE — DISCHARGE INSTRUCTIONS
Take the following medications the morning of surgery with a small sip of water: DEXILANT    ARRIVE AT 9AM    General Instructions:  • Do not eat solid food after midnight the night before surgery.  • You may drink clear liquids day of surgery but must stop at least one hour before your hospital arrival time.  • It is beneficial for you to have a clear drink that contains carbohydrates the day of surgery.  We suggest a 12 to 20 ounce bottle of Gatorade or Powerade for non-diabetic patients or a 12 to 20 ounce bottle of G2 or Powerade Zero for diabetic patients. (Pediatric patients, are not advised to drink a 12 to 20 ounce carbohydrate drink)    Clear liquids are liquids you can see through.  Nothing red in color.     Plain water                               Sports drinks  Sodas                                   Gelatin (Jell-O)  Fruit juices without pulp such as white grape juice and apple juice  Popsicles that contain no fruit or yogurt  Tea or coffee (no cream or milk added)  Gatorade / Powerade  G2 / Powerade Zero    • Infants may have breast milk up to four hours before surgery.  • Infants drinking formula may drink formula up to six hours before surgery.   • Patients who avoid smoking, chewing tobacco and alcohol for 4 weeks prior to surgery have a reduced risk of post-operative complications.  Quit smoking as many days before surgery as you can.  • Do not smoke, use chewing tobacco or drink alcohol the day of surgery.   • If applicable bring your C-PAP/ BI-PAP machine.  • Bring any papers given to you in the doctor’s office.  • Wear clean comfortable clothes and socks.  • Do not wear contact lenses or make-up.  Bring a case for your glasses.   • Bring crutches or walker if applicable.  • Remove all piercings.  Leave jewelry and any other valuables at home.  • Hair extensions with metal clips must be removed prior to surgery.  • The Pre-Admission Testing nurse will instruct you to bring medications if unable  to obtain an accurate list in Pre-Admission Testing.        Preventing a Surgical Site Infection:  • For 2 to 3 days before surgery, avoid shaving with a razor because the razor can irritate skin and make it easier to develop an infection.    • Any areas of open skin can increase the risk of a post-operative wound infection by allowing bacteria to enter and travel throughout the body.  Notify your surgeon if you have any skin wounds / rashes even if it is not near the expected surgical site.  The area will need assessed to determine if surgery should be delayed until it is healed.  • The night prior to surgery sleep in a clean bed with clean clothing.  Do not allow pets to sleep with you.  • Shower on the morning of surgery using a fresh bar of anti-bacterial soap (such as Dial) and clean washcloth.  Dry with a clean towel and dress in clean clothing.  • Ask your surgeon if you will be receiving antibiotics prior to surgery.  • Make sure you, your family, and all healthcare providers clean their hands with soap and water or an alcohol based hand  before caring for you or your wound.    Day of surgery:  Upon arrival, a Pre-op nurse and Anesthesiologist will review your health history, obtain vital signs, and answer questions you may have.  The only belongings needed at this time will be your home medications and if applicable your C-PAP/BI-PAP machine.  If you are staying overnight your family can leave the rest of your belongings in the car and bring them to your room later.  A Pre-op nurse will start an IV and you may receive medication in preparation for surgery, including something to help you relax.  Your family will be able to see you in the Pre-op area.  While you are in surgery your family should notify the waiting room  if they leave the waiting room area and provide a contact phone number.    Please be aware that surgery does come with discomfort.  We want to make every effort to  control your discomfort so please discuss any uncontrolled symptoms with your nurse.   Your doctor will most likely have prescribed pain medications.      If you are going home after surgery you will receive individualized written care instructions before being discharged.  A responsible adult must drive you to and from the hospital on the day of your surgery and stay with you for 24 hours.    If you are staying overnight following surgery, you will be transported to your hospital room following the recovery period.  [facility] has all private rooms.    You have received a list of surgical assistants for your reference.  If you have any questions please call Pre-Admission Testing at [phone].  Deductibles and co-payments are collected on the day of service. Please be prepared to pay the required co-pay, deductible or deposit on the day of service as defined by your plan.

## 2019-01-21 ENCOUNTER — APPOINTMENT (OUTPATIENT)
Dept: GENERAL RADIOLOGY | Facility: HOSPITAL | Age: 79
End: 2019-01-21

## 2019-01-21 ENCOUNTER — HOSPITAL ENCOUNTER (INPATIENT)
Facility: HOSPITAL | Age: 79
LOS: 4 days | Discharge: HOME | End: 2019-01-25
Attending: SURGERY | Admitting: SURGERY

## 2019-01-21 ENCOUNTER — ANESTHESIA EVENT (OUTPATIENT)
Dept: PERIOP | Facility: HOSPITAL | Age: 79
End: 2019-01-21

## 2019-01-21 ENCOUNTER — INPATIENT HOSPITAL (OUTPATIENT)
Dept: URBAN - METROPOLITAN AREA HOSPITAL 107 | Facility: HOSPITAL | Age: 79
End: 2019-01-21
Payer: COMMERCIAL

## 2019-01-21 ENCOUNTER — ANESTHESIA (OUTPATIENT)
Dept: PERIOP | Facility: HOSPITAL | Age: 79
End: 2019-01-21

## 2019-01-21 DIAGNOSIS — R74.8 ABNORMAL LEVELS OF OTHER SERUM ENZYMES: ICD-10-CM

## 2019-01-21 DIAGNOSIS — Z90.49 ACQUIRED ABSENCE OF OTHER SPECIFIED PARTS OF DIGESTIVE TRACT: ICD-10-CM

## 2019-01-21 DIAGNOSIS — K44.9 PARAESOPHAGEAL HERNIA: ICD-10-CM

## 2019-01-21 DIAGNOSIS — K86.89 OTHER SPECIFIED DISEASES OF PANCREAS: ICD-10-CM

## 2019-01-21 DIAGNOSIS — K83.8 OTHER SPECIFIED DISEASES OF BILIARY TRACT: ICD-10-CM

## 2019-01-21 DIAGNOSIS — K83.1 OBSTRUCTION OF BILE DUCT: ICD-10-CM

## 2019-01-21 DIAGNOSIS — K80.20 CALCULUS OF GALLBLADDER WITHOUT CHOLECYSTITIS WITHOUT OBSTRUCTION: ICD-10-CM

## 2019-01-21 DIAGNOSIS — R93.2 ABNORMAL FINDINGS ON DIAGNOSTIC IMAGING OF LIVER AND BILIARY: ICD-10-CM

## 2019-01-21 DIAGNOSIS — K92.2 GASTROINTESTINAL HEMORRHAGE, UNSPECIFIED: ICD-10-CM

## 2019-01-21 PROCEDURE — 25010000002 HYDROMORPHONE PER 4 MG: Performed by: SURGERY

## 2019-01-21 PROCEDURE — 43262 ENDO CHOLANGIOPANCREATOGRAPH: CPT

## 2019-01-21 PROCEDURE — 88304 TISSUE EXAM BY PATHOLOGIST: CPT | Performed by: SURGERY

## 2019-01-21 PROCEDURE — 25010000002 HYDROMORPHONE PER 4 MG: Performed by: NURSE ANESTHETIST, CERTIFIED REGISTERED

## 2019-01-21 PROCEDURE — BF101ZZ FLUOROSCOPY OF BILE DUCTS USING LOW OSMOLAR CONTRAST: ICD-10-PCS | Performed by: SURGERY

## 2019-01-21 PROCEDURE — 43281 LAP PARAESOPHAG HERN REPAIR: CPT | Performed by: PHYSICIAN ASSISTANT

## 2019-01-21 PROCEDURE — 0FT44ZZ RESECTION OF GALLBLADDER, PERCUTANEOUS ENDOSCOPIC APPROACH: ICD-10-PCS | Performed by: SURGERY

## 2019-01-21 PROCEDURE — 0DJ08ZZ INSPECTION OF UPPER INTESTINAL TRACT, VIA NATURAL OR ARTIFICIAL OPENING ENDOSCOPIC: ICD-10-PCS | Performed by: SURGERY

## 2019-01-21 PROCEDURE — 25010000002 PHENYLEPHRINE PER 1 ML: Performed by: NURSE ANESTHETIST, CERTIFIED REGISTERED

## 2019-01-21 PROCEDURE — 0 IOTHALAMATE 60 % SOLUTION: Performed by: SURGERY

## 2019-01-21 PROCEDURE — 0DV44ZZ RESTRICTION OF ESOPHAGOGASTRIC JUNCTION, PERCUTANEOUS ENDOSCOPIC APPROACH: ICD-10-PCS | Performed by: SURGERY

## 2019-01-21 PROCEDURE — 47563 LAPARO CHOLECYSTECTOMY/GRAPH: CPT | Performed by: SURGERY

## 2019-01-21 PROCEDURE — 25010000003 CEFAZOLIN IN DEXTROSE 2-4 GM/100ML-% SOLUTION: Performed by: SURGERY

## 2019-01-21 PROCEDURE — 25010000002 KETOROLAC TROMETHAMINE PER 15 MG: Performed by: SURGERY

## 2019-01-21 PROCEDURE — 25010000002 MIDAZOLAM PER 1 MG: Performed by: ANESTHESIOLOGY

## 2019-01-21 PROCEDURE — 43281 LAP PARAESOPHAG HERN REPAIR: CPT | Performed by: SURGERY

## 2019-01-21 PROCEDURE — 43264 ERCP REMOVE DUCT CALCULI: CPT

## 2019-01-21 PROCEDURE — 0F798ZZ DILATION OF COMMON BILE DUCT, VIA NATURAL OR ARTIFICIAL OPENING ENDOSCOPIC: ICD-10-PCS | Performed by: INTERNAL MEDICINE

## 2019-01-21 PROCEDURE — 25010000002 DEXAMETHASONE PER 1 MG: Performed by: NURSE ANESTHETIST, CERTIFIED REGISTERED

## 2019-01-21 PROCEDURE — 25010000002 PROPOFOL 10 MG/ML EMULSION: Performed by: NURSE ANESTHETIST, CERTIFIED REGISTERED

## 2019-01-21 PROCEDURE — 74300 X-RAY BILE DUCTS/PANCREAS: CPT

## 2019-01-21 PROCEDURE — 47563 LAPARO CHOLECYSTECTOMY/GRAPH: CPT | Performed by: PHYSICIAN ASSISTANT

## 2019-01-21 PROCEDURE — 25010000002 ONDANSETRON PER 1 MG: Performed by: ANESTHESIOLOGY

## 2019-01-21 PROCEDURE — 25010000002 FENTANYL CITRATE (PF) 100 MCG/2ML SOLUTION: Performed by: NURSE ANESTHETIST, CERTIFIED REGISTERED

## 2019-01-21 PROCEDURE — 0BQT4ZZ REPAIR DIAPHRAGM, PERCUTANEOUS ENDOSCOPIC APPROACH: ICD-10-PCS | Performed by: SURGERY

## 2019-01-21 PROCEDURE — 25010000002 ONDANSETRON PER 1 MG: Performed by: SURGERY

## 2019-01-21 PROCEDURE — 25010000002 PROMETHAZINE PER 50 MG: Performed by: NURSE ANESTHETIST, CERTIFIED REGISTERED

## 2019-01-21 DEVICE — CLIP LIG HEMOLOK PA 6CT MD/LG GRN: Type: IMPLANTABLE DEVICE | Site: ABDOMEN | Status: FUNCTIONAL

## 2019-01-21 RX ORDER — PROMETHAZINE HYDROCHLORIDE 25 MG/ML
12.5 INJECTION, SOLUTION INTRAMUSCULAR; INTRAVENOUS EVERY 6 HOURS PRN
Status: DISCONTINUED | OUTPATIENT
Start: 2019-01-21 | End: 2019-01-25 | Stop reason: HOSPADM

## 2019-01-21 RX ORDER — NALOXONE HCL 0.4 MG/ML
0.2 VIAL (ML) INJECTION AS NEEDED
Status: DISCONTINUED | OUTPATIENT
Start: 2019-01-21 | End: 2019-01-21 | Stop reason: HOSPADM

## 2019-01-21 RX ORDER — FAMOTIDINE 10 MG/ML
20 INJECTION, SOLUTION INTRAVENOUS ONCE
Status: COMPLETED | OUTPATIENT
Start: 2019-01-21 | End: 2019-01-21

## 2019-01-21 RX ORDER — SODIUM CHLORIDE, SODIUM LACTATE, POTASSIUM CHLORIDE, CALCIUM CHLORIDE 600; 310; 30; 20 MG/100ML; MG/100ML; MG/100ML; MG/100ML
9 INJECTION, SOLUTION INTRAVENOUS CONTINUOUS
Status: DISCONTINUED | OUTPATIENT
Start: 2019-01-21 | End: 2019-01-23

## 2019-01-21 RX ORDER — CALCIUM CARBONATE 500(1250)
500 TABLET ORAL DAILY
COMMUNITY
End: 2019-04-18

## 2019-01-21 RX ORDER — BUPIVACAINE HYDROCHLORIDE AND EPINEPHRINE 5; 5 MG/ML; UG/ML
INJECTION, SOLUTION PERINEURAL AS NEEDED
Status: DISCONTINUED | OUTPATIENT
Start: 2019-01-21 | End: 2019-01-21 | Stop reason: HOSPADM

## 2019-01-21 RX ORDER — SODIUM CHLORIDE 0.9 % (FLUSH) 0.9 %
3 SYRINGE (ML) INJECTION EVERY 12 HOURS SCHEDULED
Status: DISCONTINUED | OUTPATIENT
Start: 2019-01-21 | End: 2019-01-25 | Stop reason: HOSPADM

## 2019-01-21 RX ORDER — SODIUM CHLORIDE 0.9 % (FLUSH) 0.9 %
1-10 SYRINGE (ML) INJECTION AS NEEDED
Status: DISCONTINUED | OUTPATIENT
Start: 2019-01-21 | End: 2019-01-21 | Stop reason: HOSPADM

## 2019-01-21 RX ORDER — TOPIRAMATE 100 MG/1
100 TABLET, FILM COATED ORAL EVERY 12 HOURS SCHEDULED
Status: DISCONTINUED | OUTPATIENT
Start: 2019-01-21 | End: 2019-01-25 | Stop reason: HOSPADM

## 2019-01-21 RX ORDER — DIPHENHYDRAMINE HYDROCHLORIDE 50 MG/ML
12.5 INJECTION INTRAMUSCULAR; INTRAVENOUS
Status: DISCONTINUED | OUTPATIENT
Start: 2019-01-21 | End: 2019-01-21 | Stop reason: HOSPADM

## 2019-01-21 RX ORDER — MAGNESIUM HYDROXIDE 1200 MG/15ML
LIQUID ORAL AS NEEDED
Status: DISCONTINUED | OUTPATIENT
Start: 2019-01-21 | End: 2019-01-21 | Stop reason: HOSPADM

## 2019-01-21 RX ORDER — PROMETHAZINE HYDROCHLORIDE 25 MG/ML
12.5 INJECTION, SOLUTION INTRAMUSCULAR; INTRAVENOUS ONCE AS NEEDED
Status: DISCONTINUED | OUTPATIENT
Start: 2019-01-21 | End: 2019-01-21 | Stop reason: HOSPADM

## 2019-01-21 RX ORDER — HYDROMORPHONE HYDROCHLORIDE 1 MG/ML
0.5 INJECTION, SOLUTION INTRAMUSCULAR; INTRAVENOUS; SUBCUTANEOUS
Status: DISCONTINUED | OUTPATIENT
Start: 2019-01-21 | End: 2019-01-21 | Stop reason: HOSPADM

## 2019-01-21 RX ORDER — DIPHENHYDRAMINE HCL 25 MG
25 CAPSULE ORAL
Status: DISCONTINUED | OUTPATIENT
Start: 2019-01-21 | End: 2019-01-21 | Stop reason: HOSPADM

## 2019-01-21 RX ORDER — GABAPENTIN 300 MG/1
300 CAPSULE ORAL NIGHTLY
Status: DISCONTINUED | OUTPATIENT
Start: 2019-01-21 | End: 2019-01-22

## 2019-01-21 RX ORDER — ONDANSETRON 2 MG/ML
INJECTION INTRAMUSCULAR; INTRAVENOUS AS NEEDED
Status: DISCONTINUED | OUTPATIENT
Start: 2019-01-21 | End: 2019-01-21 | Stop reason: SURG

## 2019-01-21 RX ORDER — NALOXONE HCL 0.4 MG/ML
0.4 VIAL (ML) INJECTION
Status: DISCONTINUED | OUTPATIENT
Start: 2019-01-21 | End: 2019-01-25 | Stop reason: HOSPADM

## 2019-01-21 RX ORDER — LIDOCAINE HYDROCHLORIDE 20 MG/ML
INJECTION, SOLUTION INFILTRATION; PERINEURAL AS NEEDED
Status: DISCONTINUED | OUTPATIENT
Start: 2019-01-21 | End: 2019-01-21 | Stop reason: SURG

## 2019-01-21 RX ORDER — KETOROLAC TROMETHAMINE 30 MG/ML
15 INJECTION, SOLUTION INTRAMUSCULAR; INTRAVENOUS EVERY 6 HOURS PRN
Status: COMPLETED | OUTPATIENT
Start: 2019-01-21 | End: 2019-01-23

## 2019-01-21 RX ORDER — ROCURONIUM BROMIDE 10 MG/ML
INJECTION, SOLUTION INTRAVENOUS AS NEEDED
Status: DISCONTINUED | OUTPATIENT
Start: 2019-01-21 | End: 2019-01-21 | Stop reason: SURG

## 2019-01-21 RX ORDER — GLYCOPYRROLATE 0.2 MG/ML
INJECTION INTRAMUSCULAR; INTRAVENOUS AS NEEDED
Status: DISCONTINUED | OUTPATIENT
Start: 2019-01-21 | End: 2019-01-21 | Stop reason: SURG

## 2019-01-21 RX ORDER — ONDANSETRON 2 MG/ML
4 INJECTION INTRAMUSCULAR; INTRAVENOUS ONCE AS NEEDED
Status: DISCONTINUED | OUTPATIENT
Start: 2019-01-21 | End: 2019-01-21 | Stop reason: HOSPADM

## 2019-01-21 RX ORDER — HYDROCODONE BITARTRATE AND ACETAMINOPHEN 7.5; 325 MG/1; MG/1
1 TABLET ORAL ONCE AS NEEDED
Status: DISCONTINUED | OUTPATIENT
Start: 2019-01-21 | End: 2019-01-21 | Stop reason: HOSPADM

## 2019-01-21 RX ORDER — LANSOPRAZOLE
30 KIT
Status: DISCONTINUED | OUTPATIENT
Start: 2019-01-21 | End: 2019-01-25 | Stop reason: HOSPADM

## 2019-01-21 RX ORDER — SCOLOPAMINE TRANSDERMAL SYSTEM 1 MG/1
1 PATCH, EXTENDED RELEASE TRANSDERMAL ONCE
Status: DISCONTINUED | OUTPATIENT
Start: 2019-01-21 | End: 2019-01-22

## 2019-01-21 RX ORDER — FENTANYL CITRATE 50 UG/ML
50 INJECTION, SOLUTION INTRAMUSCULAR; INTRAVENOUS
Status: DISCONTINUED | OUTPATIENT
Start: 2019-01-21 | End: 2019-01-21 | Stop reason: HOSPADM

## 2019-01-21 RX ORDER — MIDAZOLAM HYDROCHLORIDE 1 MG/ML
2 INJECTION INTRAMUSCULAR; INTRAVENOUS
Status: DISCONTINUED | OUTPATIENT
Start: 2019-01-21 | End: 2019-01-21 | Stop reason: HOSPADM

## 2019-01-21 RX ORDER — ACETAMINOPHEN 325 MG/1
650 TABLET ORAL EVERY 4 HOURS PRN
Status: DISCONTINUED | OUTPATIENT
Start: 2019-01-21 | End: 2019-01-22

## 2019-01-21 RX ORDER — MIDAZOLAM HYDROCHLORIDE 1 MG/ML
1 INJECTION INTRAMUSCULAR; INTRAVENOUS
Status: DISCONTINUED | OUTPATIENT
Start: 2019-01-21 | End: 2019-01-21 | Stop reason: HOSPADM

## 2019-01-21 RX ORDER — EPHEDRINE SULFATE 50 MG/ML
INJECTION, SOLUTION INTRAVENOUS AS NEEDED
Status: DISCONTINUED | OUTPATIENT
Start: 2019-01-21 | End: 2019-01-21 | Stop reason: SURG

## 2019-01-21 RX ORDER — PROMETHAZINE HYDROCHLORIDE 25 MG/1
25 SUPPOSITORY RECTAL ONCE AS NEEDED
Status: DISCONTINUED | OUTPATIENT
Start: 2019-01-21 | End: 2019-01-21 | Stop reason: HOSPADM

## 2019-01-21 RX ORDER — CEFAZOLIN SODIUM 2 G/100ML
2 INJECTION, SOLUTION INTRAVENOUS ONCE
Status: COMPLETED | OUTPATIENT
Start: 2019-01-21 | End: 2019-01-21

## 2019-01-21 RX ORDER — FLUMAZENIL 0.1 MG/ML
0.2 INJECTION INTRAVENOUS AS NEEDED
Status: DISCONTINUED | OUTPATIENT
Start: 2019-01-21 | End: 2019-01-21 | Stop reason: HOSPADM

## 2019-01-21 RX ORDER — TRIAMTERENE AND HYDROCHLOROTHIAZIDE 37.5; 25 MG/1; MG/1
1 TABLET ORAL DAILY
Status: DISCONTINUED | OUTPATIENT
Start: 2019-01-21 | End: 2019-01-22

## 2019-01-21 RX ORDER — ONDANSETRON 2 MG/ML
4 INJECTION INTRAMUSCULAR; INTRAVENOUS EVERY 6 HOURS
Status: COMPLETED | OUTPATIENT
Start: 2019-01-21 | End: 2019-01-22

## 2019-01-21 RX ORDER — BIOTIN 10 MG
1 TABLET ORAL DAILY
COMMUNITY

## 2019-01-21 RX ORDER — PROMETHAZINE HYDROCHLORIDE 25 MG/ML
INJECTION, SOLUTION INTRAMUSCULAR; INTRAVENOUS AS NEEDED
Status: DISCONTINUED | OUTPATIENT
Start: 2019-01-21 | End: 2019-01-21 | Stop reason: SURG

## 2019-01-21 RX ORDER — SODIUM CHLORIDE, SODIUM LACTATE, POTASSIUM CHLORIDE, CALCIUM CHLORIDE 600; 310; 30; 20 MG/100ML; MG/100ML; MG/100ML; MG/100ML
100 INJECTION, SOLUTION INTRAVENOUS CONTINUOUS
Status: DISCONTINUED | OUTPATIENT
Start: 2019-01-21 | End: 2019-01-23

## 2019-01-21 RX ORDER — DEXAMETHASONE SODIUM PHOSPHATE 4 MG/ML
INJECTION, SOLUTION INTRA-ARTICULAR; INTRALESIONAL; INTRAMUSCULAR; INTRAVENOUS; SOFT TISSUE AS NEEDED
Status: DISCONTINUED | OUTPATIENT
Start: 2019-01-21 | End: 2019-01-21 | Stop reason: SURG

## 2019-01-21 RX ORDER — EPHEDRINE SULFATE 50 MG/ML
5 INJECTION, SOLUTION INTRAVENOUS ONCE AS NEEDED
Status: DISCONTINUED | OUTPATIENT
Start: 2019-01-21 | End: 2019-01-21 | Stop reason: HOSPADM

## 2019-01-21 RX ORDER — OXYCODONE AND ACETAMINOPHEN 7.5; 325 MG/1; MG/1
1 TABLET ORAL ONCE AS NEEDED
Status: DISCONTINUED | OUTPATIENT
Start: 2019-01-21 | End: 2019-01-21 | Stop reason: HOSPADM

## 2019-01-21 RX ORDER — FENTANYL CITRATE 50 UG/ML
INJECTION, SOLUTION INTRAMUSCULAR; INTRAVENOUS AS NEEDED
Status: DISCONTINUED | OUTPATIENT
Start: 2019-01-21 | End: 2019-01-21 | Stop reason: SURG

## 2019-01-21 RX ORDER — SODIUM CHLORIDE 0.9 % (FLUSH) 0.9 %
3-10 SYRINGE (ML) INJECTION AS NEEDED
Status: DISCONTINUED | OUTPATIENT
Start: 2019-01-21 | End: 2019-01-25 | Stop reason: HOSPADM

## 2019-01-21 RX ORDER — PROMETHAZINE HYDROCHLORIDE 25 MG/1
25 TABLET ORAL ONCE AS NEEDED
Status: DISCONTINUED | OUTPATIENT
Start: 2019-01-21 | End: 2019-01-21 | Stop reason: HOSPADM

## 2019-01-21 RX ORDER — LABETALOL HYDROCHLORIDE 5 MG/ML
5 INJECTION, SOLUTION INTRAVENOUS
Status: DISCONTINUED | OUTPATIENT
Start: 2019-01-21 | End: 2019-01-21 | Stop reason: HOSPADM

## 2019-01-21 RX ORDER — ACETAMINOPHEN 325 MG/1
650 TABLET ORAL ONCE AS NEEDED
Status: DISCONTINUED | OUTPATIENT
Start: 2019-01-21 | End: 2019-01-21 | Stop reason: HOSPADM

## 2019-01-21 RX ORDER — HYDROMORPHONE HYDROCHLORIDE 1 MG/ML
0.5 INJECTION, SOLUTION INTRAMUSCULAR; INTRAVENOUS; SUBCUTANEOUS
Status: DISCONTINUED | OUTPATIENT
Start: 2019-01-21 | End: 2019-01-25 | Stop reason: HOSPADM

## 2019-01-21 RX ORDER — PROPOFOL 10 MG/ML
VIAL (ML) INTRAVENOUS AS NEEDED
Status: DISCONTINUED | OUTPATIENT
Start: 2019-01-21 | End: 2019-01-21 | Stop reason: SURG

## 2019-01-21 RX ORDER — VENLAFAXINE 75 MG/1
75 TABLET ORAL DAILY
Status: DISCONTINUED | OUTPATIENT
Start: 2019-01-21 | End: 2019-01-25 | Stop reason: HOSPADM

## 2019-01-21 RX ORDER — LIDOCAINE HYDROCHLORIDE 10 MG/ML
0.5 INJECTION, SOLUTION EPIDURAL; INFILTRATION; INTRACAUDAL; PERINEURAL ONCE AS NEEDED
Status: DISCONTINUED | OUTPATIENT
Start: 2019-01-21 | End: 2019-01-21 | Stop reason: HOSPADM

## 2019-01-21 RX ORDER — CYCLOSPORINE 0.5 MG/ML
1 EMULSION OPHTHALMIC 2 TIMES DAILY
Status: DISCONTINUED | OUTPATIENT
Start: 2019-01-21 | End: 2019-01-25 | Stop reason: HOSPADM

## 2019-01-21 RX ORDER — METOCLOPRAMIDE HYDROCHLORIDE 5 MG/ML
10 INJECTION INTRAMUSCULAR; INTRAVENOUS ONCE
Status: DISCONTINUED | OUTPATIENT
Start: 2019-01-21 | End: 2019-01-25 | Stop reason: HOSPADM

## 2019-01-21 RX ORDER — CLONAZEPAM 1 MG/1
1 TABLET ORAL 2 TIMES DAILY PRN
Status: DISCONTINUED | OUTPATIENT
Start: 2019-01-21 | End: 2019-01-25 | Stop reason: HOSPADM

## 2019-01-21 RX ORDER — HYDRALAZINE HYDROCHLORIDE 20 MG/ML
5 INJECTION INTRAMUSCULAR; INTRAVENOUS
Status: DISCONTINUED | OUTPATIENT
Start: 2019-01-21 | End: 2019-01-21 | Stop reason: HOSPADM

## 2019-01-21 RX ORDER — SODIUM CHLORIDE 9 MG/ML
INJECTION, SOLUTION INTRAVENOUS AS NEEDED
Status: DISCONTINUED | OUTPATIENT
Start: 2019-01-21 | End: 2019-01-21 | Stop reason: HOSPADM

## 2019-01-21 RX ADMIN — LIDOCAINE HYDROCHLORIDE 100 MG: 20 INJECTION, SOLUTION INFILTRATION; PERINEURAL at 12:11

## 2019-01-21 RX ADMIN — ONDANSETRON 4 MG: 2 INJECTION INTRAMUSCULAR; INTRAVENOUS at 15:31

## 2019-01-21 RX ADMIN — FENTANYL CITRATE 50 MCG: 50 INJECTION, SOLUTION INTRAMUSCULAR; INTRAVENOUS at 15:20

## 2019-01-21 RX ADMIN — PROPOFOL 100 MG: 10 INJECTION, EMULSION INTRAVENOUS at 12:11

## 2019-01-21 RX ADMIN — FENTANYL CITRATE 50 MCG: 50 INJECTION, SOLUTION INTRAMUSCULAR; INTRAVENOUS at 17:05

## 2019-01-21 RX ADMIN — SUGAMMADEX 150 MG: 100 INJECTION, SOLUTION INTRAVENOUS at 15:37

## 2019-01-21 RX ADMIN — FAMOTIDINE 20 MG: 10 INJECTION, SOLUTION INTRAVENOUS at 11:10

## 2019-01-21 RX ADMIN — SCOPALAMINE 1 PATCH: 1 PATCH, EXTENDED RELEASE TRANSDERMAL at 11:10

## 2019-01-21 RX ADMIN — FENTANYL CITRATE 50 MCG: 50 INJECTION, SOLUTION INTRAMUSCULAR; INTRAVENOUS at 16:41

## 2019-01-21 RX ADMIN — FENTANYL CITRATE 50 MCG: 50 INJECTION, SOLUTION INTRAMUSCULAR; INTRAVENOUS at 12:11

## 2019-01-21 RX ADMIN — ROCURONIUM BROMIDE 20 MG: 10 INJECTION INTRAVENOUS at 14:23

## 2019-01-21 RX ADMIN — GABAPENTIN 300 MG: 300 CAPSULE ORAL at 20:34

## 2019-01-21 RX ADMIN — CEFAZOLIN SODIUM 2 G: 2 INJECTION, SOLUTION INTRAVENOUS at 12:16

## 2019-01-21 RX ADMIN — SODIUM CHLORIDE, PRESERVATIVE FREE 3 ML: 5 INJECTION INTRAVENOUS at 20:36

## 2019-01-21 RX ADMIN — DEXAMETHASONE SODIUM PHOSPHATE 4 MG: 4 INJECTION INTRA-ARTICULAR; INTRALESIONAL; INTRAMUSCULAR; INTRAVENOUS; SOFT TISSUE at 12:17

## 2019-01-21 RX ADMIN — LANSOPRAZOLE 30 MG: KIT at 20:34

## 2019-01-21 RX ADMIN — ROCURONIUM BROMIDE 20 MG: 10 INJECTION INTRAVENOUS at 13:23

## 2019-01-21 RX ADMIN — HYDROMORPHONE HYDROCHLORIDE 0.5 MG: 1 INJECTION, SOLUTION INTRAMUSCULAR; INTRAVENOUS; SUBCUTANEOUS at 16:41

## 2019-01-21 RX ADMIN — EPHEDRINE SULFATE 10 MG: 50 INJECTION INTRAMUSCULAR; INTRAVENOUS; SUBCUTANEOUS at 12:39

## 2019-01-21 RX ADMIN — PHENYLEPHRINE HYDROCHLORIDE 100 MCG: 10 INJECTION INTRAVENOUS at 13:02

## 2019-01-21 RX ADMIN — PROMETHAZINE HYDROCHLORIDE 12.5 MG: 25 INJECTION INTRAMUSCULAR; INTRAVENOUS at 12:20

## 2019-01-21 RX ADMIN — GLYCOPYRROLATE 0.2 MG: 0.2 INJECTION INTRAMUSCULAR; INTRAVENOUS at 12:40

## 2019-01-21 RX ADMIN — ROCURONIUM BROMIDE 50 MG: 10 INJECTION INTRAVENOUS at 12:11

## 2019-01-21 RX ADMIN — HYDROMORPHONE HYDROCHLORIDE 0.5 MG: 1 INJECTION, SOLUTION INTRAMUSCULAR; INTRAVENOUS; SUBCUTANEOUS at 18:43

## 2019-01-21 RX ADMIN — SODIUM CHLORIDE, POTASSIUM CHLORIDE, SODIUM LACTATE AND CALCIUM CHLORIDE: 600; 310; 30; 20 INJECTION, SOLUTION INTRAVENOUS at 12:07

## 2019-01-21 RX ADMIN — FENTANYL CITRATE 50 MCG: 50 INJECTION, SOLUTION INTRAMUSCULAR; INTRAVENOUS at 14:29

## 2019-01-21 RX ADMIN — KETOROLAC TROMETHAMINE 15 MG: 30 INJECTION, SOLUTION INTRAMUSCULAR at 18:44

## 2019-01-21 RX ADMIN — HYDROMORPHONE HYDROCHLORIDE 0.5 MG: 1 INJECTION, SOLUTION INTRAMUSCULAR; INTRAVENOUS; SUBCUTANEOUS at 20:35

## 2019-01-21 RX ADMIN — CYCLOSPORINE 1 DROP: 0.5 EMULSION OPHTHALMIC at 20:34

## 2019-01-21 RX ADMIN — ONDANSETRON HYDROCHLORIDE 4 MG: 2 SOLUTION INTRAMUSCULAR; INTRAVENOUS at 18:43

## 2019-01-21 RX ADMIN — MIDAZOLAM HYDROCHLORIDE 1 MG: 2 INJECTION, SOLUTION INTRAMUSCULAR; INTRAVENOUS at 11:10

## 2019-01-21 RX ADMIN — FENTANYL CITRATE 100 MCG: 50 INJECTION, SOLUTION INTRAMUSCULAR; INTRAVENOUS at 15:43

## 2019-01-21 NOTE — ANESTHESIA PREPROCEDURE EVALUATION
Anesthesia Evaluation     Patient summary reviewed   history of anesthetic complications (Severe nausea and vomiting.): PONV  NPO Solid Status: > 8 hours  NPO Liquid Status: > 6 hours           Airway   Mallampati: II  TM distance: >3 FB  Dental      Pulmonary    Cardiovascular     ECG reviewed  Rhythm: regular  Rate: normal    (+) hypertension, PVD, hyperlipidemia,  carotid artery disease right carotid      Neuro/Psych  (+) headaches, psychiatric history Depression and Anxiety,     GI/Hepatic/Renal/Endo    (+)  hiatal hernia,      Musculoskeletal     Abdominal    Substance History      OB/GYN          Other   (+) arthritis                     Anesthesia Plan    ASA 3     general     intravenous induction   Anesthetic plan, all risks, benefits, and alternatives have been provided, discussed and informed consent has been obtained with: patient.  Use of blood products discussed with patient  Consented to blood products.

## 2019-01-21 NOTE — ANESTHESIA POSTPROCEDURE EVALUATION
Patient: Sachi oVra    Procedure Summary     Date:  01/21/19 Room / Location:  [facility] OR  / [facility] MAIN OR    Anesthesia Start:  1207 Anesthesia Stop:  1557    Procedures:       Laparoscopic paraesophageal hernia repair with toupee fundoplication (N/A Abdomen)      FLEXIBLE ESOPHAGOSCOPY (N/A Esophagus)      LAPAROSCOPIC CHOLECYSTECTOMY INTRAOPERATIVE CHOLANGIOGRAM (N/A Abdomen) Diagnosis:       Paraesophageal hernia      Calculus of gallbladder without cholecystitis without obstruction      (Paraesophageal hernia [K44.9])      (Calculus of gallbladder without cholecystitis without obstruction [K80.20])    Surgeon:  Reji Johnson MD Provider:  Soniya Pineda MD    Anesthesia Type:  general ASA Status:  3          Anesthesia Type: general  Last vitals  BP   133/75 (01/21/19 1815)   Temp   36.2 °C (97.1 °F) (01/21/19 1815)   Pulse   85 (01/21/19 1815)   Resp   18 (01/21/19 1815)     SpO2   93 % (01/21/19 1815)     Anesthesia Post Evaluation

## 2019-01-21 NOTE — ANESTHESIA PROCEDURE NOTES
Airway  Urgency: elective    Date/Time: 1/21/2019 12:14 PM  Airway not difficult    General Information and Staff    Patient location during procedure: OR  Anesthesiologist: Soniya Pineda MD  CRNA: Prasanth Mcleod CRNA    Indications and Patient Condition  Indications for airway management: airway protection    Preoxygenated: yes  MILS maintained throughout  Mask difficulty assessment: 2 - vent by mask + OA or adjuvant +/- NMBA    Final Airway Details  Final airway type: endotracheal airway      Successful airway: ETT  Cuffed: yes   Successful intubation technique: direct laryngoscopy  Facilitating devices/methods: intubating stylet, Bougie and anterior pressure/BURP  Endotracheal tube insertion site: oral  Blade: Parish  Blade size: 3  ETT size (mm): 7.0  Cormack-Lehane Classification: grade IIb - view of arytenoids or posterior of glottis only  Placement verified by: chest auscultation and capnometry   Cuff volume (mL): 7  Measured from: lips  ETT to lips (cm): 21  Number of attempts at approach: 1    Additional Comments  Patient in OR. Monitors on. BLVS. Pre 02 100%. SIVI. DL x1. Anterior airway. Atraumatic placement of ETT with bougie. Placement verified with ETC02 and BBS. ETT secured. Teeth/lips in pre-op condition.

## 2019-01-22 ENCOUNTER — APPOINTMENT (OUTPATIENT)
Dept: GENERAL RADIOLOGY | Facility: HOSPITAL | Age: 79
End: 2019-01-22

## 2019-01-22 ENCOUNTER — ANESTHESIA (OUTPATIENT)
Dept: GASTROENTEROLOGY | Facility: HOSPITAL | Age: 79
End: 2019-01-22

## 2019-01-22 ENCOUNTER — ANESTHESIA EVENT (OUTPATIENT)
Dept: GASTROENTEROLOGY | Facility: HOSPITAL | Age: 79
End: 2019-01-22

## 2019-01-22 LAB
ALBUMIN SERPL-MCNC: 3.2 G/DL (ref 3.5–5.2)
ALBUMIN/GLOB SERPL: 1.2 G/DL
ALP SERPL-CCNC: 97 U/L (ref 39–117)
ALT SERPL W P-5'-P-CCNC: 252 U/L (ref 1–33)
ANION GAP SERPL CALCULATED.3IONS-SCNC: 13.4 MMOL/L
AST SERPL-CCNC: 485 U/L (ref 1–32)
BASOPHILS # BLD AUTO: 0.02 10*3/MM3 (ref 0–0.2)
BASOPHILS NFR BLD AUTO: 0.2 % (ref 0–1.5)
BILIRUB SERPL-MCNC: 1.6 MG/DL (ref 0.1–1.2)
BUN BLD-MCNC: 13 MG/DL (ref 8–23)
BUN/CREAT SERPL: 12.6 (ref 7–25)
CALCIUM SPEC-SCNC: 8.3 MG/DL (ref 8.6–10.5)
CHLORIDE SERPL-SCNC: 101 MMOL/L (ref 98–107)
CO2 SERPL-SCNC: 24.6 MMOL/L (ref 22–29)
CREAT BLD-MCNC: 1.03 MG/DL (ref 0.57–1)
DEPRECATED RDW RBC AUTO: 50.7 FL (ref 37–54)
EOSINOPHIL # BLD AUTO: 0.01 10*3/MM3 (ref 0–0.7)
EOSINOPHIL NFR BLD AUTO: 0.1 % (ref 0.3–6.2)
ERYTHROCYTE [DISTWIDTH] IN BLOOD BY AUTOMATED COUNT: 16.9 % (ref 11.7–13)
GFR SERPL CREATININE-BSD FRML MDRD: 52 ML/MIN/1.73
GLOBULIN UR ELPH-MCNC: 2.7 GM/DL
GLUCOSE BLD-MCNC: 91 MG/DL (ref 65–99)
HCT VFR BLD AUTO: 33.6 % (ref 35.6–45.5)
HGB BLD-MCNC: 10.3 G/DL (ref 11.9–15.5)
IMM GRANULOCYTES # BLD AUTO: 0.02 10*3/MM3 (ref 0–0.03)
IMM GRANULOCYTES NFR BLD AUTO: 0.2 % (ref 0–0.5)
LYMPHOCYTES # BLD AUTO: 2.22 10*3/MM3 (ref 0.9–4.8)
LYMPHOCYTES NFR BLD AUTO: 21.6 % (ref 19.6–45.3)
MAGNESIUM SERPL-MCNC: 1.7 MG/DL (ref 1.6–2.4)
MCH RBC QN AUTO: 25.4 PG (ref 26.9–32)
MCHC RBC AUTO-ENTMCNC: 30.7 G/DL (ref 32.4–36.3)
MCV RBC AUTO: 82.8 FL (ref 80.5–98.2)
MONOCYTES # BLD AUTO: 1.23 10*3/MM3 (ref 0.2–1.2)
MONOCYTES NFR BLD AUTO: 11.9 % (ref 5–12)
NEUTROPHILS # BLD AUTO: 6.8 10*3/MM3 (ref 1.9–8.1)
NEUTROPHILS NFR BLD AUTO: 66 % (ref 42.7–76)
PLATELET # BLD AUTO: 291 10*3/MM3 (ref 140–500)
PMV BLD AUTO: 9.5 FL (ref 6–12)
POTASSIUM BLD-SCNC: 2.9 MMOL/L (ref 3.5–5.2)
PROT SERPL-MCNC: 5.9 G/DL (ref 6–8.5)
RBC # BLD AUTO: 4.06 10*6/MM3 (ref 3.9–5.2)
SODIUM BLD-SCNC: 139 MMOL/L (ref 136–145)
WBC NRBC COR # BLD: 10.3 10*3/MM3 (ref 4.5–10.7)

## 2019-01-22 PROCEDURE — 25010000002 ONDANSETRON PER 1 MG: Performed by: SURGERY

## 2019-01-22 PROCEDURE — 74328 X-RAY BILE DUCT ENDOSCOPY: CPT

## 2019-01-22 PROCEDURE — 99024 POSTOP FOLLOW-UP VISIT: CPT | Performed by: SURGERY

## 2019-01-22 PROCEDURE — 0F7D8ZZ DILATION OF PANCREATIC DUCT, VIA NATURAL OR ARTIFICIAL OPENING ENDOSCOPIC: ICD-10-PCS | Performed by: INTERNAL MEDICINE

## 2019-01-22 PROCEDURE — 25010000003 POTASSIUM CHLORIDE 10 MEQ/100ML SOLUTION: Performed by: SURGERY

## 2019-01-22 PROCEDURE — 25010000002 PROPOFOL 10 MG/ML EMULSION: Performed by: ANESTHESIOLOGY

## 2019-01-22 PROCEDURE — 99221 1ST HOSP IP/OBS SF/LOW 40: CPT | Performed by: INTERNAL MEDICINE

## 2019-01-22 PROCEDURE — 25010000002 KETOROLAC TROMETHAMINE PER 15 MG: Performed by: SURGERY

## 2019-01-22 PROCEDURE — 25010000002 IOPAMIDOL 61 % SOLUTION: Performed by: INTERNAL MEDICINE

## 2019-01-22 PROCEDURE — 85025 COMPLETE CBC W/AUTO DIFF WBC: CPT | Performed by: SURGERY

## 2019-01-22 PROCEDURE — 25010000002 GLUCAGON (RDNA) PER 1 MG: Performed by: INTERNAL MEDICINE

## 2019-01-22 PROCEDURE — 83735 ASSAY OF MAGNESIUM: CPT | Performed by: SURGERY

## 2019-01-22 PROCEDURE — 80053 COMPREHEN METABOLIC PANEL: CPT | Performed by: SURGERY

## 2019-01-22 PROCEDURE — C1769 GUIDE WIRE: HCPCS | Performed by: INTERNAL MEDICINE

## 2019-01-22 PROCEDURE — 25010000002 HYDROMORPHONE PER 4 MG: Performed by: SURGERY

## 2019-01-22 RX ORDER — POTASSIUM CHLORIDE 1.5 G/1.77G
40 POWDER, FOR SOLUTION ORAL AS NEEDED
Status: DISCONTINUED | OUTPATIENT
Start: 2019-01-22 | End: 2019-01-25 | Stop reason: HOSPADM

## 2019-01-22 RX ORDER — GABAPENTIN 250 MG/5ML
300 SOLUTION ORAL NIGHTLY
Status: DISCONTINUED | OUTPATIENT
Start: 2019-01-22 | End: 2019-01-25 | Stop reason: HOSPADM

## 2019-01-22 RX ORDER — POTASSIUM CHLORIDE 750 MG/1
40 CAPSULE, EXTENDED RELEASE ORAL AS NEEDED
Status: DISCONTINUED | OUTPATIENT
Start: 2019-01-22 | End: 2019-01-25 | Stop reason: HOSPADM

## 2019-01-22 RX ORDER — GLYCOPYRROLATE 0.2 MG/ML
INJECTION INTRAMUSCULAR; INTRAVENOUS AS NEEDED
Status: DISCONTINUED | OUTPATIENT
Start: 2019-01-22 | End: 2019-01-22 | Stop reason: SURG

## 2019-01-22 RX ORDER — SODIUM CHLORIDE 9 MG/ML
1000 INJECTION, SOLUTION INTRAVENOUS CONTINUOUS
Status: DISCONTINUED | OUTPATIENT
Start: 2019-01-22 | End: 2019-01-23

## 2019-01-22 RX ORDER — ACETAMINOPHEN 160 MG/5ML
650 SOLUTION ORAL EVERY 4 HOURS PRN
Status: DISCONTINUED | OUTPATIENT
Start: 2019-01-22 | End: 2019-01-25 | Stop reason: HOSPADM

## 2019-01-22 RX ORDER — PROPOFOL 10 MG/ML
VIAL (ML) INTRAVENOUS AS NEEDED
Status: DISCONTINUED | OUTPATIENT
Start: 2019-01-22 | End: 2019-01-22 | Stop reason: SURG

## 2019-01-22 RX ORDER — ACETAMINOPHEN 325 MG/1
650 TABLET ORAL EVERY 4 HOURS PRN
Status: DISCONTINUED | OUTPATIENT
Start: 2019-01-22 | End: 2019-01-25 | Stop reason: HOSPADM

## 2019-01-22 RX ORDER — LIDOCAINE HYDROCHLORIDE 20 MG/ML
INJECTION, SOLUTION INFILTRATION; PERINEURAL AS NEEDED
Status: DISCONTINUED | OUTPATIENT
Start: 2019-01-22 | End: 2019-01-22 | Stop reason: SURG

## 2019-01-22 RX ORDER — PROPOFOL 10 MG/ML
VIAL (ML) INTRAVENOUS CONTINUOUS PRN
Status: DISCONTINUED | OUTPATIENT
Start: 2019-01-22 | End: 2019-01-22 | Stop reason: SURG

## 2019-01-22 RX ORDER — SODIUM CHLORIDE 0.9 % (FLUSH) 0.9 %
3 SYRINGE (ML) INJECTION AS NEEDED
Status: DISCONTINUED | OUTPATIENT
Start: 2019-01-22 | End: 2019-01-22 | Stop reason: HOSPADM

## 2019-01-22 RX ORDER — POTASSIUM CHLORIDE 7.45 MG/ML
10 INJECTION INTRAVENOUS
Status: DISCONTINUED | OUTPATIENT
Start: 2019-01-22 | End: 2019-01-25 | Stop reason: HOSPADM

## 2019-01-22 RX ORDER — SODIUM CHLORIDE, SODIUM LACTATE, POTASSIUM CHLORIDE, CALCIUM CHLORIDE 600; 310; 30; 20 MG/100ML; MG/100ML; MG/100ML; MG/100ML
125 INJECTION, SOLUTION INTRAVENOUS CONTINUOUS
Status: DISCONTINUED | OUTPATIENT
Start: 2019-01-22 | End: 2019-01-23

## 2019-01-22 RX ADMIN — HYDROMORPHONE HYDROCHLORIDE 0.5 MG: 1 INJECTION, SOLUTION INTRAMUSCULAR; INTRAVENOUS; SUBCUTANEOUS at 10:25

## 2019-01-22 RX ADMIN — ONDANSETRON HYDROCHLORIDE 4 MG: 2 SOLUTION INTRAMUSCULAR; INTRAVENOUS at 06:39

## 2019-01-22 RX ADMIN — KETOROLAC TROMETHAMINE 15 MG: 30 INJECTION, SOLUTION INTRAMUSCULAR at 07:58

## 2019-01-22 RX ADMIN — POTASSIUM CHLORIDE 10 MEQ: 7.46 INJECTION, SOLUTION INTRAVENOUS at 18:16

## 2019-01-22 RX ADMIN — HYDROMORPHONE HYDROCHLORIDE 0.5 MG: 1 INJECTION, SOLUTION INTRAMUSCULAR; INTRAVENOUS; SUBCUTANEOUS at 16:05

## 2019-01-22 RX ADMIN — GLYCOPYRROLATE 0.2 MCG: 0.2 INJECTION INTRAMUSCULAR; INTRAVENOUS at 13:40

## 2019-01-22 RX ADMIN — HYDROMORPHONE HYDROCHLORIDE 0.5 MG: 1 INJECTION, SOLUTION INTRAMUSCULAR; INTRAVENOUS; SUBCUTANEOUS at 00:44

## 2019-01-22 RX ADMIN — VENLAFAXINE HYDROCHLORIDE 75 MG: 75 TABLET ORAL at 08:00

## 2019-01-22 RX ADMIN — SODIUM CHLORIDE, POTASSIUM CHLORIDE, SODIUM LACTATE AND CALCIUM CHLORIDE 100 ML/HR: 600; 310; 30; 20 INJECTION, SOLUTION INTRAVENOUS at 00:48

## 2019-01-22 RX ADMIN — LANSOPRAZOLE 30 MG: KIT at 18:16

## 2019-01-22 RX ADMIN — PROPOFOL 100 MG: 10 INJECTION, EMULSION INTRAVENOUS at 13:40

## 2019-01-22 RX ADMIN — LIDOCAINE HYDROCHLORIDE 60 MG: 20 INJECTION, SOLUTION INFILTRATION; PERINEURAL at 13:40

## 2019-01-22 RX ADMIN — TOPIRAMATE 100 MG: 100 TABLET, FILM COATED ORAL at 08:00

## 2019-01-22 RX ADMIN — POTASSIUM CHLORIDE 10 MEQ: 7.46 INJECTION, SOLUTION INTRAVENOUS at 06:39

## 2019-01-22 RX ADMIN — SODIUM CHLORIDE, POTASSIUM CHLORIDE, SODIUM LACTATE AND CALCIUM CHLORIDE 125 ML/HR: 600; 310; 30; 20 INJECTION, SOLUTION INTRAVENOUS at 22:52

## 2019-01-22 RX ADMIN — ACETAMINOPHEN 650 MG: 325 TABLET, FILM COATED ORAL at 04:42

## 2019-01-22 RX ADMIN — CYCLOSPORINE 1 DROP: 0.5 EMULSION OPHTHALMIC at 08:00

## 2019-01-22 RX ADMIN — CYCLOSPORINE 1 DROP: 0.5 EMULSION OPHTHALMIC at 22:54

## 2019-01-22 RX ADMIN — PROPOFOL 100 MCG/KG/MIN: 10 INJECTION, EMULSION INTRAVENOUS at 13:40

## 2019-01-22 RX ADMIN — TOPIRAMATE 100 MG: 100 TABLET, FILM COATED ORAL at 20:39

## 2019-01-22 RX ADMIN — HYDROCODONE BITARTRATE AND ACETAMINOPHEN 15 ML: 7.5; 325 SOLUTION ORAL at 20:48

## 2019-01-22 RX ADMIN — ONDANSETRON HYDROCHLORIDE 4 MG: 2 SOLUTION INTRAMUSCULAR; INTRAVENOUS at 00:44

## 2019-01-22 RX ADMIN — LANSOPRAZOLE 30 MG: KIT at 06:39

## 2019-01-22 RX ADMIN — SODIUM CHLORIDE, POTASSIUM CHLORIDE, SODIUM LACTATE AND CALCIUM CHLORIDE 100 ML/HR: 600; 310; 30; 20 INJECTION, SOLUTION INTRAVENOUS at 11:43

## 2019-01-22 RX ADMIN — POTASSIUM CHLORIDE 10 MEQ: 7.46 INJECTION, SOLUTION INTRAVENOUS at 10:25

## 2019-01-22 RX ADMIN — POTASSIUM CHLORIDE 10 MEQ: 7.46 INJECTION, SOLUTION INTRAVENOUS at 16:05

## 2019-01-22 RX ADMIN — HYDROMORPHONE HYDROCHLORIDE 0.5 MG: 1 INJECTION, SOLUTION INTRAMUSCULAR; INTRAVENOUS; SUBCUTANEOUS at 04:48

## 2019-01-22 RX ADMIN — GABAPENTIN 300 MG: 250 SOLUTION ORAL at 20:39

## 2019-01-22 RX ADMIN — POTASSIUM CHLORIDE 10 MEQ: 7.46 INJECTION, SOLUTION INTRAVENOUS at 11:43

## 2019-01-22 RX ADMIN — INDOMETHACIN 100 MG: 50 SUPPOSITORY RECTAL at 15:20

## 2019-01-22 RX ADMIN — SODIUM CHLORIDE, PRESERVATIVE FREE 3 ML: 5 INJECTION INTRAVENOUS at 20:39

## 2019-01-22 RX ADMIN — POTASSIUM CHLORIDE 10 MEQ: 7.46 INJECTION, SOLUTION INTRAVENOUS at 08:48

## 2019-01-22 RX ADMIN — ONDANSETRON HYDROCHLORIDE 4 MG: 2 SOLUTION INTRAMUSCULAR; INTRAVENOUS at 13:40

## 2019-01-22 RX ADMIN — HYDROMORPHONE HYDROCHLORIDE 0.5 MG: 1 INJECTION, SOLUTION INTRAMUSCULAR; INTRAVENOUS; SUBCUTANEOUS at 19:12

## 2019-01-22 RX ADMIN — ONDANSETRON HYDROCHLORIDE 4 MG: 2 SOLUTION INTRAMUSCULAR; INTRAVENOUS at 18:16

## 2019-01-22 RX ADMIN — SODIUM CHLORIDE, PRESERVATIVE FREE 3 ML: 5 INJECTION INTRAVENOUS at 08:07

## 2019-01-22 NOTE — ANESTHESIA POSTPROCEDURE EVALUATION
"Patient: Sachi Vora    Procedure Summary     Date:  01/22/19 Room / Location:   [facility]    Anesthesia Start:  1337 Anesthesia Stop:  1445    Procedure:  ENDOSCOPIC RETROGRADE CHOLANGIOPANCREATOGRAPHY with spincterotomy with balloon stone extraction (N/A ) Diagnosis:      Surgeon:  James Scott MD Provider:  Geovany Flores MD    Anesthesia Type:  MAC ASA Status:  3          Anesthesia Type: MAC  Last vitals  BP   120/61 (01/22/19 1319)   Temp   36.6 °C (97.9 °F) (01/22/19 1319)   Pulse   84 (01/22/19 1319)   Resp   20 (01/22/19 1319)     SpO2   94 % (01/22/19 1319)     Post Anesthesia Care and Evaluation    Patient location during evaluation: PACU  Patient participation: complete - patient participated  Level of consciousness: awake  Pain score: 0  Pain management: adequate  Airway patency: patent  Anesthetic complications: No anesthetic complications  PONV Status: none  Cardiovascular status: acceptable  Respiratory status: acceptable  Hydration status: acceptable    Comments: /61 (BP Location: Left arm, Patient Position: Lying)   Pulse 84   Temp 36.6 °C (97.9 °F) (Oral)   Resp 20   Ht 152.4 cm (60\")   Wt 61.4 kg (135 lb 5 oz)   SpO2 94%   BMI 26.43 kg/m²       "

## 2019-01-22 NOTE — ANESTHESIA PREPROCEDURE EVALUATION
Anesthesia Evaluation     Patient summary reviewed and Nursing notes reviewed   history of anesthetic complications: PONV               Airway   Mallampati: I  TM distance: >3 FB  Neck ROM: full  No difficulty expected  Dental - normal exam     Pulmonary - negative pulmonary ROS and normal exam   Cardiovascular - normal exam    (+) hypertension, valvular problems/murmurs murmur, PVD, DVT, hyperlipidemia,  carotid artery disease      Neuro/Psych  (+) headaches, psychiatric history Anxiety and Depression,     GI/Hepatic/Renal/Endo    (+)  hiatal hernia,      Musculoskeletal     Abdominal  - normal exam    Bowel sounds: normal.   Substance History - negative use     OB/GYN negative ob/gyn ROS         Other   (+) arthritis                     Anesthesia Plan    ASA 3     MAC     Anesthetic plan, all risks, benefits, and alternatives have been provided, discussed and informed consent has been obtained with: patient.

## 2019-01-23 LAB
ALBUMIN SERPL-MCNC: 2.5 G/DL (ref 3.5–5.2)
ALBUMIN/GLOB SERPL: 0.8 G/DL
ALP SERPL-CCNC: 114 U/L (ref 39–117)
ALT SERPL W P-5'-P-CCNC: 205 U/L (ref 1–33)
AMYLASE SERPL-CCNC: 146 U/L (ref 28–100)
ANION GAP SERPL CALCULATED.3IONS-SCNC: 10.2 MMOL/L
AST SERPL-CCNC: 214 U/L (ref 1–32)
BILIRUB SERPL-MCNC: 1.1 MG/DL (ref 0.1–1.2)
BUN BLD-MCNC: 6 MG/DL (ref 8–23)
BUN/CREAT SERPL: 8.1 (ref 7–25)
CALCIUM SPEC-SCNC: 8 MG/DL (ref 8.6–10.5)
CHLORIDE SERPL-SCNC: 108 MMOL/L (ref 98–107)
CO2 SERPL-SCNC: 22.8 MMOL/L (ref 22–29)
CREAT BLD-MCNC: 0.74 MG/DL (ref 0.57–1)
CYTO UR: NORMAL
DEPRECATED RDW RBC AUTO: 53.4 FL (ref 37–54)
ERYTHROCYTE [DISTWIDTH] IN BLOOD BY AUTOMATED COUNT: 17.5 % (ref 11.7–13)
GFR SERPL CREATININE-BSD FRML MDRD: 76 ML/MIN/1.73
GLOBULIN UR ELPH-MCNC: 3 GM/DL
GLUCOSE BLD-MCNC: 89 MG/DL (ref 65–99)
HCT VFR BLD AUTO: 33.3 % (ref 35.6–45.5)
HGB BLD-MCNC: 10 G/DL (ref 11.9–15.5)
LAB AP CASE REPORT: NORMAL
LIPASE SERPL-CCNC: 124 U/L (ref 13–60)
MCH RBC QN AUTO: 25.4 PG (ref 26.9–32)
MCHC RBC AUTO-ENTMCNC: 30 G/DL (ref 32.4–36.3)
MCV RBC AUTO: 84.5 FL (ref 80.5–98.2)
PATH REPORT.FINAL DX SPEC: NORMAL
PATH REPORT.GROSS SPEC: NORMAL
PLATELET # BLD AUTO: 237 10*3/MM3 (ref 140–500)
PMV BLD AUTO: 9.3 FL (ref 6–12)
POTASSIUM BLD-SCNC: 3.1 MMOL/L (ref 3.5–5.2)
POTASSIUM BLD-SCNC: 3.5 MMOL/L (ref 3.5–5.2)
PROT SERPL-MCNC: 5.5 G/DL (ref 6–8.5)
RBC # BLD AUTO: 3.94 10*6/MM3 (ref 3.9–5.2)
SODIUM BLD-SCNC: 141 MMOL/L (ref 136–145)
WBC NRBC COR # BLD: 7.82 10*3/MM3 (ref 4.5–10.7)

## 2019-01-23 PROCEDURE — 80053 COMPREHEN METABOLIC PANEL: CPT | Performed by: INTERNAL MEDICINE

## 2019-01-23 PROCEDURE — 99231 SBSQ HOSP IP/OBS SF/LOW 25: CPT | Performed by: INTERNAL MEDICINE

## 2019-01-23 PROCEDURE — 25010000002 KETOROLAC TROMETHAMINE PER 15 MG: Performed by: SURGERY

## 2019-01-23 PROCEDURE — 85027 COMPLETE CBC AUTOMATED: CPT | Performed by: INTERNAL MEDICINE

## 2019-01-23 PROCEDURE — 84132 ASSAY OF SERUM POTASSIUM: CPT | Performed by: SURGERY

## 2019-01-23 PROCEDURE — 25010000003 POTASSIUM CHLORIDE 10 MEQ/100ML SOLUTION: Performed by: SURGERY

## 2019-01-23 PROCEDURE — 99024 POSTOP FOLLOW-UP VISIT: CPT | Performed by: SURGERY

## 2019-01-23 PROCEDURE — 82150 ASSAY OF AMYLASE: CPT | Performed by: INTERNAL MEDICINE

## 2019-01-23 PROCEDURE — 83690 ASSAY OF LIPASE: CPT | Performed by: INTERNAL MEDICINE

## 2019-01-23 RX ORDER — SODIUM CHLORIDE 9 MG/ML
50 INJECTION, SOLUTION INTRAVENOUS CONTINUOUS
Status: DISCONTINUED | OUTPATIENT
Start: 2019-01-23 | End: 2019-01-24

## 2019-01-23 RX ADMIN — POTASSIUM CHLORIDE 10 MEQ: 7.46 INJECTION, SOLUTION INTRAVENOUS at 16:39

## 2019-01-23 RX ADMIN — SODIUM CHLORIDE, PRESERVATIVE FREE 3 ML: 5 INJECTION INTRAVENOUS at 21:30

## 2019-01-23 RX ADMIN — CYCLOSPORINE 1 DROP: 0.5 EMULSION OPHTHALMIC at 21:27

## 2019-01-23 RX ADMIN — POTASSIUM CHLORIDE 10 MEQ: 7.46 INJECTION, SOLUTION INTRAVENOUS at 11:38

## 2019-01-23 RX ADMIN — CYCLOSPORINE 1 DROP: 0.5 EMULSION OPHTHALMIC at 08:45

## 2019-01-23 RX ADMIN — TOPIRAMATE 100 MG: 100 TABLET, FILM COATED ORAL at 08:45

## 2019-01-23 RX ADMIN — POTASSIUM CHLORIDE 10 MEQ: 7.46 INJECTION, SOLUTION INTRAVENOUS at 15:05

## 2019-01-23 RX ADMIN — KETOROLAC TROMETHAMINE 15 MG: 30 INJECTION, SOLUTION INTRAMUSCULAR at 07:38

## 2019-01-23 RX ADMIN — LANSOPRAZOLE 30 MG: KIT at 16:39

## 2019-01-23 RX ADMIN — HYDROCODONE BITARTRATE AND ACETAMINOPHEN 15 ML: 7.5; 325 SOLUTION ORAL at 21:27

## 2019-01-23 RX ADMIN — SODIUM CHLORIDE 50 ML/HR: 9 INJECTION, SOLUTION INTRAVENOUS at 10:12

## 2019-01-23 RX ADMIN — KETOROLAC TROMETHAMINE 15 MG: 30 INJECTION, SOLUTION INTRAMUSCULAR at 16:43

## 2019-01-23 RX ADMIN — SODIUM CHLORIDE, POTASSIUM CHLORIDE, SODIUM LACTATE AND CALCIUM CHLORIDE 125 ML/HR: 600; 310; 30; 20 INJECTION, SOLUTION INTRAVENOUS at 06:03

## 2019-01-23 RX ADMIN — LANSOPRAZOLE 30 MG: KIT at 06:57

## 2019-01-23 RX ADMIN — POTASSIUM CHLORIDE 10 MEQ: 7.46 INJECTION, SOLUTION INTRAVENOUS at 13:33

## 2019-01-23 RX ADMIN — POTASSIUM CHLORIDE 10 MEQ: 7.46 INJECTION, SOLUTION INTRAVENOUS at 18:10

## 2019-01-23 RX ADMIN — GABAPENTIN 300 MG: 250 SOLUTION ORAL at 21:27

## 2019-01-23 RX ADMIN — POTASSIUM CHLORIDE 10 MEQ: 7.46 INJECTION, SOLUTION INTRAVENOUS at 08:45

## 2019-01-23 RX ADMIN — HYDROCODONE BITARTRATE AND ACETAMINOPHEN 15 ML: 7.5; 325 SOLUTION ORAL at 06:03

## 2019-01-23 RX ADMIN — ACETAMINOPHEN 650 MG: 160 SOLUTION ORAL at 10:12

## 2019-01-23 RX ADMIN — HYDROCODONE BITARTRATE AND ACETAMINOPHEN 15 ML: 7.5; 325 SOLUTION ORAL at 12:03

## 2019-01-23 RX ADMIN — TOPIRAMATE 100 MG: 100 TABLET, FILM COATED ORAL at 21:27

## 2019-01-23 RX ADMIN — VENLAFAXINE HYDROCHLORIDE 75 MG: 75 TABLET ORAL at 08:45

## 2019-01-24 LAB
ALBUMIN SERPL-MCNC: 3.1 G/DL (ref 3.5–5.2)
ALBUMIN/GLOB SERPL: 1.2 G/DL
ALP SERPL-CCNC: 103 U/L (ref 39–117)
ALT SERPL W P-5'-P-CCNC: 146 U/L (ref 1–33)
ANION GAP SERPL CALCULATED.3IONS-SCNC: 11.1 MMOL/L
AST SERPL-CCNC: 100 U/L (ref 1–32)
BILIRUB SERPL-MCNC: 0.5 MG/DL (ref 0.1–1.2)
BUN BLD-MCNC: 3 MG/DL (ref 8–23)
BUN/CREAT SERPL: 3.7 (ref 7–25)
CALCIUM SPEC-SCNC: 8 MG/DL (ref 8.6–10.5)
CHLORIDE SERPL-SCNC: 109 MMOL/L (ref 98–107)
CO2 SERPL-SCNC: 20.9 MMOL/L (ref 22–29)
CREAT BLD-MCNC: 0.81 MG/DL (ref 0.57–1)
GFR SERPL CREATININE-BSD FRML MDRD: 69 ML/MIN/1.73
GLOBULIN UR ELPH-MCNC: 2.5 GM/DL
GLUCOSE BLD-MCNC: 82 MG/DL (ref 65–99)
POTASSIUM BLD-SCNC: 3.2 MMOL/L (ref 3.5–5.2)
POTASSIUM BLD-SCNC: 3.3 MMOL/L (ref 3.5–5.2)
PROT SERPL-MCNC: 5.6 G/DL (ref 6–8.5)
SODIUM BLD-SCNC: 141 MMOL/L (ref 136–145)

## 2019-01-24 PROCEDURE — 99231 SBSQ HOSP IP/OBS SF/LOW 25: CPT | Performed by: INTERNAL MEDICINE

## 2019-01-24 PROCEDURE — 84132 ASSAY OF SERUM POTASSIUM: CPT | Performed by: SURGERY

## 2019-01-24 PROCEDURE — 25010000002 HYDROMORPHONE PER 4 MG: Performed by: SURGERY

## 2019-01-24 PROCEDURE — 80053 COMPREHEN METABOLIC PANEL: CPT | Performed by: SURGERY

## 2019-01-24 PROCEDURE — 99024 POSTOP FOLLOW-UP VISIT: CPT | Performed by: SURGERY

## 2019-01-24 PROCEDURE — 25010000003 POTASSIUM CHLORIDE 10 MEQ/100ML SOLUTION: Performed by: SURGERY

## 2019-01-24 RX ADMIN — HYDROCODONE BITARTRATE AND ACETAMINOPHEN 15 ML: 7.5; 325 SOLUTION ORAL at 03:24

## 2019-01-24 RX ADMIN — HYDROCODONE BITARTRATE AND ACETAMINOPHEN 15 ML: 7.5; 325 SOLUTION ORAL at 18:06

## 2019-01-24 RX ADMIN — CYCLOSPORINE 1 DROP: 0.5 EMULSION OPHTHALMIC at 08:34

## 2019-01-24 RX ADMIN — SODIUM CHLORIDE 50 ML/HR: 9 INJECTION, SOLUTION INTRAVENOUS at 05:22

## 2019-01-24 RX ADMIN — SODIUM CHLORIDE, PRESERVATIVE FREE 3 ML: 5 INJECTION INTRAVENOUS at 21:25

## 2019-01-24 RX ADMIN — LANSOPRAZOLE 30 MG: KIT at 06:49

## 2019-01-24 RX ADMIN — POTASSIUM CHLORIDE 10 MEQ: 7.46 INJECTION, SOLUTION INTRAVENOUS at 06:53

## 2019-01-24 RX ADMIN — VENLAFAXINE HYDROCHLORIDE 75 MG: 75 TABLET ORAL at 08:34

## 2019-01-24 RX ADMIN — CLONAZEPAM 1 MG: 1 TABLET ORAL at 05:19

## 2019-01-24 RX ADMIN — LANSOPRAZOLE 30 MG: KIT at 17:11

## 2019-01-24 RX ADMIN — HYDROMORPHONE HYDROCHLORIDE 0.5 MG: 1 INJECTION, SOLUTION INTRAMUSCULAR; INTRAVENOUS; SUBCUTANEOUS at 08:34

## 2019-01-24 RX ADMIN — HYDROMORPHONE HYDROCHLORIDE 0.5 MG: 1 INJECTION, SOLUTION INTRAMUSCULAR; INTRAVENOUS; SUBCUTANEOUS at 03:33

## 2019-01-24 RX ADMIN — TOPIRAMATE 100 MG: 100 TABLET, FILM COATED ORAL at 08:34

## 2019-01-24 RX ADMIN — ACETAMINOPHEN 650 MG: 160 SOLUTION ORAL at 08:34

## 2019-01-24 RX ADMIN — POTASSIUM CHLORIDE 10 MEQ: 7.46 INJECTION, SOLUTION INTRAVENOUS at 03:37

## 2019-01-24 RX ADMIN — TOPIRAMATE 100 MG: 100 TABLET, FILM COATED ORAL at 20:21

## 2019-01-24 RX ADMIN — HYDROMORPHONE HYDROCHLORIDE 0.5 MG: 1 INJECTION, SOLUTION INTRAMUSCULAR; INTRAVENOUS; SUBCUTANEOUS at 20:21

## 2019-01-25 VITALS
HEIGHT: 60 IN | RESPIRATION RATE: 16 BRPM | WEIGHT: 135.31 LBS | BODY MASS INDEX: 26.57 KG/M2 | DIASTOLIC BLOOD PRESSURE: 68 MMHG | HEART RATE: 78 BPM | TEMPERATURE: 97 F | SYSTOLIC BLOOD PRESSURE: 122 MMHG | OXYGEN SATURATION: 97 %

## 2019-01-25 PROBLEM — K44.9 PARAESOPHAGEAL HERNIA: Status: RESOLVED | Noted: 2019-01-15 | Resolved: 2019-01-25

## 2019-01-25 PROBLEM — K80.20 CALCULUS OF GALLBLADDER WITHOUT CHOLECYSTITIS WITHOUT OBSTRUCTION: Status: RESOLVED | Noted: 2019-01-15 | Resolved: 2019-01-25

## 2019-01-25 LAB — POTASSIUM BLD-SCNC: 3.5 MMOL/L (ref 3.5–5.2)

## 2019-01-25 PROCEDURE — 84132 ASSAY OF SERUM POTASSIUM: CPT | Performed by: SURGERY

## 2019-01-25 PROCEDURE — 25010000003 POTASSIUM CHLORIDE 10 MEQ/100ML SOLUTION: Performed by: SURGERY

## 2019-01-25 RX ORDER — PROMETHAZINE HYDROCHLORIDE 12.5 MG/1
12.5 TABLET ORAL EVERY 6 HOURS PRN
Qty: 30 TABLET | Refills: 0 | Status: SHIPPED | OUTPATIENT
Start: 2019-01-25 | End: 2019-02-04

## 2019-01-25 RX ORDER — AMOXICILLIN 250 MG
2 CAPSULE ORAL DAILY PRN
Qty: 30 TABLET | Refills: 1 | Status: SHIPPED | OUTPATIENT
Start: 2019-01-25 | End: 2019-04-18 | Stop reason: ALTCHOICE

## 2019-01-25 RX ADMIN — POTASSIUM CHLORIDE 10 MEQ: 7.46 INJECTION, SOLUTION INTRAVENOUS at 00:47

## 2019-01-25 RX ADMIN — HYDROCODONE BITARTRATE AND ACETAMINOPHEN 15 ML: 7.5; 325 SOLUTION ORAL at 09:06

## 2019-01-25 RX ADMIN — POTASSIUM CHLORIDE 10 MEQ: 7.46 INJECTION, SOLUTION INTRAVENOUS at 02:00

## 2019-01-25 RX ADMIN — POTASSIUM CHLORIDE 40 MEQ: 1.5 POWDER, FOR SOLUTION ORAL at 09:58

## 2019-01-25 RX ADMIN — VENLAFAXINE HYDROCHLORIDE 75 MG: 75 TABLET ORAL at 09:01

## 2019-01-25 RX ADMIN — TOPIRAMATE 100 MG: 100 TABLET, FILM COATED ORAL at 09:01

## 2019-01-25 RX ADMIN — LANSOPRAZOLE 30 MG: KIT at 09:06

## 2019-01-26 ENCOUNTER — READMISSION MANAGEMENT (OUTPATIENT)
Dept: CALL CENTER | Facility: HOSPITAL | Age: 79
End: 2019-01-26

## 2019-01-26 NOTE — OUTREACH NOTE
Prep Survey      Responses   Facility patient discharged from?  [facility]   Is patient eligible?  Yes   Discharge diagnosis  Paraesophageal hernia,  Cholelithiasis and cholecystitis,  Laparoscopic paraesophageal hernia repair with toupet fundoplication, laparoscopic cholecystectomy with intraoperative cholangiogram   Does the patient have one of the following disease processes/diagnoses(primary or secondary)?  General Surgery   Does the patient have Home health ordered?  No   Is there a DME ordered?  No   Comments regarding appointments  One week f/u needs to be scheduled   Prep survey completed?  Yes          Mable Anderson RN

## 2019-01-28 ENCOUNTER — READMISSION MANAGEMENT (OUTPATIENT)
Dept: CALL CENTER | Facility: HOSPITAL | Age: 79
End: 2019-01-28

## 2019-01-28 NOTE — OUTREACH NOTE
General Surgery Week 1 Survey      Responses   Facility patient discharged from?  [facility]   Does the patient have one of the following disease processes/diagnoses(primary or secondary)?  General Surgery   Is there a successful TCM telephone encounter documented?  No   Week 1 attempt successful?  No   Unsuccessful attempts  Attempt 1          Fabiola Perry RN

## 2019-01-29 ENCOUNTER — READMISSION MANAGEMENT (OUTPATIENT)
Dept: CALL CENTER | Facility: HOSPITAL | Age: 79
End: 2019-01-29

## 2019-01-29 NOTE — OUTREACH NOTE
General Surgery Week 1 Survey      Responses   Facility patient discharged from?  [facility]   Does the patient have one of the following disease processes/diagnoses(primary or secondary)?  General Surgery   Is there a successful TCM telephone encounter documented?  No   Week 1 attempt successful?  Yes   Call start time  1422   Call end time  1430   Discharge diagnosis  Paraesophageal hernia,  Cholelithiasis and cholecystitis,  Laparoscopic paraesophageal hernia repair with toupet fundoplication, laparoscopic cholecystectomy with intraoperative cholangiogram   Meds reviewed with patient/caregiver?  Yes   Is the patient having any side effects they believe may be caused by any medication additions or changes?  No   Does the patient have all medications related to this admission filled (includes all antibiotics, pain medications, etc.)  Yes   Is the patient taking all medications as directed (includes completed medication regime)?  Yes   Does the patient have a follow up appointment scheduled with their surgeon?  Yes   Has the patient kept scheduled appointments due by today?  N/A   Comments  Alex Linder   Has home health visited the patient within 72 hours of discharge?  N/A   Psychosocial issues?  No   Did the patient receive a copy of their discharge instructions?  Yes   Nursing interventions  Reviewed instructions with patient   What is the patient's perception of their health status since discharge?  Improving   Nursing interventions  Nurse provided patient education   Is the patient /caregiver able to teach back basic post-op care?  Continue use of incentive spirometry at least 1 week post discharge, Practice 'cough and deep breath', Take showers only when approved by MD-sponge bathe until then, Keep incision areas clean,dry and protected, Do not remove steri-strips, Lifting as instructed by MD in discharge instructions   Is the patient/caregiver able to teach back signs and symptoms of incisional infection?   Increased redness, swelling or pain at the incisonal site, Increased drainage or bleeding, Incisional warmth, Pus or odor from incision, Fever   Is the patient/caregiver able to teach back steps to recovery at home?  Set small, achievable goals for return to baseline health, Rest and rebuild strength, gradually increase activity, Weigh daily, Make a list of questions for surgeon's appointment, Eat a well-balance diet   Is the patient/caregiver able to teach back the hierarchy of who to call/visit for symptoms/problems? PCP, Specialist, Home health nurse, Urgent Care, ED, 911  Yes   Additional teach back comments  She says her abd is swollen, her shoulders hurt, but she is consuming liquids.  Incision looks fine she says.   Week 1 call completed?  Yes          Rose Scott RN

## 2019-01-30 ENCOUNTER — TELEPHONE (OUTPATIENT)
Dept: INTERNAL MEDICINE | Facility: CLINIC | Age: 79
End: 2019-01-30

## 2019-01-30 RX ORDER — ESTRADIOL 1 MG/1
TABLET ORAL
Qty: 90 TABLET | Refills: 0 | Status: SHIPPED | OUTPATIENT
Start: 2019-01-30 | End: 2019-04-27 | Stop reason: SDUPTHER

## 2019-01-30 NOTE — TELEPHONE ENCOUNTER
Pt had gallbladder taken out about a week ago with Dr. Johnson. Dr. Johnson can't follow up with her for 2 weeks. At discharge the hospital told her that she should call you to follow up with you instead because they said that she needed to be seen sooner. Do you think 2 weeks will be ok or schedule with you? Call [phone].

## 2019-02-04 ENCOUNTER — OFFICE VISIT (OUTPATIENT)
Dept: INTERNAL MEDICINE | Facility: CLINIC | Age: 79
End: 2019-02-04

## 2019-02-04 VITALS
SYSTOLIC BLOOD PRESSURE: 142 MMHG | OXYGEN SATURATION: 98 % | BODY MASS INDEX: 26.31 KG/M2 | HEIGHT: 60 IN | HEART RATE: 94 BPM | DIASTOLIC BLOOD PRESSURE: 70 MMHG | WEIGHT: 134 LBS

## 2019-02-04 DIAGNOSIS — N18.2 CHRONIC RENAL INSUFFICIENCY, STAGE II (MILD): Chronic | ICD-10-CM

## 2019-02-04 DIAGNOSIS — K81.1 CHRONIC CHOLECYSTITIS: ICD-10-CM

## 2019-02-04 DIAGNOSIS — D64.9 CHRONIC ANEMIA: Chronic | ICD-10-CM

## 2019-02-04 DIAGNOSIS — R10.13 EPIGASTRIC ABDOMINAL PAIN: ICD-10-CM

## 2019-02-04 DIAGNOSIS — Z09 HOSPITAL DISCHARGE FOLLOW-UP: Primary | ICD-10-CM

## 2019-02-04 DIAGNOSIS — K44.9 PARAESOPHAGEAL HERNIA: ICD-10-CM

## 2019-02-04 DIAGNOSIS — I10 BENIGN ESSENTIAL HYPERTENSION: Chronic | ICD-10-CM

## 2019-02-04 PROCEDURE — 99214 OFFICE O/P EST MOD 30 MIN: CPT | Performed by: INTERNAL MEDICINE

## 2019-02-04 NOTE — PROGRESS NOTES
02/04/2019    Patient Information  Sachi Vora                                                                                          [address]   [place] [address]      1940  [unfilled]  There is no work phone number on file.     Current outpatient and discharge medications have been reconciled for the patient.  Reviewed by: Cruzito Weir MD      Chief Complaint:     Hospital discharge follow-up.    History of Present Illness:    Patient with a history of multiple medical problems including osteoporosis, simple renal cyst, hypertension, carotid artery plaque, chronic gastritis, chronic lower back pain, chronic renal insufficiency, depression with anxiety, functional heart murmur, hyperlipidemia, menopausal state, chronic migraine headaches, pancreatic duct dilatation, lower extremity edema, hypersomnolence, chronic anemia, multinodular goiter, generalized anxiety disorder, recent evaluation and treatment for epigastric abdominal pain requiring surgery.  She presents today for hospital discharge follow-up and this will be described in detail below.  She has no new acute complaints.  Her past medical history reviewed and updated where necessary including health maintenance parameters.  This reveals she is up-to-date or else accounted for.    The history regarding epigastric abdominal pain, surgery for paraesophageal hernia, chronic cholecystitis and laparoscopic cholecystectomy with ERCP and stone extraction is as follows:    02/04/2019--patient seen in follow-up after her surgery and she is doing much better.  Her gastrointestinal symptoms essentially resolved although she does have some discomfort as expected in the distal esophagus.  She is on more or less a liquid/soft diet.  I explained to her that I have high expectations that her recent surgery will resolve all of her abdominal complaints.    01/22/2019--ERCP with sphincterotomy and balloon stone  extraction.    01/21/2019--laparoscopic cholecystectomy with intraoperative cholangiogram.    01/21/2019--laparoscopic paraesophageal hernia repair with fundoplication.    09/06/2018--patient with history of gastroesophageal reflux presents with a 2 to three-week history of epigastric abdominal pain that is somewhat poorly described but the best description is that of a dull ache.  This is despite taking omeprazole 40 mg per day which she has been on for some time.  The pain is intermittent and definitely is precipitated by eating.  The type of food that she makes no difference.  If she drinks milk that tends to ease it somewhat but then the symptoms returned quickly.  No nausea, vomiting, or associated diarrhea.  Review of the chart reveals patient had an EGD about one year ago which revealed a medium-sized hiatal hernia, normal esophagus, localized mild inflammation and linear erosions found in the prepyloric region of the stomach, and normal duodenum.  She had a CT scan of the abdomen and pelvis about a year ago which revealed hepatic and renal cysts as described. Moderate-sized hiatal hernia. Otherwise unremarkable CT scan of the abdomen and pelvis. No acute process is identified.  Examination today reveals epigastric tenderness but no definite peritoneal signs.  Given the persistence and severity of the symptoms despite taking PPI therapy, repeat endoscopy and CT scan is indicated.  We will discontinue omeprazole and start samples of Dexilant 60 mg by mouth daily before the first meal.    Review of Systems   Constitution: Negative.   HENT: Negative.    Eyes: Negative.    Cardiovascular: Negative.    Respiratory: Negative.    Endocrine: Negative.    Hematologic/Lymphatic: Negative.    Skin: Negative.    Musculoskeletal: Positive for arthritis.   Gastrointestinal: Negative.  Negative for abdominal pain, nausea and vomiting.   Genitourinary: Negative.    Neurological: Negative.    Psychiatric/Behavioral: Negative.  "   Allergic/Immunologic: Negative.        Active Problems:    Patient Active Problem List   Diagnosis   • Osteoporosis, 03/29/2011--lumbar spine -2.8.  Right femoral neck -2.4.  Left femoral neck -2.4.  Patient receives Reclast infusions.   • Therapeutic drug monitoring   • Simple renal cyst   • Benign essential hypertension   • Carotid artery plaque, 04/02/2018--mild right ICA plaque, normal left ICA 10/03/2014--mild bilateral carotid artery plaque.   • Chronic gastritis   • Chronic lower back pain   • Chronic otitis externa   • Chronic renal insufficiency, stage II (mild), creatinine 1.12   • Depression with anxiety   • Functional murmur, 07/15/2015--normal echocardiogram.   • Hyperlipidemia   • Menopausal state   • Chronic migraine   • Pancreatic Duct Dilation   • Pedal edema   • Restless legs syndrome   • Bilateral sensorineural hearing loss   • Vitamin D deficiency   • Chronic gastric ulcer   • Chronic fatigue   • Hypersomnolence   • Chronic anemia   • Multinodular goiter   • Generalized anxiety disorder   • Iron deficiency anemia   • Statin intolerance   • Postmenopausal state   • Epigastric abdominal pain   • Paraesophageal hernia   • Chronic cholecystitis   • Hospital discharge follow-up         Past Medical History:   Diagnosis Date   • Benign essential hypertension 4/8/2016   • Bilateral sensorineural hearing loss 3/31/2011    03/31/2011--etiology reveals reverse \"cookie bite\" type of hearing loss for both ears of mild/moderate degree, mostly sensorineural.  Speech discrimination 100% on the right, 96% on the left.   • Carotid artery plaque, 10/03/2014--mild bilateral carotid artery plaque. 7/25/2012    10/03/2014--[facility] screening revealed mild bilateral carotid plaque, negative for atrial fibrillation, negative for AAA, negative for PAD, osteoporosis screen revealed osteopenia.  Body mass index was 25 and considered to be moderate risk.  07/25/2012--vascular screen negative for carotid plaque, negative " for abdominal aneurysm, negative for PAD Description: 10/03/2014--[facility] screening revealed mild bilateral carotid plaque, negative for atrial fibrillation, negative for AAA, negative for PAD, osteoporosis screen revealed osteopenia.  Body mass index was 25 and considered to be moderate risk.  07/25/2012--vascular screen negative for carotid plaque, negative for abdominal aneurysm, negative for PAD   • Chronic anemia 8/23/2016 06/13/2017--colonoscopy revealed melanosis.  Biopsied.  There was friability with contact bleeding in the ascending colon.  Biopsied.  Pathology returned consistent with melanosis coli.  06/13/2017--EGD revealed normal esophagus.  Biopsies taken.  There was a medium-sized hiatal hernia.  Localized mild inflammation and linear erosions were found in the prepyloric region.  Biopsied.  Examined duodenum was normal.  Random biopsies taken.  Pathology of the gastroesophageal junction was unremarkable as was the gastric fundus.  Prepyloric biopsy revealed minimal chronic inflammation and reactive change.  H pylori negative.  Duodenal biopsies are negative.  04/12/2017--hemoglobin low at 10.8, hematocrit is normal at 35.7.  Iron sulfate 325 mg per day initiated.  08/23/2016--routine follow-up.  Patient continues to have epigastric abdominal pain believed to be related to reflux with possible esophageal spasm.  Hemoglobin noted to be low at 10.6 with a hematocrit low at 33.7 and RDW elevated at 16.2.  Homocysti   • Chronic fatigue 4/21/2016 06/14/2017--overnight oximetry revealed oxygen desaturation index of only 0.44.  There were only 10 total desaturations during the period of testing which lasted 6 hours and 46 minutes.  05/31/2017--patient seen in follow-up and reports she continues to have chronic fatigue as well as hypersomnolence.  Once again, she is on multiple medications that are undoubtedly contributing to this problem including clonazepam and hydrocodone but I doubt that these  could be discontinued.  Her CBC back in April revealed a low hemoglobin of 10.8 but hematocrit was normal at 35.7.  Thyroid function tests were normal and CMP normal.  Overnight oximetry test ordered.  Repeat CBC.  Iron studies.  Sedimentation rate and CRP.  04/11/2017--patient seen in follow-up and her blood pressure is now at a reasonable level at 120/64.  She reports she still feels somewhat fatigued but she is much better.  Patient has not been doing any regular exercise and I do think that that would be helpful to reduce her feeling of fatigue.  She is   • Chronic gastric ulcer 4/14/2014    02/15/2017--patient seen in follow-up in nearly 6 months later.  She has complaints of not feeling well all over.  She has complaints of diffuse myalgias and possibly tendinopathy related to Levaquin that I called in prior to her going to [place] for vacation.  She reports that 3 or 4 days after starting the Levaquin she began to have the musculoskeletal symptoms and she reports that she continues to have them presently.  Symptoms are worse involving her left calf area.  Examination reveals significant tenderness involving the left calf and the left calf seems a little larger than the right.  She also has complaints of shortness of breath but no chest pain.  She is complaining of double vision and generalized weakness and fatigue.  She was complaining of chronic fatigue at the last visit back in September and I ordered an overnight oximetry test but apparently this was never done for reasons that are not clear to me.  Her current oxygen saturation is 94% and normally it is 100%.  Plan is as follows: Extensi   • Chronic gastritis 11/19/2001    02/15/2017--patient seen in follow-up in nearly 6 months later.  She has complaints of not feeling well all over.  She has complaints of diffuse myalgias and possibly tendinopathy related to Levaquin that I called in prior to her going to [place] for vacation.  She reports that 3 or  4 days after starting the Levaquin she began to have the musculoskeletal symptoms and she reports that she continues to have them presently.  Symptoms are worse involving her left calf area.  Examination reveals significant tenderness involving the left calf and the left calf seems a little larger than the right.  She also has complaints of shortness of breath but no chest pain.  She is complaining of double vision and generalized weakness and fatigue.  She was complaining of chronic fatigue at the last visit back in September and I ordered an overnight oximetry test but apparently this was never done for reasons that are not clear to me.  Her current oxygen saturation is 94% and normally it is 100%.  Plan is as follows: Extensi   • Chronic lower back pain 1/31/2006 08/11/2014--MRI of the lumbar spine reveals the conus terminates at L2 and is normal.  Cauda equina unremarkable.  Stable to moderate degenerative disc disease at L5-S1.  No acute fracture or pars defect is demonstrated.  Small synovial cysts are seen posterior to the L4-L5 facet.  The perivertebral soft tissues are unremarkable.  T12-L1, L1-L2, L2-L3 are negative.  L3-L4 a broad-based disc bulge resulting in mild bilateral neural foraminal narrowing.  L4-L5 reveals a broad-based disc bulge facet disease resulting in mild bilateral neural foraminal narrowing.  L5-S1 reveals a broad-based disc bulge, posterior osseous slipping, and facet disease resulting in mild to moderate bilateral neural foraminal narrowing.  Assessment is stable mild to moderate degenerative spondylosis.  Small synovial cysts are seen posterior to the L4-L5 facets.  08/11/2014--MRI of the thoracic spine reveals mild to moderate thoracic kyphosis.  No fracture.  At T5--T6 there is a moderate sized left paracentral disc protrusion which i   • Chronic migraine 11/3/2009    01/18/2010--MRI of the brain performed for headaches and memory loss.  Mild small vessel disease in the cerebral  and central pontine white matter.  There is an ovoid, somewhat pancake-shaped area of signal abnormality in the anterior inferior right temporal lobe subcortical to the juxtacortical white matter that measures 1.2 x 1 cm and anterior posterior and medial lateral dimension but only measures 3 mm in cranial caudal dimension.  I suspect that this is a benign cyst or a cystic area of encephalomalacia.  The remainder of the MRI of the head is within normal limits.  11/03/2009--CT scan of the brain without contrast performed for headache after a fall.  Mild diffuse atrophy.  No acute abnormality noted.; Description: Patient has had a long history of migraine headaches.  MRI and CT scan of the brain have been essentially negative.   • Chronic otitis externa 4/8/2016   • Chronic renal insufficiency, stage II (mild), creatinine 1.12 11/14/2015 11/14/2015--serum creatinine mildly elevated at 1.12.   • Depression with anxiety 4/8/2016   • Functional murmur, 07/15/2015--normal echocardiogram. 7/15/2015    07/15/2015--echocardiogram reveals normal left ventricular size and function with ejection fraction 55%.  Grade 1 diastolic dysfunction, abnormal relaxation pattern.  Trace tricuspid regurgitation.  Estimated right ventricular systolic pressure is 25 mmHg which is normal.   • Gastroesophageal reflux disease with esophagitis 11/19/2001 06/13/2017--EGD revealed normal esophagus.  Biopsies taken.  There was a medium-sized hiatal hernia.  Localized mild inflammation and linear erosions were found in the prepyloric region.  Biopsied.  Examined duodenum was normal.  Random biopsies taken.  Pathology of the gastroesophageal junction was unremarkable as was the gastric fundus.  Prepyloric biopsy revealed minimal chronic inflammation and reactive change.  H pylori negative.  Duodenal biopsies are negative.  11/03/2014--patient seen in follow-up and reports her epigastric pain/chest pain has resolved.  I reviewed the results of  the studies with her.  It does not appear that her problem is related to biliary tract disease.  She does have reflux and although the recent upper GI revealed minimal reflux, I do think her symptoms are related to esophageal reflux with esophageal spasm.  10/21/2014--air-contrast upper GI series revealed trace upper laryngeal penetration.  Persistent small partially reducible sliding hiatal hernia the upper stomach with    • Generalized anxiety disorder 4/11/2017   • History of Acute deep vein thrombosis (DVT) of distal end of left lower extremity 2/15/2017    03/21/2017 patient seen in follow-up and she is tolerating the Eliquis well without signs or symptoms of bleeding.  Her calf swelling and tenderness is better but not totally resolved.  I suspect that the DVT is chronic and may not resolve at all.  I will order a repeat venous study and then proceed from there.  02/23/2017--repeat Doppler venous study of the left lower extremity reveals a chronic left lower extremity DVT in the posterior tibial.  All other left sided veins appeared normal.  Fluid collection in the left calf noted.  02/17/2017--patient seen in follow-up and reports her left lower extremity symptoms are about the same.  She continues to have complaints of profound fatigue which I think is multifactorial including underlying depression that is not in remission.  Review the results of the CTA of the chest including the possible thyroid lesion.  I do not think this is contributing to any of her symptoms of fatigue particularly given the fact that her thyroid function tests are normal.  I expla   • History of palpitations 12/07/2011    Patient has had multiple admissions to the hospital for complaints of chest pain and heart palpitations. She meant admitted at least on 3 occasions. She has had at least 3 stress Cardiolite and 1 stress ECG, the last Cardiolite being performed 12/07/2011 which was negative. Patient is also had Holter monitors which  have been unremarkable.   • HIstory of Schatzki's ring 02/28/2012 02/28/2012--air-contrast upper GI revealed small to moderate sized reducible sliding hiatal herniation of the upper stomach with some demonstrated gastroesophageal reflux. No esophageal, gastric, or duodenal mass or mucosal ulceration was seen.  11/03/2008--EGD performed for evaluation of iron deficiency anemia revealed hiatal hernia without evidence of reflux, prepyloric antritis and   • Hyperlipidemia 4/8/2016   • Hypersomnolence 5/5/2016 06/14/2017--overnight oximetry revealed oxygen desaturation index of only 0.44.  There were only 10 total desaturations during the period of testing which lasted 6 hours and 46 minutes.  05/31/2017--patient seen in follow-up and reports she continues to have chronic fatigue as well as hypersomnolence.  Once again, she is on multiple medications that are undoubtedly contributing to this problem including clonazepam and hydrocodone but I doubt that these could be discontinued.  Her CBC back in April revealed a low hemoglobin of 10.8 but hematocrit was normal at 35.7.  Thyroid function tests were normal and CMP normal.  Overnight oximetry test ordered.  Repeat CBC.  Iron studies.  Sedimentation rate and CRP.  04/11/2017--patient seen in follow-up and her blood pressure is now at a reasonable level at 120/64.  She reports she still feels somewhat fatigued but she is much better.  Patient has not been doing any regular exercise and I do think that that would be helpful to reduce her feeling of fatigue.  She is   • Menopausal state 4/8/2016   • Multinodular goiter 2/17/2017 02/21/2017--thyroid ultrasound reveals multinodular thyroid with largest nodule measuring on the order of 1.6 cm in greatest dimension.  02/17/2017--patient seen in follow-up for DVT and CTA of the chest.  An incidental finding on the CTA of the chest reveals a 1.7 cm lesion in the right lobe of thyroid.  Note that thyroid function tests are  currently normal.  Ultrasound of the thyroid ordered.   • Osteoporosis, 03/29/2011--lumbar spine -2.8.  Right femoral neck -2.4.  Left femoral neck -2.4.  Patient receives Reclast infusions. 2/9/2009 04/19/2017--Reclast infusion.  04/05/2016--Reclast infusion.  06/04/2012--Reclast infusion.  05/26/2011--Reclast infusion.  03/29/2011--DEXA scan revealed a lumbar T score of -2.8, right femoral neck T score -2.4, left femoral neck T score -2.4.  Osteoporosis of the lumbar spine and severe osteopenia of the hips bilaterally.  Patient has been intolerant to Fosamax because of gastritis and gastroesophageal reflux.  04/01/2009--treatment for osteoporosis begun with Fosamax.  02/09/2009--DEXA scan revealed lumbar spine T score of -3.3.  Left femoral neck T score -2.5.  Right hip T score -2.6.  Osteoporosis.    • Pancreatic Duct Dilation 11/30/2001 09/29/2014--patient was again evaluated by the urologist for a renal cyst.  CT scan of the abdomen and pelvis reveals a left renal cyst measuring 5.1 x 4.9 cm.  There were subcentimeter hypodense renal lesions that are too small to further characterize and are presumably related to cysts.  Recommend attention to follow up.  Distal dilated pancreatic duct noted.  The common bile duct is the upper limits of normal in size.  The ampullary region is not well evaluated.  Comparison with prior imaging is recommended if available.  If there is no prior film available for comparison, and ERCP or MRCP could be performed to further evaluate.  Small hiatal hernia noted.  03/05/2012--CT scan of the abdomen with contrast, pancreatic protocol.  This reveals some fullness of the pancreatic ductal system and apparent pancreatic divisum with separate entrance of the accessory pancreatic duct extending into the duodenum distal to the main pancreatic duct.  There is fullness of the duct diffusely but similar to the previous s   • Pedal edema 6/29/2015 06/29/2015--patient presents with a  4-6 week history of exertional dyspnea that comes on with activity such as climbing stairs or walking her dog up an incline.  No chest pain.  Relieved with rest.  No cough.  She does have complaints of her feet and legs swelling that is particularly worse at the end of the day and not improved overnight.  No orthopnea or PND.  Chest exam reveals faint rales at the bases.  Otherwise clear.  Heart is regular and I do not appreciate a heart murmur.  Chest x-ray PA and lateral ordered.  Echocardiogram ordered.   • Restless legs syndrome 4/8/2016   • Simple renal cyst 7/20/2009 09/29/2014--patient was again evaluated by the urologist for a renal cyst.  CT scan of the abdomen and pelvis reveals a left renal cyst measuring 5.1 x 4.9 cm.  There were subcentimeter hypodense renal lesions that are too small to further characterize and are presumably related to cysts.  Recommend attention to follow up.  Distal dilated pancreatic duct noted.  The common bile duct is the upper limits of normal in size.  The ampullary region is not well evaluated.  Comparison with prior imaging is recommended if available.  If there is no prior film available for comparison, and ERCP or MRCP could be performed to further evaluate.  Small hiatal hernia noted.  07/20/2009--patient was noted to have a left renal mass consistent with a cyst.  This was evaluated by the urologist 07/20/2009.   • Statin intolerance 10/30/2017   • Vitamin D deficiency 4/8/2016         Past Surgical History:   Procedure Laterality Date   • APPENDECTOMY  1965    1965   • CATARACT EXTRACTION Bilateral Remote    Remote bilateral cataract extirpation with intraocular lens implantation.   • CHOLECYSTECTOMY WITH INTRAOPERATIVE CHOLANGIOGRAM N/A 1/21/2019 01/21/2019--laparoscopic paraesophageal hernia repair with fundoplication.   • COLONOSCOPY  11/03/2008 2008--normal colonoscopy   • COLONOSCOPY  2001 2001--normal colonoscopy.   • COLONOSCOPY N/A 6/13/2017     06/13/2017--colonoscopy revealed melanosis.  Biopsied.  There was friability with contact bleeding in the ascending colon.  Biopsied.  Pathology returned consistent with melanosis coli.   • ENDOSCOPY  10/08/2014    02/28/2012--air-contrast upper GI revealed small to moderate sized reducible sliding hiatal herniation of the upper stomach with some demonstrated gastroesophageal reflux. No esophageal, gastric, or duodenal mass or mucosal ulceration was seen. 11/03/2008--EGD performed for evaluation of iron deficiency anemia revealed hiatal hernia without evidence of reflux, prepyloric antritis and   • ENDOSCOPY  11/03/2008 11/03/2008--EGD performed for evaluation of iron deficiency anemia revealed hiatal hernia without evidence of reflux, prepyloric antritis and   • ENDOSCOPY  11/19/2001 11/19/2001--EGD revealed a very tortuous distal esophagus with a Schatzki's ring. No ulcer or erosions. No Dorado's mucosa. Stomach revealed patchy erythema and erosions in the antrum. Biopsy. Normal pylorus with no obstruction. Normal duodenum with no ulcers. The Schatzki's ring was dilated with a Rhodes dilator.;   • ENDOSCOPY N/A 9/27/2016 09/27/2016--EGD revealed a normal oropharynx, esophagus, and medium sized hiatal hernia.  Nonbleeding gastric ulcer with no stigmata of bleeding.  Biopsy.  Gastritis.  Biopsy.  Normal examined duodenum.  Biopsied.  Pathology returned mild to moderate chronic active gastritis with ulceration from the stomach antral ulcer biopsy.  Gastroesophageal junction biopsy revealed minimal mixed inflammation.   • ENDOSCOPY N/A 6/13/2017 06/13/2017--colonoscopy revealed melanosis.  Biopsied.  There was friability with contact bleeding in the ascending colon.  Biopsied.  Pathology returned consistent with melanosis coli.   • ERCP N/A 1/22/2019 01/22/2019--ERCP with sphincterotomy and balloon stone extraction.   • ESOPHAGEAL DILATATION  11/19/2001 11/19/2001--EGD revealed a very tortuous  distal esophagus with a Schatzki's ring. No ulcer or erosions. No Dorado's mucosa. Stomach revealed patchy erythema and erosions in the antrum. Biopsy. Normal pylorus with no obstruction. Normal duodenum with no ulcers. The Schatzki's ring was dilated with a Rhodes dilator.;    • ESOPHAGEAL MANOMETRY N/A 12/18/2018    Procedure: ESOPHAGEAL MANOMETRY;  Surgeon: Cecilia, Nurse Performed;  Location: [facility] ENDOSCOPY;  Service: Gastroenterology   • ESOPHAGOSCOPY N/A 1/21/2019    Procedure: FLEXIBLE ESOPHAGOSCOPY;  Surgeon: Reji Johnson MD;  Location: [facility] MAIN OR;  Service: General   • HIATAL HERNIA REPAIR N/A 1/21/2019    Procedure: Laparoscopic paraesophageal hernia repair with toupee fundoplication;  Surgeon: Reji Johnson MD;  Location: [facility] MAIN OR;  Service: General   • INCONTINENCE SURGERY  1979 1979--a bladder tack procedure for urinary stress incontinence   • KNEE ARTHROSCOPY Right 12/12/2011 12/12/2011--right knee arthroscopy with partial lateral and medial meniscectomies.   • SKIN SURGERY  05/25/2010 05/25/2010--skin lesion excised from right lower extremity. 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   • TOTAL ABDOMINAL HYSTERECTOMY  1974 1974--total abdominal hysterectomy.         Allergies   Allergen Reactions   • Statins Nausea And Vomiting   • Morphine Hallucinations   • Penicillins Itching   • Tetanus Toxoids Itching           Current Outpatient Medications:   •  aspirin 81 MG EC tablet, Take 81 mg by mouth Daily. HELD, Disp: , Rfl:   •  buPROPion XL (WELLBUTRIN XL) 150 MG 24 hr tablet, Take 150 mg by mouth Daily., Disp: , Rfl: 0  •  calcium carbonate, oyster shell, 500 MG tablet tablet, Take 500 mg by mouth Daily., Disp: , Rfl:   •  clonazePAM (KlonoPIN) 1 MG tablet, Take 1 mg by mouth 2 (Two) Times a Day As Needed for Seizures., Disp: , Rfl:   •  cycloSPORINE (RESTASIS) 0.05 % ophthalmic emulsion, Administer 1 drop to both eyes 2 (Two) Times  a Day., Disp: , Rfl:   •  dexlansoprazole (DEXILANT) 60 MG capsule, Take 60 mg by mouth Daily., Disp: , Rfl:   •  diclofenac (VOLTAREN) 1 % gel gel, Apply 4 g topically to the appropriate area as directed 4 (Four) Times a Day As Needed., Disp: , Rfl:   •  estradiol (ESTRACE) 1 MG tablet, TAKE 1 TABLET BY MOUTH DAILY, Disp: 90 tablet, Rfl: 0  •  gabapentin (NEURONTIN) 300 MG capsule, Take 300 mg by mouth Every Night., Disp: , Rfl: 2  •  HYDROcodone-acetaminophen (NORCO)  MG per tablet, Take 1 tablet by mouth every 6 (six) hours as needed for moderate pain (4-6)., Disp: , Rfl:   •  Multiple Vitamins-Minerals (MULTIVITAMIN ADULT) chewable tablet, Chew 1 tablet Daily., Disp: , Rfl:   •  senna-docusate (PERICOLACE) 8.6-50 MG per tablet, Take 2 tablets by mouth Daily As Needed for Constipation., Disp: 30 tablet, Rfl: 1  •  topiramate (TOPAMAX) 100 MG tablet, Take 100 mg by mouth 2 (Two) Times a Day., Disp: , Rfl:   •  triamterene-hydrochlorothiazide (DYAZIDE) 37.5-25 MG per capsule, Take 1 capsule by mouth Every Morning., Disp: , Rfl:   •  venlafaxine (EFFEXOR) 75 MG tablet, Take 75 mg by mouth Daily., Disp: , Rfl:       Family History   Problem Relation Age of Onset   • Heart attack Mother         dies age 47 from heart attack   • Heart disease Mother    • Bleeding Disorder Mother    • Heart attack Maternal Aunt         dies age 60 from heart attack   • Ovarian cancer Maternal Grandmother    • Heart disease Father    • Malig Hyperthermia Neg Hx          Social History     Socioeconomic History   • Marital status:      Spouse name: ander   • Number of children: 4   • Years of education: 14   • Highest education level: Associate degree: academic program   Social Needs   • Financial resource strain: Not hard at all   • Food insecurity - worry: Never true   • Food insecurity - inability: Never true   • Transportation needs - medical: Yes   • Transportation needs - non-medical: Yes   Occupational History   •  "Occupation: homemaker   Tobacco Use   • Smoking status: Never Smoker   • Smokeless tobacco: Never Used   Substance and Sexual Activity   • Alcohol use: No   • Drug use: No   • Sexual activity: Yes     Partners: Male   Other Topics Concern   • Not on file   Social History Narrative   • Not on file         Vitals:    02/04/19 1512   BP: 142/70   Pulse: 94   SpO2: 98%   Weight: 60.8 kg (134 lb)   Height: 152.4 cm (60\")          Physical Exam:    General: Alert and oriented x 3.  No acute distress.  Normal affect.  HEENT: Pupils equal, round, reactive to light; extraocular movements intact; sclerae nonicteric; pharynx, ear canals and TMs normal.  Neck: Without JVD, thyromegaly, bruit, or adenopathy.  Lungs: Clear to auscultation in all fields.  Heart: Regular rate and rhythm without murmur, rub, gallop, or click.  Abdomen: Soft, nontender, without hepatosplenomegaly or hernia.  Bowel sounds normal.  : Deferred.  Rectal: Deferred.  Extremities: Without clubbing, cyanosis, edema, or pulse deficit.  Neurologic: Intact without focal deficit.  Normal station and gait observed during ingress and egress from the examination room.  Skin: Without significant lesion.  Musculoskeletal: Unremarkable.    Lab/other results:    I reviewed the documentation from the Hospital including history and physical, EGD report, operative report from her paraesophageal hernia repair and cholecystectomy, discharge summary, discharge medications.    Assessment/Plan:     Diagnosis Plan   1. Hospital discharge follow-up     2. Chronic cholecystitis     3. Paraesophageal hernia     4. Epigastric abdominal pain     5. Chronic renal insufficiency, stage II (mild), creatinine 1.12  CBC (No Diff)    Comprehensive Metabolic Panel   6. Chronic anemia  CBC (No Diff)   7. Benign essential hypertension         Patient seen in Hospital discharge follow-up and is doing quite well.  As it turns out she had chronic cholecystitis and also had a ductal stone and " that certainly explains the pancreatic duct dilatation.  She also has esophageal reflux and had a paraesophageal hernia which has been repaired.  Overall is doing quite well.  She has chronic renal insufficiency and was noted to have episodes of cocaine anemia while in the hospital and we need to assess her renal function as well as her electrolyte status.  She also has chronic anemia and we will assess that as well.  Her blood pressure is slightly elevated today but not enough to make any changes.    Plan is as follows: No change in current medical regimen.  Check CBC and CMP today.  Patient will follow-up on the phone for the results and possible further instructions.  I will have her keep her previously scheduled follow-up appointment in May as planned.      Procedures

## 2019-02-05 LAB
ALBUMIN SERPL-MCNC: 4.1 G/DL (ref 3.5–5.2)
ALBUMIN/GLOB SERPL: 1.6 G/DL
ALP SERPL-CCNC: 152 U/L (ref 39–117)
ALT SERPL-CCNC: 35 U/L (ref 1–33)
AST SERPL-CCNC: 34 U/L (ref 1–32)
BILIRUB SERPL-MCNC: 0.3 MG/DL (ref 0.1–1.2)
BUN SERPL-MCNC: 18 MG/DL (ref 8–23)
BUN/CREAT SERPL: 17 (ref 7–25)
CALCIUM SERPL-MCNC: 10 MG/DL (ref 8.6–10.5)
CHLORIDE SERPL-SCNC: 99 MMOL/L (ref 98–107)
CO2 SERPL-SCNC: 25.3 MMOL/L (ref 22–29)
CREAT SERPL-MCNC: 1.06 MG/DL (ref 0.57–1)
ERYTHROCYTE [DISTWIDTH] IN BLOOD BY AUTOMATED COUNT: 17.8 % (ref 11.7–13)
GLOBULIN SER CALC-MCNC: 2.6 GM/DL
GLUCOSE SERPL-MCNC: 91 MG/DL (ref 65–99)
HCT VFR BLD AUTO: 38 % (ref 35.6–45.5)
HGB BLD-MCNC: 11.5 G/DL (ref 11.9–15.5)
MCH RBC QN AUTO: 25.4 PG (ref 26.9–32)
MCHC RBC AUTO-ENTMCNC: 30.3 G/DL (ref 32.4–36.3)
MCV RBC AUTO: 83.9 FL (ref 80.5–98.2)
PLATELET # BLD AUTO: 491 10*3/MM3 (ref 140–500)
POTASSIUM SERPL-SCNC: 3.8 MMOL/L (ref 3.5–5.2)
PROT SERPL-MCNC: 6.7 G/DL (ref 6–8.5)
RBC # BLD AUTO: 4.53 10*6/MM3 (ref 3.9–5.2)
SODIUM SERPL-SCNC: 141 MMOL/L (ref 136–145)
WBC # BLD AUTO: 8.58 10*3/MM3 (ref 4.5–10.7)

## 2019-02-06 ENCOUNTER — READMISSION MANAGEMENT (OUTPATIENT)
Dept: CALL CENTER | Facility: HOSPITAL | Age: 79
End: 2019-02-06

## 2019-02-06 NOTE — OUTREACH NOTE
General Surgery Week 2 Survey      Responses   Facility patient discharged from?  [facility]   Does the patient have one of the following disease processes/diagnoses(primary or secondary)?  General Surgery   Week 2 attempt successful?  Yes   Call start time  1701   Call end time  1713   Discharge diagnosis  Paraesophageal hernia,  Cholelithiasis and cholecystitis,  Laparoscopic paraesophageal hernia repair with toupet fundoplication, laparoscopic cholecystectomy with intraoperative cholangiogram   Is patient permission given to speak with other caregiver?  No   Meds reviewed with patient/caregiver?  Yes   Does the patient have all medications related to this admission filled (includes all antibiotics, pain medications, etc.)  Yes   Is the patient taking all medications as directed (includes completed medication regime)?  Yes   Does the patient have a follow up appointment scheduled with their surgeon?  Yes   Has the patient kept scheduled appointments due by today?  Yes   Comments  Post-Op with Reji Johnson MD, Tuesday Feb 19, 2019 3:00 PM    Has home health visited the patient within 72 hours of discharge?  N/A   Psychosocial issues?  No   Did the patient receive a copy of their discharge instructions?  Yes   Nursing interventions  Reviewed instructions with patient   What is the patient's perception of their health status since discharge?  Improving   Nursing interventions  Nurse provided patient education   Is the patient /caregiver able to teach back basic post-op care?  No tub bath, swimming, or hot tub until instructed by MD, Keep incision areas clean,dry and protected, Lifting as instructed by MD in discharge instructions, Do not remove steri-strips, Drive as instructed by MD in discharge instructions, Practice 'cough and deep breath'   Is the patient/caregiver able to teach back signs and symptoms of incisional infection?  Increased redness, swelling or pain at the incisonal site, Increased  drainage or bleeding, Incisional warmth, Pus or odor from incision, Fever   Is the patient/caregiver able to teach back steps to recovery at home?  Set small, achievable goals for return to baseline health, Rest and rebuild strength, gradually increase activity   Is the patient/caregiver able to teach back the hierarchy of who to call/visit for symptoms/problems? PCP, Specialist, Home health nurse, Urgent Care, ED, 911  Yes   Week 2 call completed?  Yes          Soniya Viveros RN

## 2019-02-09 ENCOUNTER — APPOINTMENT (OUTPATIENT)
Dept: GENERAL RADIOLOGY | Facility: HOSPITAL | Age: 79
End: 2019-02-09

## 2019-02-09 ENCOUNTER — READMISSION MANAGEMENT (OUTPATIENT)
Dept: CALL CENTER | Facility: HOSPITAL | Age: 79
End: 2019-02-09

## 2019-02-09 ENCOUNTER — APPOINTMENT (OUTPATIENT)
Dept: CT IMAGING | Facility: HOSPITAL | Age: 79
End: 2019-02-09

## 2019-02-09 ENCOUNTER — HOSPITAL ENCOUNTER (OUTPATIENT)
Facility: HOSPITAL | Age: 79
Setting detail: OBSERVATION
Discharge: HOME | End: 2019-02-12
Attending: EMERGENCY MEDICINE | Admitting: INTERNAL MEDICINE

## 2019-02-09 DIAGNOSIS — R07.2 PRECORDIAL PAIN: ICD-10-CM

## 2019-02-09 DIAGNOSIS — M25.551 RIGHT HIP PAIN: ICD-10-CM

## 2019-02-09 DIAGNOSIS — D72.829 LEUKOCYTOSIS, UNSPECIFIED TYPE: Primary | ICD-10-CM

## 2019-02-09 PROBLEM — Z90.49 STATUS POST CHOLECYSTECTOMY: Status: ACTIVE | Noted: 2019-02-09

## 2019-02-09 PROBLEM — E86.0 DEHYDRATION: Status: ACTIVE | Noted: 2019-02-09

## 2019-02-09 PROBLEM — N17.9 AKI (ACUTE KIDNEY INJURY): Status: ACTIVE | Noted: 2019-02-09

## 2019-02-09 LAB
ALBUMIN SERPL-MCNC: 4 G/DL (ref 3.5–5.2)
ALBUMIN/GLOB SERPL: 1.3 G/DL
ALP SERPL-CCNC: 142 U/L (ref 39–117)
ALT SERPL W P-5'-P-CCNC: 50 U/L (ref 1–33)
ANION GAP SERPL CALCULATED.3IONS-SCNC: 16.4 MMOL/L
AST SERPL-CCNC: 69 U/L (ref 1–32)
B PARAPERT DNA SPEC QL NAA+PROBE: NOT DETECTED
B PERT DNA SPEC QL NAA+PROBE: NOT DETECTED
BASOPHILS # BLD AUTO: 0.02 10*3/MM3 (ref 0–0.2)
BASOPHILS NFR BLD AUTO: 0.1 % (ref 0–1.5)
BILIRUB SERPL-MCNC: 0.6 MG/DL (ref 0.1–1.2)
BILIRUB UR QL STRIP: NEGATIVE
BUN BLD-MCNC: 21 MG/DL (ref 8–23)
BUN/CREAT SERPL: 14.6 (ref 7–25)
C PNEUM DNA NPH QL NAA+NON-PROBE: NOT DETECTED
CALCIUM SPEC-SCNC: 10 MG/DL (ref 8.6–10.5)
CHLORIDE SERPL-SCNC: 99 MMOL/L (ref 98–107)
CLARITY UR: ABNORMAL
CO2 SERPL-SCNC: 24.6 MMOL/L (ref 22–29)
COLOR UR: YELLOW
CREAT BLD-MCNC: 1.44 MG/DL (ref 0.57–1)
D-LACTATE SERPL-SCNC: 1.2 MMOL/L (ref 0.5–2)
DEPRECATED RDW RBC AUTO: 53.3 FL (ref 37–54)
EOSINOPHIL # BLD AUTO: 0 10*3/MM3 (ref 0–0.7)
EOSINOPHIL NFR BLD AUTO: 0 % (ref 0.3–6.2)
ERYTHROCYTE [DISTWIDTH] IN BLOOD BY AUTOMATED COUNT: 18 % (ref 11.7–13)
FLUAV H1 2009 PAND RNA NPH QL NAA+PROBE: NOT DETECTED
FLUAV H1 HA GENE NPH QL NAA+PROBE: NOT DETECTED
FLUAV H3 RNA NPH QL NAA+PROBE: NOT DETECTED
FLUAV SUBTYP SPEC NAA+PROBE: NOT DETECTED
FLUBV RNA ISLT QL NAA+PROBE: NOT DETECTED
GFR SERPL CREATININE-BSD FRML MDRD: 35 ML/MIN/1.73
GLOBULIN UR ELPH-MCNC: 3.2 GM/DL
GLUCOSE BLD-MCNC: 120 MG/DL (ref 65–99)
GLUCOSE UR STRIP-MCNC: NEGATIVE MG/DL
HADV DNA SPEC NAA+PROBE: NOT DETECTED
HCOV 229E RNA SPEC QL NAA+PROBE: NOT DETECTED
HCOV HKU1 RNA SPEC QL NAA+PROBE: NOT DETECTED
HCOV NL63 RNA SPEC QL NAA+PROBE: NOT DETECTED
HCOV OC43 RNA SPEC QL NAA+PROBE: NOT DETECTED
HCT VFR BLD AUTO: 41.4 % (ref 35.6–45.5)
HGB BLD-MCNC: 12.9 G/DL (ref 11.9–15.5)
HGB UR QL STRIP.AUTO: NEGATIVE
HMPV RNA NPH QL NAA+NON-PROBE: NOT DETECTED
HOLD SPECIMEN: NORMAL
HOLD SPECIMEN: NORMAL
HPIV1 RNA SPEC QL NAA+PROBE: NOT DETECTED
HPIV2 RNA SPEC QL NAA+PROBE: NOT DETECTED
HPIV3 RNA NPH QL NAA+PROBE: NOT DETECTED
HPIV4 P GENE NPH QL NAA+PROBE: NOT DETECTED
IMM GRANULOCYTES # BLD AUTO: 0.06 10*3/MM3 (ref 0–0.03)
IMM GRANULOCYTES NFR BLD AUTO: 0.3 % (ref 0–0.5)
KETONES UR QL STRIP: ABNORMAL
LEUKOCYTE ESTERASE UR QL STRIP.AUTO: NEGATIVE
LYMPHOCYTES # BLD AUTO: 2.59 10*3/MM3 (ref 0.9–4.8)
LYMPHOCYTES NFR BLD AUTO: 12.7 % (ref 19.6–45.3)
M PNEUMO IGG SER IA-ACNC: NOT DETECTED
MCH RBC QN AUTO: 25.8 PG (ref 26.9–32)
MCHC RBC AUTO-ENTMCNC: 31.2 G/DL (ref 32.4–36.3)
MCV RBC AUTO: 82.8 FL (ref 80.5–98.2)
MONOCYTES # BLD AUTO: 0.94 10*3/MM3 (ref 0.2–1.2)
MONOCYTES NFR BLD AUTO: 4.6 % (ref 5–12)
NEUTROPHILS # BLD AUTO: 16.72 10*3/MM3 (ref 1.9–8.1)
NEUTROPHILS NFR BLD AUTO: 82.3 % (ref 42.7–76)
NITRITE UR QL STRIP: NEGATIVE
PH UR STRIP.AUTO: <=5 [PH] (ref 5–8)
PLATELET # BLD AUTO: 492 10*3/MM3 (ref 140–500)
PMV BLD AUTO: 10.1 FL (ref 6–12)
POTASSIUM BLD-SCNC: 3 MMOL/L (ref 3.5–5.2)
PROCALCITONIN SERPL-MCNC: 0.44 NG/ML (ref 0.1–0.25)
PROT SERPL-MCNC: 7.2 G/DL (ref 6–8.5)
PROT UR QL STRIP: NEGATIVE
RBC # BLD AUTO: 5 10*6/MM3 (ref 3.9–5.2)
RHINOVIRUS RNA SPEC NAA+PROBE: NOT DETECTED
RSV RNA NPH QL NAA+NON-PROBE: NOT DETECTED
SODIUM BLD-SCNC: 140 MMOL/L (ref 136–145)
SP GR UR STRIP: 1.02 (ref 1–1.03)
UROBILINOGEN UR QL STRIP: ABNORMAL
WBC NRBC COR # BLD: 20.33 10*3/MM3 (ref 4.5–10.7)
WHOLE BLOOD HOLD SPECIMEN: NORMAL
WHOLE BLOOD HOLD SPECIMEN: NORMAL

## 2019-02-09 PROCEDURE — 87486 CHLMYD PNEUM DNA AMP PROBE: CPT | Performed by: NURSE PRACTITIONER

## 2019-02-09 PROCEDURE — 84145 PROCALCITONIN (PCT): CPT | Performed by: NURSE PRACTITIONER

## 2019-02-09 PROCEDURE — 74177 CT ABD & PELVIS W/CONTRAST: CPT

## 2019-02-09 PROCEDURE — 25010000002 ONDANSETRON PER 1 MG: Performed by: NURSE PRACTITIONER

## 2019-02-09 PROCEDURE — 87581 M.PNEUMON DNA AMP PROBE: CPT | Performed by: NURSE PRACTITIONER

## 2019-02-09 PROCEDURE — 83605 ASSAY OF LACTIC ACID: CPT | Performed by: NURSE PRACTITIONER

## 2019-02-09 PROCEDURE — 25010000002 HYDROMORPHONE PER 4 MG: Performed by: NURSE PRACTITIONER

## 2019-02-09 PROCEDURE — 96361 HYDRATE IV INFUSION ADD-ON: CPT

## 2019-02-09 PROCEDURE — 25810000003 SODIUM CHLORIDE 0.9 % WITH KCL 20 MEQ 20-0.9 MEQ/L-% SOLUTION: Performed by: INTERNAL MEDICINE

## 2019-02-09 PROCEDURE — 81003 URINALYSIS AUTO W/O SCOPE: CPT | Performed by: NURSE PRACTITIONER

## 2019-02-09 PROCEDURE — 96376 TX/PRO/DX INJ SAME DRUG ADON: CPT

## 2019-02-09 PROCEDURE — G0378 HOSPITAL OBSERVATION PER HR: HCPCS

## 2019-02-09 PROCEDURE — 87633 RESP VIRUS 12-25 TARGETS: CPT | Performed by: NURSE PRACTITIONER

## 2019-02-09 PROCEDURE — 0 IOPAMIDOL PER 1 ML: Performed by: NURSE PRACTITIONER

## 2019-02-09 PROCEDURE — 71046 X-RAY EXAM CHEST 2 VIEWS: CPT

## 2019-02-09 PROCEDURE — 80053 COMPREHEN METABOLIC PANEL: CPT | Performed by: NURSE PRACTITIONER

## 2019-02-09 PROCEDURE — 87798 DETECT AGENT NOS DNA AMP: CPT | Performed by: NURSE PRACTITIONER

## 2019-02-09 PROCEDURE — 99284 EMERGENCY DEPT VISIT MOD MDM: CPT

## 2019-02-09 PROCEDURE — 96375 TX/PRO/DX INJ NEW DRUG ADDON: CPT

## 2019-02-09 PROCEDURE — 85025 COMPLETE CBC W/AUTO DIFF WBC: CPT | Performed by: NURSE PRACTITIONER

## 2019-02-09 PROCEDURE — 25010000002 HYDROMORPHONE PER 4 MG: Performed by: INTERNAL MEDICINE

## 2019-02-09 PROCEDURE — 96374 THER/PROPH/DIAG INJ IV PUSH: CPT

## 2019-02-09 RX ORDER — ESTRADIOL 1 MG/1
1 TABLET ORAL DAILY
Status: DISCONTINUED | OUTPATIENT
Start: 2019-02-09 | End: 2019-02-12 | Stop reason: HOSPADM

## 2019-02-09 RX ORDER — PANTOPRAZOLE SODIUM 40 MG/1
40 TABLET, DELAYED RELEASE ORAL
Status: DISCONTINUED | OUTPATIENT
Start: 2019-02-09 | End: 2019-02-12 | Stop reason: HOSPADM

## 2019-02-09 RX ORDER — HYDROMORPHONE HYDROCHLORIDE 1 MG/ML
0.5 INJECTION, SOLUTION INTRAMUSCULAR; INTRAVENOUS; SUBCUTANEOUS
Status: DISCONTINUED | OUTPATIENT
Start: 2019-02-09 | End: 2019-02-12 | Stop reason: HOSPADM

## 2019-02-09 RX ORDER — ACETAMINOPHEN 325 MG/1
650 TABLET ORAL EVERY 4 HOURS PRN
Status: DISCONTINUED | OUTPATIENT
Start: 2019-02-09 | End: 2019-02-12 | Stop reason: HOSPADM

## 2019-02-09 RX ORDER — CLONAZEPAM 1 MG/1
1 TABLET ORAL 2 TIMES DAILY PRN
Status: DISCONTINUED | OUTPATIENT
Start: 2019-02-09 | End: 2019-02-12 | Stop reason: HOSPADM

## 2019-02-09 RX ORDER — SODIUM CHLORIDE 0.9 % (FLUSH) 0.9 %
3 SYRINGE (ML) INJECTION EVERY 12 HOURS SCHEDULED
Status: DISCONTINUED | OUTPATIENT
Start: 2019-02-09 | End: 2019-02-12 | Stop reason: HOSPADM

## 2019-02-09 RX ORDER — SODIUM CHLORIDE 0.9 % (FLUSH) 0.9 %
10 SYRINGE (ML) INJECTION AS NEEDED
Status: DISCONTINUED | OUTPATIENT
Start: 2019-02-09 | End: 2019-02-12 | Stop reason: HOSPADM

## 2019-02-09 RX ORDER — HYDROMORPHONE HYDROCHLORIDE 1 MG/ML
0.5 INJECTION, SOLUTION INTRAMUSCULAR; INTRAVENOUS; SUBCUTANEOUS ONCE
Status: COMPLETED | OUTPATIENT
Start: 2019-02-09 | End: 2019-02-09

## 2019-02-09 RX ORDER — TOPIRAMATE 100 MG/1
100 TABLET, FILM COATED ORAL EVERY 12 HOURS SCHEDULED
Status: DISCONTINUED | OUTPATIENT
Start: 2019-02-09 | End: 2019-02-12 | Stop reason: HOSPADM

## 2019-02-09 RX ORDER — BUPROPION HYDROCHLORIDE 150 MG/1
150 TABLET ORAL DAILY
Status: DISCONTINUED | OUTPATIENT
Start: 2019-02-09 | End: 2019-02-12 | Stop reason: HOSPADM

## 2019-02-09 RX ORDER — VENLAFAXINE 75 MG/1
75 TABLET ORAL DAILY
Status: DISCONTINUED | OUTPATIENT
Start: 2019-02-09 | End: 2019-02-12 | Stop reason: HOSPADM

## 2019-02-09 RX ORDER — ASPIRIN 81 MG/1
81 TABLET ORAL DAILY
Status: DISCONTINUED | OUTPATIENT
Start: 2019-02-09 | End: 2019-02-12 | Stop reason: HOSPADM

## 2019-02-09 RX ORDER — ONDANSETRON 2 MG/ML
4 INJECTION INTRAMUSCULAR; INTRAVENOUS EVERY 6 HOURS PRN
Status: DISCONTINUED | OUTPATIENT
Start: 2019-02-09 | End: 2019-02-12 | Stop reason: HOSPADM

## 2019-02-09 RX ORDER — GABAPENTIN 300 MG/1
300 CAPSULE ORAL NIGHTLY
Status: DISCONTINUED | OUTPATIENT
Start: 2019-02-09 | End: 2019-02-12 | Stop reason: HOSPADM

## 2019-02-09 RX ORDER — CALCIUM CARBONATE 500(1250)
500 TABLET ORAL DAILY
Status: DISCONTINUED | OUTPATIENT
Start: 2019-02-09 | End: 2019-02-12 | Stop reason: HOSPADM

## 2019-02-09 RX ORDER — MULTIPLE VITAMINS W/ MINERALS TAB 9MG-400MCG
1 TAB ORAL DAILY
Status: DISCONTINUED | OUTPATIENT
Start: 2019-02-09 | End: 2019-02-12 | Stop reason: HOSPADM

## 2019-02-09 RX ORDER — SODIUM CHLORIDE 0.9 % (FLUSH) 0.9 %
3-10 SYRINGE (ML) INJECTION AS NEEDED
Status: DISCONTINUED | OUTPATIENT
Start: 2019-02-09 | End: 2019-02-12 | Stop reason: HOSPADM

## 2019-02-09 RX ORDER — SODIUM CHLORIDE AND POTASSIUM CHLORIDE 150; 900 MG/100ML; MG/100ML
100 INJECTION, SOLUTION INTRAVENOUS CONTINUOUS
Status: DISCONTINUED | OUTPATIENT
Start: 2019-02-09 | End: 2019-02-12 | Stop reason: HOSPADM

## 2019-02-09 RX ORDER — ONDANSETRON 2 MG/ML
4 INJECTION INTRAMUSCULAR; INTRAVENOUS ONCE
Status: COMPLETED | OUTPATIENT
Start: 2019-02-09 | End: 2019-02-09

## 2019-02-09 RX ORDER — CYCLOSPORINE 0.5 MG/ML
1 EMULSION OPHTHALMIC 2 TIMES DAILY
Status: DISCONTINUED | OUTPATIENT
Start: 2019-02-09 | End: 2019-02-12 | Stop reason: HOSPADM

## 2019-02-09 RX ORDER — SENNA AND DOCUSATE SODIUM 50; 8.6 MG/1; MG/1
2 TABLET, FILM COATED ORAL DAILY PRN
Status: DISCONTINUED | OUTPATIENT
Start: 2019-02-09 | End: 2019-02-12 | Stop reason: HOSPADM

## 2019-02-09 RX ADMIN — IOPAMIDOL 85 ML: 755 INJECTION, SOLUTION INTRAVENOUS at 15:12

## 2019-02-09 RX ADMIN — ONDANSETRON 4 MG: 2 INJECTION INTRAMUSCULAR; INTRAVENOUS at 12:23

## 2019-02-09 RX ADMIN — HYDROMORPHONE HYDROCHLORIDE 0.5 MG: 1 INJECTION, SOLUTION INTRAMUSCULAR; INTRAVENOUS; SUBCUTANEOUS at 12:23

## 2019-02-09 RX ADMIN — HYDROMORPHONE HYDROCHLORIDE 0.5 MG: 1 INJECTION, SOLUTION INTRAMUSCULAR; INTRAVENOUS; SUBCUTANEOUS at 16:05

## 2019-02-09 RX ADMIN — HYDROMORPHONE HYDROCHLORIDE 0.5 MG: 1 INJECTION, SOLUTION INTRAMUSCULAR; INTRAVENOUS; SUBCUTANEOUS at 20:32

## 2019-02-09 RX ADMIN — ASPIRIN 81 MG: 81 TABLET, DELAYED RELEASE ORAL at 21:24

## 2019-02-09 RX ADMIN — BUPROPION HYDROCHLORIDE 150 MG: 150 TABLET, FILM COATED, EXTENDED RELEASE ORAL at 21:25

## 2019-02-09 RX ADMIN — SODIUM CHLORIDE 1000 ML: 9 INJECTION, SOLUTION INTRAVENOUS at 12:13

## 2019-02-09 RX ADMIN — GABAPENTIN 300 MG: 300 CAPSULE ORAL at 21:36

## 2019-02-09 RX ADMIN — CLONAZEPAM 1 MG: 1 TABLET ORAL at 21:36

## 2019-02-09 RX ADMIN — VENLAFAXINE HYDROCHLORIDE 75 MG: 75 TABLET ORAL at 21:25

## 2019-02-09 RX ADMIN — MULTIPLE VITAMINS W/ MINERALS TAB 1 TABLET: TAB at 21:25

## 2019-02-09 RX ADMIN — POTASSIUM CHLORIDE AND SODIUM CHLORIDE 100 ML/HR: 900; 150 INJECTION, SOLUTION INTRAVENOUS at 21:23

## 2019-02-09 RX ADMIN — ESTRADIOL 1 MG: 1 TABLET ORAL at 21:26

## 2019-02-09 RX ADMIN — CALCIUM 500 MG: 500 TABLET ORAL at 21:25

## 2019-02-09 RX ADMIN — TOPIRAMATE 100 MG: 100 TABLET, FILM COATED ORAL at 21:26

## 2019-02-09 RX ADMIN — CYCLOSPORINE 1 DROP: 0.5 EMULSION OPHTHALMIC at 21:26

## 2019-02-09 RX ADMIN — SODIUM CHLORIDE, PRESERVATIVE FREE 3 ML: 5 INJECTION INTRAVENOUS at 21:27

## 2019-02-09 RX ADMIN — PANTOPRAZOLE SODIUM 40 MG: 40 TABLET, DELAYED RELEASE ORAL at 21:36

## 2019-02-09 NOTE — ED PROVIDER NOTES
EMERGENCY DEPARTMENT ENCOUNTER    CHIEF COMPLAINT  Chief Complaint: Generalized weakness  History given by:pt   Surgery: Dr Johnson    HPI:  Pt is a 78 y.o. female who presents with a cc of generalized weakness that started last night. Pt  reports the pt has had SOA, abdominal pain, headache, and right hip pain but denies any cough, congestion, or  symptoms. Pt  reports the pt recently had hiatal hernia repair and cholecystectomy three weeks ago.    MEDICAL RECORD REVIEW  Reviewed Op notes from hernia repair and cholecyestectomy on 1/21/19 and 1/22/19    PAST MEDICAL HISTORY  Active Ambulatory Problems     Diagnosis Date Noted   • Osteoporosis, 03/29/2011--lumbar spine -2.8.  Right femoral neck -2.4.  Left femoral neck -2.4.  Patient receives Reclast infusions. 02/09/2009   • Therapeutic drug monitoring 03/23/2016   • Simple renal cyst 07/20/2009   • Benign essential hypertension 04/08/2016   • Carotid artery plaque, 04/02/2018--mild right ICA plaque, normal left ICA 10/03/2014--mild bilateral carotid artery plaque. 07/25/2012   • Chronic gastritis 11/19/2001   • Chronic lower back pain 01/31/2006   • Chronic otitis externa 04/08/2016   • Chronic renal insufficiency, stage II (mild), creatinine 1.12 11/14/2015   • Depression with anxiety 04/08/2016   • Functional murmur, 07/15/2015--normal echocardiogram. 07/15/2015   • Hyperlipidemia 04/08/2016   • Menopausal state 04/08/2016   • Chronic migraine 11/03/2009   • Pancreatic Duct Dilation 11/30/2001   • Pedal edema 06/29/2015   • Restless legs syndrome 04/08/2016   • Bilateral sensorineural hearing loss 03/31/2011   • Vitamin D deficiency 04/08/2016   • Chronic gastric ulcer 04/14/2014   • Chronic fatigue 04/21/2016   • Hypersomnolence 05/05/2016   • Chronic anemia 08/23/2016   • Multinodular goiter 02/17/2017   • Generalized anxiety disorder 04/11/2017   • Iron deficiency anemia 06/01/2017   • Statin intolerance 10/30/2017   • Postmenopausal  state 04/18/2018   • Epigastric abdominal pain 09/06/2018   • Paraesophageal hernia 01/15/2019   • Chronic cholecystitis 02/04/2019   • Hospital discharge follow-up 02/04/2019     Resolved Ambulatory Problems     Diagnosis Date Noted   • History of Dyspnea on exertion 04/08/2016   • Gastroesophageal reflux disease with esophagitis 11/19/2001   • Headache 04/08/2016   • Pancreatic divisum 11/30/2001   • Urinary hesitancy 11/20/2015   • History of Urinary urgency 11/20/2015   • History of bone density study 04/21/2016   • History of chest pain 04/21/2016   • HIstory of Schatzki's ring 04/21/2016   • History of Substernal precordial chest pain 05/05/2016   • History of cardiovascular stress test 05/13/2016   • Pain of left calf 02/15/2017   • Tenderness of left calf 02/15/2017   • Calf swelling 02/15/2017   • Shortness of breath 02/15/2017   • History of Acute deep vein thrombosis (DVT) of distal end of left lower extremity 02/15/2017   • History of mammogram 04/05/2017   • Osteopenia 04/18/2018   • Abnormal weight gain 05/04/2018   • Memory loss 05/04/2018   • Calculus of gallbladder without cholecystitis without obstruction 01/15/2019     Past Medical History:   Diagnosis Date   • Benign essential hypertension 4/8/2016   • Bilateral sensorineural hearing loss 3/31/2011   • Carotid artery plaque, 10/03/2014--mild bilateral carotid artery plaque. 7/25/2012   • Chronic anemia 8/23/2016   • Chronic fatigue 4/21/2016   • Chronic gastric ulcer 4/14/2014   • Chronic gastritis 11/19/2001   • Chronic lower back pain 1/31/2006   • Chronic migraine 11/3/2009   • Chronic otitis externa 4/8/2016   • Chronic renal insufficiency, stage II (mild), creatinine 1.12 11/14/2015   • Depression with anxiety 4/8/2016   • Functional murmur, 07/15/2015--normal echocardiogram. 7/15/2015   • Gastroesophageal reflux disease with esophagitis 11/19/2001   • Generalized anxiety disorder 4/11/2017   • GERD (gastroesophageal reflux disease)    •  History of Acute deep vein thrombosis (DVT) of distal end of left lower extremity 2/15/2017   • History of palpitations 12/07/2011   • HIstory of Schatzki's ring 02/28/2012   • Hyperlipidemia 4/8/2016   • Hypersomnolence 5/5/2016   • Menopausal state 4/8/2016   • Multinodular goiter 2/17/2017   • Osteoporosis, 03/29/2011--lumbar spine -2.8.  Right femoral neck -2.4.  Left femoral neck -2.4.  Patient receives Reclast infusions. 2/9/2009   • Pancreatic Duct Dilation 11/30/2001   • Pedal edema 6/29/2015   • Restless legs syndrome 4/8/2016   • Simple renal cyst 7/20/2009   • Statin intolerance 10/30/2017   • Vitamin D deficiency 4/8/2016       PAST SURGICAL HISTORY  Past Surgical History:   Procedure Laterality Date   • APPENDECTOMY  1965 1965   • CATARACT EXTRACTION Bilateral Remote    Remote bilateral cataract extirpation with intraocular lens implantation.   • CHOLECYSTECTOMY     • CHOLECYSTECTOMY WITH INTRAOPERATIVE CHOLANGIOGRAM N/A 1/21/2019 01/21/2019--laparoscopic paraesophageal hernia repair with fundoplication.   • COLONOSCOPY  11/03/2008 2008--normal colonoscopy   • COLONOSCOPY  2001 2001--normal colonoscopy.   • COLONOSCOPY N/A 6/13/2017 06/13/2017--colonoscopy revealed melanosis.  Biopsied.  There was friability with contact bleeding in the ascending colon.  Biopsied.  Pathology returned consistent with melanosis coli.   • ENDOSCOPY  10/08/2014    02/28/2012--air-contrast upper GI revealed small to moderate sized reducible sliding hiatal herniation of the upper stomach with some demonstrated gastroesophageal reflux. No esophageal, gastric, or duodenal mass or mucosal ulceration was seen. 11/03/2008--EGD performed for evaluation of iron deficiency anemia revealed hiatal hernia without evidence of reflux, prepyloric antritis and   • ENDOSCOPY  11/03/2008 11/03/2008--EGD performed for evaluation of iron deficiency anemia revealed hiatal hernia without evidence of reflux, prepyloric antritis  and   • ENDOSCOPY  11/19/2001 11/19/2001--EGD revealed a very tortuous distal esophagus with a Schatzki's ring. No ulcer or erosions. No Dorado's mucosa. Stomach revealed patchy erythema and erosions in the antrum. Biopsy. Normal pylorus with no obstruction. Normal duodenum with no ulcers. The Schatzki's ring was dilated with a Rhodes dilator.;   • ENDOSCOPY N/A 9/27/2016 09/27/2016--EGD revealed a normal oropharynx, esophagus, and medium sized hiatal hernia.  Nonbleeding gastric ulcer with no stigmata of bleeding.  Biopsy.  Gastritis.  Biopsy.  Normal examined duodenum.  Biopsied.  Pathology returned mild to moderate chronic active gastritis with ulceration from the stomach antral ulcer biopsy.  Gastroesophageal junction biopsy revealed minimal mixed inflammation.   • ENDOSCOPY N/A 6/13/2017 06/13/2017--colonoscopy revealed melanosis.  Biopsied.  There was friability with contact bleeding in the ascending colon.  Biopsied.  Pathology returned consistent with melanosis coli.   • ERCP N/A 1/22/2019 01/22/2019--ERCP with sphincterotomy and balloon stone extraction.   • ESOPHAGEAL DILATATION  11/19/2001 11/19/2001--EGD revealed a very tortuous distal esophagus with a Schatzki's ring. No ulcer or erosions. No Dorado's mucosa. Stomach revealed patchy erythema and erosions in the antrum. Biopsy. Normal pylorus with no obstruction. Normal duodenum with no ulcers. The Schatzki's ring was dilated with a Rhodes dilator.;    • ESOPHAGEAL MANOMETRY N/A 12/18/2018    Procedure: ESOPHAGEAL MANOMETRY;  Surgeon: Cecilia, Nurse Performed;  Location: [facility] ENDOSCOPY;  Service: Gastroenterology   • ESOPHAGOSCOPY N/A 1/21/2019    Procedure: FLEXIBLE ESOPHAGOSCOPY;  Surgeon: Reji Johnson MD;  Location: [facility] MAIN OR;  Service: General   • HERNIA REPAIR     • HIATAL HERNIA REPAIR N/A 1/21/2019    Procedure: Laparoscopic paraesophageal hernia repair with toupee fundoplication;  Surgeon: Reji Johnson  MD Kalia;  Location: [facility] OR;  Service: General   • INCONTINENCE SURGERY  1979 1979--a bladder tack procedure for urinary stress incontinence   • KNEE ARTHROSCOPY Right 12/12/2011 12/12/2011--right knee arthroscopy with partial lateral and medial meniscectomies.   • SKIN SURGERY  05/25/2010 05/25/2010--skin lesion excised from right lower extremity. 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   • TOTAL ABDOMINAL HYSTERECTOMY  1974 1974--total abdominal hysterectomy.       FAMILY HISTORY  Family History   Problem Relation Age of Onset   • Heart attack Mother         dies age 47 from heart attack   • Heart disease Mother    • Bleeding Disorder Mother    • Heart attack Maternal Aunt         dies age 60 from heart attack   • Ovarian cancer Maternal Grandmother    • Heart disease Father    • Malig Hyperthermia Neg Hx        SOCIAL HISTORY  Social History     Socioeconomic History   • Marital status:      Spouse name: ander   • Number of children: 4   • Years of education: 14   • Highest education level: Associate degree: academic program   Social Needs   • Financial resource strain: Not hard at all   • Food insecurity - worry: Never true   • Food insecurity - inability: Never true   • Transportation needs - medical: Yes   • Transportation needs - non-medical: Yes   Occupational History   • Occupation: homemaker   Tobacco Use   • Smoking status: Never Smoker   • Smokeless tobacco: Never Used   Substance and Sexual Activity   • Alcohol use: No   • Drug use: No   • Sexual activity: Defer     Partners: Male   Other Topics Concern   • Not on file   Social History Narrative   • Not on file       ALLERGIES  Statins; Morphine; Penicillins; and Tetanus toxoids    REVIEW OF SYSTEMS  Review of Systems   Constitutional: Positive for fatigue. Negative for fever.   HENT: Negative for congestion and sore throat.    Eyes: Negative for visual disturbance.   Respiratory: Positive for  shortness of breath. Negative for cough and wheezing.    Cardiovascular: Negative for chest pain.   Gastrointestinal: Positive for abdominal pain. Negative for diarrhea, nausea and vomiting.   Genitourinary: Negative for dysuria, frequency and urgency.   Musculoskeletal: Positive for arthralgias (right hip). Negative for back pain and myalgias.   Skin: Negative for rash.   Neurological: Positive for headaches. Negative for dizziness, syncope and weakness.   Psychiatric/Behavioral: Negative for confusion and self-injury. The patient is not nervous/anxious.        PHYSICAL EXAM  ED Triage Vitals [02/09/19 1058]   Temp Heart Rate Resp BP SpO2   97.9 °F (36.6 °C) 86 16 106/49 98 %      Temp src Heart Rate Source Patient Position BP Location FiO2 (%)   Tympanic Monitor -- -- --       Physical Exam   Constitutional: She is oriented to person, place, and time and well-developed, well-nourished, and in no distress. She appears healthy.   HENT:   Head: Normocephalic and atraumatic.   Nose: Nose normal.   Mouth/Throat: Uvula is midline and oropharynx is clear and moist. Mucous membranes are dry.   Eyes: Pupils are equal, round, and reactive to light.   Neck: Normal range of motion. Neck supple.   Cardiovascular: Normal rate, regular rhythm and normal heart sounds.   Pulmonary/Chest: Effort normal and breath sounds normal.   Abdominal: Soft. Normal appearance and bowel sounds are normal. There is no tenderness.   mulitple healed laproscopic incisions to abdomen   Musculoskeletal:        Lumbar back: She exhibits no tenderness.   Nonspecific tenderness to right buttock   Neurological: She is alert and oriented to person, place, and time. GCS score is 15.   Skin: Skin is warm, dry and intact.   Psychiatric: Mood, memory, affect and judgment normal.   Nursing note and vitals reviewed.      LAB RESULTS  Recent Results (from the past 24 hour(s))   Light Blue Top    Collection Time: 02/09/19 11:22 AM   Result Value Ref Range     Extra Tube hold for add-on    Green Top (Gel)    Collection Time: 02/09/19 11:22 AM   Result Value Ref Range    Extra Tube Hold for add-ons.    Lavender Top    Collection Time: 02/09/19 11:22 AM   Result Value Ref Range    Extra Tube hold for add-on    Gold Top - SST    Collection Time: 02/09/19 11:22 AM   Result Value Ref Range    Extra Tube Hold for add-ons.    Comprehensive Metabolic Panel    Collection Time: 02/09/19 11:22 AM   Result Value Ref Range    Glucose 120 (H) 65 - 99 mg/dL    BUN 21 8 - 23 mg/dL    Creatinine 1.44 (H) 0.57 - 1.00 mg/dL    Sodium 140 136 - 145 mmol/L    Potassium 3.0 (L) 3.5 - 5.2 mmol/L    Chloride 99 98 - 107 mmol/L    CO2 24.6 22.0 - 29.0 mmol/L    Calcium 10.0 8.6 - 10.5 mg/dL    Total Protein 7.2 6.0 - 8.5 g/dL    Albumin 4.00 3.50 - 5.20 g/dL    ALT (SGPT) 50 (H) 1 - 33 U/L    AST (SGOT) 69 (H) 1 - 32 U/L    Alkaline Phosphatase 142 (H) 39 - 117 U/L    Total Bilirubin 0.6 0.1 - 1.2 mg/dL    eGFR Non African Amer 35 (L) >60 mL/min/1.73    Globulin 3.2 gm/dL    A/G Ratio 1.3 g/dL    BUN/Creatinine Ratio 14.6 7.0 - 25.0    Anion Gap 16.4 mmol/L   CBC Auto Differential    Collection Time: 02/09/19 11:22 AM   Result Value Ref Range    WBC 20.33 (H) 4.50 - 10.70 10*3/mm3    RBC 5.00 3.90 - 5.20 10*6/mm3    Hemoglobin 12.9 11.9 - 15.5 g/dL    Hematocrit 41.4 35.6 - 45.5 %    MCV 82.8 80.5 - 98.2 fL    MCH 25.8 (L) 26.9 - 32.0 pg    MCHC 31.2 (L) 32.4 - 36.3 g/dL    RDW 18.0 (H) 11.7 - 13.0 %    RDW-SD 53.3 37.0 - 54.0 fl    MPV 10.1 6.0 - 12.0 fL    Platelets 492 140 - 500 10*3/mm3    Neutrophil % 82.3 (H) 42.7 - 76.0 %    Lymphocyte % 12.7 (L) 19.6 - 45.3 %    Monocyte % 4.6 (L) 5.0 - 12.0 %    Eosinophil % 0.0 (L) 0.3 - 6.2 %    Basophil % 0.1 0.0 - 1.5 %    Immature Grans % 0.3 0.0 - 0.5 %    Neutrophils, Absolute 16.72 (H) 1.90 - 8.10 10*3/mm3    Lymphocytes, Absolute 2.59 0.90 - 4.80 10*3/mm3    Monocytes, Absolute 0.94 0.20 - 1.20 10*3/mm3    Eosinophils, Absolute 0.00 0.00 - 0.70  10*3/mm3    Basophils, Absolute 0.02 0.00 - 0.20 10*3/mm3    Immature Grans, Absolute 0.06 (H) 0.00 - 0.03 10*3/mm3   Procalcitonin    Collection Time: 02/09/19 11:22 AM   Result Value Ref Range    Procalcitonin 0.44 (H) 0.10 - 0.25 ng/mL   Lactic Acid, Plasma    Collection Time: 02/09/19 12:20 PM   Result Value Ref Range    Lactate 1.2 0.5 - 2.0 mmol/L   Respiratory Panel, PCR - Swab, Nasopharynx    Collection Time: 02/09/19 12:26 PM   Result Value Ref Range    ADENOVIRUS, PCR Not Detected Not Detected    Coronavirus 229E Not Detected Not Detected    Coronavirus HKU1 Not Detected Not Detected    Coronavirus NL63 Not Detected Not Detected    Coronavirus OC43 Not Detected Not Detected    Human Metapneumovirus Not Detected Not Detected    Human Rhinovirus/Enterovirus Not Detected Not Detected    Influenza B PCR Not Detected Not Detected    Parainfluenza Virus 1 Not Detected Not Detected    Parainfluenza Virus 2 Not Detected Not Detected    Parainfluenza Virus 3 Not Detected Not Detected    Parainfluenza Virus 4 Not Detected Not Detected    Bordetella pertussis pcr Not Detected Not Detected    Influenza A H1 2009 PCR Not Detected Not Detected    Chlamydophila pneumoniae PCR Not Detected Not Detected    Mycoplasma pneumo by PCR Not Detected Not Detected    Influenza A PCR Not Detected Not Detected    Influenza A H3 Not Detected Not Detected    Influenza A H1 Not Detected Not Detected    RSV, PCR Not Detected Not Detected    Bordetella parapertussis PCR Not Detected Not Detected   Urinalysis With Microscopic If Indicated (No Culture) - Urine, Clean Catch    Collection Time: 02/09/19  1:40 PM   Result Value Ref Range    Color, UA Yellow Yellow, Straw    Appearance, UA Cloudy (A) Clear    pH, UA <=5.0 5.0 - 8.0    Specific Gravity, UA 1.023 1.005 - 1.030    Glucose, UA Negative Negative    Ketones, UA Trace (A) Negative    Bilirubin, UA Negative Negative    Blood, UA Negative Negative    Protein, UA Negative Negative     Leuk Esterase, UA Negative Negative    Nitrite, UA Negative Negative    Urobilinogen, UA 0.2 E.U./dL 0.2 - 1.0 E.U./dL       I ordered the above labs and reviewed the results    RADIOLOGY  XR Chest 2 View   Final Result   Negative.       This report was finalized on 2/9/2019 12:55 PM by Dr. Isidro Nazario M.D.          CT Abdomen Pelvis With Contrast      NAD       I ordered the above noted radiological studies and reviewed the images on the PACS system.  Spoke with Dr. Nazario regarding CT scan results      PROCEDURES      COURSE & MEDICAL DECISION MAKING  Pertinent Labs and Imaging studies that were ordered and reviewed are noted above.  Results were reviewed/discussed with the patient. Pt also made aware that some labs, such as cultures, will not be resulted during ER visit and follow up with PMD is necessary.       PROGRESS AND CONSULTS    Progress Notes:         1531   Reviewed pt's history and workup with Dr. Ohara.  After a bedside evaluation; Dr Ohara agrees with the plan of care    1545  Ordered call from [facility]. Ordered dilaudid for pain.    1608  Discussed case with Dr Miles, [facility]  Reviewed history, exam, results and treatments.  Discussed concerns and plan of care. Dr Miles accepts pt to be admitted to med/surg.    1610  Based on the patient's lab findings and presenting symptoms, the attending physican and I feel it is appropriate to admit the patient for further management, evaluation, and treatment.  I have discussed this with the admitting team.  I have also discussed this with the patient/family.  They are in agreement with admission.        MEDICATIONS GIVEN IN ER  Medications   sodium chloride 0.9 % flush 10 mL (not administered)   sodium chloride 0.9 % flush 10 mL (not administered)   HYDROmorphone (DILAUDID) injection 0.5 mg (0.5 mg Intravenous Given 2/9/19 1223)   ondansetron (ZOFRAN) injection 4 mg (4 mg Intravenous Given 2/9/19 1223)   sodium chloride 0.9 % bolus 1,000 mL (0 mL Intravenous  "Stopped 2/9/19 1514)   iopamidol (ISOVUE-370) 76 % injection 100 mL (85 mL Intravenous Given by Other 2/9/19 1512)   HYDROmorphone (DILAUDID) injection 0.5 mg (0.5 mg Intravenous Given 2/9/19 1605)       /55   Pulse 83   Temp 97.9 °F (36.6 °C) (Tympanic)   Resp 16   Ht 152.4 cm (60\")   Wt 58.5 kg (129 lb)   SpO2 94%   BMI 25.19 kg/m²       DIAGNOSIS  Final diagnoses:   Leukocytosis, unspecified type   Right hip pain       Provider attestation:    Documentation assistance provided by kinga Boateng for Rajwinder Barboza.  Information recorded by the scribe was done at my direction and has been verified and validated by me.                Ashley Boateng  02/09/19 [id number]       Rajwinder Barboza, LESLI  02/09/19 [id number]    "

## 2019-02-09 NOTE — OUTREACH NOTE
General Surgery Week 3 Survey      Responses   Facility patient discharged from?  [facility]   Does the patient have one of the following disease processes/diagnoses(primary or secondary)?  General Surgery   Week 3 attempt successful?  No   Revoke  Readmitted          Mayra Stewart RN

## 2019-02-09 NOTE — ED NOTES
Since last night, pt has had lethargy, fatigue, and weakness. Pt also reports headache and lower back pain.     EKG done en route by EMS.     Kerri Mcintosh RN  02/09/19 [id number]

## 2019-02-09 NOTE — ED PROVIDER NOTES
Pt is a 78 y.o. female who presents to the ED complaining of right hip pain that began this morning. Pain worse with bearing weight. Pt denies any significant injuries or trauma to hip. Pt also c/o malaise, generalized weakness, headache, subjective fever, and generalized myalgia. Per , pt hasn't seemed right since last night.    On exam,  Constitutional: Well appearing.   Cardiovascular: heart is RRR  Pulmonary: lungs CTAB  Abdomen: soft, minimal diffuse tenderness  Musculoskeletal: Good ROM of right hip. NVID      Labs and imaging reviewed.  CBC: WBC-20k; lactate-1.2  CT abd/pelvis is negative.     Plan: Admit      MD ATTESTATION NOTE    The EMILY and I have discussed this patient's history, physical exam, and treatment plan.  I have reviewed the documentation and personally had a face to face interaction with the patient. I affirm the documentation and agree with the treatment and plan.  The attached note describes my personal findings.      Documentation assistance provided by kinga Dominguez for Dr. Ohara. Information recorded by the scribe was done at my direction and has been verified and validated by me.             Geovani Dominguez  02/09/19 1822       Geovany Ohara MD  02/09/19 [id number]

## 2019-02-09 NOTE — H&P
Internal medicine history and physical    INTERNAL MEDICINE   [facility]       Patient Identification:  Name: Sachi Vora  Age: 78 y.o.  Sex: female  :  1940  MRN: [id number]                   Primary Care Physician: Cruzito Weir MD                                   Chief Complaint:  Brought to the emergency room by EMS for increasing weakness but got worse since last night.    History of Present Illness:   Patient is a very dramatic historian who has a tendency to digress.  Information gathering was very difficult.  After lots of effort I was able to get this from her.  Patient is a 78-year-old female who is past medical history as noted below underwent extensive intervention for hiatal hernia, cholelithiasis and cholecystectomy without cholecystitis and choledocholithiasis that include ERCP on postoperative day 1 on 2019 and was eventually discharged on 2019.  According to patient she has been struggling with her diet program that was given to her at the time of discharge and has been progressively becoming weak.  The diaper and was designed to allow her to heal from her recent paraesophageal hiatal hernia repair and then subsequent cholecystectomy.  Patient was seen by her primary care physician on 2019 for posthospital discharge follow-up.  During this visit it was noted that she's feeling much better her gastrointestinal symptoms essentially improved and she is tolerating liquid/soft diet.  No changes in her current regimen was made at that time with instructions to have a follow-up on his CBC and CMP and monitor her chronic anemia and electrolytes and renal function.  She was so weak this morning that she couldn't carry out his routine activities and EMS was called and patient was brought to the emergency room.  According to her while she was in the emergency room she started noticing that her right lower back and hip was hurting which was not the case prior to  "EMS picked her up.  She was able to walk without any problem last night.  Again history is very disjointed in all over the place.  Patient denies any fever or denies any chills.    Past Medical History:  Past Medical History:   Diagnosis Date   • Benign essential hypertension 4/8/2016   • Bilateral sensorineural hearing loss 3/31/2011    03/31/2011--etiology reveals reverse \"cookie bite\" type of hearing loss for both ears of mild/moderate degree, mostly sensorineural.  Speech discrimination 100% on the right, 96% on the left.   • Carotid artery plaque, 10/03/2014--mild bilateral carotid artery plaque. 7/25/2012    10/03/2014--[facility] screening revealed mild bilateral carotid plaque, negative for atrial fibrillation, negative for AAA, negative for PAD, osteoporosis screen revealed osteopenia.  Body mass index was 25 and considered to be moderate risk.  07/25/2012--vascular screen negative for carotid plaque, negative for abdominal aneurysm, negative for PAD Description: 10/03/2014--[facility] screening revealed mild bilateral carotid plaque, negative for atrial fibrillation, negative for AAA, negative for PAD, osteoporosis screen revealed osteopenia.  Body mass index was 25 and considered to be moderate risk.  07/25/2012--vascular screen negative for carotid plaque, negative for abdominal aneurysm, negative for PAD   • Chronic anemia 8/23/2016 06/13/2017--colonoscopy revealed melanosis.  Biopsied.  There was friability with contact bleeding in the ascending colon.  Biopsied.  Pathology returned consistent with melanosis coli.  06/13/2017--EGD revealed normal esophagus.  Biopsies taken.  There was a medium-sized hiatal hernia.  Localized mild inflammation and linear erosions were found in the prepyloric region.  Biopsied.  Examined duodenum was normal.  Random biopsies taken.  Pathology of the gastroesophageal junction was unremarkable as was the gastric fundus.  Prepyloric biopsy revealed minimal chronic " inflammation and reactive change.  H pylori negative.  Duodenal biopsies are negative.  04/12/2017--hemoglobin low at 10.8, hematocrit is normal at 35.7.  Iron sulfate 325 mg per day initiated.  08/23/2016--routine follow-up.  Patient continues to have epigastric abdominal pain believed to be related to reflux with possible esophageal spasm.  Hemoglobin noted to be low at 10.6 with a hematocrit low at 33.7 and RDW elevated at 16.2.  Homocysti   • Chronic fatigue 4/21/2016 06/14/2017--overnight oximetry revealed oxygen desaturation index of only 0.44.  There were only 10 total desaturations during the period of testing which lasted 6 hours and 46 minutes.  05/31/2017--patient seen in follow-up and reports she continues to have chronic fatigue as well as hypersomnolence.  Once again, she is on multiple medications that are undoubtedly contributing to this problem including clonazepam and hydrocodone but I doubt that these could be discontinued.  Her CBC back in April revealed a low hemoglobin of 10.8 but hematocrit was normal at 35.7.  Thyroid function tests were normal and CMP normal.  Overnight oximetry test ordered.  Repeat CBC.  Iron studies.  Sedimentation rate and CRP.  04/11/2017--patient seen in follow-up and her blood pressure is now at a reasonable level at 120/64.  She reports she still feels somewhat fatigued but she is much better.  Patient has not been doing any regular exercise and I do think that that would be helpful to reduce her feeling of fatigue.  She is   • Chronic gastric ulcer 4/14/2014    02/15/2017--patient seen in follow-up in nearly 6 months later.  She has complaints of not feeling well all over.  She has complaints of diffuse myalgias and possibly tendinopathy related to Levaquin that I called in prior to her going to [place] for vacation.  She reports that 3 or 4 days after starting the Levaquin she began to have the musculoskeletal symptoms and she reports that she continues to  have them presently.  Symptoms are worse involving her left calf area.  Examination reveals significant tenderness involving the left calf and the left calf seems a little larger than the right.  She also has complaints of shortness of breath but no chest pain.  She is complaining of double vision and generalized weakness and fatigue.  She was complaining of chronic fatigue at the last visit back in September and I ordered an overnight oximetry test but apparently this was never done for reasons that are not clear to me.  Her current oxygen saturation is 94% and normally it is 100%.  Plan is as follows: Summer   • Chronic gastritis 11/19/2001    02/15/2017--patient seen in follow-up in nearly 6 months later.  She has complaints of not feeling well all over.  She has complaints of diffuse myalgias and possibly tendinopathy related to Levaquin that I called in prior to her going to [place] for vacation.  She reports that 3 or 4 days after starting the Levaquin she began to have the musculoskeletal symptoms and she reports that she continues to have them presently.  Symptoms are worse involving her left calf area.  Examination reveals significant tenderness involving the left calf and the left calf seems a little larger than the right.  She also has complaints of shortness of breath but no chest pain.  She is complaining of double vision and generalized weakness and fatigue.  She was complaining of chronic fatigue at the last visit back in September and I ordered an overnight oximetry test but apparently this was never done for reasons that are not clear to me.  Her current oxygen saturation is 94% and normally it is 100%.  Plan is as follows: Summer   • Chronic lower back pain 1/31/2006 08/11/2014--MRI of the lumbar spine reveals the conus terminates at L2 and is normal.  Cauda equina unremarkable.  Stable to moderate degenerative disc disease at L5-S1.  No acute fracture or pars defect is demonstrated.  Small  synovial cysts are seen posterior to the L4-L5 facet.  The perivertebral soft tissues are unremarkable.  T12-L1, L1-L2, L2-L3 are negative.  L3-L4 a broad-based disc bulge resulting in mild bilateral neural foraminal narrowing.  L4-L5 reveals a broad-based disc bulge facet disease resulting in mild bilateral neural foraminal narrowing.  L5-S1 reveals a broad-based disc bulge, posterior osseous slipping, and facet disease resulting in mild to moderate bilateral neural foraminal narrowing.  Assessment is stable mild to moderate degenerative spondylosis.  Small synovial cysts are seen posterior to the L4-L5 facets.  08/11/2014--MRI of the thoracic spine reveals mild to moderate thoracic kyphosis.  No fracture.  At T5--T6 there is a moderate sized left paracentral disc protrusion which i   • Chronic migraine 11/3/2009    01/18/2010--MRI of the brain performed for headaches and memory loss.  Mild small vessel disease in the cerebral and central pontine white matter.  There is an ovoid, somewhat pancake-shaped area of signal abnormality in the anterior inferior right temporal lobe subcortical to the juxtacortical white matter that measures 1.2 x 1 cm and anterior posterior and medial lateral dimension but only measures 3 mm in cranial caudal dimension.  I suspect that this is a benign cyst or a cystic area of encephalomalacia.  The remainder of the MRI of the head is within normal limits.  11/03/2009--CT scan of the brain without contrast performed for headache after a fall.  Mild diffuse atrophy.  No acute abnormality noted.; Description: Patient has had a long history of migraine headaches.  MRI and CT scan of the brain have been essentially negative.   • Chronic otitis externa 4/8/2016   • Chronic renal insufficiency, stage II (mild), creatinine 1.12 11/14/2015 11/14/2015--serum creatinine mildly elevated at 1.12.   • Depression with anxiety 4/8/2016   • Functional murmur, 07/15/2015--normal echocardiogram.  7/15/2015    07/15/2015--echocardiogram reveals normal left ventricular size and function with ejection fraction 55%.  Grade 1 diastolic dysfunction, abnormal relaxation pattern.  Trace tricuspid regurgitation.  Estimated right ventricular systolic pressure is 25 mmHg which is normal.   • Gastroesophageal reflux disease with esophagitis 11/19/2001 06/13/2017--EGD revealed normal esophagus.  Biopsies taken.  There was a medium-sized hiatal hernia.  Localized mild inflammation and linear erosions were found in the prepyloric region.  Biopsied.  Examined duodenum was normal.  Random biopsies taken.  Pathology of the gastroesophageal junction was unremarkable as was the gastric fundus.  Prepyloric biopsy revealed minimal chronic inflammation and reactive change.  H pylori negative.  Duodenal biopsies are negative.  11/03/2014--patient seen in follow-up and reports her epigastric pain/chest pain has resolved.  I reviewed the results of the studies with her.  It does not appear that her problem is related to biliary tract disease.  She does have reflux and although the recent upper GI revealed minimal reflux, I do think her symptoms are related to esophageal reflux with esophageal spasm.  10/21/2014--air-contrast upper GI series revealed trace upper laryngeal penetration.  Persistent small partially reducible sliding hiatal hernia the upper stomach with    • Generalized anxiety disorder 4/11/2017   • GERD (gastroesophageal reflux disease)    • History of Acute deep vein thrombosis (DVT) of distal end of left lower extremity 2/15/2017    03/21/2017 patient seen in follow-up and she is tolerating the Eliquis well without signs or symptoms of bleeding.  Her calf swelling and tenderness is better but not totally resolved.  I suspect that the DVT is chronic and may not resolve at all.  I will order a repeat venous study and then proceed from there.  02/23/2017--repeat Doppler venous study of the left lower extremity reveals  a chronic left lower extremity DVT in the posterior tibial.  All other left sided veins appeared normal.  Fluid collection in the left calf noted.  02/17/2017--patient seen in follow-up and reports her left lower extremity symptoms are about the same.  She continues to have complaints of profound fatigue which I think is multifactorial including underlying depression that is not in remission.  Review the results of the CTA of the chest including the possible thyroid lesion.  I do not think this is contributing to any of her symptoms of fatigue particularly given the fact that her thyroid function tests are normal.  I expla   • History of palpitations 12/07/2011    Patient has had multiple admissions to the hospital for complaints of chest pain and heart palpitations. She meant admitted at least on 3 occasions. She has had at least 3 stress Cardiolite and 1 stress ECG, the last Cardiolite being performed 12/07/2011 which was negative. Patient is also had Holter monitors which have been unremarkable.   • HIstory of Schatzki's ring 02/28/2012 02/28/2012--air-contrast upper GI revealed small to moderate sized reducible sliding hiatal herniation of the upper stomach with some demonstrated gastroesophageal reflux. No esophageal, gastric, or duodenal mass or mucosal ulceration was seen.  11/03/2008--EGD performed for evaluation of iron deficiency anemia revealed hiatal hernia without evidence of reflux, prepyloric antritis and   • Hyperlipidemia 4/8/2016   • Hypersomnolence 5/5/2016 06/14/2017--overnight oximetry revealed oxygen desaturation index of only 0.44.  There were only 10 total desaturations during the period of testing which lasted 6 hours and 46 minutes.  05/31/2017--patient seen in follow-up and reports she continues to have chronic fatigue as well as hypersomnolence.  Once again, she is on multiple medications that are undoubtedly contributing to this problem including clonazepam and hydrocodone but I  doubt that these could be discontinued.  Her CBC back in April revealed a low hemoglobin of 10.8 but hematocrit was normal at 35.7.  Thyroid function tests were normal and CMP normal.  Overnight oximetry test ordered.  Repeat CBC.  Iron studies.  Sedimentation rate and CRP.  04/11/2017--patient seen in follow-up and her blood pressure is now at a reasonable level at 120/64.  She reports she still feels somewhat fatigued but she is much better.  Patient has not been doing any regular exercise and I do think that that would be helpful to reduce her feeling of fatigue.  She is   • Menopausal state 4/8/2016   • Multinodular goiter 2/17/2017 02/21/2017--thyroid ultrasound reveals multinodular thyroid with largest nodule measuring on the order of 1.6 cm in greatest dimension.  02/17/2017--patient seen in follow-up for DVT and CTA of the chest.  An incidental finding on the CTA of the chest reveals a 1.7 cm lesion in the right lobe of thyroid.  Note that thyroid function tests are currently normal.  Ultrasound of the thyroid ordered.   • Osteoporosis, 03/29/2011--lumbar spine -2.8.  Right femoral neck -2.4.  Left femoral neck -2.4.  Patient receives Reclast infusions. 2/9/2009 04/19/2017--Reclast infusion.  04/05/2016--Reclast infusion.  06/04/2012--Reclast infusion.  05/26/2011--Reclast infusion.  03/29/2011--DEXA scan revealed a lumbar T score of -2.8, right femoral neck T score -2.4, left femoral neck T score -2.4.  Osteoporosis of the lumbar spine and severe osteopenia of the hips bilaterally.  Patient has been intolerant to Fosamax because of gastritis and gastroesophageal reflux.  04/01/2009--treatment for osteoporosis begun with Fosamax.  02/09/2009--DEXA scan revealed lumbar spine T score of -3.3.  Left femoral neck T score -2.5.  Right hip T score -2.6.  Osteoporosis.    • Pancreatic Duct Dilation 11/30/2001 09/29/2014--patient was again evaluated by the urologist for a renal cyst.  CT scan of the abdomen  and pelvis reveals a left renal cyst measuring 5.1 x 4.9 cm.  There were subcentimeter hypodense renal lesions that are too small to further characterize and are presumably related to cysts.  Recommend attention to follow up.  Distal dilated pancreatic duct noted.  The common bile duct is the upper limits of normal in size.  The ampullary region is not well evaluated.  Comparison with prior imaging is recommended if available.  If there is no prior film available for comparison, and ERCP or MRCP could be performed to further evaluate.  Small hiatal hernia noted.  03/05/2012--CT scan of the abdomen with contrast, pancreatic protocol.  This reveals some fullness of the pancreatic ductal system and apparent pancreatic divisum with separate entrance of the accessory pancreatic duct extending into the duodenum distal to the main pancreatic duct.  There is fullness of the duct diffusely but similar to the previous s   • Pedal edema 6/29/2015 06/29/2015--patient presents with a 4-6 week history of exertional dyspnea that comes on with activity such as climbing stairs or walking her dog up an incline.  No chest pain.  Relieved with rest.  No cough.  She does have complaints of her feet and legs swelling that is particularly worse at the end of the day and not improved overnight.  No orthopnea or PND.  Chest exam reveals faint rales at the bases.  Otherwise clear.  Heart is regular and I do not appreciate a heart murmur.  Chest x-ray PA and lateral ordered.  Echocardiogram ordered.   • Restless legs syndrome 4/8/2016   • Simple renal cyst 7/20/2009 09/29/2014--patient was again evaluated by the urologist for a renal cyst.  CT scan of the abdomen and pelvis reveals a left renal cyst measuring 5.1 x 4.9 cm.  There were subcentimeter hypodense renal lesions that are too small to further characterize and are presumably related to cysts.  Recommend attention to follow up.  Distal dilated pancreatic duct noted.  The common  bile duct is the upper limits of normal in size.  The ampullary region is not well evaluated.  Comparison with prior imaging is recommended if available.  If there is no prior film available for comparison, and ERCP or MRCP could be performed to further evaluate.  Small hiatal hernia noted.  07/20/2009--patient was noted to have a left renal mass consistent with a cyst.  This was evaluated by the urologist 07/20/2009.   • Statin intolerance 10/30/2017   • Vitamin D deficiency 4/8/2016     Past Surgical History:  Past Surgical History:   Procedure Laterality Date   • APPENDECTOMY  1965    1965   • CATARACT EXTRACTION Bilateral Remote    Remote bilateral cataract extirpation with intraocular lens implantation.   • CHOLECYSTECTOMY     • CHOLECYSTECTOMY WITH INTRAOPERATIVE CHOLANGIOGRAM N/A 1/21/2019 01/21/2019--laparoscopic paraesophageal hernia repair with fundoplication.   • COLONOSCOPY  11/03/2008 2008--normal colonoscopy   • COLONOSCOPY  2001 2001--normal colonoscopy.   • COLONOSCOPY N/A 6/13/2017 06/13/2017--colonoscopy revealed melanosis.  Biopsied.  There was friability with contact bleeding in the ascending colon.  Biopsied.  Pathology returned consistent with melanosis coli.   • ENDOSCOPY  10/08/2014    02/28/2012--air-contrast upper GI revealed small to moderate sized reducible sliding hiatal herniation of the upper stomach with some demonstrated gastroesophageal reflux. No esophageal, gastric, or duodenal mass or mucosal ulceration was seen. 11/03/2008--EGD performed for evaluation of iron deficiency anemia revealed hiatal hernia without evidence of reflux, prepyloric antritis and   • ENDOSCOPY  11/03/2008 11/03/2008--EGD performed for evaluation of iron deficiency anemia revealed hiatal hernia without evidence of reflux, prepyloric antritis and   • ENDOSCOPY  11/19/2001 11/19/2001--EGD revealed a very tortuous distal esophagus with a Schatzki's ring. No ulcer or erosions. No Dorado's  mucosa. Stomach revealed patchy erythema and erosions in the antrum. Biopsy. Normal pylorus with no obstruction. Normal duodenum with no ulcers. The Schatzki's ring was dilated with a Rhodes dilator.;   • ENDOSCOPY N/A 9/27/2016 09/27/2016--EGD revealed a normal oropharynx, esophagus, and medium sized hiatal hernia.  Nonbleeding gastric ulcer with no stigmata of bleeding.  Biopsy.  Gastritis.  Biopsy.  Normal examined duodenum.  Biopsied.  Pathology returned mild to moderate chronic active gastritis with ulceration from the stomach antral ulcer biopsy.  Gastroesophageal junction biopsy revealed minimal mixed inflammation.   • ENDOSCOPY N/A 6/13/2017 06/13/2017--colonoscopy revealed melanosis.  Biopsied.  There was friability with contact bleeding in the ascending colon.  Biopsied.  Pathology returned consistent with melanosis coli.   • ERCP N/A 1/22/2019 01/22/2019--ERCP with sphincterotomy and balloon stone extraction.   • ESOPHAGEAL DILATATION  11/19/2001 11/19/2001--EGD revealed a very tortuous distal esophagus with a Schatzki's ring. No ulcer or erosions. No Dorado's mucosa. Stomach revealed patchy erythema and erosions in the antrum. Biopsy. Normal pylorus with no obstruction. Normal duodenum with no ulcers. The Schatzki's ring was dilated with a Rhodes dilator.;    • ESOPHAGEAL MANOMETRY N/A 12/18/2018    Procedure: ESOPHAGEAL MANOMETRY;  Surgeon: Cecilia, Nurse Performed;  Location: [facility] ENDOSCOPY;  Service: Gastroenterology   • ESOPHAGOSCOPY N/A 1/21/2019    Procedure: FLEXIBLE ESOPHAGOSCOPY;  Surgeon: Reji Johnson MD;  Location: [facility] OR;  Service: General   • HERNIA REPAIR     • HIATAL HERNIA REPAIR N/A 1/21/2019    Procedure: Laparoscopic paraesophageal hernia repair with toupee fundoplication;  Surgeon: Reji Johnson MD;  Location: [facility] OR;  Service: General   • INCONTINENCE SURGERY  1979 1979--a bladder tack procedure for urinary stress incontinence    • KNEE ARTHROSCOPY Right 12/12/2011 12/12/2011--right knee arthroscopy with partial lateral and medial meniscectomies.   • SKIN SURGERY  05/25/2010 05/25/2010--skin lesion excised from right lower extremity. 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   • TOTAL ABDOMINAL HYSTERECTOMY  1974 1974--total abdominal hysterectomy.      Home Meds:    (Not in a hospital admission)  Current Meds:     Current Facility-Administered Medications:   •  sodium chloride 0.9 % flush 10 mL, 10 mL, Intravenous, PRN, Geovany Ohara MD  •  [COMPLETED] Insert peripheral IV, , , Once **AND** sodium chloride 0.9 % flush 10 mL, 10 mL, Intravenous, PRN, Rajwinder Barboza APRN    Current Outpatient Medications:   •  aspirin 81 MG EC tablet, Take 81 mg by mouth Daily. HELD, Disp: , Rfl:   •  buPROPion XL (WELLBUTRIN XL) 150 MG 24 hr tablet, Take 150 mg by mouth Daily., Disp: , Rfl: 0  •  calcium carbonate, oyster shell, 500 MG tablet tablet, Take 500 mg by mouth Daily., Disp: , Rfl:   •  clonazePAM (KlonoPIN) 1 MG tablet, Take 1 mg by mouth 2 (Two) Times a Day As Needed for Seizures., Disp: , Rfl:   •  cycloSPORINE (RESTASIS) 0.05 % ophthalmic emulsion, Administer 1 drop to both eyes 2 (Two) Times a Day., Disp: , Rfl:   •  dexlansoprazole (DEXILANT) 60 MG capsule, Take 60 mg by mouth Daily., Disp: , Rfl:   •  diclofenac (VOLTAREN) 1 % gel gel, Apply 4 g topically to the appropriate area as directed 4 (Four) Times a Day As Needed., Disp: , Rfl:   •  estradiol (ESTRACE) 1 MG tablet, TAKE 1 TABLET BY MOUTH DAILY, Disp: 90 tablet, Rfl: 0  •  gabapentin (NEURONTIN) 300 MG capsule, Take 300 mg by mouth Every Night., Disp: , Rfl: 2  •  HYDROcodone-acetaminophen (NORCO)  MG per tablet, Take 1 tablet by mouth every 6 (six) hours as needed for moderate pain (4-6)., Disp: , Rfl:   •  Multiple Vitamins-Minerals (MULTIVITAMIN ADULT) chewable tablet, Chew 1 tablet Daily., Disp: , Rfl:   •  senna-docusate  (PERICOLACE) 8.6-50 MG per tablet, Take 2 tablets by mouth Daily As Needed for Constipation., Disp: 30 tablet, Rfl: 1  •  topiramate (TOPAMAX) 100 MG tablet, Take 100 mg by mouth 2 (Two) Times a Day., Disp: , Rfl:   •  triamterene-hydrochlorothiazide (DYAZIDE) 37.5-25 MG per capsule, Take 1 capsule by mouth Every Morning., Disp: , Rfl:   •  venlafaxine (EFFEXOR) 75 MG tablet, Take 75 mg by mouth Daily., Disp: , Rfl:   Allergies:  Allergies   Allergen Reactions   • Statins Nausea And Vomiting   • Morphine Hallucinations   • Penicillins Itching   • Tetanus Toxoids Itching     Social History:   Social History     Tobacco Use   • Smoking status: Never Smoker   • Smokeless tobacco: Never Used   Substance Use Topics   • Alcohol use: No      Family History:  Family History   Problem Relation Age of Onset   • Heart attack Mother         dies age 47 from heart attack   • Heart disease Mother    • Bleeding Disorder Mother    • Heart attack Maternal Aunt         dies age 60 from heart attack   • Ovarian cancer Maternal Grandmother    • Heart disease Father    • Malig Hyperthermia Neg Hx           Review of Systems  See history of present illness and past medical history.     Constitutional: Positive for fatigue. Negative for fever.   HENT: Negative for congestion and sore throat.    Eyes: Negative for visual disturbance.   Respiratory: Positive for shortness of breath. Negative for cough and wheezing.    Cardiovascular: Negative for chest pain.   Gastrointestinal: Positive for abdominal pain. Negative for diarrhea, nausea and vomiting.   Genitourinary: Negative for dysuria, frequency and urgency.   Musculoskeletal: Positive for arthralgias (right hip), started after she arrived in the emergency room. Negative for back pain and myalgias.   Skin: Negative for rash.   Neurological: Positive for headaches. Negative for dizziness, syncope and weakness.   Psychiatric/Behavioral: Negative for confusion and self-injury. The patient is  "not nervous/anxious.    Remainder of ROS is negative.      Vitals:   /55   Pulse 83   Temp 97.9 °F (36.6 °C) (Tympanic)   Resp 16   Ht 152.4 cm (60\")   Wt 58.5 kg (129 lb)   SpO2 94%   BMI 25.19 kg/m²   I/O: No intake or output data in the 24 hours ending 02/09/19 1811  Exam:  General Appearance:    Alert, cooperative, no distress, appears stated age, does not appear to be toxic or septic.     Head:    Normocephalic, without obvious abnormality, atraumatic   Eyes:    PERRL, conjunctiva/corneas clear, EOM's intact, both eyes   Ears:    Normal external ear canals, both ears   Nose:   Nares normal, septum midline, mucosa normal, no drainage    or sinus tenderness   Throat:   Lips, tongue, gums normal; oral mucosa pink and moist   Neck:   Supple, symmetrical, trachea midline, no adenopathy;     thyroid:  no enlargement/tenderness/nodules; no carotid    bruit or JVD   Back:     Symmetric, no curvature, ROM normal, no CVA tenderness, has significant tenderness in the right paraspinal muscle and sacroiliac joint area.  No mid spine tenderness noted range of motion of the hip is intact.  Straight leg raising test is negative.     Lungs:     Clear to auscultation bilaterally, respirations unlabored   Chest Wall:    No tenderness or deformity    Heart:    Regular rate and rhythm, S1 and S2 normal, no murmur, rub   or gallop   Abdomen:     Soft, multiple laparoscopic incision into the abdomen.  non-tender, bowel sounds active all four quadrants,     no masses, no hepatomegaly, no splenomegaly   Extremities:   Extremities normal, atraumatic, no cyanosis or edema   Pulses:   Pulses palpable in all extremities; symmetric all extremities   Skin:   Skin color normal, Skin is warm and dry,  no rashes or palpable lesions   Neurologic:   CNII-XII intact, motor strength grossly intact, sensation grossly intact to light touch, no focal deficits noted       Data Review:      I reviewed the patient's new clinical " results.  Results from last 7 days   Lab Units 02/09/19  1122 02/04/19  1547   WBC 10*3/mm3 20.33* 8.58   HEMOGLOBIN g/dL 12.9 11.5*   PLATELETS 10*3/mm3 492 491     Results from last 7 days   Lab Units 02/09/19  1122 02/04/19  1547   SODIUM mmol/L 140 141   POTASSIUM mmol/L 3.0* 3.8   CHLORIDE mmol/L 99 99   TOTAL CO2 mmol/L  --  25.3   CO2 mmol/L 24.6  --    BUN mg/dL 21 18   CREATININE mg/dL 1.44* 1.06*   CALCIUM mg/dL 10.0 10.0   GLUCOSE mg/dL 120*  --      Xr Chest 2 View    Result Date: 2/9/2019  Negative.  This report was finalized on 2/9/2019 12:55 PM by Dr. Isidro Nazario M.D.      Ct Abdomen Pelvis With Contrast    Result Date: 2/9/2019  1. Status post cholecystectomy, hiatal hernia repair and hysterectomy. 2. There may be some minimal wall thickening of the distal stomach which could represent some minimal gastritis. 3. No abscess is seen.    Radiation dose reduction techniques were utilized, including automated exposure control and exposure modulation based on body size.         Assessment:  Active Hospital Problems    Diagnosis Date Noted   • **Leukocytosis [D72.829] 02/09/2019   • Right hip pain [M25.551] 02/09/2019   • ELIS (acute kidney injury) (CMS/HCC) [N17.9] 02/09/2019   • Dehydration [E86.0] 02/09/2019   • Status post cholecystectomy [Z90.49] 02/09/2019   • Benign essential hypertension [I10] 04/08/2016       Medical decision making:  Progressive fatigue and weakness and dizziness in the context of liquid diet and altered diet that she's on following her prior esophageal hernia repair and recent cholecystectomy with abnormal white blood cell count and no specific localizing abnormality except for mild tenderness in the right paraspinal and sacroiliac area with symptoms starting this morning after she arrived in the emergency room.  The only explanation for her fatigue weakness and dizziness could be due to evolving dehydration due to acute or diet pattern that she is on.  No clear explanation of  her leukocytosis given the initial evaluation.  Plan is to monitor her clinical course and reevaluate periodically to see if any evolving specific diagnosis can be made to explain this presentation.  In the meantime provided with hydration and electrolyte replacement.  Left lower back and sacroiliac joint pain and tenderness-likely muscle strain versus sacroiliitis versus pseudogout due to recent alteration in her diet and hydration status.  Plan is to monitor and provide her with symptomatic relief and reassess if she spikes temperature consider blood culture.  An evolving neurological deficits consider MRI of the lower back  Acute on chronic renal insufficiency likely exacerbated by recent decrease intake hospitalizations and dehydration and dietary restriction.  Plan is to provided with cautious hydration and avoid nephrotoxic agents and see how she does.  Leukocytosis of unclear etiology-monitor and reevaluate.  Hypertension-continue her antihypertensive regimen and avoid hypotensive episodes and nephrotoxic agents.  Status post recent paraesophageal hiatal hernia repair and cholecystectomy and ERCP for cholelithiasis and choledocholithiasis on January 21 and 22nd 2019.  Plan is to monitor with serial LFTs and intervene accordingly.  Mayo Miles MD   2/9/2019  6:11 PM  Much of this encounter note is an electronic transcription/translation of spoken language to printed text. The electronic translation of spoken language may permit erroneous, or at times, nonsensical words or phrases to be inadvertently transcribed; Although I have reviewed the note for such errors, some may still exist

## 2019-02-09 NOTE — ED NOTES
Nursing report ED to floor  Sachi Vora  78 y.o.  female    HPI (triage note):   Chief Complaint   Patient presents with   • Fatigue     has felt ill since last night 9p.     • Headache   • Back Pain     low       Admitting doctor:   Mayo Miles MD    Admitting diagnosis:   The primary encounter diagnosis was Leukocytosis, unspecified type. A diagnosis of Right hip pain was also pertinent to this visit.    Code status:   Current Code Status     Date Active Code Status Order ID Comments User Context       Prior          Allergies:   Statins; Morphine; Penicillins; and Tetanus toxoids    Weight:       02/09/19  1108   Weight: 58.5 kg (129 lb)       Most recent vitals:   Vitals:    02/09/19 1443 02/09/19 1515 02/09/19 1543 02/09/19 1616   BP: 109/59  111/55    Pulse: 78  83    Resp:  16  16   Temp:       TempSrc:       SpO2: 98%  94%    Weight:       Height:           Active LDAs/IV Access:   Lines, Drains & Airways    Active LDAs     Name:   Placement date:   Placement time:   Site:   Days:    Peripheral IV 02/09/19 1629 Left Hand   02/09/19    1629    Hand   less than 1                Labs (abnormal labs have a star):   Labs Reviewed   COMPREHENSIVE METABOLIC PANEL - Abnormal; Notable for the following components:       Result Value    Glucose 120 (*)     Creatinine 1.44 (*)     Potassium 3.0 (*)     ALT (SGPT) 50 (*)     AST (SGOT) 69 (*)     Alkaline Phosphatase 142 (*)     eGFR Non  Amer 35 (*)     All other components within normal limits    Narrative:     The MDRD GFR formula is only valid for adults with stable renal function between ages 18 and 70.   URINALYSIS W/ MICROSCOPIC IF INDICATED (NO CULTURE) - Abnormal; Notable for the following components:    Appearance, UA Cloudy (*)     Ketones, UA Trace (*)     All other components within normal limits    Narrative:     Urine microscopic not indicated.   CBC WITH AUTO DIFFERENTIAL - Abnormal; Notable for the following components:    WBC 20.33 (*)     MCH  "25.8 (*)     MCHC 31.2 (*)     RDW 18.0 (*)     Neutrophil % 82.3 (*)     Lymphocyte % 12.7 (*)     Monocyte % 4.6 (*)     Eosinophil % 0.0 (*)     Neutrophils, Absolute 16.72 (*)     Immature Grans, Absolute 0.06 (*)     All other components within normal limits   PROCALCITONIN - Abnormal; Notable for the following components:    Procalcitonin 0.44 (*)     All other components within normal limits    Narrative:     As a Marker for Sepsis (Non-Neonates):   1. <0.5 ng/mL represents a low risk of severe sepsis and/or septic shock.  1. >2 ng/mL represents a high risk of severe sepsis and/or septic shock.    As a Marker for Lower Respiratory Tract Infections that require antibiotic therapy:  PCT on Admission     Antibiotic Therapy             6-12 Hrs later  > 0.5                Strongly Recommended            >0.25 - <0.5         Recommended  0.1 - 0.25           Discouraged                   Remeasure/reassess PCT  <0.1                 Strongly Discouraged          Remeasure/reassess PCT      As 28 day mortality risk marker: \"Change in Procalcitonin Result\" (> 80 % or <=80 %) if Day 0 (or Day 1) and Day 4 values are available. Refer to http://www.[employer]-pct-calculator.com/   Change in PCT <=80 %   A decrease of PCT levels below or equal to 80 % defines a positive change in PCT test result representing a higher risk for 28-day all-cause mortality of patients diagnosed with severe sepsis or septic shock.  Change in PCT > 80 %   A decrease of PCT levels of more than 80 % defines a negative change in PCT result representing a lower risk for 28-day all-cause mortality of patients diagnosed with severe sepsis or septic shock.               RESPIRATORY PANEL, PCR - Normal   LACTIC ACID, PLASMA - Normal   RAINBOW DRAW    Narrative:     The following orders were created for panel order [place] Draw.  Procedure                               Abnormality         Status                     ---------                               " -----------         ------                     Light Blue Top[[id number]]                                   Final result               Green Top (Gel)[[id number]]                                  Final result               Lavender Top[[id number]]                                     Final result               Gold Top - SST[[id number]]                                   Final result                 Please view results for these tests on the individual orders.   LIGHT BLUE TOP   GREEN TOP   LAVENDER TOP   GOLD TOP - SST   CBC AND DIFFERENTIAL    Narrative:     The following orders were created for panel order CBC & Differential.  Procedure                               Abnormality         Status                     ---------                               -----------         ------                     CBC Auto Differential[[id number]]        Abnormal            Final result                 Please view results for these tests on the individual orders.       EKG:   No orders to display       Meds given in ED:   Medications   sodium chloride 0.9 % flush 10 mL (not administered)   sodium chloride 0.9 % flush 10 mL (not administered)   HYDROmorphone (DILAUDID) injection 0.5 mg (0.5 mg Intravenous Given 2/9/19 1223)   ondansetron (ZOFRAN) injection 4 mg (4 mg Intravenous Given 2/9/19 1223)   sodium chloride 0.9 % bolus 1,000 mL (0 mL Intravenous Stopped 2/9/19 1514)   iopamidol (ISOVUE-370) 76 % injection 100 mL (85 mL Intravenous Given by Other 2/9/19 1512)   HYDROmorphone (DILAUDID) injection 0.5 mg (0.5 mg Intravenous Given 2/9/19 1605)       Imaging results:  Xr Chest 2 View    Result Date: 2/9/2019  Negative.  This report was finalized on 2/9/2019 12:55 PM by Dr. Isidro Nazario M.D.      Ct Abdomen Pelvis With Contrast    Result Date: 2/9/2019  1. Status post cholecystectomy, hiatal hernia repair and hysterectomy. 2. There may be some minimal wall thickening of the distal stomach which could represent some minimal  gastritis. 3. No abscess is seen.    Radiation dose reduction techniques were utilized, including automated exposure control and exposure modulation based on body size.         Ambulatory status:   At micki    Social issues:   Social History     Socioeconomic History   • Marital status:      Spouse name: ander   • Number of children: 4   • Years of education: 14   • Highest education level: Associate degree: academic program   Social Needs   • Financial resource strain: Not hard at all   • Food insecurity - worry: Never true   • Food insecurity - inability: Never true   • Transportation needs - medical: Yes   • Transportation needs - non-medical: Yes   Occupational History   • Occupation: homemaker   Tobacco Use   • Smoking status: Never Smoker   • Smokeless tobacco: Never Used   Substance and Sexual Activity   • Alcohol use: No   • Drug use: No   • Sexual activity: Defer     Partners: Male   Other Topics Concern   • Not on file   Social History Narrative   • Not on file          Delmy Hendricks RN  02/09/19 7005

## 2019-02-10 LAB
ALBUMIN SERPL-MCNC: 3.3 G/DL (ref 3.5–5.2)
ALBUMIN/GLOB SERPL: 1.2 G/DL
ALP SERPL-CCNC: 106 U/L (ref 39–117)
ALT SERPL W P-5'-P-CCNC: 40 U/L (ref 1–33)
ANION GAP SERPL CALCULATED.3IONS-SCNC: 11.1 MMOL/L
AST SERPL-CCNC: 55 U/L (ref 1–32)
BASOPHILS # BLD AUTO: 0.04 10*3/MM3 (ref 0–0.2)
BASOPHILS NFR BLD AUTO: 0.4 % (ref 0–1.5)
BILIRUB SERPL-MCNC: 0.3 MG/DL (ref 0.1–1.2)
BUN BLD-MCNC: 14 MG/DL (ref 8–23)
BUN/CREAT SERPL: 15.4 (ref 7–25)
CALCIUM SPEC-SCNC: 8.8 MG/DL (ref 8.6–10.5)
CHLORIDE SERPL-SCNC: 105 MMOL/L (ref 98–107)
CO2 SERPL-SCNC: 23.9 MMOL/L (ref 22–29)
CREAT BLD-MCNC: 0.91 MG/DL (ref 0.57–1)
DEPRECATED RDW RBC AUTO: 53.1 FL (ref 37–54)
EOSINOPHIL # BLD AUTO: 0.2 10*3/MM3 (ref 0–0.7)
EOSINOPHIL NFR BLD AUTO: 2 % (ref 0.3–6.2)
ERYTHROCYTE [DISTWIDTH] IN BLOOD BY AUTOMATED COUNT: 17.9 % (ref 11.7–13)
GFR SERPL CREATININE-BSD FRML MDRD: 60 ML/MIN/1.73
GLOBULIN UR ELPH-MCNC: 2.7 GM/DL
GLUCOSE BLD-MCNC: 90 MG/DL (ref 65–99)
HCT VFR BLD AUTO: 34.3 % (ref 35.6–45.5)
HGB BLD-MCNC: 10.7 G/DL (ref 11.9–15.5)
IMM GRANULOCYTES # BLD AUTO: 0.04 10*3/MM3 (ref 0–0.03)
IMM GRANULOCYTES NFR BLD AUTO: 0.4 % (ref 0–0.5)
LYMPHOCYTES # BLD AUTO: 3.34 10*3/MM3 (ref 0.9–4.8)
LYMPHOCYTES NFR BLD AUTO: 32.8 % (ref 19.6–45.3)
MCH RBC QN AUTO: 25.4 PG (ref 26.9–32)
MCHC RBC AUTO-ENTMCNC: 31.2 G/DL (ref 32.4–36.3)
MCV RBC AUTO: 81.5 FL (ref 80.5–98.2)
MONOCYTES # BLD AUTO: 0.96 10*3/MM3 (ref 0.2–1.2)
MONOCYTES NFR BLD AUTO: 9.4 % (ref 5–12)
NEUTROPHILS # BLD AUTO: 5.64 10*3/MM3 (ref 1.9–8.1)
NEUTROPHILS NFR BLD AUTO: 55.4 % (ref 42.7–76)
PLATELET # BLD AUTO: 443 10*3/MM3 (ref 140–500)
PMV BLD AUTO: 10.7 FL (ref 6–12)
POTASSIUM BLD-SCNC: 3.1 MMOL/L (ref 3.5–5.2)
PROT SERPL-MCNC: 6 G/DL (ref 6–8.5)
RBC # BLD AUTO: 4.21 10*6/MM3 (ref 3.9–5.2)
SODIUM BLD-SCNC: 140 MMOL/L (ref 136–145)
WBC NRBC COR # BLD: 10.18 10*3/MM3 (ref 4.5–10.7)

## 2019-02-10 PROCEDURE — G0378 HOSPITAL OBSERVATION PER HR: HCPCS

## 2019-02-10 PROCEDURE — 25810000003 SODIUM CHLORIDE 0.9 % WITH KCL 20 MEQ 20-0.9 MEQ/L-% SOLUTION: Performed by: INTERNAL MEDICINE

## 2019-02-10 PROCEDURE — 97162 PT EVAL MOD COMPLEX 30 MIN: CPT

## 2019-02-10 PROCEDURE — 80053 COMPREHEN METABOLIC PANEL: CPT | Performed by: INTERNAL MEDICINE

## 2019-02-10 PROCEDURE — 96361 HYDRATE IV INFUSION ADD-ON: CPT

## 2019-02-10 PROCEDURE — 97110 THERAPEUTIC EXERCISES: CPT

## 2019-02-10 PROCEDURE — 85025 COMPLETE CBC W/AUTO DIFF WBC: CPT | Performed by: INTERNAL MEDICINE

## 2019-02-10 RX ORDER — POTASSIUM CHLORIDE 750 MG/1
40 CAPSULE, EXTENDED RELEASE ORAL 2 TIMES DAILY WITH MEALS
Status: DISCONTINUED | OUTPATIENT
Start: 2019-02-10 | End: 2019-02-10

## 2019-02-10 RX ORDER — HYDROCODONE BITARTRATE AND ACETAMINOPHEN 10; 325 MG/1; MG/1
1 TABLET ORAL EVERY 6 HOURS PRN
Status: DISCONTINUED | OUTPATIENT
Start: 2019-02-10 | End: 2019-02-12 | Stop reason: HOSPADM

## 2019-02-10 RX ORDER — POTASSIUM CHLORIDE 1.5 G/1.77G
40 POWDER, FOR SOLUTION ORAL 2 TIMES DAILY
Status: DISPENSED | OUTPATIENT
Start: 2019-02-10 | End: 2019-02-11

## 2019-02-10 RX ADMIN — POTASSIUM CHLORIDE AND SODIUM CHLORIDE 100 ML/HR: 900; 150 INJECTION, SOLUTION INTRAVENOUS at 20:27

## 2019-02-10 RX ADMIN — TOPIRAMATE 100 MG: 100 TABLET, FILM COATED ORAL at 20:22

## 2019-02-10 RX ADMIN — CYCLOSPORINE 1 DROP: 0.5 EMULSION OPHTHALMIC at 22:52

## 2019-02-10 RX ADMIN — HYDROCODONE BITARTRATE AND ACETAMINOPHEN 1 TABLET: 10; 325 TABLET ORAL at 21:39

## 2019-02-10 RX ADMIN — SODIUM CHLORIDE, PRESERVATIVE FREE 3 ML: 5 INJECTION INTRAVENOUS at 08:29

## 2019-02-10 RX ADMIN — VENLAFAXINE HYDROCHLORIDE 75 MG: 75 TABLET ORAL at 08:20

## 2019-02-10 RX ADMIN — POTASSIUM CHLORIDE 40 MEQ: 1.5 POWDER, FOR SOLUTION ORAL at 15:14

## 2019-02-10 RX ADMIN — TOPIRAMATE 100 MG: 100 TABLET, FILM COATED ORAL at 08:21

## 2019-02-10 RX ADMIN — CALCIUM 500 MG: 500 TABLET ORAL at 08:21

## 2019-02-10 RX ADMIN — MULTIPLE VITAMINS W/ MINERALS TAB 1 TABLET: TAB at 08:20

## 2019-02-10 RX ADMIN — HYDROCODONE BITARTRATE AND ACETAMINOPHEN 1 TABLET: 10; 325 TABLET ORAL at 08:31

## 2019-02-10 RX ADMIN — PANTOPRAZOLE SODIUM 40 MG: 40 TABLET, DELAYED RELEASE ORAL at 17:00

## 2019-02-10 RX ADMIN — BUPROPION HYDROCHLORIDE 150 MG: 150 TABLET, FILM COATED, EXTENDED RELEASE ORAL at 08:21

## 2019-02-10 RX ADMIN — SODIUM CHLORIDE, PRESERVATIVE FREE 3 ML: 5 INJECTION INTRAVENOUS at 20:22

## 2019-02-10 RX ADMIN — CYCLOSPORINE 1 DROP: 0.5 EMULSION OPHTHALMIC at 08:21

## 2019-02-10 RX ADMIN — HYDROCODONE BITARTRATE AND ACETAMINOPHEN 1 TABLET: 10; 325 TABLET ORAL at 15:26

## 2019-02-10 RX ADMIN — ESTRADIOL 1 MG: 1 TABLET ORAL at 08:21

## 2019-02-10 RX ADMIN — GABAPENTIN 300 MG: 300 CAPSULE ORAL at 20:22

## 2019-02-10 RX ADMIN — ASPIRIN 81 MG: 81 TABLET, DELAYED RELEASE ORAL at 08:21

## 2019-02-10 RX ADMIN — PANTOPRAZOLE SODIUM 40 MG: 40 TABLET, DELAYED RELEASE ORAL at 06:39

## 2019-02-10 NOTE — PLAN OF CARE
Problem: Patient Care Overview  Goal: Plan of Care Review  Outcome: Ongoing (interventions implemented as appropriate)   02/10/19 1538   Coping/Psychosocial   Plan of Care Reviewed With patient   Plan of Care Review   Progress no change   OTHER   Outcome Summary A&Ox4. Up adlib/standby assist. Pt still c/o right hip pain, norco given x2. IVF continued. K added to med list for hypokalemia. No c/o n/v/d. VSS. Family at bedside. Will continue to monitor.      Goal: Individualization and Mutuality  Outcome: Ongoing (interventions implemented as appropriate)    Goal: Discharge Needs Assessment  Outcome: Ongoing (interventions implemented as appropriate)    Goal: Interprofessional Rounds/Family Conf  Outcome: Ongoing (interventions implemented as appropriate)      Problem: Fall Risk (Adult)  Goal: Identify Related Risk Factors and Signs and Symptoms  Outcome: Ongoing (interventions implemented as appropriate)    Goal: Absence of Fall  Outcome: Ongoing (interventions implemented as appropriate)

## 2019-02-10 NOTE — PLAN OF CARE
Problem: Patient Care Overview  Goal: Plan of Care Review  Outcome: Ongoing (interventions implemented as appropriate)   02/10/19 0335   Coping/Psychosocial   Plan of Care Reviewed With patient   Plan of Care Review   Progress no change   OTHER   Outcome Summary Pt presents to E.R. with right hip pain, weakness, headache and just not feeling well. WBC 20.33.VSS. Monitoring        Problem: Fall Risk (Adult)  Goal: Identify Related Risk Factors and Signs and Symptoms  Outcome: Ongoing (interventions implemented as appropriate)    Goal: Absence of Fall  Outcome: Ongoing (interventions implemented as appropriate)

## 2019-02-10 NOTE — PLAN OF CARE
Problem: Patient Care Overview  Goal: Plan of Care Review  Outcome: Ongoing (interventions implemented as appropriate)   02/10/19 4429   Coping/Psychosocial   Plan of Care Reviewed With patient   OTHER   Outcome Summary Pt demonstrates signs/symptoms of R gluteal muscle strain. She says she fell in the shower 2 weeks ago and hasnt noticed the pain much until now. On exam, she has painful resisted hip ER and painful PROM hip IR and hip abductor weakness. She does not have sciatica signs/symptoms this visit. She is still walking fine (400' this visit) and the hip pain is only mild. May benefit from outpatient PT services. Acute care PT to sign off at this time.

## 2019-02-10 NOTE — PROGRESS NOTES
Name: Sachi Vora ADMIT: 2019   : 1940  PCP: Cruzito Weir MD    MRN: [id number] LOS: 0 days   AGE/SEX: 78 y.o. female  ROOM: [facility]   Subjective     Feels better today, still with right hip pain but this is a little better  Intermittent confusion Friday night and sat am per  but mental status now back to baseline  No soa or cough  No chest pain or palpitations  No nausea or vomiting, tolerating a milk shake currently    Objective   Vital Signs  Temp:  [97.5 °F (36.4 °C)-97.7 °F (36.5 °C)] 97.5 °F (36.4 °C)  Heart Rate:  [78-83] 78  Resp:  [16] 16  BP: ()/(52-67) 100/55  SpO2:  [94 %-100 %] 95 %  on   ;   Device (Oxygen Therapy): room air  Body mass index is 25.19 kg/m².    Physical Exam   Constitutional: She appears well-developed and well-nourished. No distress.   HENT:   Head: Normocephalic and atraumatic.   Mouth/Throat: No oropharyngeal exudate.   Eyes: EOM are normal. Pupils are equal, round, and reactive to light.   Neck: Normal range of motion. Neck supple. No JVD present.   Cardiovascular: Normal rate and regular rhythm. Exam reveals no friction rub.   No murmur heard.  Pulmonary/Chest: Breath sounds normal. No stridor. No respiratory distress. She has no wheezes.   Abdominal: Soft. Bowel sounds are normal. She exhibits no distension and no mass. There is no tenderness.   Musculoskeletal: She exhibits no edema, tenderness or deformity.        Right hip: She exhibits normal range of motion, no tenderness, no bony tenderness, no swelling and no crepitus.   Full range of motion right hip, pyriformis tenderness, straight leg raise negative   Skin: She is not diaphoretic.   Vitals reviewed.      Results Review:       I reviewed the patient's new clinical results.  Results from last 7 days   Lab Units 02/10/19  0657 19  1122 19  1547   WBC 10*3/mm3 10.18 20.33* 8.58   HEMOGLOBIN g/dL 10.7* 12.9 11.5*   PLATELETS 10*3/mm3 445 473 901     Results from last 7 days    Lab Units 02/10/19  0657 02/09/19  1122 02/04/19  1547   SODIUM mmol/L 140 140 141   POTASSIUM mmol/L 3.1* 3.0* 3.8   CHLORIDE mmol/L 105 99 99   TOTAL CO2 mmol/L  --   --  25.3   CO2 mmol/L 23.9 24.6  --    BUN mg/dL 14 21 18   CREATININE mg/dL 0.91 1.44* 1.06*   GLUCOSE mg/dL 90 120*  --    Estimated Creatinine Clearance: 42.1 mL/min (by C-G formula based on SCr of 0.91 mg/dL).  Results from last 7 days   Lab Units 02/10/19  0657 02/09/19  1122 02/04/19  1547   ALBUMIN g/dL 3.30* 4.00 4.10   BILIRUBIN mg/dL 0.3 0.6 0.3   ALK PHOS U/L 106 142* 152*   AST (SGOT) U/L 55* 69* 34*   ALT (SGPT) U/L 40* 50* 35*     Results from last 7 days   Lab Units 02/10/19  0657 02/09/19  1122 02/04/19  1547   CALCIUM mg/dL 8.8 10.0 10.0   ALBUMIN g/dL 3.30* 4.00 4.10     Results from last 7 days   Lab Units 02/09/19  1220 02/09/19  1122   PROCALCITONIN ng/mL  --  0.44*   LACTATE mmol/L 1.2  --          aspirin 81 mg Oral Daily   buPROPion  mg Oral Daily   calcium carbonate (oyster shell) 500 mg Oral Daily   cycloSPORINE 1 drop Both Eyes BID   estradiol 1 mg Oral Daily   gabapentin 300 mg Oral Nightly   multivitamin with minerals 1 tablet Oral Daily   pantoprazole 40 mg Oral BID AC   potassium chloride 40 mEq Oral BID   sodium chloride 3 mL Intravenous Q12H   topiramate 100 mg Oral Q12H   venlafaxine 75 mg Oral Daily       sodium chloride 0.9 % with KCl 20 mEq 100 mL/hr Last Rate: 100 mL/hr (02/10/19 1200)   Diet Soft Texture; Whole Foods; Thin      Assessment/Plan      Active Hospital Problems    Diagnosis Date Noted   • **Leukocytosis [D72.829] 02/09/2019   • Right hip pain [M25.551] 02/09/2019   • ELIS (acute kidney injury) (CMS/HCC) [N17.9] 02/09/2019   • Dehydration [E86.0] 02/09/2019   • Status post cholecystectomy [Z90.49] 02/09/2019   • Benign essential hypertension [I10] 04/08/2016      Resolved Hospital Problems   No resolved problems to display.     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 Consulted PT. Given her recent surgeries will avoid NSAIDS or steroids  · Hypokalemia: replace  · HTN: cont home regimen  · Migraines: cont topamax and tylenol  · Recent paraesophageal hiatal hernia, cholecystectomy and ERCP for cholelithiasis and choledocholithiasis: Will let Dr. Johnson know of admission tomorrow. Doesn't appear to be having any active issues from this. Cont modified diet  · DISPO: likely home tomorrow        Geovany Whitfield MD  [facility]  02/10/19  12:38 PM

## 2019-02-10 NOTE — THERAPY EVALUATION
"Acute Care - Physical Therapy Initial Evaluation  [facility]     Patient Name: Sachi Vora  : 1940  MRN: [id number]  Today's Date: 2/10/2019         Referring Physician: Enoch      Admit Date: 2019    Visit Dx:     ICD-10-CM ICD-9-CM   1. Leukocytosis, unspecified type D72.829 288.60   2. Right hip pain M25.551 719.45     Patient Active Problem List   Diagnosis   • Osteoporosis, 2011--lumbar spine -2.8.  Right femoral neck -2.4.  Left femoral neck -2.4.  Patient receives Reclast infusions.   • Therapeutic drug monitoring   • Simple renal cyst   • Benign essential hypertension   • Carotid artery plaque, 2018--mild right ICA plaque, normal left ICA 10/03/2014--mild bilateral carotid artery plaque.   • Chronic gastritis   • Chronic lower back pain   • Chronic otitis externa   • Chronic renal insufficiency, stage II (mild), creatinine 1.12   • Depression with anxiety   • Functional murmur, 07/15/2015--normal echocardiogram.   • Hyperlipidemia   • Menopausal state   • Chronic migraine   • Pancreatic Duct Dilation   • Pedal edema   • Restless legs syndrome   • Bilateral sensorineural hearing loss   • Vitamin D deficiency   • Chronic gastric ulcer   • Chronic fatigue   • Hypersomnolence   • Chronic anemia   • Multinodular goiter   • Generalized anxiety disorder   • Iron deficiency anemia   • Statin intolerance   • Postmenopausal state   • Epigastric abdominal pain   • Paraesophageal hernia   • Chronic cholecystitis   • Hospital discharge follow-up   • Leukocytosis   • Right hip pain   • ELIS (acute kidney injury) (CMS/HCC)   • Dehydration   • Status post cholecystectomy     Past Medical History:   Diagnosis Date   • Benign essential hypertension 2016   • Bilateral sensorineural hearing loss 3/31/2011    2011--etiology reveals reverse \"cookie bite\" type of hearing loss for both ears of mild/moderate degree, mostly sensorineural.  Speech discrimination 100% on the right, 96% on the " left.   • Carotid artery plaque, 10/03/2014--mild bilateral carotid artery plaque. 7/25/2012    10/03/2014--[facility] screening revealed mild bilateral carotid plaque, negative for atrial fibrillation, negative for AAA, negative for PAD, osteoporosis screen revealed osteopenia.  Body mass index was 25 and considered to be moderate risk.  07/25/2012--vascular screen negative for carotid plaque, negative for abdominal aneurysm, negative for PAD Description: 10/03/2014--[facility] screening revealed mild bilateral carotid plaque, negative for atrial fibrillation, negative for AAA, negative for PAD, osteoporosis screen revealed osteopenia.  Body mass index was 25 and considered to be moderate risk.  07/25/2012--vascular screen negative for carotid plaque, negative for abdominal aneurysm, negative for PAD   • Chronic anemia 8/23/2016 06/13/2017--colonoscopy revealed melanosis.  Biopsied.  There was friability with contact bleeding in the ascending colon.  Biopsied.  Pathology returned consistent with melanosis coli.  06/13/2017--EGD revealed normal esophagus.  Biopsies taken.  There was a medium-sized hiatal hernia.  Localized mild inflammation and linear erosions were found in the prepyloric region.  Biopsied.  Examined duodenum was normal.  Random biopsies taken.  Pathology of the gastroesophageal junction was unremarkable as was the gastric fundus.  Prepyloric biopsy revealed minimal chronic inflammation and reactive change.  H pylori negative.  Duodenal biopsies are negative.  04/12/2017--hemoglobin low at 10.8, hematocrit is normal at 35.7.  Iron sulfate 325 mg per day initiated.  08/23/2016--routine follow-up.  Patient continues to have epigastric abdominal pain believed to be related to reflux with possible esophageal spasm.  Hemoglobin noted to be low at 10.6 with a hematocrit low at 33.7 and RDW elevated at 16.2.  Homocysti   • Chronic fatigue 4/21/2016 06/14/2017--overnight oximetry revealed oxygen  desaturation index of only 0.44.  There were only 10 total desaturations during the period of testing which lasted 6 hours and 46 minutes.  05/31/2017--patient seen in follow-up and reports she continues to have chronic fatigue as well as hypersomnolence.  Once again, she is on multiple medications that are undoubtedly contributing to this problem including clonazepam and hydrocodone but I doubt that these could be discontinued.  Her CBC back in April revealed a low hemoglobin of 10.8 but hematocrit was normal at 35.7.  Thyroid function tests were normal and CMP normal.  Overnight oximetry test ordered.  Repeat CBC.  Iron studies.  Sedimentation rate and CRP.  04/11/2017--patient seen in follow-up and her blood pressure is now at a reasonable level at 120/64.  She reports she still feels somewhat fatigued but she is much better.  Patient has not been doing any regular exercise and I do think that that would be helpful to reduce her feeling of fatigue.  She is   • Chronic gastric ulcer 4/14/2014    02/15/2017--patient seen in follow-up in nearly 6 months later.  She has complaints of not feeling well all over.  She has complaints of diffuse myalgias and possibly tendinopathy related to Levaquin that I called in prior to her going to [place] for vacation.  She reports that 3 or 4 days after starting the Levaquin she began to have the musculoskeletal symptoms and she reports that she continues to have them presently.  Symptoms are worse involving her left calf area.  Examination reveals significant tenderness involving the left calf and the left calf seems a little larger than the right.  She also has complaints of shortness of breath but no chest pain.  She is complaining of double vision and generalized weakness and fatigue.  She was complaining of chronic fatigue at the last visit back in September and I ordered an overnight oximetry test but apparently this was never done for reasons that are not clear to me.   Her current oxygen saturation is 94% and normally it is 100%.  Plan is as follows: Summer   • Chronic gastritis 11/19/2001    02/15/2017--patient seen in follow-up in nearly 6 months later.  She has complaints of not feeling well all over.  She has complaints of diffuse myalgias and possibly tendinopathy related to Levaquin that I called in prior to her going to [place] for vacation.  She reports that 3 or 4 days after starting the Levaquin she began to have the musculoskeletal symptoms and she reports that she continues to have them presently.  Symptoms are worse involving her left calf area.  Examination reveals significant tenderness involving the left calf and the left calf seems a little larger than the right.  She also has complaints of shortness of breath but no chest pain.  She is complaining of double vision and generalized weakness and fatigue.  She was complaining of chronic fatigue at the last visit back in September and I ordered an overnight oximetry test but apparently this was never done for reasons that are not clear to me.  Her current oxygen saturation is 94% and normally it is 100%.  Plan is as follows: Summer   • Chronic lower back pain 1/31/2006 08/11/2014--MRI of the lumbar spine reveals the conus terminates at L2 and is normal.  Cauda equina unremarkable.  Stable to moderate degenerative disc disease at L5-S1.  No acute fracture or pars defect is demonstrated.  Small synovial cysts are seen posterior to the L4-L5 facet.  The perivertebral soft tissues are unremarkable.  T12-L1, L1-L2, L2-L3 are negative.  L3-L4 a broad-based disc bulge resulting in mild bilateral neural foraminal narrowing.  L4-L5 reveals a broad-based disc bulge facet disease resulting in mild bilateral neural foraminal narrowing.  L5-S1 reveals a broad-based disc bulge, posterior osseous slipping, and facet disease resulting in mild to moderate bilateral neural foraminal narrowing.  Assessment is stable mild to  moderate degenerative spondylosis.  Small synovial cysts are seen posterior to the L4-L5 facets.  08/11/2014--MRI of the thoracic spine reveals mild to moderate thoracic kyphosis.  No fracture.  At T5--T6 there is a moderate sized left paracentral disc protrusion which i   • Chronic migraine 11/3/2009    01/18/2010--MRI of the brain performed for headaches and memory loss.  Mild small vessel disease in the cerebral and central pontine white matter.  There is an ovoid, somewhat pancake-shaped area of signal abnormality in the anterior inferior right temporal lobe subcortical to the juxtacortical white matter that measures 1.2 x 1 cm and anterior posterior and medial lateral dimension but only measures 3 mm in cranial caudal dimension.  I suspect that this is a benign cyst or a cystic area of encephalomalacia.  The remainder of the MRI of the head is within normal limits.  11/03/2009--CT scan of the brain without contrast performed for headache after a fall.  Mild diffuse atrophy.  No acute abnormality noted.; Description: Patient has had a long history of migraine headaches.  MRI and CT scan of the brain have been essentially negative.   • Chronic otitis externa 4/8/2016   • Chronic renal insufficiency, stage II (mild), creatinine 1.12 11/14/2015 11/14/2015--serum creatinine mildly elevated at 1.12.   • Depression with anxiety 4/8/2016   • Functional murmur, 07/15/2015--normal echocardiogram. 7/15/2015    07/15/2015--echocardiogram reveals normal left ventricular size and function with ejection fraction 55%.  Grade 1 diastolic dysfunction, abnormal relaxation pattern.  Trace tricuspid regurgitation.  Estimated right ventricular systolic pressure is 25 mmHg which is normal.   • Gastroesophageal reflux disease with esophagitis 11/19/2001 06/13/2017--EGD revealed normal esophagus.  Biopsies taken.  There was a medium-sized hiatal hernia.  Localized mild inflammation and linear erosions were found in the prepyloric  region.  Biopsied.  Examined duodenum was normal.  Random biopsies taken.  Pathology of the gastroesophageal junction was unremarkable as was the gastric fundus.  Prepyloric biopsy revealed minimal chronic inflammation and reactive change.  H pylori negative.  Duodenal biopsies are negative.  11/03/2014--patient seen in follow-up and reports her epigastric pain/chest pain has resolved.  I reviewed the results of the studies with her.  It does not appear that her problem is related to biliary tract disease.  She does have reflux and although the recent upper GI revealed minimal reflux, I do think her symptoms are related to esophageal reflux with esophageal spasm.  10/21/2014--air-contrast upper GI series revealed trace upper laryngeal penetration.  Persistent small partially reducible sliding hiatal hernia the upper stomach with    • Generalized anxiety disorder 4/11/2017   • GERD (gastroesophageal reflux disease)    • History of Acute deep vein thrombosis (DVT) of distal end of left lower extremity 2/15/2017    03/21/2017 patient seen in follow-up and she is tolerating the Eliquis well without signs or symptoms of bleeding.  Her calf swelling and tenderness is better but not totally resolved.  I suspect that the DVT is chronic and may not resolve at all.  I will order a repeat venous study and then proceed from there.  02/23/2017--repeat Doppler venous study of the left lower extremity reveals a chronic left lower extremity DVT in the posterior tibial.  All other left sided veins appeared normal.  Fluid collection in the left calf noted.  02/17/2017--patient seen in follow-up and reports her left lower extremity symptoms are about the same.  She continues to have complaints of profound fatigue which I think is multifactorial including underlying depression that is not in remission.  Review the results of the CTA of the chest including the possible thyroid lesion.  I do not think this is contributing to any of her  symptoms of fatigue particularly given the fact that her thyroid function tests are normal.  I expla   • History of palpitations 12/07/2011    Patient has had multiple admissions to the hospital for complaints of chest pain and heart palpitations. She meant admitted at least on 3 occasions. She has had at least 3 stress Cardiolite and 1 stress ECG, the last Cardiolite being performed 12/07/2011 which was negative. Patient is also had Holter monitors which have been unremarkable.   • HIstory of Schatzki's ring 02/28/2012 02/28/2012--air-contrast upper GI revealed small to moderate sized reducible sliding hiatal herniation of the upper stomach with some demonstrated gastroesophageal reflux. No esophageal, gastric, or duodenal mass or mucosal ulceration was seen.  11/03/2008--EGD performed for evaluation of iron deficiency anemia revealed hiatal hernia without evidence of reflux, prepyloric antritis and   • Hyperlipidemia 4/8/2016   • Hypersomnolence 5/5/2016 06/14/2017--overnight oximetry revealed oxygen desaturation index of only 0.44.  There were only 10 total desaturations during the period of testing which lasted 6 hours and 46 minutes.  05/31/2017--patient seen in follow-up and reports she continues to have chronic fatigue as well as hypersomnolence.  Once again, she is on multiple medications that are undoubtedly contributing to this problem including clonazepam and hydrocodone but I doubt that these could be discontinued.  Her CBC back in April revealed a low hemoglobin of 10.8 but hematocrit was normal at 35.7.  Thyroid function tests were normal and CMP normal.  Overnight oximetry test ordered.  Repeat CBC.  Iron studies.  Sedimentation rate and CRP.  04/11/2017--patient seen in follow-up and her blood pressure is now at a reasonable level at 120/64.  She reports she still feels somewhat fatigued but she is much better.  Patient has not been doing any regular exercise and I do think that that would be  helpful to reduce her feeling of fatigue.  She is   • Menopausal state 4/8/2016   • Multinodular goiter 2/17/2017 02/21/2017--thyroid ultrasound reveals multinodular thyroid with largest nodule measuring on the order of 1.6 cm in greatest dimension.  02/17/2017--patient seen in follow-up for DVT and CTA of the chest.  An incidental finding on the CTA of the chest reveals a 1.7 cm lesion in the right lobe of thyroid.  Note that thyroid function tests are currently normal.  Ultrasound of the thyroid ordered.   • Osteoporosis, 03/29/2011--lumbar spine -2.8.  Right femoral neck -2.4.  Left femoral neck -2.4.  Patient receives Reclast infusions. 2/9/2009 04/19/2017--Reclast infusion.  04/05/2016--Reclast infusion.  06/04/2012--Reclast infusion.  05/26/2011--Reclast infusion.  03/29/2011--DEXA scan revealed a lumbar T score of -2.8, right femoral neck T score -2.4, left femoral neck T score -2.4.  Osteoporosis of the lumbar spine and severe osteopenia of the hips bilaterally.  Patient has been intolerant to Fosamax because of gastritis and gastroesophageal reflux.  04/01/2009--treatment for osteoporosis begun with Fosamax.  02/09/2009--DEXA scan revealed lumbar spine T score of -3.3.  Left femoral neck T score -2.5.  Right hip T score -2.6.  Osteoporosis.    • Pancreatic Duct Dilation 11/30/2001 09/29/2014--patient was again evaluated by the urologist for a renal cyst.  CT scan of the abdomen and pelvis reveals a left renal cyst measuring 5.1 x 4.9 cm.  There were subcentimeter hypodense renal lesions that are too small to further characterize and are presumably related to cysts.  Recommend attention to follow up.  Distal dilated pancreatic duct noted.  The common bile duct is the upper limits of normal in size.  The ampullary region is not well evaluated.  Comparison with prior imaging is recommended if available.  If there is no prior film available for comparison, and ERCP or MRCP could be performed to further  evaluate.  Small hiatal hernia noted.  03/05/2012--CT scan of the abdomen with contrast, pancreatic protocol.  This reveals some fullness of the pancreatic ductal system and apparent pancreatic divisum with separate entrance of the accessory pancreatic duct extending into the duodenum distal to the main pancreatic duct.  There is fullness of the duct diffusely but similar to the previous s   • Pedal edema 6/29/2015 06/29/2015--patient presents with a 4-6 week history of exertional dyspnea that comes on with activity such as climbing stairs or walking her dog up an incline.  No chest pain.  Relieved with rest.  No cough.  She does have complaints of her feet and legs swelling that is particularly worse at the end of the day and not improved overnight.  No orthopnea or PND.  Chest exam reveals faint rales at the bases.  Otherwise clear.  Heart is regular and I do not appreciate a heart murmur.  Chest x-ray PA and lateral ordered.  Echocardiogram ordered.   • Restless legs syndrome 4/8/2016   • Simple renal cyst 7/20/2009 09/29/2014--patient was again evaluated by the urologist for a renal cyst.  CT scan of the abdomen and pelvis reveals a left renal cyst measuring 5.1 x 4.9 cm.  There were subcentimeter hypodense renal lesions that are too small to further characterize and are presumably related to cysts.  Recommend attention to follow up.  Distal dilated pancreatic duct noted.  The common bile duct is the upper limits of normal in size.  The ampullary region is not well evaluated.  Comparison with prior imaging is recommended if available.  If there is no prior film available for comparison, and ERCP or MRCP could be performed to further evaluate.  Small hiatal hernia noted.  07/20/2009--patient was noted to have a left renal mass consistent with a cyst.  This was evaluated by the urologist 07/20/2009.   • Statin intolerance 10/30/2017   • Vitamin D deficiency 4/8/2016     Past Surgical History:   Procedure  Laterality Date   • APPENDECTOMY  1965 1965   • CATARACT EXTRACTION Bilateral Remote    Remote bilateral cataract extirpation with intraocular lens implantation.   • CHOLECYSTECTOMY     • CHOLECYSTECTOMY WITH INTRAOPERATIVE CHOLANGIOGRAM N/A 1/21/2019 01/21/2019--laparoscopic paraesophageal hernia repair with fundoplication.   • COLONOSCOPY  11/03/2008 2008--normal colonoscopy   • COLONOSCOPY  2001 2001--normal colonoscopy.   • COLONOSCOPY N/A 6/13/2017 06/13/2017--colonoscopy revealed melanosis.  Biopsied.  There was friability with contact bleeding in the ascending colon.  Biopsied.  Pathology returned consistent with melanosis coli.   • ENDOSCOPY  10/08/2014    02/28/2012--air-contrast upper GI revealed small to moderate sized reducible sliding hiatal herniation of the upper stomach with some demonstrated gastroesophageal reflux. No esophageal, gastric, or duodenal mass or mucosal ulceration was seen. 11/03/2008--EGD performed for evaluation of iron deficiency anemia revealed hiatal hernia without evidence of reflux, prepyloric antritis and   • ENDOSCOPY  11/03/2008 11/03/2008--EGD performed for evaluation of iron deficiency anemia revealed hiatal hernia without evidence of reflux, prepyloric antritis and   • ENDOSCOPY  11/19/2001 11/19/2001--EGD revealed a very tortuous distal esophagus with a Schatzki's ring. No ulcer or erosions. No Dorado's mucosa. Stomach revealed patchy erythema and erosions in the antrum. Biopsy. Normal pylorus with no obstruction. Normal duodenum with no ulcers. The Schatzki's ring was dilated with a Rhodes dilator.;   • ENDOSCOPY N/A 9/27/2016 09/27/2016--EGD revealed a normal oropharynx, esophagus, and medium sized hiatal hernia.  Nonbleeding gastric ulcer with no stigmata of bleeding.  Biopsy.  Gastritis.  Biopsy.  Normal examined duodenum.  Biopsied.  Pathology returned mild to moderate chronic active gastritis with ulceration from the stomach antral ulcer  biopsy.  Gastroesophageal junction biopsy revealed minimal mixed inflammation.   • ENDOSCOPY N/A 6/13/2017 06/13/2017--colonoscopy revealed melanosis.  Biopsied.  There was friability with contact bleeding in the ascending colon.  Biopsied.  Pathology returned consistent with melanosis coli.   • ERCP N/A 1/22/2019 01/22/2019--ERCP with sphincterotomy and balloon stone extraction.   • ESOPHAGEAL DILATATION  11/19/2001 11/19/2001--EGD revealed a very tortuous distal esophagus with a Schatzki's ring. No ulcer or erosions. No Dorado's mucosa. Stomach revealed patchy erythema and erosions in the antrum. Biopsy. Normal pylorus with no obstruction. Normal duodenum with no ulcers. The Schatzki's ring was dilated with a Rhodes dilator.;    • ESOPHAGEAL MANOMETRY N/A 12/18/2018    Procedure: ESOPHAGEAL MANOMETRY;  Surgeon: Cecilia, Nurse Performed;  Location: [facility] ENDOSCOPY;  Service: Gastroenterology   • ESOPHAGOSCOPY N/A 1/21/2019    Procedure: FLEXIBLE ESOPHAGOSCOPY;  Surgeon: Reji Johnson MD;  Location: [facility] OR;  Service: General   • HERNIA REPAIR     • HIATAL HERNIA REPAIR N/A 1/21/2019    Procedure: Laparoscopic paraesophageal hernia repair with toupee fundoplication;  Surgeon: Reji Johnson MD;  Location: [facility] OR;  Service: General   • INCONTINENCE SURGERY  1979 1979--a bladder tack procedure for urinary stress incontinence   • KNEE ARTHROSCOPY Right 12/12/2011 12/12/2011--right knee arthroscopy with partial lateral and medial meniscectomies.   • SKIN SURGERY  05/25/2010 05/25/2010--skin lesion excised from right lower extremity. 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   • TOTAL ABDOMINAL HYSTERECTOMY  1974 1974--total abdominal hysterectomy.        PT ASSESSMENT (last 12 hours)      Physical Therapy Evaluation     Row Name 02/10/19 1419          PT Evaluation Time/Intention    Subjective Information  complains of;pain  -CS     Document  Type  evaluation  -CS     Mode of Treatment  physical therapy  -CS     Patient Effort  good  -CS     Row Name 02/10/19 1419          General Information    Patient Profile Reviewed?  yes  -CS     Referring Physician  [place]  -CS     Patient Observations  alert;cooperative;agree to therapy  -CS     Patient/Family Observations  Supine in bed, no acute distress  -CS     Prior Level of Function  independent:  -CS     Equipment Currently Used at Home  none  -CS     Pertinent History of Current Functional Problem  R posterior hip pain  -CS     Existing Precautions/Restrictions  fall  -CS     Row Name 02/10/19 1419          Relationship/Environment    Primary Source of Support/Comfort  spouse  -CS     Lives With  spouse  -CS     Row Name 02/10/19 1419          Resource/Environmental Concerns    Current Living Arrangements  home/apartment/condo  -CS     Resource/Environmental Concerns  none  -CS     Row Name 02/10/19 1419          Cognitive Assessment/Intervention- PT/OT    Orientation Status (Cognition)  oriented x 3  -CS     Follows Commands (Cognition)  WNL  -CS     Row Name 02/10/19 1419          Bed Mobility Assessment/Treatment    Bed Mobility Assessment/Treatment  supine-sit;sit-supine  -CS     Supine-Sit [place] (Bed Mobility)  conditional independence  -CS     Sit-Supine [place] (Bed Mobility)  conditional independence  -CS     Row Name 02/10/19 1419          Transfer Assessment/Treatment    Transfer Assessment/Treatment  stand-sit transfer;sit-stand transfer  -CS     Sit-Stand [place] (Transfers)  conditional independence  -CS     Stand-Sit [place] (Transfers)  conditional independence  -CS     Row Name 02/10/19 1419          Gait/Stairs Assessment/Training    [place] Level (Gait)  conditional independence  -CS     Distance in Feet (Gait)  400  -CS     Pattern (Gait)  step-through  -CS     Deviations/Abnormal Patterns (Gait)  gait speed decreased  -CS     Row Name 02/10/19 1416           General ROM    RT Lower Ext  --  -CS     LT Lower Ext  --  -CS     Row Name 02/10/19 1419          Right Lower Ext    Rt Hip External Rotation PROM  painful  -CS     Row Name 02/10/19 1419          MMT (Manual Muscle Testing)    General MMT Comments  Hip abduction 3-/5  -CS     Row Name 02/10/19 1419          Therapeutic Exercise    Comment (Therapeutic Exercise)  Supine clamshell x10  -CS     Row Name 02/10/19 1419          Pain Assessment    Additional Documentation  Pain Scale: FACES Pre/Post-Treatment (Group)  -[facility] Name 02/10/19 1419          Pain Scale: Numbers Pre/Post-Treatment    Pain Location - Side  Right  -CS     Pain Location - Orientation  posterior  -CS     Pain Location  hip  -CS     Pain Intervention(s)  Repositioned;Ambulation/increased activity  -     Row Name 02/10/19 1419          Pain Scale: FACES Pre/Post-Treatment    Pain: FACES Scale, Pretreatment  2-->hurts little bit  -CS     Pain: FACES Scale, Post-Treatment  2-->hurts little bit  -CS     Row Name 02/10/19 1419          Coping    Observed Emotional State  calm;cooperative  -     Verbalized Emotional State  acceptance  -     Row Name 02/10/19 1419          Plan of Care Review    Plan of Care Reviewed With  patient  -[facility] Name 02/10/19 1419          Physical Therapy Clinical Impression    Patient/Family Goals Statement (PT Clinical Impression)  Wants to go home  -CS     Criteria for Skilled Interventions Met (PT Clinical Impression)  no;no problems identified which require skilled intervention  -CS     Care Plan Review (PT)  evaluation/treatment results reviewed  -     Row Name 02/10/19 1419          Physical Therapy Goals    Bed Mobility Goal Selection (PT)  --  -     Row Name 02/10/19 1419          Positioning and Restraints    Pre-Treatment Position  in bed  -CS     Post Treatment Position  bed  -CS     In Bed  supine;encouraged to call for assist;call light within reach;with family/caregiver  -       User Key   (r) = Recorded By, (t) = Taken By, (c) = Cosigned By    Initials Name Provider Type    Herbert Jha, PT Physical Therapist        Physical Therapy Education     Title: PT OT SLP Therapies (Done)     Topic: Physical Therapy (Done)     Point: Mobility training (Done)     Learning Progress Summary           Patient Acceptance, E,TB, VU by  at 2/10/2019  2:26 PM                   Point: Home exercise program (Done)     Learning Progress Summary           Patient Acceptance, E,TB, VU by  at 2/10/2019  2:26 PM                   Point: Body mechanics (Done)     Learning Progress Summary           Patient Acceptance, E,TB, VU by  at 2/10/2019  2:26 PM                   Point: Precautions (Done)     Learning Progress Summary           Patient Acceptance, E,TB, VU by  at 2/10/2019  2:26 PM                               User Key     Initials Effective Dates Name Provider Type Discipline     05/14/18 -  Herbert Bryan, PT Physical Therapist PT              PT Recommendation and Plan  Anticipated Discharge Disposition (PT): home with assist, home with OP services  Therapy Frequency (PT Clinical Impression): evaluation only  Outcome Summary/Treatment Plan (PT)  Anticipated Discharge Disposition (PT): home with assist, home with OP services  Plan of Care Reviewed With: patient  Outcome Summary: Pt demonstrates signs/symptoms of R gluteal muscle strain. She says she fell in the shower 2 weeks ago and hasnt noticed the pain much until now. On exam, she has painful resisted hip ER and painful PROM hip IR and hip abductor weakness. She does not have sciatica signs/symptoms this visit. She is still walking fine (400' this visit) and the hip pain is only mild. May benefit from outpatient PT services. Acute care PT to sign off at this time.   Outcome Measures     Row Name 02/10/19 1400             How much help from another person do you currently need...    Turning from your back to your side while in flat bed without  using bedrails?  4  -CS      Moving from lying on back to sitting on the side of a flat bed without bedrails?  4  -CS      Moving to and from a bed to a chair (including a wheelchair)?  4  -CS      Standing up from a chair using your arms (e.g., wheelchair, bedside chair)?  4  -CS      Climbing 3-5 steps with a railing?  4  -CS      To walk in hospital room?  4  -CS      AM-PAC 6 Clicks Score  24  -CS         Functional Assessment    Outcome Measure Options  AM-PAC 6 Clicks Basic Mobility (PT)  -CS        User Key  (r) = Recorded By, (t) = Taken By, (c) = Cosigned By    Initials Name Provider Type    CS Herbert Bryan, PT Physical Therapist         Time Calculation:   PT Charges     Row Name 02/10/19 1435             Time Calculation    Start Time  1403  -CS      Stop Time  1427  -CS      Time Calculation (min)  24 min  -CS      PT Received On  02/10/19  -CS        User Key  (r) = Recorded By, (t) = Taken By, (c) = Cosigned By    Initials Name Provider Type    Herbert Jha, PT Physical Therapist        Therapy Suggested Charges     Code   Minutes Charges    None           Therapy Charges for Today     Code Description Service Date Service Provider Modifiers Qty    11349711783 HC PT THER PROC EA 15 MIN 2/10/2019 Herbert Bryan, PT GP 1    [id number]  PT EVAL MOD COMPLEXITY 2 2/10/2019 Herbert Bryan PT GP 1          PT G-Codes  Outcome Measure Options: AM-PAC 6 Clicks Basic Mobility (PT)  AM-PAC 6 Clicks Score: 24      Herbert Bryan PT  2/10/2019

## 2019-02-11 ENCOUNTER — APPOINTMENT (OUTPATIENT)
Dept: CT IMAGING | Facility: HOSPITAL | Age: 79
End: 2019-02-11

## 2019-02-11 LAB
ANION GAP SERPL CALCULATED.3IONS-SCNC: 10.7 MMOL/L
BASOPHILS # BLD AUTO: 0.03 10*3/MM3 (ref 0–0.2)
BASOPHILS NFR BLD AUTO: 0.4 % (ref 0–1.5)
BUN BLD-MCNC: 10 MG/DL (ref 8–23)
BUN/CREAT SERPL: 13.7 (ref 7–25)
CALCIUM SPEC-SCNC: 7.7 MG/DL (ref 8.6–10.5)
CHLORIDE SERPL-SCNC: 112 MMOL/L (ref 98–107)
CO2 SERPL-SCNC: 20.3 MMOL/L (ref 22–29)
CREAT BLD-MCNC: 0.73 MG/DL (ref 0.57–1)
D DIMER PPP FEU-MCNC: 1.89 MCGFEU/ML (ref 0–0.49)
DEPRECATED RDW RBC AUTO: 54.8 FL (ref 37–54)
EOSINOPHIL # BLD AUTO: 0.26 10*3/MM3 (ref 0–0.7)
EOSINOPHIL NFR BLD AUTO: 3.8 % (ref 0.3–6.2)
ERYTHROCYTE [DISTWIDTH] IN BLOOD BY AUTOMATED COUNT: 18.1 % (ref 11.7–13)
GFR SERPL CREATININE-BSD FRML MDRD: 78 ML/MIN/1.73
GLUCOSE BLD-MCNC: 90 MG/DL (ref 65–99)
HCT VFR BLD AUTO: 29.1 % (ref 35.6–45.5)
HGB BLD-MCNC: 8.9 G/DL (ref 11.9–15.5)
IMM GRANULOCYTES # BLD AUTO: 0.03 10*3/MM3 (ref 0–0.03)
IMM GRANULOCYTES NFR BLD AUTO: 0.4 % (ref 0–0.5)
LYMPHOCYTES # BLD AUTO: 2.66 10*3/MM3 (ref 0.9–4.8)
LYMPHOCYTES NFR BLD AUTO: 39.2 % (ref 19.6–45.3)
MCH RBC QN AUTO: 25.4 PG (ref 26.9–32)
MCHC RBC AUTO-ENTMCNC: 30.6 G/DL (ref 32.4–36.3)
MCV RBC AUTO: 82.9 FL (ref 80.5–98.2)
MONOCYTES # BLD AUTO: 0.55 10*3/MM3 (ref 0.2–1.2)
MONOCYTES NFR BLD AUTO: 8.1 % (ref 5–12)
NEUTROPHILS # BLD AUTO: 3.26 10*3/MM3 (ref 1.9–8.1)
NEUTROPHILS NFR BLD AUTO: 48.1 % (ref 42.7–76)
PLATELET # BLD AUTO: 353 10*3/MM3 (ref 140–500)
PMV BLD AUTO: 9.4 FL (ref 6–12)
POTASSIUM BLD-SCNC: 3.4 MMOL/L (ref 3.5–5.2)
RBC # BLD AUTO: 3.51 10*6/MM3 (ref 3.9–5.2)
SODIUM BLD-SCNC: 143 MMOL/L (ref 136–145)
TROPONIN T SERPL-MCNC: <0.01 NG/ML (ref 0–0.03)
TROPONIN T SERPL-MCNC: <0.01 NG/ML (ref 0–0.03)
WBC NRBC COR # BLD: 6.79 10*3/MM3 (ref 4.5–10.7)

## 2019-02-11 PROCEDURE — 93005 ELECTROCARDIOGRAM TRACING: CPT | Performed by: INTERNAL MEDICINE

## 2019-02-11 PROCEDURE — 80048 BASIC METABOLIC PNL TOTAL CA: CPT | Performed by: INTERNAL MEDICINE

## 2019-02-11 PROCEDURE — 85379 FIBRIN DEGRADATION QUANT: CPT | Performed by: INTERNAL MEDICINE

## 2019-02-11 PROCEDURE — 71275 CT ANGIOGRAPHY CHEST: CPT

## 2019-02-11 PROCEDURE — 84484 ASSAY OF TROPONIN QUANT: CPT | Performed by: INTERNAL MEDICINE

## 2019-02-11 PROCEDURE — 99204 OFFICE O/P NEW MOD 45 MIN: CPT | Performed by: INTERNAL MEDICINE

## 2019-02-11 PROCEDURE — 96361 HYDRATE IV INFUSION ADD-ON: CPT

## 2019-02-11 PROCEDURE — 25810000003 SODIUM CHLORIDE 0.9 % WITH KCL 20 MEQ 20-0.9 MEQ/L-% SOLUTION: Performed by: INTERNAL MEDICINE

## 2019-02-11 PROCEDURE — G0378 HOSPITAL OBSERVATION PER HR: HCPCS

## 2019-02-11 PROCEDURE — 0 IOPAMIDOL PER 1 ML: Performed by: INTERNAL MEDICINE

## 2019-02-11 PROCEDURE — 96376 TX/PRO/DX INJ SAME DRUG ADON: CPT

## 2019-02-11 PROCEDURE — 25010000002 ONDANSETRON PER 1 MG: Performed by: INTERNAL MEDICINE

## 2019-02-11 PROCEDURE — 85025 COMPLETE CBC W/AUTO DIFF WBC: CPT | Performed by: INTERNAL MEDICINE

## 2019-02-11 PROCEDURE — 93010 ELECTROCARDIOGRAM REPORT: CPT | Performed by: INTERNAL MEDICINE

## 2019-02-11 RX ORDER — POTASSIUM CHLORIDE 750 MG/1
40 CAPSULE, EXTENDED RELEASE ORAL ONCE
Status: COMPLETED | OUTPATIENT
Start: 2019-02-11 | End: 2019-02-11

## 2019-02-11 RX ORDER — POTASSIUM CHLORIDE 1.5 G/1.77G
40 POWDER, FOR SOLUTION ORAL 2 TIMES DAILY
Status: DISCONTINUED | OUTPATIENT
Start: 2019-02-11 | End: 2019-02-11 | Stop reason: SDUPTHER

## 2019-02-11 RX ORDER — POTASSIUM CHLORIDE 750 MG/1
40 CAPSULE, EXTENDED RELEASE ORAL 2 TIMES DAILY WITH MEALS
Status: COMPLETED | OUTPATIENT
Start: 2019-02-11 | End: 2019-02-11

## 2019-02-11 RX ADMIN — CLONAZEPAM 1 MG: 1 TABLET ORAL at 21:32

## 2019-02-11 RX ADMIN — BUPROPION HYDROCHLORIDE 150 MG: 150 TABLET, FILM COATED, EXTENDED RELEASE ORAL at 08:56

## 2019-02-11 RX ADMIN — ASPIRIN 81 MG: 81 TABLET, DELAYED RELEASE ORAL at 08:56

## 2019-02-11 RX ADMIN — PANTOPRAZOLE SODIUM 40 MG: 40 TABLET, DELAYED RELEASE ORAL at 05:50

## 2019-02-11 RX ADMIN — HYDROCODONE BITARTRATE AND ACETAMINOPHEN 1 TABLET: 10; 325 TABLET ORAL at 15:38

## 2019-02-11 RX ADMIN — IOPAMIDOL 95 ML: 755 INJECTION, SOLUTION INTRAVENOUS at 19:47

## 2019-02-11 RX ADMIN — POTASSIUM CHLORIDE AND SODIUM CHLORIDE 100 ML/HR: 900; 150 INJECTION, SOLUTION INTRAVENOUS at 05:49

## 2019-02-11 RX ADMIN — ONDANSETRON 4 MG: 2 INJECTION INTRAMUSCULAR; INTRAVENOUS at 13:30

## 2019-02-11 RX ADMIN — ESTRADIOL 1 MG: 1 TABLET ORAL at 08:56

## 2019-02-11 RX ADMIN — SODIUM CHLORIDE, PRESERVATIVE FREE 3 ML: 5 INJECTION INTRAVENOUS at 21:45

## 2019-02-11 RX ADMIN — ONDANSETRON 4 MG: 2 INJECTION INTRAMUSCULAR; INTRAVENOUS at 21:32

## 2019-02-11 RX ADMIN — POTASSIUM CHLORIDE 40 MEQ: 750 CAPSULE, EXTENDED RELEASE ORAL at 14:32

## 2019-02-11 RX ADMIN — HYDROCODONE BITARTRATE AND ACETAMINOPHEN 1 TABLET: 10; 325 TABLET ORAL at 21:32

## 2019-02-11 RX ADMIN — TOPIRAMATE 100 MG: 100 TABLET, FILM COATED ORAL at 21:32

## 2019-02-11 RX ADMIN — GABAPENTIN 300 MG: 300 CAPSULE ORAL at 21:38

## 2019-02-11 RX ADMIN — CALCIUM 500 MG: 500 TABLET ORAL at 08:56

## 2019-02-11 RX ADMIN — PANTOPRAZOLE SODIUM 40 MG: 40 TABLET, DELAYED RELEASE ORAL at 18:24

## 2019-02-11 RX ADMIN — TOPIRAMATE 100 MG: 100 TABLET, FILM COATED ORAL at 08:56

## 2019-02-11 RX ADMIN — HYDROCODONE BITARTRATE AND ACETAMINOPHEN 1 TABLET: 10; 325 TABLET ORAL at 05:50

## 2019-02-11 RX ADMIN — POTASSIUM CHLORIDE AND SODIUM CHLORIDE 100 ML/HR: 900; 150 INJECTION, SOLUTION INTRAVENOUS at 13:30

## 2019-02-11 RX ADMIN — POTASSIUM CHLORIDE 40 MEQ: 750 CAPSULE, EXTENDED RELEASE ORAL at 21:33

## 2019-02-11 RX ADMIN — CYCLOSPORINE 1 DROP: 0.5 EMULSION OPHTHALMIC at 08:56

## 2019-02-11 RX ADMIN — VENLAFAXINE HYDROCHLORIDE 75 MG: 75 TABLET ORAL at 08:56

## 2019-02-11 RX ADMIN — SODIUM CHLORIDE, PRESERVATIVE FREE 3 ML: 5 INJECTION INTRAVENOUS at 08:56

## 2019-02-11 RX ADMIN — MULTIPLE VITAMINS W/ MINERALS TAB 1 TABLET: TAB at 18:24

## 2019-02-11 NOTE — PROGRESS NOTES
Name: Sachi Vora ADMIT: 2019   : 1940  PCP: Cruzito Weir MD    MRN: [id number] LOS: 0 days   AGE/SEX: 78 y.o. female  ROOM: [facility]   Subjective   Chest pain today, lasting seconds, pressure radiating from sternum to left arm with SOA. Worse on palpation and deep inspiration. Now better  Right hip pain is gone  No soa or cough  No chest pain or palpitations  No nausea or vomiting     Objective   Vital Signs  Temp:  [97.5 °F (36.4 °C)-98.2 °F (36.8 °C)] 98.2 °F (36.8 °C)  Heart Rate:  [70-78] 74  Resp:  [16-18] 18  BP: (103-107)/(59-62) 105/62  SpO2:  [94 %-96 %] 96 %  on   ;   Device (Oxygen Therapy): room air  Body mass index is 25.19 kg/m².    Physical Exam   Constitutional: She appears well-developed and well-nourished. No distress.   Eating lunch     HENT:   Head: Normocephalic and atraumatic.   Mouth/Throat: No oropharyngeal exudate.   Eyes: EOM are normal. Pupils are equal, round, and reactive to light.   Neck: Normal range of motion. Neck supple. No JVD present.   Cardiovascular: Normal rate and regular rhythm. Exam reveals no friction rub.   No murmur heard.  Pulmonary/Chest: Breath sounds normal. No stridor. No respiratory distress. She has no wheezes.   Abdominal: Soft. Bowel sounds are normal. She exhibits no distension and no mass. There is no tenderness.   Musculoskeletal: She exhibits no edema, tenderness or deformity.        Right hip: She exhibits normal range of motion, no tenderness, no bony tenderness, no swelling and no crepitus.   Full range of motion right hip, pyriformis tenderness, straight leg raise negative   Skin: She is not diaphoretic.   Vitals reviewed.      Results Review:       I reviewed the patient's new clinical results.  Results from last 7 days   Lab Units 19  0703 02/10/19  0657 19  1122 19  1547   WBC 10*3/mm3 6.79 10.18 20.33* 8.58   HEMOGLOBIN g/dL 8.9* 10.7* 12.9 11.5*   PLATELETS 10*3/mm3 353 443 492 491     Results from last 7 days    Lab Units 02/11/19  0703 02/10/19  0657 02/09/19  1122 02/04/19  1547   SODIUM mmol/L 143 140 140 141   POTASSIUM mmol/L 3.4* 3.1* 3.0* 3.8   CHLORIDE mmol/L 112* 105 99 99   TOTAL CO2 mmol/L  --   --   --  25.3   CO2 mmol/L 20.3* 23.9 24.6  --    BUN mg/dL 10 14 21 18   CREATININE mg/dL 0.73 0.91 1.44* 1.06*   GLUCOSE mg/dL 90 90 120*  --    Estimated Creatinine Clearance: 47.9 mL/min (by C-G formula based on SCr of 0.73 mg/dL).  Results from last 7 days   Lab Units 02/10/19  0657 02/09/19  1122 02/04/19  1547   ALBUMIN g/dL 3.30* 4.00 4.10   BILIRUBIN mg/dL 0.3 0.6 0.3   ALK PHOS U/L 106 142* 152*   AST (SGOT) U/L 55* 69* 34*   ALT (SGPT) U/L 40* 50* 35*     Results from last 7 days   Lab Units 02/11/19  0703 02/10/19  0657 02/09/19  1122 02/04/19  1547   CALCIUM mg/dL 7.7* 8.8 10.0 10.0   ALBUMIN g/dL  --  3.30* 4.00 4.10     Results from last 7 days   Lab Units 02/09/19  1220 02/09/19  1122   PROCALCITONIN ng/mL  --  0.44*   LACTATE mmol/L 1.2  --          aspirin 81 mg Oral Daily   buPROPion  mg Oral Daily   calcium carbonate (oyster shell) 500 mg Oral Daily   cycloSPORINE 1 drop Both Eyes BID   estradiol 1 mg Oral Daily   gabapentin 300 mg Oral Nightly   multivitamin with minerals 1 tablet Oral Daily   pantoprazole 40 mg Oral BID AC   potassium chloride 40 mEq Oral BID With Meals   sodium chloride 3 mL Intravenous Q12H   topiramate 100 mg Oral Q12H   venlafaxine 75 mg Oral Daily       sodium chloride 0.9 % with KCl 20 mEq 100 mL/hr Last Rate: 100 mL/hr (02/11/19 1330)   Diet Soft Texture; [employer] Foods; Thin      Assessment/Plan      Active Hospital Problems    Diagnosis Date Noted   • **Leukocytosis [D72.829] 02/09/2019   • Right hip pain [M25.551] 02/09/2019   • ELIS (acute kidney injury) (CMS/HCC) [N17.9] 02/09/2019   • Dehydration [E86.0] 02/09/2019   • Status post cholecystectomy [Z90.49] 02/09/2019   • Benign essential hypertension [I10] 04/08/2016      Resolved Hospital Problems   No resolved  problems to display.     · Anemia: no signs of bleeding, hgb 8.9 today, probably some dilution  · Chest pain: sounds atypical, ordered and reviewed EKG and troponin. Consulted Cardiology. D dimer is high so I've ordered CTA chest. 2D ECHO pending  · Epigastric pain today with recent paraesophageal hiatal hernia repair, cholecystectomy and ERCP for cholelithiasis and choledocholithiasis so I'll ask Dr. 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 Consulted PT. Given her recent surgeries will avoid NSAIDS or steroids. resolved  · Hypokalemia: replace  · HTN: cont home regimen  · Migraines: cont topamax and tylenol  ·  Cont modified diet  · DISPO: likely home tomorrow    Geovany Whitfield MD  [facility]  02/11/19  2:50 PM

## 2019-02-11 NOTE — PLAN OF CARE
Problem: Patient Care Overview  Goal: Plan of Care Review  Outcome: Ongoing (interventions implemented as appropriate)   02/11/19 0431   Coping/Psychosocial   Plan of Care Reviewed With patient   Plan of Care Review   Progress improving   OTHER   Outcome Summary meds per order, pain meds per request, no falls, see vs and labs     Goal: Individualization and Mutuality  Outcome: Ongoing (interventions implemented as appropriate)    Goal: Discharge Needs Assessment  Outcome: Ongoing (interventions implemented as appropriate)    Goal: Interprofessional Rounds/Family Conf  Outcome: Ongoing (interventions implemented as appropriate)      Problem: Fall Risk (Adult)  Goal: Identify Related Risk Factors and Signs and Symptoms  Outcome: Outcome(s) achieved Date Met: 02/11/19    Goal: Absence of Fall  Outcome: Ongoing (interventions implemented as appropriate)      Problem: Pain, Acute (Adult)  Goal: Identify Related Risk Factors and Signs and Symptoms  Outcome: Outcome(s) achieved Date Met: 02/11/19    Goal: Acceptable Pain Control/Comfort Level  Outcome: Ongoing (interventions implemented as appropriate)

## 2019-02-11 NOTE — PLAN OF CARE
Problem: Patient Care Overview  Goal: Plan of Care Review  Outcome: Ongoing (interventions implemented as appropriate)   02/11/19 0431 02/11/19 0855 02/11/19 9798   Coping/Psychosocial   Plan of Care Reviewed With --  patient --    Plan of Care Review   Progress improving --  --    OTHER   Outcome Summary --  --  pt c/o chest pressure this am; ddimer elevated, awaiting CTA; echo pending; norco x 1; Dr. Johnson consulted

## 2019-02-11 NOTE — CONSULTS
Patient Name: Sachi Vora  :1940  78 y.o.    Date of Admission: 2019  Encounter Provider: Rajwinder Bradley MD  Date of Encounter Visit: 19  Place of Service: [facility] CARDIOLOGY  Referring Provider: Mayo Miles MD  Patient Care Team:  Cruzito Weir MD as PCP - General (Internal Medicine)  Shellie Lizama APRN as PCP - Claims Attributed      Chief complaint: Chest pain      History of Present Illness: Ms. Vora is a 78 year old female with a history of a normal stress test in 2016 ( which was ordered by her PCP). There is no documented history of cardiac disease. She was recently discharged (19) following extensive intervention for hiatal hernia, cholelithiasis and cholecystectomy with out cholecystitis and choledocholithiasis that include ERCP . According to the the patient she has been struggling with her diet at home since discharge. She dose have chronic anemia and at her follow up visit with her primary physician she reported that her gastrointestinal symptoms were better, but still weak.     Yesterday she reported being so weak that she couldn't do her daily activities so she called EMS to bring  her to the hospital . While in the emergency room she complained of right lower back and hip pain.     Labs: trop <0.10, bun/creat 10/0.73, K+3.4, hgb/ hct 8.9(10.1)/ 29.1(34.3).    Pt states the chest pain is pressure-like that is worse with inspiration and is the same pain she had I 2016 when her stress test was ordered. She states this pain has been constant for the last 3 hours. Initial  Trop <0.010.     She describes the pain as a pressure in her mid epigastrium and into her left chest and left shoulder. She says it is worse when she takes a deep breath. It is not worse with movement that she has noticed. It is not focally tender. She does feel like she is more short of breath today than she has been though she does not visually appear to be short of  "breath. She denies any diaphoresis or palpitations. She says this is similar to pain that she had back in 2016. She has not noticed this pain since her surgery. This is new for her.        Previous Cardiac Testing:     Doppler, Carotid 4/2018    · Proximal right internal carotid artery plaque without significant stenosis.  · Proximal left internal carotid artery is normal.       Stress Test 5/2016    · Myocardial perfusion imaging indicates a normal myocardial perfusion study with no evidence of ischemia.  · Left ventricular ejection fraction is hyperdynamic (Calculated EF > 70%).  · Impressions are consistent with a low risk study.        Echo : 7/15    Echocardiogram reveals normal left ventricular size and function with ejection fraction 55%.  Grade 1 diastolic dysfunction, abnormal relaxation pattern.  Trace tricuspid regurgitation.  Estimated right ventricular systolic pressure is 25 mmHg which is normal        Past Medical History:   Diagnosis Date   • Benign essential hypertension 4/8/2016   • Bilateral sensorineural hearing loss 3/31/2011    03/31/2011--etiology reveals reverse \"cookie bite\" type of hearing loss for both ears of mild/moderate degree, mostly sensorineural.  Speech discrimination 100% on the right, 96% on the left.   • Carotid artery plaque, 10/03/2014--mild bilateral carotid artery plaque. 7/25/2012    10/03/2014--[facility] screening revealed mild bilateral carotid plaque, negative for atrial fibrillation, negative for AAA, negative for PAD, osteoporosis screen revealed osteopenia.  Body mass index was 25 and considered to be moderate risk.  07/25/2012--vascular screen negative for carotid plaque, negative for abdominal aneurysm, negative for PAD Description: 10/03/2014--[facility] screening revealed mild bilateral carotid plaque, negative for atrial fibrillation, negative for AAA, negative for PAD, osteoporosis screen revealed osteopenia.  Body mass index was 25 and considered to be moderate " risk.  07/25/2012--vascular screen negative for carotid plaque, negative for abdominal aneurysm, negative for PAD   • Chronic anemia 8/23/2016 06/13/2017--colonoscopy revealed melanosis.  Biopsied.  There was friability with contact bleeding in the ascending colon.  Biopsied.  Pathology returned consistent with melanosis coli.  06/13/2017--EGD revealed normal esophagus.  Biopsies taken.  There was a medium-sized hiatal hernia.  Localized mild inflammation and linear erosions were found in the prepyloric region.  Biopsied.  Examined duodenum was normal.  Random biopsies taken.  Pathology of the gastroesophageal junction was unremarkable as was the gastric fundus.  Prepyloric biopsy revealed minimal chronic inflammation and reactive change.  H pylori negative.  Duodenal biopsies are negative.  04/12/2017--hemoglobin low at 10.8, hematocrit is normal at 35.7.  Iron sulfate 325 mg per day initiated.  08/23/2016--routine follow-up.  Patient continues to have epigastric abdominal pain believed to be related to reflux with possible esophageal spasm.  Hemoglobin noted to be low at 10.6 with a hematocrit low at 33.7 and RDW elevated at 16.2.  Homocysti   • Chronic fatigue 4/21/2016 06/14/2017--overnight oximetry revealed oxygen desaturation index of only 0.44.  There were only 10 total desaturations during the period of testing which lasted 6 hours and 46 minutes.  05/31/2017--patient seen in follow-up and reports she continues to have chronic fatigue as well as hypersomnolence.  Once again, she is on multiple medications that are undoubtedly contributing to this problem including clonazepam and hydrocodone but I doubt that these could be discontinued.  Her CBC back in April revealed a low hemoglobin of 10.8 but hematocrit was normal at 35.7.  Thyroid function tests were normal and CMP normal.  Overnight oximetry test ordered.  Repeat CBC.  Iron studies.  Sedimentation rate and CRP.  04/11/2017--patient seen in  follow-up and her blood pressure is now at a reasonable level at 120/64.  She reports she still feels somewhat fatigued but she is much better.  Patient has not been doing any regular exercise and I do think that that would be helpful to reduce her feeling of fatigue.  She is   • Chronic gastric ulcer 4/14/2014    02/15/2017--patient seen in follow-up in nearly 6 months later.  She has complaints of not feeling well all over.  She has complaints of diffuse myalgias and possibly tendinopathy related to Levaquin that I called in prior to her going to [place] for vacation.  She reports that 3 or 4 days after starting the Levaquin she began to have the musculoskeletal symptoms and she reports that she continues to have them presently.  Symptoms are worse involving her left calf area.  Examination reveals significant tenderness involving the left calf and the left calf seems a little larger than the right.  She also has complaints of shortness of breath but no chest pain.  She is complaining of double vision and generalized weakness and fatigue.  She was complaining of chronic fatigue at the last visit back in September and I ordered an overnight oximetry test but apparently this was never done for reasons that are not clear to me.  Her current oxygen saturation is 94% and normally it is 100%.  Plan is as follows: Extensi   • Chronic gastritis 11/19/2001    02/15/2017--patient seen in follow-up in nearly 6 months later.  She has complaints of not feeling well all over.  She has complaints of diffuse myalgias and possibly tendinopathy related to Levaquin that I called in prior to her going to [place] for vacation.  She reports that 3 or 4 days after starting the Levaquin she began to have the musculoskeletal symptoms and she reports that she continues to have them presently.  Symptoms are worse involving her left calf area.  Examination reveals significant tenderness involving the left calf and the left calf seems a  little larger than the right.  She also has complaints of shortness of breath but no chest pain.  She is complaining of double vision and generalized weakness and fatigue.  She was complaining of chronic fatigue at the last visit back in September and I ordered an overnight oximetry test but apparently this was never done for reasons that are not clear to me.  Her current oxygen saturation is 94% and normally it is 100%.  Plan is as follows: Extensi   • Chronic lower back pain 1/31/2006 08/11/2014--MRI of the lumbar spine reveals the conus terminates at L2 and is normal.  Cauda equina unremarkable.  Stable to moderate degenerative disc disease at L5-S1.  No acute fracture or pars defect is demonstrated.  Small synovial cysts are seen posterior to the L4-L5 facet.  The perivertebral soft tissues are unremarkable.  T12-L1, L1-L2, L2-L3 are negative.  L3-L4 a broad-based disc bulge resulting in mild bilateral neural foraminal narrowing.  L4-L5 reveals a broad-based disc bulge facet disease resulting in mild bilateral neural foraminal narrowing.  L5-S1 reveals a broad-based disc bulge, posterior osseous slipping, and facet disease resulting in mild to moderate bilateral neural foraminal narrowing.  Assessment is stable mild to moderate degenerative spondylosis.  Small synovial cysts are seen posterior to the L4-L5 facets.  08/11/2014--MRI of the thoracic spine reveals mild to moderate thoracic kyphosis.  No fracture.  At T5--T6 there is a moderate sized left paracentral disc protrusion which i   • Chronic migraine 11/3/2009    01/18/2010--MRI of the brain performed for headaches and memory loss.  Mild small vessel disease in the cerebral and central pontine white matter.  There is an ovoid, somewhat pancake-shaped area of signal abnormality in the anterior inferior right temporal lobe subcortical to the juxtacortical white matter that measures 1.2 x 1 cm and anterior posterior and medial lateral dimension but only  measures 3 mm in cranial caudal dimension.  I suspect that this is a benign cyst or a cystic area of encephalomalacia.  The remainder of the MRI of the head is within normal limits.  11/03/2009--CT scan of the brain without contrast performed for headache after a fall.  Mild diffuse atrophy.  No acute abnormality noted.; Description: Patient has had a long history of migraine headaches.  MRI and CT scan of the brain have been essentially negative.   • Chronic otitis externa 4/8/2016   • Chronic renal insufficiency, stage II (mild), creatinine 1.12 11/14/2015 11/14/2015--serum creatinine mildly elevated at 1.12.   • Depression with anxiety 4/8/2016   • Functional murmur, 07/15/2015--normal echocardiogram. 7/15/2015    07/15/2015--echocardiogram reveals normal left ventricular size and function with ejection fraction 55%.  Grade 1 diastolic dysfunction, abnormal relaxation pattern.  Trace tricuspid regurgitation.  Estimated right ventricular systolic pressure is 25 mmHg which is normal.   • Gastroesophageal reflux disease with esophagitis 11/19/2001 06/13/2017--EGD revealed normal esophagus.  Biopsies taken.  There was a medium-sized hiatal hernia.  Localized mild inflammation and linear erosions were found in the prepyloric region.  Biopsied.  Examined duodenum was normal.  Random biopsies taken.  Pathology of the gastroesophageal junction was unremarkable as was the gastric fundus.  Prepyloric biopsy revealed minimal chronic inflammation and reactive change.  H pylori negative.  Duodenal biopsies are negative.  11/03/2014--patient seen in follow-up and reports her epigastric pain/chest pain has resolved.  I reviewed the results of the studies with her.  It does not appear that her problem is related to biliary tract disease.  She does have reflux and although the recent upper GI revealed minimal reflux, I do think her symptoms are related to esophageal reflux with esophageal spasm.  10/21/2014--air-contrast  upper GI series revealed trace upper laryngeal penetration.  Persistent small partially reducible sliding hiatal hernia the upper stomach with    • Generalized anxiety disorder 4/11/2017   • GERD (gastroesophageal reflux disease)    • History of Acute deep vein thrombosis (DVT) of distal end of left lower extremity 2/15/2017    03/21/2017 patient seen in follow-up and she is tolerating the Eliquis well without signs or symptoms of bleeding.  Her calf swelling and tenderness is better but not totally resolved.  I suspect that the DVT is chronic and may not resolve at all.  I will order a repeat venous study and then proceed from there.  02/23/2017--repeat Doppler venous study of the left lower extremity reveals a chronic left lower extremity DVT in the posterior tibial.  All other left sided veins appeared normal.  Fluid collection in the left calf noted.  02/17/2017--patient seen in follow-up and reports her left lower extremity symptoms are about the same.  She continues to have complaints of profound fatigue which I think is multifactorial including underlying depression that is not in remission.  Review the results of the CTA of the chest including the possible thyroid lesion.  I do not think this is contributing to any of her symptoms of fatigue particularly given the fact that her thyroid function tests are normal.  I expla   • History of palpitations 12/07/2011    Patient has had multiple admissions to the hospital for complaints of chest pain and heart palpitations. She meant admitted at least on 3 occasions. She has had at least 3 stress Cardiolite and 1 stress ECG, the last Cardiolite being performed 12/07/2011 which was negative. Patient is also had Holter monitors which have been unremarkable.   • HIstory of Schatzki's ring 02/28/2012 02/28/2012--air-contrast upper GI revealed small to moderate sized reducible sliding hiatal herniation of the upper stomach with some demonstrated gastroesophageal  reflux. No esophageal, gastric, or duodenal mass or mucosal ulceration was seen.  11/03/2008--EGD performed for evaluation of iron deficiency anemia revealed hiatal hernia without evidence of reflux, prepyloric antritis and   • Hyperlipidemia 4/8/2016   • Hypersomnolence 5/5/2016 06/14/2017--overnight oximetry revealed oxygen desaturation index of only 0.44.  There were only 10 total desaturations during the period of testing which lasted 6 hours and 46 minutes.  05/31/2017--patient seen in follow-up and reports she continues to have chronic fatigue as well as hypersomnolence.  Once again, she is on multiple medications that are undoubtedly contributing to this problem including clonazepam and hydrocodone but I doubt that these could be discontinued.  Her CBC back in April revealed a low hemoglobin of 10.8 but hematocrit was normal at 35.7.  Thyroid function tests were normal and CMP normal.  Overnight oximetry test ordered.  Repeat CBC.  Iron studies.  Sedimentation rate and CRP.  04/11/2017--patient seen in follow-up and her blood pressure is now at a reasonable level at 120/64.  She reports she still feels somewhat fatigued but she is much better.  Patient has not been doing any regular exercise and I do think that that would be helpful to reduce her feeling of fatigue.  She is   • Menopausal state 4/8/2016   • Multinodular goiter 2/17/2017 02/21/2017--thyroid ultrasound reveals multinodular thyroid with largest nodule measuring on the order of 1.6 cm in greatest dimension.  02/17/2017--patient seen in follow-up for DVT and CTA of the chest.  An incidental finding on the CTA of the chest reveals a 1.7 cm lesion in the right lobe of thyroid.  Note that thyroid function tests are currently normal.  Ultrasound of the thyroid ordered.   • Osteoporosis, 03/29/2011--lumbar spine -2.8.  Right femoral neck -2.4.  Left femoral neck -2.4.  Patient receives Reclast infusions. 2/9/2009 04/19/2017--Reclast infusion.   04/05/2016--Reclast infusion.  06/04/2012--Reclast infusion.  05/26/2011--Reclast infusion.  03/29/2011--DEXA scan revealed a lumbar T score of -2.8, right femoral neck T score -2.4, left femoral neck T score -2.4.  Osteoporosis of the lumbar spine and severe osteopenia of the hips bilaterally.  Patient has been intolerant to Fosamax because of gastritis and gastroesophageal reflux.  04/01/2009--treatment for osteoporosis begun with Fosamax.  02/09/2009--DEXA scan revealed lumbar spine T score of -3.3.  Left femoral neck T score -2.5.  Right hip T score -2.6.  Osteoporosis.    • Pancreatic Duct Dilation 11/30/2001 09/29/2014--patient was again evaluated by the urologist for a renal cyst.  CT scan of the abdomen and pelvis reveals a left renal cyst measuring 5.1 x 4.9 cm.  There were subcentimeter hypodense renal lesions that are too small to further characterize and are presumably related to cysts.  Recommend attention to follow up.  Distal dilated pancreatic duct noted.  The common bile duct is the upper limits of normal in size.  The ampullary region is not well evaluated.  Comparison with prior imaging is recommended if available.  If there is no prior film available for comparison, and ERCP or MRCP could be performed to further evaluate.  Small hiatal hernia noted.  03/05/2012--CT scan of the abdomen with contrast, pancreatic protocol.  This reveals some fullness of the pancreatic ductal system and apparent pancreatic divisum with separate entrance of the accessory pancreatic duct extending into the duodenum distal to the main pancreatic duct.  There is fullness of the duct diffusely but similar to the previous s   • Pedal edema 6/29/2015 06/29/2015--patient presents with a 4-6 week history of exertional dyspnea that comes on with activity such as climbing stairs or walking her dog up an incline.  No chest pain.  Relieved with rest.  No cough.  She does have complaints of her feet and legs swelling that is  particularly worse at the end of the day and not improved overnight.  No orthopnea or PND.  Chest exam reveals faint rales at the bases.  Otherwise clear.  Heart is regular and I do not appreciate a heart murmur.  Chest x-ray PA and lateral ordered.  Echocardiogram ordered.   • Restless legs syndrome 4/8/2016   • Simple renal cyst 7/20/2009 09/29/2014--patient was again evaluated by the urologist for a renal cyst.  CT scan of the abdomen and pelvis reveals a left renal cyst measuring 5.1 x 4.9 cm.  There were subcentimeter hypodense renal lesions that are too small to further characterize and are presumably related to cysts.  Recommend attention to follow up.  Distal dilated pancreatic duct noted.  The common bile duct is the upper limits of normal in size.  The ampullary region is not well evaluated.  Comparison with prior imaging is recommended if available.  If there is no prior film available for comparison, and ERCP or MRCP could be performed to further evaluate.  Small hiatal hernia noted.  07/20/2009--patient was noted to have a left renal mass consistent with a cyst.  This was evaluated by the urologist 07/20/2009.   • Statin intolerance 10/30/2017   • Vitamin D deficiency 4/8/2016       Past Surgical History:   Procedure Laterality Date   • APPENDECTOMY  1965    1965   • CATARACT EXTRACTION Bilateral Remote    Remote bilateral cataract extirpation with intraocular lens implantation.   • CHOLECYSTECTOMY     • CHOLECYSTECTOMY WITH INTRAOPERATIVE CHOLANGIOGRAM N/A 1/21/2019 01/21/2019--laparoscopic paraesophageal hernia repair with fundoplication.   • COLONOSCOPY  11/03/2008 2008--normal colonoscopy   • COLONOSCOPY  2001 2001--normal colonoscopy.   • COLONOSCOPY N/A 6/13/2017 06/13/2017--colonoscopy revealed melanosis.  Biopsied.  There was friability with contact bleeding in the ascending colon.  Biopsied.  Pathology returned consistent with melanosis coli.   • ENDOSCOPY  10/08/2014     02/28/2012--air-contrast upper GI revealed small to moderate sized reducible sliding hiatal herniation of the upper stomach with some demonstrated gastroesophageal reflux. No esophageal, gastric, or duodenal mass or mucosal ulceration was seen. 11/03/2008--EGD performed for evaluation of iron deficiency anemia revealed hiatal hernia without evidence of reflux, prepyloric antritis and   • ENDOSCOPY  11/03/2008 11/03/2008--EGD performed for evaluation of iron deficiency anemia revealed hiatal hernia without evidence of reflux, prepyloric antritis and   • ENDOSCOPY  11/19/2001 11/19/2001--EGD revealed a very tortuous distal esophagus with a Schatzki's ring. No ulcer or erosions. No Dorado's mucosa. Stomach revealed patchy erythema and erosions in the antrum. Biopsy. Normal pylorus with no obstruction. Normal duodenum with no ulcers. The Schatzki's ring was dilated with a Rhodes dilator.;   • ENDOSCOPY N/A 9/27/2016 09/27/2016--EGD revealed a normal oropharynx, esophagus, and medium sized hiatal hernia.  Nonbleeding gastric ulcer with no stigmata of bleeding.  Biopsy.  Gastritis.  Biopsy.  Normal examined duodenum.  Biopsied.  Pathology returned mild to moderate chronic active gastritis with ulceration from the stomach antral ulcer biopsy.  Gastroesophageal junction biopsy revealed minimal mixed inflammation.   • ENDOSCOPY N/A 6/13/2017 06/13/2017--colonoscopy revealed melanosis.  Biopsied.  There was friability with contact bleeding in the ascending colon.  Biopsied.  Pathology returned consistent with melanosis coli.   • ERCP N/A 1/22/2019 01/22/2019--ERCP with sphincterotomy and balloon stone extraction.   • ESOPHAGEAL DILATATION  11/19/2001 11/19/2001--EGD revealed a very tortuous distal esophagus with a Schatzki's ring. No ulcer or erosions. No Dorado's mucosa. Stomach revealed patchy erythema and erosions in the antrum. Biopsy. Normal pylorus with no obstruction. Normal duodenum with no  ulcers. The Schatzki's ring was dilated with a Rhodes dilator.;    • ESOPHAGEAL MANOMETRY N/A 12/18/2018    Procedure: ESOPHAGEAL MANOMETRY;  Surgeon: Cecilia, Nurse Performed;  [facility];  Service: Gastroenterology   • ESOPHAGOSCOPY N/A 1/21/2019    Procedure: FLEXIBLE ESOPHAGOSCOPY;  Surgeon: Reji Johnson MD;  Location: [facility] MAIN OR;  Service: General   • HERNIA REPAIR     • HIATAL HERNIA REPAIR N/A 1/21/2019    Procedure: Laparoscopic paraesophageal hernia repair with toupee fundoplication;  Surgeon: Reji Johnson MD;  Location: [facility] MAIN OR;  Service: General   • INCONTINENCE SURGERY  1979 1979--a bladder tack procedure for urinary stress incontinence   • KNEE ARTHROSCOPY Right 12/12/2011 12/12/2011--right knee arthroscopy with partial lateral and medial meniscectomies.   • SKIN SURGERY  05/25/2010 05/25/2010--skin lesion excised from right lower extremity. 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   • TOTAL ABDOMINAL HYSTERECTOMY  1974 1974--total abdominal hysterectomy.         Prior to Admission medications    Medication Sig Start Date End Date Taking? Authorizing Provider   aspirin 81 MG EC tablet Take 81 mg by mouth Daily. HELD   Yes Carol Ann Corenll MD   buPROPion XL (WELLBUTRIN XL) 150 MG 24 hr tablet Take 150 mg by mouth Daily. 2/23/18  Yes Carol Ann Cornell MD   calcium carbonate, oyster shell, 500 MG tablet tablet Take 500 mg by mouth Daily.   Yes Carol Ann Cornell MD   clonazePAM (KlonoPIN) 1 MG tablet Take 1 mg by mouth 2 (Two) Times a Day As Needed for Seizures.   Yes Carol Ann Cornell MD   cycloSPORINE (RESTASIS) 0.05 % ophthalmic emulsion Administer 1 drop to both eyes 2 (Two) Times a Day.   Yes Carol Ann Cornell MD   dexlansoprazole (DEXILANT) 60 MG capsule Take 60 mg by mouth Daily.   Yes Carol Ann Cornell MD   diclofenac (VOLTAREN) 1 % gel gel Apply 4 g topically to the appropriate area as  directed 4 (Four) Times a Day As Needed.   Yes Carol Ann Cornell MD   estradiol (ESTRACE) 1 MG tablet TAKE 1 TABLET BY MOUTH DAILY 1/30/19  Yes Cruzito Weir MD   gabapentin (NEURONTIN) 300 MG capsule Take 300 mg by mouth Every Night. 3/20/18  Yes Carol Ann Cornell MD   HYDROcodone-acetaminophen (NORCO)  MG per tablet Take 1 tablet by mouth every 6 (six) hours as needed for moderate pain (4-6).   Yes Carol Ann Cornell MD   Multiple Vitamins-Minerals (MULTIVITAMIN ADULT) chewable tablet Chew 1 tablet Daily.   Yes Carol Ann Cornell MD   senna-docusate (PERICOLACE) 8.6-50 MG per tablet Take 2 tablets by mouth Daily As Needed for Constipation. 1/25/19 1/25/20 Yes Reji Johnson MD   topiramate (TOPAMAX) 100 MG tablet Take 100 mg by mouth Daily.   Yes Carol Ann Cornell MD   triamterene-hydrochlorothiazide (DYAZIDE) 37.5-25 MG per capsule Take 1 capsule by mouth Every Morning.   Yes Carol Ann Cornell MD   venlafaxine (EFFEXOR) 75 MG tablet Take 150 mg by mouth Daily.   Yes Carol Ann Cornell MD       Allergies   Allergen Reactions   • Statins Nausea And Vomiting   • Morphine Hallucinations   • Penicillins Itching   • Tetanus Toxoids Itching       Social History     Socioeconomic History   • Marital status:      Spouse name: ander   • Number of children: 4   • Years of education: 14   • Highest education level: Associate degree: academic program   Social Needs   • Financial resource strain: Not hard at all   • Food insecurity - worry: Never true   • Food insecurity - inability: Never true   • Transportation needs - medical: Yes   • Transportation needs - non-medical: Yes   Occupational History   • Occupation: homemaker   Tobacco Use   • Smoking status: Never Smoker   • Smokeless tobacco: Never Used   Substance and Sexual Activity   • Alcohol use: No   • Drug use: No   • Sexual activity: Defer     Partners: Male       Family History   Problem Relation Age of Onset   •  Heart attack Mother         dies age 47 from heart attack   • Heart disease Mother    • Bleeding Disorder Mother    • Heart attack Maternal Aunt         dies age 60 from heart attack   • Ovarian cancer Maternal Grandmother    • Heart disease Father    • Malig Hyperthermia Neg Hx        REVIEW OF SYSTEMS:   All other systems reviewed and negative.        Objective:     Vitals:    02/10/19 0831 02/10/19 1952 02/11/19 0416 02/11/19 0849   BP: 100/55 103/61 103/59 107/61   BP Location: Right arm Left arm Right arm Right arm   Patient Position: Lying Lying Lying Lying   Pulse: 78 78 70 75   Resp: 16 16 18 18   Temp: 97.5 °F (36.4 °C) 97.7 °F (36.5 °C) 97.5 °F (36.4 °C)    TempSrc: Oral Oral Oral    SpO2:  96% 96% 94%   Weight:       Height:         Body mass index is 25.19 kg/m².    Intake/Output Summary (Last 24 hours) at 2/11/2019 1117  Last data filed at 2/11/2019 0555  Gross per 24 hour   Intake 2420 ml   Output 440 ml   Net 1980 ml       Constitutional: She is oriented to person, place, and time. She appears well-developed. She does not appear ill.   HENT:   Head: Normocephalic and atraumatic. Head is without contusion.   Right Ear: Hearing normal. No drainage.   Left Ear: Hearing normal. No drainage.   Nose: No nasal deformity. No epistaxis.   Eyes: Lids are normal. Right eye exhibits no exudate. Left eye exhibits no exudate.  Neck: No JVD present. Carotid bruit is not present. No tracheal deviation present. No thyroid mass and no thyromegaly present.   Cardiovascular: Normal rate, regular rhythm and normal heart sounds.    Pulses:       Posterior tibial pulses are 2+ on the right side, and 2+ on the left side.   Pulmonary/Chest: Effort normal and breath sounds normal.   Abdominal: Soft. Normal appearance and bowel sounds are normal. There is no tenderness.   Musculoskeletal: Normal range of motion.        Right shoulder: She exhibits no deformity.        Left shoulder: She exhibits no deformity.   Neurological:  She is alert and oriented to person, place, and time. She has normal strength.   Skin: Skin is warm, dry and intact. No rash noted.   Psychiatric: She has a normal mood and affect. Her behavior is normal. Thought content normal.   Vitals reviewed      Lab Review:     Results from last 7 days   Lab Units 02/11/19  0703 02/10/19  0657   SODIUM mmol/L 143 140   POTASSIUM mmol/L 3.4* 3.1*   CHLORIDE mmol/L 112* 105   CO2 mmol/L 20.3* 23.9   BUN mg/dL 10 14   CREATININE mg/dL 0.73 0.91   CALCIUM mg/dL 7.7* 8.8   BILIRUBIN mg/dL  --  0.3   ALK PHOS U/L  --  106   ALT (SGPT) U/L  --  40*   AST (SGOT) U/L  --  55*   GLUCOSE mg/dL 90 90     Results from last 7 days   Lab Units 02/11/19  0703   TROPONIN T ng/mL <0.010     Results from last 7 days   Lab Units 02/11/19  0703   WBC 10*3/mm3 6.79   HEMOGLOBIN g/dL 8.9*   HEMATOCRIT % 29.1*   PLATELETS 10*3/mm3 353            1/17/19            Assessment and Plan:       1.  Chest pain.  Troponin from this morning was normal.  I will repeat that and check a d-dimer as she is at risk for a pulmonary embolus.  I will check an echocardiogram looking for wall motion abnormalities.  2.  Status post hiatal hernia repair, cholecystectomy, ERCP, poor nutrition  3.  Generalized weakness  4.  Hypokalemia  5.  Chronic anemia    Rajwinder Bradley MD  02/11/19  11:17 AM

## 2019-02-12 ENCOUNTER — APPOINTMENT (OUTPATIENT)
Dept: CARDIOLOGY | Facility: HOSPITAL | Age: 79
End: 2019-02-12

## 2019-02-12 VITALS
SYSTOLIC BLOOD PRESSURE: 101 MMHG | HEART RATE: 72 BPM | BODY MASS INDEX: 25.32 KG/M2 | RESPIRATION RATE: 16 BRPM | WEIGHT: 129 LBS | DIASTOLIC BLOOD PRESSURE: 59 MMHG | HEIGHT: 60 IN | OXYGEN SATURATION: 95 % | TEMPERATURE: 97.3 F

## 2019-02-12 PROBLEM — R07.2 PRECORDIAL PAIN: Status: ACTIVE | Noted: 2019-02-12

## 2019-02-12 LAB
ANION GAP SERPL CALCULATED.3IONS-SCNC: 10.1 MMOL/L
BASOPHILS # BLD AUTO: 0.04 10*3/MM3 (ref 0–0.2)
BASOPHILS NFR BLD AUTO: 0.7 % (ref 0–1.5)
BH CV ECHO MEAS - ACS: 2.2 CM
BH CV ECHO MEAS - AI DEC SLOPE: 218 CM/SEC^2
BH CV ECHO MEAS - AI MAX PG: 49.8 MMHG
BH CV ECHO MEAS - AI MAX VEL: 353 CM/SEC
BH CV ECHO MEAS - AI P1/2T: 474.3 MSEC
BH CV ECHO MEAS - AO MAX PG (FULL): 0.63 MMHG
BH CV ECHO MEAS - AO MAX PG: 4.5 MMHG
BH CV ECHO MEAS - AO MEAN PG (FULL): 1 MMHG
BH CV ECHO MEAS - AO MEAN PG: 3 MMHG
BH CV ECHO MEAS - AO ROOT AREA (BSA CORRECTED): 1.9
BH CV ECHO MEAS - AO ROOT AREA: 7.1 CM^2
BH CV ECHO MEAS - AO ROOT DIAM: 3 CM
BH CV ECHO MEAS - AO V2 MAX: 106 CM/SEC
BH CV ECHO MEAS - AO V2 MEAN: 76.4 CM/SEC
BH CV ECHO MEAS - AO V2 VTI: 26.6 CM
BH CV ECHO MEAS - AVA(I,A): 2.4 CM^2
BH CV ECHO MEAS - AVA(I,D): 2.4 CM^2
BH CV ECHO MEAS - AVA(V,A): 2.6 CM^2
BH CV ECHO MEAS - AVA(V,D): 2.6 CM^2
BH CV ECHO MEAS - BSA(HAYCOCK): 1.6 M^2
BH CV ECHO MEAS - BSA: 1.5 M^2
BH CV ECHO MEAS - BZI_BMI: 25.2 KILOGRAMS/M^2
BH CV ECHO MEAS - BZI_METRIC_HEIGHT: 152.4 CM
BH CV ECHO MEAS - BZI_METRIC_WEIGHT: 58.5 KG
BH CV ECHO MEAS - EDV(MOD-SP2): 60 ML
BH CV ECHO MEAS - EDV(MOD-SP4): 53 ML
BH CV ECHO MEAS - EDV(TEICH): 92 ML
BH CV ECHO MEAS - EF(CUBED): 73.3 %
BH CV ECHO MEAS - EF(MOD-BP): 66 %
BH CV ECHO MEAS - EF(MOD-SP2): 66.7 %
BH CV ECHO MEAS - EF(MOD-SP4): 62.3 %
BH CV ECHO MEAS - EF(TEICH): 65.3 %
BH CV ECHO MEAS - ESV(MOD-SP2): 20 ML
BH CV ECHO MEAS - ESV(MOD-SP4): 20 ML
BH CV ECHO MEAS - ESV(TEICH): 31.9 ML
BH CV ECHO MEAS - FS: 35.6 %
BH CV ECHO MEAS - IVS/LVPW: 1.2
BH CV ECHO MEAS - IVSD: 0.8 CM
BH CV ECHO MEAS - LAT PEAK E' VEL: 8 CM/SEC
BH CV ECHO MEAS - LV DIASTOLIC VOL/BSA (35-75): 34.2 ML/M^2
BH CV ECHO MEAS - LV MASS(C)D: 100.9 GRAMS
BH CV ECHO MEAS - LV MASS(C)DI: 65.1 GRAMS/M^2
BH CV ECHO MEAS - LV MAX PG: 3.9 MMHG
BH CV ECHO MEAS - LV MEAN PG: 2 MMHG
BH CV ECHO MEAS - LV SYSTOLIC VOL/BSA (12-30): 12.9 ML/M^2
BH CV ECHO MEAS - LV V1 MAX: 98.3 CM/SEC
BH CV ECHO MEAS - LV V1 MEAN: 60.2 CM/SEC
BH CV ECHO MEAS - LV V1 VTI: 22.8 CM
BH CV ECHO MEAS - LVIDD: 4.5 CM
BH CV ECHO MEAS - LVIDS: 2.9 CM
BH CV ECHO MEAS - LVLD AP2: 6.6 CM
BH CV ECHO MEAS - LVLD AP4: 6.6 CM
BH CV ECHO MEAS - LVLS AP2: 5.6 CM
BH CV ECHO MEAS - LVLS AP4: 5.7 CM
BH CV ECHO MEAS - LVOT AREA (M): 2.8 CM^2
BH CV ECHO MEAS - LVOT AREA: 2.8 CM^2
BH CV ECHO MEAS - LVOT DIAM: 1.9 CM
BH CV ECHO MEAS - LVPWD: 0.67 CM
BH CV ECHO MEAS - MED PEAK E' VEL: 10 CM/SEC
BH CV ECHO MEAS - MR MAX PG: 77.1 MMHG
BH CV ECHO MEAS - MR MAX VEL: 439 CM/SEC
BH CV ECHO MEAS - MV A DUR: 0.14 SEC
BH CV ECHO MEAS - MV A MAX VEL: 97.5 CM/SEC
BH CV ECHO MEAS - MV DEC SLOPE: 545 CM/SEC^2
BH CV ECHO MEAS - MV DEC TIME: 0.14 SEC
BH CV ECHO MEAS - MV E MAX VEL: 81.5 CM/SEC
BH CV ECHO MEAS - MV E/A: 0.84
BH CV ECHO MEAS - MV MAX PG: 5.2 MMHG
BH CV ECHO MEAS - MV MEAN PG: 3 MMHG
BH CV ECHO MEAS - MV P1/2T MAX VEL: 82.9 CM/SEC
BH CV ECHO MEAS - MV P1/2T: 44.6 MSEC
BH CV ECHO MEAS - MV V2 MAX: 114 CM/SEC
BH CV ECHO MEAS - MV V2 MEAN: 75.5 CM/SEC
BH CV ECHO MEAS - MV V2 VTI: 35.3 CM
BH CV ECHO MEAS - MVA P1/2T LCG: 2.7 CM^2
BH CV ECHO MEAS - MVA(P1/2T): 4.9 CM^2
BH CV ECHO MEAS - MVA(VTI): 1.8 CM^2
BH CV ECHO MEAS - PA ACC TIME: 0.13 SEC
BH CV ECHO MEAS - PA MAX PG (FULL): 1.9 MMHG
BH CV ECHO MEAS - PA MAX PG: 2.7 MMHG
BH CV ECHO MEAS - PA PR(ACCEL): 18.7 MMHG
BH CV ECHO MEAS - PA V2 MAX: 82.8 CM/SEC
BH CV ECHO MEAS - PULM A REVS DUR: 0.11 SEC
BH CV ECHO MEAS - PULM A REVS VEL: 32.5 CM/SEC
BH CV ECHO MEAS - PULM DIAS VEL: 42.2 CM/SEC
BH CV ECHO MEAS - PULM S/D: 1.5
BH CV ECHO MEAS - PULM SYS VEL: 65 CM/SEC
BH CV ECHO MEAS - RAP SYSTOLE: 15 MMHG
BH CV ECHO MEAS - RV MAX PG: 0.87 MMHG
BH CV ECHO MEAS - RV MEAN PG: 0 MMHG
BH CV ECHO MEAS - RV V1 MAX: 46.6 CM/SEC
BH CV ECHO MEAS - RV V1 MEAN: 29.3 CM/SEC
BH CV ECHO MEAS - RV V1 VTI: 9.6 CM
BH CV ECHO MEAS - RVSP: 40 MMHG
BH CV ECHO MEAS - SI(AO): 121.4 ML/M^2
BH CV ECHO MEAS - SI(CUBED): 42.8 ML/M^2
BH CV ECHO MEAS - SI(LVOT): 41.7 ML/M^2
BH CV ECHO MEAS - SI(MOD-SP2): 25.8 ML/M^2
BH CV ECHO MEAS - SI(MOD-SP4): 21.3 ML/M^2
BH CV ECHO MEAS - SI(TEICH): 38.7 ML/M^2
BH CV ECHO MEAS - SUP REN AO DIAM: 1.8 CM
BH CV ECHO MEAS - SV(AO): 188 ML
BH CV ECHO MEAS - SV(CUBED): 66.4 ML
BH CV ECHO MEAS - SV(LVOT): 64.6 ML
BH CV ECHO MEAS - SV(MOD-SP2): 40 ML
BH CV ECHO MEAS - SV(MOD-SP4): 33 ML
BH CV ECHO MEAS - SV(TEICH): 60 ML
BH CV ECHO MEAS - TAPSE (>1.6): 2.2 CM2
BH CV ECHO MEAS - TR MAX VEL: 254 CM/SEC
BH CV ECHO MEASUREMENTS AVERAGE E/E' RATIO: 9.06
BH CV XLRA - RV BASE: 3 CM
BH CV XLRA - TDI S': 13 CM/SEC
BUN BLD-MCNC: 6 MG/DL (ref 8–23)
BUN/CREAT SERPL: 9.1 (ref 7–25)
CALCIUM SPEC-SCNC: 7.7 MG/DL (ref 8.6–10.5)
CHLORIDE SERPL-SCNC: 117 MMOL/L (ref 98–107)
CO2 SERPL-SCNC: 16.9 MMOL/L (ref 22–29)
CREAT BLD-MCNC: 0.66 MG/DL (ref 0.57–1)
DEPRECATED RDW RBC AUTO: 55.8 FL (ref 37–54)
EOSINOPHIL # BLD AUTO: 0.23 10*3/MM3 (ref 0–0.4)
EOSINOPHIL NFR BLD AUTO: 4.2 % (ref 0.3–6.2)
ERYTHROCYTE [DISTWIDTH] IN BLOOD BY AUTOMATED COUNT: 18.5 % (ref 12.3–15.4)
GFR SERPL CREATININE-BSD FRML MDRD: 87 ML/MIN/1.73
GLUCOSE BLD-MCNC: 81 MG/DL (ref 65–99)
HCT VFR BLD AUTO: 29.3 % (ref 34–46.6)
HGB BLD-MCNC: 9 G/DL (ref 12–15.9)
IMM GRANULOCYTES # BLD AUTO: 0.04 10*3/MM3 (ref 0–0.05)
IMM GRANULOCYTES NFR BLD AUTO: 0.7 % (ref 0–0.5)
LEFT ATRIUM VOLUME INDEX: 33 ML/M2
LYMPHOCYTES # BLD AUTO: 2.39 10*3/MM3 (ref 0.7–3.1)
LYMPHOCYTES NFR BLD AUTO: 43.7 % (ref 19.6–45.3)
MAXIMAL PREDICTED HEART RATE: 144 BPM
MCH RBC QN AUTO: 25.3 PG (ref 26.6–33)
MCHC RBC AUTO-ENTMCNC: 30.7 G/DL (ref 31.5–35.7)
MCV RBC AUTO: 82.3 FL (ref 79–97)
MONOCYTES # BLD AUTO: 0.46 10*3/MM3 (ref 0.1–0.9)
MONOCYTES NFR BLD AUTO: 8.4 % (ref 5–12)
NEUTROPHILS # BLD AUTO: 2.31 10*3/MM3 (ref 1.4–7)
NEUTROPHILS NFR BLD AUTO: 42.3 % (ref 42.7–76)
NRBC BLD AUTO-RTO: 0 /100 WBC (ref 0–0)
PLATELET # BLD AUTO: 342 10*3/MM3 (ref 140–450)
PMV BLD AUTO: 10.7 FL (ref 6–12)
POTASSIUM BLD-SCNC: 4 MMOL/L (ref 3.5–5.2)
RBC # BLD AUTO: 3.56 10*6/MM3 (ref 3.77–5.28)
SODIUM BLD-SCNC: 144 MMOL/L (ref 136–145)
STRESS TARGET HR: 122 BPM
TROPONIN T SERPL-MCNC: <0.01 NG/ML (ref 0–0.03)
WBC NRBC COR # BLD: 5.47 10*3/MM3 (ref 3.4–10.8)

## 2019-02-12 PROCEDURE — 93005 ELECTROCARDIOGRAM TRACING: CPT | Performed by: INTERNAL MEDICINE

## 2019-02-12 PROCEDURE — 93306 TTE W/DOPPLER COMPLETE: CPT

## 2019-02-12 PROCEDURE — 99214 OFFICE O/P EST MOD 30 MIN: CPT | Performed by: INTERNAL MEDICINE

## 2019-02-12 PROCEDURE — 84484 ASSAY OF TROPONIN QUANT: CPT | Performed by: INTERNAL MEDICINE

## 2019-02-12 PROCEDURE — 80048 BASIC METABOLIC PNL TOTAL CA: CPT | Performed by: INTERNAL MEDICINE

## 2019-02-12 PROCEDURE — 25810000003 SODIUM CHLORIDE 0.9 % WITH KCL 20 MEQ 20-0.9 MEQ/L-% SOLUTION: Performed by: INTERNAL MEDICINE

## 2019-02-12 PROCEDURE — 93306 TTE W/DOPPLER COMPLETE: CPT | Performed by: INTERNAL MEDICINE

## 2019-02-12 PROCEDURE — 93010 ELECTROCARDIOGRAM REPORT: CPT | Performed by: INTERNAL MEDICINE

## 2019-02-12 PROCEDURE — 85025 COMPLETE CBC W/AUTO DIFF WBC: CPT | Performed by: INTERNAL MEDICINE

## 2019-02-12 PROCEDURE — 96361 HYDRATE IV INFUSION ADD-ON: CPT

## 2019-02-12 PROCEDURE — G0378 HOSPITAL OBSERVATION PER HR: HCPCS

## 2019-02-12 RX ADMIN — PANTOPRAZOLE SODIUM 40 MG: 40 TABLET, DELAYED RELEASE ORAL at 16:53

## 2019-02-12 RX ADMIN — VENLAFAXINE HYDROCHLORIDE 75 MG: 75 TABLET ORAL at 08:28

## 2019-02-12 RX ADMIN — CALCIUM 500 MG: 500 TABLET ORAL at 08:28

## 2019-02-12 RX ADMIN — ESTRADIOL 1 MG: 1 TABLET ORAL at 08:28

## 2019-02-12 RX ADMIN — HYDROCODONE BITARTRATE AND ACETAMINOPHEN 1 TABLET: 10; 325 TABLET ORAL at 08:28

## 2019-02-12 RX ADMIN — PANTOPRAZOLE SODIUM 40 MG: 40 TABLET, DELAYED RELEASE ORAL at 06:48

## 2019-02-12 RX ADMIN — BUPROPION HYDROCHLORIDE 150 MG: 150 TABLET, FILM COATED, EXTENDED RELEASE ORAL at 08:28

## 2019-02-12 RX ADMIN — SODIUM CHLORIDE, PRESERVATIVE FREE 3 ML: 5 INJECTION INTRAVENOUS at 08:29

## 2019-02-12 RX ADMIN — HYDROCODONE BITARTRATE AND ACETAMINOPHEN 1 TABLET: 10; 325 TABLET ORAL at 16:21

## 2019-02-12 RX ADMIN — MULTIPLE VITAMINS W/ MINERALS TAB 1 TABLET: TAB at 08:28

## 2019-02-12 RX ADMIN — ASPIRIN 81 MG: 81 TABLET, DELAYED RELEASE ORAL at 08:28

## 2019-02-12 RX ADMIN — CYCLOSPORINE 1 DROP: 0.5 EMULSION OPHTHALMIC at 08:29

## 2019-02-12 RX ADMIN — TOPIRAMATE 100 MG: 100 TABLET, FILM COATED ORAL at 08:29

## 2019-02-12 RX ADMIN — POTASSIUM CHLORIDE AND SODIUM CHLORIDE 100 ML/HR: 900; 150 INJECTION, SOLUTION INTRAVENOUS at 04:53

## 2019-02-12 NOTE — PLAN OF CARE
Problem: Patient Care Overview  Goal: Plan of Care Review  Outcome: Ongoing (interventions implemented as appropriate)   02/12/19 0409   Coping/Psychosocial   Plan of Care Reviewed With patient   Plan of Care Review   Progress no change   OTHER   Outcome Summary CT angiogram neg for P.E. Pt appeared to have a restful night. Pain meds x1. Will continue monitoring       Problem: Fall Risk (Adult)  Goal: Absence of Fall  Outcome: Ongoing (interventions implemented as appropriate)

## 2019-02-12 NOTE — DISCHARGE SUMMARY
"     Name: Sachi Vora  Age: 78 y.o.  Sex: female  :  1940  MRN: [id number]         Primary Care Physician: Cruzito Weir MD      Date of Admission:  2019  Date of Discharge:  2019      Chief Complaint:  Fatigue (has felt ill since last night 9p.  ); Headache; and Back Pain (low)      Discharge Diagnosis:  Active Hospital Problems    Diagnosis Date Noted   • **Leukocytosis [D72.829] 2019   • Precordial pain [R07.2] 2019   • Right hip pain [M25.551] 2019   • ELIS (acute kidney injury) (CMS/HCC) [N17.9] 2019   • Dehydration [E86.0] 2019   • Status post cholecystectomy [Z90.49] 2019   • Benign essential hypertension [I10] 2016      Resolved Hospital Problems   No resolved problems to display.       Secondary Diagnoses:  Past Medical History:   Diagnosis Date   • Benign essential hypertension 2016   • Bilateral sensorineural hearing loss 3/31/2011    2011--etiology reveals reverse \"cookie bite\" type of hearing loss for both ears of mild/moderate degree, mostly sensorineural.  Speech discrimination 100% on the right, 96% on the left.   • Carotid artery plaque, 10/03/2014--mild bilateral carotid artery plaque. 2012    10/03/2014--[facility] screening revealed mild bilateral carotid plaque, negative for atrial fibrillation, negative for AAA, negative for PAD, osteoporosis screen revealed osteopenia.  Body mass index was 25 and considered to be moderate risk.  2012--vascular screen negative for carotid plaque, negative for abdominal aneurysm, negative for PAD Description: 10/03/2014--[facility] screening revealed mild bilateral carotid plaque, negative for atrial fibrillation, negative for AAA, negative for PAD, osteoporosis screen revealed osteopenia.  Body mass index was 25 and considered to be moderate risk.  2012--vascular screen negative for carotid plaque, negative for abdominal aneurysm, negative for PAD   • Chronic anemia " 8/23/2016 06/13/2017--colonoscopy revealed melanosis.  Biopsied.  There was friability with contact bleeding in the ascending colon.  Biopsied.  Pathology returned consistent with melanosis coli.  06/13/2017--EGD revealed normal esophagus.  Biopsies taken.  There was a medium-sized hiatal hernia.  Localized mild inflammation and linear erosions were found in the prepyloric region.  Biopsied.  Examined duodenum was normal.  Random biopsies taken.  Pathology of the gastroesophageal junction was unremarkable as was the gastric fundus.  Prepyloric biopsy revealed minimal chronic inflammation and reactive change.  H pylori negative.  Duodenal biopsies are negative.  04/12/2017--hemoglobin low at 10.8, hematocrit is normal at 35.7.  Iron sulfate 325 mg per day initiated.  08/23/2016--routine follow-up.  Patient continues to have epigastric abdominal pain believed to be related to reflux with possible esophageal spasm.  Hemoglobin noted to be low at 10.6 with a hematocrit low at 33.7 and RDW elevated at 16.2.  Homocysti   • Chronic fatigue 4/21/2016 06/14/2017--overnight oximetry revealed oxygen desaturation index of only 0.44.  There were only 10 total desaturations during the period of testing which lasted 6 hours and 46 minutes.  05/31/2017--patient seen in follow-up and reports she continues to have chronic fatigue as well as hypersomnolence.  Once again, she is on multiple medications that are undoubtedly contributing to this problem including clonazepam and hydrocodone but I doubt that these could be discontinued.  Her CBC back in April revealed a low hemoglobin of 10.8 but hematocrit was normal at 35.7.  Thyroid function tests were normal and CMP normal.  Overnight oximetry test ordered.  Repeat CBC.  Iron studies.  Sedimentation rate and CRP.  04/11/2017--patient seen in follow-up and her blood pressure is now at a reasonable level at 120/64.  She reports she still feels somewhat fatigued but she is much  better.  Patient has not been doing any regular exercise and I do think that that would be helpful to reduce her feeling of fatigue.  She is   • Chronic gastric ulcer 4/14/2014    02/15/2017--patient seen in follow-up in nearly 6 months later.  She has complaints of not feeling well all over.  She has complaints of diffuse myalgias and possibly tendinopathy related to Levaquin that I called in prior to her going to [place] for vacation.  She reports that 3 or 4 days after starting the Levaquin she began to have the musculoskeletal symptoms and she reports that she continues to have them presently.  Symptoms are worse involving her left calf area.  Examination reveals significant tenderness involving the left calf and the left calf seems a little larger than the right.  She also has complaints of shortness of breath but no chest pain.  She is complaining of double vision and generalized weakness and fatigue.  She was complaining of chronic fatigue at the last visit back in September and I ordered an overnight oximetry test but apparently this was never done for reasons that are not clear to me.  Her current oxygen saturation is 94% and normally it is 100%.  Plan is as follows: Extensi   • Chronic gastritis 11/19/2001    02/15/2017--patient seen in follow-up in nearly 6 months later.  She has complaints of not feeling well all over.  She has complaints of diffuse myalgias and possibly tendinopathy related to Levaquin that I called in prior to her going to [place] for vacation.  She reports that 3 or 4 days after starting the Levaquin she began to have the musculoskeletal symptoms and she reports that she continues to have them presently.  Symptoms are worse involving her left calf area.  Examination reveals significant tenderness involving the left calf and the left calf seems a little larger than the right.  She also has complaints of shortness of breath but no chest pain.  She is complaining of double vision and  generalized weakness and fatigue.  She was complaining of chronic fatigue at the last visit back in September and I ordered an overnight oximetry test but apparently this was never done for reasons that are not clear to me.  Her current oxygen saturation is 94% and normally it is 100%.  Plan is as follows: Extensi   • Chronic lower back pain 1/31/2006 08/11/2014--MRI of the lumbar spine reveals the conus terminates at L2 and is normal.  Cauda equina unremarkable.  Stable to moderate degenerative disc disease at L5-S1.  No acute fracture or pars defect is demonstrated.  Small synovial cysts are seen posterior to the L4-L5 facet.  The perivertebral soft tissues are unremarkable.  T12-L1, L1-L2, L2-L3 are negative.  L3-L4 a broad-based disc bulge resulting in mild bilateral neural foraminal narrowing.  L4-L5 reveals a broad-based disc bulge facet disease resulting in mild bilateral neural foraminal narrowing.  L5-S1 reveals a broad-based disc bulge, posterior osseous slipping, and facet disease resulting in mild to moderate bilateral neural foraminal narrowing.  Assessment is stable mild to moderate degenerative spondylosis.  Small synovial cysts are seen posterior to the L4-L5 facets.  08/11/2014--MRI of the thoracic spine reveals mild to moderate thoracic kyphosis.  No fracture.  At T5--T6 there is a moderate sized left paracentral disc protrusion which i   • Chronic migraine 11/3/2009    01/18/2010--MRI of the brain performed for headaches and memory loss.  Mild small vessel disease in the cerebral and central pontine white matter.  There is an ovoid, somewhat pancake-shaped area of signal abnormality in the anterior inferior right temporal lobe subcortical to the juxtacortical white matter that measures 1.2 x 1 cm and anterior posterior and medial lateral dimension but only measures 3 mm in cranial caudal dimension.  I suspect that this is a benign cyst or a cystic area of encephalomalacia.  The remainder of the  MRI of the head is within normal limits.  11/03/2009--CT scan of the brain without contrast performed for headache after a fall.  Mild diffuse atrophy.  No acute abnormality noted.; Description: Patient has had a long history of migraine headaches.  MRI and CT scan of the brain have been essentially negative.   • Chronic otitis externa 4/8/2016   • Chronic renal insufficiency, stage II (mild), creatinine 1.12 11/14/2015 11/14/2015--serum creatinine mildly elevated at 1.12.   • Depression with anxiety 4/8/2016   • Functional murmur, 07/15/2015--normal echocardiogram. 7/15/2015    07/15/2015--echocardiogram reveals normal left ventricular size and function with ejection fraction 55%.  Grade 1 diastolic dysfunction, abnormal relaxation pattern.  Trace tricuspid regurgitation.  Estimated right ventricular systolic pressure is 25 mmHg which is normal.   • Gastroesophageal reflux disease with esophagitis 11/19/2001 06/13/2017--EGD revealed normal esophagus.  Biopsies taken.  There was a medium-sized hiatal hernia.  Localized mild inflammation and linear erosions were found in the prepyloric region.  Biopsied.  Examined duodenum was normal.  Random biopsies taken.  Pathology of the gastroesophageal junction was unremarkable as was the gastric fundus.  Prepyloric biopsy revealed minimal chronic inflammation and reactive change.  H pylori negative.  Duodenal biopsies are negative.  11/03/2014--patient seen in follow-up and reports her epigastric pain/chest pain has resolved.  I reviewed the results of the studies with her.  It does not appear that her problem is related to biliary tract disease.  She does have reflux and although the recent upper GI revealed minimal reflux, I do think her symptoms are related to esophageal reflux with esophageal spasm.  10/21/2014--air-contrast upper GI series revealed trace upper laryngeal penetration.  Persistent small partially reducible sliding hiatal hernia the upper stomach with     • Generalized anxiety disorder 4/11/2017   • GERD (gastroesophageal reflux disease)    • History of Acute deep vein thrombosis (DVT) of distal end of left lower extremity 2/15/2017    03/21/2017 patient seen in follow-up and she is tolerating the Eliquis well without signs or symptoms of bleeding.  Her calf swelling and tenderness is better but not totally resolved.  I suspect that the DVT is chronic and may not resolve at all.  I will order a repeat venous study and then proceed from there.  02/23/2017--repeat Doppler venous study of the left lower extremity reveals a chronic left lower extremity DVT in the posterior tibial.  All other left sided veins appeared normal.  Fluid collection in the left calf noted.  02/17/2017--patient seen in follow-up and reports her left lower extremity symptoms are about the same.  She continues to have complaints of profound fatigue which I think is multifactorial including underlying depression that is not in remission.  Review the results of the CTA of the chest including the possible thyroid lesion.  I do not think this is contributing to any of her symptoms of fatigue particularly given the fact that her thyroid function tests are normal.  I expla   • History of palpitations 12/07/2011    Patient has had multiple admissions to the hospital for complaints of chest pain and heart palpitations. She meant admitted at least on 3 occasions. She has had at least 3 stress Cardiolite and 1 stress ECG, the last Cardiolite being performed 12/07/2011 which was negative. Patient is also had Holter monitors which have been unremarkable.   • HIstory of Schatzki's ring 02/28/2012 02/28/2012--air-contrast upper GI revealed small to moderate sized reducible sliding hiatal herniation of the upper stomach with some demonstrated gastroesophageal reflux. No esophageal, gastric, or duodenal mass or mucosal ulceration was seen.  11/03/2008--EGD performed for evaluation of iron deficiency anemia  revealed hiatal hernia without evidence of reflux, prepyloric antritis and   • Hyperlipidemia 4/8/2016   • Hypersomnolence 5/5/2016 06/14/2017--overnight oximetry revealed oxygen desaturation index of only 0.44.  There were only 10 total desaturations during the period of testing which lasted 6 hours and 46 minutes.  05/31/2017--patient seen in follow-up and reports she continues to have chronic fatigue as well as hypersomnolence.  Once again, she is on multiple medications that are undoubtedly contributing to this problem including clonazepam and hydrocodone but I doubt that these could be discontinued.  Her CBC back in April revealed a low hemoglobin of 10.8 but hematocrit was normal at 35.7.  Thyroid function tests were normal and CMP normal.  Overnight oximetry test ordered.  Repeat CBC.  Iron studies.  Sedimentation rate and CRP.  04/11/2017--patient seen in follow-up and her blood pressure is now at a reasonable level at 120/64.  She reports she still feels somewhat fatigued but she is much better.  Patient has not been doing any regular exercise and I do think that that would be helpful to reduce her feeling of fatigue.  She is   • Menopausal state 4/8/2016   • Multinodular goiter 2/17/2017 02/21/2017--thyroid ultrasound reveals multinodular thyroid with largest nodule measuring on the order of 1.6 cm in greatest dimension.  02/17/2017--patient seen in follow-up for DVT and CTA of the chest.  An incidental finding on the CTA of the chest reveals a 1.7 cm lesion in the right lobe of thyroid.  Note that thyroid function tests are currently normal.  Ultrasound of the thyroid ordered.   • Osteoporosis, 03/29/2011--lumbar spine -2.8.  Right femoral neck -2.4.  Left femoral neck -2.4.  Patient receives Reclast infusions. 2/9/2009 04/19/2017--Reclast infusion.  04/05/2016--Reclast infusion.  06/04/2012--Reclast infusion.  05/26/2011--Reclast infusion.  03/29/2011--DEXA scan revealed a lumbar T score of  -2.8, right femoral neck T score -2.4, left femoral neck T score -2.4.  Osteoporosis of the lumbar spine and severe osteopenia of the hips bilaterally.  Patient has been intolerant to Fosamax because of gastritis and gastroesophageal reflux.  04/01/2009--treatment for osteoporosis begun with Fosamax.  02/09/2009--DEXA scan revealed lumbar spine T score of -3.3.  Left femoral neck T score -2.5.  Right hip T score -2.6.  Osteoporosis.    • Pancreatic Duct Dilation 11/30/2001 09/29/2014--patient was again evaluated by the urologist for a renal cyst.  CT scan of the abdomen and pelvis reveals a left renal cyst measuring 5.1 x 4.9 cm.  There were subcentimeter hypodense renal lesions that are too small to further characterize and are presumably related to cysts.  Recommend attention to follow up.  Distal dilated pancreatic duct noted.  The common bile duct is the upper limits of normal in size.  The ampullary region is not well evaluated.  Comparison with prior imaging is recommended if available.  If there is no prior film available for comparison, and ERCP or MRCP could be performed to further evaluate.  Small hiatal hernia noted.  03/05/2012--CT scan of the abdomen with contrast, pancreatic protocol.  This reveals some fullness of the pancreatic ductal system and apparent pancreatic divisum with separate entrance of the accessory pancreatic duct extending into the duodenum distal to the main pancreatic duct.  There is fullness of the duct diffusely but similar to the previous s   • Pedal edema 6/29/2015 06/29/2015--patient presents with a 4-6 week history of exertional dyspnea that comes on with activity such as climbing stairs or walking her dog up an incline.  No chest pain.  Relieved with rest.  No cough.  She does have complaints of her feet and legs swelling that is particularly worse at the end of the day and not improved overnight.  No orthopnea or PND.  Chest exam reveals faint rales at the bases.   Otherwise clear.  Heart is regular and I do not appreciate a heart murmur.  Chest x-ray PA and lateral ordered.  Echocardiogram ordered.   • Restless legs syndrome 4/8/2016   • Simple renal cyst 7/20/2009 09/29/2014--patient was again evaluated by the urologist for a renal cyst.  CT scan of the abdomen and pelvis reveals a left renal cyst measuring 5.1 x 4.9 cm.  There were subcentimeter hypodense renal lesions that are too small to further characterize and are presumably related to cysts.  Recommend attention to follow up.  Distal dilated pancreatic duct noted.  The common bile duct is the upper limits of normal in size.  The ampullary region is not well evaluated.  Comparison with prior imaging is recommended if available.  If there is no prior film available for comparison, and ERCP or MRCP could be performed to further evaluate.  Small hiatal hernia noted.  07/20/2009--patient was noted to have a left renal mass consistent with a cyst.  This was evaluated by the urologist 07/20/2009.   • Statin intolerance 10/30/2017   • Vitamin D deficiency 4/8/2016         Consults:  Consult Orders (all) (From admission, onward)    Start     Ordered    02/11/19 1314  Inpatient General Surgery Consult  Once     Specialty:  General Surgery  Provider:  Reji Johnson MD    02/11/19 1313    02/11/19 0907  Inpatient Cardiology Consult  Once     Specialty:  Cardiology  Provider:  Rajwinder Bradley MD    02/11/19 0906          Procedures Performed:    2D ECHO    Interpretation Summary     · Calculated right ventricular systolic pressure from tricuspid regurgitation is 40 mmHg.  · Mild mitral valve regurgitation is present  · Mild aortic valve regurgitation is present.  · Moderate tricuspid valve regurgitation is present.  · Left atrial cavity size is mildly dilated.  · Calculated EF = 66%.  · There is no evidence of pericardial effusion.     CT ANGIOGRAM CHEST W CONTRAST-     CLINICAL HISTORY: Chest pain. Rule out  pulmonary embolism as cause     TECHNIQUE: Spiral CT images were obtained through the chest during rapid  IV injection of contrast and were reconstructed in 2 mm thick axial  slices. Multiple coronal and sagittal and 3-D reconstructions were  obtained.     Radiation dose reduction techniques were utilized, including automated  exposure control and exposure modulation based on body size.     COMPARISON: CT scan of the chest dated 2/16/2017.     FINDINGS: The main pulmonary arteries and their lobar and segmental  branches are fairly well opacified, and demonstrate no filling defects.  There is no CT evidence of pulmonary embolism. The thoracic aorta was  also well opacified and appears within normal limits. There is no  mediastinal or hilar or axillary lymphadenopathy. There are tiny  bilateral posteriorly layering pleural effusions. Minimal atelectasis is  present within both lower lobes adjacent to the effusions. No  parenchymal infiltrates or masses are identified. Images through the  upper abdomen show left renal cyst and are otherwise unremarkable.     IMPRESSION:  There is no CT evidence of pulmonary embolism. There are  tiny bilateral pleural effusions with minimal atelectasis in the  adjacent lower lobes.    CT OF THE ABDOMEN AND PELVIS WITH CONTRAST 02/09/2019     HISTORY: Leukocytosis. Fever. Recent cholecystectomy. Recent hiatal  hernia repair.     Axial images were obtained from the lung bases to the symphysis pubis  after intravenous contrast. No oral contrast was given.     There is some mild soft tissue thickening in the region of the GE  junction likely from the recent hiatal hernia repair. Small hepatic cyst  is seen. Gallbladder has been removed with only some minimal increased  attenuation in the gallbladder fossa.     Granulomatous calcifications are seen in the spleen. The pancreas,  adrenals and kidneys appear unremarkable except for bilateral renal  cysts including a large 5.4 cm left renal  cyst. There appears to be some  minimal wall thickening of the distal stomach.     No bowel wall thickening or dilatation is otherwise seen. The uterus has  been removed.     There may be some minimal right basilar atelectasis.     No abscess is seen.     IMPRESSION:  1. Status post cholecystectomy, hiatal hernia repair and hysterectomy.  2. There may be some minimal wall thickening of the distal stomach which  could represent some minimal gastritis.  3. No abscess is seen.    Hospital Course:    The patient is a very pleasant 78-year-old female sent paraesophageal hiatal hernia repair, cystectomy and ERCP for cholelithiasis and choledocholithiasis.  She was doing well but had to come back to the hospital for some nausea, confusion, and weakness.  Please see admission history and physical for complete details.  She had acute kidney injury and dehydration which likely will out her hydrocodone to build up in her.  This causes some confusion.  She was hydrated and her symptoms improved.  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  She did well with physical therapy.  I was going to discharge her there today but she developed some chest pain.  Sounded atypical in nature.  D-dimer was checked though and was positive.  Given her recent surgery she had a CTA of her chest which was negative.  Also she had a 2-D echo which was unremarkable.  Cardiology saw her in consultation and recommended outpatient stress test which has been set up.  In addition, she started complaining of epigastric pain and I consulted her surgeon.  There are no active issues at this time and she is tolerating a regular diet.  I have stopped her antihypertensives due to borderline hypotension.  If she starts developing hypertension I recommend restarting them.  She feels ready to be discharged home today and medically she is stable to discharge today.  She'll follow-up as scheduled with Dr. Johnson next  week and with her primary care physician.      Physical Exam:  Temp:  [97.3 °F (36.3 °C)-97.5 °F (36.4 °C)] 97.3 °F (36.3 °C)  Heart Rate:  [71-73] 72  Resp:  [16-18] 16  BP: ()/(58-65) 101/59  Body mass index is 25.19 kg/m².  Physical Exam  Constitutional: She appears well-developed and well-nourished. No distress.   Cardiovascular: Normal rate and regular rhythm. Exam reveals no friction rub.   No murmur heard.  Pulmonary/Chest: Breath sounds normal. No stridor. No respiratory distress. She has no wheezes.   Abdominal: Soft. Bowel sounds are normal. She exhibits no distension and no mass. There is no tenderness.   Musculoskeletal: She exhibits no edema, tenderness or deformity.        Right hip: She exhibits normal range of motion, no tenderness, no bony tenderness, no swelling and no crepitus.   Full range of motion right hip, pyriformis tenderness, straight leg raise negative   Skin: She is not diaphoretic.   Vitals reviewed.    Condition on Discharge:  Stable.    Discharge Disposition:   Home with family    Allergies:   Allergies   Allergen Reactions   • Statins Nausea And Vomiting   • Morphine Hallucinations   • Penicillins Itching   • Tetanus Toxoids Itching       Discharge Medications:      Discharge Medications      Continue These Medications      Instructions Start Date   aspirin 81 MG EC tablet   81 mg, Oral, Daily, HELD      buPROPion  MG 24 hr tablet  Commonly known as:  WELLBUTRIN XL   150 mg, Oral, Daily      calcium carbonate (oyster shell) 500 MG tablet tablet   500 mg, Oral, Daily      clonazePAM 1 MG tablet  Commonly known as:  KlonoPIN   1 mg, Oral, 2 Times Daily PRN      cycloSPORINE 0.05 % ophthalmic emulsion  Commonly known as:  RESTASIS   1 drop, Both Eyes, 2 Times Daily      DEXILANT 60 MG capsule  Generic drug:  dexlansoprazole   60 mg, Oral, Daily      diclofenac 1 % gel gel  Commonly known as:  VOLTAREN   4 g, Topical, 4 Times Daily PRN      estradiol 1 MG tablet  Commonly  known as:  ESTRACE   TAKE 1 TABLET BY MOUTH DAILY      gabapentin 300 MG capsule  Commonly known as:  NEURONTIN   300 mg, Oral, Nightly      HYDROcodone-acetaminophen  MG per tablet  Commonly known as:  NORCO   1 tablet, Oral, Every 6 Hours PRN      MULTIVITAMIN ADULT chewable tablet   1 tablet, Oral, Daily      senna-docusate 8.6-50 MG per tablet  Commonly known as:  PERICOLACE   2 tablets, Oral, Daily PRN      TOPAMAX 100 MG tablet  Generic drug:  topiramate   100 mg, Oral, Daily      venlafaxine 75 MG tablet  Commonly known as:  EFFEXOR   150 mg, Oral, Daily         Stop These Medications    triamterene-hydrochlorothiazide 37.5-25 MG per capsule  Commonly known as:  DYAZIDE            Discharge Diet:   Diet Instructions     Advance Diet As Tolerated            Activity at Discharge:   Activity Instructions     Activity as Tolerated      Discharge Activity Restrictions      1) No driving while taking pain medications or other sedating medications          Follow-up Appointments:  Future Appointments   Date Time Provider Department Center   2/18/2019  8:30 AM [facility]   2/19/2019  3:00 PM Reji Johnson MD [facility] None   5/8/2019  9:00 AM [facility] [place] [facility] None   5/15/2019  1:00 PM Cruzito Weir MD [facility] None     Follow-up Information     Cruzito Weir MD Follow up.    Specialty:  Internal Medicine  Contact information:  [address] [facility] [address]  [address]  [facility] [id number] [phone]             Rajwinder Bradley MD Follow up in 6 week(s).    Specialty:  Cardiology  Contact information:  [address]  [address]  [facility] [id number] [phone]                     Test Results Pending at Discharge:  None     Code status:   Code Status and Medical Interventions:   Ordered at: 02/09/19 4365     Code Status:    CPR     Medical Interventions (Level of Support Prior to Arrest):    Full         Geovany Whitfield MD  [facility]  02/12/19  4:24 PM      Time: greater than 30 minutes.

## 2019-02-12 NOTE — PROGRESS NOTES
"Patient Name: Sachi Vora  :1940  78 y.o.      Patient Care Team:  Cruzito Weir MD as PCP - General (Internal Medicine)  Shellie Lizama APRN as PCP - Claims Attributed    Interval History:   No chest discomfort overnight.  Troponins negative.  CT without pulmonary embolus    Subjective:  Following for chest pain     Objective   Vital Signs  Temp:  [97.5 °F (36.4 °C)-98.2 °F (36.8 °C)] 97.5 °F (36.4 °C)  Heart Rate:  [69-74] 71  Resp:  [18] 18  BP: ()/(58-65) 98/58    Intake/Output Summary (Last 24 hours) at 2019 0921  Last data filed at 2019 0453  Gross per 24 hour   Intake 1057 ml   Output 100 ml   Net 957 ml     Flowsheet Rows      First Filed Value   Admission Height  152.4 cm (60\") Documented at 2019 1108   Admission Weight  58.5 kg (129 lb) Documented at 2019 1108          Physical Exam:   General Appearance:    Alert, cooperative, in no acute distress   Lungs:     Clear to auscultation.  Normal respiratory effort and rate.      Heart:    Regular rhythm and normal rate, normal S1 and S2, no murmurs, gallops or rubs.     Chest Wall:    No abnormalities observed   Abdomen:     Soft, nontender, positive bowel sounds.     Extremities:   no cyanosis, clubbing or edema.  No marked joint deformities.  Adequate musculoskeletal strength.       Results Review:    Results from last 7 days   Lab Units 19  0641   SODIUM mmol/L 144   POTASSIUM mmol/L 4.0   CHLORIDE mmol/L 117*   CO2 mmol/L 16.9*   BUN mg/dL 6*   CREATININE mg/dL 0.66   GLUCOSE mg/dL 81   CALCIUM mg/dL 7.7*     Results from last 7 days   Lab Units 19  0641 19  1054 19  0703   TROPONIN T ng/mL <0.010 <0.010 <0.010     Results from last 7 days   Lab Units 19  0641   WBC 10*3/mm3 5.47   HEMOGLOBIN g/dL 9.0*   HEMATOCRIT % 29.3*   PLATELETS 10*3/mm3 342                         Medication Review:     aspirin 81 mg Oral Daily   buPROPion  mg Oral Daily   calcium carbonate (oyster " shell) 500 mg Oral Daily   cycloSPORINE 1 drop Both Eyes BID   estradiol 1 mg Oral Daily   gabapentin 300 mg Oral Nightly   multivitamin with minerals 1 tablet Oral Daily   pantoprazole 40 mg Oral BID AC   sodium chloride 3 mL Intravenous Q12H   topiramate 100 mg Oral Q12H   venlafaxine 75 mg Oral Daily          sodium chloride 0.9 % with KCl 20 mEq 100 mL/hr Last Rate: Stopped (02/12/19 0830)       Assessment/Plan     1.  Chest pain.   troponin has been negative ×3.  D-dimer was positive but CT PE protocol did not show pulmonary embolus.  Echocardiogram showed normal LV systolic function without wall motion abnormalities.  2.  Status post hiatal hernia repair, cholecystectomy, ERCP, poor nutrition  3.  Generalized weakness  4.  Hypokalemia  5.  Chronic anemia    - I spoke with Dr Whitfield. Pt hopefully home today.   - I am okay with discharge today.  I'm going to arrange for an outpatient stress test and to follow-up with me in 6 weeks.    Rajwinder Bradley MD, [facility]  02/12/19  9:21 AM

## 2019-02-12 NOTE — PROGRESS NOTES
Covering for Dr. Johnson  Admitted per her report due to hip pain and chest pain.  CT angiogram was negative.  Abdomen is soft and incisions are healing well.  I see no evidence of postoperative problems.  From my standpoint, she is free to go home today or whenever felt ready from a medical standpoint.

## 2019-02-13 ENCOUNTER — READMISSION MANAGEMENT (OUTPATIENT)
Dept: CALL CENTER | Facility: HOSPITAL | Age: 79
End: 2019-02-13

## 2019-02-13 NOTE — OUTREACH NOTE
Prep Survey      Responses   Facility patient discharged from?  [place]   Is patient eligible?  Yes   Discharge diagnosis  Leukocytosis, Precordial pain    Does the patient have one of the following disease processes/diagnoses(primary or secondary)?  Other   Does the patient have Home health ordered?  No   Is there a DME ordered?  No   Prep survey completed?  Yes          Rosie Ross RN

## 2019-02-14 ENCOUNTER — READMISSION MANAGEMENT (OUTPATIENT)
Dept: CALL CENTER | Facility: HOSPITAL | Age: 79
End: 2019-02-14

## 2019-02-14 NOTE — OUTREACH NOTE
Medical Week 1 Survey      Responses   Facility patient discharged from?  [place]   Does the patient have one of the following disease processes/diagnoses(primary or secondary)?  Other   Is there a successful TCM telephone encounter documented?  No   Week 1 attempt successful?  Yes   Call start time  1020   Call end time  1023   Is patient permission given to speak with other caregiver?  No   Medication alerts for this patient  review medication with the patient   Is the patient taking all medications as directed (includes completed medication regime)?  Yes   Comments regarding appointments  has appointment scheduled   Does the patient have a primary care provider?   Yes   Does the patient have an appointment with their PCP within 7 days of discharge?  Yes   Has the patient kept scheduled appointments due by today?  N/A   Has home health visited the patient within 72 hours of discharge?  N/A   Psychosocial issues?  No   Did the patient receive a copy of their discharge instructions?  Yes   Nursing interventions  Reviewed instructions with patient   What is the patient's perception of their health status since discharge?  Improving   Is the patient/caregiver able to teach back signs and symptoms related to disease process for when to call PCP?  Yes   Is the patient/caregiver able to teach back signs and symptoms related to disease process for when to call 911?  Yes   Is the patient/caregiver able to teach back the hierarchy of who to call/visit for symptoms/problems? PCP, Specialist, Home health nurse, Urgent Care, ED, 911  Yes   Week 1 call completed?  Yes   Wrap up additional comments  is begining to feel better          Theresa Chambers RN

## 2019-02-18 ENCOUNTER — TELEPHONE (OUTPATIENT)
Dept: CARDIOLOGY | Facility: CLINIC | Age: 79
End: 2019-02-18

## 2019-02-18 ENCOUNTER — HOSPITAL ENCOUNTER (OUTPATIENT)
Dept: CARDIOLOGY | Facility: HOSPITAL | Age: 79
Discharge: HOME | End: 2019-02-18
Admitting: INTERNAL MEDICINE

## 2019-02-18 VITALS — WEIGHT: 128.97 LBS | HEIGHT: 60 IN | BODY MASS INDEX: 25.32 KG/M2

## 2019-02-18 DIAGNOSIS — R07.2 PRECORDIAL PAIN: ICD-10-CM

## 2019-02-18 LAB
BH CV NUCLEAR PRIOR STUDY: 2
BH CV STRESS BP STAGE 1: NORMAL
BH CV STRESS COMMENTS STAGE 1: NORMAL
BH CV STRESS DOSE REGADENOSON STAGE 1: 0.4
BH CV STRESS DURATION MIN STAGE 1: 0
BH CV STRESS DURATION SEC STAGE 1: 10
BH CV STRESS HR STAGE 1: 104
BH CV STRESS PROTOCOL 1: NORMAL
BH CV STRESS RECOVERY BP: NORMAL MMHG
BH CV STRESS RECOVERY HR: 86 BPM
BH CV STRESS STAGE 1: 1
LV EF NUC BP: 77 %
MAXIMAL PREDICTED HEART RATE: 144 BPM
PERCENT MAX PREDICTED HR: 72.22 %
STRESS BASELINE BP: NORMAL MMHG
STRESS BASELINE HR: 64 BPM
STRESS PERCENT HR: 85 %
STRESS POST EXERCISE DUR SEC: 10 SEC
STRESS POST PEAK BP: NORMAL MMHG
STRESS POST PEAK HR: 104 BPM
STRESS TARGET HR: 122 BPM

## 2019-02-18 PROCEDURE — 93016 CV STRESS TEST SUPVJ ONLY: CPT | Performed by: INTERNAL MEDICINE

## 2019-02-18 PROCEDURE — 78452 HT MUSCLE IMAGE SPECT MULT: CPT | Performed by: INTERNAL MEDICINE

## 2019-02-18 PROCEDURE — 0 TECHNETIUM TETROFOSMIN KIT: Performed by: INTERNAL MEDICINE

## 2019-02-18 PROCEDURE — 93018 CV STRESS TEST I&R ONLY: CPT | Performed by: INTERNAL MEDICINE

## 2019-02-18 PROCEDURE — 25010000002 REGADENOSON 0.4 MG/5ML SOLUTION: Performed by: INTERNAL MEDICINE

## 2019-02-18 PROCEDURE — 78452 HT MUSCLE IMAGE SPECT MULT: CPT

## 2019-02-18 PROCEDURE — 93017 CV STRESS TEST TRACING ONLY: CPT

## 2019-02-18 PROCEDURE — A9502 TC99M TETROFOSMIN: HCPCS | Performed by: INTERNAL MEDICINE

## 2019-02-18 RX ADMIN — TETROFOSMIN 1 DOSE: 1.38 INJECTION, POWDER, LYOPHILIZED, FOR SOLUTION INTRAVENOUS at 09:20

## 2019-02-18 RX ADMIN — REGADENOSON 0.4 MG: 0.08 INJECTION, SOLUTION INTRAVENOUS at 09:20

## 2019-02-18 RX ADMIN — TETROFOSMIN 1 DOSE: 1.38 INJECTION, POWDER, LYOPHILIZED, FOR SOLUTION INTRAVENOUS at 08:35

## 2019-02-18 NOTE — TELEPHONE ENCOUNTER
· Myocardial perfusion imaging indicates a normal myocardial perfusion study with no evidence of ischemia.  · Left ventricular ejection fraction is hyperdynamic (Calculated EF > 70%).  · Impressions are consistent with a low risk study.        1.  Chest pain.   troponin has been negative ×3.  D-dimer was positive but CT PE protocol did not show pulmonary embolus.  Echocardiogram showed normal LV systolic function without wall motion abnormalities.  2.  Status post hiatal hernia repair, cholecystectomy, ERCP, poor nutrition  3.  Generalized weakness  4.  Hypokalemia  5.  Chronic anemia     - I spoke with Dr Whitfield. Pt hopefully home today.   - I am okay with discharge today.  I'm going to arrange for an outpatient stress test and to follow-up with me in 6 weeks.          Please let her know her stress test was normal.  She should have a follow-up with Karina in like 6 weeks.

## 2019-02-19 ENCOUNTER — OFFICE VISIT (OUTPATIENT)
Dept: SURGERY | Facility: CLINIC | Age: 79
End: 2019-02-19

## 2019-02-19 DIAGNOSIS — Z09 POSTOP CHECK: Primary | ICD-10-CM

## 2019-02-19 PROCEDURE — 99024 POSTOP FOLLOW-UP VISIT: CPT | Performed by: SURGERY

## 2019-02-19 RX ORDER — SIMETHICONE 80 MG
80 TABLET,CHEWABLE ORAL 4 TIMES DAILY PRN
Qty: 100 TABLET | Refills: 2 | Status: SHIPPED | OUTPATIENT
Start: 2019-02-19 | End: 2020-02-19

## 2019-02-19 NOTE — TELEPHONE ENCOUNTER
Called and left VMM on cell phone for her to call the office back. Scheduled f/u appt abilio/Karina on 4/2 @ 9:30am.  Hilary Hoover RN

## 2019-02-21 ENCOUNTER — READMISSION MANAGEMENT (OUTPATIENT)
Dept: CALL CENTER | Facility: HOSPITAL | Age: 79
End: 2019-02-21

## 2019-02-21 NOTE — OUTREACH NOTE
Medical Week 2 Survey      Responses   Facility patient discharged from?  [place]   Does the patient have one of the following disease processes/diagnoses(primary or secondary)?  Other   Week 2 attempt successful?  Yes   Call start time  1702   Discharge diagnosis  Leukocytosis, Precordial pain    Call end time  1708   Meds reviewed with patient/caregiver?  Yes   Is the patient having any side effects they believe may be caused by any medication additions or changes?  No   Does the patient have all medications ordered at discharge?  Yes   Is the patient taking all medications as directed (includes completed medication regime)?  Yes   Does the patient have a primary care provider?   Yes   Does the patient have an appointment with their PCP within 7 days of discharge?  Yes   Has the patient kept scheduled appointments due by today?  Yes   Comments  Doing well with appts.   Has home health visited the patient within 72 hours of discharge?  N/A   Psychosocial issues?  No   Did the patient receive a copy of their discharge instructions?  Yes   Nursing interventions  Reviewed instructions with patient   What is the patient's perception of their health status since discharge?  Improving   Is the patient/caregiver able to teach back signs and symptoms related to disease process for when to call PCP?  Yes   Is the patient/caregiver able to teach back signs and symptoms related to disease process for when to call 911?  Yes   Is the patient/caregiver able to teach back the hierarchy of who to call/visit for symptoms/problems? PCP, Specialist, Home health nurse, Urgent Care, ED, 911  Yes   Week 2 Call Completed?  Yes          Geovany Kearney RN

## 2019-02-21 NOTE — PROGRESS NOTES
Postoperative Note    This is a 78 y.o. female who presents for a post-operative visit after undergoing a Laparoscopic repair of hiatal hernia and Toupet fundoplication with laparoscopic cholecystectomy on 1/21/2019 followed by ERCP on 1/22/2019    Patient was admitted to the hospital with weakness and hip pain on 2/9/2019.  She was discharged from the hospital 4 days without any problem related to surgery and her weakness improved.  She was struggling with liquid diet but has been doing much better with soft foods.  Patient reports she is doing well. Eating well without any significant nausea. She is eating soft foods at this point. She has tried fish and mashed potatoes.She is having no problems with dysphagia or feeling that the food is getting stuck.  She reports having intermittent episodes of hoarseness.  She is having good bowel function. No problems with constipation or diarrhea but she feels bloated.  No urinary complaints. Denies fever. Ambulating well and slowly returning to normal activities within the recommended guidelines.    Patient has been taking antiacids medications.    Pathology  Final Diagnosis   1. Gallbladder:               A. Chronic cholecystitis and cholelithiasis.     Incisions are appears to be healing well with good reapproximation with no erythema. No signs of infection.  Abdomen is soft, nontender nondistended    Assessment  Patients is a 78 y.o. female s/p  Laparoscopic repair of hiatal hernia and Toupet fundoplication with laparoscopic cholecystectomy on 1/21/2019 followed by ERCP on 1/22/2019 for choledocholithiasis.  She was struggling with her liquid diet after surgery but has been doing much better with soft foods.  She does not have any complaints of dysphagia but has bloating.  Discussed with her that bloating is 1 of the possible adverse effects of the surgery.  Discussed with her about the need to start taking Gas-X as needed.  She should continue eating small meals  She has  intermittent episode of hoarseness that does not seem to be related to acid reflux.  We will watch this for now    -Patient to follow-up in my office in 2 weeks and at that time we will put her on regular diet if no problems with dysphagia.  Patient verbalized understanding and agree with the plan.   -Stop Dexilant    Postoperative instructions were discussed and given in a printed form.  Diet advancements were discussed.     Reji Johnson MD, FACS  General, Minimally Invasive and Endoscopic Surgery  [facility]    [address] [place] [address], [address]  [place], [place], [address]  P: [phone]  F: [phone]

## 2019-03-02 ENCOUNTER — READMISSION MANAGEMENT (OUTPATIENT)
Dept: CALL CENTER | Facility: HOSPITAL | Age: 79
End: 2019-03-02

## 2019-03-03 NOTE — OUTREACH NOTE
Medical Week 3 Survey      Responses   Facility patient discharged from?  [place]   Does the patient have one of the following disease processes/diagnoses(primary or secondary)?  Other   Week 3 attempt successful?  Yes   Call start time  1844   Call end time  1845   Meds reviewed with patient/caregiver?  Yes   Is the patient taking all medications as directed (includes completed medication regime)?  Yes   Has the patient kept scheduled appointments due by today?  Yes   What is the patient's perception of their health status since discharge?  Improving   Week 3 Call Completed?  Yes   Graduated  Yes   Did the patient feel the follow up calls were helpful during their recovery period?  Yes   Was the number of calls appropriate?  Yes          Gisell Clarke RN

## 2019-03-05 ENCOUNTER — OFFICE VISIT (OUTPATIENT)
Dept: SURGERY | Facility: CLINIC | Age: 79
End: 2019-03-05

## 2019-03-05 DIAGNOSIS — Z09 POSTOP CHECK: Primary | ICD-10-CM

## 2019-03-05 PROCEDURE — 99024 POSTOP FOLLOW-UP VISIT: CPT | Performed by: SURGERY

## 2019-03-05 RX ORDER — POLYETHYLENE GLYCOL 3350 17 G/17G
17 POWDER, FOR SOLUTION ORAL DAILY
Qty: 60 EACH | Refills: 1 | Status: SHIPPED | OUTPATIENT
Start: 2019-03-05 | End: 2019-09-03

## 2019-03-05 NOTE — PROGRESS NOTES
Postoperative Note    This is a 78 y.o. female who presents for a post-operative visit after undergoing a Laparoscopic repair of hiatal hernia and Toupet fundoplication and laparoscopic cholecystectomy on 1/21/2019.    Patient reports she is doing well. Eating well without any significant nausea. She is eating soft foods at this point. She has tried fish and mashed potatoes.She had only one episode of dysphagia, otherwise no major dysphagia.  She has been having intermittent episodes of epigastric abdominal pain that are very mild and is associated with food intake.  The patient reports constipation of one bowel movement every 3 days  No urinary complaints. Denies fever. Ambulating well and slowly returning to normal activities within the recommended guidelines.    Patient has not been taking antiacids medications.  Reports no heartburn    Incisions are appears well-healed. No signs of infection.    Assessment    78 y.o. female who presents for a post-operative visit after undergoing a Laparoscopic repair of hiatal hernia and Toupet fundoplication and laparoscopic cholecystectomy on 1/21/2019.  No major issues.  She is tolerating soft diet.  Intermittent abdominal pain does not seem to be something the need to be further workup for now.     Postoperative instructions were discussed and given in a printed form.  Diet advancements were discussed.  She will try regular diet.  Discussed with her about the need to chew food very well    She will follow-up at our office in 6 month(s)     Reji Johnson MD, FACS  General, Minimally Invasive and Endoscopic Surgery  [facility]    [address], [address]  [place], [place], [address]  P: [phone]  F: [phone]

## 2019-03-13 DIAGNOSIS — I10 BENIGN ESSENTIAL HYPERTENSION: ICD-10-CM

## 2019-03-13 DIAGNOSIS — R60.0 PEDAL EDEMA: ICD-10-CM

## 2019-03-13 RX ORDER — TRIAMTERENE AND HYDROCHLOROTHIAZIDE 37.5; 25 MG/1; MG/1
CAPSULE ORAL
Qty: 90 CAPSULE | Refills: 0 | Status: SHIPPED | OUTPATIENT
Start: 2019-03-13 | End: 2019-04-24 | Stop reason: SDUPTHER

## 2019-04-03 DIAGNOSIS — M81.0 OSTEOPOROSIS, UNSPECIFIED OSTEOPOROSIS TYPE, UNSPECIFIED PATHOLOGICAL FRACTURE PRESENCE: Primary | Chronic | ICD-10-CM

## 2019-04-16 DIAGNOSIS — N18.2 CHRONIC RENAL INSUFFICIENCY, STAGE II (MILD): Chronic | ICD-10-CM

## 2019-04-16 DIAGNOSIS — Z51.81 THERAPEUTIC DRUG MONITORING: ICD-10-CM

## 2019-04-16 DIAGNOSIS — M81.0 OSTEOPOROSIS, UNSPECIFIED OSTEOPOROSIS TYPE, UNSPECIFIED PATHOLOGICAL FRACTURE PRESENCE: Primary | Chronic | ICD-10-CM

## 2019-04-16 PROBLEM — K81.1 CHRONIC CHOLECYSTITIS: Status: RESOLVED | Noted: 2019-02-04 | Resolved: 2019-04-16

## 2019-04-16 PROBLEM — Z09 HOSPITAL DISCHARGE FOLLOW-UP: Status: RESOLVED | Noted: 2019-02-04 | Resolved: 2019-04-16

## 2019-04-16 PROBLEM — E86.0 DEHYDRATION: Status: RESOLVED | Noted: 2019-02-09 | Resolved: 2019-04-16

## 2019-04-16 PROBLEM — D72.829 LEUKOCYTOSIS: Status: RESOLVED | Noted: 2019-02-09 | Resolved: 2019-04-16

## 2019-04-16 PROBLEM — N17.9 AKI (ACUTE KIDNEY INJURY) (HCC): Status: RESOLVED | Noted: 2019-02-09 | Resolved: 2019-04-16

## 2019-04-16 PROBLEM — R10.13 EPIGASTRIC ABDOMINAL PAIN: Status: RESOLVED | Noted: 2018-09-06 | Resolved: 2019-04-16

## 2019-04-16 PROBLEM — M25.551 RIGHT HIP PAIN: Status: RESOLVED | Noted: 2019-02-09 | Resolved: 2019-04-16

## 2019-04-16 PROBLEM — Z90.49 STATUS POST CHOLECYSTECTOMY: Status: RESOLVED | Noted: 2019-02-09 | Resolved: 2019-04-16

## 2019-04-16 PROBLEM — R07.2 PRECORDIAL PAIN: Status: RESOLVED | Noted: 2019-02-12 | Resolved: 2019-04-16

## 2019-04-16 RX ORDER — ZOLEDRONIC ACID 5 MG/100ML
5 INJECTION, SOLUTION INTRAVENOUS ONCE
Qty: 100 ML | Refills: 0 | Status: SHIPPED | OUTPATIENT
Start: 2019-04-16 | End: 2019-04-16

## 2019-04-17 RX ORDER — ZOLEDRONIC ACID 5 MG/100ML
5 INJECTION, SOLUTION INTRAVENOUS ONCE
Status: CANCELLED | OUTPATIENT
Start: 2019-04-22

## 2019-04-18 ENCOUNTER — OFFICE VISIT (OUTPATIENT)
Dept: CARDIOLOGY | Facility: CLINIC | Age: 79
End: 2019-04-18

## 2019-04-18 VITALS
BODY MASS INDEX: 26.58 KG/M2 | HEART RATE: 77 BPM | SYSTOLIC BLOOD PRESSURE: 122 MMHG | HEIGHT: 60 IN | WEIGHT: 135.4 LBS | DIASTOLIC BLOOD PRESSURE: 78 MMHG

## 2019-04-18 DIAGNOSIS — R29.818 SUSPECTED SLEEP APNEA: ICD-10-CM

## 2019-04-18 DIAGNOSIS — D64.9 CHRONIC ANEMIA: Chronic | ICD-10-CM

## 2019-04-18 DIAGNOSIS — N18.2 CHRONIC RENAL INSUFFICIENCY, STAGE II (MILD): Chronic | ICD-10-CM

## 2019-04-18 DIAGNOSIS — I27.20 PULMONARY HYPERTENSION (HCC): Primary | ICD-10-CM

## 2019-04-18 DIAGNOSIS — E78.5 HYPERLIPIDEMIA, UNSPECIFIED HYPERLIPIDEMIA TYPE: Chronic | ICD-10-CM

## 2019-04-18 DIAGNOSIS — E55.9 VITAMIN D DEFICIENCY: Chronic | ICD-10-CM

## 2019-04-18 DIAGNOSIS — D50.0 IRON DEFICIENCY ANEMIA DUE TO CHRONIC BLOOD LOSS: Chronic | ICD-10-CM

## 2019-04-18 DIAGNOSIS — Z87.898 HISTORY OF CHEST PAIN: ICD-10-CM

## 2019-04-18 DIAGNOSIS — I10 BENIGN ESSENTIAL HYPERTENSION: Chronic | ICD-10-CM

## 2019-04-18 PROCEDURE — 99214 OFFICE O/P EST MOD 30 MIN: CPT | Performed by: NURSE PRACTITIONER

## 2019-04-18 PROCEDURE — 93000 ELECTROCARDIOGRAM COMPLETE: CPT | Performed by: NURSE PRACTITIONER

## 2019-04-18 RX ORDER — MELATONIN
1000 DAILY
COMMUNITY
End: 2020-11-19

## 2019-04-18 RX ORDER — GINKGO BILOBA 60 MG
1 TABLET ORAL DAILY
COMMUNITY
End: 2020-11-19

## 2019-04-18 RX ORDER — VITAMIN E 268 MG
400 CAPSULE ORAL DAILY
COMMUNITY

## 2019-04-18 NOTE — PROGRESS NOTES
Date of Office Visit: 2019  Encounter Provider: Jessy Baker, JHONY, APRN  Place of Service: [facility] CARDIOLOGY  Patient Name: Sachi Vora  :1940        Subjective:     Chief Complaint:  Follow-up, history of chest pain, mild pulmonary hypertension.      History of Present Illness:  Sachi Vora is a 78 y.o. female patient of Dr. Bradley.  This is my first time seeing this patient in the office today, and I have reviewed her records.    Patient has a history of chest pain, hiatal hernia status post repair, cholelithiasis, cholecystectomy, anemia.    Patient was noted to have a normal stress test in 2016, which was ordered by primary care provider.  Patient was discharged 19 following extensive intervention for hiatal hernia, cholelithiasis, and cholecystectomy without cholecystitis.  Patient was seen in the hospital 19 by Dr. Bradley.  She reported that she has been struggling with her diet since discharge.  She had a history of chronic anemia and reported that it follow-up appointment with primary care provider GI symptoms were better, however she still felt weak.  The day prior she reported that she was so weak she cannot do her daily activities so she called EMS to bring her to the hospital.  While in the ER she complained of right lower back pain and hip pain.  Troponin was negative.  Described a pressure in the mid epigastric area and into her left chest shoulder.  Not worse with movement.  Focal tenderness.  Felt like her shortness of breath increased, though she did not appear short of breath on exam.  She has noticed the pain since surgery.  It was new for her.    Patient had echocardiogram done 19.  It showed normal left ventricular systolic function with EF of 66%.  There was thickening of the aortic valve with mild aortic valve regurgitation.  Mild mitral valve regurgitation was seen.  Moderate tricuspid regurgitation was seen with mildly elevated  RVSP at 40 mmHg.  Left atrial cavity was mildly dilated and right atrial cavity was borderline dilated.    D-dimer was positive and CT PE protocol did not show sign of pulmonary embolus.  No wall motion abnormalities seen on echocardiogram and LV systolic function was normal.  Patient was instructed to get an outpatient stress test and follow-up with Dr. Bradley in 6 weeks.    Patient had nuclear stress test on 2/18/19.  Normal myocardial perfusion study with no evidence of ischemia.  Impressions were consistent with a low risk study.      Patient presents to office today for follow-up appointment.  Patient reports she has been doing okay since hospital discharge.  It sounds like she has been under some stress.  She is tearful at points during office visit today.  She reports that she lost one son proximally 15 years ago from an automobile accident and a son last year from a heart attack.  She is still grieving.  She reports that she has not had any exercise since surgery.  She is planning to resume.  Her  bought her a bike.  Recommended starting a very gradually 10-15 minutes at a time and increasing as tolerated.  Notify the office for any issues or concerns.  She reports one type of intermittent left lateral chest wall pain that occurs with certain movements of her chest wall.  It sounds musculoskeletal in nature.  She reports a different type of left chest pain that seems to occur randomly at rest.  Denies exacerbating or alleviating factors.  Sound atypical in nature.  Not new or worsening.  She had recent normal nuclear stress test without signs of ischemia and impressions were consistent with a low risk study.  She has some chronic intermittent shortness of breath with exertion, not new or worsening.  When asked if she has had any palpitations she reports an occasional funny feeling that she cannot describe.  She reports that occurs rarely.  She cannot name when it last occurred but it was a while ago.   "She is very nonspecific in testing it.  She was asked to notify the office if it recurs.  She does have a history of occasional snoring and chronic fatigue.  Reports she has never been tested for sleep apnea.  Recommended getting her tested for sleep apnea given mild pulmonary hypertension.  Patient does have a history of some balance issues.  Recommend that she discuss balance physical therapy with primary care provider during upcoming appointment.  Blood pressure and heart rate are well controlled in the office today.  She is not checking regularly outside the office but reports that they always run well.  Patient denies any dizziness, syncope, near syncope, falls, or abnormal bleeding.        Past Medical History:   Diagnosis Date   • Benign essential hypertension 4/8/2016   • Bilateral sensorineural hearing loss 3/31/2011    03/31/2011--etiology reveals reverse \"cookie bite\" type of hearing loss for both ears of mild/moderate degree, mostly sensorineural.  Speech discrimination 100% on the right, 96% on the left.   • Carotid artery plaque, 10/03/2014--mild bilateral carotid artery plaque. 7/25/2012    10/03/2014--[facility] screening revealed mild bilateral carotid plaque, negative for atrial fibrillation, negative for AAA, negative for PAD, osteoporosis screen revealed osteopenia.  Body mass index was 25 and considered to be moderate risk.  07/25/2012--vascular screen negative for carotid plaque, negative for abdominal aneurysm, negative for PAD Description: 10/03/2014--[facility] screening revealed mild bilateral carotid plaque, negative for atrial fibrillation, negative for AAA, negative for PAD, osteoporosis screen revealed osteopenia.  Body mass index was 25 and considered to be moderate risk.  07/25/2012--vascular screen negative for carotid plaque, negative for abdominal aneurysm, negative for PAD   • Chronic anemia 8/23/2016 06/13/2017--colonoscopy revealed melanosis.  Biopsied.  There was friability with " contact bleeding in the ascending colon.  Biopsied.  Pathology returned consistent with melanosis coli.  06/13/2017--EGD revealed normal esophagus.  Biopsies taken.  There was a medium-sized hiatal hernia.  Localized mild inflammation and linear erosions were found in the prepyloric region.  Biopsied.  Examined duodenum was normal.  Random biopsies taken.  Pathology of the gastroesophageal junction was unremarkable as was the gastric fundus.  Prepyloric biopsy revealed minimal chronic inflammation and reactive change.  H pylori negative.  Duodenal biopsies are negative.  04/12/2017--hemoglobin low at 10.8, hematocrit is normal at 35.7.  Iron sulfate 325 mg per day initiated.  08/23/2016--routine follow-up.  Patient continues to have epigastric abdominal pain believed to be related to reflux with possible esophageal spasm.  Hemoglobin noted to be low at 10.6 with a hematocrit low at 33.7 and RDW elevated at 16.2.  Homocysti   • Chronic fatigue 4/21/2016 06/14/2017--overnight oximetry revealed oxygen desaturation index of only 0.44.  There were only 10 total desaturations during the period of testing which lasted 6 hours and 46 minutes.  05/31/2017--patient seen in follow-up and reports she continues to have chronic fatigue as well as hypersomnolence.  Once again, she is on multiple medications that are undoubtedly contributing to this problem including clonazepam and hydrocodone but I doubt that these could be discontinued.  Her CBC back in April revealed a low hemoglobin of 10.8 but hematocrit was normal at 35.7.  Thyroid function tests were normal and CMP normal.  Overnight oximetry test ordered.  Repeat CBC.  Iron studies.  Sedimentation rate and CRP.  04/11/2017--patient seen in follow-up and her blood pressure is now at a reasonable level at 120/64.  She reports she still feels somewhat fatigued but she is much better.  Patient has not been doing any regular exercise and I do think that that would be helpful  to reduce her feeling of fatigue.  She is   • Chronic gastric ulcer 4/14/2014    02/15/2017--patient seen in follow-up in nearly 6 months later.  She has complaints of not feeling well all over.  She has complaints of diffuse myalgias and possibly tendinopathy related to Levaquin that I called in prior to her going to [place] for vacation.  She reports that 3 or 4 days after starting the Levaquin she began to have the musculoskeletal symptoms and she reports that she continues to have them presently.  Symptoms are worse involving her left calf area.  Examination reveals significant tenderness involving the left calf and the left calf seems a little larger than the right.  She also has complaints of shortness of breath but no chest pain.  She is complaining of double vision and generalized weakness and fatigue.  She was complaining of chronic fatigue at the last visit back in September and I ordered an overnight oximetry test but apparently this was never done for reasons that are not clear to me.  Her current oxygen saturation is 94% and normally it is 100%.  Plan is as follows: Extensi   • Chronic gastritis 11/19/2001    02/15/2017--patient seen in follow-up in nearly 6 months later.  She has complaints of not feeling well all over.  She has complaints of diffuse myalgias and possibly tendinopathy related to Levaquin that I called in prior to her going to [place] for vacation.  She reports that 3 or 4 days after starting the Levaquin she began to have the musculoskeletal symptoms and she reports that she continues to have them presently.  Symptoms are worse involving her left calf area.  Examination reveals significant tenderness involving the left calf and the left calf seems a little larger than the right.  She also has complaints of shortness of breath but no chest pain.  She is complaining of double vision and generalized weakness and fatigue.  She was complaining of chronic fatigue at the last visit back  in September and I ordered an overnight oximetry test but apparently this was never done for reasons that are not clear to me.  Her current oxygen saturation is 94% and normally it is 100%.  Plan is as follows: Extensi   • Chronic lower back pain 1/31/2006 08/11/2014--MRI of the lumbar spine reveals the conus terminates at L2 and is normal.  Cauda equina unremarkable.  Stable to moderate degenerative disc disease at L5-S1.  No acute fracture or pars defect is demonstrated.  Small synovial cysts are seen posterior to the L4-L5 facet.  The perivertebral soft tissues are unremarkable.  T12-L1, L1-L2, L2-L3 are negative.  L3-L4 a broad-based disc bulge resulting in mild bilateral neural foraminal narrowing.  L4-L5 reveals a broad-based disc bulge facet disease resulting in mild bilateral neural foraminal narrowing.  L5-S1 reveals a broad-based disc bulge, posterior osseous slipping, and facet disease resulting in mild to moderate bilateral neural foraminal narrowing.  Assessment is stable mild to moderate degenerative spondylosis.  Small synovial cysts are seen posterior to the L4-L5 facets.  08/11/2014--MRI of the thoracic spine reveals mild to moderate thoracic kyphosis.  No fracture.  At T5--T6 there is a moderate sized left paracentral disc protrusion which i   • Chronic migraine 11/3/2009    01/18/2010--MRI of the brain performed for headaches and memory loss.  Mild small vessel disease in the cerebral and central pontine white matter.  There is an ovoid, somewhat pancake-shaped area of signal abnormality in the anterior inferior right temporal lobe subcortical to the juxtacortical white matter that measures 1.2 x 1 cm and anterior posterior and medial lateral dimension but only measures 3 mm in cranial caudal dimension.  I suspect that this is a benign cyst or a cystic area of encephalomalacia.  The remainder of the MRI of the head is within normal limits.  11/03/2009--CT scan of the brain without contrast  performed for headache after a fall.  Mild diffuse atrophy.  No acute abnormality noted.; Description: Patient has had a long history of migraine headaches.  MRI and CT scan of the brain have been essentially negative.   • Chronic otitis externa 4/8/2016   • Chronic renal insufficiency, stage II (mild), creatinine 1.12 11/14/2015 11/14/2015--serum creatinine mildly elevated at 1.12.   • Depression with anxiety 4/8/2016   • Functional murmur, 07/15/2015--normal echocardiogram. 7/15/2015    07/15/2015--echocardiogram reveals normal left ventricular size and function with ejection fraction 55%.  Grade 1 diastolic dysfunction, abnormal relaxation pattern.  Trace tricuspid regurgitation.  Estimated right ventricular systolic pressure is 25 mmHg which is normal.   • Gastroesophageal reflux disease with esophagitis 11/19/2001 06/13/2017--EGD revealed normal esophagus.  Biopsies taken.  There was a medium-sized hiatal hernia.  Localized mild inflammation and linear erosions were found in the prepyloric region.  Biopsied.  Examined duodenum was normal.  Random biopsies taken.  Pathology of the gastroesophageal junction was unremarkable as was the gastric fundus.  Prepyloric biopsy revealed minimal chronic inflammation and reactive change.  H pylori negative.  Duodenal biopsies are negative.  11/03/2014--patient seen in follow-up and reports her epigastric pain/chest pain has resolved.  I reviewed the results of the studies with her.  It does not appear that her problem is related to biliary tract disease.  She does have reflux and although the recent upper GI revealed minimal reflux, I do think her symptoms are related to esophageal reflux with esophageal spasm.  10/21/2014--air-contrast upper GI series revealed trace upper laryngeal penetration.  Persistent small partially reducible sliding hiatal hernia the upper stomach with    • Generalized anxiety disorder 4/11/2017   • GERD (gastroesophageal reflux disease)    •  History of Acute deep vein thrombosis (DVT) of distal end of left lower extremity 2/15/2017    03/21/2017 patient seen in follow-up and she is tolerating the Eliquis well without signs or symptoms of bleeding.  Her calf swelling and tenderness is better but not totally resolved.  I suspect that the DVT is chronic and may not resolve at all.  I will order a repeat venous study and then proceed from there.  02/23/2017--repeat Doppler venous study of the left lower extremity reveals a chronic left lower extremity DVT in the posterior tibial.  All other left sided veins appeared normal.  Fluid collection in the left calf noted.  02/17/2017--patient seen in follow-up and reports her left lower extremity symptoms are about the same.  She continues to have complaints of profound fatigue which I think is multifactorial including underlying depression that is not in remission.  Review the results of the CTA of the chest including the possible thyroid lesion.  I do not think this is contributing to any of her symptoms of fatigue particularly given the fact that her thyroid function tests are normal.  I expla   • History of palpitations 12/07/2011    Patient has had multiple admissions to the hospital for complaints of chest pain and heart palpitations. She meant admitted at least on 3 occasions. She has had at least 3 stress Cardiolite and 1 stress ECG, the last Cardiolite being performed 12/07/2011 which was negative. Patient is also had Holter monitors which have been unremarkable.   • HIstory of Schatzki's ring 02/28/2012 02/28/2012--air-contrast upper GI revealed small to moderate sized reducible sliding hiatal herniation of the upper stomach with some demonstrated gastroesophageal reflux. No esophageal, gastric, or duodenal mass or mucosal ulceration was seen.  11/03/2008--EGD performed for evaluation of iron deficiency anemia revealed hiatal hernia without evidence of reflux, prepyloric antritis and   •  Hyperlipidemia 4/8/2016   • Hypersomnolence 5/5/2016 06/14/2017--overnight oximetry revealed oxygen desaturation index of only 0.44.  There were only 10 total desaturations during the period of testing which lasted 6 hours and 46 minutes.  05/31/2017--patient seen in follow-up and reports she continues to have chronic fatigue as well as hypersomnolence.  Once again, she is on multiple medications that are undoubtedly contributing to this problem including clonazepam and hydrocodone but I doubt that these could be discontinued.  Her CBC back in April revealed a low hemoglobin of 10.8 but hematocrit was normal at 35.7.  Thyroid function tests were normal and CMP normal.  Overnight oximetry test ordered.  Repeat CBC.  Iron studies.  Sedimentation rate and CRP.  04/11/2017--patient seen in follow-up and her blood pressure is now at a reasonable level at 120/64.  She reports she still feels somewhat fatigued but she is much better.  Patient has not been doing any regular exercise and I do think that that would be helpful to reduce her feeling of fatigue.  She is   • Menopausal state 4/8/2016   • Multinodular goiter 2/17/2017 02/21/2017--thyroid ultrasound reveals multinodular thyroid with largest nodule measuring on the order of 1.6 cm in greatest dimension.  02/17/2017--patient seen in follow-up for DVT and CTA of the chest.  An incidental finding on the CTA of the chest reveals a 1.7 cm lesion in the right lobe of thyroid.  Note that thyroid function tests are currently normal.  Ultrasound of the thyroid ordered.   • Osteoporosis, 03/29/2011--lumbar spine -2.8.  Right femoral neck -2.4.  Left femoral neck -2.4.  Patient receives Reclast infusions. 2/9/2009 04/19/2017--Reclast infusion.  04/05/2016--Reclast infusion.  06/04/2012--Reclast infusion.  05/26/2011--Reclast infusion.  03/29/2011--DEXA scan revealed a lumbar T score of -2.8, right femoral neck T score -2.4, left femoral neck T score -2.4.   Osteoporosis of the lumbar spine and severe osteopenia of the hips bilaterally.  Patient has been intolerant to Fosamax because of gastritis and gastroesophageal reflux.  04/01/2009--treatment for osteoporosis begun with Fosamax.  02/09/2009--DEXA scan revealed lumbar spine T score of -3.3.  Left femoral neck T score -2.5.  Right hip T score -2.6.  Osteoporosis.    • Pancreatic Duct Dilation 11/30/2001 09/29/2014--patient was again evaluated by the urologist for a renal cyst.  CT scan of the abdomen and pelvis reveals a left renal cyst measuring 5.1 x 4.9 cm.  There were subcentimeter hypodense renal lesions that are too small to further characterize and are presumably related to cysts.  Recommend attention to follow up.  Distal dilated pancreatic duct noted.  The common bile duct is the upper limits of normal in size.  The ampullary region is not well evaluated.  Comparison with prior imaging is recommended if available.  If there is no prior film available for comparison, and ERCP or MRCP could be performed to further evaluate.  Small hiatal hernia noted.  03/05/2012--CT scan of the abdomen with contrast, pancreatic protocol.  This reveals some fullness of the pancreatic ductal system and apparent pancreatic divisum with separate entrance of the accessory pancreatic duct extending into the duodenum distal to the main pancreatic duct.  There is fullness of the duct diffusely but similar to the previous s   • Pedal edema 6/29/2015 06/29/2015--patient presents with a 4-6 week history of exertional dyspnea that comes on with activity such as climbing stairs or walking her dog up an incline.  No chest pain.  Relieved with rest.  No cough.  She does have complaints of her feet and legs swelling that is particularly worse at the end of the day and not improved overnight.  No orthopnea or PND.  Chest exam reveals faint rales at the bases.  Otherwise clear.  Heart is regular and I do not appreciate a heart murmur.   Chest x-ray PA and lateral ordered.  Echocardiogram ordered.   • Restless legs syndrome 4/8/2016   • Simple renal cyst 7/20/2009 09/29/2014--patient was again evaluated by the urologist for a renal cyst.  CT scan of the abdomen and pelvis reveals a left renal cyst measuring 5.1 x 4.9 cm.  There were subcentimeter hypodense renal lesions that are too small to further characterize and are presumably related to cysts.  Recommend attention to follow up.  Distal dilated pancreatic duct noted.  The common bile duct is the upper limits of normal in size.  The ampullary region is not well evaluated.  Comparison with prior imaging is recommended if available.  If there is no prior film available for comparison, and ERCP or MRCP could be performed to further evaluate.  Small hiatal hernia noted.  07/20/2009--patient was noted to have a left renal mass consistent with a cyst.  This was evaluated by the urologist 07/20/2009.   • Statin intolerance 10/30/2017   • Vitamin D deficiency 4/8/2016     Past Surgical History:   Procedure Laterality Date   • APPENDECTOMY  1965    1965   • CATARACT EXTRACTION Bilateral Remote    Remote bilateral cataract extirpation with intraocular lens implantation.   • CHOLECYSTECTOMY     • CHOLECYSTECTOMY WITH INTRAOPERATIVE CHOLANGIOGRAM N/A 1/21/2019 01/21/2019--laparoscopic paraesophageal hernia repair with fundoplication.   • COLONOSCOPY  11/03/2008 2008--normal colonoscopy   • COLONOSCOPY  2001 2001--normal colonoscopy.   • COLONOSCOPY N/A 6/13/2017 06/13/2017--colonoscopy revealed melanosis.  Biopsied.  There was friability with contact bleeding in the ascending colon.  Biopsied.  Pathology returned consistent with melanosis coli.   • ENDOSCOPY  10/08/2014    02/28/2012--air-contrast upper GI revealed small to moderate sized reducible sliding hiatal herniation of the upper stomach with some demonstrated gastroesophageal reflux. No esophageal, gastric, or duodenal mass or mucosal  ulceration was seen. 11/03/2008--EGD performed for evaluation of iron deficiency anemia revealed hiatal hernia without evidence of reflux, prepyloric antritis and   • ENDOSCOPY  11/03/2008 11/03/2008--EGD performed for evaluation of iron deficiency anemia revealed hiatal hernia without evidence of reflux, prepyloric antritis and   • ENDOSCOPY  11/19/2001 11/19/2001--EGD revealed a very tortuous distal esophagus with a Schatzki's ring. No ulcer or erosions. No Dorado's mucosa. Stomach revealed patchy erythema and erosions in the antrum. Biopsy. Normal pylorus with no obstruction. Normal duodenum with no ulcers. The Schatzki's ring was dilated with a Rhodes dilator.;   • ENDOSCOPY N/A 9/27/2016 09/27/2016--EGD revealed a normal oropharynx, esophagus, and medium sized hiatal hernia.  Nonbleeding gastric ulcer with no stigmata of bleeding.  Biopsy.  Gastritis.  Biopsy.  Normal examined duodenum.  Biopsied.  Pathology returned mild to moderate chronic active gastritis with ulceration from the stomach antral ulcer biopsy.  Gastroesophageal junction biopsy revealed minimal mixed inflammation.   • ENDOSCOPY N/A 6/13/2017 06/13/2017--colonoscopy revealed melanosis.  Biopsied.  There was friability with contact bleeding in the ascending colon.  Biopsied.  Pathology returned consistent with melanosis coli.   • ERCP N/A 1/22/2019 01/22/2019--ERCP with sphincterotomy and balloon stone extraction.   • ESOPHAGEAL DILATATION  11/19/2001 11/19/2001--EGD revealed a very tortuous distal esophagus with a Schatzki's ring. No ulcer or erosions. No Dorado's mucosa. Stomach revealed patchy erythema and erosions in the antrum. Biopsy. Normal pylorus with no obstruction. Normal duodenum with no ulcers. The Schatzki's ring was dilated with a Rhodes dilator.;    • ESOPHAGEAL MANOMETRY N/A 12/18/2018    Procedure: ESOPHAGEAL MANOMETRY;  Surgeon: Cecilia, Nurse Performed;  Location: [facility] ENDOSCOPY;  Service: Gastroenterology    • ESOPHAGOSCOPY N/A 1/21/2019    Procedure: FLEXIBLE ESOPHAGOSCOPY;  Surgeon: Reji Johnson MD;  [facility] MAIN OR;  Service: General   • HERNIA REPAIR     • HIATAL HERNIA REPAIR N/A 1/21/2019    Procedure: Laparoscopic paraesophageal hernia repair with toupee fundoplication;  Surgeon: Reji Johnson MD;  Location: [facility] MAIN OR;  Service: General   • INCONTINENCE SURGERY  1979 1979--a bladder tack procedure for urinary stress incontinence   • KNEE ARTHROSCOPY Right 12/12/2011 12/12/2011--right knee arthroscopy with partial lateral and medial meniscectomies.   • SKIN SURGERY  05/25/2010 05/25/2010--skin lesion excised from right lower extremity. 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   • TOTAL ABDOMINAL HYSTERECTOMY  1974 1974--total abdominal hysterectomy.     Outpatient Medications Prior to Visit   Medication Sig Dispense Refill   • aspirin 81 MG EC tablet Take 81 mg by mouth Daily. HELD     • buPROPion XL (WELLBUTRIN XL) 150 MG 24 hr tablet Take 150 mg by mouth 2 (Two) Times a Day.  0   • cholecalciferol (VITAMIN D3) 1000 units tablet Take 1,000 Units by mouth Daily.     • clonazePAM (KlonoPIN) 1 MG tablet Take 1 mg by mouth Daily.     • cycloSPORINE (RESTASIS) 0.05 % ophthalmic emulsion Administer 1 drop to both eyes 2 (Two) Times a Day.     • diclofenac (VOLTAREN) 1 % gel gel Apply 4 g topically to the appropriate area as directed 4 (Four) Times a Day As Needed.     • estradiol (ESTRACE) 1 MG tablet TAKE 1 TABLET BY MOUTH DAILY 90 tablet 0   • gabapentin (NEURONTIN) 300 MG capsule Take 300 mg by mouth Every Night.  2   • Ginkgo 60 MG tablet Take 1 tablet by mouth Daily.     • HYDROcodone-acetaminophen (NORCO)  MG per tablet Take 1 tablet by mouth every 6 (six) hours as needed for moderate pain (4-6).     • Multiple Vitamins-Minerals (MULTIVITAMIN ADULT) chewable tablet Chew 1 tablet Daily.     • polyethylene glycol (MIRALAX) packet Take  17 g by mouth Daily. (Patient taking differently: Take 17 g by mouth Daily As Needed.) 60 each 1   • simethicone (GAS-X) 80 MG chewable tablet Chew 1 tablet 4 (Four) Times a Day As Needed for Flatulence. 100 tablet 2   • topiramate (TOPAMAX) 100 MG tablet Take 100 mg by mouth Daily.     • triamterene-hydrochlorothiazide (DYAZIDE) 37.5-25 MG per capsule TAKE ONE CAPSULE BY MOUTH DAILY FOR BLOOD PRESSURE AND LEG SWELLING 90 capsule 0   • venlafaxine (EFFEXOR) 75 MG tablet Take 75 mg by mouth Daily.     • vitamin E 400 UNIT capsule Take 400 Units by mouth Daily.     • calcium carbonate, oyster shell, 500 MG tablet tablet Take 500 mg by mouth Daily.     • senna-docusate (PERICOLACE) 8.6-50 MG per tablet Take 2 tablets by mouth Daily As Needed for Constipation. 30 tablet 1     No facility-administered medications prior to visit.        Allergies as of 04/18/2019 - Reviewed 04/18/2019   Allergen Reaction Noted   • Statins Nausea And Vomiting 01/17/2019   • Morphine Hallucinations 01/17/2019   • Penicillins Itching 04/05/2016   • Tetanus toxoids Itching 04/05/2016     Social History     Socioeconomic History   • Marital status:      Spouse name: ander   • Number of children: 4   • Years of education: 14   • Highest education level: Associate degree: academic program   Occupational History   • Occupation: homemaker   Social Needs   • Financial resource strain: Not hard at all   • Food insecurity:     Worry: Never true     Inability: Never true   • Transportation needs:     Medical: Yes     Non-medical: Yes   Tobacco Use   • Smoking status: Never Smoker   • Smokeless tobacco: Never Used   Substance and Sexual Activity   • Alcohol use: No   • Drug use: No   • Sexual activity: Defer     Partners: Male   Lifestyle   • Physical activity:     Days per week: 3 days     Minutes per session: 20 min   • Stress: To some extent   Relationships   • Social connections:     Talks on phone: More than three times a week     Gets  "together: Once a week     Attends Denominational service: 1 to 4 times per year     Active member of club or organization: Yes     Attends meetings of clubs or organizations: 1 to 4 times per year     Relationship status:      Family History   Problem Relation Age of Onset   • Heart attack Mother         dies age 47 from heart attack   • Heart disease Mother    • Bleeding Disorder Mother    • Heart attack Maternal Aunt         dies age 60 from heart attack   • Ovarian cancer Maternal Grandmother    • Heart disease Father    • Malig Hyperthermia Neg Hx        Review of Systems   Constitution: Positive for malaise/fatigue. Negative for chills, fever, weight gain and weight loss.   HENT: Positive for hearing loss. Negative for ear pain, nosebleeds and sore throat.    Eyes: Positive for blurred vision. Negative for double vision, redness, vision loss in left eye, vision loss in right eye and visual disturbance.   Cardiovascular: Positive for leg swelling.        SEE HPI.    Respiratory: Negative for cough, shortness of breath, snoring and wheezing.    Endocrine: Negative for cold intolerance and heat intolerance.        \"goiter\"    Skin: Negative for itching, rash and suspicious lesions.   Musculoskeletal: Positive for myalgias. Negative for joint pain and joint swelling.   Gastrointestinal: Positive for diarrhea. Negative for abdominal pain, hematemesis, melena, nausea and vomiting.   Genitourinary: Negative for dysuria, frequency and hematuria.   Neurological: Negative for dizziness, headaches, numbness, paresthesias and seizures.   Psychiatric/Behavioral: Positive for depression. Negative for altered mental status. The patient is not nervous/anxious.           Objective:     Vitals:    04/18/19 1454   BP: 122/78   BP Location: Right arm   Pulse: 77   Weight: 61.4 kg (135 lb 6.4 oz)   Height: 152.4 cm (60\")     Body mass index is 26.44 kg/m².      PHYSICAL EXAM:  Physical Exam   Constitutional: She is oriented to " person, place, and time. She appears well-developed and well-nourished. No distress.   HENT:   Head: Normocephalic and atraumatic.   Eyes: Pupils are equal, round, and reactive to light.   Glasses   Neck: Neck supple. No JVD present. Carotid bruit is not present. No tracheal deviation present.   Cardiovascular: Normal rate, regular rhythm, normal heart sounds and intact distal pulses. Exam reveals no gallop and no friction rub.   No murmur heard.  Pulses:       Radial pulses are 2+ on the right side, and 2+ on the left side.        Posterior tibial pulses are 2+ on the right side, and 2+ on the left side.   Pulmonary/Chest: Effort normal and breath sounds normal. No respiratory distress. She has no wheezes. She has no rales.   Abdominal: Soft. Bowel sounds are normal. She exhibits no distension. There is no tenderness.   Musculoskeletal: She exhibits no edema, tenderness or deformity.   Neurological: She is alert and oriented to person, place, and time.   Skin: Skin is warm and dry. No rash noted. She is not diaphoretic. No erythema.   Psychiatric: She has a normal mood and affect. Her behavior is normal. Judgment normal.           ECG 12 Lead  Date/Time: 4/18/2019 3:08 PM  Performed by: Jessy Baker DNP, APRN  Authorized by: Jessy Baker DNP, APRN   Comparison: compared with previous ECG from 2/12/2019  Rhythm: sinus rhythm  Rate: normal  BPM: 77  Comments: No significant changes from previous ECG.              Assessment:       Diagnosis Plan   1. Pulmonary hypertension (CMS/HCC)  Ambulatory Referral to Pulmonology   2. Chronic anemia     3. History of chest pain     4. Benign essential hypertension     5. Suspected sleep apnea  Ambulatory Referral to Pulmonology         Plan:     1. History of chest pain: Current chest discomfort symptoms are very atypical.  She has one type of chest discomfort that happens to the left lateral chest wall that is worse with movement.  It sounds musculoskeletal.   Another nonspecific/atypical left chest pain that occurs at rest and occurs randomly.  No known exacerbating or relieving factors.  Patient to notify the office for new, recurrent, or worsening symptoms prior to next visit.  Normal nuclear stress test 2/2019.  No signs of ischemia and impressions consistent with a low risk study.    2. Mild pulmonary hypertension: Suspected sleep apnea.  Questionable history of pleurisy.  Will refer to pulmonology/ sleep medicine for sleep study and further evaluation.  3. Suspected sleep apnea: As above.  4. Hypertension: Blood pressure well controlled in the office today.  Patient not checking outside the office, however she reports that it is always well controlled.  5. Status post hiatal hernia repair, cholecystectomy, ERCP, poor nutrition.  6. Chronic anemia: Followed by outside provider.    Patient to follow-up with Dr. Bradley in 3 months or sooner if needed for any new, recurrent, or worsening symptoms or other problems/concerns.           Your medication list           Accurate as of 4/18/19  3:28 PM. If you have any questions, ask your nurse or doctor.               CHANGE how you take these medications      Instructions Last Dose Given Next Dose Due   polyethylene glycol packet  Commonly known as:  MIRALAX  What changed:    · when to take this  · reasons to take this      Take 17 g by mouth Daily.          CONTINUE taking these medications      Instructions Last Dose Given Next Dose Due   aspirin 81 MG EC tablet      Take 81 mg by mouth Daily. HELD       buPROPion  MG 24 hr tablet  Commonly known as:  WELLBUTRIN XL      Take 150 mg by mouth 2 (Two) Times a Day.       cholecalciferol 1000 units tablet  Commonly known as:  VITAMIN D3      Take 1,000 Units by mouth Daily.       clonazePAM 1 MG tablet  Commonly known as:  KlonoPIN      Take 1 mg by mouth Daily.       cycloSPORINE 0.05 % ophthalmic emulsion  Commonly known as:  RESTASIS      Administer 1 drop to both eyes 2  (Two) Times a Day.       diclofenac 1 % gel gel  Commonly known as:  VOLTAREN      Apply 4 g topically to the appropriate area as directed 4 (Four) Times a Day As Needed.       estradiol 1 MG tablet  Commonly known as:  ESTRACE      TAKE 1 TABLET BY MOUTH DAILY       gabapentin 300 MG capsule  Commonly known as:  NEURONTIN      Take 300 mg by mouth Every Night.       Ginkgo 60 MG tablet      Take 1 tablet by mouth Daily.       HYDROcodone-acetaminophen  MG per tablet  Commonly known as:  NORCO      Take 1 tablet by mouth every 6 (six) hours as needed for moderate pain (4-6).       MULTIVITAMIN ADULT chewable tablet      Chew 1 tablet Daily.       simethicone 80 MG chewable tablet  Commonly known as:  GAS-X      Chew 1 tablet 4 (Four) Times a Day As Needed for Flatulence.       TOPAMAX 100 MG tablet  Generic drug:  topiramate      Take 100 mg by mouth Daily.       triamterene-hydrochlorothiazide 37.5-25 MG per capsule  Commonly known as:  DYAZIDE      TAKE ONE CAPSULE BY MOUTH DAILY FOR BLOOD PRESSURE AND LEG SWELLING       venlafaxine 75 MG tablet  Commonly known as:  EFFEXOR      Take 75 mg by mouth Daily.       vitamin E 400 UNIT capsule      Take 400 Units by mouth Daily.          STOP taking these medications    calcium carbonate (oyster shell) 500 MG tablet tablet  Stopped by:  Jessy Baker DNP, APRLAINE        senna-docusate 8.6-50 MG per tablet  Commonly known as:  PERICOLACE  Stopped by:  Jessy Baker DNP, APRN               I did not stop or change the above medications.  Patient's medication list was updated to reflect medications they are currently taking including medication changes made by other providers.          Thanks,    Jessy Baker DNP, LESLI  04/18/2019           Dictated utilizing Dragon dictation

## 2019-04-20 LAB
25(OH)D3+25(OH)D2 SERPL-MCNC: 77.4 NG/ML (ref 30–100)
ALBUMIN SERPL-MCNC: 4.5 G/DL (ref 3.5–5.2)
ALBUMIN/GLOB SERPL: 1.8 G/DL
ALP SERPL-CCNC: 77 U/L (ref 39–117)
ALT SERPL-CCNC: 17 U/L (ref 1–33)
AST SERPL-CCNC: 15 U/L (ref 1–32)
BILIRUB SERPL-MCNC: 0.3 MG/DL (ref 0.2–1.2)
BUN SERPL-MCNC: 20 MG/DL (ref 8–23)
BUN/CREAT SERPL: 19.2 (ref 7–25)
CALCIUM SERPL-MCNC: 9.6 MG/DL (ref 8.6–10.5)
CHLORIDE SERPL-SCNC: 102 MMOL/L (ref 98–107)
CHOLEST SERPL-MCNC: 227 MG/DL (ref 100–199)
CK SERPL-CCNC: 48 U/L (ref 20–180)
CO2 SERPL-SCNC: 26.3 MMOL/L (ref 22–29)
CREAT SERPL-MCNC: 1.04 MG/DL (ref 0.57–1)
ERYTHROCYTE [DISTWIDTH] IN BLOOD BY AUTOMATED COUNT: 18.6 % (ref 12.3–15.4)
GLOBULIN SER CALC-MCNC: 2.5 GM/DL
GLUCOSE SERPL-MCNC: 78 MG/DL (ref 65–99)
HCT VFR BLD AUTO: 38.2 % (ref 34–46.6)
HDL SERPL-SCNC: 61.6 UMOL/L
HDLC SERPL-MCNC: 133 MG/DL
HGB BLD-MCNC: 11.8 G/DL (ref 12–15.9)
IRON SATN MFR SERPL: 5 % (ref 20–50)
IRON SERPL-MCNC: 38 MCG/DL (ref 37–145)
LDL SERPL QN: 21.2 NM
LDL SERPL-SCNC: 786 NMOL/L
LDL SMALL SERPL-SCNC: <90 NMOL/L
LDLC SERPL CALC-MCNC: 74 MG/DL (ref 0–99)
MCH RBC QN AUTO: 25.7 PG (ref 26.6–33)
MCHC RBC AUTO-ENTMCNC: 30.9 G/DL (ref 31.5–35.7)
MCV RBC AUTO: 83.2 FL (ref 79–97)
PLATELET # BLD AUTO: 382 10*3/MM3 (ref 140–450)
POTASSIUM SERPL-SCNC: 4.3 MMOL/L (ref 3.5–5.2)
PROT SERPL-MCNC: 7 G/DL (ref 6–8.5)
RBC # BLD AUTO: 4.59 10*6/MM3 (ref 3.77–5.28)
SODIUM SERPL-SCNC: 142 MMOL/L (ref 136–145)
T3FREE SERPL-MCNC: 2.2 PG/ML (ref 2–4.4)
T4 FREE SERPL-MCNC: 1.01 NG/DL (ref 0.93–1.7)
TIBC SERPL-MCNC: 744 MCG/DL
TRIGL SERPL-MCNC: 100 MG/DL (ref 0–149)
TSH SERPL DL<=0.005 MIU/L-ACNC: 1.27 MIU/ML (ref 0.27–4.2)
UIBC SERPL-MCNC: 744 MCG/DL
WBC # BLD AUTO: 10.41 10*3/MM3 (ref 3.4–10.8)

## 2019-04-22 ENCOUNTER — HOSPITAL ENCOUNTER (OUTPATIENT)
Dept: INFUSION THERAPY | Facility: HOSPITAL | Age: 79
Discharge: HOME | End: 2019-04-22
Admitting: INTERNAL MEDICINE

## 2019-04-22 VITALS
WEIGHT: 133 LBS | SYSTOLIC BLOOD PRESSURE: 134 MMHG | BODY MASS INDEX: 26.11 KG/M2 | OXYGEN SATURATION: 98 % | HEART RATE: 78 BPM | RESPIRATION RATE: 16 BRPM | HEIGHT: 60 IN | DIASTOLIC BLOOD PRESSURE: 76 MMHG | TEMPERATURE: 97.8 F

## 2019-04-22 DIAGNOSIS — M81.0 AGE RELATED OSTEOPOROSIS, UNSPECIFIED PATHOLOGICAL FRACTURE PRESENCE: Primary | ICD-10-CM

## 2019-04-22 PROCEDURE — 25010000002 ZOLEDRONIC ACID 5 MG/100ML SOLUTION: Performed by: INTERNAL MEDICINE

## 2019-04-22 PROCEDURE — 96365 THER/PROPH/DIAG IV INF INIT: CPT

## 2019-04-22 PROCEDURE — 96374 THER/PROPH/DIAG INJ IV PUSH: CPT

## 2019-04-22 RX ORDER — ZOLEDRONIC ACID 5 MG/100ML
5 INJECTION, SOLUTION INTRAVENOUS ONCE
OUTPATIENT
Start: 2019-04-22

## 2019-04-22 RX ORDER — ZOLEDRONIC ACID 5 MG/100ML
5 INJECTION, SOLUTION INTRAVENOUS ONCE
Status: COMPLETED | OUTPATIENT
Start: 2019-04-22 | End: 2019-04-22

## 2019-04-22 RX ADMIN — ZOLEDRONIC ACID 5 MG: 5 INJECTION, SOLUTION INTRAVENOUS at 14:57

## 2019-04-22 NOTE — PATIENT INSTRUCTIONS
Zoledronic Acid injection (Paget's Disease, Osteoporosis)  What is this medicine?  ZOLEDRONIC ACID (FAVIO luis daniel martinez ik AS id) lowers the amount of calcium loss from bone. It is used to treat Paget's disease and osteoporosis in women.  This medicine may be used for other purposes; ask your health care provider or pharmacist if you have questions.  COMMON BRAND NAME(S): Reclast, Zometa  What should I tell my health care provider before I take this medicine?  They need to know if you have any of these conditions:  -aspirin-sensitive asthma  -cancer, especially if you are receiving medicines used to treat cancer  -dental disease or wear dentures  -infection  -kidney disease  -low levels of calcium in the blood  -past surgery on the parathyroid gland or intestines  -receiving corticosteroids like dexamethasone or prednisone  -an unusual or allergic reaction to zoledronic acid, other medicines, foods, dyes, or preservatives  -pregnant or trying to get pregnant  -breast-feeding  How should I use this medicine?  This medicine is for infusion into a vein. It is given by a health care professional in a hospital or clinic setting.  Talk to your pediatrician regarding the use of this medicine in children. This medicine is not approved for use in children.  Overdosage: If you think you have taken too much of this medicine contact a poison control center or emergency room at once.  NOTE: This medicine is only for you. Do not share this medicine with others.  What if I miss a dose?  It is important not to miss your dose. Call your doctor or health care professional if you are unable to keep an appointment.  What may interact with this medicine?  -certain antibiotics given by injection  -NSAIDs, medicines for pain and inflammation, like ibuprofen or naproxen  -some diuretics like bumetanide, furosemide  -teriparatide  This list may not describe all possible interactions. Give your health care provider a list of all the medicines,  herbs, non-prescription drugs, or dietary supplements you use. Also tell them if you smoke, drink alcohol, or use illegal drugs. Some items may interact with your medicine.  What should I watch for while using this medicine?  Visit your doctor or health care professional for regular checkups. It may be some time before you see the benefit from this medicine. Do not stop taking your medicine unless your doctor tells you to. Your doctor may order blood tests or other tests to see how you are doing.  Women should inform their doctor if they wish to become pregnant or think they might be pregnant. There is a potential for serious side effects to an unborn child. Talk to your health care professional or pharmacist for more information.  You should make sure that you get enough calcium and vitamin D while you are taking this medicine. Discuss the foods you eat and the vitamins you take with your health care professional.  Some people who take this medicine have severe bone, joint, and/or muscle pain. This medicine may also increase your risk for jaw problems or a broken thigh bone. Tell your doctor right away if you have severe pain in your jaw, bones, joints, or muscles. Tell your doctor if you have any pain that does not go away or that gets worse.  Tell your dentist and dental surgeon that you are taking this medicine. You should not have major dental surgery while on this medicine. See your dentist to have a dental exam and fix any dental problems before starting this medicine. Take good care of your teeth while on this medicine. Make sure you see your dentist for regular follow-up appointments.  What side effects may I notice from receiving this medicine?  Side effects that you should report to your doctor or health care professional as soon as possible:  -allergic reactions like skin rash, itching or hives, swelling of the face, lips, or tongue  -anxiety, confusion, or depression  -breathing problems  -changes in  vision  -eye pain  -feeling faint or lightheaded, falls  -jaw pain, especially after dental work  -mouth sores  -muscle cramps, stiffness, or weakness  -redness, blistering, peeling or loosening of the skin, including inside the mouth  -trouble passing urine or change in the amount of urine  Side effects that usually do not require medical attention (report to your doctor or health care professional if they continue or are bothersome):  -bone, joint, or muscle pain  -constipation  -diarrhea  -fever  -hair loss  -irritation at site where injected  -loss of appetite  -nausea, vomiting  -stomach upset  -trouble sleeping  -trouble swallowing  -weak or tired  This list may not describe all possible side effects. Call your doctor for medical advice about side effects. You may report side effects to FDA at 4-775-FDA-2368.  Where should I keep my medicine?  This drug is given in a hospital or clinic and will not be stored at home.  NOTE: This sheet is a summary. It may not cover all possible information. If you have questions about this medicine, talk to your doctor, pharmacist, or health care provider.  © 2019 [employer] (2015-05-16 14:19:57)

## 2019-04-24 DIAGNOSIS — I10 BENIGN ESSENTIAL HYPERTENSION: ICD-10-CM

## 2019-04-24 DIAGNOSIS — R60.0 PEDAL EDEMA: ICD-10-CM

## 2019-04-24 RX ORDER — TRIAMTERENE AND HYDROCHLOROTHIAZIDE 37.5; 25 MG/1; MG/1
CAPSULE ORAL
Qty: 90 CAPSULE | Refills: 0 | Status: SHIPPED | OUTPATIENT
Start: 2019-04-24 | End: 2019-06-18 | Stop reason: SDUPTHER

## 2019-04-29 RX ORDER — ESTRADIOL 1 MG/1
TABLET ORAL
Qty: 90 TABLET | Refills: 0 | Status: SHIPPED | OUTPATIENT
Start: 2019-04-29 | End: 2019-07-10 | Stop reason: SDUPTHER

## 2019-05-15 ENCOUNTER — OFFICE VISIT (OUTPATIENT)
Dept: INTERNAL MEDICINE | Facility: CLINIC | Age: 79
End: 2019-05-15

## 2019-05-15 VITALS
OXYGEN SATURATION: 98 % | HEART RATE: 79 BPM | WEIGHT: 137.6 LBS | HEIGHT: 60 IN | SYSTOLIC BLOOD PRESSURE: 154 MMHG | DIASTOLIC BLOOD PRESSURE: 68 MMHG | BODY MASS INDEX: 27.01 KG/M2

## 2019-05-15 DIAGNOSIS — D64.9 CHRONIC ANEMIA: Chronic | ICD-10-CM

## 2019-05-15 DIAGNOSIS — IMO0002 CHRONIC MIGRAINE: Chronic | ICD-10-CM

## 2019-05-15 DIAGNOSIS — I65.23 ATHEROSCLEROSIS OF BOTH CAROTID ARTERIES: Chronic | ICD-10-CM

## 2019-05-15 DIAGNOSIS — Z51.81 THERAPEUTIC DRUG MONITORING: ICD-10-CM

## 2019-05-15 DIAGNOSIS — R53.82 CHRONIC FATIGUE: Chronic | ICD-10-CM

## 2019-05-15 DIAGNOSIS — Z12.39 BREAST CANCER SCREENING: ICD-10-CM

## 2019-05-15 DIAGNOSIS — D50.0 IRON DEFICIENCY ANEMIA DUE TO CHRONIC BLOOD LOSS: Chronic | ICD-10-CM

## 2019-05-15 DIAGNOSIS — F41.1 GENERALIZED ANXIETY DISORDER: Chronic | ICD-10-CM

## 2019-05-15 DIAGNOSIS — N18.2 CHRONIC RENAL INSUFFICIENCY, STAGE II (MILD): Chronic | ICD-10-CM

## 2019-05-15 DIAGNOSIS — M81.0 OSTEOPOROSIS, UNSPECIFIED OSTEOPOROSIS TYPE, UNSPECIFIED PATHOLOGICAL FRACTURE PRESENCE: Chronic | ICD-10-CM

## 2019-05-15 DIAGNOSIS — E55.9 VITAMIN D DEFICIENCY: Chronic | ICD-10-CM

## 2019-05-15 DIAGNOSIS — R60.0 PEDAL EDEMA: Chronic | ICD-10-CM

## 2019-05-15 DIAGNOSIS — K25.7 CHRONIC GASTRIC ULCER WITHOUT HEMORRHAGE AND WITHOUT PERFORATION: Chronic | ICD-10-CM

## 2019-05-15 DIAGNOSIS — Z78.9 STATIN INTOLERANCE: Chronic | ICD-10-CM

## 2019-05-15 DIAGNOSIS — E04.2 MULTINODULAR GOITER: Chronic | ICD-10-CM

## 2019-05-15 DIAGNOSIS — I27.20 PULMONARY HYPERTENSION (HCC): Chronic | ICD-10-CM

## 2019-05-15 DIAGNOSIS — E78.5 HYPERLIPIDEMIA, UNSPECIFIED HYPERLIPIDEMIA TYPE: Primary | Chronic | ICD-10-CM

## 2019-05-15 DIAGNOSIS — K29.51 CHRONIC GASTRITIS WITH BLEEDING, UNSPECIFIED GASTRITIS TYPE: Chronic | ICD-10-CM

## 2019-05-15 DIAGNOSIS — G25.81 RESTLESS LEGS SYNDROME: Chronic | ICD-10-CM

## 2019-05-15 DIAGNOSIS — F41.8 DEPRESSION WITH ANXIETY: Chronic | ICD-10-CM

## 2019-05-15 DIAGNOSIS — I10 BENIGN ESSENTIAL HYPERTENSION: Chronic | ICD-10-CM

## 2019-05-15 PROBLEM — K44.9 PARAESOPHAGEAL HERNIA: Status: RESOLVED | Noted: 2019-01-15 | Resolved: 2019-05-15

## 2019-05-15 PROCEDURE — 99214 OFFICE O/P EST MOD 30 MIN: CPT | Performed by: INTERNAL MEDICINE

## 2019-05-15 RX ORDER — TOPIRAMATE 100 MG/1
TABLET, FILM COATED ORAL
Qty: 90 TABLET | Refills: 1 | Status: SHIPPED | OUTPATIENT
Start: 2019-05-15 | End: 2019-12-04 | Stop reason: SDUPTHER

## 2019-05-15 RX ORDER — OMEPRAZOLE 40 MG/1
CAPSULE, DELAYED RELEASE ORAL
Qty: 30 CAPSULE | Refills: 6 | Status: SHIPPED | OUTPATIENT
Start: 2019-05-15 | End: 2019-06-06

## 2019-05-15 NOTE — PROGRESS NOTES
05/15/2019    Patient Information  Sachi Vora                                                                                          [address] [place] [address]      1940  [unfilled]  There is no work phone number on file.    Chief Complaint:     Follow-up hyperlipidemia, chronic renal insufficiency, hypertension, osteoporosis, carotid artery plaque, chronic gastritis and history of gastric ulcer, depression with anxiety, chronic migraine headaches, pedal edema, restless leg syndrome, chronic anemia, multinodular goiter, history of iron deficiency anemia and statin intolerance.    History of Present Illness:    Patient with a history of medical problems as outlined in chief complaint of been fairly stable since she had her paraesophageal hernia repaired and also had her gallbladder removed.  She continues to have indigestion and apparently the gastroenterologist/surgeon took her off of her PPI therapy.  Other than that, she is doing fairly well.  Her past medical history reviewed and updated were necessary including health maintenance parameters.  This reveals she is up-to-date or else accounted for.    Review of Systems   Constitution: Negative.   HENT: Negative.    Eyes: Negative.    Cardiovascular: Negative.    Respiratory: Negative.    Endocrine: Negative.    Hematologic/Lymphatic: Negative.    Skin: Negative.    Musculoskeletal: Negative.    Gastrointestinal: Positive for heartburn.   Genitourinary: Negative.    Neurological: Negative.    Psychiatric/Behavioral: Negative.    Allergic/Immunologic: Negative.        Active Problems:    Patient Active Problem List   Diagnosis   • Osteoporosis, 03/29/2011--lumbar spine -2.8.  Right femoral neck -2.4.  Left femoral neck -2.4.  Patient receives Reclast infusions.   • Therapeutic drug monitoring   • Simple renal cyst   • Benign essential hypertension   • Carotid artery plaque, 04/02/2018--mild right ICA plaque, normal left ICA  "10/03/2014--mild bilateral carotid artery plaque.   • Chronic gastritis   • Chronic lower back pain   • Chronic otitis externa   • Chronic renal insufficiency, stage II (mild), creatinine 1.12   • Depression with anxiety   • Functional murmur, 07/15/2015--normal echocardiogram.   • Hyperlipidemia   • Menopausal state   • Chronic migraine   • Pancreatic Duct Dilation   • Pedal edema   • Restless legs syndrome   • Bilateral sensorineural hearing loss   • Vitamin D deficiency   • Chronic gastric ulcer   • Chronic fatigue   • Hypersomnolence   • Chronic anemia   • Multinodular goiter   • Generalized anxiety disorder   • Iron deficiency anemia   • Statin intolerance   • Postmenopausal state   • Pulmonary hypertension (CMS/HCC)         Past Medical History:   Diagnosis Date   • Benign essential hypertension 4/8/2016   • Bilateral sensorineural hearing loss 3/31/2011    03/31/2011--etiology reveals reverse \"cookie bite\" type of hearing loss for both ears of mild/moderate degree, mostly sensorineural.  Speech discrimination 100% on the right, 96% on the left.   • Carotid artery plaque, 10/03/2014--mild bilateral carotid artery plaque. 7/25/2012    10/03/2014--[facility] screening revealed mild bilateral carotid plaque, negative for atrial fibrillation, negative for AAA, negative for PAD, osteoporosis screen revealed osteopenia.  Body mass index was 25 and considered to be moderate risk.  07/25/2012--vascular screen negative for carotid plaque, negative for abdominal aneurysm, negative for PAD Description: 10/03/2014--[facility] screening revealed mild bilateral carotid plaque, negative for atrial fibrillation, negative for AAA, negative for PAD, osteoporosis screen revealed osteopenia.  Body mass index was 25 and considered to be moderate risk.  07/25/2012--vascular screen negative for carotid plaque, negative for abdominal aneurysm, negative for PAD   • Chronic anemia 8/23/2016 06/13/2017--colonoscopy revealed melanosis.  " Biopsied.  There was friability with contact bleeding in the ascending colon.  Biopsied.  Pathology returned consistent with melanosis coli.  06/13/2017--EGD revealed normal esophagus.  Biopsies taken.  There was a medium-sized hiatal hernia.  Localized mild inflammation and linear erosions were found in the prepyloric region.  Biopsied.  Examined duodenum was normal.  Random biopsies taken.  Pathology of the gastroesophageal junction was unremarkable as was the gastric fundus.  Prepyloric biopsy revealed minimal chronic inflammation and reactive change.  H pylori negative.  Duodenal biopsies are negative.  04/12/2017--hemoglobin low at 10.8, hematocrit is normal at 35.7.  Iron sulfate 325 mg per day initiated.  08/23/2016--routine follow-up.  Patient continues to have epigastric abdominal pain believed to be related to reflux with possible esophageal spasm.  Hemoglobin noted to be low at 10.6 with a hematocrit low at 33.7 and RDW elevated at 16.2.  Homocysti   • Chronic fatigue 4/21/2016 06/14/2017--overnight oximetry revealed oxygen desaturation index of only 0.44.  There were only 10 total desaturations during the period of testing which lasted 6 hours and 46 minutes.  05/31/2017--patient seen in follow-up and reports she continues to have chronic fatigue as well as hypersomnolence.  Once again, she is on multiple medications that are undoubtedly contributing to this problem including clonazepam and hydrocodone but I doubt that these could be discontinued.  Her CBC back in April revealed a low hemoglobin of 10.8 but hematocrit was normal at 35.7.  Thyroid function tests were normal and CMP normal.  Overnight oximetry test ordered.  Repeat CBC.  Iron studies.  Sedimentation rate and CRP.  04/11/2017--patient seen in follow-up and her blood pressure is now at a reasonable level at 120/64.  She reports she still feels somewhat fatigued but she is much better.  Patient has not been doing any regular exercise and I  do think that that would be helpful to reduce her feeling of fatigue.  She is   • Chronic gastric ulcer 4/14/2014    02/15/2017--patient seen in follow-up in nearly 6 months later.  She has complaints of not feeling well all over.  She has complaints of diffuse myalgias and possibly tendinopathy related to Levaquin that I called in prior to her going to [place] for vacation.  She reports that 3 or 4 days after starting the Levaquin she began to have the musculoskeletal symptoms and she reports that she continues to have them presently.  Symptoms are worse involving her left calf area.  Examination reveals significant tenderness involving the left calf and the left calf seems a little larger than the right.  She also has complaints of shortness of breath but no chest pain.  She is complaining of double vision and generalized weakness and fatigue.  She was complaining of chronic fatigue at the last visit back in September and I ordered an overnight oximetry test but apparently this was never done for reasons that are not clear to me.  Her current oxygen saturation is 94% and normally it is 100%.  Plan is as follows: Extensi   • Chronic gastritis 11/19/2001    02/15/2017--patient seen in follow-up in nearly 6 months later.  She has complaints of not feeling well all over.  She has complaints of diffuse myalgias and possibly tendinopathy related to Levaquin that I called in prior to her going to [place] for vacation.  She reports that 3 or 4 days after starting the Levaquin she began to have the musculoskeletal symptoms and she reports that she continues to have them presently.  Symptoms are worse involving her left calf area.  Examination reveals significant tenderness involving the left calf and the left calf seems a little larger than the right.  She also has complaints of shortness of breath but no chest pain.  She is complaining of double vision and generalized weakness and fatigue.  She was complaining of  chronic fatigue at the last visit back in September and I ordered an overnight oximetry test but apparently this was never done for reasons that are not clear to me.  Her current oxygen saturation is 94% and normally it is 100%.  Plan is as follows: Extensi   • Chronic lower back pain 1/31/2006 08/11/2014--MRI of the lumbar spine reveals the conus terminates at L2 and is normal.  Cauda equina unremarkable.  Stable to moderate degenerative disc disease at L5-S1.  No acute fracture or pars defect is demonstrated.  Small synovial cysts are seen posterior to the L4-L5 facet.  The perivertebral soft tissues are unremarkable.  T12-L1, L1-L2, L2-L3 are negative.  L3-L4 a broad-based disc bulge resulting in mild bilateral neural foraminal narrowing.  L4-L5 reveals a broad-based disc bulge facet disease resulting in mild bilateral neural foraminal narrowing.  L5-S1 reveals a broad-based disc bulge, posterior osseous slipping, and facet disease resulting in mild to moderate bilateral neural foraminal narrowing.  Assessment is stable mild to moderate degenerative spondylosis.  Small synovial cysts are seen posterior to the L4-L5 facets.  08/11/2014--MRI of the thoracic spine reveals mild to moderate thoracic kyphosis.  No fracture.  At T5--T6 there is a moderate sized left paracentral disc protrusion which i   • Chronic migraine 11/3/2009    01/18/2010--MRI of the brain performed for headaches and memory loss.  Mild small vessel disease in the cerebral and central pontine white matter.  There is an ovoid, somewhat pancake-shaped area of signal abnormality in the anterior inferior right temporal lobe subcortical to the juxtacortical white matter that measures 1.2 x 1 cm and anterior posterior and medial lateral dimension but only measures 3 mm in cranial caudal dimension.  I suspect that this is a benign cyst or a cystic area of encephalomalacia.  The remainder of the MRI of the head is within normal limits.  11/03/2009--CT  scan of the brain without contrast performed for headache after a fall.  Mild diffuse atrophy.  No acute abnormality noted.; Description: Patient has had a long history of migraine headaches.  MRI and CT scan of the brain have been essentially negative.   • Chronic otitis externa 4/8/2016   • Chronic renal insufficiency, stage II (mild), creatinine 1.12 11/14/2015 11/14/2015--serum creatinine mildly elevated at 1.12.   • Depression with anxiety 4/8/2016   • Functional murmur, 07/15/2015--normal echocardiogram. 7/15/2015    07/15/2015--echocardiogram reveals normal left ventricular size and function with ejection fraction 55%.  Grade 1 diastolic dysfunction, abnormal relaxation pattern.  Trace tricuspid regurgitation.  Estimated right ventricular systolic pressure is 25 mmHg which is normal.   • Gastroesophageal reflux disease with esophagitis 11/19/2001 06/13/2017--EGD revealed normal esophagus.  Biopsies taken.  There was a medium-sized hiatal hernia.  Localized mild inflammation and linear erosions were found in the prepyloric region.  Biopsied.  Examined duodenum was normal.  Random biopsies taken.  Pathology of the gastroesophageal junction was unremarkable as was the gastric fundus.  Prepyloric biopsy revealed minimal chronic inflammation and reactive change.  H pylori negative.  Duodenal biopsies are negative.  11/03/2014--patient seen in follow-up and reports her epigastric pain/chest pain has resolved.  I reviewed the results of the studies with her.  It does not appear that her problem is related to biliary tract disease.  She does have reflux and although the recent upper GI revealed minimal reflux, I do think her symptoms are related to esophageal reflux with esophageal spasm.  10/21/2014--air-contrast upper GI series revealed trace upper laryngeal penetration.  Persistent small partially reducible sliding hiatal hernia the upper stomach with    • Generalized anxiety disorder 4/11/2017   • History  of Acute deep vein thrombosis (DVT) of distal end of left lower extremity 2/15/2017    03/21/2017 patient seen in follow-up and she is tolerating the Eliquis well without signs or symptoms of bleeding.  Her calf swelling and tenderness is better but not totally resolved.  I suspect that the DVT is chronic and may not resolve at all.  I will order a repeat venous study and then proceed from there.  02/23/2017--repeat Doppler venous study of the left lower extremity reveals a chronic left lower extremity DVT in the posterior tibial.  All other left sided veins appeared normal.  Fluid collection in the left calf noted.  02/17/2017--patient seen in follow-up and reports her left lower extremity symptoms are about the same.  She continues to have complaints of profound fatigue which I think is multifactorial including underlying depression that is not in remission.  Review the results of the CTA of the chest including the possible thyroid lesion.  I do not think this is contributing to any of her symptoms of fatigue particularly given the fact that her thyroid function tests are normal.  I expla   • History of palpitations 12/07/2011    Patient has had multiple admissions to the hospital for complaints of chest pain and heart palpitations. She meant admitted at least on 3 occasions. She has had at least 3 stress Cardiolite and 1 stress ECG, the last Cardiolite being performed 12/07/2011 which was negative. Patient is also had Holter monitors which have been unremarkable.   • HIstory of Schatzki's ring 02/28/2012 02/28/2012--air-contrast upper GI revealed small to moderate sized reducible sliding hiatal herniation of the upper stomach with some demonstrated gastroesophageal reflux. No esophageal, gastric, or duodenal mass or mucosal ulceration was seen.  11/03/2008--EGD performed for evaluation of iron deficiency anemia revealed hiatal hernia without evidence of reflux, prepyloric antritis and   • Hyperlipidemia  4/8/2016   • Hypersomnolence 5/5/2016 06/14/2017--overnight oximetry revealed oxygen desaturation index of only 0.44.  There were only 10 total desaturations during the period of testing which lasted 6 hours and 46 minutes.  05/31/2017--patient seen in follow-up and reports she continues to have chronic fatigue as well as hypersomnolence.  Once again, she is on multiple medications that are undoubtedly contributing to this problem including clonazepam and hydrocodone but I doubt that these could be discontinued.  Her CBC back in April revealed a low hemoglobin of 10.8 but hematocrit was normal at 35.7.  Thyroid function tests were normal and CMP normal.  Overnight oximetry test ordered.  Repeat CBC.  Iron studies.  Sedimentation rate and CRP.  04/11/2017--patient seen in follow-up and her blood pressure is now at a reasonable level at 120/64.  She reports she still feels somewhat fatigued but she is much better.  Patient has not been doing any regular exercise and I do think that that would be helpful to reduce her feeling of fatigue.  She is   • Menopausal state 4/8/2016   • Multinodular goiter 2/17/2017 02/21/2017--thyroid ultrasound reveals multinodular thyroid with largest nodule measuring on the order of 1.6 cm in greatest dimension.  02/17/2017--patient seen in follow-up for DVT and CTA of the chest.  An incidental finding on the CTA of the chest reveals a 1.7 cm lesion in the right lobe of thyroid.  Note that thyroid function tests are currently normal.  Ultrasound of the thyroid ordered.   • Osteoporosis, 03/29/2011--lumbar spine -2.8.  Right femoral neck -2.4.  Left femoral neck -2.4.  Patient receives Reclast infusions. 2/9/2009 04/19/2017--Reclast infusion.  04/05/2016--Reclast infusion.  06/04/2012--Reclast infusion.  05/26/2011--Reclast infusion.  03/29/2011--DEXA scan revealed a lumbar T score of -2.8, right femoral neck T score -2.4, left femoral neck T score -2.4.  Osteoporosis of the lumbar  spine and severe osteopenia of the hips bilaterally.  Patient has been intolerant to Fosamax because of gastritis and gastroesophageal reflux.  04/01/2009--treatment for osteoporosis begun with Fosamax.  02/09/2009--DEXA scan revealed lumbar spine T score of -3.3.  Left femoral neck T score -2.5.  Right hip T score -2.6.  Osteoporosis.    • Pancreatic Duct Dilation 11/30/2001 09/29/2014--patient was again evaluated by the urologist for a renal cyst.  CT scan of the abdomen and pelvis reveals a left renal cyst measuring 5.1 x 4.9 cm.  There were subcentimeter hypodense renal lesions that are too small to further characterize and are presumably related to cysts.  Recommend attention to follow up.  Distal dilated pancreatic duct noted.  The common bile duct is the upper limits of normal in size.  The ampullary region is not well evaluated.  Comparison with prior imaging is recommended if available.  If there is no prior film available for comparison, and ERCP or MRCP could be performed to further evaluate.  Small hiatal hernia noted.  03/05/2012--CT scan of the abdomen with contrast, pancreatic protocol.  This reveals some fullness of the pancreatic ductal system and apparent pancreatic divisum with separate entrance of the accessory pancreatic duct extending into the duodenum distal to the main pancreatic duct.  There is fullness of the duct diffusely but similar to the previous s   • Pedal edema 6/29/2015 06/29/2015--patient presents with a 4-6 week history of exertional dyspnea that comes on with activity such as climbing stairs or walking her dog up an incline.  No chest pain.  Relieved with rest.  No cough.  She does have complaints of her feet and legs swelling that is particularly worse at the end of the day and not improved overnight.  No orthopnea or PND.  Chest exam reveals faint rales at the bases.  Otherwise clear.  Heart is regular and I do not appreciate a heart murmur.  Chest x-ray PA and lateral  ordered.  Echocardiogram ordered.   • Pulmonary hypertension (CMS/HCC) 4/18/2019   • Restless legs syndrome 4/8/2016   • Simple renal cyst 7/20/2009 09/29/2014--patient was again evaluated by the urologist for a renal cyst.  CT scan of the abdomen and pelvis reveals a left renal cyst measuring 5.1 x 4.9 cm.  There were subcentimeter hypodense renal lesions that are too small to further characterize and are presumably related to cysts.  Recommend attention to follow up.  Distal dilated pancreatic duct noted.  The common bile duct is the upper limits of normal in size.  The ampullary region is not well evaluated.  Comparison with prior imaging is recommended if available.  If there is no prior film available for comparison, and ERCP or MRCP could be performed to further evaluate.  Small hiatal hernia noted.  07/20/2009--patient was noted to have a left renal mass consistent with a cyst.  This was evaluated by the urologist 07/20/2009.   • Statin intolerance 10/30/2017   • Vitamin D deficiency 4/8/2016         Past Surgical History:   Procedure Laterality Date   • APPENDECTOMY  1965    1965   • CATARACT EXTRACTION Bilateral Remote    Remote bilateral cataract extirpation with intraocular lens implantation.   • CHOLECYSTECTOMY WITH INTRAOPERATIVE CHOLANGIOGRAM N/A 1/21/2019 01/21/2019--laparoscopic paraesophageal hernia repair with fundoplication.   • COLONOSCOPY  11/03/2008 2008--normal colonoscopy   • COLONOSCOPY  2001 2001--normal colonoscopy.   • COLONOSCOPY N/A 6/13/2017 06/13/2017--colonoscopy revealed melanosis.  Biopsied.  There was friability with contact bleeding in the ascending colon.  Biopsied.  Pathology returned consistent with melanosis coli.   • ENDOSCOPY  10/08/2014    02/28/2012--air-contrast upper GI revealed small to moderate sized reducible sliding hiatal herniation of the upper stomach with some demonstrated gastroesophageal reflux. No esophageal, gastric, or duodenal mass or  mucosal ulceration was seen. 11/03/2008--EGD performed for evaluation of iron deficiency anemia revealed hiatal hernia without evidence of reflux, prepyloric antritis and   • ENDOSCOPY  11/03/2008 11/03/2008--EGD performed for evaluation of iron deficiency anemia revealed hiatal hernia without evidence of reflux, prepyloric antritis and   • ENDOSCOPY  11/19/2001 11/19/2001--EGD revealed a very tortuous distal esophagus with a Schatzki's ring. No ulcer or erosions. No Dorado's mucosa. Stomach revealed patchy erythema and erosions in the antrum. Biopsy. Normal pylorus with no obstruction. Normal duodenum with no ulcers. The Schatzki's ring was dilated with a Rhodes dilator.;   • ENDOSCOPY N/A 9/27/2016 09/27/2016--EGD revealed a normal oropharynx, esophagus, and medium sized hiatal hernia.  Nonbleeding gastric ulcer with no stigmata of bleeding.  Biopsy.  Gastritis.  Biopsy.  Normal examined duodenum.  Biopsied.  Pathology returned mild to moderate chronic active gastritis with ulceration from the stomach antral ulcer biopsy.  Gastroesophageal junction biopsy revealed minimal mixed inflammation.   • ENDOSCOPY N/A 6/13/2017 06/13/2017--colonoscopy revealed melanosis.  Biopsied.  There was friability with contact bleeding in the ascending colon.  Biopsied.  Pathology returned consistent with melanosis coli.   • ERCP N/A 1/22/2019 01/22/2019--ERCP with sphincterotomy and balloon stone extraction.   • ESOPHAGEAL DILATATION  11/19/2001 11/19/2001--EGD revealed a very tortuous distal esophagus with a Schatzki's ring. No ulcer or erosions. No Dorado's mucosa. Stomach revealed patchy erythema and erosions in the antrum. Biopsy. Normal pylorus with no obstruction. Normal duodenum with no ulcers. The Schatzki's ring was dilated with a Rhodes dilator.;    • ESOPHAGEAL MANOMETRY N/A 12/18/2018    Procedure: ESOPHAGEAL MANOMETRY;  Surgeon: Cecilia, Nurse Performed;  Location: [facility] ENDOSCOPY;  Service:  Gastroenterology   • ESOPHAGOSCOPY N/A 1/21/2019    Procedure: FLEXIBLE ESOPHAGOSCOPY;  Surgeon: Reji Johnson MD;  Location: [facility] MAIN OR;  Service: General   • HIATAL HERNIA REPAIR N/A 1/21/2019    Procedure: Laparoscopic paraesophageal hernia repair with toupee fundoplication;  Surgeon: Reji Johnson MD;  Location: [facility] MAIN OR;  Service: General   • INCONTINENCE SURGERY  1979 1979--a bladder tack procedure for urinary stress incontinence   • KNEE ARTHROSCOPY Right 12/12/2011 12/12/2011--right knee arthroscopy with partial lateral and medial meniscectomies.   • SKIN SURGERY  05/25/2010 05/25/2010--skin lesion excised from right lower extremity. 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   • TOTAL ABDOMINAL HYSTERECTOMY  1974 1974--total abdominal hysterectomy.         Allergies   Allergen Reactions   • Statins Nausea And Vomiting   • Morphine Hallucinations   • Penicillins Itching   • Tetanus Toxoids Itching           Current Outpatient Medications:   •  aspirin 81 MG EC tablet, Take 81 mg by mouth Daily. HELD, Disp: , Rfl:   •  buPROPion XL (WELLBUTRIN XL) 150 MG 24 hr tablet, Take 150 mg by mouth 2 (Two) Times a Day., Disp: , Rfl: 0  •  cholecalciferol (VITAMIN D3) 1000 units tablet, Take 1,000 Units by mouth Daily., Disp: , Rfl:   •  clonazePAM (KlonoPIN) 1 MG tablet, Take 1 mg by mouth Daily., Disp: , Rfl:   •  cycloSPORINE (RESTASIS) 0.05 % ophthalmic emulsion, Administer 1 drop to both eyes 2 (Two) Times a Day., Disp: , Rfl:   •  diclofenac (VOLTAREN) 1 % gel gel, Apply 4 g topically to the appropriate area as directed 4 (Four) Times a Day As Needed., Disp: , Rfl:   •  estradiol (ESTRACE) 1 MG tablet, TAKE 1 TABLET BY MOUTH DAILY, Disp: 90 tablet, Rfl: 0  •  gabapentin (NEURONTIN) 300 MG capsule, Take 300 mg by mouth Every Night., Disp: , Rfl: 2  •  Ginkgo 60 MG tablet, Take 1 tablet by mouth Daily., Disp: , Rfl:   •  HYDROcodone-acetaminophen  (NORCO)  MG per tablet, Take 1 tablet by mouth every 6 (six) hours as needed for moderate pain (4-6)., Disp: , Rfl:   •  Multiple Vitamins-Minerals (MULTIVITAMIN ADULT) chewable tablet, Chew 1 tablet Daily., Disp: , Rfl:   •  polyethylene glycol (MIRALAX) packet, Take 17 g by mouth Daily. (Patient taking differently: Take 17 g by mouth Daily As Needed.), Disp: 60 each, Rfl: 1  •  simethicone (GAS-X) 80 MG chewable tablet, Chew 1 tablet 4 (Four) Times a Day As Needed for Flatulence., Disp: 100 tablet, Rfl: 2  •  topiramate (TOPAMAX) 100 MG tablet, TAKE 1 TABLET DAILY, Disp: 90 tablet, Rfl: 1  •  triamterene-hydrochlorothiazide (DYAZIDE) 37.5-25 MG per capsule, TAKE ONE CAPSULE BY MOUTH DAILY FOR BLOOD PRESSURE AND LEG SWELLING, Disp: 90 capsule, Rfl: 0  •  venlafaxine (EFFEXOR) 75 MG tablet, Take 75 mg by mouth Daily., Disp: , Rfl:   •  vitamin E 400 UNIT capsule, Take 400 Units by mouth Daily., Disp: , Rfl:       Family History   Problem Relation Age of Onset   • Heart attack Mother         dies age 47 from heart attack   • Heart disease Mother    • Bleeding Disorder Mother    • Heart attack Maternal Aunt         dies age 60 from heart attack   • Ovarian cancer Maternal Grandmother    • Heart disease Father    • Malig Hyperthermia Neg Hx          Social History     Socioeconomic History   • Marital status:      Spouse name: ander   • Number of children: 4   • Years of education: 14   • Highest education level: Associate degree: academic program   Occupational History   • Occupation: homemaker   Social Needs   • Financial resource strain: Not hard at all   • Food insecurity:     Worry: Never true     Inability: Never true   • Transportation needs:     Medical: Yes     Non-medical: Yes   Tobacco Use   • Smoking status: Never Smoker   • Smokeless tobacco: Never Used   Substance and Sexual Activity   • Alcohol use: No   • Drug use: No   • Sexual activity: Defer     Partners: Male   Lifestyle   • Physical  "activity:     Days per week: 3 days     Minutes per session: 20 min   • Stress: To some extent   Relationships   • Social connections:     Talks on phone: More than three times a week     Gets together: Once a week     Attends [employer] service: 1 to 4 times per year     Active member of club or organization: Yes     Attends meetings of clubs or organizations: 1 to 4 times per year     Relationship status:          Vitals:    05/15/19 1300   BP: 154/68   BP Location: Right arm   Pulse: 79   SpO2: 98%   Weight: 62.4 kg (137 lb 9.6 oz)   Height: 152.4 cm (60\")          Physical Exam:    General: Alert and oriented x 3.  No acute distress.  Normal affect.  HEENT: Pupils equal, round, reactive to light; extraocular movements intact; sclerae nonicteric; pharynx, ear canals and TMs normal.  Neck: Without JVD, thyromegaly, bruit, or adenopathy.  Lungs: Clear to auscultation in all fields.  Heart: Regular rate and rhythm without murmur, rub, gallop, or click.  Abdomen: Soft, nontender, without hepatosplenomegaly or hernia.  Bowel sounds normal.  : Deferred.  Rectal: Deferred.  Extremities: Without clubbing, cyanosis, edema, or pulse deficit.  Neurologic: Intact without focal deficit.  Normal station and gait observed during ingress and egress from the examination room.  Skin: Without significant lesion.  Musculoskeletal: Unremarkable.    Lab/other results:    NMR is perfect other than total cholesterol is 227.  CMP normal except creatinine slightly elevated at 1.04.  CBC normal except hemoglobin low at 11.8.  However, hematocrit is normal at 38.2.  Iron studies are normal except iron saturation is low at 5%.  Thyroid function tests are normal.  Vitamin D normal.  CPK normal.    Assessment/Plan:     Diagnosis Plan   1. Hyperlipidemia, unspecified hyperlipidemia type     2. Chronic renal insufficiency, stage II (mild), creatinine 1.12     3. Benign essential hypertension     4. Osteoporosis, unspecified osteoporosis " type, unspecified pathological fracture presence     5. Carotid artery plaque, 04/02/2018--mild right ICA plaque, normal left ICA 10/03/2014--mild bilateral carotid artery plaque.     6. Chronic gastritis with bleeding, unspecified gastritis type     7. Depression with anxiety     8. Chronic migraine     9. Pedal edema     10. Restless legs syndrome     11. Vitamin D deficiency     12. Chronic gastric ulcer without hemorrhage and without perforation     13. Chronic anemia     14. Multinodular goiter     15. Generalized anxiety disorder     16. Iron deficiency anemia due to chronic blood loss     17. Statin intolerance     18. Pulmonary hypertension (CMS/HCC)     19. Therapeutic drug monitoring     20. Chronic fatigue       Patient has hyperlipidemia technically but her NMR profile was perfect.  Cholesterol medication not indicated and patient cannot tolerate statins anyway.  Her chronic renal insufficiency is very mild and actually improved.  Her blood pressure is little elevated today and this needs to be monitored.  She has osteoporosis and has received Reclast and is up-to-date on her DEXA scan.  Patient has carotid artery plaque and is up-to-date on her to carotid Doppler study.  Patient does have a history of chronic gastritis as well as a gastric ulcer and she is having some symptoms of indigestion/heartburn and I think needs to be on PPI therapy.  Her depression and anxiety seem to be reasonably controlled.  Restless legs has not been an issue on the gabapentin.  She does have periodic migraine headaches but things have been reasonably well-controlled at this time.  She has very mild chronic anemia that does not require any further evaluation.  She has a multinodular goiter that was assessed about 2 years ago with a thyroid study and this will be repeated probably next year.    Plan is as follows: No change in current medical regimen, except start omeprazole 40 mg/day..  Mammogram ordered.  I will have  patient follow-up in about 2 to 3 weeks to reassess her blood pressure.  We can also review her reflux/indigestion symptoms.        Procedures

## 2019-05-28 RX ORDER — CLONAZEPAM 1 MG/1
TABLET ORAL
Qty: 60 TABLET | Refills: 0 | Status: SHIPPED | OUTPATIENT
Start: 2019-05-28 | End: 2019-10-20 | Stop reason: SDUPTHER

## 2019-06-06 ENCOUNTER — OFFICE VISIT (OUTPATIENT)
Dept: INTERNAL MEDICINE | Facility: CLINIC | Age: 79
End: 2019-06-06

## 2019-06-06 VITALS
HEART RATE: 87 BPM | HEIGHT: 60 IN | SYSTOLIC BLOOD PRESSURE: 120 MMHG | DIASTOLIC BLOOD PRESSURE: 60 MMHG | OXYGEN SATURATION: 98 % | BODY MASS INDEX: 26.31 KG/M2 | WEIGHT: 134 LBS

## 2019-06-06 DIAGNOSIS — K21.9 GASTROESOPHAGEAL REFLUX DISEASE WITHOUT ESOPHAGITIS: ICD-10-CM

## 2019-06-06 DIAGNOSIS — K29.51 CHRONIC GASTRITIS WITH BLEEDING, UNSPECIFIED GASTRITIS TYPE: Chronic | ICD-10-CM

## 2019-06-06 DIAGNOSIS — K25.7 CHRONIC GASTRIC ULCER WITHOUT HEMORRHAGE AND WITHOUT PERFORATION: Chronic | ICD-10-CM

## 2019-06-06 DIAGNOSIS — D64.9 CHRONIC ANEMIA: Chronic | ICD-10-CM

## 2019-06-06 DIAGNOSIS — E78.5 HYPERLIPIDEMIA, UNSPECIFIED HYPERLIPIDEMIA TYPE: Chronic | ICD-10-CM

## 2019-06-06 DIAGNOSIS — Z12.31 BREAST CANCER SCREENING BY MAMMOGRAM: ICD-10-CM

## 2019-06-06 DIAGNOSIS — D50.0 IRON DEFICIENCY ANEMIA DUE TO CHRONIC BLOOD LOSS: Chronic | ICD-10-CM

## 2019-06-06 DIAGNOSIS — Z51.81 THERAPEUTIC DRUG MONITORING: ICD-10-CM

## 2019-06-06 DIAGNOSIS — E55.9 VITAMIN D DEFICIENCY: Chronic | ICD-10-CM

## 2019-06-06 DIAGNOSIS — I10 BENIGN ESSENTIAL HYPERTENSION: Primary | Chronic | ICD-10-CM

## 2019-06-06 DIAGNOSIS — N18.2 CHRONIC RENAL INSUFFICIENCY, STAGE II (MILD): Chronic | ICD-10-CM

## 2019-06-06 PROCEDURE — 99213 OFFICE O/P EST LOW 20 MIN: CPT | Performed by: INTERNAL MEDICINE

## 2019-06-06 RX ORDER — DEXLANSOPRAZOLE 60 MG/1
CAPSULE, DELAYED RELEASE ORAL
Qty: 90 CAPSULE | Refills: 3 | Status: SHIPPED | OUTPATIENT
Start: 2019-06-06 | End: 2019-09-03 | Stop reason: SINTOL

## 2019-06-06 NOTE — PROGRESS NOTES
06/06/2019    Patient Information  Sachi Vora                                                                                          [address] [place] [address]      1940  [unfilled]  There is no work phone number on file.    Chief Complaint:     Follow-up hypertension, reflux symptoms and chronic gastritis.  Complaining of side effects from omeprazole.    History of Present Illness:    Patient with history of osteoporosis, hypertension, carotid artery plaque, chronic gastritis and history of gastric ulcer, recent reflux symptoms, chronic lower back pain, stage II chronic renal insufficiency, hyperlipidemia and pedal edema.  She presents today primarily to follow-up on her blood pressure which was somewhat elevated at the last visit.  At that time she was complaining of dyspepsia and reflux symptoms and was started on omeprazole 40 mg/day.  However, patient reports this caused her to have diarrhea to the point where she actually had an accident in bed.  She subsequently stopped taking it.  She continues to have epigastric abdominal discomfort that previously was much improved with Dexilant but not with omeprazole, particularly given the side effects.  Past medical history reviewed and updated were necessary including health maintenance parameters.  This reveals she is up-to-date except for mammogram.  I placed an order for this at the last visit but no one has contacted the patient.    Review of Systems   Constitution: Negative.   HENT: Negative.    Eyes: Negative.    Cardiovascular: Negative.    Respiratory: Negative.    Endocrine: Negative.    Hematologic/Lymphatic: Negative.    Skin: Negative.    Musculoskeletal: Negative.    Gastrointestinal: Positive for abdominal pain and diarrhea.   Genitourinary: Negative.    Neurological: Negative.    Psychiatric/Behavioral: Negative.    Allergic/Immunologic: Negative.        Active Problems:    Patient Active Problem List   Diagnosis  "  • Osteoporosis, 03/29/2011--lumbar spine -2.8.  Right femoral neck -2.4.  Left femoral neck -2.4.  Patient receives Reclast infusions.   • Therapeutic drug monitoring   • Simple renal cyst   • Benign essential hypertension   • Carotid artery plaque, 04/02/2018--mild right ICA plaque, normal left ICA 10/03/2014--mild bilateral carotid artery plaque.   • Chronic gastritis   • Chronic lower back pain   • Chronic otitis externa   • Chronic renal insufficiency, stage II (mild), creatinine 1.12   • Depression with anxiety   • Functional murmur, 07/15/2015--normal echocardiogram.   • Hyperlipidemia   • Menopausal state   • Chronic migraine   • Pancreatic Duct Dilation   • Pedal edema   • Restless legs syndrome   • Bilateral sensorineural hearing loss   • Vitamin D deficiency   • Chronic gastric ulcer   • Chronic fatigue   • Hypersomnolence   • Chronic anemia   • Multinodular goiter   • Generalized anxiety disorder   • Iron deficiency anemia   • Statin intolerance   • Postmenopausal state   • Pulmonary hypertension (CMS/HCC)   • Gastroesophageal reflux disease without esophagitis         Past Medical History:   Diagnosis Date   • Benign essential hypertension 4/8/2016   • Bilateral sensorineural hearing loss 3/31/2011    03/31/2011--etiology reveals reverse \"cookie bite\" type of hearing loss for both ears of mild/moderate degree, mostly sensorineural.  Speech discrimination 100% on the right, 96% on the left.   • Carotid artery plaque, 10/03/2014--mild bilateral carotid artery plaque. 7/25/2012    10/03/2014--[facility] screening revealed mild bilateral carotid plaque, negative for atrial fibrillation, negative for AAA, negative for PAD, osteoporosis screen revealed osteopenia.  Body mass index was 25 and considered to be moderate risk.  07/25/2012--vascular screen negative for carotid plaque, negative for abdominal aneurysm, negative for PAD Description: 10/03/2014--[facility] screening revealed mild bilateral carotid " plaque, negative for atrial fibrillation, negative for AAA, negative for PAD, osteoporosis screen revealed osteopenia.  Body mass index was 25 and considered to be moderate risk.  07/25/2012--vascular screen negative for carotid plaque, negative for abdominal aneurysm, negative for PAD   • Chronic anemia 8/23/2016 06/13/2017--colonoscopy revealed melanosis.  Biopsied.  There was friability with contact bleeding in the ascending colon.  Biopsied.  Pathology returned consistent with melanosis coli.  06/13/2017--EGD revealed normal esophagus.  Biopsies taken.  There was a medium-sized hiatal hernia.  Localized mild inflammation and linear erosions were found in the prepyloric region.  Biopsied.  Examined duodenum was normal.  Random biopsies taken.  Pathology of the gastroesophageal junction was unremarkable as was the gastric fundus.  Prepyloric biopsy revealed minimal chronic inflammation and reactive change.  H pylori negative.  Duodenal biopsies are negative.  04/12/2017--hemoglobin low at 10.8, hematocrit is normal at 35.7.  Iron sulfate 325 mg per day initiated.  08/23/2016--routine follow-up.  Patient continues to have epigastric abdominal pain believed to be related to reflux with possible esophageal spasm.  Hemoglobin noted to be low at 10.6 with a hematocrit low at 33.7 and RDW elevated at 16.2.  Homocysti   • Chronic fatigue 4/21/2016 06/14/2017--overnight oximetry revealed oxygen desaturation index of only 0.44.  There were only 10 total desaturations during the period of testing which lasted 6 hours and 46 minutes.  05/31/2017--patient seen in follow-up and reports she continues to have chronic fatigue as well as hypersomnolence.  Once again, she is on multiple medications that are undoubtedly contributing to this problem including clonazepam and hydrocodone but I doubt that these could be discontinued.  Her CBC back in April revealed a low hemoglobin of 10.8 but hematocrit was normal at 35.7.   Thyroid function tests were normal and CMP normal.  Overnight oximetry test ordered.  Repeat CBC.  Iron studies.  Sedimentation rate and CRP.  04/11/2017--patient seen in follow-up and her blood pressure is now at a reasonable level at 120/64.  She reports she still feels somewhat fatigued but she is much better.  Patient has not been doing any regular exercise and I do think that that would be helpful to reduce her feeling of fatigue.  She is   • Chronic gastric ulcer 4/14/2014    02/15/2017--patient seen in follow-up in nearly 6 months later.  She has complaints of not feeling well all over.  She has complaints of diffuse myalgias and possibly tendinopathy related to Levaquin that I called in prior to her going to [place] for vacation.  She reports that 3 or 4 days after starting the Levaquin she began to have the musculoskeletal symptoms and she reports that she continues to have them presently.  Symptoms are worse involving her left calf area.  Examination reveals significant tenderness involving the left calf and the left calf seems a little larger than the right.  She also has complaints of shortness of breath but no chest pain.  She is complaining of double vision and generalized weakness and fatigue.  She was complaining of chronic fatigue at the last visit back in September and I ordered an overnight oximetry test but apparently this was never done for reasons that are not clear to me.  Her current oxygen saturation is 94% and normally it is 100%.  Plan is as follows: Extensi   • Chronic gastritis 11/19/2001    02/15/2017--patient seen in follow-up in nearly 6 months later.  She has complaints of not feeling well all over.  She has complaints of diffuse myalgias and possibly tendinopathy related to Levaquin that I called in prior to her going to [place] for vacation.  She reports that 3 or 4 days after starting the Levaquin she began to have the musculoskeletal symptoms and she reports that she  continues to have them presently.  Symptoms are worse involving her left calf area.  Examination reveals significant tenderness involving the left calf and the left calf seems a little larger than the right.  She also has complaints of shortness of breath but no chest pain.  She is complaining of double vision and generalized weakness and fatigue.  She was complaining of chronic fatigue at the last visit back in September and I ordered an overnight oximetry test but apparently this was never done for reasons that are not clear to me.  Her current oxygen saturation is 94% and normally it is 100%.  Plan is as follows: Extensi   • Chronic lower back pain 1/31/2006 08/11/2014--MRI of the lumbar spine reveals the conus terminates at L2 and is normal.  Cauda equina unremarkable.  Stable to moderate degenerative disc disease at L5-S1.  No acute fracture or pars defect is demonstrated.  Small synovial cysts are seen posterior to the L4-L5 facet.  The perivertebral soft tissues are unremarkable.  T12-L1, L1-L2, L2-L3 are negative.  L3-L4 a broad-based disc bulge resulting in mild bilateral neural foraminal narrowing.  L4-L5 reveals a broad-based disc bulge facet disease resulting in mild bilateral neural foraminal narrowing.  L5-S1 reveals a broad-based disc bulge, posterior osseous slipping, and facet disease resulting in mild to moderate bilateral neural foraminal narrowing.  Assessment is stable mild to moderate degenerative spondylosis.  Small synovial cysts are seen posterior to the L4-L5 facets.  08/11/2014--MRI of the thoracic spine reveals mild to moderate thoracic kyphosis.  No fracture.  At T5--T6 there is a moderate sized left paracentral disc protrusion which i   • Chronic migraine 11/3/2009    01/18/2010--MRI of the brain performed for headaches and memory loss.  Mild small vessel disease in the cerebral and central pontine white matter.  There is an ovoid, somewhat pancake-shaped area of signal abnormality  in the anterior inferior right temporal lobe subcortical to the juxtacortical white matter that measures 1.2 x 1 cm and anterior posterior and medial lateral dimension but only measures 3 mm in cranial caudal dimension.  I suspect that this is a benign cyst or a cystic area of encephalomalacia.  The remainder of the MRI of the head is within normal limits.  11/03/2009--CT scan of the brain without contrast performed for headache after a fall.  Mild diffuse atrophy.  No acute abnormality noted.; Description: Patient has had a long history of migraine headaches.  MRI and CT scan of the brain have been essentially negative.   • Chronic otitis externa 4/8/2016   • Chronic renal insufficiency, stage II (mild), creatinine 1.12 11/14/2015 11/14/2015--serum creatinine mildly elevated at 1.12.   • Depression with anxiety 4/8/2016   • Functional murmur, 07/15/2015--normal echocardiogram. 7/15/2015    07/15/2015--echocardiogram reveals normal left ventricular size and function with ejection fraction 55%.  Grade 1 diastolic dysfunction, abnormal relaxation pattern.  Trace tricuspid regurgitation.  Estimated right ventricular systolic pressure is 25 mmHg which is normal.   • Gastroesophageal reflux disease with esophagitis 11/19/2001 06/13/2017--EGD revealed normal esophagus.  Biopsies taken.  There was a medium-sized hiatal hernia.  Localized mild inflammation and linear erosions were found in the prepyloric region.  Biopsied.  Examined duodenum was normal.  Random biopsies taken.  Pathology of the gastroesophageal junction was unremarkable as was the gastric fundus.  Prepyloric biopsy revealed minimal chronic inflammation and reactive change.  H pylori negative.  Duodenal biopsies are negative.  11/03/2014--patient seen in follow-up and reports her epigastric pain/chest pain has resolved.  I reviewed the results of the studies with her.  It does not appear that her problem is related to biliary tract disease.  She does  have reflux and although the recent upper GI revealed minimal reflux, I do think her symptoms are related to esophageal reflux with esophageal spasm.  10/21/2014--air-contrast upper GI series revealed trace upper laryngeal penetration.  Persistent small partially reducible sliding hiatal hernia the upper stomach with    • Gastroesophageal reflux disease without esophagitis 6/6/2019   • Generalized anxiety disorder 4/11/2017   • History of Acute deep vein thrombosis (DVT) of distal end of left lower extremity 2/15/2017    03/21/2017 patient seen in follow-up and she is tolerating the Eliquis well without signs or symptoms of bleeding.  Her calf swelling and tenderness is better but not totally resolved.  I suspect that the DVT is chronic and may not resolve at all.  I will order a repeat venous study and then proceed from there.  02/23/2017--repeat Doppler venous study of the left lower extremity reveals a chronic left lower extremity DVT in the posterior tibial.  All other left sided veins appeared normal.  Fluid collection in the left calf noted.  02/17/2017--patient seen in follow-up and reports her left lower extremity symptoms are about the same.  She continues to have complaints of profound fatigue which I think is multifactorial including underlying depression that is not in remission.  Review the results of the CTA of the chest including the possible thyroid lesion.  I do not think this is contributing to any of her symptoms of fatigue particularly given the fact that her thyroid function tests are normal.  I expla   • History of palpitations 12/07/2011    Patient has had multiple admissions to the hospital for complaints of chest pain and heart palpitations. She meant admitted at least on 3 occasions. She has had at least 3 stress Cardiolite and 1 stress ECG, the last Cardiolite being performed 12/07/2011 which was negative. Patient is also had Holter monitors which have been unremarkable.   • HIstory of  Schatzki's ring 02/28/2012 02/28/2012--air-contrast upper GI revealed small to moderate sized reducible sliding hiatal herniation of the upper stomach with some demonstrated gastroesophageal reflux. No esophageal, gastric, or duodenal mass or mucosal ulceration was seen.  11/03/2008--EGD performed for evaluation of iron deficiency anemia revealed hiatal hernia without evidence of reflux, prepyloric antritis and   • Hyperlipidemia 4/8/2016   • Hypersomnolence 5/5/2016 06/14/2017--overnight oximetry revealed oxygen desaturation index of only 0.44.  There were only 10 total desaturations during the period of testing which lasted 6 hours and 46 minutes.  05/31/2017--patient seen in follow-up and reports she continues to have chronic fatigue as well as hypersomnolence.  Once again, she is on multiple medications that are undoubtedly contributing to this problem including clonazepam and hydrocodone but I doubt that these could be discontinued.  Her CBC back in April revealed a low hemoglobin of 10.8 but hematocrit was normal at 35.7.  Thyroid function tests were normal and CMP normal.  Overnight oximetry test ordered.  Repeat CBC.  Iron studies.  Sedimentation rate and CRP.  04/11/2017--patient seen in follow-up and her blood pressure is now at a reasonable level at 120/64.  She reports she still feels somewhat fatigued but she is much better.  Patient has not been doing any regular exercise and I do think that that would be helpful to reduce her feeling of fatigue.  She is   • Menopausal state 4/8/2016   • Multinodular goiter 2/17/2017 02/21/2017--thyroid ultrasound reveals multinodular thyroid with largest nodule measuring on the order of 1.6 cm in greatest dimension.  02/17/2017--patient seen in follow-up for DVT and CTA of the chest.  An incidental finding on the CTA of the chest reveals a 1.7 cm lesion in the right lobe of thyroid.  Note that thyroid function tests are currently normal.  Ultrasound of the  thyroid ordered.   • Osteoporosis, 03/29/2011--lumbar spine -2.8.  Right femoral neck -2.4.  Left femoral neck -2.4.  Patient receives Reclast infusions. 2/9/2009 04/19/2017--Reclast infusion.  04/05/2016--Reclast infusion.  06/04/2012--Reclast infusion.  05/26/2011--Reclast infusion.  03/29/2011--DEXA scan revealed a lumbar T score of -2.8, right femoral neck T score -2.4, left femoral neck T score -2.4.  Osteoporosis of the lumbar spine and severe osteopenia of the hips bilaterally.  Patient has been intolerant to Fosamax because of gastritis and gastroesophageal reflux.  04/01/2009--treatment for osteoporosis begun with Fosamax.  02/09/2009--DEXA scan revealed lumbar spine T score of -3.3.  Left femoral neck T score -2.5.  Right hip T score -2.6.  Osteoporosis.    • Pancreatic Duct Dilation 11/30/2001 09/29/2014--patient was again evaluated by the urologist for a renal cyst.  CT scan of the abdomen and pelvis reveals a left renal cyst measuring 5.1 x 4.9 cm.  There were subcentimeter hypodense renal lesions that are too small to further characterize and are presumably related to cysts.  Recommend attention to follow up.  Distal dilated pancreatic duct noted.  The common bile duct is the upper limits of normal in size.  The ampullary region is not well evaluated.  Comparison with prior imaging is recommended if available.  If there is no prior film available for comparison, and ERCP or MRCP could be performed to further evaluate.  Small hiatal hernia noted.  03/05/2012--CT scan of the abdomen with contrast, pancreatic protocol.  This reveals some fullness of the pancreatic ductal system and apparent pancreatic divisum with separate entrance of the accessory pancreatic duct extending into the duodenum distal to the main pancreatic duct.  There is fullness of the duct diffusely but similar to the previous s   • Pedal edema 6/29/2015 06/29/2015--patient presents with a 4-6 week history of exertional dyspnea  that comes on with activity such as climbing stairs or walking her dog up an incline.  No chest pain.  Relieved with rest.  No cough.  She does have complaints of her feet and legs swelling that is particularly worse at the end of the day and not improved overnight.  No orthopnea or PND.  Chest exam reveals faint rales at the bases.  Otherwise clear.  Heart is regular and I do not appreciate a heart murmur.  Chest x-ray PA and lateral ordered.  Echocardiogram ordered.   • Pulmonary hypertension (CMS/HCC) 4/18/2019   • Restless legs syndrome 4/8/2016   • Simple renal cyst 7/20/2009 09/29/2014--patient was again evaluated by the urologist for a renal cyst.  CT scan of the abdomen and pelvis reveals a left renal cyst measuring 5.1 x 4.9 cm.  There were subcentimeter hypodense renal lesions that are too small to further characterize and are presumably related to cysts.  Recommend attention to follow up.  Distal dilated pancreatic duct noted.  The common bile duct is the upper limits of normal in size.  The ampullary region is not well evaluated.  Comparison with prior imaging is recommended if available.  If there is no prior film available for comparison, and ERCP or MRCP could be performed to further evaluate.  Small hiatal hernia noted.  07/20/2009--patient was noted to have a left renal mass consistent with a cyst.  This was evaluated by the urologist 07/20/2009.   • Statin intolerance 10/30/2017   • Vitamin D deficiency 4/8/2016         Past Surgical History:   Procedure Laterality Date   • APPENDECTOMY  1965    1965   • CATARACT EXTRACTION Bilateral Remote    Remote bilateral cataract extirpation with intraocular lens implantation.   • CHOLECYSTECTOMY WITH INTRAOPERATIVE CHOLANGIOGRAM N/A 1/21/2019 01/21/2019--laparoscopic paraesophageal hernia repair with fundoplication.   • COLONOSCOPY  11/03/2008 2008--normal colonoscopy   • COLONOSCOPY  2001 2001--normal colonoscopy.   • COLONOSCOPY N/A 6/13/2017     06/13/2017--colonoscopy revealed melanosis.  Biopsied.  There was friability with contact bleeding in the ascending colon.  Biopsied.  Pathology returned consistent with melanosis coli.   • ENDOSCOPY  10/08/2014    02/28/2012--air-contrast upper GI revealed small to moderate sized reducible sliding hiatal herniation of the upper stomach with some demonstrated gastroesophageal reflux. No esophageal, gastric, or duodenal mass or mucosal ulceration was seen. 11/03/2008--EGD performed for evaluation of iron deficiency anemia revealed hiatal hernia without evidence of reflux, prepyloric antritis and   • ENDOSCOPY  11/03/2008 11/03/2008--EGD performed for evaluation of iron deficiency anemia revealed hiatal hernia without evidence of reflux, prepyloric antritis and   • ENDOSCOPY  11/19/2001 11/19/2001--EGD revealed a very tortuous distal esophagus with a Schatzki's ring. No ulcer or erosions. No Dorado's mucosa. Stomach revealed patchy erythema and erosions in the antrum. Biopsy. Normal pylorus with no obstruction. Normal duodenum with no ulcers. The Schatzki's ring was dilated with a Rhodes dilator.;   • ENDOSCOPY N/A 9/27/2016 09/27/2016--EGD revealed a normal oropharynx, esophagus, and medium sized hiatal hernia.  Nonbleeding gastric ulcer with no stigmata of bleeding.  Biopsy.  Gastritis.  Biopsy.  Normal examined duodenum.  Biopsied.  Pathology returned mild to moderate chronic active gastritis with ulceration from the stomach antral ulcer biopsy.  Gastroesophageal junction biopsy revealed minimal mixed inflammation.   • ENDOSCOPY N/A 6/13/2017 06/13/2017--colonoscopy revealed melanosis.  Biopsied.  There was friability with contact bleeding in the ascending colon.  Biopsied.  Pathology returned consistent with melanosis coli.   • ERCP N/A 1/22/2019 01/22/2019--ERCP with sphincterotomy and balloon stone extraction.   • ESOPHAGEAL DILATATION  11/19/2001 11/19/2001--EGD revealed a very tortuous  distal esophagus with a Schatzki's ring. No ulcer or erosions. No Dorado's mucosa. Stomach revealed patchy erythema and erosions in the antrum. Biopsy. Normal pylorus with no obstruction. Normal duodenum with no ulcers. The Schatzki's ring was dilated with a Rhodes dilator.;    • ESOPHAGEAL MANOMETRY N/A 12/18/2018    Procedure: ESOPHAGEAL MANOMETRY;  Surgeon: Cecilia, Nurse Performed;  Location: [facility] ENDOSCOPY;  Service: Gastroenterology   • ESOPHAGOSCOPY N/A 1/21/2019    Procedure: FLEXIBLE ESOPHAGOSCOPY;  Surgeon: Reji Johnosn MD;  Location: [facility] MAIN OR;  Service: General   • HIATAL HERNIA REPAIR N/A 1/21/2019    Procedure: Laparoscopic paraesophageal hernia repair with toupee fundoplication;  Surgeon: Reji Johnson MD;  Location: [facility] MAIN OR;  Service: General   • INCONTINENCE SURGERY  1979 1979--a bladder tack procedure for urinary stress incontinence   • KNEE ARTHROSCOPY Right 12/12/2011 12/12/2011--right knee arthroscopy with partial lateral and medial meniscectomies.   • SKIN SURGERY  05/25/2010 05/25/2010--skin lesion excised from right lower extremity. 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   • TOTAL ABDOMINAL HYSTERECTOMY  1974 1974--total abdominal hysterectomy.         Allergies   Allergen Reactions   • Statins Nausea And Vomiting   • Morphine Hallucinations   • Penicillins Itching   • Tetanus Toxoids Itching           Current Outpatient Medications:   •  aspirin 81 MG EC tablet, Take 81 mg by mouth Daily. HELD, Disp: , Rfl:   •  buPROPion XL (WELLBUTRIN XL) 150 MG 24 hr tablet, Take 150 mg by mouth 2 (Two) Times a Day., Disp: , Rfl: 0  •  cholecalciferol (VITAMIN D3) 1000 units tablet, Take 1,000 Units by mouth Daily., Disp: , Rfl:   •  clonazePAM (KlonoPIN) 1 MG tablet, TAKE 1/2 TO 1 TABLET TWICE DAILY WHEN NECESSARY, Disp: 60 tablet, Rfl: 0  •  cycloSPORINE (RESTASIS) 0.05 % ophthalmic emulsion, Administer 1 drop to both eyes 2 (Two)  Times a Day., Disp: , Rfl:   •  diclofenac (VOLTAREN) 1 % gel gel, Apply 4 g topically to the appropriate area as directed 4 (Four) Times a Day As Needed., Disp: , Rfl:   •  estradiol (ESTRACE) 1 MG tablet, TAKE 1 TABLET BY MOUTH DAILY, Disp: 90 tablet, Rfl: 0  •  gabapentin (NEURONTIN) 300 MG capsule, Take 300 mg by mouth Every Night., Disp: , Rfl: 2  •  Ginkgo 60 MG tablet, Take 1 tablet by mouth Daily., Disp: , Rfl:   •  HYDROcodone-acetaminophen (NORCO)  MG per tablet, Take 1 tablet by mouth every 6 (six) hours as needed for moderate pain (4-6)., Disp: , Rfl:   •  Multiple Vitamins-Minerals (MULTIVITAMIN ADULT) chewable tablet, Chew 1 tablet Daily., Disp: , Rfl:   •  polyethylene glycol (MIRALAX) packet, Take 17 g by mouth Daily. (Patient taking differently: Take 17 g by mouth Daily As Needed.), Disp: 60 each, Rfl: 1  •  simethicone (GAS-X) 80 MG chewable tablet, Chew 1 tablet 4 (Four) Times a Day As Needed for Flatulence., Disp: 100 tablet, Rfl: 2  •  topiramate (TOPAMAX) 100 MG tablet, TAKE 1 TABLET DAILY, Disp: 90 tablet, Rfl: 1  •  triamterene-hydrochlorothiazide (DYAZIDE) 37.5-25 MG per capsule, TAKE ONE CAPSULE BY MOUTH DAILY FOR BLOOD PRESSURE AND LEG SWELLING, Disp: 90 capsule, Rfl: 0  •  venlafaxine (EFFEXOR) 75 MG tablet, Take 75 mg by mouth Daily., Disp: , Rfl:   •  vitamin E 400 UNIT capsule, Take 400 Units by mouth Daily., Disp: , Rfl:   •  dexlansoprazole (DEXILANT) 60 MG capsule, Take 1 p.o. daily before the first meal., Disp: 90 capsule, Rfl: 3      Family History   Problem Relation Age of Onset   • Heart attack Mother         dies age 47 from heart attack   • Heart disease Mother    • Bleeding Disorder Mother    • Heart attack Maternal Aunt         dies age 60 from heart attack   • Ovarian cancer Maternal Grandmother    • Heart disease Father    • Malig Hyperthermia Neg Hx          Social History     Socioeconomic History   • Marital status:      Spouse name: ander   • Number of  "children: 4   • Years of education: 14   • Highest education level: Associate degree: academic program   Occupational History   • Occupation: homemaker   Social Needs   • Financial resource strain: Not hard at all   • Food insecurity:     Worry: Never true     Inability: Never true   • Transportation needs:     Medical: Yes     Non-medical: Yes   Tobacco Use   • Smoking status: Never Smoker   • Smokeless tobacco: Never Used   Substance and Sexual Activity   • Alcohol use: No   • Drug use: No   • Sexual activity: Yes     Partners: Male   Lifestyle   • Physical activity:     Days per week: 3 days     Minutes per session: 20 min   • Stress: To some extent   Relationships   • Social connections:     Talks on phone: More than three times a week     Gets together: Once a week     Attends [employer] service: 1 to 4 times per year     Active member of club or organization: Yes     Attends meetings of clubs or organizations: 1 to 4 times per year     Relationship status:          Vitals:    06/06/19 1105   BP: 120/60   BP Location: Left arm   Pulse: 87   SpO2: 98%   Weight: 60.8 kg (134 lb)   Height: 152.4 cm (60\")          Physical Exam:    General: Alert and oriented x 3.  No acute distress.  Normal affect.  HEENT: Pupils equal, round, reactive to light; extraocular movements intact; sclerae nonicteric; pharynx, ear canals and TMs normal.  Neck: Without JVD, thyromegaly, bruit, or adenopathy.  Lungs: Clear to auscultation in all fields.  Heart: Regular rate and rhythm without murmur, rub, gallop, or click.  Abdomen: Soft, nontender, without hepatosplenomegaly or hernia.  Bowel sounds normal.  : Deferred.  Rectal: Deferred.  Extremities: Without clubbing, cyanosis, edema, or pulse deficit.  Neurologic: Intact without focal deficit.  Normal station and gait observed during ingress and egress from the examination room.  Skin: Without significant lesion.  Musculoskeletal: Unremarkable.    Lab/other " results:      Assessment/Plan:     Diagnosis Plan   1. Benign essential hypertension  Comprehensive Metabolic Panel   2. Gastroesophageal reflux disease without esophagitis  dexlansoprazole (DEXILANT) 60 MG capsule   3. Chronic gastric ulcer without hemorrhage and without perforation  dexlansoprazole (DEXILANT) 60 MG capsule   4. Chronic gastritis with bleeding, unspecified gastritis type  dexlansoprazole (DEXILANT) 60 MG capsule   5. Breast cancer screening by mammogram  Mammo Screening Bilateral With CAD   6. Chronic renal insufficiency, stage II (mild), creatinine 1.12  Urinalysis With Microscopic If Indicated (No Culture) - Urine, Clean Catch   7. Hyperlipidemia, unspecified hyperlipidemia type  CK    NMR LipoProfile    TSH    T4, Free    T3, Free   8. Vitamin D deficiency  Vitamin D 25 Hydroxy   9. Chronic anemia  CBC (No Diff)    CK    Comprehensive Metabolic Panel    NMR LipoProfile    Vitamin D 25 Hydroxy    Urinalysis With Microscopic If Indicated (No Culture) - Urine, Clean Catch    TSH    T4, Free    T3, Free   10. Iron deficiency anemia due to chronic blood loss     11. Therapeutic drug monitoring       Patient's blood pressure appears to be well controlled.  However, she continues to have epigastric discomfort consistent with his reflux and I am sure that her chronic gastric ulcer and gastritis are playing a role as well.  She needs to be on chronic PPI therapy but does not tolerate omeprazole due to side effects.  She has previously tolerated Dexilant and it has been effective.    Plan is as follows: Formally discontinue omeprazole.  Dexilant 60 mg p.o. daily before the first meal.  Patient will follow-up after November 13, 2019 with lab prior and this will also be subsequent Medicare wellness visit        Procedures

## 2019-06-18 DIAGNOSIS — R60.0 PEDAL EDEMA: ICD-10-CM

## 2019-06-18 DIAGNOSIS — I10 BENIGN ESSENTIAL HYPERTENSION: ICD-10-CM

## 2019-06-18 RX ORDER — TRIAMTERENE AND HYDROCHLOROTHIAZIDE 37.5; 25 MG/1; MG/1
CAPSULE ORAL
Qty: 90 CAPSULE | Refills: 1 | Status: SHIPPED | OUTPATIENT
Start: 2019-06-18 | End: 2021-12-21 | Stop reason: ALTCHOICE

## 2019-07-10 RX ORDER — ESTRADIOL 1 MG/1
TABLET ORAL
Qty: 90 TABLET | Refills: 1 | Status: SHIPPED | OUTPATIENT
Start: 2019-07-10 | End: 2020-01-06

## 2019-07-16 ENCOUNTER — HOSPITAL ENCOUNTER (OUTPATIENT)
Dept: MAMMOGRAPHY | Facility: HOSPITAL | Age: 79
Discharge: HOME | End: 2019-07-16
Admitting: INTERNAL MEDICINE

## 2019-07-16 PROCEDURE — 77067 SCR MAMMO BI INCL CAD: CPT

## 2019-07-16 PROCEDURE — 77063 BREAST TOMOSYNTHESIS BI: CPT

## 2019-09-03 ENCOUNTER — OFFICE VISIT (OUTPATIENT)
Dept: SURGERY | Facility: CLINIC | Age: 79
End: 2019-09-03

## 2019-09-03 VITALS — WEIGHT: 137.4 LBS | HEIGHT: 60 IN | HEART RATE: 77 BPM | BODY MASS INDEX: 26.97 KG/M2 | OXYGEN SATURATION: 97 %

## 2019-09-03 DIAGNOSIS — Z09 POSTOP CHECK: Primary | ICD-10-CM

## 2019-09-03 PROCEDURE — 99024 POSTOP FOLLOW-UP VISIT: CPT | Performed by: SURGERY

## 2019-09-03 RX ORDER — AMOXICILLIN 250 MG
2 CAPSULE ORAL DAILY PRN
Qty: 30 TABLET | Refills: 1 | Status: SHIPPED | OUTPATIENT
Start: 2019-09-03 | End: 2019-09-30

## 2019-09-06 NOTE — PROGRESS NOTES
Postoperative Note     This is a 78 y.o. female who presents for a post-operative visit after undergoing a Laparoscopic repair of hiatal hernia and Toupet fundoplication and laparoscopic cholecystectomy on 1/21/2019.    Patient is doing great and denies any complaint other than abdominal bloating.  She has tried regular diet.  These happen when she eats fast.  Otherwise she denies any heartburn, regurgitation, dysphagia, hoarseness, coughing.  She reports constipation and she has not been taking any stool softeners.    Patient has not been taking antiacids medications.    Incisions are appears to be completely healed, there is no signs of hernia    Assessment  Patients is a 78 y.o. female s/p Laparoscopic repair of hiatal hernia and Toupet fundoplication and laparoscopic cholecystectomy on 1/21/2019.and has been doing great.  She denies any major complaint other than bloating.  Discussed with her about the need to eat slowly and several meals a day.  I recommend that she takes Gas-X or charcoal pills.  Can do regular diet without any restrictions.  Eat small meals    Postoperative instructions were discussed and given in a printed form.  Diet advancements were discussed.     She will follow-up at our office in 6 month(s)     Reji Johnson MD, FACS  General, Minimally Invasive and Endoscopic Surgery  [facility], [facility]    [address], [address]  [place], [place], [address]  P: [phone]  F: [phone]

## 2019-09-30 ENCOUNTER — OFFICE VISIT (OUTPATIENT)
Dept: INTERNAL MEDICINE | Facility: CLINIC | Age: 79
End: 2019-09-30

## 2019-09-30 VITALS
SYSTOLIC BLOOD PRESSURE: 116 MMHG | OXYGEN SATURATION: 98 % | DIASTOLIC BLOOD PRESSURE: 62 MMHG | BODY MASS INDEX: 26.78 KG/M2 | HEIGHT: 60 IN | HEART RATE: 71 BPM | WEIGHT: 136.4 LBS

## 2019-09-30 DIAGNOSIS — Z23 NEED FOR INFLUENZA VACCINATION: ICD-10-CM

## 2019-09-30 DIAGNOSIS — H93.13 BILATERAL TINNITUS: Primary | ICD-10-CM

## 2019-09-30 DIAGNOSIS — H69.83 DYSFUNCTION OF BOTH EUSTACHIAN TUBES: ICD-10-CM

## 2019-09-30 PROBLEM — H69.93 DYSFUNCTION OF BOTH EUSTACHIAN TUBES: Status: ACTIVE | Noted: 2019-09-30

## 2019-09-30 PROCEDURE — 99213 OFFICE O/P EST LOW 20 MIN: CPT | Performed by: INTERNAL MEDICINE

## 2019-09-30 PROCEDURE — 90653 IIV ADJUVANT VACCINE IM: CPT | Performed by: INTERNAL MEDICINE

## 2019-09-30 PROCEDURE — G0008 ADMIN INFLUENZA VIRUS VAC: HCPCS | Performed by: INTERNAL MEDICINE

## 2019-09-30 RX ORDER — SUCRALFATE 1 G/1
1 TABLET ORAL 4 TIMES DAILY
COMMUNITY
End: 2020-03-03

## 2019-09-30 NOTE — PROGRESS NOTES
09/30/2019    Patient Information  Sachi Vora                                                                                          [address]   [place] [address]      1940  [unfilled]  There is no work phone number on file.    Chief Complaint:       History of Present Illness:      Review of Systems   Constitution: Negative.   HENT: Positive for hearing loss and tinnitus.    Eyes: Negative.    Cardiovascular: Negative.    Respiratory: Negative.    Endocrine: Negative.    Hematologic/Lymphatic: Negative.    Skin: Negative.    Musculoskeletal: Negative.    Gastrointestinal: Negative.    Genitourinary: Negative.    Neurological: Negative.    Psychiatric/Behavioral: Negative.    Allergic/Immunologic: Negative.        Active Problems:    Patient Active Problem List   Diagnosis   • Osteoporosis, 03/29/2011--lumbar spine -2.8.  Right femoral neck -2.4.  Left femoral neck -2.4.  Patient receives Reclast infusions.   • Therapeutic drug monitoring   • Simple renal cyst   • Benign essential hypertension   • Carotid artery plaque, 04/02/2018--mild right ICA plaque, normal left ICA 10/03/2014--mild bilateral carotid artery plaque.   • Chronic gastritis   • Chronic lower back pain   • Chronic otitis externa   • Chronic renal insufficiency, stage II (mild), creatinine 1.12   • Depression with anxiety   • Functional murmur, 07/15/2015--normal echocardiogram.   • Hyperlipidemia   • Menopausal state   • Chronic migraine   • Pancreatic Duct Dilation   • Pedal edema   • Restless legs syndrome   • Bilateral sensorineural hearing loss   • Vitamin D deficiency   • Chronic gastric ulcer   • Chronic fatigue   • Hypersomnolence   • Chronic anemia   • Multinodular goiter   • Generalized anxiety disorder   • Iron deficiency anemia   • Statin intolerance   • Postmenopausal state   • Pulmonary hypertension (CMS/HCC)   • Gastroesophageal reflux disease without esophagitis   • Bilateral tinnitus   • Dysfunction  "of both eustachian tubes         Past Medical History:   Diagnosis Date   • Benign essential hypertension 4/8/2016   • Bilateral sensorineural hearing loss 3/31/2011    03/31/2011--etiology reveals reverse \"cookie bite\" type of hearing loss for both ears of mild/moderate degree, mostly sensorineural.  Speech discrimination 100% on the right, 96% on the left.   • Carotid artery plaque, 10/03/2014--mild bilateral carotid artery plaque. 7/25/2012    10/03/2014--[facility] screening revealed mild bilateral carotid plaque, negative for atrial fibrillation, negative for AAA, negative for PAD, osteoporosis screen revealed osteopenia.  Body mass index was 25 and considered to be moderate risk.  07/25/2012--vascular screen negative for carotid plaque, negative for abdominal aneurysm, negative for PAD Description: 10/03/2014--[facility] screening revealed mild bilateral carotid plaque, negative for atrial fibrillation, negative for AAA, negative for PAD, osteoporosis screen revealed osteopenia.  Body mass index was 25 and considered to be moderate risk.  07/25/2012--vascular screen negative for carotid plaque, negative for abdominal aneurysm, negative for PAD   • Chronic anemia 8/23/2016 06/13/2017--colonoscopy revealed melanosis.  Biopsied.  There was friability with contact bleeding in the ascending colon.  Biopsied.  Pathology returned consistent with melanosis coli.  06/13/2017--EGD revealed normal esophagus.  Biopsies taken.  There was a medium-sized hiatal hernia.  Localized mild inflammation and linear erosions were found in the prepyloric region.  Biopsied.  Examined duodenum was normal.  Random biopsies taken.  Pathology of the gastroesophageal junction was unremarkable as was the gastric fundus.  Prepyloric biopsy revealed minimal chronic inflammation and reactive change.  H pylori negative.  Duodenal biopsies are negative.  04/12/2017--hemoglobin low at 10.8, hematocrit is normal at 35.7.  Iron sulfate 325 mg per " day initiated.  08/23/2016--routine follow-up.  Patient continues to have epigastric abdominal pain believed to be related to reflux with possible esophageal spasm.  Hemoglobin noted to be low at 10.6 with a hematocrit low at 33.7 and RDW elevated at 16.2.  Homocysti   • Chronic fatigue 4/21/2016 06/14/2017--overnight oximetry revealed oxygen desaturation index of only 0.44.  There were only 10 total desaturations during the period of testing which lasted 6 hours and 46 minutes.  05/31/2017--patient seen in follow-up and reports she continues to have chronic fatigue as well as hypersomnolence.  Once again, she is on multiple medications that are undoubtedly contributing to this problem including clonazepam and hydrocodone but I doubt that these could be discontinued.  Her CBC back in April revealed a low hemoglobin of 10.8 but hematocrit was normal at 35.7.  Thyroid function tests were normal and CMP normal.  Overnight oximetry test ordered.  Repeat CBC.  Iron studies.  Sedimentation rate and CRP.  04/11/2017--patient seen in follow-up and her blood pressure is now at a reasonable level at 120/64.  She reports she still feels somewhat fatigued but she is much better.  Patient has not been doing any regular exercise and I do think that that would be helpful to reduce her feeling of fatigue.  She is   • Chronic gastric ulcer 4/14/2014    02/15/2017--patient seen in follow-up in nearly 6 months later.  She has complaints of not feeling well all over.  She has complaints of diffuse myalgias and possibly tendinopathy related to Levaquin that I called in prior to her going to [place] for vacation.  She reports that 3 or 4 days after starting the Levaquin she began to have the musculoskeletal symptoms and she reports that she continues to have them presently.  Symptoms are worse involving her left calf area.  Examination reveals significant tenderness involving the left calf and the left calf seems a little larger  than the right.  She also has complaints of shortness of breath but no chest pain.  She is complaining of double vision and generalized weakness and fatigue.  She was complaining of chronic fatigue at the last visit back in September and I ordered an overnight oximetry test but apparently this was never done for reasons that are not clear to me.  Her current oxygen saturation is 94% and normally it is 100%.  Plan is as follows: Summer   • Chronic gastritis 11/19/2001    02/15/2017--patient seen in follow-up in nearly 6 months later.  She has complaints of not feeling well all over.  She has complaints of diffuse myalgias and possibly tendinopathy related to Levaquin that I called in prior to her going to [place] for vacation.  She reports that 3 or 4 days after starting the Levaquin she began to have the musculoskeletal symptoms and she reports that she continues to have them presently.  Symptoms are worse involving her left calf area.  Examination reveals significant tenderness involving the left calf and the left calf seems a little larger than the right.  She also has complaints of shortness of breath but no chest pain.  She is complaining of double vision and generalized weakness and fatigue.  She was complaining of chronic fatigue at the last visit back in September and I ordered an overnight oximetry test but apparently this was never done for reasons that are not clear to me.  Her current oxygen saturation is 94% and normally it is 100%.  Plan is as follows: Summer   • Chronic lower back pain 1/31/2006 08/11/2014--MRI of the lumbar spine reveals the conus terminates at L2 and is normal.  Cauda equina unremarkable.  Stable to moderate degenerative disc disease at L5-S1.  No acute fracture or pars defect is demonstrated.  Small synovial cysts are seen posterior to the L4-L5 facet.  The perivertebral soft tissues are unremarkable.  T12-L1, L1-L2, L2-L3 are negative.  L3-L4 a broad-based disc bulge  resulting in mild bilateral neural foraminal narrowing.  L4-L5 reveals a broad-based disc bulge facet disease resulting in mild bilateral neural foraminal narrowing.  L5-S1 reveals a broad-based disc bulge, posterior osseous slipping, and facet disease resulting in mild to moderate bilateral neural foraminal narrowing.  Assessment is stable mild to moderate degenerative spondylosis.  Small synovial cysts are seen posterior to the L4-L5 facets.  08/11/2014--MRI of the thoracic spine reveals mild to moderate thoracic kyphosis.  No fracture.  At T5--T6 there is a moderate sized left paracentral disc protrusion which i   • Chronic migraine 11/3/2009    01/18/2010--MRI of the brain performed for headaches and memory loss.  Mild small vessel disease in the cerebral and central pontine white matter.  There is an ovoid, somewhat pancake-shaped area of signal abnormality in the anterior inferior right temporal lobe subcortical to the juxtacortical white matter that measures 1.2 x 1 cm and anterior posterior and medial lateral dimension but only measures 3 mm in cranial caudal dimension.  I suspect that this is a benign cyst or a cystic area of encephalomalacia.  The remainder of the MRI of the head is within normal limits.  11/03/2009--CT scan of the brain without contrast performed for headache after a fall.  Mild diffuse atrophy.  No acute abnormality noted.; Description: Patient has had a long history of migraine headaches.  MRI and CT scan of the brain have been essentially negative.   • Chronic otitis externa 4/8/2016   • Chronic renal insufficiency, stage II (mild), creatinine 1.12 11/14/2015 11/14/2015--serum creatinine mildly elevated at 1.12.   • Depression with anxiety 4/8/2016   • Functional murmur, 07/15/2015--normal echocardiogram. 7/15/2015    07/15/2015--echocardiogram reveals normal left ventricular size and function with ejection fraction 55%.  Grade 1 diastolic dysfunction, abnormal relaxation pattern.   Trace tricuspid regurgitation.  Estimated right ventricular systolic pressure is 25 mmHg which is normal.   • Gastroesophageal reflux disease with esophagitis 11/19/2001 06/13/2017--EGD revealed normal esophagus.  Biopsies taken.  There was a medium-sized hiatal hernia.  Localized mild inflammation and linear erosions were found in the prepyloric region.  Biopsied.  Examined duodenum was normal.  Random biopsies taken.  Pathology of the gastroesophageal junction was unremarkable as was the gastric fundus.  Prepyloric biopsy revealed minimal chronic inflammation and reactive change.  H pylori negative.  Duodenal biopsies are negative.  11/03/2014--patient seen in follow-up and reports her epigastric pain/chest pain has resolved.  I reviewed the results of the studies with her.  It does not appear that her problem is related to biliary tract disease.  She does have reflux and although the recent upper GI revealed minimal reflux, I do think her symptoms are related to esophageal reflux with esophageal spasm.  10/21/2014--air-contrast upper GI series revealed trace upper laryngeal penetration.  Persistent small partially reducible sliding hiatal hernia the upper stomach with    • Gastroesophageal reflux disease without esophagitis 6/6/2019   • Generalized anxiety disorder 4/11/2017   • History of Acute deep vein thrombosis (DVT) of distal end of left lower extremity 2/15/2017    03/21/2017 patient seen in follow-up and she is tolerating the Eliquis well without signs or symptoms of bleeding.  Her calf swelling and tenderness is better but not totally resolved.  I suspect that the DVT is chronic and may not resolve at all.  I will order a repeat venous study and then proceed from there.  02/23/2017--repeat Doppler venous study of the left lower extremity reveals a chronic left lower extremity DVT in the posterior tibial.  All other left sided veins appeared normal.  Fluid collection in the left calf noted.   02/17/2017--patient seen in follow-up and reports her left lower extremity symptoms are about the same.  She continues to have complaints of profound fatigue which I think is multifactorial including underlying depression that is not in remission.  Review the results of the CTA of the chest including the possible thyroid lesion.  I do not think this is contributing to any of her symptoms of fatigue particularly given the fact that her thyroid function tests are normal.  I expla   • History of palpitations 12/07/2011    Patient has had multiple admissions to the hospital for complaints of chest pain and heart palpitations. She meant admitted at least on 3 occasions. She has had at least 3 stress Cardiolite and 1 stress ECG, the last Cardiolite being performed 12/07/2011 which was negative. Patient is also had Holter monitors which have been unremarkable.   • HIstory of Schatzki's ring 02/28/2012 02/28/2012--air-contrast upper GI revealed small to moderate sized reducible sliding hiatal herniation of the upper stomach with some demonstrated gastroesophageal reflux. No esophageal, gastric, or duodenal mass or mucosal ulceration was seen.  11/03/2008--EGD performed for evaluation of iron deficiency anemia revealed hiatal hernia without evidence of reflux, prepyloric antritis and   • Hyperlipidemia 4/8/2016   • Hypersomnolence 5/5/2016 06/14/2017--overnight oximetry revealed oxygen desaturation index of only 0.44.  There were only 10 total desaturations during the period of testing which lasted 6 hours and 46 minutes.  05/31/2017--patient seen in follow-up and reports she continues to have chronic fatigue as well as hypersomnolence.  Once again, she is on multiple medications that are undoubtedly contributing to this problem including clonazepam and hydrocodone but I doubt that these could be discontinued.  Her CBC back in April revealed a low hemoglobin of 10.8 but hematocrit was normal at 35.7.  Thyroid function  tests were normal and CMP normal.  Overnight oximetry test ordered.  Repeat CBC.  Iron studies.  Sedimentation rate and CRP.  04/11/2017--patient seen in follow-up and her blood pressure is now at a reasonable level at 120/64.  She reports she still feels somewhat fatigued but she is much better.  Patient has not been doing any regular exercise and I do think that that would be helpful to reduce her feeling of fatigue.  She is   • Menopausal state 4/8/2016   • Multinodular goiter 2/17/2017 02/21/2017--thyroid ultrasound reveals multinodular thyroid with largest nodule measuring on the order of 1.6 cm in greatest dimension.  02/17/2017--patient seen in follow-up for DVT and CTA of the chest.  An incidental finding on the CTA of the chest reveals a 1.7 cm lesion in the right lobe of thyroid.  Note that thyroid function tests are currently normal.  Ultrasound of the thyroid ordered.   • Osteoporosis, 03/29/2011--lumbar spine -2.8.  Right femoral neck -2.4.  Left femoral neck -2.4.  Patient receives Reclast infusions. 2/9/2009 04/19/2017--Reclast infusion.  04/05/2016--Reclast infusion.  06/04/2012--Reclast infusion.  05/26/2011--Reclast infusion.  03/29/2011--DEXA scan revealed a lumbar T score of -2.8, right femoral neck T score -2.4, left femoral neck T score -2.4.  Osteoporosis of the lumbar spine and severe osteopenia of the hips bilaterally.  Patient has been intolerant to Fosamax because of gastritis and gastroesophageal reflux.  04/01/2009--treatment for osteoporosis begun with Fosamax.  02/09/2009--DEXA scan revealed lumbar spine T score of -3.3.  Left femoral neck T score -2.5.  Right hip T score -2.6.  Osteoporosis.    • Pancreatic Duct Dilation 11/30/2001 09/29/2014--patient was again evaluated by the urologist for a renal cyst.  CT scan of the abdomen and pelvis reveals a left renal cyst measuring 5.1 x 4.9 cm.  There were subcentimeter hypodense renal lesions that are too small to further  characterize and are presumably related to cysts.  Recommend attention to follow up.  Distal dilated pancreatic duct noted.  The common bile duct is the upper limits of normal in size.  The ampullary region is not well evaluated.  Comparison with prior imaging is recommended if available.  If there is no prior film available for comparison, and ERCP or MRCP could be performed to further evaluate.  Small hiatal hernia noted.  03/05/2012--CT scan of the abdomen with contrast, pancreatic protocol.  This reveals some fullness of the pancreatic ductal system and apparent pancreatic divisum with separate entrance of the accessory pancreatic duct extending into the duodenum distal to the main pancreatic duct.  There is fullness of the duct diffusely but similar to the previous s   • Pedal edema 6/29/2015 06/29/2015--patient presents with a 4-6 week history of exertional dyspnea that comes on with activity such as climbing stairs or walking her dog up an incline.  No chest pain.  Relieved with rest.  No cough.  She does have complaints of her feet and legs swelling that is particularly worse at the end of the day and not improved overnight.  No orthopnea or PND.  Chest exam reveals faint rales at the bases.  Otherwise clear.  Heart is regular and I do not appreciate a heart murmur.  Chest x-ray PA and lateral ordered.  Echocardiogram ordered.   • Pulmonary hypertension (CMS/HCC) 4/18/2019   • Restless legs syndrome 4/8/2016   • Simple renal cyst 7/20/2009 09/29/2014--patient was again evaluated by the urologist for a renal cyst.  CT scan of the abdomen and pelvis reveals a left renal cyst measuring 5.1 x 4.9 cm.  There were subcentimeter hypodense renal lesions that are too small to further characterize and are presumably related to cysts.  Recommend attention to follow up.  Distal dilated pancreatic duct noted.  The common bile duct is the upper limits of normal in size.  The ampullary region is not well evaluated.   Comparison with prior imaging is recommended if available.  If there is no prior film available for comparison, and ERCP or MRCP could be performed to further evaluate.  Small hiatal hernia noted.  07/20/2009--patient was noted to have a left renal mass consistent with a cyst.  This was evaluated by the urologist 07/20/2009.   • Statin intolerance 10/30/2017   • Vitamin D deficiency 4/8/2016         Past Surgical History:   Procedure Laterality Date   • APPENDECTOMY  1965    1965   • CATARACT EXTRACTION Bilateral Remote    Remote bilateral cataract extirpation with intraocular lens implantation.   • CHOLECYSTECTOMY WITH INTRAOPERATIVE CHOLANGIOGRAM N/A 1/21/2019 01/21/2019--laparoscopic paraesophageal hernia repair with fundoplication.   • COLONOSCOPY  11/03/2008 2008--normal colonoscopy   • COLONOSCOPY  2001 2001--normal colonoscopy.   • COLONOSCOPY N/A 6/13/2017 06/13/2017--colonoscopy revealed melanosis.  Biopsied.  There was friability with contact bleeding in the ascending colon.  Biopsied.  Pathology returned consistent with melanosis coli.   • ENDOSCOPY  10/08/2014    02/28/2012--air-contrast upper GI revealed small to moderate sized reducible sliding hiatal herniation of the upper stomach with some demonstrated gastroesophageal reflux. No esophageal, gastric, or duodenal mass or mucosal ulceration was seen. 11/03/2008--EGD performed for evaluation of iron deficiency anemia revealed hiatal hernia without evidence of reflux, prepyloric antritis and   • ENDOSCOPY  11/03/2008 11/03/2008--EGD performed for evaluation of iron deficiency anemia revealed hiatal hernia without evidence of reflux, prepyloric antritis and   • ENDOSCOPY  11/19/2001 11/19/2001--EGD revealed a very tortuous distal esophagus with a Schatzki's ring. No ulcer or erosions. No Dorado's mucosa. Stomach revealed patchy erythema and erosions in the antrum. Biopsy. Normal pylorus with no obstruction. Normal duodenum with no  ulcers. The Schatzki's ring was dilated with a Rhodes dilator.;   • ENDOSCOPY N/A 9/27/2016 09/27/2016--EGD revealed a normal oropharynx, esophagus, and medium sized hiatal hernia.  Nonbleeding gastric ulcer with no stigmata of bleeding.  Biopsy.  Gastritis.  Biopsy.  Normal examined duodenum.  Biopsied.  Pathology returned mild to moderate chronic active gastritis with ulceration from the stomach antral ulcer biopsy.  Gastroesophageal junction biopsy revealed minimal mixed inflammation.   • ENDOSCOPY N/A 6/13/2017 06/13/2017--colonoscopy revealed melanosis.  Biopsied.  There was friability with contact bleeding in the ascending colon.  Biopsied.  Pathology returned consistent with melanosis coli.   • ERCP N/A 1/22/2019 01/22/2019--ERCP with sphincterotomy and balloon stone extraction.   • ESOPHAGEAL DILATATION  11/19/2001 11/19/2001--EGD revealed a very tortuous distal esophagus with a Schatzki's ring. No ulcer or erosions. No Dorado's mucosa. Stomach revealed patchy erythema and erosions in the antrum. Biopsy. Normal pylorus with no obstruction. Normal duodenum with no ulcers. The Schatzki's ring was dilated with a Rhodes dilator.;    • ESOPHAGEAL MANOMETRY N/A 12/18/2018    Procedure: ESOPHAGEAL MANOMETRY;  Surgeon: Cecilia, Nurse Performed;  Location: [facility];  Service: Gastroenterology   • ESOPHAGOSCOPY N/A 1/21/2019    Procedure: FLEXIBLE ESOPHAGOSCOPY;  Surgeon: Reji Johnson MD;  Location: [facility] OR;  Service: General   • HIATAL HERNIA REPAIR N/A 1/21/2019    Procedure: Laparoscopic paraesophageal hernia repair with toupee fundoplication;  Surgeon: Reji Johnson MD;  Location: [facility] OR;  Service: General   • INCONTINENCE SURGERY  1979 1979--a bladder tack procedure for urinary stress incontinence   • KNEE ARTHROSCOPY Right 12/12/2011 12/12/2011--right knee arthroscopy with partial lateral and medial meniscectomies.   • SKIN SURGERY  05/25/2010     05/25/2010--skin lesion excised from right lower extremity. 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   • TOTAL ABDOMINAL HYSTERECTOMY WITH SALPINGO OOPHORECTOMY  1974 1974--total abdominal hysterectomy.         Allergies   Allergen Reactions   • Statins Nausea And Vomiting   • Morphine Hallucinations   • Penicillins Itching   • Tetanus Toxoids Itching           Current Outpatient Medications:   •  aspirin 81 MG EC tablet, Take 81 mg by mouth Daily. HELD, Disp: , Rfl:   •  buPROPion XL (WELLBUTRIN XL) 150 MG 24 hr tablet, Take 150 mg by mouth 2 (Two) Times a Day., Disp: , Rfl: 0  •  cholecalciferol (VITAMIN D3) 1000 units tablet, Take 1,000 Units by mouth Daily., Disp: , Rfl:   •  clonazePAM (KlonoPIN) 1 MG tablet, TAKE 1/2 TO 1 TABLET TWICE DAILY WHEN NECESSARY, Disp: 60 tablet, Rfl: 0  •  cycloSPORINE (RESTASIS) 0.05 % ophthalmic emulsion, Administer 1 drop to both eyes 2 (Two) Times a Day., Disp: , Rfl:   •  diclofenac (VOLTAREN) 1 % gel gel, Apply 4 g topically to the appropriate area as directed 4 (Four) Times a Day As Needed., Disp: , Rfl:   •  estradiol (ESTRACE) 1 MG tablet, TAKE 1 TABLET BY MOUTH DAILY, Disp: 90 tablet, Rfl: 1  •  gabapentin (NEURONTIN) 100 MG capsule, Take 100 mg by mouth Every Night., Disp: , Rfl: 2  •  Ginkgo 60 MG tablet, Take 1 tablet by mouth Daily., Disp: , Rfl:   •  HYDROcodone-acetaminophen (NORCO)  MG per tablet, Take 1 tablet by mouth every 6 (six) hours as needed for moderate pain (4-6)., Disp: , Rfl:   •  Misc Natural Products (COLON CLEANSE PO), Take  by mouth., Disp: , Rfl:   •  simethicone (GAS-X) 80 MG chewable tablet, Chew 1 tablet 4 (Four) Times a Day As Needed for Flatulence., Disp: 100 tablet, Rfl: 2  •  sucralfate (CARAFATE) 1 g tablet, Take 1 g by mouth 4 (Four) Times a Day., Disp: , Rfl:   •  topiramate (TOPAMAX) 100 MG tablet, TAKE 1 TABLET DAILY, Disp: 90 tablet, Rfl: 1  •  venlafaxine (EFFEXOR) 75 MG tablet, Take 75 mg by mouth Daily.,  Disp: , Rfl:   •  vitamin E 400 UNIT capsule, Take 400 Units by mouth Daily., Disp: , Rfl:   •  Multiple Vitamins-Minerals (MULTIVITAMIN ADULT) chewable tablet, Chew 1 tablet Daily., Disp: , Rfl:   •  triamterene-hydrochlorothiazide (DYAZIDE) 37.5-25 MG per capsule, TAKE ONE CAPSULE BY MOUTH DAILY FOR BLOOD PRESSURE AND LEG SWELLING, Disp: 90 capsule, Rfl: 1      Family History   Problem Relation Age of Onset   • Heart attack Mother         dies age 47 from heart attack   • Heart disease Mother    • Bleeding Disorder Mother    • Heart attack Maternal Aunt         dies age 60 from heart attack   • Heart disease Father    • Malig Hyperthermia Neg Hx          Social History     Socioeconomic History   • Marital status:      Spouse name: ander   • Number of children: 4   • Years of education: 14   • Highest education level: Associate degree: academic program   Occupational History   • Occupation: homemaker   Social Needs   • Financial resource strain: Not hard at all   • Food insecurity:     Worry: Never true     Inability: Never true   • Transportation needs:     Medical: Yes     Non-medical: Yes   Tobacco Use   • Smoking status: Never Smoker   • Smokeless tobacco: Never Used   Substance and Sexual Activity   • Alcohol use: No     Frequency: Never     Binge frequency: Never   • Drug use: No   • Sexual activity: Yes     Partners: Male   Lifestyle   • Physical activity:     Days per week: 3 days     Minutes per session: 20 min   • Stress: Not at all   Relationships   • Social connections:     Talks on phone: More than three times a week     Gets together: Once a week     Attends [employer] service: 1 to 4 times per year     Active member of club or organization: Yes     Attends meetings of clubs or organizations: 1 to 4 times per year     Relationship status:          Vitals:    09/30/19 1341   BP: 116/62   BP Location: Left arm   Pulse: 71   SpO2: 98%   Weight: 61.9 kg (136 lb 6.4 oz)   Height: 152.4 cm  "(60\")          Physical Exam:    General: Alert and oriented x 3.  No acute distress.  Normal affect.  HEENT: Pupils equal, round, reactive to light; extraocular movements intact; sclerae nonicteric; pharynx, ear canals and TMs normal, except some old scars from otitis media as a child, particularly on the right.  Neck: Without JVD, thyromegaly, bruit, or adenopathy.  Lungs: Clear to auscultation in all fields.  Heart: Regular rate and rhythm without murmur, rub, gallop, or click.  Abdomen: Soft, nontender, without hepatosplenomegaly or hernia.  Bowel sounds normal.  : Deferred.  Rectal: Deferred.  Extremities: Without clubbing, cyanosis, edema, or pulse deficit.  Neurologic: Intact without focal deficit.  Normal station and gait observed during ingress and egress from the examination room.  Skin: Without significant lesion.  Musculoskeletal: Unremarkable.    Lab/other results:      Assessment/Plan:     Diagnosis Plan   1. Bilateral tinnitus     2. Dysfunction of both eustachian tubes         Patient presents with complaints of worsening bilateral tinnitus but also has fluctuating hearing loss that I think is related to eustachian tube dysfunction.  I explained her I would feel better if this was formally evaluated by the ENT.    Plan is as follows: ENT referral.  High-dose influenza vaccine given.  Patient will keep previously scheduled lab and follow-up as planned.    Procedures        "

## 2019-10-21 RX ORDER — CLONAZEPAM 1 MG/1
TABLET ORAL
Qty: 60 TABLET | Refills: 0 | Status: SHIPPED | OUTPATIENT
Start: 2019-10-21 | End: 2019-11-12 | Stop reason: SDUPTHER

## 2019-11-05 ENCOUNTER — TRANSCRIBE ORDERS (OUTPATIENT)
Dept: ADMINISTRATIVE | Facility: HOSPITAL | Age: 79
End: 2019-11-05

## 2019-11-05 DIAGNOSIS — I27.20 PULMONARY HYPERTENSION (HCC): Primary | ICD-10-CM

## 2019-11-07 ENCOUNTER — LAB (OUTPATIENT)
Dept: INTERNAL MEDICINE | Facility: CLINIC | Age: 79
End: 2019-11-07

## 2019-11-07 DIAGNOSIS — N18.2 CHRONIC RENAL INSUFFICIENCY, STAGE II (MILD): Chronic | ICD-10-CM

## 2019-11-07 DIAGNOSIS — I10 BENIGN ESSENTIAL HYPERTENSION: Chronic | ICD-10-CM

## 2019-11-07 DIAGNOSIS — E55.9 VITAMIN D DEFICIENCY: Chronic | ICD-10-CM

## 2019-11-07 DIAGNOSIS — E78.5 HYPERLIPIDEMIA, UNSPECIFIED HYPERLIPIDEMIA TYPE: Chronic | ICD-10-CM

## 2019-11-07 DIAGNOSIS — D64.9 CHRONIC ANEMIA: Chronic | ICD-10-CM

## 2019-11-08 LAB
25(OH)D3+25(OH)D2 SERPL-MCNC: 77 NG/ML (ref 30–100)
ALBUMIN SERPL-MCNC: 4.1 G/DL (ref 3.5–5.2)
ALBUMIN/GLOB SERPL: 1.6 G/DL
ALP SERPL-CCNC: 67 U/L (ref 39–117)
ALT SERPL-CCNC: 25 U/L (ref 1–33)
APPEARANCE UR: ABNORMAL
AST SERPL-CCNC: 17 U/L (ref 1–32)
BILIRUB SERPL-MCNC: 0.2 MG/DL (ref 0.2–1.2)
BILIRUB UR QL STRIP: NEGATIVE
BUN SERPL-MCNC: 18 MG/DL (ref 8–23)
BUN/CREAT SERPL: 15.5 (ref 7–25)
CALCIUM SERPL-MCNC: 9.1 MG/DL (ref 8.6–10.5)
CHLORIDE SERPL-SCNC: 102 MMOL/L (ref 98–107)
CHOLEST SERPL-MCNC: 226 MG/DL (ref 100–199)
CK SERPL-CCNC: 62 U/L (ref 20–180)
CO2 SERPL-SCNC: 25.9 MMOL/L (ref 22–29)
COLOR UR: YELLOW
CREAT SERPL-MCNC: 1.16 MG/DL (ref 0.57–1)
ERYTHROCYTE [DISTWIDTH] IN BLOOD BY AUTOMATED COUNT: 17 % (ref 12.3–15.4)
GLOBULIN SER CALC-MCNC: 2.5 GM/DL
GLUCOSE SERPL-MCNC: 78 MG/DL (ref 65–99)
GLUCOSE UR QL: NEGATIVE
HCT VFR BLD AUTO: 37.2 % (ref 34–46.6)
HDL SERPL-SCNC: 58.6 UMOL/L
HDLC SERPL-MCNC: 114 MG/DL
HGB BLD-MCNC: 12.2 G/DL (ref 12–15.9)
HGB UR QL STRIP: NEGATIVE
KETONES UR QL STRIP: NEGATIVE
LDL SERPL QN: 21.4 NM
LDL SERPL-SCNC: 983 NMOL/L
LDL SMALL SERPL-SCNC: <90 NMOL/L
LDLC SERPL CALC-MCNC: 86 MG/DL (ref 0–99)
LEUKOCYTE ESTERASE UR QL STRIP: NEGATIVE
MCH RBC QN AUTO: 26.2 PG (ref 26.6–33)
MCHC RBC AUTO-ENTMCNC: 32.8 G/DL (ref 31.5–35.7)
MCV RBC AUTO: 79.8 FL (ref 79–97)
NITRITE UR QL STRIP: NEGATIVE
PH UR STRIP: 5.5 [PH] (ref 5–8)
PLATELET # BLD AUTO: 397 10*3/MM3 (ref 140–450)
POTASSIUM SERPL-SCNC: 3.8 MMOL/L (ref 3.5–5.2)
PROT SERPL-MCNC: 6.6 G/DL (ref 6–8.5)
PROT UR QL STRIP: NEGATIVE
RBC # BLD AUTO: 4.66 10*6/MM3 (ref 3.77–5.28)
SODIUM SERPL-SCNC: 142 MMOL/L (ref 136–145)
SP GR UR: 1.02 (ref 1–1.03)
T3FREE SERPL-MCNC: 2.9 PG/ML (ref 2–4.4)
T4 FREE SERPL-MCNC: 1.06 NG/DL (ref 0.93–1.7)
TRIGL SERPL-MCNC: 128 MG/DL (ref 0–149)
TSH SERPL DL<=0.005 MIU/L-ACNC: 2.23 UIU/ML (ref 0.27–4.2)
UROBILINOGEN UR STRIP-MCNC: ABNORMAL MG/DL
WBC # BLD AUTO: 6.76 10*3/MM3 (ref 3.4–10.8)

## 2019-11-12 RX ORDER — CLONAZEPAM 1 MG/1
TABLET ORAL
Qty: 60 TABLET | Refills: 0 | Status: SHIPPED | OUTPATIENT
Start: 2019-11-12 | End: 2020-01-27

## 2019-11-14 ENCOUNTER — OFFICE VISIT (OUTPATIENT)
Dept: INTERNAL MEDICINE | Facility: CLINIC | Age: 79
End: 2019-11-14

## 2019-11-14 VITALS
HEIGHT: 60 IN | SYSTOLIC BLOOD PRESSURE: 118 MMHG | BODY MASS INDEX: 27.05 KG/M2 | HEART RATE: 81 BPM | OXYGEN SATURATION: 97 % | WEIGHT: 137.8 LBS | DIASTOLIC BLOOD PRESSURE: 68 MMHG

## 2019-11-14 DIAGNOSIS — I65.23 ATHEROSCLEROSIS OF BOTH CAROTID ARTERIES: Chronic | ICD-10-CM

## 2019-11-14 DIAGNOSIS — F41.1 GENERALIZED ANXIETY DISORDER: Chronic | ICD-10-CM

## 2019-11-14 DIAGNOSIS — E04.2 MULTINODULAR GOITER: Chronic | ICD-10-CM

## 2019-11-14 DIAGNOSIS — N18.2 CHRONIC RENAL INSUFFICIENCY, STAGE II (MILD): Chronic | ICD-10-CM

## 2019-11-14 DIAGNOSIS — R53.82 CHRONIC FATIGUE: Chronic | ICD-10-CM

## 2019-11-14 DIAGNOSIS — F41.8 DEPRESSION WITH ANXIETY: Chronic | ICD-10-CM

## 2019-11-14 DIAGNOSIS — D50.0 IRON DEFICIENCY ANEMIA DUE TO CHRONIC BLOOD LOSS: Chronic | ICD-10-CM

## 2019-11-14 DIAGNOSIS — G89.29 CHRONIC LOW BACK PAIN, UNSPECIFIED BACK PAIN LATERALITY, UNSPECIFIED WHETHER SCIATICA PRESENT: Chronic | ICD-10-CM

## 2019-11-14 DIAGNOSIS — M81.0 AGE-RELATED OSTEOPOROSIS WITHOUT CURRENT PATHOLOGICAL FRACTURE: Chronic | ICD-10-CM

## 2019-11-14 DIAGNOSIS — K21.9 GASTROESOPHAGEAL REFLUX DISEASE WITHOUT ESOPHAGITIS: Chronic | ICD-10-CM

## 2019-11-14 DIAGNOSIS — IMO0002 CHRONIC MIGRAINE: Chronic | ICD-10-CM

## 2019-11-14 DIAGNOSIS — G47.10 HYPERSOMNOLENCE: Chronic | ICD-10-CM

## 2019-11-14 DIAGNOSIS — D64.9 CHRONIC ANEMIA: Chronic | ICD-10-CM

## 2019-11-14 DIAGNOSIS — Z78.9 STATIN INTOLERANCE: Chronic | ICD-10-CM

## 2019-11-14 DIAGNOSIS — R60.0 PEDAL EDEMA: Chronic | ICD-10-CM

## 2019-11-14 DIAGNOSIS — I10 BENIGN ESSENTIAL HYPERTENSION: Chronic | ICD-10-CM

## 2019-11-14 DIAGNOSIS — M54.50 CHRONIC LOW BACK PAIN, UNSPECIFIED BACK PAIN LATERALITY, UNSPECIFIED WHETHER SCIATICA PRESENT: Chronic | ICD-10-CM

## 2019-11-14 DIAGNOSIS — E55.9 VITAMIN D DEFICIENCY: Primary | Chronic | ICD-10-CM

## 2019-11-14 DIAGNOSIS — I27.20 PULMONARY HYPERTENSION (HCC): Chronic | ICD-10-CM

## 2019-11-14 DIAGNOSIS — E78.5 HYPERLIPIDEMIA, UNSPECIFIED HYPERLIPIDEMIA TYPE: Chronic | ICD-10-CM

## 2019-11-14 DIAGNOSIS — Z51.81 THERAPEUTIC DRUG MONITORING: ICD-10-CM

## 2019-11-14 DIAGNOSIS — G25.81 RESTLESS LEGS SYNDROME: Chronic | ICD-10-CM

## 2019-11-14 DIAGNOSIS — Z00.00 MEDICARE ANNUAL WELLNESS VISIT, SUBSEQUENT: ICD-10-CM

## 2019-11-14 PROCEDURE — 99214 OFFICE O/P EST MOD 30 MIN: CPT | Performed by: INTERNAL MEDICINE

## 2019-11-14 PROCEDURE — G0439 PPPS, SUBSEQ VISIT: HCPCS | Performed by: INTERNAL MEDICINE

## 2019-11-14 NOTE — PROGRESS NOTES
11/14/2019    Patient Information  Sachi CUELLAR Vora                                                                                          [address]   [place] [address]      1940  [unfilled]  There is no work phone number on file.    Chief Complaint:     Subsequent Medicare wellness visit.  Follow-up hyperlipidemia, hypertension, chronic renal insufficiency, multinodular goiter, chronic anemia/iron deficiency, carotid artery plaque, pedal edema, pulmonary hypertension, osteoporosis, esophageal reflux, chronic lower back pain, depression with anxiety/generalized anxiety, chronic fatigue, hypersomnolence, chronic migraine, restless leg syndrome, statin intolerance.  No new acute complaints.    History of Present Illness:    Patient with a history of medical problems as outlined in the chief complaint presents today for a follow-up with lab prior in order to monitor her chronic medical issues.  She is also here for her subsequent Medicare wellness visit.  Her past medical history reviewed and updated were necessary including health maintenance parameters.  This reveals she is up-to-date or else accounted for after today's visit with the exception of the DEXA scan which we will order.    Review of Systems   Constitution: Positive for malaise/fatigue.   HENT: Negative.    Eyes: Negative.    Cardiovascular: Negative.    Respiratory: Negative.    Endocrine: Negative.    Hematologic/Lymphatic: Negative.    Skin: Negative.    Musculoskeletal: Positive for arthritis and back pain.   Gastrointestinal: Negative.    Genitourinary: Negative.    Neurological: Negative.    Psychiatric/Behavioral: Positive for depression.   Allergic/Immunologic: Negative.        Active Problems:    Patient Active Problem List   Diagnosis   • Osteoporosis, 03/29/2011--lumbar spine -2.8.  Right femoral neck -2.4.  Left femoral neck -2.4.  Patient receives Reclast infusions.   • Therapeutic drug monitoring   • Simple renal  "cyst   • Benign essential hypertension   • Carotid artery plaque, 04/02/2018--mild right ICA plaque, normal left ICA 10/03/2014--mild bilateral carotid artery plaque.   • Chronic gastritis   • Chronic lower back pain   • Chronic otitis externa   • Chronic renal insufficiency, stage II (mild), creatinine 1.12   • Depression with anxiety   • Functional murmur, 07/15/2015--normal echocardiogram.   • Hyperlipidemia   • Menopausal state   • Chronic migraine   • Pancreatic Duct Dilation   • Pedal edema   • Restless legs syndrome   • Bilateral sensorineural hearing loss   • Vitamin D deficiency   • Chronic gastric ulcer   • Chronic fatigue   • Hypersomnolence   • Chronic anemia   • Multinodular goiter   • Generalized anxiety disorder   • Iron deficiency anemia   • Statin intolerance   • Postmenopausal state   • Pulmonary hypertension (CMS/HCC)   • Gastroesophageal reflux disease without esophagitis   • Bilateral tinnitus   • Dysfunction of both eustachian tubes         Past Medical History:   Diagnosis Date   • Benign essential hypertension 4/8/2016   • Bilateral sensorineural hearing loss 3/31/2011    03/31/2011--etiology reveals reverse \"cookie bite\" type of hearing loss for both ears of mild/moderate degree, mostly sensorineural.  Speech discrimination 100% on the right, 96% on the left.   • Carotid artery plaque, 10/03/2014--mild bilateral carotid artery plaque. 7/25/2012    10/03/2014--[facility] screening revealed mild bilateral carotid plaque, negative for atrial fibrillation, negative for AAA, negative for PAD, osteoporosis screen revealed osteopenia.  Body mass index was 25 and considered to be moderate risk.  07/25/2012--vascular screen negative for carotid plaque, negative for abdominal aneurysm, negative for PAD Description: 10/03/2014--[facility] screening revealed mild bilateral carotid plaque, negative for atrial fibrillation, negative for AAA, negative for PAD, osteoporosis screen revealed osteopenia.  Body " mass index was 25 and considered to be moderate risk.  07/25/2012--vascular screen negative for carotid plaque, negative for abdominal aneurysm, negative for PAD   • Chronic anemia 8/23/2016 06/13/2017--colonoscopy revealed melanosis.  Biopsied.  There was friability with contact bleeding in the ascending colon.  Biopsied.  Pathology returned consistent with melanosis coli.  06/13/2017--EGD revealed normal esophagus.  Biopsies taken.  There was a medium-sized hiatal hernia.  Localized mild inflammation and linear erosions were found in the prepyloric region.  Biopsied.  Examined duodenum was normal.  Random biopsies taken.  Pathology of the gastroesophageal junction was unremarkable as was the gastric fundus.  Prepyloric biopsy revealed minimal chronic inflammation and reactive change.  H pylori negative.  Duodenal biopsies are negative.  04/12/2017--hemoglobin low at 10.8, hematocrit is normal at 35.7.  Iron sulfate 325 mg per day initiated.  08/23/2016--routine follow-up.  Patient continues to have epigastric abdominal pain believed to be related to reflux with possible esophageal spasm.  Hemoglobin noted to be low at 10.6 with a hematocrit low at 33.7 and RDW elevated at 16.2.  Homocysti   • Chronic fatigue 4/21/2016 06/14/2017--overnight oximetry revealed oxygen desaturation index of only 0.44.  There were only 10 total desaturations during the period of testing which lasted 6 hours and 46 minutes.  05/31/2017--patient seen in follow-up and reports she continues to have chronic fatigue as well as hypersomnolence.  Once again, she is on multiple medications that are undoubtedly contributing to this problem including clonazepam and hydrocodone but I doubt that these could be discontinued.  Her CBC back in April revealed a low hemoglobin of 10.8 but hematocrit was normal at 35.7.  Thyroid function tests were normal and CMP normal.  Overnight oximetry test ordered.  Repeat CBC.  Iron studies.  Sedimentation  rate and CRP.  04/11/2017--patient seen in follow-up and her blood pressure is now at a reasonable level at 120/64.  She reports she still feels somewhat fatigued but she is much better.  Patient has not been doing any regular exercise and I do think that that would be helpful to reduce her feeling of fatigue.  She is   • Chronic gastric ulcer 4/14/2014    02/15/2017--patient seen in follow-up in nearly 6 months later.  She has complaints of not feeling well all over.  She has complaints of diffuse myalgias and possibly tendinopathy related to Levaquin that I called in prior to her going to [place] for vacation.  She reports that 3 or 4 days after starting the Levaquin she began to have the musculoskeletal symptoms and she reports that she continues to have them presently.  Symptoms are worse involving her left calf area.  Examination reveals significant tenderness involving the left calf and the left calf seems a little larger than the right.  She also has complaints of shortness of breath but no chest pain.  She is complaining of double vision and generalized weakness and fatigue.  She was complaining of chronic fatigue at the last visit back in September and I ordered an overnight oximetry test but apparently this was never done for reasons that are not clear to me.  Her current oxygen saturation is 94% and normally it is 100%.  Plan is as follows: Extensi   • Chronic gastritis 11/19/2001    02/15/2017--patient seen in follow-up in nearly 6 months later.  She has complaints of not feeling well all over.  She has complaints of diffuse myalgias and possibly tendinopathy related to Levaquin that I called in prior to her going to [place] for vacation.  She reports that 3 or 4 days after starting the Levaquin she began to have the musculoskeletal symptoms and she reports that she continues to have them presently.  Symptoms are worse involving her left calf area.  Examination reveals significant tenderness  involving the left calf and the left calf seems a little larger than the right.  She also has complaints of shortness of breath but no chest pain.  She is complaining of double vision and generalized weakness and fatigue.  She was complaining of chronic fatigue at the last visit back in September and I ordered an overnight oximetry test but apparently this was never done for reasons that are not clear to me.  Her current oxygen saturation is 94% and normally it is 100%.  Plan is as follows: Extensi   • Chronic lower back pain 1/31/2006 08/11/2014--MRI of the lumbar spine reveals the conus terminates at L2 and is normal.  Cauda equina unremarkable.  Stable to moderate degenerative disc disease at L5-S1.  No acute fracture or pars defect is demonstrated.  Small synovial cysts are seen posterior to the L4-L5 facet.  The perivertebral soft tissues are unremarkable.  T12-L1, L1-L2, L2-L3 are negative.  L3-L4 a broad-based disc bulge resulting in mild bilateral neural foraminal narrowing.  L4-L5 reveals a broad-based disc bulge facet disease resulting in mild bilateral neural foraminal narrowing.  L5-S1 reveals a broad-based disc bulge, posterior osseous slipping, and facet disease resulting in mild to moderate bilateral neural foraminal narrowing.  Assessment is stable mild to moderate degenerative spondylosis.  Small synovial cysts are seen posterior to the L4-L5 facets.  08/11/2014--MRI of the thoracic spine reveals mild to moderate thoracic kyphosis.  No fracture.  At T5--T6 there is a moderate sized left paracentral disc protrusion which i   • Chronic migraine 11/3/2009    01/18/2010--MRI of the brain performed for headaches and memory loss.  Mild small vessel disease in the cerebral and central pontine white matter.  There is an ovoid, somewhat pancake-shaped area of signal abnormality in the anterior inferior right temporal lobe subcortical to the juxtacortical white matter that measures 1.2 x 1 cm and anterior  posterior and medial lateral dimension but only measures 3 mm in cranial caudal dimension.  I suspect that this is a benign cyst or a cystic area of encephalomalacia.  The remainder of the MRI of the head is within normal limits.  11/03/2009--CT scan of the brain without contrast performed for headache after a fall.  Mild diffuse atrophy.  No acute abnormality noted.; Description: Patient has had a long history of migraine headaches.  MRI and CT scan of the brain have been essentially negative.   • Chronic otitis externa 4/8/2016   • Chronic renal insufficiency, stage II (mild), creatinine 1.12 11/14/2015 11/14/2015--serum creatinine mildly elevated at 1.12.   • Depression with anxiety 4/8/2016   • Functional murmur, 07/15/2015--normal echocardiogram. 7/15/2015    07/15/2015--echocardiogram reveals normal left ventricular size and function with ejection fraction 55%.  Grade 1 diastolic dysfunction, abnormal relaxation pattern.  Trace tricuspid regurgitation.  Estimated right ventricular systolic pressure is 25 mmHg which is normal.   • Gastroesophageal reflux disease with esophagitis 11/19/2001 06/13/2017--EGD revealed normal esophagus.  Biopsies taken.  There was a medium-sized hiatal hernia.  Localized mild inflammation and linear erosions were found in the prepyloric region.  Biopsied.  Examined duodenum was normal.  Random biopsies taken.  Pathology of the gastroesophageal junction was unremarkable as was the gastric fundus.  Prepyloric biopsy revealed minimal chronic inflammation and reactive change.  H pylori negative.  Duodenal biopsies are negative.  11/03/2014--patient seen in follow-up and reports her epigastric pain/chest pain has resolved.  I reviewed the results of the studies with her.  It does not appear that her problem is related to biliary tract disease.  She does have reflux and although the recent upper GI revealed minimal reflux, I do think her symptoms are related to esophageal reflux  with esophageal spasm.  10/21/2014--air-contrast upper GI series revealed trace upper laryngeal penetration.  Persistent small partially reducible sliding hiatal hernia the upper stomach with    • Gastroesophageal reflux disease without esophagitis 6/6/2019   • Generalized anxiety disorder 4/11/2017   • History of Acute deep vein thrombosis (DVT) of distal end of left lower extremity 2/15/2017    03/21/2017 patient seen in follow-up and she is tolerating the Eliquis well without signs or symptoms of bleeding.  Her calf swelling and tenderness is better but not totally resolved.  I suspect that the DVT is chronic and may not resolve at all.  I will order a repeat venous study and then proceed from there.  02/23/2017--repeat Doppler venous study of the left lower extremity reveals a chronic left lower extremity DVT in the posterior tibial.  All other left sided veins appeared normal.  Fluid collection in the left calf noted.  02/17/2017--patient seen in follow-up and reports her left lower extremity symptoms are about the same.  She continues to have complaints of profound fatigue which I think is multifactorial including underlying depression that is not in remission.  Review the results of the CTA of the chest including the possible thyroid lesion.  I do not think this is contributing to any of her symptoms of fatigue particularly given the fact that her thyroid function tests are normal.  I expla   • History of palpitations 12/07/2011    Patient has had multiple admissions to the hospital for complaints of chest pain and heart palpitations. She meant admitted at least on 3 occasions. She has had at least 3 stress Cardiolite and 1 stress ECG, the last Cardiolite being performed 12/07/2011 which was negative. Patient is also had Holter monitors which have been unremarkable.   • HIstory of Schatzki's ring 02/28/2012 02/28/2012--air-contrast upper GI revealed small to moderate sized reducible sliding hiatal  herniation of the upper stomach with some demonstrated gastroesophageal reflux. No esophageal, gastric, or duodenal mass or mucosal ulceration was seen.  11/03/2008--EGD performed for evaluation of iron deficiency anemia revealed hiatal hernia without evidence of reflux, prepyloric antritis and   • Hyperlipidemia 4/8/2016   • Hypersomnolence 5/5/2016 06/14/2017--overnight oximetry revealed oxygen desaturation index of only 0.44.  There were only 10 total desaturations during the period of testing which lasted 6 hours and 46 minutes.  05/31/2017--patient seen in follow-up and reports she continues to have chronic fatigue as well as hypersomnolence.  Once again, she is on multiple medications that are undoubtedly contributing to this problem including clonazepam and hydrocodone but I doubt that these could be discontinued.  Her CBC back in April revealed a low hemoglobin of 10.8 but hematocrit was normal at 35.7.  Thyroid function tests were normal and CMP normal.  Overnight oximetry test ordered.  Repeat CBC.  Iron studies.  Sedimentation rate and CRP.  04/11/2017--patient seen in follow-up and her blood pressure is now at a reasonable level at 120/64.  She reports she still feels somewhat fatigued but she is much better.  Patient has not been doing any regular exercise and I do think that that would be helpful to reduce her feeling of fatigue.  She is   • Menopausal state 4/8/2016   • Multinodular goiter 2/17/2017 02/21/2017--thyroid ultrasound reveals multinodular thyroid with largest nodule measuring on the order of 1.6 cm in greatest dimension.  02/17/2017--patient seen in follow-up for DVT and CTA of the chest.  An incidental finding on the CTA of the chest reveals a 1.7 cm lesion in the right lobe of thyroid.  Note that thyroid function tests are currently normal.  Ultrasound of the thyroid ordered.   • Osteoporosis, 03/29/2011--lumbar spine -2.8.  Right femoral neck -2.4.  Left femoral neck -2.4.   Patient receives Reclast infusions. 2/9/2009 04/19/2017--Reclast infusion.  04/05/2016--Reclast infusion.  06/04/2012--Reclast infusion.  05/26/2011--Reclast infusion.  03/29/2011--DEXA scan revealed a lumbar T score of -2.8, right femoral neck T score -2.4, left femoral neck T score -2.4.  Osteoporosis of the lumbar spine and severe osteopenia of the hips bilaterally.  Patient has been intolerant to Fosamax because of gastritis and gastroesophageal reflux.  04/01/2009--treatment for osteoporosis begun with Fosamax.  02/09/2009--DEXA scan revealed lumbar spine T score of -3.3.  Left femoral neck T score -2.5.  Right hip T score -2.6.  Osteoporosis.    • Pancreatic Duct Dilation 11/30/2001 09/29/2014--patient was again evaluated by the urologist for a renal cyst.  CT scan of the abdomen and pelvis reveals a left renal cyst measuring 5.1 x 4.9 cm.  There were subcentimeter hypodense renal lesions that are too small to further characterize and are presumably related to cysts.  Recommend attention to follow up.  Distal dilated pancreatic duct noted.  The common bile duct is the upper limits of normal in size.  The ampullary region is not well evaluated.  Comparison with prior imaging is recommended if available.  If there is no prior film available for comparison, and ERCP or MRCP could be performed to further evaluate.  Small hiatal hernia noted.  03/05/2012--CT scan of the abdomen with contrast, pancreatic protocol.  This reveals some fullness of the pancreatic ductal system and apparent pancreatic divisum with separate entrance of the accessory pancreatic duct extending into the duodenum distal to the main pancreatic duct.  There is fullness of the duct diffusely but similar to the previous s   • Pedal edema 6/29/2015 06/29/2015--patient presents with a 4-6 week history of exertional dyspnea that comes on with activity such as climbing stairs or walking her dog up an incline.  No chest pain.  Relieved with  rest.  No cough.  She does have complaints of her feet and legs swelling that is particularly worse at the end of the day and not improved overnight.  No orthopnea or PND.  Chest exam reveals faint rales at the bases.  Otherwise clear.  Heart is regular and I do not appreciate a heart murmur.  Chest x-ray PA and lateral ordered.  Echocardiogram ordered.   • Pulmonary hypertension (CMS/HCC) 4/18/2019   • Restless legs syndrome 4/8/2016   • Simple renal cyst 7/20/2009 09/29/2014--patient was again evaluated by the urologist for a renal cyst.  CT scan of the abdomen and pelvis reveals a left renal cyst measuring 5.1 x 4.9 cm.  There were subcentimeter hypodense renal lesions that are too small to further characterize and are presumably related to cysts.  Recommend attention to follow up.  Distal dilated pancreatic duct noted.  The common bile duct is the upper limits of normal in size.  The ampullary region is not well evaluated.  Comparison with prior imaging is recommended if available.  If there is no prior film available for comparison, and ERCP or MRCP could be performed to further evaluate.  Small hiatal hernia noted.  07/20/2009--patient was noted to have a left renal mass consistent with a cyst.  This was evaluated by the urologist 07/20/2009.   • Statin intolerance 10/30/2017   • Vitamin D deficiency 4/8/2016         Past Surgical History:   Procedure Laterality Date   • APPENDECTOMY  1965    1965   • CATARACT EXTRACTION Bilateral Remote    Remote bilateral cataract extirpation with intraocular lens implantation.   • CHOLECYSTECTOMY WITH INTRAOPERATIVE CHOLANGIOGRAM N/A 1/21/2019 01/21/2019--laparoscopic paraesophageal hernia repair with fundoplication.   • COLONOSCOPY  11/03/2008 2008--normal colonoscopy   • COLONOSCOPY  2001 2001--normal colonoscopy.   • COLONOSCOPY N/A 6/13/2017 06/13/2017--colonoscopy revealed melanosis.  Biopsied.  There was friability with contact bleeding in the ascending  colon.  Biopsied.  Pathology returned consistent with melanosis coli.   • ENDOSCOPY  10/08/2014    02/28/2012--air-contrast upper GI revealed small to moderate sized reducible sliding hiatal herniation of the upper stomach with some demonstrated gastroesophageal reflux. No esophageal, gastric, or duodenal mass or mucosal ulceration was seen. 11/03/2008--EGD performed for evaluation of iron deficiency anemia revealed hiatal hernia without evidence of reflux, prepyloric antritis and   • ENDOSCOPY  11/03/2008 11/03/2008--EGD performed for evaluation of iron deficiency anemia revealed hiatal hernia without evidence of reflux, prepyloric antritis and   • ENDOSCOPY  11/19/2001 11/19/2001--EGD revealed a very tortuous distal esophagus with a Schatzki's ring. No ulcer or erosions. No Dorado's mucosa. Stomach revealed patchy erythema and erosions in the antrum. Biopsy. Normal pylorus with no obstruction. Normal duodenum with no ulcers. The Schatzki's ring was dilated with a Rhodes dilator.;   • ENDOSCOPY N/A 9/27/2016 09/27/2016--EGD revealed a normal oropharynx, esophagus, and medium sized hiatal hernia.  Nonbleeding gastric ulcer with no stigmata of bleeding.  Biopsy.  Gastritis.  Biopsy.  Normal examined duodenum.  Biopsied.  Pathology returned mild to moderate chronic active gastritis with ulceration from the stomach antral ulcer biopsy.  Gastroesophageal junction biopsy revealed minimal mixed inflammation.   • ENDOSCOPY N/A 6/13/2017 06/13/2017--colonoscopy revealed melanosis.  Biopsied.  There was friability with contact bleeding in the ascending colon.  Biopsied.  Pathology returned consistent with melanosis coli.   • ERCP N/A 1/22/2019 01/22/2019--ERCP with sphincterotomy and balloon stone extraction.   • ESOPHAGEAL DILATATION  11/19/2001 11/19/2001--EGD revealed a very tortuous distal esophagus with a Schatzki's ring. No ulcer or erosions. No Dorado's mucosa. Stomach revealed patchy erythema and  erosions in the antrum. Biopsy. Normal pylorus with no obstruction. Normal duodenum with no ulcers. The Schatzki's ring was dilated with a Rhodes dilator.;    • ESOPHAGEAL MANOMETRY N/A 12/18/2018    Procedure: ESOPHAGEAL MANOMETRY;  Surgeon: Cecilia, Nurse Performed;  Location: [facility] ENDOSCOPY;  Service: Gastroenterology   • ESOPHAGOSCOPY N/A 1/21/2019    Procedure: FLEXIBLE ESOPHAGOSCOPY;  Surgeon: Reji Johnson MD;  Location: [facility] MAIN OR;  Service: General   • HIATAL HERNIA REPAIR N/A 1/21/2019    Procedure: Laparoscopic paraesophageal hernia repair with toupee fundoplication;  Surgeon: Reji Johnson MD;  Location: [facility] MAIN OR;  Service: General   • INCONTINENCE SURGERY  1979 1979--a bladder tack procedure for urinary stress incontinence   • KNEE ARTHROSCOPY Right 12/12/2011 12/12/2011--right knee arthroscopy with partial lateral and medial meniscectomies.   • SKIN SURGERY  05/25/2010 05/25/2010--skin lesion excised from right lower extremity. 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   • TOTAL ABDOMINAL HYSTERECTOMY WITH SALPINGO OOPHORECTOMY  1974 1974--total abdominal hysterectomy.         Allergies   Allergen Reactions   • Statins Nausea And Vomiting   • Morphine Hallucinations   • Penicillins Itching   • Tetanus Toxoids Itching           Current Outpatient Medications:   •  aspirin 81 MG EC tablet, Take 81 mg by mouth Daily. HELD, Disp: , Rfl:   •  buPROPion XL (WELLBUTRIN XL) 150 MG 24 hr tablet, Take 150 mg by mouth 2 (Two) Times a Day., Disp: , Rfl: 0  •  cholecalciferol (VITAMIN D3) 1000 units tablet, Take 1,000 Units by mouth Daily., Disp: , Rfl:   •  clonazePAM (KlonoPIN) 1 MG tablet, TAKE 1/2 TO 1 TABLET TWICE DAILY WHEN NECESSARY, Disp: 60 tablet, Rfl: 0  •  cycloSPORINE (RESTASIS) 0.05 % ophthalmic emulsion, Administer 1 drop to both eyes 2 (Two) Times a Day., Disp: , Rfl:   •  diclofenac (VOLTAREN) 1 % gel gel, Apply 4 g topically to the  appropriate area as directed 4 (Four) Times a Day As Needed., Disp: , Rfl:   •  estradiol (ESTRACE) 1 MG tablet, TAKE 1 TABLET BY MOUTH DAILY, Disp: 90 tablet, Rfl: 1  •  gabapentin (NEURONTIN) 100 MG capsule, Take 100 mg by mouth Every Night., Disp: , Rfl: 2  •  Ginkgo 60 MG tablet, Take 1 tablet by mouth Daily., Disp: , Rfl:   •  HYDROcodone-acetaminophen (NORCO)  MG per tablet, Take 1 tablet by mouth every 6 (six) hours as needed for moderate pain (4-6)., Disp: , Rfl:   •  Misc Natural Products (COLON CLEANSE PO), Take  by mouth., Disp: , Rfl:   •  Multiple Vitamins-Minerals (MULTIVITAMIN ADULT) chewable tablet, Chew 1 tablet Daily., Disp: , Rfl:   •  simethicone (GAS-X) 80 MG chewable tablet, Chew 1 tablet 4 (Four) Times a Day As Needed for Flatulence., Disp: 100 tablet, Rfl: 2  •  sucralfate (CARAFATE) 1 g tablet, Take 1 g by mouth 4 (Four) Times a Day., Disp: , Rfl:   •  topiramate (TOPAMAX) 100 MG tablet, TAKE 1 TABLET DAILY, Disp: 90 tablet, Rfl: 1  •  triamterene-hydrochlorothiazide (DYAZIDE) 37.5-25 MG per capsule, TAKE ONE CAPSULE BY MOUTH DAILY FOR BLOOD PRESSURE AND LEG SWELLING, Disp: 90 capsule, Rfl: 1  •  venlafaxine (EFFEXOR) 75 MG tablet, Take 75 mg by mouth Daily., Disp: , Rfl:   •  vitamin E 400 UNIT capsule, Take 400 Units by mouth Daily., Disp: , Rfl:       Family History   Problem Relation Age of Onset   • Heart attack Mother         dies age 47 from heart attack   • Heart disease Mother    • Bleeding Disorder Mother    • Heart attack Maternal Aunt         dies age 60 from heart attack   • Heart disease Father    • Malig Hyperthermia Neg Hx          Social History     Socioeconomic History   • Marital status:      Spouse name: ander   • Number of children: 4   • Years of education: 14   • Highest education level: Associate degree: academic program   Occupational History   • Occupation: homemaker   Social Needs   • Financial resource strain: Not hard at all   • Food insecurity:     " Worry: Never true     Inability: Never true   • Transportation needs:     Medical: Yes     Non-medical: Yes   Tobacco Use   • Smoking status: Never Smoker   • Smokeless tobacco: Never Used   Substance and Sexual Activity   • Alcohol use: No     Frequency: Never     Binge frequency: Never   • Drug use: No   • Sexual activity: Yes     Partners: Male   Lifestyle   • Physical activity:     Days per week: 3 days     Minutes per session: 20 min   • Stress: Not at all   Relationships   • Social connections:     Talks on phone: More than three times a week     Gets together: Once a week     Attends [employer] service: 1 to 4 times per year     Active member of club or organization: Yes     Attends meetings of clubs or organizations: 1 to 4 times per year     Relationship status:          Vitals:    11/14/19 1230   BP: 118/68   BP Location: Left arm   Pulse: 81   SpO2: 97%   Weight: 62.5 kg (137 lb 12.8 oz)   Height: 152.4 cm (60\")        Body mass index is 26.91 kg/m².      Physical Exam:    General: Alert and oriented x 3.  No acute distress.  Normal affect.  HEENT: Pupils equal, round, reactive to light; extraocular movements intact; sclerae nonicteric; pharynx, ear canals and TMs normal.  Neck: Without JVD, thyromegaly, bruit, or adenopathy.  Lungs: Clear to auscultation in all fields.  Heart: Regular rate and rhythm without murmur, rub, gallop, or click.  Abdomen: Soft, nontender, without hepatosplenomegaly or hernia.  Bowel sounds normal.  : Deferred.  Rectal: Deferred.  Extremities: Without clubbing, cyanosis, edema, or pulse deficit.  Neurologic: Intact without focal deficit.  Normal station and gait observed during ingress and egress from the examination room.  Skin: Without significant lesion.  Musculoskeletal: Unremarkable.    Lab/other results:    NMR is perfect other than total cholesterol is 226.  CMP normal except creatinine elevated at 1.16.  Urinalysis normal.  CBC essentially normal.  Thyroid " function test normal.  Vitamin D normal at 77.  CPK normal.    Assessment/Plan:     Diagnosis Plan   1. Medicare annual wellness visit, subsequent     2. Hyperlipidemia, unspecified hyperlipidemia type     3. Benign essential hypertension     4. Chronic renal insufficiency, stage II (mild), creatinine 1.12     5. Multinodular goiter     6. Chronic anemia     7. Iron deficiency anemia due to chronic blood loss     8. Carotid artery plaque, 04/02/2018--mild right ICA plaque, normal left ICA 10/03/2014--mild bilateral carotid artery plaque.     9. Pedal edema     10. Pulmonary hypertension (CMS/HCC)     11. Age-related osteoporosis without current pathological fracture     12. Gastroesophageal reflux disease without esophagitis     13. Chronic low back pain, unspecified back pain laterality, unspecified whether sciatica present     14. Depression with anxiety     15. Generalized anxiety disorder     16. Chronic fatigue     17. Hypersomnolence     18. Chronic migraine     19. Restless legs syndrome     20. Statin intolerance     21. Therapeutic drug monitoring       The subsequent Medicare wellness visit is documented on separate note.    Patient has hyperlipidemia technically but her NMR profile is excellent and medication not indicated.  Her blood pressure seems to be well controlled on the current regimen.  Her chronic renal insufficiency is mild and stable.  Patient does have a multinodular goiter and had a thyroid ultrasound back in 2017.  We will likely repeat that next year.  Chronic anemia appears to have resolved along with iron deficiency but we will continue to monitor that as well.  Patient has carotid artery plaque and had a carotid Doppler study less than 2 years ago.  This reveals very mild right ICA plaque and a normal left ICA.  Pedal edema is well controlled.  She has pulmonary hypertension and is followed by the pulmonologist.  Patient has osteoporosis and needs a DEXA scan which we will order.   Reflux symptoms seem to be controlled.  She continues to have chronic lower back pain.  Depression and anxiety seem to be reasonably controlled.  Migraine headaches are intermittent but tolerable.  Restless leg syndrome symptoms seem to be controlled.    Plan is as follows: No change in current medical regimen.  Patient will follow-up in 1 year with lab prior and this will also be subsequent Medicare wellness visit.  Otherwise, she will follow-up as needed.    Procedures

## 2019-11-14 NOTE — PROGRESS NOTES
The ABCs of the Annual Wellness Visit  Subsequent Medicare Wellness Visit    No chief complaint on file.      Subjective   History of Present Illness:  Sachi Vora is a 79 y.o. female who presents for a Subsequent Medicare Wellness Visit.    HEALTH RISK ASSESSMENT    Recent Hospitalizations:  Recently treated at the following:  [facility]    Current Medical Providers:  Patient Care Team:  Cruzito Weir MD as PCP - General (Internal Medicine)  Cruzito Weir MD as PCP - Claims Attributed    Smoking Status:  Social History     Tobacco Use   Smoking Status Never Smoker   Smokeless Tobacco Never Used       Alcohol Consumption:  Social History     Substance and Sexual Activity   Alcohol Use No   • Frequency: Never   • Binge frequency: Never       Depression Screen:   PHQ-2/PHQ-9 Depression Screening 11/14/2019   Little interest or pleasure in doing things 0   Feeling down, depressed, or hopeless 0   Trouble falling or staying asleep, or sleeping too much -   Feeling tired or having little energy -   Poor appetite or overeating -   Feeling bad about yourself - or that you are a failure or have let yourself or your family down -   Trouble concentrating on things, such as reading the newspaper or watching television -   Moving or speaking so slowly that other people could have noticed. Or the opposite - being so fidgety or restless that you have been moving around a lot more than usual -   Thoughts that you would be better off dead, or of hurting yourself in some way -   Total Score 0       Fall Risk Screen:  REGINAADI Fall Risk Assessment was completed, and patient is at LOW risk for falls.Assessment completed on:11/14/2019    Health Habits and Functional and Cognitive Screening:  Functional & Cognitive Status 11/14/2019   Do you have difficulty preparing food and eating? No   Do you have difficulty bathing yourself, getting dressed or grooming yourself? No   Do you have difficulty using the toilet?  No   Do you have difficulty moving around from place to place? No   Do you have trouble with steps or getting out of a bed or a chair? No   Current Diet Well Balanced Diet   Dental Exam Up to date   Eye Exam Up to date   Exercise (times per week) 5 times per week   Current Exercise Activities Include Walking   Do you need help using the phone?  No   Are you deaf or do you have serious difficulty hearing?  Yes   Do you need help with transportation? No   Do you need help shopping? No   Do you need help preparing meals?  No   Do you need help with housework?  Yes   Do you need help with laundry? No   Do you need help taking your medications? No   Do you need help managing money? No   Do you ever drive or ride in a car without wearing a seat belt? No   Have you felt unusual stress, anger or loneliness in the last month? No   Who do you live with? Spouse   If you need help, do you have trouble finding someone available to you? No   Have you been bothered in the last four weeks by sexual problems? No   Do you have difficulty concentrating, remembering or making decisions? No         Does the patient have evidence of cognitive impairment? No    Asprin use counseling:Taking ASA appropriately as indicated    Age-appropriate Screening Schedule:  Refer to the list below for future screening recommendations based on patient's age, sex and/or medical conditions. Orders for these recommended tests are listed in the plan section. The patient has been provided with a written plan.    Health Maintenance   Topic Date Due   • DXA SCAN  11/03/2019   • LIPID PANEL  11/07/2020   • MAMMOGRAM  07/16/2021   • TDAP/TD VACCINES (2 - Td) 02/15/2027   • COLONOSCOPY  06/13/2027   • INFLUENZA VACCINE  Completed   • PNEUMOCOCCAL VACCINES (65+ LOW/MEDIUM RISK)  Completed   • ZOSTER VACCINE  Discontinued          The following portions of the patient's history were reviewed and updated as appropriate: allergies, current medications, past family  history, past medical history, past social history, past surgical history and problem list.    Outpatient Medications Prior to Visit   Medication Sig Dispense Refill   • aspirin 81 MG EC tablet Take 81 mg by mouth Daily. HELD     • buPROPion XL (WELLBUTRIN XL) 150 MG 24 hr tablet Take 150 mg by mouth 2 (Two) Times a Day.  0   • cholecalciferol (VITAMIN D3) 1000 units tablet Take 1,000 Units by mouth Daily.     • clonazePAM (KlonoPIN) 1 MG tablet TAKE 1/2 TO 1 TABLET TWICE DAILY WHEN NECESSARY 60 tablet 0   • cycloSPORINE (RESTASIS) 0.05 % ophthalmic emulsion Administer 1 drop to both eyes 2 (Two) Times a Day.     • diclofenac (VOLTAREN) 1 % gel gel Apply 4 g topically to the appropriate area as directed 4 (Four) Times a Day As Needed.     • estradiol (ESTRACE) 1 MG tablet TAKE 1 TABLET BY MOUTH DAILY 90 tablet 1   • gabapentin (NEURONTIN) 100 MG capsule Take 100 mg by mouth Every Night.  2   • Ginkgo 60 MG tablet Take 1 tablet by mouth Daily.     • HYDROcodone-acetaminophen (NORCO)  MG per tablet Take 1 tablet by mouth every 6 (six) hours as needed for moderate pain (4-6).     • Misc Natural Products (COLON CLEANSE PO) Take  by mouth.     • Multiple Vitamins-Minerals (MULTIVITAMIN ADULT) chewable tablet Chew 1 tablet Daily.     • simethicone (GAS-X) 80 MG chewable tablet Chew 1 tablet 4 (Four) Times a Day As Needed for Flatulence. 100 tablet 2   • sucralfate (CARAFATE) 1 g tablet Take 1 g by mouth 4 (Four) Times a Day.     • topiramate (TOPAMAX) 100 MG tablet TAKE 1 TABLET DAILY 90 tablet 1   • triamterene-hydrochlorothiazide (DYAZIDE) 37.5-25 MG per capsule TAKE ONE CAPSULE BY MOUTH DAILY FOR BLOOD PRESSURE AND LEG SWELLING 90 capsule 1   • venlafaxine (EFFEXOR) 75 MG tablet Take 75 mg by mouth Daily.     • vitamin E 400 UNIT capsule Take 400 Units by mouth Daily.       No facility-administered medications prior to visit.        Patient Active Problem List   Diagnosis   • Osteoporosis, 03/29/2011--lumbar  "spine -2.8.  Right femoral neck -2.4.  Left femoral neck -2.4.  Patient receives Reclast infusions.   • Therapeutic drug monitoring   • Simple renal cyst   • Benign essential hypertension   • Carotid artery plaque, 04/02/2018--mild right ICA plaque, normal left ICA 10/03/2014--mild bilateral carotid artery plaque.   • Chronic gastritis   • Chronic lower back pain   • Chronic otitis externa   • Chronic renal insufficiency, stage II (mild), creatinine 1.12   • Depression with anxiety   • Functional murmur, 07/15/2015--normal echocardiogram.   • Hyperlipidemia   • Menopausal state   • Chronic migraine   • Pancreatic Duct Dilation   • Pedal edema   • Restless legs syndrome   • Bilateral sensorineural hearing loss   • Vitamin D deficiency   • Chronic gastric ulcer   • Chronic fatigue   • Hypersomnolence   • Chronic anemia   • Multinodular goiter   • Generalized anxiety disorder   • Iron deficiency anemia   • Statin intolerance   • Postmenopausal state   • Pulmonary hypertension (CMS/HCC)   • Gastroesophageal reflux disease without esophagitis   • Bilateral tinnitus   • Dysfunction of both eustachian tubes       Advanced Care Planning:  Patient has an advance directive - a copy has been provided and is visible in patient header    Review of Systems   Constitutional: Positive for fatigue.   Musculoskeletal: Positive for arthralgias and back pain.   All other systems reviewed and are negative.      Compared to one year ago, the patient feels her physical health is better.  Compared to one year ago, the patient feels her mental health is better.    Reviewed chart for potential of high risk medication in the elderly: yes  Reviewed chart for potential of harmful drug interactions in the elderly:yes    Objective         Vitals:    11/14/19 1230   BP: 118/68   BP Location: Left arm   Pulse: 81   SpO2: 97%   Weight: 62.5 kg (137 lb 12.8 oz)   Height: 152.4 cm (60\")       Body mass index is 26.91 kg/m².  Discussed the patient's " BMI with her. The BMI is above average; BMI management plan is completed.    Physical Exam  General: Alert and oriented x 3.  No acute distress.  Normal affect.  HEENT: Pupils equal, round, reactive to light; extraocular movements intact; sclerae nonicteric; pharynx, ear canals and TMs normal.  Neck: Without JVD, thyromegaly, bruit, or adenopathy.  Lungs: Clear to auscultation in all fields.  Heart: Regular rate and rhythm without murmur, rub, gallop, or click.  Abdomen: Soft, nontender, without hepatosplenomegaly or hernia.  Bowel sounds normal.  : Deferred.  Rectal: Deferred.  Extremities: Without clubbing, cyanosis, edema, or pulse deficit.  Neurologic: Intact without focal deficit.  Normal station and gait observed during ingress and egress from the examination room.  Skin: Without significant lesion.  Musculoskeletal: Unremarkable.  Lab Results   Component Value Date    GLU 78 11/07/2019    CHLPL 226 (H) 11/07/2019    TRIG 128 11/07/2019        Assessment/Plan   Medicare Risks and Personalized Health Plan  CMS Preventative Services Quick Reference  Cardiovascular risk  Chronic Pain   Depression/Dysphoria  Diabetic Lab Screening   Obesity/Overweight   Osteoprorosis Risk    The above risks/problems have been discussed with the patient.  Pertinent information has been shared with the patient in the After Visit Summary.  Follow up plans and orders are seen below in the Assessment/Plan Section.    Diagnoses and all orders for this visit:    1. Vitamin D deficiency (Primary)  -     Vitamin D 25 Hydroxy; Future    2. Medicare annual wellness visit, subsequent    3. Hyperlipidemia, unspecified hyperlipidemia type  -     NMR LipoProfile; Future    4. Benign essential hypertension  -     TSH; Future  -     T4, Free; Future  -     T3, Free; Future    5. Chronic renal insufficiency, stage II (mild), creatinine 1.12  -     CBC (No Diff); Future  -     Comprehensive Metabolic Panel; Future    6. Multinodular goiter    7.  Chronic anemia    8. Iron deficiency anemia due to chronic blood loss    9. Carotid artery plaque, 04/02/2018--mild right ICA plaque, normal left ICA 10/03/2014--mild bilateral carotid artery plaque.    10. Pedal edema    11. Pulmonary hypertension (CMS/HCC)    12. Age-related osteoporosis without current pathological fracture  -     DEXA Bone Density Axial    13. Gastroesophageal reflux disease without esophagitis    14. Chronic low back pain, unspecified back pain laterality, unspecified whether sciatica present    15. Depression with anxiety    16. Generalized anxiety disorder    17. Chronic fatigue    18. Hypersomnolence    19. Chronic migraine    20. Restless legs syndrome    21. Statin intolerance    22. Therapeutic drug monitoring      Follow Up:  Return in about 1 year (around 11/14/2020) for Next scheduled follow up with lab prior.     An After Visit Summary and PPPS were given to the patient.

## 2019-11-25 ENCOUNTER — TELEPHONE (OUTPATIENT)
Dept: INTERNAL MEDICINE | Facility: CLINIC | Age: 79
End: 2019-11-25

## 2019-11-25 DIAGNOSIS — M85.89 OSTEOPENIA OF MULTIPLE SITES: Primary | ICD-10-CM

## 2019-11-25 DIAGNOSIS — Z87.39 HISTORY OF OSTEOPOROSIS: ICD-10-CM

## 2019-11-25 NOTE — TELEPHONE ENCOUNTER
I am not sure why the patient is calling me about this.  The radiology department should be the ones calling me.  At any rate I put in an order indicating osteopenia.  Patient previously had osteoporosis.

## 2019-12-02 ENCOUNTER — HOSPITAL ENCOUNTER (OUTPATIENT)
Dept: BONE DENSITY | Facility: HOSPITAL | Age: 79
Discharge: HOME | End: 2019-12-02
Admitting: INTERNAL MEDICINE

## 2019-12-02 PROCEDURE — 77080 DXA BONE DENSITY AXIAL: CPT

## 2019-12-04 RX ORDER — TOPIRAMATE 100 MG/1
TABLET, FILM COATED ORAL
Qty: 90 TABLET | Refills: 1 | Status: SHIPPED | OUTPATIENT
Start: 2019-12-04 | End: 2020-05-11

## 2019-12-09 ENCOUNTER — TRANSCRIBE ORDERS (OUTPATIENT)
Dept: ADMINISTRATIVE | Facility: HOSPITAL | Age: 79
End: 2019-12-09

## 2019-12-09 DIAGNOSIS — M50.10 CERVICAL DISC DISORDER WITH RADICULOPATHY: Primary | ICD-10-CM

## 2019-12-24 ENCOUNTER — HOSPITAL ENCOUNTER (OUTPATIENT)
Dept: MRI IMAGING | Facility: HOSPITAL | Age: 79
Discharge: HOME | End: 2019-12-24
Admitting: NURSE PRACTITIONER

## 2019-12-24 DIAGNOSIS — M50.10 CERVICAL DISC DISORDER WITH RADICULOPATHY: ICD-10-CM

## 2019-12-24 PROCEDURE — 72141 MRI NECK SPINE W/O DYE: CPT

## 2020-01-06 RX ORDER — ESTRADIOL 1 MG/1
TABLET ORAL
Qty: 90 TABLET | Refills: 1 | Status: SHIPPED | OUTPATIENT
Start: 2020-01-06 | End: 2020-04-03

## 2020-01-24 DIAGNOSIS — F41.1 GENERALIZED ANXIETY DISORDER: Primary | Chronic | ICD-10-CM

## 2020-01-27 RX ORDER — CLONAZEPAM 1 MG/1
TABLET ORAL
Qty: 60 TABLET | Refills: 5 | Status: SHIPPED | OUTPATIENT
Start: 2020-01-27 | End: 2020-08-17

## 2020-03-03 ENCOUNTER — OFFICE VISIT (OUTPATIENT)
Dept: SURGERY | Facility: CLINIC | Age: 80
End: 2020-03-03

## 2020-03-03 VITALS — BODY MASS INDEX: 27.09 KG/M2 | HEART RATE: 82 BPM | OXYGEN SATURATION: 95 % | WEIGHT: 138 LBS | HEIGHT: 60 IN

## 2020-03-03 DIAGNOSIS — Z98.890 HISTORY OF REPAIR OF HIATAL HERNIA: Primary | ICD-10-CM

## 2020-03-03 DIAGNOSIS — Z87.19 HISTORY OF REPAIR OF HIATAL HERNIA: Primary | ICD-10-CM

## 2020-03-03 PROCEDURE — 99213 OFFICE O/P EST LOW 20 MIN: CPT | Performed by: SURGERY

## 2020-03-03 NOTE — PROGRESS NOTES
CC: Follow-up hiatal hernia repair and cholecystectomy    HPI: The patient is a very pleasant 79-year-old female that is here today for follow-up after undergoing laparoscopic hiatal hernia repair with Toupet fundoplication and laparoscopic cholecystectomy on 1/21/2019.  She has been doing great and denies major complaint.  She reports having abdominal discomfort after eating spicy food as well as intermittent episodes of hoarseness.  She denies heartburn, regurgitation, dysphagia.  She has been off antiacid medication.  She denies any other abdominal pain, nausea, vomiting, diarrhea.  Abdominal bloating that she reported to me 6 months ago has already resolved.    PMH: Osteoporosis, hypertension, carotid disease, chronic gastritis, depression, anxiety, GERD, hyperlipidemia, migraine, restless legs syndrome, chronic fatigue and hypersomnolence, multinodular goiter, and chronic pain requiring multiple medications.      PSH: Appendectomy, colonoscopy 2001 and 2008, 2017,  upper endoscopy 2012, 2008, 2001, 2016,2017,  esophageal dilation 2001, bladder sling, knee arthroscopy, oophorectomy, skin lesion excision, total abdominal hysterectomy, laparoscopic hiatal hernia repair with toupee fundoplication and laparoscopic cholecystectomy 1/2019.     MEDS: Reviewed in Epic     ALL: Reviewed in [facility]    FH and SH: Coronary artery disease on her mother and grandmother.  Ovarian cancer grandmother.  The patient is , never smoked     ROS:   Constitutional:  denies fatigue or weight loss  Cardiovascular: Denies chest pain, denies palpitations, edemas.  Respiratory: denies cough, sputum, SOB.  Gastrointestinal: As per history of present illness  Genitourinary: Denies urinary frequency or dysuria  Endocrine: denies cold intolerance, lethargy and flushing.  Hem: Reports excessive bruising and denies postop bleeding.  Musculoskeletal: Reports joint, back, neck, muscle pain.  Reports gait problem and neck  "stiffness  Neuro: Denies dizziness, headaches, lightheadedness and weakness   Skin: denies change in nevi, rashes, masses.  Psychiatric: Denies decreased concentration, anxiety, sleep disturbance and depression    Physical exam:   Vitals:    03/03/20 1315   Pulse: 82   SpO2: 95%   Weight: 62.6 kg (138 lb)   Height: 152.4 cm (60\")     Alert and oriented ×3, no acute distress.  Head is normocephalic and atraumatic.  Neck is supple there is no thyromegaly or lymphadenopathy  Chest is clear bilaterally there is no added sounds  Regular rate and rhythm, no murmurs  Abdomen is soft and nontender, is nondistended, bowel sounds are positive.  There is no rebound or guarding is and there is no peritoneal signs.  No clubbing cyanosis or edema in lower or upper extremities    Assessment and plan     The patient is a very pleasant 78-year-old female status post laparoscopic hiatal hernia repair with toupee fundoplication and laparoscopic cholecystectomy with intraoperative cholangiogram on January 2019.  She is doing great and denies any major complaint.  She has been having intermittent episodes of hoarseness that does not seem to be related to acid reflux.  She denies any dysphasia.  She has been tolerating diet without any problem other than when eating spicy foods.  Discussed with her about the need to try to avoid foods that cause pain.  Otherwise, she is doing great.    -Follow-up in my office in 1 year  -Antiacid as needed    Reji Johnson MD, FACS  General, Minimally Invasive and Endoscopic Surgery  [facility]    [address], [address]  [place], [place], [address]  P: [phone]  F: [phone]       "

## 2020-03-04 ENCOUNTER — TELEPHONE (OUTPATIENT)
Dept: INTERNAL MEDICINE | Facility: CLINIC | Age: 80
End: 2020-03-04

## 2020-03-04 DIAGNOSIS — M81.0 AGE-RELATED OSTEOPOROSIS WITHOUT CURRENT PATHOLOGICAL FRACTURE: Primary | ICD-10-CM

## 2020-03-04 NOTE — TELEPHONE ENCOUNTER
Pt says she got a letter from [facility] about getting a reclast done.  Pt says she needs to have labs done to determine if she needs to get a reclast

## 2020-03-04 NOTE — TELEPHONE ENCOUNTER
The lab has nothing to do with whether or not she needs Reclast.  The lab is to check her kidney function and electrolytes prior to receiving the Reclast.  This needs to be done approximately the week before.  I placed an order for a CMP and printed off and patient needs to make sure she takes that with her to the lab and tell them that is all that she needs.

## 2020-03-09 LAB
ALBUMIN SERPL-MCNC: 3.9 G/DL (ref 3.5–5.2)
ALBUMIN/GLOB SERPL: 1.6 G/DL
ALP SERPL-CCNC: 75 U/L (ref 39–117)
ALT SERPL-CCNC: 32 U/L (ref 1–33)
AST SERPL-CCNC: 36 U/L (ref 1–32)
BILIRUB SERPL-MCNC: 0.3 MG/DL (ref 0.2–1.2)
BUN SERPL-MCNC: 20 MG/DL (ref 8–23)
BUN/CREAT SERPL: 16.3 (ref 7–25)
CALCIUM SERPL-MCNC: 9 MG/DL (ref 8.6–10.5)
CHLORIDE SERPL-SCNC: 101 MMOL/L (ref 98–107)
CO2 SERPL-SCNC: 27.6 MMOL/L (ref 22–29)
CREAT SERPL-MCNC: 1.23 MG/DL (ref 0.57–1)
GLOBULIN SER CALC-MCNC: 2.5 GM/DL
GLUCOSE SERPL-MCNC: 91 MG/DL (ref 65–99)
POTASSIUM SERPL-SCNC: 3.6 MMOL/L (ref 3.5–5.2)
PROT SERPL-MCNC: 6.4 G/DL (ref 6–8.5)
SODIUM SERPL-SCNC: 143 MMOL/L (ref 136–145)

## 2020-03-26 ENCOUNTER — TELEPHONE (OUTPATIENT)
Dept: INTERNAL MEDICINE | Facility: CLINIC | Age: 80
End: 2020-03-26

## 2020-03-27 NOTE — TELEPHONE ENCOUNTER
Lab work looks okay.  Her kidney function is all just a little bit but not too bad nothing to worry about.

## 2020-04-03 RX ORDER — ESTRADIOL 1 MG/1
TABLET ORAL
Qty: 90 TABLET | Refills: 1 | Status: SHIPPED | OUTPATIENT
Start: 2020-04-03 | End: 2020-10-02

## 2020-05-04 ENCOUNTER — HOSPITAL ENCOUNTER (OUTPATIENT)
Dept: CARDIOLOGY | Facility: HOSPITAL | Age: 80
Discharge: HOME | End: 2020-05-04
Admitting: INTERNAL MEDICINE

## 2020-05-04 VITALS
HEART RATE: 82 BPM | SYSTOLIC BLOOD PRESSURE: 130 MMHG | WEIGHT: 138 LBS | OXYGEN SATURATION: 97 % | BODY MASS INDEX: 27.09 KG/M2 | DIASTOLIC BLOOD PRESSURE: 78 MMHG | HEIGHT: 60 IN

## 2020-05-04 DIAGNOSIS — I27.20 PULMONARY HYPERTENSION (HCC): ICD-10-CM

## 2020-05-04 LAB
AORTIC ARCH: 3 CM
AORTIC ROOT ANNULUS: 1.8 CM
ASCENDING AORTA: 2.9 CM
BH CV ECHO MEAS - ACS: 2 CM
BH CV ECHO MEAS - AI DEC SLOPE: 195.8 CM/SEC^2
BH CV ECHO MEAS - AI MAX PG: 40.8 MMHG
BH CV ECHO MEAS - AI MAX VEL: 319.3 CM/SEC
BH CV ECHO MEAS - AI P1/2T: 477.5 MSEC
BH CV ECHO MEAS - AO MAX PG (FULL): 0.88 MMHG
BH CV ECHO MEAS - AO MAX PG: 5.6 MMHG
BH CV ECHO MEAS - AO MEAN PG (FULL): 0.51 MMHG
BH CV ECHO MEAS - AO MEAN PG: 3.2 MMHG
BH CV ECHO MEAS - AO ROOT AREA (BSA CORRECTED): 1.8
BH CV ECHO MEAS - AO ROOT AREA: 6.2 CM^2
BH CV ECHO MEAS - AO ROOT DIAM: 2.8 CM
BH CV ECHO MEAS - AO V2 MAX: 118.1 CM/SEC
BH CV ECHO MEAS - AO V2 MEAN: 85.3 CM/SEC
BH CV ECHO MEAS - AO V2 VTI: 28.4 CM
BH CV ECHO MEAS - ASC AORTA: 2.9 CM
BH CV ECHO MEAS - AVA(I,A): 2.1 CM^2
BH CV ECHO MEAS - AVA(I,D): 2.1 CM^2
BH CV ECHO MEAS - AVA(V,A): 2.2 CM^2
BH CV ECHO MEAS - AVA(V,D): 2.2 CM^2
BH CV ECHO MEAS - BSA(HAYCOCK): 1.6 M^2
BH CV ECHO MEAS - BSA: 1.6 M^2
BH CV ECHO MEAS - BZI_BMI: 27 KILOGRAMS/M^2
BH CV ECHO MEAS - BZI_METRIC_HEIGHT: 152.4 CM
BH CV ECHO MEAS - BZI_METRIC_WEIGHT: 62.6 KG
BH CV ECHO MEAS - EDV(MOD-SP2): 61 ML
BH CV ECHO MEAS - EDV(MOD-SP4): 52 ML
BH CV ECHO MEAS - EDV(TEICH): 31.7 ML
BH CV ECHO MEAS - EF(CUBED): 62.2 %
BH CV ECHO MEAS - EF(MOD-BP): 69 %
BH CV ECHO MEAS - EF(MOD-SP2): 67.2 %
BH CV ECHO MEAS - EF(MOD-SP4): 69.2 %
BH CV ECHO MEAS - EF(TEICH): 55.4 %
BH CV ECHO MEAS - ESV(MOD-SP2): 20 ML
BH CV ECHO MEAS - ESV(MOD-SP4): 16 ML
BH CV ECHO MEAS - ESV(TEICH): 14.1 ML
BH CV ECHO MEAS - FS: 27.7 %
BH CV ECHO MEAS - IVS/LVPW: 1
BH CV ECHO MEAS - IVSD: 1 CM
BH CV ECHO MEAS - LAT PEAK E' VEL: 7 CM/SEC
BH CV ECHO MEAS - LV DIASTOLIC VOL/BSA (35-75): 32.6 ML/M^2
BH CV ECHO MEAS - LV MASS(C)D: 75.7 GRAMS
BH CV ECHO MEAS - LV MASS(C)DI: 47.5 GRAMS/M^2
BH CV ECHO MEAS - LV MAX PG: 4.7 MMHG
BH CV ECHO MEAS - LV MEAN PG: 2.7 MMHG
BH CV ECHO MEAS - LV SYSTOLIC VOL/BSA (12-30): 10 ML/M^2
BH CV ECHO MEAS - LV V1 MAX: 108.3 CM/SEC
BH CV ECHO MEAS - LV V1 MEAN: 78.3 CM/SEC
BH CV ECHO MEAS - LV V1 VTI: 24 CM
BH CV ECHO MEAS - LVIDD: 2.9 CM
BH CV ECHO MEAS - LVIDS: 2.1 CM
BH CV ECHO MEAS - LVLD AP2: 7 CM
BH CV ECHO MEAS - LVLD AP4: 6.3 CM
BH CV ECHO MEAS - LVLS AP2: 5.2 CM
BH CV ECHO MEAS - LVLS AP4: 4.6 CM
BH CV ECHO MEAS - LVOT AREA (M): 2.5 CM^2
BH CV ECHO MEAS - LVOT AREA: 2.4 CM^2
BH CV ECHO MEAS - LVOT DIAM: 1.8 CM
BH CV ECHO MEAS - LVPWD: 0.97 CM
BH CV ECHO MEAS - MED PEAK E' VEL: 6 CM/SEC
BH CV ECHO MEAS - MV A DUR: 0.09 SEC
BH CV ECHO MEAS - MV A MAX VEL: 109 CM/SEC
BH CV ECHO MEAS - MV DEC SLOPE: 428.9 CM/SEC^2
BH CV ECHO MEAS - MV DEC TIME: 0.2 SEC
BH CV ECHO MEAS - MV E MAX VEL: 88.8 CM/SEC
BH CV ECHO MEAS - MV E/A: 0.82
BH CV ECHO MEAS - MV MAX PG: 6.2 MMHG
BH CV ECHO MEAS - MV MEAN PG: 2.2 MMHG
BH CV ECHO MEAS - MV P1/2T MAX VEL: 96.4 CM/SEC
BH CV ECHO MEAS - MV P1/2T: 65.9 MSEC
BH CV ECHO MEAS - MV V2 MAX: 124.2 CM/SEC
BH CV ECHO MEAS - MV V2 MEAN: 67.1 CM/SEC
BH CV ECHO MEAS - MV V2 VTI: 31 CM
BH CV ECHO MEAS - MVA P1/2T LCG: 2.3 CM^2
BH CV ECHO MEAS - MVA(P1/2T): 3.3 CM^2
BH CV ECHO MEAS - MVA(VTI): 1.9 CM^2
BH CV ECHO MEAS - PA ACC TIME: 0.09 SEC
BH CV ECHO MEAS - PA MAX PG (FULL): 1.4 MMHG
BH CV ECHO MEAS - PA MAX PG: 3.3 MMHG
BH CV ECHO MEAS - PA PR(ACCEL): 37.4 MMHG
BH CV ECHO MEAS - PA V2 MAX: 90.9 CM/SEC
BH CV ECHO MEAS - PULM A REVS DUR: 0.08 SEC
BH CV ECHO MEAS - PULM A REVS VEL: 40.3 CM/SEC
BH CV ECHO MEAS - PULM DIAS VEL: 43.3 CM/SEC
BH CV ECHO MEAS - PULM S/D: 1.6
BH CV ECHO MEAS - PULM SYS VEL: 69 CM/SEC
BH CV ECHO MEAS - PVA(V,A): 2.1 CM^2
BH CV ECHO MEAS - PVA(V,D): 2.1 CM^2
BH CV ECHO MEAS - QP/QS: 0.8
BH CV ECHO MEAS - RAP SYSTOLE: 3 MMHG
BH CV ECHO MEAS - RV BASE (<4.1) - OBSOLETE: 2.6 CM
BH CV ECHO MEAS - RV LENGTH (<8.5) - OBSOLETE: 6.5 CM
BH CV ECHO MEAS - RV MAX PG: 2 MMHG
BH CV ECHO MEAS - RV MEAN PG: 1.2 MMHG
BH CV ECHO MEAS - RV V1 MAX: 69.8 CM/SEC
BH CV ECHO MEAS - RV V1 MEAN: 51.2 CM/SEC
BH CV ECHO MEAS - RV V1 VTI: 17.3 CM
BH CV ECHO MEAS - RVOT AREA: 2.7 CM^2
BH CV ECHO MEAS - RVOT DIAM: 1.9 CM
BH CV ECHO MEAS - RVSP: 28.4 MMHG
BH CV ECHO MEAS - SI(AO): 110.2 ML/M^2
BH CV ECHO MEAS - SI(CUBED): 9.3 ML/M^2
BH CV ECHO MEAS - SI(LVOT): 36.6 ML/M^2
BH CV ECHO MEAS - SI(MOD-SP2): 25.7 ML/M^2
BH CV ECHO MEAS - SI(MOD-SP4): 22.6 ML/M^2
BH CV ECHO MEAS - SI(TEICH): 11 ML/M^2
BH CV ECHO MEAS - SUP REN AO DIAM: 2.3 CM
BH CV ECHO MEAS - SV(AO): 175.7 ML
BH CV ECHO MEAS - SV(CUBED): 14.9 ML
BH CV ECHO MEAS - SV(LVOT): 58.3 ML
BH CV ECHO MEAS - SV(MOD-SP2): 41 ML
BH CV ECHO MEAS - SV(MOD-SP4): 36 ML
BH CV ECHO MEAS - SV(RVOT): 46.6 ML
BH CV ECHO MEAS - SV(TEICH): 17.6 ML
BH CV ECHO MEAS - TAPSE (>1.6): 2.5 CM2
BH CV ECHO MEAS - TR MAX VEL: 251.9 CM/SEC
BH CV ECHO MEASUREMENTS AVERAGE E/E' RATIO: 13.66
BH CV VAS BP RIGHT ARM: NORMAL MMHG
BH CV XLRA - RV BASE: 2.6 CM
BH CV XLRA - RV LENGTH: 6.5 CM
BH CV XLRA - RV MID: 2.9 CM
BH CV XLRA - TDI S': 14 CM/SEC
LEFT ATRIUM VOLUME INDEX: 23 ML/M2
SINUS: 2.7 CM
STJ: 2.7 CM

## 2020-05-04 PROCEDURE — 93306 TTE W/DOPPLER COMPLETE: CPT | Performed by: INTERNAL MEDICINE

## 2020-05-04 PROCEDURE — 93306 TTE W/DOPPLER COMPLETE: CPT

## 2020-05-11 RX ORDER — TOPIRAMATE 100 MG/1
TABLET, FILM COATED ORAL
Qty: 90 TABLET | Refills: 1 | Status: SHIPPED | OUTPATIENT
Start: 2020-05-11 | End: 2020-11-05

## 2020-06-08 ENCOUNTER — OFFICE VISIT (OUTPATIENT)
Dept: INTERNAL MEDICINE | Facility: CLINIC | Age: 80
End: 2020-06-08

## 2020-06-08 VITALS
DIASTOLIC BLOOD PRESSURE: 78 MMHG | BODY MASS INDEX: 26.35 KG/M2 | SYSTOLIC BLOOD PRESSURE: 130 MMHG | HEIGHT: 60 IN | HEART RATE: 75 BPM | WEIGHT: 134.2 LBS | OXYGEN SATURATION: 98 %

## 2020-06-08 DIAGNOSIS — F41.8 DEPRESSION WITH ANXIETY: Chronic | ICD-10-CM

## 2020-06-08 DIAGNOSIS — R49.0 CHRONIC HOARSENESS: Primary | ICD-10-CM

## 2020-06-08 DIAGNOSIS — G89.29 CHRONIC LOW BACK PAIN WITH SCIATICA, SCIATICA LATERALITY UNSPECIFIED, UNSPECIFIED BACK PAIN LATERALITY: Chronic | ICD-10-CM

## 2020-06-08 DIAGNOSIS — M54.40 CHRONIC LOW BACK PAIN WITH SCIATICA, SCIATICA LATERALITY UNSPECIFIED, UNSPECIFIED BACK PAIN LATERALITY: Chronic | ICD-10-CM

## 2020-06-08 PROCEDURE — 99213 OFFICE O/P EST LOW 20 MIN: CPT | Performed by: INTERNAL MEDICINE

## 2020-06-08 RX ORDER — BACLOFEN 10 MG/1
10 TABLET ORAL NIGHTLY
COMMUNITY
End: 2020-06-08

## 2020-06-08 NOTE — PROGRESS NOTES
"             06/08/2020    Patient Information  Sachi Vora                                                                                          [address] [place] [address]      1940  [unfilled]  There is no work phone number on file.    Chief Complaint:     Complaining of worsening depression, chronic hoarseness, and \"I just do not feel well and wonders if any of the medications are causing this\"    History of Present Illness:    Patient with a long history of depression and anxiety who has previously seen psychiatry presents today with worsening depressive symptoms that is interfering with her sleep and making her think of suicidal thoughts but she has no definite plan.  She has not seen her psychiatrist for a while.  She is wondering if whether or not she can come off of some of her medications.  See below for discussion.  Patient also has complaints of chronic but intermittent hoarseness and this is not associated with any other symptoms.  She has been evaluated by ENT for tinnitus but has not seen ENT regarding the hoarseness.  She wants to know if there is any medication she can possibly discontinue.  See below.  Past medical history reviewed and updated were necessary including health maintenance parameters.    Review of Systems   Constitution: Negative.   HENT: Positive for hoarse voice.    Eyes: Negative.    Cardiovascular: Negative.    Respiratory: Negative.    Endocrine: Negative.    Hematologic/Lymphatic: Negative.    Skin: Negative.    Musculoskeletal: Positive for arthritis and back pain.   Gastrointestinal: Negative.    Genitourinary: Negative.    Neurological: Negative.    Psychiatric/Behavioral: Positive for depression. The patient is nervous/anxious.    Allergic/Immunologic: Negative.        Active Problems:    Patient Active Problem List   Diagnosis   • Osteoporosis, 03/29/2011--lumbar spine -2.8.  Right femoral neck -2.4.  Left femoral neck -2.4.  Patient receives " "Reclast infusions.   • Therapeutic drug monitoring   • Simple renal cyst   • Benign essential hypertension   • Carotid artery plaque, 04/02/2018--mild right ICA plaque, normal left ICA 10/03/2014--mild bilateral carotid artery plaque.   • Chronic gastritis   • Chronic lower back pain   • Chronic otitis externa   • Chronic renal insufficiency, stage II (mild), creatinine 1.12   • Depression with anxiety   • Functional murmur, 07/15/2015--normal echocardiogram.   • Hyperlipidemia   • Menopausal state   • Chronic migraine   • Pancreatic Duct Dilation   • Pedal edema   • Restless legs syndrome   • Bilateral sensorineural hearing loss   • Vitamin D deficiency   • Chronic gastric ulcer   • Chronic fatigue   • Hypersomnolence   • Chronic anemia   • Multinodular goiter   • Generalized anxiety disorder   • Iron deficiency anemia   • Statin intolerance   • Postmenopausal state   • Pulmonary hypertension (CMS/HCC)   • Gastroesophageal reflux disease without esophagitis   • Bilateral tinnitus   • Dysfunction of both eustachian tubes   • Chronic hoarseness         Past Medical History:   Diagnosis Date   • Benign essential hypertension 4/8/2016   • Bilateral sensorineural hearing loss 3/31/2011    03/31/2011--etiology reveals reverse \"cookie bite\" type of hearing loss for both ears of mild/moderate degree, mostly sensorineural.  Speech discrimination 100% on the right, 96% on the left.   • Carotid artery plaque, 10/03/2014--mild bilateral carotid artery plaque. 7/25/2012    10/03/2014--[facility] screening revealed mild bilateral carotid plaque, negative for atrial fibrillation, negative for AAA, negative for PAD, osteoporosis screen revealed osteopenia.  Body mass index was 25 and considered to be moderate risk.  07/25/2012--vascular screen negative for carotid plaque, negative for abdominal aneurysm, negative for PAD Description: 10/03/2014--[facility] screening revealed mild bilateral carotid plaque, negative for atrial " fibrillation, negative for AAA, negative for PAD, osteoporosis screen revealed osteopenia.  Body mass index was 25 and considered to be moderate risk.  07/25/2012--vascular screen negative for carotid plaque, negative for abdominal aneurysm, negative for PAD   • Chronic anemia 8/23/2016 06/13/2017--colonoscopy revealed melanosis.  Biopsied.  There was friability with contact bleeding in the ascending colon.  Biopsied.  Pathology returned consistent with melanosis coli.  06/13/2017--EGD revealed normal esophagus.  Biopsies taken.  There was a medium-sized hiatal hernia.  Localized mild inflammation and linear erosions were found in the prepyloric region.  Biopsied.  Examined duodenum was normal.  Random biopsies taken.  Pathology of the gastroesophageal junction was unremarkable as was the gastric fundus.  Prepyloric biopsy revealed minimal chronic inflammation and reactive change.  H pylori negative.  Duodenal biopsies are negative.  04/12/2017--hemoglobin low at 10.8, hematocrit is normal at 35.7.  Iron sulfate 325 mg per day initiated.  08/23/2016--routine follow-up.  Patient continues to have epigastric abdominal pain believed to be related to reflux with possible esophageal spasm.  Hemoglobin noted to be low at 10.6 with a hematocrit low at 33.7 and RDW elevated at 16.2.  Homocysti   • Chronic fatigue 4/21/2016 06/14/2017--overnight oximetry revealed oxygen desaturation index of only 0.44.  There were only 10 total desaturations during the period of testing which lasted 6 hours and 46 minutes.  05/31/2017--patient seen in follow-up and reports she continues to have chronic fatigue as well as hypersomnolence.  Once again, she is on multiple medications that are undoubtedly contributing to this problem including clonazepam and hydrocodone but I doubt that these could be discontinued.  Her CBC back in April revealed a low hemoglobin of 10.8 but hematocrit was normal at 35.7.  Thyroid function tests were  normal and CMP normal.  Overnight oximetry test ordered.  Repeat CBC.  Iron studies.  Sedimentation rate and CRP.  04/11/2017--patient seen in follow-up and her blood pressure is now at a reasonable level at 120/64.  She reports she still feels somewhat fatigued but she is much better.  Patient has not been doing any regular exercise and I do think that that would be helpful to reduce her feeling of fatigue.  She is   • Chronic gastric ulcer 4/14/2014    02/15/2017--patient seen in follow-up in nearly 6 months later.  She has complaints of not feeling well all over.  She has complaints of diffuse myalgias and possibly tendinopathy related to Levaquin that I called in prior to her going to [place] for vacation.  She reports that 3 or 4 days after starting the Levaquin she began to have the musculoskeletal symptoms and she reports that she continues to have them presently.  Symptoms are worse involving her left calf area.  Examination reveals significant tenderness involving the left calf and the left calf seems a little larger than the right.  She also has complaints of shortness of breath but no chest pain.  She is complaining of double vision and generalized weakness and fatigue.  She was complaining of chronic fatigue at the last visit back in September and I ordered an overnight oximetry test but apparently this was never done for reasons that are not clear to me.  Her current oxygen saturation is 94% and normally it is 100%.  Plan is as follows: Extensi   • Chronic gastritis 11/19/2001    02/15/2017--patient seen in follow-up in nearly 6 months later.  She has complaints of not feeling well all over.  She has complaints of diffuse myalgias and possibly tendinopathy related to Levaquin that I called in prior to her going to [place] for vacation.  She reports that 3 or 4 days after starting the Levaquin she began to have the musculoskeletal symptoms and she reports that she continues to have them presently.   Symptoms are worse involving her left calf area.  Examination reveals significant tenderness involving the left calf and the left calf seems a little larger than the right.  She also has complaints of shortness of breath but no chest pain.  She is complaining of double vision and generalized weakness and fatigue.  She was complaining of chronic fatigue at the last visit back in September and I ordered an overnight oximetry test but apparently this was never done for reasons that are not clear to me.  Her current oxygen saturation is 94% and normally it is 100%.  Plan is as follows: Extensi   • Chronic lower back pain 1/31/2006 08/11/2014--MRI of the lumbar spine reveals the conus terminates at L2 and is normal.  Cauda equina unremarkable.  Stable to moderate degenerative disc disease at L5-S1.  No acute fracture or pars defect is demonstrated.  Small synovial cysts are seen posterior to the L4-L5 facet.  The perivertebral soft tissues are unremarkable.  T12-L1, L1-L2, L2-L3 are negative.  L3-L4 a broad-based disc bulge resulting in mild bilateral neural foraminal narrowing.  L4-L5 reveals a broad-based disc bulge facet disease resulting in mild bilateral neural foraminal narrowing.  L5-S1 reveals a broad-based disc bulge, posterior osseous slipping, and facet disease resulting in mild to moderate bilateral neural foraminal narrowing.  Assessment is stable mild to moderate degenerative spondylosis.  Small synovial cysts are seen posterior to the L4-L5 facets.  08/11/2014--MRI of the thoracic spine reveals mild to moderate thoracic kyphosis.  No fracture.  At T5--T6 there is a moderate sized left paracentral disc protrusion which i   • Chronic migraine 11/3/2009    01/18/2010--MRI of the brain performed for headaches and memory loss.  Mild small vessel disease in the cerebral and central pontine white matter.  There is an ovoid, somewhat pancake-shaped area of signal abnormality in the anterior inferior right  temporal lobe subcortical to the juxtacortical white matter that measures 1.2 x 1 cm and anterior posterior and medial lateral dimension but only measures 3 mm in cranial caudal dimension.  I suspect that this is a benign cyst or a cystic area of encephalomalacia.  The remainder of the MRI of the head is within normal limits.  11/03/2009--CT scan of the brain without contrast performed for headache after a fall.  Mild diffuse atrophy.  No acute abnormality noted.; Description: Patient has had a long history of migraine headaches.  MRI and CT scan of the brain have been essentially negative.   • Chronic otitis externa 4/8/2016   • Chronic renal insufficiency, stage II (mild), creatinine 1.12 11/14/2015 11/14/2015--serum creatinine mildly elevated at 1.12.   • Depression with anxiety 4/8/2016   • Functional murmur, 07/15/2015--normal echocardiogram. 7/15/2015    07/15/2015--echocardiogram reveals normal left ventricular size and function with ejection fraction 55%.  Grade 1 diastolic dysfunction, abnormal relaxation pattern.  Trace tricuspid regurgitation.  Estimated right ventricular systolic pressure is 25 mmHg which is normal.   • Gastroesophageal reflux disease with esophagitis 11/19/2001 06/13/2017--EGD revealed normal esophagus.  Biopsies taken.  There was a medium-sized hiatal hernia.  Localized mild inflammation and linear erosions were found in the prepyloric region.  Biopsied.  Examined duodenum was normal.  Random biopsies taken.  Pathology of the gastroesophageal junction was unremarkable as was the gastric fundus.  Prepyloric biopsy revealed minimal chronic inflammation and reactive change.  H pylori negative.  Duodenal biopsies are negative.  11/03/2014--patient seen in follow-up and reports her epigastric pain/chest pain has resolved.  I reviewed the results of the studies with her.  It does not appear that her problem is related to biliary tract disease.  She does have reflux and although the  recent upper GI revealed minimal reflux, I do think her symptoms are related to esophageal reflux with esophageal spasm.  10/21/2014--air-contrast upper GI series revealed trace upper laryngeal penetration.  Persistent small partially reducible sliding hiatal hernia the upper stomach with    • Gastroesophageal reflux disease without esophagitis 6/6/2019   • Generalized anxiety disorder 4/11/2017   • History of Acute deep vein thrombosis (DVT) of distal end of left lower extremity 2/15/2017    03/21/2017 patient seen in follow-up and she is tolerating the Eliquis well without signs or symptoms of bleeding.  Her calf swelling and tenderness is better but not totally resolved.  I suspect that the DVT is chronic and may not resolve at all.  I will order a repeat venous study and then proceed from there.  02/23/2017--repeat Doppler venous study of the left lower extremity reveals a chronic left lower extremity DVT in the posterior tibial.  All other left sided veins appeared normal.  Fluid collection in the left calf noted.  02/17/2017--patient seen in follow-up and reports her left lower extremity symptoms are about the same.  She continues to have complaints of profound fatigue which I think is multifactorial including underlying depression that is not in remission.  Review the results of the CTA of the chest including the possible thyroid lesion.  I do not think this is contributing to any of her symptoms of fatigue particularly given the fact that her thyroid function tests are normal.  I expla   • History of palpitations 12/07/2011    Patient has had multiple admissions to the hospital for complaints of chest pain and heart palpitations. She meant admitted at least on 3 occasions. She has had at least 3 stress Cardiolite and 1 stress ECG, the last Cardiolite being performed 12/07/2011 which was negative. Patient is also had Holter monitors which have been unremarkable.   • HIstory of Schatzki's ring 02/28/2012     02/28/2012--air-contrast upper GI revealed small to moderate sized reducible sliding hiatal herniation of the upper stomach with some demonstrated gastroesophageal reflux. No esophageal, gastric, or duodenal mass or mucosal ulceration was seen.  11/03/2008--EGD performed for evaluation of iron deficiency anemia revealed hiatal hernia without evidence of reflux, prepyloric antritis and   • Hyperlipidemia 4/8/2016   • Hypersomnolence 5/5/2016 06/14/2017--overnight oximetry revealed oxygen desaturation index of only 0.44.  There were only 10 total desaturations during the period of testing which lasted 6 hours and 46 minutes.  05/31/2017--patient seen in follow-up and reports she continues to have chronic fatigue as well as hypersomnolence.  Once again, she is on multiple medications that are undoubtedly contributing to this problem including clonazepam and hydrocodone but I doubt that these could be discontinued.  Her CBC back in April revealed a low hemoglobin of 10.8 but hematocrit was normal at 35.7.  Thyroid function tests were normal and CMP normal.  Overnight oximetry test ordered.  Repeat CBC.  Iron studies.  Sedimentation rate and CRP.  04/11/2017--patient seen in follow-up and her blood pressure is now at a reasonable level at 120/64.  She reports she still feels somewhat fatigued but she is much better.  Patient has not been doing any regular exercise and I do think that that would be helpful to reduce her feeling of fatigue.  She is   • Menopausal state 4/8/2016   • Multinodular goiter 2/17/2017 02/21/2017--thyroid ultrasound reveals multinodular thyroid with largest nodule measuring on the order of 1.6 cm in greatest dimension.  02/17/2017--patient seen in follow-up for DVT and CTA of the chest.  An incidental finding on the CTA of the chest reveals a 1.7 cm lesion in the right lobe of thyroid.  Note that thyroid function tests are currently normal.  Ultrasound of the thyroid ordered.   •  Osteoporosis, 03/29/2011--lumbar spine -2.8.  Right femoral neck -2.4.  Left femoral neck -2.4.  Patient receives Reclast infusions. 2/9/2009 04/19/2017--Reclast infusion.  04/05/2016--Reclast infusion.  06/04/2012--Reclast infusion.  05/26/2011--Reclast infusion.  03/29/2011--DEXA scan revealed a lumbar T score of -2.8, right femoral neck T score -2.4, left femoral neck T score -2.4.  Osteoporosis of the lumbar spine and severe osteopenia of the hips bilaterally.  Patient has been intolerant to Fosamax because of gastritis and gastroesophageal reflux.  04/01/2009--treatment for osteoporosis begun with Fosamax.  02/09/2009--DEXA scan revealed lumbar spine T score of -3.3.  Left femoral neck T score -2.5.  Right hip T score -2.6.  Osteoporosis.    • Pancreatic Duct Dilation 11/30/2001 09/29/2014--patient was again evaluated by the urologist for a renal cyst.  CT scan of the abdomen and pelvis reveals a left renal cyst measuring 5.1 x 4.9 cm.  There were subcentimeter hypodense renal lesions that are too small to further characterize and are presumably related to cysts.  Recommend attention to follow up.  Distal dilated pancreatic duct noted.  The common bile duct is the upper limits of normal in size.  The ampullary region is not well evaluated.  Comparison with prior imaging is recommended if available.  If there is no prior film available for comparison, and ERCP or MRCP could be performed to further evaluate.  Small hiatal hernia noted.  03/05/2012--CT scan of the abdomen with contrast, pancreatic protocol.  This reveals some fullness of the pancreatic ductal system and apparent pancreatic divisum with separate entrance of the accessory pancreatic duct extending into the duodenum distal to the main pancreatic duct.  There is fullness of the duct diffusely but similar to the previous s   • Pedal edema 6/29/2015 06/29/2015--patient presents with a 4-6 week history of exertional dyspnea that comes on with  activity such as climbing stairs or walking her dog up an incline.  No chest pain.  Relieved with rest.  No cough.  She does have complaints of her feet and legs swelling that is particularly worse at the end of the day and not improved overnight.  No orthopnea or PND.  Chest exam reveals faint rales at the bases.  Otherwise clear.  Heart is regular and I do not appreciate a heart murmur.  Chest x-ray PA and lateral ordered.  Echocardiogram ordered.   • Pulmonary hypertension (CMS/HCC) 4/18/2019   • Restless legs syndrome 4/8/2016   • Simple renal cyst 7/20/2009 09/29/2014--patient was again evaluated by the urologist for a renal cyst.  CT scan of the abdomen and pelvis reveals a left renal cyst measuring 5.1 x 4.9 cm.  There were subcentimeter hypodense renal lesions that are too small to further characterize and are presumably related to cysts.  Recommend attention to follow up.  Distal dilated pancreatic duct noted.  The common bile duct is the upper limits of normal in size.  The ampullary region is not well evaluated.  Comparison with prior imaging is recommended if available.  If there is no prior film available for comparison, and ERCP or MRCP could be performed to further evaluate.  Small hiatal hernia noted.  07/20/2009--patient was noted to have a left renal mass consistent with a cyst.  This was evaluated by the urologist 07/20/2009.   • Statin intolerance 10/30/2017   • Vitamin D deficiency 4/8/2016         Past Surgical History:   Procedure Laterality Date   • APPENDECTOMY  1965    1965   • CATARACT EXTRACTION Bilateral Remote    Remote bilateral cataract extirpation with intraocular lens implantation.   • CHOLECYSTECTOMY WITH INTRAOPERATIVE CHOLANGIOGRAM N/A 1/21/2019 01/21/2019--laparoscopic paraesophageal hernia repair with fundoplication.   • COLONOSCOPY  11/03/2008 2008--normal colonoscopy   • COLONOSCOPY  2001 2001--normal colonoscopy.   • COLONOSCOPY N/A 6/13/2017     06/13/2017--colonoscopy revealed melanosis.  Biopsied.  There was friability with contact bleeding in the ascending colon.  Biopsied.  Pathology returned consistent with melanosis coli.   • ENDOSCOPY  10/08/2014    02/28/2012--air-contrast upper GI revealed small to moderate sized reducible sliding hiatal herniation of the upper stomach with some demonstrated gastroesophageal reflux. No esophageal, gastric, or duodenal mass or mucosal ulceration was seen. 11/03/2008--EGD performed for evaluation of iron deficiency anemia revealed hiatal hernia without evidence of reflux, prepyloric antritis and   • ENDOSCOPY  11/03/2008 11/03/2008--EGD performed for evaluation of iron deficiency anemia revealed hiatal hernia without evidence of reflux, prepyloric antritis and   • ENDOSCOPY  11/19/2001 11/19/2001--EGD revealed a very tortuous distal esophagus with a Schatzki's ring. No ulcer or erosions. No Dorado's mucosa. Stomach revealed patchy erythema and erosions in the antrum. Biopsy. Normal pylorus with no obstruction. Normal duodenum with no ulcers. The Schatzki's ring was dilated with a Rhodes dilator.;   • ENDOSCOPY N/A 9/27/2016 09/27/2016--EGD revealed a normal oropharynx, esophagus, and medium sized hiatal hernia.  Nonbleeding gastric ulcer with no stigmata of bleeding.  Biopsy.  Gastritis.  Biopsy.  Normal examined duodenum.  Biopsied.  Pathology returned mild to moderate chronic active gastritis with ulceration from the stomach antral ulcer biopsy.  Gastroesophageal junction biopsy revealed minimal mixed inflammation.   • ENDOSCOPY N/A 6/13/2017 06/13/2017--colonoscopy revealed melanosis.  Biopsied.  There was friability with contact bleeding in the ascending colon.  Biopsied.  Pathology returned consistent with melanosis coli.   • ERCP N/A 1/22/2019 01/22/2019--ERCP with sphincterotomy and balloon stone extraction.   • ESOPHAGEAL DILATATION  11/19/2001 11/19/2001--EGD revealed a very tortuous  distal esophagus with a Schatzki's ring. No ulcer or erosions. No Dorado's mucosa. Stomach revealed patchy erythema and erosions in the antrum. Biopsy. Normal pylorus with no obstruction. Normal duodenum with no ulcers. The Schatzki's ring was dilated with a Rhodes dilator.;    • ESOPHAGEAL MANOMETRY N/A 12/18/2018    Procedure: ESOPHAGEAL MANOMETRY;  Surgeon: Cecilia, Nurse Performed;  Location: [facility] ENDOSCOPY;  Service: Gastroenterology   • ESOPHAGOSCOPY N/A 1/21/2019    Procedure: FLEXIBLE ESOPHAGOSCOPY;  Surgeon: Reji Johnson MD;  Location: [facility] MAIN OR;  Service: General   • HIATAL HERNIA REPAIR N/A 1/21/2019    Procedure: Laparoscopic paraesophageal hernia repair with toupee fundoplication;  Surgeon: Reji Johnson MD;  Location: [facility] MAIN OR;  Service: General   • INCONTINENCE SURGERY  1979 1979--a bladder tack procedure for urinary stress incontinence   • KNEE ARTHROSCOPY Right 12/12/2011 12/12/2011--right knee arthroscopy with partial lateral and medial meniscectomies.   • SKIN SURGERY  05/25/2010 05/25/2010--skin lesion excised from right lower extremity. 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   • TOTAL ABDOMINAL HYSTERECTOMY WITH SALPINGO OOPHORECTOMY  1974 1974--total abdominal hysterectomy.         Allergies   Allergen Reactions   • Statins Nausea And Vomiting   • Morphine Hallucinations   • Penicillins Itching   • Tetanus Toxoids Itching           Current Outpatient Medications:   •  aspirin 81 MG EC tablet, Take 81 mg by mouth Daily. HELD, Disp: , Rfl:   •  buPROPion XL (WELLBUTRIN XL) 150 MG 24 hr tablet, Take 150 mg by mouth 2 (Two) Times a Day., Disp: , Rfl: 0  •  cholecalciferol (VITAMIN D3) 1000 units tablet, Take 1,000 Units by mouth Daily., Disp: , Rfl:   •  clonazePAM (KlonoPIN) 1 MG tablet, TAKE 1/2 TO 1 TABLET BY MOUTH TWICE DAILY, Disp: 60 tablet, Rfl: 5  •  cycloSPORINE (RESTASIS) 0.05 % ophthalmic emulsion, Administer 1 drop  to both eyes 2 (Two) Times a Day., Disp: , Rfl:   •  diclofenac (VOLTAREN) 1 % gel gel, Apply 4 g topically to the appropriate area as directed 4 (Four) Times a Day As Needed., Disp: , Rfl:   •  estradiol (ESTRACE) 1 MG tablet, TAKE 1 TABLET BY MOUTH DAILY, Disp: 90 tablet, Rfl: 1  •  gabapentin (NEURONTIN) 100 MG capsule, Take 100 mg by mouth Every Night., Disp: , Rfl: 2  •  Ginkgo 60 MG tablet, Take 1 tablet by mouth Daily., Disp: , Rfl:   •  HYDROcodone-acetaminophen (NORCO)  MG per tablet, Take 1 tablet by mouth every 6 (six) hours as needed for moderate pain (4-6)., Disp: , Rfl:   •  Misc Natural Products (COLON CLEANSE PO), Take  by mouth., Disp: , Rfl:   •  Multiple Vitamins-Minerals (MULTIVITAMIN ADULT) chewable tablet, Chew 1 tablet Daily., Disp: , Rfl:   •  topiramate (TOPAMAX) 100 MG tablet, TAKE 1 TABLET DAILY, Disp: 90 tablet, Rfl: 1  •  triamterene-hydrochlorothiazide (DYAZIDE) 37.5-25 MG per capsule, TAKE ONE CAPSULE BY MOUTH DAILY FOR BLOOD PRESSURE AND LEG SWELLING, Disp: 90 capsule, Rfl: 1  •  venlafaxine (EFFEXOR) 75 MG tablet, Take 75 mg by mouth Daily., Disp: , Rfl:   •  vitamin E 400 UNIT capsule, Take 400 Units by mouth Daily., Disp: , Rfl:       Family History   Problem Relation Age of Onset   • Heart attack Mother         dies age 47 from heart attack   • Heart disease Mother    • Bleeding Disorder Mother    • Heart attack Maternal Aunt         dies age 60 from heart attack   • Heart disease Father    • Malig Hyperthermia Neg Hx          Social History     Socioeconomic History   • Marital status:      Spouse name: ander   • Number of children: 4   • Years of education: 14   • Highest education level: Associate degree: academic program   Occupational History   • Occupation: homemaker   Social Needs   • Financial resource strain: Not hard at all   • Food insecurity:     Worry: Never true     Inability: Never true   • Transportation needs:     Medical: Yes     Non-medical: Yes  "  Tobacco Use   • Smoking status: Never Smoker   • Smokeless tobacco: Never Used   Substance and Sexual Activity   • Alcohol use: No     Frequency: Never     Binge frequency: Never   • Drug use: No   • Sexual activity: Yes     Partners: Male   Lifestyle   • Physical activity:     Days per week: 3 days     Minutes per session: 20 min   • Stress: Not at all   Relationships   • Social connections:     Talks on phone: More than three times a week     Gets together: Once a week     Attends Confucianist service: 1 to 4 times per year     Active member of club or organization: Yes     Attends meetings of clubs or organizations: 1 to 4 times per year     Relationship status:          Vitals:    06/08/20 1427   BP: 130/78   BP Location: Left arm   Pulse: 75   SpO2: 98%   Weight: 60.9 kg (134 lb 3.2 oz)   Height: 152.4 cm (60\")        Body mass index is 26.21 kg/m².      Physical Exam:    General: Alert and oriented x 3.  No acute distress, but patient is tearful at times.  Normal affect.  HEENT: Pupils equal, round, reactive to light; extraocular movements intact; sclerae nonicteric; pharynx, ear canals and TMs normal.  Neck: Without JVD, thyromegaly, bruit, or adenopathy.  Lungs: Clear to auscultation in all fields.  Heart: Regular rate and rhythm without murmur, rub, gallop, or click.  Abdomen: Soft, nontender, without hepatosplenomegaly or hernia.  Bowel sounds normal.  : Deferred.  Rectal: Deferred.  Extremities: Without clubbing, cyanosis, edema, or pulse deficit.  Neurologic: Intact without focal deficit.  Normal station and gait observed during ingress and egress from the examination room.  Skin: Without significant lesion.  Musculoskeletal: Unremarkable.    Lab/other results:      Assessment/Plan:     Diagnosis Plan   1. Chronic hoarseness  Ambulatory Referral to ENT (Otolaryngology)   2. Depression with anxiety     3. Chronic low back pain with sciatica, sciatica laterality unspecified, unspecified back pain " laterality       Patient presents with chronic hoarseness that needs to be evaluated by ENT.  She has obviously worsening depression and anxiety and needs to be reevaluated by her psychiatrist.  She is concerned about possible side effects of medications and wants to know if she can discontinue any medication and I am reluctant to do so because most of her medications are actually psychiatric type medications.  The one thing that I can see that she can get rid of that might have some CNS side effects would be the baclofen and I have recommended that she decrease this by one half for the next week or so and then discontinue it altogether.  I have instructed her not to stop any of her psychiatric medications, particularly abruptly without the guidance of the psychiatrist.  I have encouraged her to see her psychiatrist as soon as possible and also have placed ENT referral.  Patient should contact me for any worsening symptoms or any significant change in symptoms.  Note that she currently does not have any definite suicidal ideation.            Procedures

## 2020-08-17 DIAGNOSIS — F41.1 GENERALIZED ANXIETY DISORDER: Chronic | ICD-10-CM

## 2020-08-17 RX ORDER — CLONAZEPAM 1 MG/1
TABLET ORAL
Qty: 60 TABLET | Refills: 3 | Status: SHIPPED | OUTPATIENT
Start: 2020-08-17 | End: 2021-01-05

## 2020-09-15 ENCOUNTER — OFFICE VISIT (OUTPATIENT)
Dept: INTERNAL MEDICINE | Facility: CLINIC | Age: 80
End: 2020-09-15

## 2020-09-15 VITALS
WEIGHT: 136.6 LBS | BODY MASS INDEX: 26.82 KG/M2 | HEIGHT: 60 IN | OXYGEN SATURATION: 98 % | DIASTOLIC BLOOD PRESSURE: 60 MMHG | SYSTOLIC BLOOD PRESSURE: 116 MMHG | HEART RATE: 68 BPM

## 2020-09-15 DIAGNOSIS — E78.2 MIXED HYPERLIPIDEMIA: Chronic | ICD-10-CM

## 2020-09-15 DIAGNOSIS — D64.9 CHRONIC ANEMIA: Chronic | ICD-10-CM

## 2020-09-15 DIAGNOSIS — D50.0 IRON DEFICIENCY ANEMIA DUE TO CHRONIC BLOOD LOSS: Chronic | ICD-10-CM

## 2020-09-15 DIAGNOSIS — E55.9 VITAMIN D DEFICIENCY: Chronic | ICD-10-CM

## 2020-09-15 DIAGNOSIS — R49.0 CHRONIC HOARSENESS: ICD-10-CM

## 2020-09-15 DIAGNOSIS — I10 BENIGN ESSENTIAL HYPERTENSION: Chronic | ICD-10-CM

## 2020-09-15 DIAGNOSIS — N18.2 CHRONIC RENAL INSUFFICIENCY, STAGE II (MILD): Chronic | ICD-10-CM

## 2020-09-15 DIAGNOSIS — Z23 NEED FOR INFLUENZA VACCINATION: ICD-10-CM

## 2020-09-15 DIAGNOSIS — Z51.81 THERAPEUTIC DRUG MONITORING: ICD-10-CM

## 2020-09-15 DIAGNOSIS — E04.2 MULTINODULAR GOITER: Chronic | ICD-10-CM

## 2020-09-15 DIAGNOSIS — R22.1 NECK SWELLING: Primary | ICD-10-CM

## 2020-09-15 PROCEDURE — 90694 VACC AIIV4 NO PRSRV 0.5ML IM: CPT | Performed by: INTERNAL MEDICINE

## 2020-09-15 PROCEDURE — G0008 ADMIN INFLUENZA VIRUS VAC: HCPCS | Performed by: INTERNAL MEDICINE

## 2020-09-15 PROCEDURE — 99214 OFFICE O/P EST MOD 30 MIN: CPT | Performed by: INTERNAL MEDICINE

## 2020-09-15 NOTE — PROGRESS NOTES
09/15/2020    Patient Information  Sachi Vora                                                                                          [address] [place] [address]      1940  [unfilled]  There is no work phone number on file.    Chief Complaint:     Complaining of neck swelling.  Complaining of hoarseness.    History of Present Illness:    The history regarding hoarseness and neck swelling:    September 15, 2020--patient presents with complaints of swelling of the soft tissues of the left side of the neck and this is associated with pain and discomfort, particularly when she turns her head rotating to the left.  She also notices hoarseness and feels that her voice has changed.  On exam there is definite prominence of the left sternocleidomastoid muscle and there may be some shotty lymphadenopathy but this is not definite.  Range of motion is definitely limited in rotation with obvious discomfort.  Her voice is hoarse as compared to previous.  ENT referral given.    Review of Systems   Constitution: Negative. Negative for chills and fever.   HENT: Positive for hoarse voice.         Swelling of the left side of the neck   Eyes: Negative.    Cardiovascular: Negative.    Respiratory: Negative.    Endocrine: Negative.    Hematologic/Lymphatic: Negative.    Skin: Negative.    Musculoskeletal: Positive for arthritis, back pain and joint pain.   Gastrointestinal: Negative.    Genitourinary: Negative.    Neurological: Negative.    Psychiatric/Behavioral: Negative.    Allergic/Immunologic: Negative.        Active Problems:    Patient Active Problem List   Diagnosis   • Osteoporosis, 03/29/2011--lumbar spine -2.8.  Right femoral neck -2.4.  Left femoral neck -2.4.  Patient receives Reclast infusions.   • Therapeutic drug monitoring   • Simple renal cyst   • Benign essential hypertension   • Carotid artery plaque, 04/02/2018--mild right ICA plaque, normal left ICA 10/03/2014--mild bilateral  "carotid artery plaque.   • Chronic gastritis   • Chronic lower back pain   • Chronic otitis externa   • Chronic renal insufficiency, stage II (mild), creatinine 1.12   • Depression with anxiety   • Functional murmur, 07/15/2015--normal echocardiogram.   • Hyperlipidemia   • Menopausal state   • Chronic migraine   • Pancreatic Duct Dilation   • Pedal edema   • Restless legs syndrome   • Bilateral sensorineural hearing loss   • Vitamin D deficiency   • Chronic gastric ulcer   • Chronic fatigue   • Hypersomnolence   • Chronic anemia   • Multinodular goiter   • Generalized anxiety disorder   • Iron deficiency anemia   • Statin intolerance   • Postmenopausal state   • Pulmonary hypertension (CMS/HCC)   • Gastroesophageal reflux disease without esophagitis   • Bilateral tinnitus   • Dysfunction of both eustachian tubes   • Chronic hoarseness   • Neck swelling         Past Medical History:   Diagnosis Date   • Benign essential hypertension 4/8/2016   • Bilateral sensorineural hearing loss 3/31/2011    03/31/2011--etiology reveals reverse \"cookie bite\" type of hearing loss for both ears of mild/moderate degree, mostly sensorineural.  Speech discrimination 100% on the right, 96% on the left.   • Carotid artery plaque, 10/03/2014--mild bilateral carotid artery plaque. 7/25/2012    10/03/2014--[facility] screening revealed mild bilateral carotid plaque, negative for atrial fibrillation, negative for AAA, negative for PAD, osteoporosis screen revealed osteopenia.  Body mass index was 25 and considered to be moderate risk.  07/25/2012--vascular screen negative for carotid plaque, negative for abdominal aneurysm, negative for PAD Description: 10/03/2014--[facility] screening revealed mild bilateral carotid plaque, negative for atrial fibrillation, negative for AAA, negative for PAD, osteoporosis screen revealed osteopenia.  Body mass index was 25 and considered to be moderate risk.  07/25/2012--vascular screen negative for carotid " plaque, negative for abdominal aneurysm, negative for PAD   • Chronic anemia 8/23/2016 06/13/2017--colonoscopy revealed melanosis.  Biopsied.  There was friability with contact bleeding in the ascending colon.  Biopsied.  Pathology returned consistent with melanosis coli.  06/13/2017--EGD revealed normal esophagus.  Biopsies taken.  There was a medium-sized hiatal hernia.  Localized mild inflammation and linear erosions were found in the prepyloric region.  Biopsied.  Examined duodenum was normal.  Random biopsies taken.  Pathology of the gastroesophageal junction was unremarkable as was the gastric fundus.  Prepyloric biopsy revealed minimal chronic inflammation and reactive change.  H pylori negative.  Duodenal biopsies are negative.  04/12/2017--hemoglobin low at 10.8, hematocrit is normal at 35.7.  Iron sulfate 325 mg per day initiated.  08/23/2016--routine follow-up.  Patient continues to have epigastric abdominal pain believed to be related to reflux with possible esophageal spasm.  Hemoglobin noted to be low at 10.6 with a hematocrit low at 33.7 and RDW elevated at 16.2.  Homocysti   • Chronic fatigue 4/21/2016 06/14/2017--overnight oximetry revealed oxygen desaturation index of only 0.44.  There were only 10 total desaturations during the period of testing which lasted 6 hours and 46 minutes.  05/31/2017--patient seen in follow-up and reports she continues to have chronic fatigue as well as hypersomnolence.  Once again, she is on multiple medications that are undoubtedly contributing to this problem including clonazepam and hydrocodone but I doubt that these could be discontinued.  Her CBC back in April revealed a low hemoglobin of 10.8 but hematocrit was normal at 35.7.  Thyroid function tests were normal and CMP normal.  Overnight oximetry test ordered.  Repeat CBC.  Iron studies.  Sedimentation rate and CRP.  04/11/2017--patient seen in follow-up and her blood pressure is now at a reasonable level  at 120/64.  She reports she still feels somewhat fatigued but she is much better.  Patient has not been doing any regular exercise and I do think that that would be helpful to reduce her feeling of fatigue.  She is   • Chronic gastric ulcer 4/14/2014    02/15/2017--patient seen in follow-up in nearly 6 months later.  She has complaints of not feeling well all over.  She has complaints of diffuse myalgias and possibly tendinopathy related to Levaquin that I called in prior to her going to [place] for vacation.  She reports that 3 or 4 days after starting the Levaquin she began to have the musculoskeletal symptoms and she reports that she continues to have them presently.  Symptoms are worse involving her left calf area.  Examination reveals significant tenderness involving the left calf and the left calf seems a little larger than the right.  She also has complaints of shortness of breath but no chest pain.  She is complaining of double vision and generalized weakness and fatigue.  She was complaining of chronic fatigue at the last visit back in September and I ordered an overnight oximetry test but apparently this was never done for reasons that are not clear to me.  Her current oxygen saturation is 94% and normally it is 100%.  Plan is as follows: Extensi   • Chronic gastritis 11/19/2001    02/15/2017--patient seen in follow-up in nearly 6 months later.  She has complaints of not feeling well all over.  She has complaints of diffuse myalgias and possibly tendinopathy related to Levaquin that I called in prior to her going to [place] for vacation.  She reports that 3 or 4 days after starting the Levaquin she began to have the musculoskeletal symptoms and she reports that she continues to have them presently.  Symptoms are worse involving her left calf area.  Examination reveals significant tenderness involving the left calf and the left calf seems a little larger than the right.  She also has complaints of  shortness of breath but no chest pain.  She is complaining of double vision and generalized weakness and fatigue.  She was complaining of chronic fatigue at the last visit back in September and I ordered an overnight oximetry test but apparently this was never done for reasons that are not clear to me.  Her current oxygen saturation is 94% and normally it is 100%.  Plan is as follows: Extensi   • Chronic lower back pain 1/31/2006 08/11/2014--MRI of the lumbar spine reveals the conus terminates at L2 and is normal.  Cauda equina unremarkable.  Stable to moderate degenerative disc disease at L5-S1.  No acute fracture or pars defect is demonstrated.  Small synovial cysts are seen posterior to the L4-L5 facet.  The perivertebral soft tissues are unremarkable.  T12-L1, L1-L2, L2-L3 are negative.  L3-L4 a broad-based disc bulge resulting in mild bilateral neural foraminal narrowing.  L4-L5 reveals a broad-based disc bulge facet disease resulting in mild bilateral neural foraminal narrowing.  L5-S1 reveals a broad-based disc bulge, posterior osseous slipping, and facet disease resulting in mild to moderate bilateral neural foraminal narrowing.  Assessment is stable mild to moderate degenerative spondylosis.  Small synovial cysts are seen posterior to the L4-L5 facets.  08/11/2014--MRI of the thoracic spine reveals mild to moderate thoracic kyphosis.  No fracture.  At T5--T6 there is a moderate sized left paracentral disc protrusion which i   • Chronic migraine 11/3/2009    01/18/2010--MRI of the brain performed for headaches and memory loss.  Mild small vessel disease in the cerebral and central pontine white matter.  There is an ovoid, somewhat pancake-shaped area of signal abnormality in the anterior inferior right temporal lobe subcortical to the juxtacortical white matter that measures 1.2 x 1 cm and anterior posterior and medial lateral dimension but only measures 3 mm in cranial caudal dimension.  I suspect that  this is a benign cyst or a cystic area of encephalomalacia.  The remainder of the MRI of the head is within normal limits.  11/03/2009--CT scan of the brain without contrast performed for headache after a fall.  Mild diffuse atrophy.  No acute abnormality noted.; Description: Patient has had a long history of migraine headaches.  MRI and CT scan of the brain have been essentially negative.   • Chronic otitis externa 4/8/2016   • Chronic renal insufficiency, stage II (mild), creatinine 1.12 11/14/2015 11/14/2015--serum creatinine mildly elevated at 1.12.   • Depression with anxiety 4/8/2016   • Functional murmur, 07/15/2015--normal echocardiogram. 7/15/2015    07/15/2015--echocardiogram reveals normal left ventricular size and function with ejection fraction 55%.  Grade 1 diastolic dysfunction, abnormal relaxation pattern.  Trace tricuspid regurgitation.  Estimated right ventricular systolic pressure is 25 mmHg which is normal.   • Gastroesophageal reflux disease with esophagitis 11/19/2001 06/13/2017--EGD revealed normal esophagus.  Biopsies taken.  There was a medium-sized hiatal hernia.  Localized mild inflammation and linear erosions were found in the prepyloric region.  Biopsied.  Examined duodenum was normal.  Random biopsies taken.  Pathology of the gastroesophageal junction was unremarkable as was the gastric fundus.  Prepyloric biopsy revealed minimal chronic inflammation and reactive change.  H pylori negative.  Duodenal biopsies are negative.  11/03/2014--patient seen in follow-up and reports her epigastric pain/chest pain has resolved.  I reviewed the results of the studies with her.  It does not appear that her problem is related to biliary tract disease.  She does have reflux and although the recent upper GI revealed minimal reflux, I do think her symptoms are related to esophageal reflux with esophageal spasm.  10/21/2014--air-contrast upper GI series revealed trace upper laryngeal penetration.   Persistent small partially reducible sliding hiatal hernia the upper stomach with    • Gastroesophageal reflux disease without esophagitis 6/6/2019   • Generalized anxiety disorder 4/11/2017   • History of Acute deep vein thrombosis (DVT) of distal end of left lower extremity 2/15/2017    03/21/2017 patient seen in follow-up and she is tolerating the Eliquis well without signs or symptoms of bleeding.  Her calf swelling and tenderness is better but not totally resolved.  I suspect that the DVT is chronic and may not resolve at all.  I will order a repeat venous study and then proceed from there.  02/23/2017--repeat Doppler venous study of the left lower extremity reveals a chronic left lower extremity DVT in the posterior tibial.  All other left sided veins appeared normal.  Fluid collection in the left calf noted.  02/17/2017--patient seen in follow-up and reports her left lower extremity symptoms are about the same.  She continues to have complaints of profound fatigue which I think is multifactorial including underlying depression that is not in remission.  Review the results of the CTA of the chest including the possible thyroid lesion.  I do not think this is contributing to any of her symptoms of fatigue particularly given the fact that her thyroid function tests are normal.  I expla   • History of palpitations 12/07/2011    Patient has had multiple admissions to the hospital for complaints of chest pain and heart palpitations. She meant admitted at least on 3 occasions. She has had at least 3 stress Cardiolite and 1 stress ECG, the last Cardiolite being performed 12/07/2011 which was negative. Patient is also had Holter monitors which have been unremarkable.   • HIstory of Schatzki's ring 02/28/2012 02/28/2012--air-contrast upper GI revealed small to moderate sized reducible sliding hiatal herniation of the upper stomach with some demonstrated gastroesophageal reflux. No esophageal, gastric, or duodenal  mass or mucosal ulceration was seen.  11/03/2008--EGD performed for evaluation of iron deficiency anemia revealed hiatal hernia without evidence of reflux, prepyloric antritis and   • Hyperlipidemia 4/8/2016   • Hypersomnolence 5/5/2016 06/14/2017--overnight oximetry revealed oxygen desaturation index of only 0.44.  There were only 10 total desaturations during the period of testing which lasted 6 hours and 46 minutes.  05/31/2017--patient seen in follow-up and reports she continues to have chronic fatigue as well as hypersomnolence.  Once again, she is on multiple medications that are undoubtedly contributing to this problem including clonazepam and hydrocodone but I doubt that these could be discontinued.  Her CBC back in April revealed a low hemoglobin of 10.8 but hematocrit was normal at 35.7.  Thyroid function tests were normal and CMP normal.  Overnight oximetry test ordered.  Repeat CBC.  Iron studies.  Sedimentation rate and CRP.  04/11/2017--patient seen in follow-up and her blood pressure is now at a reasonable level at 120/64.  She reports she still feels somewhat fatigued but she is much better.  Patient has not been doing any regular exercise and I do think that that would be helpful to reduce her feeling of fatigue.  She is   • Menopausal state 4/8/2016   • Multinodular goiter 2/17/2017 02/21/2017--thyroid ultrasound reveals multinodular thyroid with largest nodule measuring on the order of 1.6 cm in greatest dimension.  02/17/2017--patient seen in follow-up for DVT and CTA of the chest.  An incidental finding on the CTA of the chest reveals a 1.7 cm lesion in the right lobe of thyroid.  Note that thyroid function tests are currently normal.  Ultrasound of the thyroid ordered.   • Osteoporosis, 03/29/2011--lumbar spine -2.8.  Right femoral neck -2.4.  Left femoral neck -2.4.  Patient receives Reclast infusions. 2/9/2009 04/19/2017--Reclast infusion.  04/05/2016--Reclast infusion.   06/04/2012--Reclast infusion.  05/26/2011--Reclast infusion.  03/29/2011--DEXA scan revealed a lumbar T score of -2.8, right femoral neck T score -2.4, left femoral neck T score -2.4.  Osteoporosis of the lumbar spine and severe osteopenia of the hips bilaterally.  Patient has been intolerant to Fosamax because of gastritis and gastroesophageal reflux.  04/01/2009--treatment for osteoporosis begun with Fosamax.  02/09/2009--DEXA scan revealed lumbar spine T score of -3.3.  Left femoral neck T score -2.5.  Right hip T score -2.6.  Osteoporosis.    • Pancreatic Duct Dilation 11/30/2001 09/29/2014--patient was again evaluated by the urologist for a renal cyst.  CT scan of the abdomen and pelvis reveals a left renal cyst measuring 5.1 x 4.9 cm.  There were subcentimeter hypodense renal lesions that are too small to further characterize and are presumably related to cysts.  Recommend attention to follow up.  Distal dilated pancreatic duct noted.  The common bile duct is the upper limits of normal in size.  The ampullary region is not well evaluated.  Comparison with prior imaging is recommended if available.  If there is no prior film available for comparison, and ERCP or MRCP could be performed to further evaluate.  Small hiatal hernia noted.  03/05/2012--CT scan of the abdomen with contrast, pancreatic protocol.  This reveals some fullness of the pancreatic ductal system and apparent pancreatic divisum with separate entrance of the accessory pancreatic duct extending into the duodenum distal to the main pancreatic duct.  There is fullness of the duct diffusely but similar to the previous s   • Pedal edema 6/29/2015 06/29/2015--patient presents with a 4-6 week history of exertional dyspnea that comes on with activity such as climbing stairs or walking her dog up an incline.  No chest pain.  Relieved with rest.  No cough.  She does have complaints of her feet and legs swelling that is particularly worse at the end  of the day and not improved overnight.  No orthopnea or PND.  Chest exam reveals faint rales at the bases.  Otherwise clear.  Heart is regular and I do not appreciate a heart murmur.  Chest x-ray PA and lateral ordered.  Echocardiogram ordered.   • Pulmonary hypertension (CMS/HCC) 4/18/2019   • Restless legs syndrome 4/8/2016   • Simple renal cyst 7/20/2009 09/29/2014--patient was again evaluated by the urologist for a renal cyst.  CT scan of the abdomen and pelvis reveals a left renal cyst measuring 5.1 x 4.9 cm.  There were subcentimeter hypodense renal lesions that are too small to further characterize and are presumably related to cysts.  Recommend attention to follow up.  Distal dilated pancreatic duct noted.  The common bile duct is the upper limits of normal in size.  The ampullary region is not well evaluated.  Comparison with prior imaging is recommended if available.  If there is no prior film available for comparison, and ERCP or MRCP could be performed to further evaluate.  Small hiatal hernia noted.  07/20/2009--patient was noted to have a left renal mass consistent with a cyst.  This was evaluated by the urologist 07/20/2009.   • Statin intolerance 10/30/2017   • Vitamin D deficiency 4/8/2016         Past Surgical History:   Procedure Laterality Date   • APPENDECTOMY  1965    1965   • CATARACT EXTRACTION Bilateral Remote    Remote bilateral cataract extirpation with intraocular lens implantation.   • CHOLECYSTECTOMY WITH INTRAOPERATIVE CHOLANGIOGRAM N/A 1/21/2019 01/21/2019--laparoscopic paraesophageal hernia repair with fundoplication.   • COLONOSCOPY  11/03/2008 2008--normal colonoscopy   • COLONOSCOPY  2001 2001--normal colonoscopy.   • COLONOSCOPY N/A 6/13/2017 06/13/2017--colonoscopy revealed melanosis.  Biopsied.  There was friability with contact bleeding in the ascending colon.  Biopsied.  Pathology returned consistent with melanosis coli.   • ENDOSCOPY  10/08/2014     02/28/2012--air-contrast upper GI revealed small to moderate sized reducible sliding hiatal herniation of the upper stomach with some demonstrated gastroesophageal reflux. No esophageal, gastric, or duodenal mass or mucosal ulceration was seen. 11/03/2008--EGD performed for evaluation of iron deficiency anemia revealed hiatal hernia without evidence of reflux, prepyloric antritis and   • ENDOSCOPY  11/03/2008 11/03/2008--EGD performed for evaluation of iron deficiency anemia revealed hiatal hernia without evidence of reflux, prepyloric antritis and   • ENDOSCOPY  11/19/2001 11/19/2001--EGD revealed a very tortuous distal esophagus with a Schatzki's ring. No ulcer or erosions. No Dorado's mucosa. Stomach revealed patchy erythema and erosions in the antrum. Biopsy. Normal pylorus with no obstruction. Normal duodenum with no ulcers. The Schatzki's ring was dilated with a Rhodes dilator.;   • ENDOSCOPY N/A 9/27/2016 09/27/2016--EGD revealed a normal oropharynx, esophagus, and medium sized hiatal hernia.  Nonbleeding gastric ulcer with no stigmata of bleeding.  Biopsy.  Gastritis.  Biopsy.  Normal examined duodenum.  Biopsied.  Pathology returned mild to moderate chronic active gastritis with ulceration from the stomach antral ulcer biopsy.  Gastroesophageal junction biopsy revealed minimal mixed inflammation.   • ENDOSCOPY N/A 6/13/2017 06/13/2017--colonoscopy revealed melanosis.  Biopsied.  There was friability with contact bleeding in the ascending colon.  Biopsied.  Pathology returned consistent with melanosis coli.   • ERCP N/A 1/22/2019 01/22/2019--ERCP with sphincterotomy and balloon stone extraction.   • ESOPHAGEAL DILATATION  11/19/2001 11/19/2001--EGD revealed a very tortuous distal esophagus with a Schatzki's ring. No ulcer or erosions. No Dorado's mucosa. Stomach revealed patchy erythema and erosions in the antrum. Biopsy. Normal pylorus with no obstruction. Normal duodenum with no  ulcers. The Schatzki's ring was dilated with a Rhodes dilator.;    • ESOPHAGEAL MANOMETRY N/A 12/18/2018    Procedure: ESOPHAGEAL MANOMETRY;  Surgeon: Cecilia, Nurse Performed;  [facility];  Service: Gastroenterology   • ESOPHAGOSCOPY N/A 1/21/2019    Procedure: FLEXIBLE ESOPHAGOSCOPY;  Surgeon: Reji Johnson MD;  Location: [facility] MAIN OR;  Service: General   • HIATAL HERNIA REPAIR N/A 1/21/2019    Procedure: Laparoscopic paraesophageal hernia repair with toupee fundoplication;  Surgeon: Reji Johnson MD;  Location: [facility] MAIN OR;  Service: General   • INCONTINENCE SURGERY  1979 1979--a bladder tack procedure for urinary stress incontinence   • KNEE ARTHROSCOPY Right 12/12/2011 12/12/2011--right knee arthroscopy with partial lateral and medial meniscectomies.   • SKIN SURGERY  05/25/2010 05/25/2010--skin lesion excised from right lower extremity. 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   • TOTAL ABDOMINAL HYSTERECTOMY WITH SALPINGO OOPHORECTOMY  1974 1974--total abdominal hysterectomy.         Allergies   Allergen Reactions   • Statins Nausea And Vomiting   • Morphine Hallucinations   • Penicillins Itching   • Tetanus Toxoids Itching           Current Outpatient Medications:   •  aspirin 81 MG EC tablet, Take 81 mg by mouth Daily. HELD, Disp: , Rfl:   •  buPROPion XL (WELLBUTRIN XL) 150 MG 24 hr tablet, Take 150 mg by mouth 2 (Two) Times a Day., Disp: , Rfl: 0  •  cholecalciferol (VITAMIN D3) 1000 units tablet, Take 1,000 Units by mouth Daily., Disp: , Rfl:   •  clonazePAM (KlonoPIN) 1 MG tablet, TAKE 1/2 TO 1 TABLET BY MOUTH TWICE DAILY, Disp: 60 tablet, Rfl: 3  •  cycloSPORINE (RESTASIS) 0.05 % ophthalmic emulsion, Administer 1 drop to both eyes 2 (Two) Times a Day., Disp: , Rfl:   •  diclofenac (VOLTAREN) 1 % gel gel, Apply 4 g topically to the appropriate area as directed 4 (Four) Times a Day As Needed., Disp: , Rfl:   •  estradiol  (ESTRACE) 1 MG tablet, TAKE 1 TABLET BY MOUTH DAILY, Disp: 90 tablet, Rfl: 1  •  gabapentin (NEURONTIN) 100 MG capsule, Take 100 mg by mouth Every Night., Disp: , Rfl: 2  •  Ginkgo 60 MG tablet, Take 1 tablet by mouth Daily., Disp: , Rfl:   •  HYDROcodone-acetaminophen (NORCO)  MG per tablet, Take 1 tablet by mouth every 6 (six) hours as needed for moderate pain (4-6)., Disp: , Rfl:   •  Misc Natural Products (COLON CLEANSE PO), Take  by mouth., Disp: , Rfl:   •  Multiple Vitamins-Minerals (MULTIVITAMIN ADULT) chewable tablet, Chew 1 tablet Daily., Disp: , Rfl:   •  topiramate (TOPAMAX) 100 MG tablet, TAKE 1 TABLET DAILY, Disp: 90 tablet, Rfl: 1  •  triamterene-hydrochlorothiazide (DYAZIDE) 37.5-25 MG per capsule, TAKE ONE CAPSULE BY MOUTH DAILY FOR BLOOD PRESSURE AND LEG SWELLING, Disp: 90 capsule, Rfl: 1  •  venlafaxine (EFFEXOR) 75 MG tablet, Take 75 mg by mouth Daily., Disp: , Rfl:   •  vitamin E 400 UNIT capsule, Take 400 Units by mouth Daily., Disp: , Rfl:       Family History   Problem Relation Age of Onset   • Heart attack Mother         dies age 47 from heart attack   • Heart disease Mother    • Bleeding Disorder Mother    • Heart attack Maternal Aunt         dies age 60 from heart attack   • Heart disease Father    • Malig Hyperthermia Neg Hx          Social History     Socioeconomic History   • Marital status:      Spouse name: ander   • Number of children: 4   • Years of education: 14   • Highest education level: Associate degree: academic program   Occupational History   • Occupation: homemaker   Social Needs   • Financial resource strain: Not hard at all   • Food insecurity     Worry: Never true     Inability: Never true   • Transportation needs     Medical: Yes     Non-medical: Yes   Tobacco Use   • Smoking status: Never Smoker   • Smokeless tobacco: Never Used   Substance and Sexual Activity   • Alcohol use: No     Frequency: Never     Binge frequency: Never   • Drug use: No   • Sexual  "activity: Yes     Partners: Male   Lifestyle   • Physical activity     Days per week: 3 days     Minutes per session: 20 min   • Stress: Not at all   Relationships   • Social connections     Talks on phone: More than three times a week     Gets together: Once a week     Attends [employer] service: 1 to 4 times per year     Active member of club or organization: Yes     Attends meetings of clubs or organizations: 1 to 4 times per year     Relationship status:          Vitals:    09/15/20 0954   BP: 116/60   BP Location: Left arm   Pulse: 68   SpO2: 98%   Weight: 62 kg (136 lb 9.6 oz)   Height: 152.4 cm (60\")        Body mass index is 26.68 kg/m².      Physical Exam:    General: Alert and oriented x 3.  No acute distress.  Normal affect.  HEENT: Pupils equal, round, reactive to light; extraocular movements intact; sclerae nonicteric; pharynx, ear canals and TMs normal.  Neck: Without JVD, thyromegaly, bruit, or adenopathy.  The left sternocleidomastoid muscle is definitely prominent as compared to the right.  There may be some shotty lymphadenopathy but I do not think there is anything pathologic in that regard.  Hoarseness noted.  Lungs: Clear to auscultation in all fields.  Heart: Regular rate and rhythm without murmur, rub, gallop, or click.  Abdomen: Soft, nontender, without hepatosplenomegaly or hernia.  Bowel sounds normal.  : Deferred.  Rectal: Deferred.  Extremities: Without clubbing, cyanosis, edema, or pulse deficit.  Neurologic: Intact without focal deficit.  Normal station and gait observed during ingress and egress from the examination room.  Skin: Without significant lesion.  Musculoskeletal: Unremarkable.    Lab/other results:      Assessment/Plan:     Diagnosis Plan   1. Neck swelling  Ambulatory Referral to ENT (Otolaryngology)   2. Chronic hoarseness  Ambulatory Referral to ENT (Otolaryngology)   3. Need for influenza vaccination  Fluad Quad 65+ yrs ([id number])   4. Hyperlipidemia  CK    " Comprehensive Metabolic Panel    NMR LipoProfile    TSH    T4, Free    T3, Free   5. Chronic renal insufficiency, stage II (mild), creatinine 1.12  CBC (No Diff)   6. Benign essential hypertension     7. Vitamin D deficiency  Vitamin D 25 Hydroxy   8. Chronic anemia  Iron Profile   9. Multinodular goiter     10. Iron deficiency anemia due to chronic blood loss  Iron Profile   11. Therapeutic drug monitoring       Patient has complaints of chronic hoarseness and also has subjective swelling of the left side of the neck and I think this needs to be further evaluated by ENT.  We will hold off on any specific treatment for this until we have a better handle on exactly what the problem is.  She has several other medical problems as noted above that were not formally evaluated today but these diagnoses were added to the assessment and plan in order to facilitate future lab work.    Plan is as follows: ENT referral given.  High-dose influenza vaccine given.  We will set up fasting lab, follow-up, subsequent Medicare wellness visit after November 14, 2020.      Procedures

## 2020-10-02 RX ORDER — ESTRADIOL 1 MG/1
TABLET ORAL
Qty: 90 TABLET | Refills: 1 | Status: SHIPPED | OUTPATIENT
Start: 2020-10-02 | End: 2021-05-25

## 2020-10-22 ENCOUNTER — LAB (OUTPATIENT)
Dept: LAB | Facility: HOSPITAL | Age: 80
End: 2020-10-22

## 2020-10-22 ENCOUNTER — TRANSCRIBE ORDERS (OUTPATIENT)
Dept: ADMINISTRATIVE | Facility: HOSPITAL | Age: 80
End: 2020-10-22

## 2020-10-22 DIAGNOSIS — H53.2 DIPLOPIA: Primary | ICD-10-CM

## 2020-10-22 DIAGNOSIS — H53.2 DIPLOPIA: ICD-10-CM

## 2020-10-22 LAB
CRP SERPL-MCNC: 0.77 MG/DL (ref 0–0.5)
ERYTHROCYTE [SEDIMENTATION RATE] IN BLOOD: 11 MM/HR (ref 0–30)

## 2020-10-22 PROCEDURE — 86140 C-REACTIVE PROTEIN: CPT | Performed by: SPECIALIST

## 2020-10-22 PROCEDURE — 36415 COLL VENOUS BLD VENIPUNCTURE: CPT

## 2020-10-22 PROCEDURE — 85652 RBC SED RATE AUTOMATED: CPT | Performed by: SPECIALIST

## 2020-10-22 PROCEDURE — 83519 RIA NONANTIBODY: CPT | Performed by: SPECIALIST

## 2020-10-26 LAB — ACHR BIND AB SER-SCNC: <0.03 NMOL/L (ref 0–0.24)

## 2020-11-02 LAB — ACHR MOD AB/ACHR TOTAL SFR SER: <12 % (ref 0–20)

## 2020-11-05 DIAGNOSIS — IMO0002 CHRONIC MIGRAINE: Primary | Chronic | ICD-10-CM

## 2020-11-05 DIAGNOSIS — G25.81 RESTLESS LEGS SYNDROME: Primary | Chronic | ICD-10-CM

## 2020-11-05 RX ORDER — GABAPENTIN 100 MG/1
100 CAPSULE ORAL NIGHTLY
Qty: 90 CAPSULE | Refills: 1 | Status: SHIPPED | OUTPATIENT
Start: 2020-11-05 | End: 2021-12-21 | Stop reason: ALTCHOICE

## 2020-11-05 RX ORDER — TOPIRAMATE 100 MG/1
TABLET, FILM COATED ORAL
Qty: 90 TABLET | Refills: 4 | Status: SHIPPED | OUTPATIENT
Start: 2020-11-05

## 2020-11-12 ENCOUNTER — LAB (OUTPATIENT)
Dept: LAB | Facility: HOSPITAL | Age: 80
End: 2020-11-12

## 2020-11-12 DIAGNOSIS — D64.9 CHRONIC ANEMIA: Chronic | ICD-10-CM

## 2020-11-12 DIAGNOSIS — N18.2 CHRONIC RENAL INSUFFICIENCY, STAGE II (MILD): Chronic | ICD-10-CM

## 2020-11-12 DIAGNOSIS — E55.9 VITAMIN D DEFICIENCY: Chronic | ICD-10-CM

## 2020-11-12 DIAGNOSIS — E78.2 MIXED HYPERLIPIDEMIA: Chronic | ICD-10-CM

## 2020-11-12 DIAGNOSIS — D50.0 IRON DEFICIENCY ANEMIA DUE TO CHRONIC BLOOD LOSS: Chronic | ICD-10-CM

## 2020-11-12 LAB
25(OH)D3 SERPL-MCNC: 65.9 NG/ML (ref 30–100)
ALBUMIN SERPL-MCNC: 4 G/DL (ref 3.5–5.2)
ALBUMIN/GLOB SERPL: 1.8 G/DL
ALP SERPL-CCNC: 68 U/L (ref 39–117)
ALT SERPL W P-5'-P-CCNC: 23 U/L (ref 1–33)
ANION GAP SERPL CALCULATED.3IONS-SCNC: 11.3 MMOL/L (ref 5–15)
AST SERPL-CCNC: 26 U/L (ref 1–32)
BILIRUB SERPL-MCNC: 0.3 MG/DL (ref 0–1.2)
BUN SERPL-MCNC: 21 MG/DL (ref 8–23)
BUN/CREAT SERPL: 32.3 (ref 7–25)
CALCIUM SPEC-SCNC: 8.5 MG/DL (ref 8.6–10.5)
CHLORIDE SERPL-SCNC: 104 MMOL/L (ref 98–107)
CK SERPL-CCNC: 76 U/L (ref 20–180)
CO2 SERPL-SCNC: 22.7 MMOL/L (ref 22–29)
CREAT SERPL-MCNC: 0.65 MG/DL (ref 0.57–1)
DEPRECATED RDW RBC AUTO: 44.8 FL (ref 37–54)
ERYTHROCYTE [DISTWIDTH] IN BLOOD BY AUTOMATED COUNT: 14.8 % (ref 12.3–15.4)
GFR SERPL CREATININE-BSD FRML MDRD: 88 ML/MIN/1.73
GLOBULIN UR ELPH-MCNC: 2.2 GM/DL
GLUCOSE SERPL-MCNC: 81 MG/DL (ref 65–99)
HCT VFR BLD AUTO: 37.7 % (ref 34–46.6)
HGB BLD-MCNC: 12.3 G/DL (ref 12–15.9)
IRON 24H UR-MRATE: 69 MCG/DL (ref 37–145)
IRON SATN MFR SERPL: 11 % (ref 20–50)
MCH RBC QN AUTO: 27.3 PG (ref 26.6–33)
MCHC RBC AUTO-ENTMCNC: 32.6 G/DL (ref 31.5–35.7)
MCV RBC AUTO: 83.8 FL (ref 79–97)
PLATELET # BLD AUTO: 373 10*3/MM3 (ref 140–450)
PMV BLD AUTO: 9.9 FL (ref 6–12)
POTASSIUM SERPL-SCNC: 3.1 MMOL/L (ref 3.5–5.2)
PROT SERPL-MCNC: 6.2 G/DL (ref 6–8.5)
RBC # BLD AUTO: 4.5 10*6/MM3 (ref 3.77–5.28)
SODIUM SERPL-SCNC: 138 MMOL/L (ref 136–145)
T3FREE SERPL-MCNC: 3.29 PG/ML (ref 2–4.4)
T4 FREE SERPL-MCNC: 1.29 NG/DL (ref 0.93–1.7)
TIBC SERPL-MCNC: 623 MCG/DL (ref 298–536)
TRANSFERRIN SERPL-MCNC: 418 MG/DL (ref 200–360)
TSH SERPL DL<=0.05 MIU/L-ACNC: 1.38 UIU/ML (ref 0.27–4.2)
WBC # BLD AUTO: 6.46 10*3/MM3 (ref 3.4–10.8)

## 2020-11-12 PROCEDURE — 84481 FREE ASSAY (FT-3): CPT

## 2020-11-12 PROCEDURE — 80061 LIPID PANEL: CPT

## 2020-11-12 PROCEDURE — 84443 ASSAY THYROID STIM HORMONE: CPT

## 2020-11-12 PROCEDURE — 84439 ASSAY OF FREE THYROXINE: CPT

## 2020-11-12 PROCEDURE — 83704 LIPOPROTEIN BLD QUAN PART: CPT

## 2020-11-12 PROCEDURE — 82306 VITAMIN D 25 HYDROXY: CPT

## 2020-11-12 PROCEDURE — 82550 ASSAY OF CK (CPK): CPT

## 2020-11-12 PROCEDURE — 85027 COMPLETE CBC AUTOMATED: CPT

## 2020-11-12 PROCEDURE — 83540 ASSAY OF IRON: CPT

## 2020-11-12 PROCEDURE — 36415 COLL VENOUS BLD VENIPUNCTURE: CPT

## 2020-11-12 PROCEDURE — 84466 ASSAY OF TRANSFERRIN: CPT

## 2020-11-12 PROCEDURE — 80053 COMPREHEN METABOLIC PANEL: CPT

## 2020-11-14 LAB
CHOLEST SERPL-MCNC: 213 MG/DL (ref 100–199)
HDL SERPL-SCNC: 53.5 UMOL/L
HDLC SERPL-MCNC: 115 MG/DL
LDL SERPL QN: 21.2 NM
LDL SERPL-SCNC: 875 NMOL/L
LDL SMALL SERPL-SCNC: <90 NMOL/L
LDLC SERPL CALC-MCNC: 79 MG/DL (ref 0–99)
TRIGL SERPL-MCNC: 115 MG/DL (ref 0–149)

## 2020-11-19 ENCOUNTER — OFFICE VISIT (OUTPATIENT)
Dept: INTERNAL MEDICINE | Facility: CLINIC | Age: 80
End: 2020-11-19

## 2020-11-19 VITALS
HEART RATE: 73 BPM | SYSTOLIC BLOOD PRESSURE: 122 MMHG | DIASTOLIC BLOOD PRESSURE: 60 MMHG | HEIGHT: 61 IN | WEIGHT: 140 LBS | OXYGEN SATURATION: 99 % | BODY MASS INDEX: 26.43 KG/M2

## 2020-11-19 DIAGNOSIS — I10 BENIGN ESSENTIAL HYPERTENSION: Chronic | ICD-10-CM

## 2020-11-19 DIAGNOSIS — E78.2 MIXED HYPERLIPIDEMIA: Chronic | ICD-10-CM

## 2020-11-19 DIAGNOSIS — E55.9 VITAMIN D DEFICIENCY: Chronic | ICD-10-CM

## 2020-11-19 DIAGNOSIS — F41.8 DEPRESSION WITH ANXIETY: Chronic | ICD-10-CM

## 2020-11-19 DIAGNOSIS — K21.9 GASTROESOPHAGEAL REFLUX DISEASE WITHOUT ESOPHAGITIS: Chronic | ICD-10-CM

## 2020-11-19 DIAGNOSIS — G47.10 HYPERSOMNOLENCE: Chronic | ICD-10-CM

## 2020-11-19 DIAGNOSIS — H90.3 BILATERAL SENSORINEURAL HEARING LOSS: Chronic | ICD-10-CM

## 2020-11-19 DIAGNOSIS — R53.82 CHRONIC FATIGUE: Chronic | ICD-10-CM

## 2020-11-19 DIAGNOSIS — D64.9 CHRONIC ANEMIA: Chronic | ICD-10-CM

## 2020-11-19 DIAGNOSIS — H69.83 DYSFUNCTION OF BOTH EUSTACHIAN TUBES: ICD-10-CM

## 2020-11-19 DIAGNOSIS — N18.2 CHRONIC RENAL INSUFFICIENCY, STAGE II (MILD): Chronic | ICD-10-CM

## 2020-11-19 DIAGNOSIS — I65.23 ATHEROSCLEROSIS OF BOTH CAROTID ARTERIES: Chronic | ICD-10-CM

## 2020-11-19 DIAGNOSIS — G89.29 CHRONIC LOW BACK PAIN WITH SCIATICA, SCIATICA LATERALITY UNSPECIFIED, UNSPECIFIED BACK PAIN LATERALITY: Chronic | ICD-10-CM

## 2020-11-19 DIAGNOSIS — E87.6 HYPOKALEMIA: ICD-10-CM

## 2020-11-19 DIAGNOSIS — Z78.0 POSTMENOPAUSAL STATE: Chronic | ICD-10-CM

## 2020-11-19 DIAGNOSIS — M54.40 CHRONIC LOW BACK PAIN WITH SCIATICA, SCIATICA LATERALITY UNSPECIFIED, UNSPECIFIED BACK PAIN LATERALITY: Chronic | ICD-10-CM

## 2020-11-19 DIAGNOSIS — IMO0002 CHRONIC MIGRAINE: Chronic | ICD-10-CM

## 2020-11-19 DIAGNOSIS — H93.13 BILATERAL TINNITUS: ICD-10-CM

## 2020-11-19 DIAGNOSIS — R49.0 CHRONIC HOARSENESS: ICD-10-CM

## 2020-11-19 DIAGNOSIS — Z51.81 THERAPEUTIC DRUG MONITORING: ICD-10-CM

## 2020-11-19 DIAGNOSIS — K25.7 CHRONIC GASTRIC ULCER WITHOUT HEMORRHAGE AND WITHOUT PERFORATION: Chronic | ICD-10-CM

## 2020-11-19 DIAGNOSIS — Z78.9 STATIN INTOLERANCE: Chronic | ICD-10-CM

## 2020-11-19 DIAGNOSIS — I27.20 PULMONARY HYPERTENSION (HCC): Chronic | ICD-10-CM

## 2020-11-19 DIAGNOSIS — E04.2 MULTINODULAR GOITER: Chronic | ICD-10-CM

## 2020-11-19 DIAGNOSIS — D50.0 IRON DEFICIENCY ANEMIA DUE TO CHRONIC BLOOD LOSS: Chronic | ICD-10-CM

## 2020-11-19 DIAGNOSIS — F45.42 PAIN DISORDER WITH PSYCHOLOGICAL FACTORS: Chronic | ICD-10-CM

## 2020-11-19 DIAGNOSIS — Z11.59 NEED FOR HEPATITIS C SCREENING TEST: ICD-10-CM

## 2020-11-19 DIAGNOSIS — N95.1 MENOPAUSAL STATE: Chronic | ICD-10-CM

## 2020-11-19 DIAGNOSIS — R60.0 PEDAL EDEMA: Chronic | ICD-10-CM

## 2020-11-19 DIAGNOSIS — M81.0 AGE-RELATED OSTEOPOROSIS WITHOUT CURRENT PATHOLOGICAL FRACTURE: Chronic | ICD-10-CM

## 2020-11-19 DIAGNOSIS — I65.23 ATHEROSCLEROSIS OF BOTH CAROTID ARTERIES: ICD-10-CM

## 2020-11-19 DIAGNOSIS — Z00.00 ENCOUNTER FOR SUBSEQUENT ANNUAL WELLNESS VISIT (AWV) IN MEDICARE PATIENT: Primary | ICD-10-CM

## 2020-11-19 DIAGNOSIS — F41.1 GENERALIZED ANXIETY DISORDER: Chronic | ICD-10-CM

## 2020-11-19 DIAGNOSIS — G25.81 RESTLESS LEGS SYNDROME: Chronic | ICD-10-CM

## 2020-11-19 PROBLEM — R22.1 NECK SWELLING: Status: RESOLVED | Noted: 2020-09-15 | Resolved: 2020-11-19

## 2020-11-19 PROCEDURE — G0439 PPPS, SUBSEQ VISIT: HCPCS | Performed by: INTERNAL MEDICINE

## 2020-11-19 PROCEDURE — 99214 OFFICE O/P EST MOD 30 MIN: CPT | Performed by: INTERNAL MEDICINE

## 2020-11-19 RX ORDER — DIPHENOXYLATE HYDROCHLORIDE AND ATROPINE SULFATE 2.5; .025 MG/1; MG/1
TABLET ORAL
COMMUNITY
End: 2020-11-19

## 2020-11-19 RX ORDER — POTASSIUM CHLORIDE 750 MG/1
TABLET, EXTENDED RELEASE ORAL
Qty: 270 TABLET | Refills: 3 | Status: SHIPPED | OUTPATIENT
Start: 2020-11-19 | End: 2021-12-21 | Stop reason: ALTCHOICE

## 2020-11-19 RX ORDER — BUPROPION HYDROCHLORIDE 75 MG/1
TABLET ORAL
COMMUNITY
End: 2020-11-19

## 2020-11-19 NOTE — PROGRESS NOTES
The ABCs of the Annual Wellness Visit  Subsequent Medicare Wellness Visit    No chief complaint on file.      Subjective   History of Present Illness:  Sachi Vora is a 80 y.o. female who presents for a Subsequent Medicare Wellness Visit.    HEALTH RISK ASSESSMENT    Recent Hospitalizations:  No hospitalization(s) within the last year.    Current Medical Providers:  Patient Care Team:  Cruzito Weir MD as PCP - General (Internal Medicine)    Smoking Status:  Social History     Tobacco Use   Smoking Status Never Smoker   Smokeless Tobacco Never Used       Alcohol Consumption:  Social History     Substance and Sexual Activity   Alcohol Use No   • Frequency: Never   • Binge frequency: Never       Depression Screen:   PHQ-2/PHQ-9 Depression Screening 11/19/2020   Little interest or pleasure in doing things 1   Feeling down, depressed, or hopeless 1   Trouble falling or staying asleep, or sleeping too much 1   Feeling tired or having little energy 2   Poor appetite or overeating 1   Feeling bad about yourself - or that you are a failure or have let yourself or your family down 0   Trouble concentrating on things, such as reading the newspaper or watching television 1   Moving or speaking so slowly that other people could have noticed. Or the opposite - being so fidgety or restless that you have been moving around a lot more than usual 1   Thoughts that you would be better off dead, or of hurting yourself in some way 0   Total Score 8   If you checked off any problems, how difficult have these problems made it for you to do your work, take care of things at home, or get along with other people? Very difficult       Fall Risk Screen:  STEADI Fall Risk Assessment was completed, and patient is at LOW risk for falls.Assessment completed on:11/19/2020    Health Habits and Functional and Cognitive Screening:  Functional & Cognitive Status 11/19/2020   Do you have difficulty preparing food and eating? No   Do you have  difficulty bathing yourself, getting dressed or grooming yourself? No   Do you have difficulty using the toilet? No   Do you have difficulty moving around from place to place? No   Do you have trouble with steps or getting out of a bed or a chair? No   Current Diet Well Balanced Diet   Dental Exam Up to date   Eye Exam Up to date   Exercise (times per week) 2 times per week   Current Exercises Include Walking   Current Exercise Activities Include -   Do you need help using the phone?  No   Are you deaf or do you have serious difficulty hearing?  No   Do you need help with transportation? No   Do you need help shopping? No   Do you need help preparing meals?  No   Do you need help with housework?  No   Do you need help with laundry? No   Do you need help taking your medications? No   Do you need help managing money? No   Do you ever drive or ride in a car without wearing a seat belt? No   Have you felt unusual stress, anger or loneliness in the last month? No   Who do you live with? Spouse   If you need help, do you have trouble finding someone available to you? No   Have you been bothered in the last four weeks by sexual problems? -   Do you have difficulty concentrating, remembering or making decisions? No         Does the patient have evidence of cognitive impairment? No    Asprin use counseling:Taking ASA appropriately as indicated    Age-appropriate Screening Schedule:  Refer to the list below for future screening recommendations based on patient's age, sex and/or medical conditions. Orders for these recommended tests are listed in the plan section. The patient has been provided with a written plan.    Health Maintenance   Topic Date Due   • MAMMOGRAM  07/16/2021   • LIPID PANEL  11/12/2021   • DXA SCAN  12/02/2021   • TDAP/TD VACCINES (2 - Td) 02/15/2027   • COLONOSCOPY  06/13/2027   • INFLUENZA VACCINE  Completed   • ZOSTER VACCINE  Discontinued          The following portions of the patient's history were  reviewed and updated as appropriate: allergies, current medications, past family history, past medical history, past social history, past surgical history and problem list.    Outpatient Medications Prior to Visit   Medication Sig Dispense Refill   • aspirin 81 MG EC tablet Take 81 mg by mouth Daily. HELD     • buPROPion XL (WELLBUTRIN XL) 150 MG 24 hr tablet Take 150 mg by mouth 2 (Two) Times a Day.  0   • Cimetidine (TAGAMET PO) Tagamet     • clonazePAM (KlonoPIN) 1 MG tablet TAKE 1/2 TO 1 TABLET BY MOUTH TWICE DAILY 60 tablet 3   • cycloSPORINE (RESTASIS) 0.05 % ophthalmic emulsion Administer 1 drop to both eyes 2 (Two) Times a Day.     • diclofenac (VOLTAREN) 1 % gel gel Apply 4 g topically to the appropriate area as directed 4 (Four) Times a Day As Needed.     • estradiol (ESTRACE) 1 MG tablet TAKE 1 TABLET BY MOUTH DAILY 90 tablet 1   • gabapentin (NEURONTIN) 100 MG capsule Take 1 capsule by mouth Every Night. 90 capsule 1   • HYDROcodone-acetaminophen (NORCO)  MG per tablet Take 1 tablet by mouth every 6 (six) hours as needed for moderate pain (4-6).     • Misc Natural Products (COLON CLEANSE PO) Take  by mouth.     • Multiple Vitamins-Minerals (MULTIVITAMIN ADULT) chewable tablet Chew 1 tablet Daily.     • topiramate (TOPAMAX) 100 MG tablet TAKE 1 TABLET DAILY 90 tablet 4   • triamterene-hydrochlorothiazide (DYAZIDE) 37.5-25 MG per capsule TAKE ONE CAPSULE BY MOUTH DAILY FOR BLOOD PRESSURE AND LEG SWELLING 90 capsule 1   • venlafaxine (EFFEXOR) 75 MG tablet Take 75 mg by mouth Daily.     • vitamin E 400 UNIT capsule Take 400 Units by mouth Daily.     • buPROPion (WELLBUTRIN) 75 MG tablet Wellbutrin 75 mg tablet     • Cholecalciferol (VITAMIN D-3 PO) Vitamin D3     • cholecalciferol (VITAMIN D3) 1000 units tablet Take 1,000 Units by mouth Daily.     • Ginkgo 60 MG tablet Take 1 tablet by mouth Daily.     • multivitamin (MULTI-VITAMIN DAILY PO) Multi-Vitamin     • hydroCHLOROthiazide (MICROZIDE PO)  hydrochlorothiazide       No facility-administered medications prior to visit.        Patient Active Problem List   Diagnosis   • Osteoporosis, 03/29/2011--lumbar spine -2.8.  Right femoral neck -2.4.  Left femoral neck -2.4.  Patient receives Reclast infusions.   • Therapeutic drug monitoring   • Simple renal cyst   • Benign essential hypertension   • Carotid artery plaque, 04/02/2018--mild right ICA plaque, normal left ICA 10/03/2014--mild bilateral carotid artery plaque.   • Chronic gastritis   • Chronic lower back pain   • Chronic otitis externa   • Chronic renal insufficiency, stage II (mild), creatinine 1.12   • Depression with anxiety   • Functional murmur, 07/15/2015--normal echocardiogram.   • Hyperlipidemia   • Menopausal state   • Chronic migraine   • Pancreatic Duct Dilation   • Pedal edema   • Restless legs syndrome   • Bilateral sensorineural hearing loss   • Vitamin D deficiency   • Chronic gastric ulcer   • Chronic fatigue   • Hypersomnolence   • Chronic anemia   • Multinodular goiter   • Generalized anxiety disorder   • Iron deficiency anemia   • Statin intolerance   • Postmenopausal state   • Pulmonary hypertension (CMS/HCC)   • Gastroesophageal reflux disease without esophagitis   • Bilateral tinnitus   • Dysfunction of both eustachian tubes   • Chronic hoarseness   • Pain disorder with psychological factors       Advanced Care Planning:  ACP discussion was held with the patient during this visit. Patient has an advance directive (not in EMR), copy requested.    Review of Systems   Constitutional: Positive for fatigue.   HENT: Positive for hearing loss and tinnitus.    Eyes: Negative.    Respiratory: Negative.    Cardiovascular: Negative.    Gastrointestinal: Negative.    Endocrine: Negative.    Genitourinary: Negative.    Musculoskeletal: Positive for arthralgias and back pain.   Skin: Negative.    Allergic/Immunologic: Negative.    Neurological: Negative.    Hematological: Negative.   "  Psychiatric/Behavioral: Positive for dysphoric mood. The patient is nervous/anxious.        Compared to one year ago, the patient feels her physical health is better.  Compared to one year ago, the patient feels her mental health is worse.    Reviewed chart for potential of high risk medication in the elderly: yes  Reviewed chart for potential of harmful drug interactions in the elderly:yes    Objective         Vitals:    11/19/20 1334   BP: 122/60   BP Location: Left arm   Patient Position: Sitting   Cuff Size: Adult   Pulse: 73   SpO2: 99%   Weight: 63.5 kg (140 lb)   Height: 153.7 cm (60.5\")       Body mass index is 26.89 kg/m².  Discussed the patient's BMI with her. The BMI is above average; BMI management plan is completed.    Physical Exam    General: Alert and oriented x 3.  No acute distress.  Normal affect.  HEENT: Pupils equal, round, reactive to light; extraocular movements intact; sclerae nonicteric; pharynx, ear canals and TMs normal.  Neck: Without JVD, thyromegaly, bruit, or adenopathy.  Lungs: Clear to auscultation in all fields.  Heart: Regular rate and rhythm without murmur, rub, gallop, or click.  Abdomen: Soft, nontender, without hepatosplenomegaly or hernia.  Bowel sounds normal.  : Deferred.  Rectal: Deferred.  Extremities: Without clubbing, cyanosis, edema, or pulse deficit.  Neurologic: Intact without focal deficit.  Normal station and gait observed during ingress and egress from the examination room.  Skin: Without significant lesion.  Musculoskeletal: Unremarkable.    Lab Results   Component Value Date    CHLPL 213 (H) 11/12/2020    TRIG 115 11/12/2020        Assessment/Plan   Medicare Risks and Personalized Health Plan  CMS Preventative Services Quick Reference  Advance Directive Discussion  Chronic Pain   Diabetic Lab Screening   Obesity/Overweight   Osteoprorosis Risk    The above risks/problems have been discussed with the patient.  Pertinent information has been shared with the " patient in the After Visit Summary.  Follow up plans and orders are seen below in the Assessment/Plan Section.    Diagnoses and all orders for this visit:    1. Encounter for subsequent annual wellness visit (AWV) in Medicare patient (Primary)    2. Hyperlipidemia  -     NMR LipoProfile; Future  -     TSH; Future  -     T4, Free; Future  -     T3, Free; Future    3. Chronic renal insufficiency, stage II (mild), creatinine 1.12  -     CBC (No Diff); Future  -     Comprehensive Metabolic Panel; Future    4. Benign essential hypertension    5. Osteoporosis, 03/29/2011--lumbar spine -2.8.  Right femoral neck -2.4.  Left femoral neck -2.4.  Patient receives Reclast infusions.    6. Carotid artery plaque, 04/02/2018--mild right ICA plaque, normal left ICA 10/03/2014--mild bilateral carotid artery plaque.  -     Duplex Carotid Ultrasound CAR    7. Chronic low back pain with sciatica, sciatica laterality unspecified, unspecified back pain laterality    8. Depression with anxiety    9. Menopausal state    10. Chronic migraine    11. Pedal edema    12. Restless legs syndrome    13. Bilateral sensorineural hearing loss    14. Bilateral tinnitus    15. Dysfunction of both eustachian tubes    16. Vitamin D deficiency  -     Cholecalciferol (vitamin D3) 125 MCG (5000 UT) capsule capsule; 1 by mouth daily as directed  Dispense: 30 capsule  -     Vitamin D 25 Hydroxy; Future    17. Chronic gastric ulcer without hemorrhage and without perforation    18. Chronic fatigue    19. Hypersomnolence    20. Chronic anemia    21. Multinodular goiter  -     TSH; Future  -     T4, Free; Future  -     T3, Free; Future    22. Generalized anxiety disorder    23. Iron deficiency anemia due to chronic blood loss    24. Statin intolerance    25. Postmenopausal state    26. Pulmonary hypertension (CMS/HCC)    27. Gastroesophageal reflux disease without esophagitis    28. Pain disorder with psychological factors    29. Chronic hoarseness    30.  Therapeutic drug monitoring    31. Need for hepatitis C screening test  -     Hepatitis C Antibody; Future    32. Hypokalemia  -     potassium chloride (K-DUR,KLOR-CON) 10 MEQ CR tablet; Take 1 p.o. 3 times daily at mealtime  Dispense: 270 tablet; Refill: 3      Follow Up:  No follow-ups on file.     An After Visit Summary and PPPS were given to the patient.

## 2020-11-19 NOTE — PROGRESS NOTES
11/19/2020    Patient Information  Sachi Vora                                                                                          [address]   [place] [address]      1940  [unfilled]  There is no work phone number on file.    Chief Complaint:     Subsequent Medicare wellness visit.  Follow-up lab work in order to monitor chronic medical issues listed in history of present illness.  No new acute complaints.    History of Present Illness:    Patient with a multitude of medical problems including hyperlipidemia, chronic renal insufficiency, hypertension, osteoporosis, carotid artery plaque, chronic lower back pain, depression with anxiety and generalized anxiety, postmenopausal state, chronic migraine headaches, pedal edema, restless leg syndrome, bilateral sensorineural hearing loss and tinnitus, chronic hoarseness, eustachian tube dysfunction, vitamin D deficiency, chronic gastric ulcer, chronic fatigue, hypersomnolence, chronic anemia, multinodular goiter, iron deficiency anemia, statin intolerance, pulmonary hypertension, esophageal reflux, chronic pain syndrome with psychological factors.  She presents today for subsequent Medicare wellness visit.  She also had lab work in order to monitor chronic medical issues.  Her past medical history reviewed and updated were necessary including health maintenance parameters.  This reveals she will be up-to-date or else accounted for after today's visit.  She is due a carotid Doppler study which is not on the health maintenance/preventative list.    Review of Systems   Constitution: Negative.   HENT: Negative.    Eyes: Negative.    Cardiovascular: Negative.    Respiratory: Negative.    Endocrine: Negative.    Hematologic/Lymphatic: Negative.    Skin: Negative.    Musculoskeletal: Positive for arthritis, back pain, joint pain and neck pain.   Gastrointestinal: Negative.    Genitourinary: Negative.    Neurological: Negative.   "  Psychiatric/Behavioral: Positive for depression. The patient is nervous/anxious.    Allergic/Immunologic: Negative.        Active Problems:    Patient Active Problem List   Diagnosis   • Osteoporosis, 03/29/2011--lumbar spine -2.8.  Right femoral neck -2.4.  Left femoral neck -2.4.  Patient receives Reclast infusions.   • Therapeutic drug monitoring   • Simple renal cyst   • Benign essential hypertension   • Carotid artery plaque, 04/02/2018--mild right ICA plaque, normal left ICA 10/03/2014--mild bilateral carotid artery plaque.   • Chronic gastritis   • Chronic lower back pain   • Chronic otitis externa   • Chronic renal insufficiency, stage II (mild), creatinine 1.12   • Depression with anxiety   • Functional murmur, 07/15/2015--normal echocardiogram.   • Hyperlipidemia   • Menopausal state   • Chronic migraine   • Pancreatic Duct Dilation   • Pedal edema   • Restless legs syndrome   • Bilateral sensorineural hearing loss   • Vitamin D deficiency   • Chronic gastric ulcer   • Chronic fatigue   • Hypersomnolence   • Chronic anemia   • Multinodular goiter   • Generalized anxiety disorder   • Iron deficiency anemia   • Statin intolerance   • Postmenopausal state   • Pulmonary hypertension (CMS/HCC)   • Gastroesophageal reflux disease without esophagitis   • Bilateral tinnitus   • Dysfunction of both eustachian tubes   • Chronic hoarseness   • Pain disorder with psychological factors         Past Medical History:   Diagnosis Date   • Benign essential hypertension 4/8/2016   • Bilateral sensorineural hearing loss 3/31/2011    03/31/2011--etiology reveals reverse \"cookie bite\" type of hearing loss for both ears of mild/moderate degree, mostly sensorineural.  Speech discrimination 100% on the right, 96% on the left.   • Carotid artery plaque, 10/03/2014--mild bilateral carotid artery plaque. 7/25/2012    10/03/2014--[facility] screening revealed mild bilateral carotid plaque, negative for atrial fibrillation, negative " for AAA, negative for PAD, osteoporosis screen revealed osteopenia.  Body mass index was 25 and considered to be moderate risk.  07/25/2012--vascular screen negative for carotid plaque, negative for abdominal aneurysm, negative for PAD Description: 10/03/2014--[facility] screening revealed mild bilateral carotid plaque, negative for atrial fibrillation, negative for AAA, negative for PAD, osteoporosis screen revealed osteopenia.  Body mass index was 25 and considered to be moderate risk.  07/25/2012--vascular screen negative for carotid plaque, negative for abdominal aneurysm, negative for PAD   • Chronic anemia 8/23/2016 06/13/2017--colonoscopy revealed melanosis.  Biopsied.  There was friability with contact bleeding in the ascending colon.  Biopsied.  Pathology returned consistent with melanosis coli.  06/13/2017--EGD revealed normal esophagus.  Biopsies taken.  There was a medium-sized hiatal hernia.  Localized mild inflammation and linear erosions were found in the prepyloric region.  Biopsied.  Examined duodenum was normal.  Random biopsies taken.  Pathology of the gastroesophageal junction was unremarkable as was the gastric fundus.  Prepyloric biopsy revealed minimal chronic inflammation and reactive change.  H pylori negative.  Duodenal biopsies are negative.  04/12/2017--hemoglobin low at 10.8, hematocrit is normal at 35.7.  Iron sulfate 325 mg per day initiated.  08/23/2016--routine follow-up.  Patient continues to have epigastric abdominal pain believed to be related to reflux with possible esophageal spasm.  Hemoglobin noted to be low at 10.6 with a hematocrit low at 33.7 and RDW elevated at 16.2.  Homocysti   • Chronic fatigue 4/21/2016 06/14/2017--overnight oximetry revealed oxygen desaturation index of only 0.44.  There were only 10 total desaturations during the period of testing which lasted 6 hours and 46 minutes.  05/31/2017--patient seen in follow-up and reports she continues to have chronic  fatigue as well as hypersomnolence.  Once again, she is on multiple medications that are undoubtedly contributing to this problem including clonazepam and hydrocodone but I doubt that these could be discontinued.  Her CBC back in April revealed a low hemoglobin of 10.8 but hematocrit was normal at 35.7.  Thyroid function tests were normal and CMP normal.  Overnight oximetry test ordered.  Repeat CBC.  Iron studies.  Sedimentation rate and CRP.  04/11/2017--patient seen in follow-up and her blood pressure is now at a reasonable level at 120/64.  She reports she still feels somewhat fatigued but she is much better.  Patient has not been doing any regular exercise and I do think that that would be helpful to reduce her feeling of fatigue.  She is   • Chronic gastric ulcer 4/14/2014    02/15/2017--patient seen in follow-up in nearly 6 months later.  She has complaints of not feeling well all over.  She has complaints of diffuse myalgias and possibly tendinopathy related to Levaquin that I called in prior to her going to [place] for vacation.  She reports that 3 or 4 days after starting the Levaquin she began to have the musculoskeletal symptoms and she reports that she continues to have them presently.  Symptoms are worse involving her left calf area.  Examination reveals significant tenderness involving the left calf and the left calf seems a little larger than the right.  She also has complaints of shortness of breath but no chest pain.  She is complaining of double vision and generalized weakness and fatigue.  She was complaining of chronic fatigue at the last visit back in September and I ordered an overnight oximetry test but apparently this was never done for reasons that are not clear to me.  Her current oxygen saturation is 94% and normally it is 100%.  Plan is as follows: Extensi   • Chronic gastritis 11/19/2001    02/15/2017--patient seen in follow-up in nearly 6 months later.  She has complaints of not  feeling well all over.  She has complaints of diffuse myalgias and possibly tendinopathy related to Levaquin that I called in prior to her going to [place] for vacation.  She reports that 3 or 4 days after starting the Levaquin she began to have the musculoskeletal symptoms and she reports that she continues to have them presently.  Symptoms are worse involving her left calf area.  Examination reveals significant tenderness involving the left calf and the left calf seems a little larger than the right.  She also has complaints of shortness of breath but no chest pain.  She is complaining of double vision and generalized weakness and fatigue.  She was complaining of chronic fatigue at the last visit back in September and I ordered an overnight oximetry test but apparently this was never done for reasons that are not clear to me.  Her current oxygen saturation is 94% and normally it is 100%.  Plan is as follows: Extensi   • Chronic lower back pain 1/31/2006 08/11/2014--MRI of the lumbar spine reveals the conus terminates at L2 and is normal.  Cauda equina unremarkable.  Stable to moderate degenerative disc disease at L5-S1.  No acute fracture or pars defect is demonstrated.  Small synovial cysts are seen posterior to the L4-L5 facet.  The perivertebral soft tissues are unremarkable.  T12-L1, L1-L2, L2-L3 are negative.  L3-L4 a broad-based disc bulge resulting in mild bilateral neural foraminal narrowing.  L4-L5 reveals a broad-based disc bulge facet disease resulting in mild bilateral neural foraminal narrowing.  L5-S1 reveals a broad-based disc bulge, posterior osseous slipping, and facet disease resulting in mild to moderate bilateral neural foraminal narrowing.  Assessment is stable mild to moderate degenerative spondylosis.  Small synovial cysts are seen posterior to the L4-L5 facets.  08/11/2014--MRI of the thoracic spine reveals mild to moderate thoracic kyphosis.  No fracture.  At T5--T6 there is a  moderate sized left paracentral disc protrusion which i   • Chronic migraine 11/3/2009    01/18/2010--MRI of the brain performed for headaches and memory loss.  Mild small vessel disease in the cerebral and central pontine white matter.  There is an ovoid, somewhat pancake-shaped area of signal abnormality in the anterior inferior right temporal lobe subcortical to the juxtacortical white matter that measures 1.2 x 1 cm and anterior posterior and medial lateral dimension but only measures 3 mm in cranial caudal dimension.  I suspect that this is a benign cyst or a cystic area of encephalomalacia.  The remainder of the MRI of the head is within normal limits.  11/03/2009--CT scan of the brain without contrast performed for headache after a fall.  Mild diffuse atrophy.  No acute abnormality noted.; Description: Patient has had a long history of migraine headaches.  MRI and CT scan of the brain have been essentially negative.   • Chronic otitis externa 4/8/2016   • Chronic renal insufficiency, stage II (mild), creatinine 1.12 11/14/2015 11/14/2015--serum creatinine mildly elevated at 1.12.   • Depression with anxiety 4/8/2016   • Functional murmur, 07/15/2015--normal echocardiogram. 7/15/2015    07/15/2015--echocardiogram reveals normal left ventricular size and function with ejection fraction 55%.  Grade 1 diastolic dysfunction, abnormal relaxation pattern.  Trace tricuspid regurgitation.  Estimated right ventricular systolic pressure is 25 mmHg which is normal.   • Gastroesophageal reflux disease with esophagitis 11/19/2001 06/13/2017--EGD revealed normal esophagus.  Biopsies taken.  There was a medium-sized hiatal hernia.  Localized mild inflammation and linear erosions were found in the prepyloric region.  Biopsied.  Examined duodenum was normal.  Random biopsies taken.  Pathology of the gastroesophageal junction was unremarkable as was the gastric fundus.  Prepyloric biopsy revealed minimal chronic  inflammation and reactive change.  H pylori negative.  Duodenal biopsies are negative.  11/03/2014--patient seen in follow-up and reports her epigastric pain/chest pain has resolved.  I reviewed the results of the studies with her.  It does not appear that her problem is related to biliary tract disease.  She does have reflux and although the recent upper GI revealed minimal reflux, I do think her symptoms are related to esophageal reflux with esophageal spasm.  10/21/2014--air-contrast upper GI series revealed trace upper laryngeal penetration.  Persistent small partially reducible sliding hiatal hernia the upper stomach with    • Gastroesophageal reflux disease without esophagitis 6/6/2019   • Generalized anxiety disorder 4/11/2017   • History of Acute deep vein thrombosis (DVT) of distal end of left lower extremity 2/15/2017    03/21/2017 patient seen in follow-up and she is tolerating the Eliquis well without signs or symptoms of bleeding.  Her calf swelling and tenderness is better but not totally resolved.  I suspect that the DVT is chronic and may not resolve at all.  I will order a repeat venous study and then proceed from there.  02/23/2017--repeat Doppler venous study of the left lower extremity reveals a chronic left lower extremity DVT in the posterior tibial.  All other left sided veins appeared normal.  Fluid collection in the left calf noted.  02/17/2017--patient seen in follow-up and reports her left lower extremity symptoms are about the same.  She continues to have complaints of profound fatigue which I think is multifactorial including underlying depression that is not in remission.  Review the results of the CTA of the chest including the possible thyroid lesion.  I do not think this is contributing to any of her symptoms of fatigue particularly given the fact that her thyroid function tests are normal.  I expla   • History of palpitations 12/07/2011    Patient has had multiple admissions to the  hospital for complaints of chest pain and heart palpitations. She meant admitted at least on 3 occasions. She has had at least 3 stress Cardiolite and 1 stress ECG, the last Cardiolite being performed 12/07/2011 which was negative. Patient is also had Holter monitors which have been unremarkable.   • HIstory of Schatzki's ring 02/28/2012 02/28/2012--air-contrast upper GI revealed small to moderate sized reducible sliding hiatal herniation of the upper stomach with some demonstrated gastroesophageal reflux. No esophageal, gastric, or duodenal mass or mucosal ulceration was seen.  11/03/2008--EGD performed for evaluation of iron deficiency anemia revealed hiatal hernia without evidence of reflux, prepyloric antritis and   • Hyperlipidemia 4/8/2016   • Hypersomnolence 5/5/2016 06/14/2017--overnight oximetry revealed oxygen desaturation index of only 0.44.  There were only 10 total desaturations during the period of testing which lasted 6 hours and 46 minutes.  05/31/2017--patient seen in follow-up and reports she continues to have chronic fatigue as well as hypersomnolence.  Once again, she is on multiple medications that are undoubtedly contributing to this problem including clonazepam and hydrocodone but I doubt that these could be discontinued.  Her CBC back in April revealed a low hemoglobin of 10.8 but hematocrit was normal at 35.7.  Thyroid function tests were normal and CMP normal.  Overnight oximetry test ordered.  Repeat CBC.  Iron studies.  Sedimentation rate and CRP.  04/11/2017--patient seen in follow-up and her blood pressure is now at a reasonable level at 120/64.  She reports she still feels somewhat fatigued but she is much better.  Patient has not been doing any regular exercise and I do think that that would be helpful to reduce her feeling of fatigue.  She is   • Menopausal state 4/8/2016   • Multinodular goiter 2/17/2017 02/21/2017--thyroid ultrasound reveals multinodular thyroid with  largest nodule measuring on the order of 1.6 cm in greatest dimension.  02/17/2017--patient seen in follow-up for DVT and CTA of the chest.  An incidental finding on the CTA of the chest reveals a 1.7 cm lesion in the right lobe of thyroid.  Note that thyroid function tests are currently normal.  Ultrasound of the thyroid ordered.   • Osteoporosis, 03/29/2011--lumbar spine -2.8.  Right femoral neck -2.4.  Left femoral neck -2.4.  Patient receives Reclast infusions. 2/9/2009 04/19/2017--Reclast infusion.  04/05/2016--Reclast infusion.  06/04/2012--Reclast infusion.  05/26/2011--Reclast infusion.  03/29/2011--DEXA scan revealed a lumbar T score of -2.8, right femoral neck T score -2.4, left femoral neck T score -2.4.  Osteoporosis of the lumbar spine and severe osteopenia of the hips bilaterally.  Patient has been intolerant to Fosamax because of gastritis and gastroesophageal reflux.  04/01/2009--treatment for osteoporosis begun with Fosamax.  02/09/2009--DEXA scan revealed lumbar spine T score of -3.3.  Left femoral neck T score -2.5.  Right hip T score -2.6.  Osteoporosis.    • Pain disorder with psychological factors 10/10/2012   • Pancreatic Duct Dilation 11/30/2001 09/29/2014--patient was again evaluated by the urologist for a renal cyst.  CT scan of the abdomen and pelvis reveals a left renal cyst measuring 5.1 x 4.9 cm.  There were subcentimeter hypodense renal lesions that are too small to further characterize and are presumably related to cysts.  Recommend attention to follow up.  Distal dilated pancreatic duct noted.  The common bile duct is the upper limits of normal in size.  The ampullary region is not well evaluated.  Comparison with prior imaging is recommended if available.  If there is no prior film available for comparison, and ERCP or MRCP could be performed to further evaluate.  Small hiatal hernia noted.  03/05/2012--CT scan of the abdomen with contrast, pancreatic protocol.  This reveals  some fullness of the pancreatic ductal system and apparent pancreatic divisum with separate entrance of the accessory pancreatic duct extending into the duodenum distal to the main pancreatic duct.  There is fullness of the duct diffusely but similar to the previous s   • Pedal edema 6/29/2015 06/29/2015--patient presents with a 4-6 week history of exertional dyspnea that comes on with activity such as climbing stairs or walking her dog up an incline.  No chest pain.  Relieved with rest.  No cough.  She does have complaints of her feet and legs swelling that is particularly worse at the end of the day and not improved overnight.  No orthopnea or PND.  Chest exam reveals faint rales at the bases.  Otherwise clear.  Heart is regular and I do not appreciate a heart murmur.  Chest x-ray PA and lateral ordered.  Echocardiogram ordered.   • Pulmonary hypertension (CMS/HCC) 4/18/2019   • Restless legs syndrome 4/8/2016   • Simple renal cyst 7/20/2009 09/29/2014--patient was again evaluated by the urologist for a renal cyst.  CT scan of the abdomen and pelvis reveals a left renal cyst measuring 5.1 x 4.9 cm.  There were subcentimeter hypodense renal lesions that are too small to further characterize and are presumably related to cysts.  Recommend attention to follow up.  Distal dilated pancreatic duct noted.  The common bile duct is the upper limits of normal in size.  The ampullary region is not well evaluated.  Comparison with prior imaging is recommended if available.  If there is no prior film available for comparison, and ERCP or MRCP could be performed to further evaluate.  Small hiatal hernia noted.  07/20/2009--patient was noted to have a left renal mass consistent with a cyst.  This was evaluated by the urologist 07/20/2009.   • Statin intolerance 10/30/2017   • Vitamin D deficiency 4/8/2016         Past Surgical History:   Procedure Laterality Date   • APPENDECTOMY  1965 1965   • CATARACT EXTRACTION  Bilateral Remote    Remote bilateral cataract extirpation with intraocular lens implantation.   • CHOLECYSTECTOMY WITH INTRAOPERATIVE CHOLANGIOGRAM N/A 1/21/2019 01/21/2019--laparoscopic paraesophageal hernia repair with fundoplication.   • COLONOSCOPY  11/03/2008 2008--normal colonoscopy   • COLONOSCOPY  2001 2001--normal colonoscopy.   • COLONOSCOPY N/A 6/13/2017 06/13/2017--colonoscopy revealed melanosis.  Biopsied.  There was friability with contact bleeding in the ascending colon.  Biopsied.  Pathology returned consistent with melanosis coli.   • ENDOSCOPY  10/08/2014    02/28/2012--air-contrast upper GI revealed small to moderate sized reducible sliding hiatal herniation of the upper stomach with some demonstrated gastroesophageal reflux. No esophageal, gastric, or duodenal mass or mucosal ulceration was seen. 11/03/2008--EGD performed for evaluation of iron deficiency anemia revealed hiatal hernia without evidence of reflux, prepyloric antritis and   • ENDOSCOPY  11/03/2008 11/03/2008--EGD performed for evaluation of iron deficiency anemia revealed hiatal hernia without evidence of reflux, prepyloric antritis and   • ENDOSCOPY  11/19/2001 11/19/2001--EGD revealed a very tortuous distal esophagus with a Schatzki's ring. No ulcer or erosions. No Dorado's mucosa. Stomach revealed patchy erythema and erosions in the antrum. Biopsy. Normal pylorus with no obstruction. Normal duodenum with no ulcers. The Schatzki's ring was dilated with a Rhodes dilator.;   • ENDOSCOPY N/A 9/27/2016 09/27/2016--EGD revealed a normal oropharynx, esophagus, and medium sized hiatal hernia.  Nonbleeding gastric ulcer with no stigmata of bleeding.  Biopsy.  Gastritis.  Biopsy.  Normal examined duodenum.  Biopsied.  Pathology returned mild to moderate chronic active gastritis with ulceration from the stomach antral ulcer biopsy.  Gastroesophageal junction biopsy revealed minimal mixed inflammation.   • ENDOSCOPY  N/A 6/13/2017 06/13/2017--colonoscopy revealed melanosis.  Biopsied.  There was friability with contact bleeding in the ascending colon.  Biopsied.  Pathology returned consistent with melanosis coli.   • ERCP N/A 1/22/2019 01/22/2019--ERCP with sphincterotomy and balloon stone extraction.   • ESOPHAGEAL DILATATION  11/19/2001 11/19/2001--EGD revealed a very tortuous distal esophagus with a Schatzki's ring. No ulcer or erosions. No Dorado's mucosa. Stomach revealed patchy erythema and erosions in the antrum. Biopsy. Normal pylorus with no obstruction. Normal duodenum with no ulcers. The Schatzki's ring was dilated with a Rhodes dilator.;    • ESOPHAGEAL MANOMETRY N/A 12/18/2018    Procedure: ESOPHAGEAL MANOMETRY;  Surgeon: Cecilia, Nurse Performed;  Location: [facility] ENDOSCOPY;  Service: Gastroenterology   • ESOPHAGOSCOPY N/A 1/21/2019    Procedure: FLEXIBLE ESOPHAGOSCOPY;  Surgeon: Reji Johnson MD;  Location: [facility] OR;  Service: General   • HIATAL HERNIA REPAIR N/A 1/21/2019    Procedure: Laparoscopic paraesophageal hernia repair with toupee fundoplication;  Surgeon: Reji Johnson MD;  Location: [facility] OR;  Service: General   • INCONTINENCE SURGERY  1979 1979--a bladder tack procedure for urinary stress incontinence   • KNEE ARTHROSCOPY Right 12/12/2011 12/12/2011--right knee arthroscopy with partial lateral and medial meniscectomies.   • SKIN SURGERY  05/25/2010 05/25/2010--skin lesion excised from right lower extremity. 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   • TOTAL ABDOMINAL HYSTERECTOMY WITH SALPINGO OOPHORECTOMY  1974 1974--total abdominal hysterectomy.         Allergies   Allergen Reactions   • Statins Nausea And Vomiting   • Morphine Hallucinations   • Penicillins Itching   • Tetanus Toxoids Itching           Current Outpatient Medications:   •  aspirin 81 MG EC tablet, Take 81 mg by mouth Daily. HELD, Disp: , Rfl:   •  buPROPion  XL (WELLBUTRIN XL) 150 MG 24 hr tablet, Take 150 mg by mouth 2 (Two) Times a Day., Disp: , Rfl: 0  •  Cimetidine (TAGAMET PO), Tagamet, Disp: , Rfl:   •  clonazePAM (KlonoPIN) 1 MG tablet, TAKE 1/2 TO 1 TABLET BY MOUTH TWICE DAILY, Disp: 60 tablet, Rfl: 3  •  cycloSPORINE (RESTASIS) 0.05 % ophthalmic emulsion, Administer 1 drop to both eyes 2 (Two) Times a Day., Disp: , Rfl:   •  diclofenac (VOLTAREN) 1 % gel gel, Apply 4 g topically to the appropriate area as directed 4 (Four) Times a Day As Needed., Disp: , Rfl:   •  estradiol (ESTRACE) 1 MG tablet, TAKE 1 TABLET BY MOUTH DAILY, Disp: 90 tablet, Rfl: 1  •  gabapentin (NEURONTIN) 100 MG capsule, Take 1 capsule by mouth Every Night., Disp: 90 capsule, Rfl: 1  •  HYDROcodone-acetaminophen (NORCO)  MG per tablet, Take 1 tablet by mouth every 6 (six) hours as needed for moderate pain (4-6)., Disp: , Rfl:   •  Misc Natural Products (COLON CLEANSE PO), Take  by mouth., Disp: , Rfl:   •  Multiple Vitamins-Minerals (MULTIVITAMIN ADULT) chewable tablet, Chew 1 tablet Daily., Disp: , Rfl:   •  topiramate (TOPAMAX) 100 MG tablet, TAKE 1 TABLET DAILY, Disp: 90 tablet, Rfl: 4  •  triamterene-hydrochlorothiazide (DYAZIDE) 37.5-25 MG per capsule, TAKE ONE CAPSULE BY MOUTH DAILY FOR BLOOD PRESSURE AND LEG SWELLING, Disp: 90 capsule, Rfl: 1  •  venlafaxine (EFFEXOR) 75 MG tablet, Take 75 mg by mouth Daily., Disp: , Rfl:   •  vitamin E 400 UNIT capsule, Take 400 Units by mouth Daily., Disp: , Rfl:   •  Cholecalciferol (vitamin D3) 125 MCG (5000 UT) capsule capsule, 1 by mouth daily as directed, Disp: 30 capsule, Rfl:   •  potassium chloride (K-DUR,KLOR-CON) 10 MEQ CR tablet, Take 1 p.o. 3 times daily at mealtime, Disp: 270 tablet, Rfl: 3      Family History   Problem Relation Age of Onset   • Heart attack Mother         dies age 47 from heart attack   • Heart disease Mother    • Bleeding Disorder Mother    • Heart attack Maternal Aunt         dies age 60 from heart attack   •  "Heart disease Father    • Malchristin Hyperthermia Neg Hx          Social History     Socioeconomic History   • Marital status:      Spouse name: ander   • Number of children: 4   • Years of education: 14   • Highest education level: Associate degree: academic program   Occupational History   • Occupation: homemaker   Social Needs   • Financial resource strain: Not hard at all   • Food insecurity     Worry: Never true     Inability: Never true   • Transportation needs     Medical: Yes     Non-medical: Yes   Tobacco Use   • Smoking status: Never Smoker   • Smokeless tobacco: Never Used   Substance and Sexual Activity   • Alcohol use: No     Frequency: Never     Binge frequency: Never   • Drug use: No   • Sexual activity: Yes     Partners: Male   Lifestyle   • Physical activity     Days per week: 3 days     Minutes per session: 20 min   • Stress: Not at all   Relationships   • Social connections     Talks on phone: More than three times a week     Gets together: Once a week     Attends [employer] service: 1 to 4 times per year     Active member of club or organization: Yes     Attends meetings of clubs or organizations: 1 to 4 times per year     Relationship status:          Vitals:    11/19/20 1334   BP: 122/60   BP Location: Left arm   Patient Position: Sitting   Cuff Size: Adult   Pulse: 73   SpO2: 99%   Weight: 63.5 kg (140 lb)   Height: 153.7 cm (60.5\")        Body mass index is 26.89 kg/m².      Physical Exam:    General: Alert and oriented x 3.  No acute distress.  Normal affect.  HEENT: Pupils equal, round, reactive to light; extraocular movements intact; sclerae nonicteric; pharynx, ear canals and TMs normal.  Neck: Without JVD, thyromegaly, bruit, or adenopathy.  Lungs: Clear to auscultation in all fields.  Heart: Regular rate and rhythm without murmur, rub, gallop, or click.  Abdomen: Soft, nontender, without hepatosplenomegaly or hernia.  Bowel sounds normal.  : Deferred.  Rectal: Deferred.  " Extremities: Without clubbing, cyanosis, edema, or pulse deficit.  Neurologic: Intact without focal deficit.  Normal station and gait observed during ingress and egress from the examination room.  Skin: Without significant lesion.  Musculoskeletal: Unremarkable.    Lab/other results:    NMR reveals a total cholesterol of 213.  Triglycerides 115.  LDL particle number excellent at 875.  Small LDL particle number less than 90.  HDL particle number very good at 53.5.  Thyroid function tests are normal.  Serum iron saturation is low at 11% but her serum iron is normal at 69.  CMP normal except potassium low at 3.1.  CPK is normal.  CBC normal.  Vitamin D normal.    Assessment/Plan:     Diagnosis Plan   1. Encounter for subsequent annual wellness visit (AWV) in Medicare patient     2. Hyperlipidemia  NMR LipoProfile    TSH    T4, Free    T3, Free   3. Chronic renal insufficiency, stage II (mild), creatinine 1.12  CBC (No Diff)    Comprehensive Metabolic Panel   4. Benign essential hypertension     5. Osteoporosis, 03/29/2011--lumbar spine -2.8.  Right femoral neck -2.4.  Left femoral neck -2.4.  Patient receives Reclast infusions.     6. Carotid artery plaque, 04/02/2018--mild right ICA plaque, normal left ICA 10/03/2014--mild bilateral carotid artery plaque.  Duplex Carotid Ultrasound CAR   7. Chronic low back pain with sciatica, sciatica laterality unspecified, unspecified back pain laterality     8. Depression with anxiety     9. Menopausal state     10. Chronic migraine     11. Pedal edema     12. Restless legs syndrome     13. Bilateral sensorineural hearing loss     14. Bilateral tinnitus     15. Dysfunction of both eustachian tubes     16. Vitamin D deficiency  Cholecalciferol (vitamin D3) 125 MCG (5000 UT) capsule capsule    Vitamin D 25 Hydroxy   17. Chronic gastric ulcer without hemorrhage and without perforation     18. Chronic fatigue     19. Hypersomnolence     20. Chronic anemia     21. Multinodular goiter   TSH    T4, Free    T3, Free   22. Generalized anxiety disorder     23. Iron deficiency anemia due to chronic blood loss     24. Statin intolerance     25. Postmenopausal state     26. Pulmonary hypertension (CMS/HCC)     27. Gastroesophageal reflux disease without esophagitis     28. Pain disorder with psychological factors     29. Chronic hoarseness     30. Therapeutic drug monitoring     31. Need for hepatitis C screening test  Hepatitis C Antibody   32. Hypokalemia  potassium chloride (K-DUR,KLOR-CON) 10 MEQ CR tablet     The subsequent Medicare wellness visit is documented on separate note.    Patient has a multitude of medical problems that are as controlled and stable as we can possibly get them.  Hyperlipidemia is under reasonable control on diet alone as patient is statin intolerant.  It appears her chronic renal insufficiency may have resolved but we will continue to monitor.  However, she does have significant hypokalemia that needs to be addressed.  Her blood pressure seems to be controlled on the current regimen.  She has osteoporosis and is up-to-date on her DEXA scan.  She has carotid artery plaque and is overdue for a carotid Doppler study.  She continues to have chronic pain including chronic lower back pain as well as pain in multiple other sites.  She is seeing pain management and this requires narcotic pain medication.  They have noted a psychiatric underlay regarding this and I certainly agree.  Patient has been depressed and anxious for quite some time despite multiple medications.  She has seen a therapist and a psychiatrist in the past.  We are doing the best we can with this issue.  She also has chronic migraines which is another pain syndrome which is being treated with Topamax with some success.  Her pedal edema is well controlled.  She had repeat complaints of hearing loss as well as tinnitus and chronic hoarseness and I have referred her to ENT to evaluate this.  Her pulmonary  hypertension is mild and asymptomatic.  Her esophageal reflux seems to be controlled.    Plan is as follows: Start potassium chloride 10 mEq 1 p.o. 3 times daily at mealtime.  No other changes in medical regimen.  Patient will follow-up in 6 months with lab prior or follow-up as needed.    Procedures

## 2020-11-24 ENCOUNTER — HOSPITAL ENCOUNTER (OUTPATIENT)
Dept: CARDIOLOGY | Facility: HOSPITAL | Age: 80
End: 2020-11-24

## 2020-12-01 ENCOUNTER — HOSPITAL ENCOUNTER (OUTPATIENT)
Dept: CARDIOLOGY | Facility: HOSPITAL | Age: 80
Discharge: HOME | End: 2020-12-01
Admitting: INTERNAL MEDICINE

## 2020-12-01 LAB
BH CV XLRA MEAS LEFT DIST CCA EDV: 15.2 CM/SEC
BH CV XLRA MEAS LEFT DIST CCA PSV: 50.4 CM/SEC
BH CV XLRA MEAS LEFT DIST ICA EDV: 23.6 CM/SEC
BH CV XLRA MEAS LEFT DIST ICA PSV: 84.1 CM/SEC
BH CV XLRA MEAS LEFT MID ICA EDV: -19.5 CM/SEC
BH CV XLRA MEAS LEFT MID ICA PSV: -61.7 CM/SEC
BH CV XLRA MEAS LEFT PROX CCA EDV: 19.3 CM/SEC
BH CV XLRA MEAS LEFT PROX CCA PSV: 69.8 CM/SEC
BH CV XLRA MEAS LEFT PROX ECA EDV: -9.3 CM/SEC
BH CV XLRA MEAS LEFT PROX ECA PSV: -58 CM/SEC
BH CV XLRA MEAS LEFT PROX ICA EDV: 15.2 CM/SEC
BH CV XLRA MEAS LEFT PROX ICA PSV: 55.1 CM/SEC
BH CV XLRA MEAS LEFT PROX SCLA EDV: 13.7 CM/SEC
BH CV XLRA MEAS LEFT PROX SCLA PSV: 144 CM/SEC
BH CV XLRA MEAS LEFT VERTEBRAL A EDV: 12.3 CM/SEC
BH CV XLRA MEAS LEFT VERTEBRAL A PSV: 44.6 CM/SEC
BH CV XLRA MEAS RIGHT DIST CCA EDV: 18.8 CM/SEC
BH CV XLRA MEAS RIGHT DIST CCA PSV: 64.5 CM/SEC
BH CV XLRA MEAS RIGHT DIST ICA EDV: -23.6 CM/SEC
BH CV XLRA MEAS RIGHT DIST ICA PSV: -83.3 CM/SEC
BH CV XLRA MEAS RIGHT MID ICA EDV: -24 CM/SEC
BH CV XLRA MEAS RIGHT MID ICA PSV: -80.9 CM/SEC
BH CV XLRA MEAS RIGHT PROX CCA EDV: 17 CM/SEC
BH CV XLRA MEAS RIGHT PROX CCA PSV: 81.5 CM/SEC
BH CV XLRA MEAS RIGHT PROX ECA EDV: -13.5 CM/SEC
BH CV XLRA MEAS RIGHT PROX ECA PSV: -70.4 CM/SEC
BH CV XLRA MEAS RIGHT PROX ICA EDV: -14.7 CM/SEC
BH CV XLRA MEAS RIGHT PROX ICA PSV: -46.3 CM/SEC
BH CV XLRA MEAS RIGHT PROX SCLA PSV: 71.5 CM/SEC
BH CV XLRA MEAS RIGHT VERTEBRAL A EDV: 12.2 CM/SEC
BH CV XLRA MEAS RIGHT VERTEBRAL A PSV: 33 CM/SEC
LEFT ARM BP: NORMAL MMHG
RIGHT ARM BP: NORMAL MMHG

## 2020-12-01 PROCEDURE — 93880 EXTRACRANIAL BILAT STUDY: CPT

## 2021-01-05 DIAGNOSIS — F41.1 GENERALIZED ANXIETY DISORDER: Chronic | ICD-10-CM

## 2021-01-05 RX ORDER — CLONAZEPAM 1 MG/1
TABLET ORAL
Qty: 60 TABLET | Refills: 5 | Status: SHIPPED | OUTPATIENT
Start: 2021-01-05 | End: 2021-07-16

## 2021-01-27 ENCOUNTER — APPOINTMENT (OUTPATIENT)
Dept: CT IMAGING | Facility: HOSPITAL | Age: 81
End: 2021-01-27

## 2021-01-27 ENCOUNTER — TELEPHONE (OUTPATIENT)
Dept: INTERNAL MEDICINE | Facility: CLINIC | Age: 81
End: 2021-01-27

## 2021-01-27 ENCOUNTER — APPOINTMENT (OUTPATIENT)
Dept: GENERAL RADIOLOGY | Facility: HOSPITAL | Age: 81
End: 2021-01-27

## 2021-01-27 ENCOUNTER — HOSPITAL ENCOUNTER (EMERGENCY)
Facility: HOSPITAL | Age: 81
Discharge: HOME | End: 2021-01-27
Attending: EMERGENCY MEDICINE | Admitting: EMERGENCY MEDICINE

## 2021-01-27 VITALS
WEIGHT: 133.3 LBS | TEMPERATURE: 98.3 F | HEIGHT: 60 IN | SYSTOLIC BLOOD PRESSURE: 187 MMHG | BODY MASS INDEX: 26.17 KG/M2 | DIASTOLIC BLOOD PRESSURE: 89 MMHG | OXYGEN SATURATION: 98 % | HEART RATE: 73 BPM | RESPIRATION RATE: 18 BRPM

## 2021-01-27 DIAGNOSIS — E87.6 HYPOKALEMIA: ICD-10-CM

## 2021-01-27 DIAGNOSIS — R29.6 FREQUENT FALLS: Primary | ICD-10-CM

## 2021-01-27 DIAGNOSIS — R26.89 BALANCE PROBLEMS: ICD-10-CM

## 2021-01-27 LAB
ALBUMIN SERPL-MCNC: 3.9 G/DL (ref 3.5–5.2)
ALBUMIN/GLOB SERPL: 1.4 G/DL
ALP SERPL-CCNC: 74 U/L (ref 39–117)
ALT SERPL W P-5'-P-CCNC: 26 U/L (ref 1–33)
ANION GAP SERPL CALCULATED.3IONS-SCNC: 8.7 MMOL/L (ref 5–15)
AST SERPL-CCNC: 19 U/L (ref 1–32)
BASOPHILS # BLD AUTO: 0.03 10*3/MM3 (ref 0–0.2)
BASOPHILS NFR BLD AUTO: 0.3 % (ref 0–1.5)
BILIRUB SERPL-MCNC: 0.2 MG/DL (ref 0–1.2)
BILIRUB UR QL STRIP: NEGATIVE
BUN SERPL-MCNC: 14 MG/DL (ref 8–23)
BUN/CREAT SERPL: 15.6 (ref 7–25)
CALCIUM SPEC-SCNC: 8.9 MG/DL (ref 8.6–10.5)
CHLORIDE SERPL-SCNC: 110 MMOL/L (ref 98–107)
CLARITY UR: ABNORMAL
CO2 SERPL-SCNC: 25.3 MMOL/L (ref 22–29)
COLOR UR: YELLOW
CREAT SERPL-MCNC: 0.9 MG/DL (ref 0.57–1)
DEPRECATED RDW RBC AUTO: 47 FL (ref 37–54)
EOSINOPHIL # BLD AUTO: 0.13 10*3/MM3 (ref 0–0.4)
EOSINOPHIL NFR BLD AUTO: 1.5 % (ref 0.3–6.2)
ERYTHROCYTE [DISTWIDTH] IN BLOOD BY AUTOMATED COUNT: 15.1 % (ref 12.3–15.4)
GFR SERPL CREATININE-BSD FRML MDRD: 61 ML/MIN/1.73
GLOBULIN UR ELPH-MCNC: 2.7 GM/DL
GLUCOSE SERPL-MCNC: 84 MG/DL (ref 65–99)
GLUCOSE UR STRIP-MCNC: NEGATIVE MG/DL
HCT VFR BLD AUTO: 37.5 % (ref 34–46.6)
HGB BLD-MCNC: 12.4 G/DL (ref 12–15.9)
HGB UR QL STRIP.AUTO: NEGATIVE
IMM GRANULOCYTES # BLD AUTO: 0.03 10*3/MM3 (ref 0–0.05)
IMM GRANULOCYTES NFR BLD AUTO: 0.3 % (ref 0–0.5)
KETONES UR QL STRIP: NEGATIVE
LEUKOCYTE ESTERASE UR QL STRIP.AUTO: NEGATIVE
LYMPHOCYTES # BLD AUTO: 2.53 10*3/MM3 (ref 0.7–3.1)
LYMPHOCYTES NFR BLD AUTO: 28.4 % (ref 19.6–45.3)
MCH RBC QN AUTO: 28.4 PG (ref 26.6–33)
MCHC RBC AUTO-ENTMCNC: 33.1 G/DL (ref 31.5–35.7)
MCV RBC AUTO: 85.8 FL (ref 79–97)
MONOCYTES # BLD AUTO: 0.66 10*3/MM3 (ref 0.1–0.9)
MONOCYTES NFR BLD AUTO: 7.4 % (ref 5–12)
NEUTROPHILS NFR BLD AUTO: 5.53 10*3/MM3 (ref 1.7–7)
NEUTROPHILS NFR BLD AUTO: 62.1 % (ref 42.7–76)
NITRITE UR QL STRIP: NEGATIVE
NRBC BLD AUTO-RTO: 0 /100 WBC (ref 0–0.2)
PH UR STRIP.AUTO: 7.5 [PH] (ref 5–8)
PLATELET # BLD AUTO: 271 10*3/MM3 (ref 140–450)
PMV BLD AUTO: 9.4 FL (ref 6–12)
POTASSIUM SERPL-SCNC: 3.2 MMOL/L (ref 3.5–5.2)
PROT SERPL-MCNC: 6.6 G/DL (ref 6–8.5)
PROT UR QL STRIP: NEGATIVE
QT INTERVAL: 406 MS
RBC # BLD AUTO: 4.37 10*6/MM3 (ref 3.77–5.28)
SODIUM SERPL-SCNC: 144 MMOL/L (ref 136–145)
SP GR UR STRIP: 1.02 (ref 1–1.03)
TROPONIN T SERPL-MCNC: <0.01 NG/ML (ref 0–0.03)
UROBILINOGEN UR QL STRIP: ABNORMAL
WBC # BLD AUTO: 8.91 10*3/MM3 (ref 3.4–10.8)

## 2021-01-27 PROCEDURE — 71046 X-RAY EXAM CHEST 2 VIEWS: CPT

## 2021-01-27 PROCEDURE — 36415 COLL VENOUS BLD VENIPUNCTURE: CPT

## 2021-01-27 PROCEDURE — 80053 COMPREHEN METABOLIC PANEL: CPT | Performed by: EMERGENCY MEDICINE

## 2021-01-27 PROCEDURE — 85025 COMPLETE CBC W/AUTO DIFF WBC: CPT | Performed by: EMERGENCY MEDICINE

## 2021-01-27 PROCEDURE — 72110 X-RAY EXAM L-2 SPINE 4/>VWS: CPT

## 2021-01-27 PROCEDURE — 84484 ASSAY OF TROPONIN QUANT: CPT | Performed by: EMERGENCY MEDICINE

## 2021-01-27 PROCEDURE — 72072 X-RAY EXAM THORAC SPINE 3VWS: CPT

## 2021-01-27 PROCEDURE — 81003 URINALYSIS AUTO W/O SCOPE: CPT | Performed by: EMERGENCY MEDICINE

## 2021-01-27 PROCEDURE — 93010 ELECTROCARDIOGRAM REPORT: CPT | Performed by: INTERNAL MEDICINE

## 2021-01-27 PROCEDURE — P9612 CATHETERIZE FOR URINE SPEC: HCPCS

## 2021-01-27 PROCEDURE — 70450 CT HEAD/BRAIN W/O DYE: CPT

## 2021-01-27 PROCEDURE — 99284 EMERGENCY DEPT VISIT MOD MDM: CPT

## 2021-01-27 PROCEDURE — 93005 ELECTROCARDIOGRAM TRACING: CPT | Performed by: EMERGENCY MEDICINE

## 2021-01-27 RX ORDER — ACETAMINOPHEN 500 MG
1000 TABLET ORAL ONCE
Status: COMPLETED | OUTPATIENT
Start: 2021-01-27 | End: 2021-01-27

## 2021-01-27 RX ORDER — SODIUM CHLORIDE 0.9 % (FLUSH) 0.9 %
10 SYRINGE (ML) INJECTION AS NEEDED
Status: DISCONTINUED | OUTPATIENT
Start: 2021-01-27 | End: 2021-01-27 | Stop reason: HOSPADM

## 2021-01-27 RX ORDER — POTASSIUM CHLORIDE 750 MG/1
40 TABLET, FILM COATED, EXTENDED RELEASE ORAL ONCE
Status: DISCONTINUED | OUTPATIENT
Start: 2021-01-27 | End: 2021-01-27 | Stop reason: HOSPADM

## 2021-01-27 RX ADMIN — ACETAMINOPHEN 1000 MG: 500 TABLET, FILM COATED ORAL at 19:18

## 2021-01-27 NOTE — TELEPHONE ENCOUNTER
Caller: PETE MURRIETA (DAUGHTER)    Relationship to patient: Child    Best call back number: [phone]    Patient is needing: PATIENT'S DAUGHTER CALLED VERY WORRIED ABOUT HER MOTHER-THE PATIENT. PETE STATED SHE LIVES IN [place] BUT HAS NOTICED HER MOTHER'S HEALTH DECREASE. HER MOTHER FELL 1/26/21 AND COMPLAINING OF BACK PAIN. PATIENT'S DAUGHTER STATED SHE ALWAYS SEEMS CONFUSED, SHUFFLES HER FEET FOR BALANCE, FALLS OFTEN AND DOESN'T TELL ANYONE. PATIENT HAS DOUBLE/BLURRY VISION AND HAS TROUBLE SLEEPING. PATIENT'S DAUGHTER SAYS SHE IS NOT ACTING LIKE HER SELF. HER HAND-WRITING IS UNREADABLE AND SHE DOESN'T SEEM COHERENT MOST OF THE TIME. PLEASE FOLLOW UP WITH DAUGHTER TO DISCUSS FURTHER CONCERNS/TREATMENTS/OPTIONS.     MAIL UPDATED  VERBAL FORM TO PATIENT:    [address] [place] [address]

## 2021-01-29 ENCOUNTER — TELEPHONE (OUTPATIENT)
Dept: NEUROLOGY | Facility: CLINIC | Age: 81
End: 2021-01-29

## 2021-01-29 NOTE — TELEPHONE ENCOUNTER
Pt was seen in the emergency room on 01/27/2021 and told to follow up with neurology zoe as soon as possible due to frequent falls, and balance problems. She also hit her head on the ground when she fell the most recent time.     Please advise on scheduling.

## 2021-02-05 ENCOUNTER — OFFICE VISIT (OUTPATIENT)
Dept: INTERNAL MEDICINE | Facility: CLINIC | Age: 81
End: 2021-02-05

## 2021-02-05 VITALS
BODY MASS INDEX: 27.88 KG/M2 | DIASTOLIC BLOOD PRESSURE: 62 MMHG | HEIGHT: 60 IN | WEIGHT: 142 LBS | OXYGEN SATURATION: 99 % | HEART RATE: 82 BPM | SYSTOLIC BLOOD PRESSURE: 146 MMHG

## 2021-02-05 DIAGNOSIS — I65.23 ATHEROSCLEROSIS OF BOTH CAROTID ARTERIES: Chronic | ICD-10-CM

## 2021-02-05 DIAGNOSIS — F41.1 GENERALIZED ANXIETY DISORDER: Chronic | ICD-10-CM

## 2021-02-05 DIAGNOSIS — H93.13 TINNITUS OF BOTH EARS: ICD-10-CM

## 2021-02-05 DIAGNOSIS — G25.81 RESTLESS LEGS SYNDROME: Chronic | ICD-10-CM

## 2021-02-05 DIAGNOSIS — H81.13 BENIGN PAROXYSMAL POSITIONAL VERTIGO DUE TO BILATERAL VESTIBULAR DISORDER: ICD-10-CM

## 2021-02-05 DIAGNOSIS — N18.2 CHRONIC RENAL INSUFFICIENCY, STAGE II (MILD): Chronic | ICD-10-CM

## 2021-02-05 DIAGNOSIS — I10 BENIGN ESSENTIAL HYPERTENSION: Chronic | ICD-10-CM

## 2021-02-05 DIAGNOSIS — R29.6 FREQUENT FALLS: Primary | ICD-10-CM

## 2021-02-05 DIAGNOSIS — F41.8 DEPRESSION WITH ANXIETY: Chronic | ICD-10-CM

## 2021-02-05 DIAGNOSIS — F45.42 PAIN DISORDER WITH PSYCHOLOGICAL FACTORS: Chronic | ICD-10-CM

## 2021-02-05 DIAGNOSIS — E78.2 MIXED HYPERLIPIDEMIA: Chronic | ICD-10-CM

## 2021-02-05 DIAGNOSIS — I27.20 PULMONARY HYPERTENSION (HCC): Chronic | ICD-10-CM

## 2021-02-05 DIAGNOSIS — R42 DIZZINESS: ICD-10-CM

## 2021-02-05 PROBLEM — R49.0 CHRONIC HOARSENESS: Chronic | Status: ACTIVE | Noted: 2020-06-08

## 2021-02-05 PROBLEM — H69.83 DYSFUNCTION OF BOTH EUSTACHIAN TUBES: Status: RESOLVED | Noted: 2019-09-30 | Resolved: 2021-02-05

## 2021-02-05 PROBLEM — H69.93 DYSFUNCTION OF BOTH EUSTACHIAN TUBES: Status: RESOLVED | Noted: 2019-09-30 | Resolved: 2021-02-05

## 2021-02-05 PROCEDURE — 99214 OFFICE O/P EST MOD 30 MIN: CPT | Performed by: INTERNAL MEDICINE

## 2021-02-05 NOTE — PROGRESS NOTES
02/05/2021    Patient Information  Sachi CUELLAR Vora                                                                                          [address] [place] [address]      1940  [unfilled]  There is no work phone number on file.    Chief Complaint:     Follow-up recent emergency room visit for frequent falls and dizziness.    History of Present Illness:    Patient with a history of multiple medical problems including osteoporosis, hypertension, carotid artery plaque, chronic gastritis, chronic lower back pain, chronic renal insufficiency stage II, depression with anxiety and generalized anxiety, hyperlipidemia, chronic migraine headache, pedal edema, restless leg syndrome, sensorineural hearing loss, chronic gastric ulcer, chronic fatigue, hypersomnolence, chronic anemia, multinodular goiter, iron deficiency anemia, statin intolerance, pulmonary hypertension, esophageal reflux, tinnitus and chronic hoarseness, pain disorder with psychological factors.  She presents today to follow-up of recent emergency room visit for frequent falls and dizziness as described below.  Past medical history reviewed and updated were necessary including health maintenance parameters.  This reveals she is up-to-date or else accounted for.    The history regarding dizziness and frequent falls:    February 5, 2021--patient seen in follow-up by Dr. Weir.  I extensively reviewed the documentation from the emergency room visit as noted below.  I reviewed the history with the patient and she is describing episodes of dizziness described as a spinning sensation of her body and this seems to be precipitated by movement although it does not occur if she rolls over in bed.  If she stands up and starts to move she gets dizzy/spinning sensation.  She does not feel like she is going to pass out and did not have a syncopal or near syncopal episode.  There is no nausea or vomiting.  The episodes last for 5 minutes at  "the longest.  She has never had these symptoms before.  She is still having the symptoms and the last episode was yesterday.  She was walking up steps at the time.  That episode lasted 2 or 3 minutes.  On exam there is no focal neurologic deficit.  Her ambulation looked normal.  There was a endpoint nystagmus to the right on the extraocular exam which was otherwise normal.  I think patient is suffering from benign paroxysmal positional vertigo and she needs to be evaluated with vestibular therapy and possibly treated.    Room Number:  14/14  Date of encounter:  1/27/2021  PCP: Cruzito Weir MD  Historian: Patient      I used full protective equipment while examining this patient.  This includes face mask, gloves and protective eyewear.  I washed my hands before entering the room and immediately upon leaving the room.  Patient was wearing a surgical mask.        HPI:  Chief Complaint: Frequent falls  A complete HPI/ROS/PMH/PSH/SH/FH are unobtainable due to: None     Context: Sachi Vora is a 80 y.o. female who presents to the ED c/o frequent falls over the past several months.  Patient reports \"balance problems\" for the past 6 months or so.  Patient states she falls because she loses her balance.  She last fell 2 days ago while walking her dog.  She hit her head and upper back on the soft, wet ground..  Denies loss of consciousness.  Complains of dull headache, upper back pain, and worsening of her chronic low back pain since then.  Nothing makes the pain better or worse.  She is on aspirin but no other anticoagulants.  Denies nausea, vomiting, focal numbness/tingling/weakness, chest pain, abdominal pain, bowel/bladder dysfunction, or recent illness.  Patient does not use a cane or walker.  Patient does report increased urinary urgency for the past several months.  Denies dysuria or hematuria.  Patient has not seen her PCP for any of these complaints.  Denies any recent medication changes.  Patient denies " feeling dizzy at present.    RADIOLOGY  Xr Chest 2 View     Result Date: 1/27/2021  XR CHEST 2 VW-, XR SPINE THORACIC 3 VW-, XR SPINE LUMBAR COMPLETE 4+VW-  HISTORY: Female who is 80 years-old,  trauma  TECHNIQUE: Frontal and lateral views of the chest, 4 views of the thoracic spine, 5 views of the lumbar spine.  COMPARISON: 02/09/2019, 10/01/2018, 08/29/2018  FINDINGS:  Chest x-ray: The heart size is borderline. Aorta is calcified. Pulmonary vasculature is unremarkable. No focal pulmonary consolidation, pleural effusion, or pneumothorax. No acute osseous process.  Thoracic and lumbar spine x-ray: Alignment is in range of normal. Vertebral body heights appear stable. No acute fracture is identified. Small multilevel endplate spurring, as well as mild disc space narrowing at mid thoracic levels, L5/S1. Lower lumbar facet arthropathy is present. Degenerative changes seen at the symphysis pubis.       1. No evidence for acute pulmonary process. Borderline heart size. Follow-up evaluation of the chest can be obtained as clinical indications persist. 2. No acute fracture is identified in the thoracic or lumbar spine. Degenerative changes in the spine. If there is further clinical concern, MRI could be considered for further aeration.  This report was finalized on 1/27/2021 5:09 PM by Dr. Ady Vaughn M.D.       Xr Spine Thoracic 3 View     Result Date: 1/27/2021  XR CHEST 2 VW-, XR SPINE THORACIC 3 VW-, XR SPINE LUMBAR COMPLETE 4+VW-  HISTORY: Female who is 80 years-old,  trauma  TECHNIQUE: Frontal and lateral views of the chest, 4 views of the thoracic spine, 5 views of the lumbar spine.  COMPARISON: 02/09/2019, 10/01/2018, 08/29/2018  FINDINGS:  Chest x-ray: The heart size is borderline. Aorta is calcified. Pulmonary vasculature is unremarkable. No focal pulmonary consolidation, pleural effusion, or pneumothorax. No acute osseous process.  Thoracic and lumbar spine x-ray: Alignment is in range of normal.  Vertebral body heights appear stable. No acute fracture is identified. Small multilevel endplate spurring, as well as mild disc space narrowing at mid thoracic levels, L5/S1. Lower lumbar facet arthropathy is present. Degenerative changes seen at the symphysis pubis.       1. No evidence for acute pulmonary process. Borderline heart size. Follow-up evaluation of the chest can be obtained as clinical indications persist. 2. No acute fracture is identified in the thoracic or lumbar spine. Degenerative changes in the spine. If there is further clinical concern, MRI could be considered for further aeration.  This report was finalized on 1/27/2021 5:09 PM by Dr. Ady Vaughn M.D.       Ct Head Without Contrast     Result Date: 1/27/2021  HEAD CT WITHOUT CONTRAST  HISTORY: Fall, head injury  TECHNIQUE: Radiation dose reduction techniques were utilized, including automated exposure control and exposure modulation based on body size. 3 mm images were obtained from the skull base to vertex without IV contrast.  COMPARISON: None available  FINDINGS: There is no evidence of intracranial hemorrhage. There is no evidence of acute cortical-based infarction. There is no evidence of hydrocephalus. There are confluent areas of low attenuation in the subcortical, periventricular and deep white matter which is nonspecific but likely reflect sequela of chronic small vessel ischemia. There is an area of encephalomalacia within the left temporal lobe which may be from an old infarct. No evidence of focal mass lesion. No evidence of midline shift. Carotid siphon calcification. The orbits are unremarkable. The sinuses and mastoids are clear. The bone windows are unremarkable.       Chronic changes without evidence of acute intracranial abnormality  Radiation dose reduction techniques were utilized, including automated exposure control and exposure modulation based on body size.  This report was finalized on 1/27/2021 5:15 PM by   Colin Barboza M.D.       Xr Spine Lumbar Complete 4+vw     Result Date: 1/27/2021  XR CHEST 2 VW-, XR SPINE THORACIC 3 VW-, XR SPINE LUMBAR COMPLETE 4+VW-  HISTORY: Female who is 80 years-old,  trauma  TECHNIQUE: Frontal and lateral views of the chest, 4 views of the thoracic spine, 5 views of the lumbar spine.  COMPARISON: 02/09/2019, 10/01/2018, 08/29/2018  FINDINGS:  Chest x-ray: The heart size is borderline. Aorta is calcified. Pulmonary vasculature is unremarkable. No focal pulmonary consolidation, pleural effusion, or pneumothorax. No acute osseous process.  Thoracic and lumbar spine x-ray: Alignment is in range of normal. Vertebral body heights appear stable. No acute fracture is identified. Small multilevel endplate spurring, as well as mild disc space narrowing at mid thoracic levels, L5/S1. Lower lumbar facet arthropathy is present. Degenerative changes seen at the symphysis pubis.       1. No evidence for acute pulmonary process. Borderline heart size. Follow-up evaluation of the chest can be obtained as clinical indications persist. 2. No acute fracture is identified in the thoracic or lumbar spine. Degenerative changes in the spine. If there is further clinical concern, MRI could be considered for further aeration.  This report was finalized on 1/27/2021 5:09 PM by Dr. Ady Vaughn M.D.      Contains abnormal data Comprehensive Metabolic Panel  Order: [id number]  Status:  Final result   Visible to patient:  Yes (MyChart)   Next appt:  Today at 11:00 AM in Internal Medicine (Cruzito Weir MD)  Specimen Information: Blood        Component   Ref Range & Units 9d ago   Glucose   65 - 99 mg/dL 84    BUN   8 - 23 mg/dL 14    Creatinine   0.57 - 1.00 mg/dL 0.90    Sodium   136 - 145 mmol/L 144    Potassium   3.5 - 5.2 mmol/L 3.2Low     Chloride   98 - 107 mmol/L 110High     CO2   22.0 - 29.0 mmol/L 25.3    Calcium   8.6 - 10.5 mg/dL 8.9    Total Protein   6.0 - 8.5 g/dL 6.6    Albumin   3.50 - 5.20  g/dL 3.90    ALT (SGPT)   1 - 33 U/L 26    AST (SGOT)   1 - 32 U/L 19    Alkaline Phosphatase   39 - 117 U/L 74    Total Bilirubin   0.0 - 1.2 mg/dL 0.2    eGFR Non African Amer   >60 mL/min/1.73 61    Globulin   gm/dL 2.7    A/G Ratio   g/dL 1.4    BUN/Creatinine Ratio   7.0 - 25.0 15.6    Anion Gap   5.0 - 15.0 mmol/L 8.7    Resulting Agency [facility]      Narrative  Performed by: [facility]  GFR Normal >60   Chronic Kidney Disease <60   Kidney Failure <15       Specimen Collected: 01/27/21 17:35 Last Resulted: 01/27/21 18:04           CBC Auto Differential  Order: [id number] - Part of Panel Order [id number]  Status:  Final result   Visible to patient:  Yes (MyChart)  Specimen Information: Blood        Component   Ref Range & Units 9d ago   WBC   3.40 - 10.80 10*3/mm3 8.91    RBC   3.77 - 5.28 10*6/mm3 4.37    Hemoglobin   12.0 - 15.9 g/dL 12.4    Hematocrit   34.0 - 46.6 % 37.5    MCV   79.0 - 97.0 fL 85.8    MCH   26.6 - 33.0 pg 28.4    MCHC   31.5 - 35.7 g/dL 33.1    RDW   12.3 - 15.4 % 15.1    RDW-SD   37.0 - 54.0 fl 47.0    MPV   6.0 - 12.0 fL 9.4    Platelets   140 - 450 10*3/mm3 271    Neutrophil %   42.7 - 76.0 % 62.1    Lymphocyte %   19.6 - 45.3 % 28.4    Monocyte %   5.0 - 12.0 % 7.4    Eosinophil %   0.3 - 6.2 % 1.5    Basophil %   0.0 - 1.5 % 0.3    Immature Grans %   0.0 - 0.5 % 0.3    Neutrophils, Absolute   1.70 - 7.00 10*3/mm3 5.53    Lymphocytes, Absolute   0.70 - 3.10 10*3/mm3 2.53    Monocytes, Absolute   0.10 - 0.90 10*3/mm3 0.66    Eosinophils, Absolute   0.00 - 0.40 10*3/mm3 0.13    Basophils, Absolute   0.00 - 0.20 10*3/mm3 0.03    Immature Grans, Absolute   0.00 - 0.05 10*3/mm3 0.03    nRBC   0.0 - 0.2 /100 WBC 0.0    Resulting Agency [facility]         Specimen Collected: 01/27/21 16:17 Last Resulted: 01/27/21                DIAGNOSIS  Final diagnoses:   Frequent falls   Hypokalemia   Balance problems      DISPOSITION  Discharge     DISCHARGE     Patient discharged in stable  condition.     Reviewed implications of results, diagnosis, meds, responsibility to follow up, warning signs and symptoms of possible worsening, potential complications and reasons to return to ER, including worsening symptoms, persistent dizziness, chest pain, palpitations, fainting, or other concern..     Patient/Family voiced understanding of above instructions.     Discussed plan for discharge, as there is no emergent indication for admission. Patient referred to primary care provider for BP management due to today's BP. Pt/family is agreeable and understands need for follow up and repeat testing.  Pt is aware that discharge does not mean that nothing is wrong but it indicates no emergency is present that requires admission and they must continue care with follow-up as given below or physician of their choice.     Review of Systems   Constitution: Negative.   HENT: Negative.    Eyes: Negative.    Cardiovascular: Negative.    Respiratory: Negative.    Endocrine: Negative.    Hematologic/Lymphatic: Negative.    Skin: Negative.    Musculoskeletal: Positive for arthritis, back pain and joint pain.   Gastrointestinal: Negative.    Genitourinary: Negative.    Neurological: Positive for disturbances in coordination, dizziness and loss of balance.   Psychiatric/Behavioral: Positive for depression. The patient is nervous/anxious.    Allergic/Immunologic: Negative.        Active Problems:    Patient Active Problem List   Diagnosis   • Osteoporosis, 03/29/2011--lumbar spine -2.8.  Right femoral neck -2.4.  Left femoral neck -2.4.  Patient receives Reclast infusions.   • Therapeutic drug monitoring   • Simple renal cyst   • Benign essential hypertension   • Carotid artery plaque, 04/02/2018--mild right ICA plaque, normal left ICA 10/03/2014--mild bilateral carotid artery plaque.   • Chronic gastritis   • Chronic lower back pain   • Chronic otitis externa   • Chronic renal insufficiency, stage II (mild), creatinine 1.12   •  "Depression with anxiety   • Functional murmur, 07/15/2015--normal echocardiogram.   • Hyperlipidemia   • Menopausal state   • Chronic migraine   • Pancreatic Duct Dilation   • Pedal edema   • Restless legs syndrome   • Bilateral sensorineural hearing loss   • Vitamin D deficiency   • Chronic gastric ulcer   • Chronic fatigue   • Hypersomnolence   • Chronic anemia   • Multinodular goiter   • Generalized anxiety disorder   • Iron deficiency anemia   • Statin intolerance   • Postmenopausal state   • Pulmonary hypertension (CMS/HCC)   • Gastroesophageal reflux disease without esophagitis   • Tinnitus of both ears   • Chronic hoarseness   • Pain disorder with psychological factors   • Frequent falls   • Dizziness   • Benign paroxysmal positional vertigo due to bilateral vestibular disorder         Past Medical History:   Diagnosis Date   • Benign essential hypertension 4/8/2016   • Bilateral sensorineural hearing loss 3/31/2011    03/31/2011--etiology reveals reverse \"cookie bite\" type of hearing loss for both ears of mild/moderate degree, mostly sensorineural.  Speech discrimination 100% on the right, 96% on the left.   • Carotid artery plaque, 10/03/2014--mild bilateral carotid artery plaque. 7/25/2012    10/03/2014--[facility] screening revealed mild bilateral carotid plaque, negative for atrial fibrillation, negative for AAA, negative for PAD, osteoporosis screen revealed osteopenia.  Body mass index was 25 and considered to be moderate risk.  07/25/2012--vascular screen negative for carotid plaque, negative for abdominal aneurysm, negative for PAD Description: 10/03/2014--[facility] screening revealed mild bilateral carotid plaque, negative for atrial fibrillation, negative for AAA, negative for PAD, osteoporosis screen revealed osteopenia.  Body mass index was 25 and considered to be moderate risk.  07/25/2012--vascular screen negative for carotid plaque, negative for abdominal aneurysm, negative for PAD   • Chronic " anemia 8/23/2016 06/13/2017--colonoscopy revealed melanosis.  Biopsied.  There was friability with contact bleeding in the ascending colon.  Biopsied.  Pathology returned consistent with melanosis coli.  06/13/2017--EGD revealed normal esophagus.  Biopsies taken.  There was a medium-sized hiatal hernia.  Localized mild inflammation and linear erosions were found in the prepyloric region.  Biopsied.  Examined duodenum was normal.  Random biopsies taken.  Pathology of the gastroesophageal junction was unremarkable as was the gastric fundus.  Prepyloric biopsy revealed minimal chronic inflammation and reactive change.  H pylori negative.  Duodenal biopsies are negative.  04/12/2017--hemoglobin low at 10.8, hematocrit is normal at 35.7.  Iron sulfate 325 mg per day initiated.  08/23/2016--routine follow-up.  Patient continues to have epigastric abdominal pain believed to be related to reflux with possible esophageal spasm.  Hemoglobin noted to be low at 10.6 with a hematocrit low at 33.7 and RDW elevated at 16.2.  Homocysti   • Chronic fatigue 4/21/2016 06/14/2017--overnight oximetry revealed oxygen desaturation index of only 0.44.  There were only 10 total desaturations during the period of testing which lasted 6 hours and 46 minutes.  05/31/2017--patient seen in follow-up and reports she continues to have chronic fatigue as well as hypersomnolence.  Once again, she is on multiple medications that are undoubtedly contributing to this problem including clonazepam and hydrocodone but I doubt that these could be discontinued.  Her CBC back in April revealed a low hemoglobin of 10.8 but hematocrit was normal at 35.7.  Thyroid function tests were normal and CMP normal.  Overnight oximetry test ordered.  Repeat CBC.  Iron studies.  Sedimentation rate and CRP.  04/11/2017--patient seen in follow-up and her blood pressure is now at a reasonable level at 120/64.  She reports she still feels somewhat fatigued but she is  much better.  Patient has not been doing any regular exercise and I do think that that would be helpful to reduce her feeling of fatigue.  She is   • Chronic gastric ulcer 4/14/2014    02/15/2017--patient seen in follow-up in nearly 6 months later.  She has complaints of not feeling well all over.  She has complaints of diffuse myalgias and possibly tendinopathy related to Levaquin that I called in prior to her going to [place] for vacation.  She reports that 3 or 4 days after starting the Levaquin she began to have the musculoskeletal symptoms and she reports that she continues to have them presently.  Symptoms are worse involving her left calf area.  Examination reveals significant tenderness involving the left calf and the left calf seems a little larger than the right.  She also has complaints of shortness of breath but no chest pain.  She is complaining of double vision and generalized weakness and fatigue.  She was complaining of chronic fatigue at the last visit back in September and I ordered an overnight oximetry test but apparently this was never done for reasons that are not clear to me.  Her current oxygen saturation is 94% and normally it is 100%.  Plan is as follows: Extensi   • Chronic gastritis 11/19/2001    02/15/2017--patient seen in follow-up in nearly 6 months later.  She has complaints of not feeling well all over.  She has complaints of diffuse myalgias and possibly tendinopathy related to Levaquin that I called in prior to her going to [place] for vacation.  She reports that 3 or 4 days after starting the Levaquin she began to have the musculoskeletal symptoms and she reports that she continues to have them presently.  Symptoms are worse involving her left calf area.  Examination reveals significant tenderness involving the left calf and the left calf seems a little larger than the right.  She also has complaints of shortness of breath but no chest pain.  She is complaining of double  vision and generalized weakness and fatigue.  She was complaining of chronic fatigue at the last visit back in September and I ordered an overnight oximetry test but apparently this was never done for reasons that are not clear to me.  Her current oxygen saturation is 94% and normally it is 100%.  Plan is as follows: Extensi   • Chronic hoarseness 6/8/2020    September 15, 2020--patient presents with complaints of swelling of the soft tissues of the left side of the neck and this is associated with pain and discomfort, particularly when she turns her head rotating to the left.  She also notices hoarseness and feels that her voice has changed.  On exam there is definite prominence of the left sternocleidomastoid muscle and there may be some shotty lymph   • Chronic lower back pain 1/31/2006 08/11/2014--MRI of the lumbar spine reveals the conus terminates at L2 and is normal.  Cauda equina unremarkable.  Stable to moderate degenerative disc disease at L5-S1.  No acute fracture or pars defect is demonstrated.  Small synovial cysts are seen posterior to the L4-L5 facet.  The perivertebral soft tissues are unremarkable.  T12-L1, L1-L2, L2-L3 are negative.  L3-L4 a broad-based disc bulge resulting in mild bilateral neural foraminal narrowing.  L4-L5 reveals a broad-based disc bulge facet disease resulting in mild bilateral neural foraminal narrowing.  L5-S1 reveals a broad-based disc bulge, posterior osseous slipping, and facet disease resulting in mild to moderate bilateral neural foraminal narrowing.  Assessment is stable mild to moderate degenerative spondylosis.  Small synovial cysts are seen posterior to the L4-L5 facets.  08/11/2014--MRI of the thoracic spine reveals mild to moderate thoracic kyphosis.  No fracture.  At T5--T6 there is a moderate sized left paracentral disc protrusion which i   • Chronic migraine 11/3/2009    01/18/2010--MRI of the brain performed for headaches and memory loss.  Mild small vessel  disease in the cerebral and central pontine white matter.  There is an ovoid, somewhat pancake-shaped area of signal abnormality in the anterior inferior right temporal lobe subcortical to the juxtacortical white matter that measures 1.2 x 1 cm and anterior posterior and medial lateral dimension but only measures 3 mm in cranial caudal dimension.  I suspect that this is a benign cyst or a cystic area of encephalomalacia.  The remainder of the MRI of the head is within normal limits.  11/03/2009--CT scan of the brain without contrast performed for headache after a fall.  Mild diffuse atrophy.  No acute abnormality noted.; Description: Patient has had a long history of migraine headaches.  MRI and CT scan of the brain have been essentially negative.   • Chronic otitis externa 4/8/2016   • Chronic renal insufficiency, stage II (mild), creatinine 1.12 11/14/2015 11/14/2015--serum creatinine mildly elevated at 1.12.   • Depression with anxiety 4/8/2016   • Functional murmur, 07/15/2015--normal echocardiogram. 7/15/2015    07/15/2015--echocardiogram reveals normal left ventricular size and function with ejection fraction 55%.  Grade 1 diastolic dysfunction, abnormal relaxation pattern.  Trace tricuspid regurgitation.  Estimated right ventricular systolic pressure is 25 mmHg which is normal.   • Gastroesophageal reflux disease with esophagitis 11/19/2001 06/13/2017--EGD revealed normal esophagus.  Biopsies taken.  There was a medium-sized hiatal hernia.  Localized mild inflammation and linear erosions were found in the prepyloric region.  Biopsied.  Examined duodenum was normal.  Random biopsies taken.  Pathology of the gastroesophageal junction was unremarkable as was the gastric fundus.  Prepyloric biopsy revealed minimal chronic inflammation and reactive change.  H pylori negative.  Duodenal biopsies are negative.  11/03/2014--patient seen in follow-up and reports her epigastric pain/chest pain has resolved.  I  reviewed the results of the studies with her.  It does not appear that her problem is related to biliary tract disease.  She does have reflux and although the recent upper GI revealed minimal reflux, I do think her symptoms are related to esophageal reflux with esophageal spasm.  10/21/2014--air-contrast upper GI series revealed trace upper laryngeal penetration.  Persistent small partially reducible sliding hiatal hernia the upper stomach with    • Gastroesophageal reflux disease without esophagitis 6/6/2019   • Generalized anxiety disorder 4/11/2017   • History of Acute deep vein thrombosis (DVT) of distal end of left lower extremity 2/15/2017    03/21/2017 patient seen in follow-up and she is tolerating the Eliquis well without signs or symptoms of bleeding.  Her calf swelling and tenderness is better but not totally resolved.  I suspect that the DVT is chronic and may not resolve at all.  I will order a repeat venous study and then proceed from there.  02/23/2017--repeat Doppler venous study of the left lower extremity reveals a chronic left lower extremity DVT in the posterior tibial.  All other left sided veins appeared normal.  Fluid collection in the left calf noted.  02/17/2017--patient seen in follow-up and reports her left lower extremity symptoms are about the same.  She continues to have complaints of profound fatigue which I think is multifactorial including underlying depression that is not in remission.  Review the results of the CTA of the chest including the possible thyroid lesion.  I do not think this is contributing to any of her symptoms of fatigue particularly given the fact that her thyroid function tests are normal.  I expla   • History of palpitations 12/07/2011    Patient has had multiple admissions to the hospital for complaints of chest pain and heart palpitations. She meant admitted at least on 3 occasions. She has had at least 3 stress Cardiolite and 1 stress ECG, the last Cardiolite  being performed 12/07/2011 which was negative. Patient is also had Holter monitors which have been unremarkable.   • HIstory of Schatzki's ring 02/28/2012 02/28/2012--air-contrast upper GI revealed small to moderate sized reducible sliding hiatal herniation of the upper stomach with some demonstrated gastroesophageal reflux. No esophageal, gastric, or duodenal mass or mucosal ulceration was seen.  11/03/2008--EGD performed for evaluation of iron deficiency anemia revealed hiatal hernia without evidence of reflux, prepyloric antritis and   • Hyperlipidemia 4/8/2016   • Hypersomnolence 5/5/2016 06/14/2017--overnight oximetry revealed oxygen desaturation index of only 0.44.  There were only 10 total desaturations during the period of testing which lasted 6 hours and 46 minutes.  05/31/2017--patient seen in follow-up and reports she continues to have chronic fatigue as well as hypersomnolence.  Once again, she is on multiple medications that are undoubtedly contributing to this problem including clonazepam and hydrocodone but I doubt that these could be discontinued.  Her CBC back in April revealed a low hemoglobin of 10.8 but hematocrit was normal at 35.7.  Thyroid function tests were normal and CMP normal.  Overnight oximetry test ordered.  Repeat CBC.  Iron studies.  Sedimentation rate and CRP.  04/11/2017--patient seen in follow-up and her blood pressure is now at a reasonable level at 120/64.  She reports she still feels somewhat fatigued but she is much better.  Patient has not been doing any regular exercise and I do think that that would be helpful to reduce her feeling of fatigue.  She is   • Menopausal state 4/8/2016   • Multinodular goiter 2/17/2017 02/21/2017--thyroid ultrasound reveals multinodular thyroid with largest nodule measuring on the order of 1.6 cm in greatest dimension.  02/17/2017--patient seen in follow-up for DVT and CTA of the chest.  An incidental finding on the CTA of the chest  reveals a 1.7 cm lesion in the right lobe of thyroid.  Note that thyroid function tests are currently normal.  Ultrasound of the thyroid ordered.   • Osteoporosis, 03/29/2011--lumbar spine -2.8.  Right femoral neck -2.4.  Left femoral neck -2.4.  Patient receives Reclast infusions. 2/9/2009 04/19/2017--Reclast infusion.  04/05/2016--Reclast infusion.  06/04/2012--Reclast infusion.  05/26/2011--Reclast infusion.  03/29/2011--DEXA scan revealed a lumbar T score of -2.8, right femoral neck T score -2.4, left femoral neck T score -2.4.  Osteoporosis of the lumbar spine and severe osteopenia of the hips bilaterally.  Patient has been intolerant to Fosamax because of gastritis and gastroesophageal reflux.  04/01/2009--treatment for osteoporosis begun with Fosamax.  02/09/2009--DEXA scan revealed lumbar spine T score of -3.3.  Left femoral neck T score -2.5.  Right hip T score -2.6.  Osteoporosis.    • Pain disorder with psychological factors 10/10/2012   • Pancreatic Duct Dilation 11/30/2001 09/29/2014--patient was again evaluated by the urologist for a renal cyst.  CT scan of the abdomen and pelvis reveals a left renal cyst measuring 5.1 x 4.9 cm.  There were subcentimeter hypodense renal lesions that are too small to further characterize and are presumably related to cysts.  Recommend attention to follow up.  Distal dilated pancreatic duct noted.  The common bile duct is the upper limits of normal in size.  The ampullary region is not well evaluated.  Comparison with prior imaging is recommended if available.  If there is no prior film available for comparison, and ERCP or MRCP could be performed to further evaluate.  Small hiatal hernia noted.  03/05/2012--CT scan of the abdomen with contrast, pancreatic protocol.  This reveals some fullness of the pancreatic ductal system and apparent pancreatic divisum with separate entrance of the accessory pancreatic duct extending into the duodenum distal to the main  pancreatic duct.  There is fullness of the duct diffusely but similar to the previous s   • Pedal edema 6/29/2015 06/29/2015--patient presents with a 4-6 week history of exertional dyspnea that comes on with activity such as climbing stairs or walking her dog up an incline.  No chest pain.  Relieved with rest.  No cough.  She does have complaints of her feet and legs swelling that is particularly worse at the end of the day and not improved overnight.  No orthopnea or PND.  Chest exam reveals faint rales at the bases.  Otherwise clear.  Heart is regular and I do not appreciate a heart murmur.  Chest x-ray PA and lateral ordered.  Echocardiogram ordered.   • Pulmonary hypertension (CMS/HCC) 4/18/2019   • Restless legs syndrome 4/8/2016   • Simple renal cyst 7/20/2009 09/29/2014--patient was again evaluated by the urologist for a renal cyst.  CT scan of the abdomen and pelvis reveals a left renal cyst measuring 5.1 x 4.9 cm.  There were subcentimeter hypodense renal lesions that are too small to further characterize and are presumably related to cysts.  Recommend attention to follow up.  Distal dilated pancreatic duct noted.  The common bile duct is the upper limits of normal in size.  The ampullary region is not well evaluated.  Comparison with prior imaging is recommended if available.  If there is no prior film available for comparison, and ERCP or MRCP could be performed to further evaluate.  Small hiatal hernia noted.  07/20/2009--patient was noted to have a left renal mass consistent with a cyst.  This was evaluated by the urologist 07/20/2009.   • Statin intolerance 10/30/2017   • Tinnitus of both ears 9/30/2019 September 30, 2019--patient presents with a several year history of bilateral tinnitus, right greater than left.  It seems to be getting worse and now is associated with fluctuating hearing.  She occasionally will have worsening hearing after she coughs or sneezes.  On exam she has evidence of  old otitis media scars, particularly on the right.  There is no acute infection present and there is no a   • Vitamin D deficiency 4/8/2016         Past Surgical History:   Procedure Laterality Date   • APPENDECTOMY  1965    1965   • CATARACT EXTRACTION Bilateral Remote    Remote bilateral cataract extirpation with intraocular lens implantation.   • CHOLECYSTECTOMY WITH INTRAOPERATIVE CHOLANGIOGRAM N/A 1/21/2019 01/21/2019--laparoscopic paraesophageal hernia repair with fundoplication.   • COLONOSCOPY  11/03/2008 2008--normal colonoscopy   • COLONOSCOPY  2001 2001--normal colonoscopy.   • COLONOSCOPY N/A 6/13/2017 06/13/2017--colonoscopy revealed melanosis.  Biopsied.  There was friability with contact bleeding in the ascending colon.  Biopsied.  Pathology returned consistent with melanosis coli.   • ENDOSCOPY  10/08/2014    02/28/2012--air-contrast upper GI revealed small to moderate sized reducible sliding hiatal herniation of the upper stomach with some demonstrated gastroesophageal reflux. No esophageal, gastric, or duodenal mass or mucosal ulceration was seen. 11/03/2008--EGD performed for evaluation of iron deficiency anemia revealed hiatal hernia without evidence of reflux, prepyloric antritis and   • ENDOSCOPY  11/03/2008 11/03/2008--EGD performed for evaluation of iron deficiency anemia revealed hiatal hernia without evidence of reflux, prepyloric antritis and   • ENDOSCOPY  11/19/2001 11/19/2001--EGD revealed a very tortuous distal esophagus with a Schatzki's ring. No ulcer or erosions. No Dorado's mucosa. Stomach revealed patchy erythema and erosions in the antrum. Biopsy. Normal pylorus with no obstruction. Normal duodenum with no ulcers. The Schatzki's ring was dilated with a Rhodes dilator.;   • ENDOSCOPY N/A 9/27/2016 09/27/2016--EGD revealed a normal oropharynx, esophagus, and medium sized hiatal hernia.  Nonbleeding gastric ulcer with no stigmata of bleeding.  Biopsy.   Gastritis.  Biopsy.  Normal examined duodenum.  Biopsied.  Pathology returned mild to moderate chronic active gastritis with ulceration from the stomach antral ulcer biopsy.  Gastroesophageal junction biopsy revealed minimal mixed inflammation.   • ENDOSCOPY N/A 6/13/2017 06/13/2017--colonoscopy revealed melanosis.  Biopsied.  There was friability with contact bleeding in the ascending colon.  Biopsied.  Pathology returned consistent with melanosis coli.   • ERCP N/A 1/22/2019 01/22/2019--ERCP with sphincterotomy and balloon stone extraction.   • ESOPHAGEAL DILATATION  11/19/2001 11/19/2001--EGD revealed a very tortuous distal esophagus with a Schatzki's ring. No ulcer or erosions. No Dorado's mucosa. Stomach revealed patchy erythema and erosions in the antrum. Biopsy. Normal pylorus with no obstruction. Normal duodenum with no ulcers. The Schatzki's ring was dilated with a Rhodes dilator.;    • ESOPHAGEAL MANOMETRY N/A 12/18/2018    Procedure: ESOPHAGEAL MANOMETRY;  Surgeon: Cecilia, Nurse Performed;  Location: [facility] ENDOSCOPY;  Service: Gastroenterology   • ESOPHAGOSCOPY N/A 1/21/2019    Procedure: FLEXIBLE ESOPHAGOSCOPY;  Surgeon: Reji Johnson MD;  Location: [facility] OR;  Service: General   • HIATAL HERNIA REPAIR N/A 1/21/2019    Procedure: Laparoscopic paraesophageal hernia repair with toupee fundoplication;  Surgeon: Reji Johnson MD;  Location: [facility] OR;  Service: General   • INCONTINENCE SURGERY  1979 1979--a bladder tack procedure for urinary stress incontinence   • KNEE ARTHROSCOPY Right 12/12/2011 12/12/2011--right knee arthroscopy with partial lateral and medial meniscectomies.   • SKIN SURGERY  05/25/2010 05/25/2010--skin lesion excised from right lower extremity. 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   • TOTAL ABDOMINAL HYSTERECTOMY WITH SALPINGO OOPHORECTOMY  1974 1974--total abdominal hysterectomy.         Allergies    Allergen Reactions   • Statins Nausea And Vomiting   • Morphine Hallucinations   • Penicillins Itching   • Tetanus Toxoids Itching           Current Outpatient Medications:   •  aspirin 81 MG EC tablet, Take 81 mg by mouth Daily. HELD, Disp: , Rfl:   •  buPROPion XL (WELLBUTRIN XL) 150 MG 24 hr tablet, Take 150 mg by mouth 2 (Two) Times a Day., Disp: , Rfl: 0  •  Cholecalciferol (vitamin D3) 125 MCG (5000 UT) capsule capsule, 1 by mouth daily as directed, Disp: 30 capsule, Rfl:   •  Cimetidine (TAGAMET PO), Tagamet, Disp: , Rfl:   •  clonazePAM (KlonoPIN) 1 MG tablet, TAKE 1/2 TO 1 TABLET BY MOUTH TWICE DAILY, Disp: 60 tablet, Rfl: 5  •  cycloSPORINE (RESTASIS) 0.05 % ophthalmic emulsion, Administer 1 drop to both eyes 2 (Two) Times a Day., Disp: , Rfl:   •  diclofenac (VOLTAREN) 1 % gel gel, Apply 4 g topically to the appropriate area as directed 4 (Four) Times a Day As Needed., Disp: , Rfl:   •  estradiol (ESTRACE) 1 MG tablet, TAKE 1 TABLET BY MOUTH DAILY, Disp: 90 tablet, Rfl: 1  •  gabapentin (NEURONTIN) 100 MG capsule, Take 1 capsule by mouth Every Night., Disp: 90 capsule, Rfl: 1  •  HYDROcodone-acetaminophen (NORCO)  MG per tablet, Take 1 tablet by mouth every 6 (six) hours as needed for moderate pain (4-6)., Disp: , Rfl:   •  Misc Natural Products (COLON CLEANSE PO), Take  by mouth., Disp: , Rfl:   •  Multiple Vitamins-Minerals (MULTIVITAMIN ADULT) chewable tablet, Chew 1 tablet Daily., Disp: , Rfl:   •  potassium chloride (K-DUR,KLOR-CON) 10 MEQ CR tablet, Take 1 p.o. 3 times daily at mealtime, Disp: 270 tablet, Rfl: 3  •  topiramate (TOPAMAX) 100 MG tablet, TAKE 1 TABLET DAILY, Disp: 90 tablet, Rfl: 4  •  triamterene-hydrochlorothiazide (DYAZIDE) 37.5-25 MG per capsule, TAKE ONE CAPSULE BY MOUTH DAILY FOR BLOOD PRESSURE AND LEG SWELLING, Disp: 90 capsule, Rfl: 1  •  venlafaxine (EFFEXOR) 75 MG tablet, Take 75 mg by mouth Daily., Disp: , Rfl:   •  vitamin E 400 UNIT capsule, Take 400 Units by mouth  "Daily., Disp: , Rfl:       Family History   Problem Relation Age of Onset   • Heart attack Mother         dies age 47 from heart attack   • Heart disease Mother    • Bleeding Disorder Mother    • Heart attack Maternal Aunt         dies age 60 from heart attack   • Heart disease Father    • Malig Hyperthermia Neg Hx          Social History     Socioeconomic History   • Marital status:      Spouse name: ander   • Number of children: 4   • Years of education: 14   • Highest education level: Associate degree: academic program   Occupational History   • Occupation: homemaker   Social Needs   • Financial resource strain: Not hard at all   • Food insecurity     Worry: Never true     Inability: Never true   • Transportation needs     Medical: Yes     Non-medical: Yes   Tobacco Use   • Smoking status: Never Smoker   • Smokeless tobacco: Never Used   Substance and Sexual Activity   • Alcohol use: No     Frequency: Never     Binge frequency: Never   • Drug use: No   • Sexual activity: Yes     Partners: Male   Lifestyle   • Physical activity     Days per week: 3 days     Minutes per session: 20 min   • Stress: Not at all   Relationships   • Social connections     Talks on phone: More than three times a week     Gets together: Once a week     Attends [employer] service: 1 to 4 times per year     Active member of club or organization: Yes     Attends meetings of clubs or organizations: 1 to 4 times per year     Relationship status:          Vitals:    02/05/21 1110   BP: 146/62   BP Location: Left arm   Pulse: 82   SpO2: 99%   Weight: 64.4 kg (142 lb)   Height: 152.4 cm (60\")        Body mass index is 27.73 kg/m².      Physical Exam:    General: Alert and oriented x 3.  No acute distress.  Normal affect.  HEENT: Pupils equal, round, reactive to light; extraocular movements intact; sclerae nonicteric; pharynx, ear canals and TMs normal.  Neck: Without JVD, thyromegaly, bruit, or adenopathy.  Lungs: Clear to " auscultation in all fields.  Heart: Regular rate and rhythm without murmur, rub, gallop, or click.  Abdomen: Soft, nontender, without hepatosplenomegaly or hernia.  Bowel sounds normal.  : Deferred.  Rectal: Deferred.  Extremities: Without clubbing, cyanosis, edema, or pulse deficit.  Neurologic: Intact without focal deficit.  Normal station and gait observed during ingress and egress from the examination room.  Skin: Without significant lesion.  Musculoskeletal: Unremarkable.    Lab/other results:    Reviewed the documentation from the emergency room visit as noted above.  I reviewed her medications at length.    Assessment/Plan:     Diagnosis Plan   1. Frequent falls     2. Dizziness     3. Benign paroxysmal positional vertigo due to bilateral vestibular disorder     4. Benign essential hypertension     5. Carotid artery plaque, 04/02/2018--mild right ICA plaque, normal left ICA 10/03/2014--mild bilateral carotid artery plaque.     6. Chronic renal insufficiency, stage II (mild), creatinine 1.12     7. Hyperlipidemia     8. Generalized anxiety disorder     9. Depression with anxiety     10. Restless legs syndrome     11. Pain disorder with psychological factors     12. Pulmonary hypertension (CMS/HCC)     13. Tinnitus of both ears       Patient presents with frequent falls from dizziness and her history is consistent with benign paroxysmal positional vertigo.  She has hypertension and her blood pressure was high at that visit but it appears to be only slightly high today.  She has carotid artery plaque but had a CT scan in the emergency room which was negative.  Multiple x-rays were also negative for injury.  Patient needs to be further evaluated with vestibular therapy.    Plan is as follows: Vestibular therapy referral ASAP.  I instructed patient to be careful in the meantime to avoid falls.  She should not drive either.        Procedures

## 2021-04-28 ENCOUNTER — OFFICE VISIT (OUTPATIENT)
Dept: INTERNAL MEDICINE | Facility: CLINIC | Age: 81
End: 2021-04-28

## 2021-04-28 VITALS
HEART RATE: 87 BPM | OXYGEN SATURATION: 99 % | TEMPERATURE: 98.1 F | HEIGHT: 60 IN | SYSTOLIC BLOOD PRESSURE: 130 MMHG | BODY MASS INDEX: 25.91 KG/M2 | RESPIRATION RATE: 16 BRPM | DIASTOLIC BLOOD PRESSURE: 82 MMHG | WEIGHT: 132 LBS

## 2021-04-28 DIAGNOSIS — IMO0002 CHRONIC MIGRAINE: Chronic | ICD-10-CM

## 2021-04-28 DIAGNOSIS — R41.0 INTERMITTENT CONFUSION: ICD-10-CM

## 2021-04-28 DIAGNOSIS — R29.6 FREQUENT FALLS: ICD-10-CM

## 2021-04-28 DIAGNOSIS — R41.3 MEMORY LOSS: ICD-10-CM

## 2021-04-28 DIAGNOSIS — F41.8 DEPRESSION WITH ANXIETY: ICD-10-CM

## 2021-04-28 DIAGNOSIS — R42 DIZZINESS: Primary | ICD-10-CM

## 2021-04-28 PROCEDURE — 99214 OFFICE O/P EST MOD 30 MIN: CPT | Performed by: INTERNAL MEDICINE

## 2021-04-28 RX ORDER — VENLAFAXINE HYDROCHLORIDE 150 MG/1
CAPSULE, EXTENDED RELEASE ORAL
Start: 2021-04-28 | End: 2021-12-21 | Stop reason: ALTCHOICE

## 2021-04-28 RX ORDER — BUPROPION HYDROCHLORIDE 300 MG/1
300 TABLET ORAL EVERY MORNING
COMMUNITY
Start: 2021-04-20

## 2021-04-28 RX ORDER — VENLAFAXINE HYDROCHLORIDE 75 MG/1
75 CAPSULE, EXTENDED RELEASE ORAL DAILY
COMMUNITY
Start: 2021-03-30 | End: 2021-04-28 | Stop reason: SDUPTHER

## 2021-04-28 RX ORDER — VENLAFAXINE HYDROCHLORIDE 150 MG/1
CAPSULE, EXTENDED RELEASE ORAL
COMMUNITY
Start: 2021-04-27 | End: 2021-04-28 | Stop reason: SDUPTHER

## 2021-04-28 RX ORDER — TRAZODONE HYDROCHLORIDE 50 MG/1
25-50 TABLET ORAL NIGHTLY
COMMUNITY
Start: 2021-04-20

## 2021-04-28 RX ORDER — VENLAFAXINE HYDROCHLORIDE 75 MG/1
CAPSULE, EXTENDED RELEASE ORAL
Qty: 30 CAPSULE | Refills: 5
Start: 2021-04-28 | End: 2021-12-21 | Stop reason: ALTCHOICE

## 2021-04-29 ENCOUNTER — TRANSCRIBE ORDERS (OUTPATIENT)
Dept: ADMINISTRATIVE | Facility: HOSPITAL | Age: 81
End: 2021-04-29

## 2021-04-29 ENCOUNTER — APPOINTMENT (OUTPATIENT)
Dept: GENERAL RADIOLOGY | Facility: HOSPITAL | Age: 81
End: 2021-04-29

## 2021-04-29 ENCOUNTER — HOSPITAL ENCOUNTER (EMERGENCY)
Facility: HOSPITAL | Age: 81
Discharge: HOME | End: 2021-04-29
Attending: EMERGENCY MEDICINE | Admitting: EMERGENCY MEDICINE

## 2021-04-29 VITALS
BODY MASS INDEX: 25.52 KG/M2 | RESPIRATION RATE: 18 BRPM | DIASTOLIC BLOOD PRESSURE: 92 MMHG | TEMPERATURE: 98.8 F | OXYGEN SATURATION: 90 % | HEART RATE: 67 BPM | HEIGHT: 60 IN | WEIGHT: 130 LBS | SYSTOLIC BLOOD PRESSURE: 180 MMHG

## 2021-04-29 DIAGNOSIS — M54.16 LUMBAR RADICULOPATHY: Primary | ICD-10-CM

## 2021-04-29 DIAGNOSIS — E78.2 MIXED HYPERLIPIDEMIA: Chronic | ICD-10-CM

## 2021-04-29 DIAGNOSIS — M50.10 CERVICAL DISC DISORDER WITH RADICULOPATHY: ICD-10-CM

## 2021-04-29 DIAGNOSIS — E04.2 MULTINODULAR GOITER: Chronic | ICD-10-CM

## 2021-04-29 DIAGNOSIS — R07.89 ATYPICAL CHEST PAIN: Primary | ICD-10-CM

## 2021-04-29 DIAGNOSIS — I10 HYPERTENSION, UNSPECIFIED TYPE: ICD-10-CM

## 2021-04-29 LAB
ALBUMIN SERPL-MCNC: 4.1 G/DL (ref 3.5–5.2)
ALBUMIN/GLOB SERPL: 1.6 G/DL
ALP SERPL-CCNC: 120 U/L (ref 39–117)
ALT SERPL W P-5'-P-CCNC: 16 U/L (ref 1–33)
ANION GAP SERPL CALCULATED.3IONS-SCNC: 11.1 MMOL/L (ref 5–15)
AST SERPL-CCNC: 16 U/L (ref 1–32)
BASOPHILS # BLD AUTO: 0.1 10*3/MM3 (ref 0–0.2)
BASOPHILS NFR BLD AUTO: 1.3 % (ref 0–1.5)
BILIRUB SERPL-MCNC: 0.3 MG/DL (ref 0–1.2)
BILIRUB UR QL STRIP: NEGATIVE
BUN SERPL-MCNC: 15 MG/DL (ref 8–23)
BUN/CREAT SERPL: 17.2 (ref 7–25)
CALCIUM SPEC-SCNC: 9.1 MG/DL (ref 8.6–10.5)
CHLORIDE SERPL-SCNC: 107 MMOL/L (ref 98–107)
CLARITY UR: CLEAR
CO2 SERPL-SCNC: 23.9 MMOL/L (ref 22–29)
COLOR UR: YELLOW
CREAT SERPL-MCNC: 0.87 MG/DL (ref 0.57–1)
D DIMER PPP FEU-MCNC: 0.47 MCGFEU/ML (ref 0–0.49)
DEPRECATED RDW RBC AUTO: 43.9 FL (ref 37–54)
EOSINOPHIL # BLD AUTO: 0.2 10*3/MM3 (ref 0–0.4)
EOSINOPHIL NFR BLD AUTO: 2.7 % (ref 0.3–6.2)
ERYTHROCYTE [DISTWIDTH] IN BLOOD BY AUTOMATED COUNT: 14.4 % (ref 12.3–15.4)
GFR SERPL CREATININE-BSD FRML MDRD: 63 ML/MIN/1.73
GLOBULIN UR ELPH-MCNC: 2.6 GM/DL
GLUCOSE SERPL-MCNC: 98 MG/DL (ref 65–99)
GLUCOSE UR STRIP-MCNC: NEGATIVE MG/DL
HCT VFR BLD AUTO: 38 % (ref 34–46.6)
HGB BLD-MCNC: 12.6 G/DL (ref 12–15.9)
HGB UR QL STRIP.AUTO: NEGATIVE
HOLD SPECIMEN: NORMAL
HOLD SPECIMEN: NORMAL
IMM GRANULOCYTES # BLD AUTO: 0.03 10*3/MM3 (ref 0–0.05)
IMM GRANULOCYTES NFR BLD AUTO: 0.4 % (ref 0–0.5)
KETONES UR QL STRIP: NEGATIVE
LEUKOCYTE ESTERASE UR QL STRIP.AUTO: NEGATIVE
LYMPHOCYTES # BLD AUTO: 3.49 10*3/MM3 (ref 0.7–3.1)
LYMPHOCYTES NFR BLD AUTO: 46.5 % (ref 19.6–45.3)
MCH RBC QN AUTO: 28.4 PG (ref 26.6–33)
MCHC RBC AUTO-ENTMCNC: 33.2 G/DL (ref 31.5–35.7)
MCV RBC AUTO: 85.6 FL (ref 79–97)
MONOCYTES # BLD AUTO: 0.64 10*3/MM3 (ref 0.1–0.9)
MONOCYTES NFR BLD AUTO: 8.5 % (ref 5–12)
NEUTROPHILS NFR BLD AUTO: 3.05 10*3/MM3 (ref 1.7–7)
NEUTROPHILS NFR BLD AUTO: 40.6 % (ref 42.7–76)
NITRITE UR QL STRIP: NEGATIVE
NRBC BLD AUTO-RTO: 0.1 /100 WBC (ref 0–0.2)
PH UR STRIP.AUTO: 6 [PH] (ref 5–8)
PLATELET # BLD AUTO: 348 10*3/MM3 (ref 140–450)
PMV BLD AUTO: 9.9 FL (ref 6–12)
POTASSIUM SERPL-SCNC: 3.4 MMOL/L (ref 3.5–5.2)
PROT SERPL-MCNC: 6.7 G/DL (ref 6–8.5)
PROT UR QL STRIP: NEGATIVE
RBC # BLD AUTO: 4.44 10*6/MM3 (ref 3.77–5.28)
SODIUM SERPL-SCNC: 142 MMOL/L (ref 136–145)
SP GR UR STRIP: 1.02 (ref 1–1.03)
T3FREE SERPL-MCNC: 3.19 PG/ML (ref 2–4.4)
TROPONIN T SERPL-MCNC: <0.01 NG/ML (ref 0–0.03)
TROPONIN T SERPL-MCNC: <0.01 NG/ML (ref 0–0.03)
UROBILINOGEN UR QL STRIP: NORMAL
WBC # BLD AUTO: 7.51 10*3/MM3 (ref 3.4–10.8)
WHOLE BLOOD HOLD SPECIMEN: NORMAL
WHOLE BLOOD HOLD SPECIMEN: NORMAL

## 2021-04-29 PROCEDURE — 93010 ELECTROCARDIOGRAM REPORT: CPT | Performed by: INTERNAL MEDICINE

## 2021-04-29 PROCEDURE — 71046 X-RAY EXAM CHEST 2 VIEWS: CPT

## 2021-04-29 PROCEDURE — 85025 COMPLETE CBC W/AUTO DIFF WBC: CPT

## 2021-04-29 PROCEDURE — 84481 FREE ASSAY (FT-3): CPT | Performed by: INTERNAL MEDICINE

## 2021-04-29 PROCEDURE — 80053 COMPREHEN METABOLIC PANEL: CPT

## 2021-04-29 PROCEDURE — 36415 COLL VENOUS BLD VENIPUNCTURE: CPT

## 2021-04-29 PROCEDURE — 93005 ELECTROCARDIOGRAM TRACING: CPT

## 2021-04-29 PROCEDURE — 84484 ASSAY OF TROPONIN QUANT: CPT

## 2021-04-29 PROCEDURE — 96374 THER/PROPH/DIAG INJ IV PUSH: CPT

## 2021-04-29 PROCEDURE — 99284 EMERGENCY DEPT VISIT MOD MDM: CPT

## 2021-04-29 PROCEDURE — 85379 FIBRIN DEGRADATION QUANT: CPT | Performed by: EMERGENCY MEDICINE

## 2021-04-29 PROCEDURE — 81003 URINALYSIS AUTO W/O SCOPE: CPT

## 2021-04-29 RX ORDER — ASPIRIN 325 MG
325 TABLET ORAL ONCE
Status: COMPLETED | OUTPATIENT
Start: 2021-04-29 | End: 2021-04-29

## 2021-04-29 RX ORDER — SODIUM CHLORIDE 0.9 % (FLUSH) 0.9 %
10 SYRINGE (ML) INJECTION AS NEEDED
Status: DISCONTINUED | OUTPATIENT
Start: 2021-04-29 | End: 2021-04-29 | Stop reason: HOSPADM

## 2021-04-29 RX ORDER — LABETALOL HYDROCHLORIDE 5 MG/ML
10 INJECTION, SOLUTION INTRAVENOUS ONCE
Status: COMPLETED | OUTPATIENT
Start: 2021-04-29 | End: 2021-04-29

## 2021-04-29 RX ORDER — AMLODIPINE BESYLATE 2.5 MG/1
2.5 TABLET ORAL DAILY
Qty: 30 TABLET | Refills: 0 | Status: SHIPPED | OUTPATIENT
Start: 2021-04-29 | End: 2021-06-02 | Stop reason: SDUPTHER

## 2021-04-29 RX ADMIN — ASPIRIN 325 MG: 325 TABLET ORAL at 15:38

## 2021-04-29 RX ADMIN — LABETALOL HYDROCHLORIDE 10 MG: 5 INJECTION, SOLUTION INTRAVENOUS at 15:51

## 2021-04-30 LAB — QT INTERVAL: 371 MS

## 2021-05-06 ENCOUNTER — OFFICE VISIT (OUTPATIENT)
Dept: CARDIOLOGY | Facility: CLINIC | Age: 81
End: 2021-05-06

## 2021-05-06 VITALS
HEIGHT: 60 IN | SYSTOLIC BLOOD PRESSURE: 136 MMHG | WEIGHT: 138 LBS | DIASTOLIC BLOOD PRESSURE: 76 MMHG | HEART RATE: 75 BPM | BODY MASS INDEX: 27.09 KG/M2

## 2021-05-06 DIAGNOSIS — R00.2 PALPITATIONS: ICD-10-CM

## 2021-05-06 DIAGNOSIS — I27.20 PULMONARY HYPERTENSION (HCC): ICD-10-CM

## 2021-05-06 DIAGNOSIS — R29.6 FREQUENT FALLS: ICD-10-CM

## 2021-05-06 DIAGNOSIS — R07.89 CHEST PAIN, ATYPICAL: Primary | ICD-10-CM

## 2021-05-06 PROCEDURE — 93000 ELECTROCARDIOGRAM COMPLETE: CPT | Performed by: NURSE PRACTITIONER

## 2021-05-06 PROCEDURE — 99214 OFFICE O/P EST MOD 30 MIN: CPT | Performed by: NURSE PRACTITIONER

## 2021-05-06 NOTE — PROGRESS NOTES
Date of Office Visit: 21  Encounter Provider: LESLI Kelley  Place of Service: [facility] CARDIOLOGY  Patient Name: Sachi Vora  :1940    Chief Complaint   Patient presents with   • Pulmonary hypertension (   • Chest Pain   • Follow-up   :     HPI: Sachi Vora is a 80 y.o. female  with history of Dr. Bradley first seen in 2019 for Chest pain in the ER that was reported to be similar to CP she has in 2016, which prompted a normal stress test through her primary (cardiac markers negative at the time).  Echo completed at that time showing normal LV function without wall motion abnormalities. She followed up with Samuel BALLESTEROS two months post Hospital visit without issues, she increased activity, she did mention       intermittent left lateral chest wall pain that occurs with certain movements of her chest wall at that time but uncertain as too cause, determined most likely musculoskeletal in nature, recommended INGRID testing given mild pulmonary HTN, and blood pressure and HR was well controlled at this visit.      Last echo was  showing EF 69%, normal LV function, mild aortic regurgitation, and RVSP of <35 mmhg.     Other pertinent medical issues include Anemia, GERD with hx of ulcer, depression and LULÚ, and Vit D deficiency.      She presents today for ER follow up.  She has had reportedly 14 falls since January.  She had a CT scan of her head at that time which did not show anything acute.  She was recently in the hospital with complaint of chest pain after yet another fall.  She denies chest pain since that episode.  She denies dizziness, lightheadedness, nausea, palpitation prior to these falling episodes.  She does not feel as though her knees are going out on her.  Her blood pressure has not been low reportedly at home.  She has baseline shortness of breath.  She has no lower extremity edema.  She was prescribed a walker but does not want to use that.  Her  " got her a new cane today with 4 prongs.  She is to see her primary care physician here in the next week.  She is scheduled for an MRI of her brain and spine at the end of the month.  She has been dragging her feet for the last 6 weeks which is unusual for her and she also reports that when she writes it is not as legible list it used to be. She hopes to get back in physical therapy.             Allergies   Allergen Reactions   • Statins Nausea And Vomiting   • Morphine Hallucinations   • Penicillins Itching   • Tetanus Toxoids Itching           Family and social history reviewed.     ROS  All other systems were reviewed and are negative          Objective:     Vitals:    05/06/21 1402   BP: 136/76   BP Location: Left arm   Patient Position: Sitting   Pulse: 75   Weight: 62.6 kg (138 lb)   Height: 152.4 cm (60\")     Body mass index is 26.95 kg/m².    PHYSICAL EXAM:  Constitutional:       General: Not in acute distress.     Appearance: Well-developed. Not diaphoretic.   HENT:      Head: Normocephalic.   Pulmonary:      Effort: Pulmonary effort is normal. No respiratory distress.      Breath sounds: Normal breath sounds. No wheezing. No rhonchi. No rales.   Cardiovascular:      Normal rate. Regular rhythm.   Pulses:     Radial: 2+ bilaterally.  Skin:     General: Skin is warm and dry. There is no cyanosis.      Findings: No rash.   Neurological:      Mental Status: Alert and oriented to person, place, and time.   Psychiatric:         Behavior: Behavior normal.         Thought Content: Thought content normal.         Judgment: Judgment normal.           ECG 12 Lead    Date/Time: 5/6/2021 5:52 PM  Performed by: Twyla Lilly APRN  Authorized by: Twyla Lilly APRN   Comparison: compared with previous ECG   Similar to previous ECG  Rhythm: sinus rhythm  Rate: normal  Conduction: conduction normal  QRS axis: normal    Clinical impression: normal ECG              Current Outpatient Medications   Medication Sig " Dispense Refill   • amLODIPine (NORVASC) 2.5 MG tablet Take 1 tablet by mouth Daily. 30 tablet 0   • aspirin 81 MG EC tablet Take 81 mg by mouth Daily. HELD     • buPROPion XL (WELLBUTRIN XL) 300 MG 24 hr tablet Take 300 mg by mouth Every Morning.     • Cholecalciferol (vitamin D3) 125 MCG (5000 UT) capsule capsule 1 by mouth daily as directed 30 capsule    • Cimetidine (TAGAMET PO) Tagamet     • clonazePAM (KlonoPIN) 1 MG tablet TAKE 1/2 TO 1 TABLET BY MOUTH TWICE DAILY 60 tablet 5   • cycloSPORINE (RESTASIS) 0.05 % ophthalmic emulsion Administer 1 drop to both eyes 2 (Two) Times a Day.     • diclofenac (VOLTAREN) 1 % gel gel Apply 4 g topically to the appropriate area as directed 4 (Four) Times a Day As Needed.     • estradiol (ESTRACE) 1 MG tablet TAKE 1 TABLET BY MOUTH DAILY 90 tablet 1   • gabapentin (NEURONTIN) 100 MG capsule Take 1 capsule by mouth Every Night. 90 capsule 1   • HYDROcodone-acetaminophen (NORCO)  MG per tablet Take 1 tablet by mouth every 6 (six) hours as needed for moderate pain (4-6).     • Misc Natural Products (COLON CLEANSE PO) Take  by mouth.     • Multiple Vitamins-Minerals (MULTIVITAMIN ADULT) chewable tablet Chew 1 tablet Daily.     • potassium chloride (K-DUR,KLOR-CON) 10 MEQ CR tablet Take 1 p.o. 3 times daily at mealtime 270 tablet 3   • topiramate (TOPAMAX) 100 MG tablet TAKE 1 TABLET DAILY 90 tablet 4   • traZODone (DESYREL) 50 MG tablet Take 25-50 mg by mouth Every Night.     • triamterene-hydrochlorothiazide (DYAZIDE) 37.5-25 MG per capsule TAKE ONE CAPSULE BY MOUTH DAILY FOR BLOOD PRESSURE AND LEG SWELLING 90 capsule 1   • venlafaxine XR (EFFEXOR-XR) 150 MG 24 hr capsule Take 150 mg p.o. in the morning and 75 mg in the evening.     • venlafaxine XR (EFFEXOR-XR) 75 MG 24 hr capsule Take 75 mg in the evening. 30 capsule 5   • vitamin E 400 UNIT capsule Take 400 Units by mouth Daily.       No current facility-administered medications for this visit.     Assessment:        Diagnosis Plan   1. Chest pain, atypical  Stress Test With Myocardial Perfusion One Day   2. Palpitations  Holter Monitor - 48 Hour   3. Pulmonary hypertension (CMS/HCC)     4. Frequent falls          Orders Placed This Encounter   Procedures   • Stress Test With Myocardial Perfusion One Day     Standing Status:   Future     Standing Expiration Date:   5/6/2022     Order Specific Question:   What stress agent will be used?     Answer:   Regadenoson (Lexiscan)     Order Specific Question:   Difficulty walking criteria?     Answer:   Musculoskeletal (hips, knees, feet, back, amputee)     Order Specific Question:   Difficulty walking criteria?     Answer:   Poor Exercise Tolerance     Order Specific Question:   Reason for exam?     Answer:   Chest Pain     Order Specific Question:   Release to patient     Answer:   Immediate   • Holter Monitor - 48 Hour     Standing Status:   Future     Standing Expiration Date:   5/6/2022     Order Specific Question:   Reason for exam?     Answer:   Presyncope or Syncope     Order Specific Question:   Release to patient     Answer:   Immediate         Plan:    1. 80 year old  female with atypical chest pain no recurrent episodes since she ruled out in the emergency department.  She is to return for perfusion stress test to rule out ischemia    2. Primary essential Hypertension  3. Dyslipidemia   4. Generalized anxiety disorder and depression- defer to Primary   5. chronic LLE DVT  6.  Frequent falls-she has hit her head a couple times.  CT of the head in January was unremarkable.  She is to have an MRI in 2 to 3 weeks of her head.  She is to discuss physical therapy with her PCP as well as start using a cane since she did not like using a walker.  I am going to place a 48-hour Holter to ensure she has no significant arrhythmia that could be a culprit.  She has had 2 echoes in the last 2 years which were unremarkable so I do not plan to repeat that at this time              It  has been a pleasure to participate in this patient's care.      Thank you,  LESLI Kelley      **I used Dragon to dictate this note:**

## 2021-05-14 ENCOUNTER — LAB (OUTPATIENT)
Dept: LAB | Facility: HOSPITAL | Age: 81
End: 2021-05-14

## 2021-05-14 DIAGNOSIS — E04.2 MULTINODULAR GOITER: Chronic | ICD-10-CM

## 2021-05-14 DIAGNOSIS — E55.9 VITAMIN D DEFICIENCY: Chronic | ICD-10-CM

## 2021-05-14 DIAGNOSIS — Z11.59 NEED FOR HEPATITIS C SCREENING TEST: ICD-10-CM

## 2021-05-14 DIAGNOSIS — E78.2 MIXED HYPERLIPIDEMIA: Chronic | ICD-10-CM

## 2021-05-14 DIAGNOSIS — N18.2 CHRONIC RENAL INSUFFICIENCY, STAGE II (MILD): Chronic | ICD-10-CM

## 2021-05-14 LAB
25(OH)D3 SERPL-MCNC: 58 NG/ML (ref 30–100)
ALBUMIN SERPL-MCNC: 4 G/DL (ref 3.5–5.2)
ALBUMIN/GLOB SERPL: 1.4 G/DL
ALP SERPL-CCNC: 89 U/L (ref 39–117)
ALT SERPL W P-5'-P-CCNC: 15 U/L (ref 1–33)
ANION GAP SERPL CALCULATED.3IONS-SCNC: 11 MMOL/L (ref 5–15)
AST SERPL-CCNC: 14 U/L (ref 1–32)
BILIRUB SERPL-MCNC: 0.2 MG/DL (ref 0–1.2)
BUN SERPL-MCNC: 16 MG/DL (ref 8–23)
BUN/CREAT SERPL: 16.3 (ref 7–25)
CALCIUM SPEC-SCNC: 9.3 MG/DL (ref 8.6–10.5)
CHLORIDE SERPL-SCNC: 106 MMOL/L (ref 98–107)
CO2 SERPL-SCNC: 24 MMOL/L (ref 22–29)
CREAT SERPL-MCNC: 0.98 MG/DL (ref 0.57–1)
DEPRECATED RDW RBC AUTO: 46.7 FL (ref 37–54)
ERYTHROCYTE [DISTWIDTH] IN BLOOD BY AUTOMATED COUNT: 14.6 % (ref 12.3–15.4)
GFR SERPL CREATININE-BSD FRML MDRD: 55 ML/MIN/1.73
GLOBULIN UR ELPH-MCNC: 2.8 GM/DL
GLUCOSE SERPL-MCNC: 89 MG/DL (ref 65–99)
HCT VFR BLD AUTO: 39.5 % (ref 34–46.6)
HCV AB SER DONR QL: NORMAL
HGB BLD-MCNC: 12.4 G/DL (ref 12–15.9)
MCH RBC QN AUTO: 27.3 PG (ref 26.6–33)
MCHC RBC AUTO-ENTMCNC: 31.4 G/DL (ref 31.5–35.7)
MCV RBC AUTO: 86.8 FL (ref 79–97)
PLATELET # BLD AUTO: 322 10*3/MM3 (ref 140–450)
PMV BLD AUTO: 10.3 FL (ref 6–12)
POTASSIUM SERPL-SCNC: 3.6 MMOL/L (ref 3.5–5.2)
PROT SERPL-MCNC: 6.8 G/DL (ref 6–8.5)
RBC # BLD AUTO: 4.55 10*6/MM3 (ref 3.77–5.28)
SODIUM SERPL-SCNC: 141 MMOL/L (ref 136–145)
T4 FREE SERPL-MCNC: 1.04 NG/DL (ref 0.93–1.7)
TSH SERPL DL<=0.05 MIU/L-ACNC: 1.28 UIU/ML (ref 0.27–4.2)
WBC # BLD AUTO: 8.03 10*3/MM3 (ref 3.4–10.8)

## 2021-05-14 PROCEDURE — 84439 ASSAY OF FREE THYROXINE: CPT

## 2021-05-14 PROCEDURE — 85027 COMPLETE CBC AUTOMATED: CPT

## 2021-05-14 PROCEDURE — 84443 ASSAY THYROID STIM HORMONE: CPT

## 2021-05-14 PROCEDURE — 86803 HEPATITIS C AB TEST: CPT

## 2021-05-14 PROCEDURE — 80053 COMPREHEN METABOLIC PANEL: CPT

## 2021-05-14 PROCEDURE — 36415 COLL VENOUS BLD VENIPUNCTURE: CPT

## 2021-05-14 PROCEDURE — 82306 VITAMIN D 25 HYDROXY: CPT

## 2021-05-14 PROCEDURE — 83704 LIPOPROTEIN BLD QUAN PART: CPT

## 2021-05-14 PROCEDURE — 80061 LIPID PANEL: CPT

## 2021-05-16 LAB
CHOLEST SERPL-MCNC: 194 MG/DL (ref 100–199)
HDL SERPL-SCNC: 55.8 UMOL/L
HDLC SERPL-MCNC: 102 MG/DL
LDL SERPL QN: 21.1 NM
LDL SERPL-SCNC: 728 NMOL/L
LDL SMALL SERPL-SCNC: <90 NMOL/L
LDLC SERPL CALC-MCNC: 66 MG/DL (ref 0–99)
TRIGL SERPL-MCNC: 161 MG/DL (ref 0–149)

## 2021-05-18 ENCOUNTER — TELEPHONE (OUTPATIENT)
Dept: CARDIOLOGY | Facility: CLINIC | Age: 81
End: 2021-05-18

## 2021-05-18 NOTE — TELEPHONE ENCOUNTER
Twyla     Patient's  called and had a question. She is scheduled 5/25 for stress test and 48 hours holter. She has an MRI the following evening at 7:45pm. Will that interfere with the monitor? Or can we change it to a 24 hour monitor?     Thanks  Sherry

## 2021-05-19 ENCOUNTER — TELEPHONE (OUTPATIENT)
Dept: INTERNAL MEDICINE | Facility: CLINIC | Age: 81
End: 2021-05-19

## 2021-05-19 ENCOUNTER — OFFICE VISIT (OUTPATIENT)
Dept: INTERNAL MEDICINE | Facility: CLINIC | Age: 81
End: 2021-05-19

## 2021-05-19 VITALS
TEMPERATURE: 98 F | HEART RATE: 76 BPM | WEIGHT: 138.4 LBS | RESPIRATION RATE: 16 BRPM | SYSTOLIC BLOOD PRESSURE: 132 MMHG | DIASTOLIC BLOOD PRESSURE: 74 MMHG | HEIGHT: 60 IN | OXYGEN SATURATION: 95 % | BODY MASS INDEX: 27.17 KG/M2

## 2021-05-19 DIAGNOSIS — N18.2 CHRONIC RENAL INSUFFICIENCY, STAGE II (MILD): Chronic | ICD-10-CM

## 2021-05-19 DIAGNOSIS — R26.89 BALANCE DISORDER: ICD-10-CM

## 2021-05-19 DIAGNOSIS — E55.9 VITAMIN D DEFICIENCY: Chronic | ICD-10-CM

## 2021-05-19 DIAGNOSIS — R53.82 CHRONIC FATIGUE: Chronic | ICD-10-CM

## 2021-05-19 DIAGNOSIS — K21.9 GASTROESOPHAGEAL REFLUX DISEASE WITHOUT ESOPHAGITIS: Chronic | ICD-10-CM

## 2021-05-19 DIAGNOSIS — E04.2 MULTINODULAR GOITER: Chronic | ICD-10-CM

## 2021-05-19 DIAGNOSIS — H81.13 BENIGN PAROXYSMAL POSITIONAL VERTIGO DUE TO BILATERAL VESTIBULAR DISORDER: ICD-10-CM

## 2021-05-19 DIAGNOSIS — G47.10 HYPERSOMNOLENCE: Chronic | ICD-10-CM

## 2021-05-19 DIAGNOSIS — D64.9 CHRONIC ANEMIA: Chronic | ICD-10-CM

## 2021-05-19 DIAGNOSIS — F41.8 DEPRESSION WITH ANXIETY: Chronic | ICD-10-CM

## 2021-05-19 DIAGNOSIS — I65.23 ATHEROSCLEROSIS OF BOTH CAROTID ARTERIES: Chronic | ICD-10-CM

## 2021-05-19 DIAGNOSIS — R29.6 FREQUENT FALLS: ICD-10-CM

## 2021-05-19 DIAGNOSIS — E78.2 MIXED HYPERLIPIDEMIA: Primary | Chronic | ICD-10-CM

## 2021-05-19 DIAGNOSIS — Z78.9 STATIN INTOLERANCE: Chronic | ICD-10-CM

## 2021-05-19 DIAGNOSIS — D50.0 IRON DEFICIENCY ANEMIA DUE TO CHRONIC BLOOD LOSS: Chronic | ICD-10-CM

## 2021-05-19 DIAGNOSIS — F45.42 PAIN DISORDER WITH PSYCHOLOGICAL FACTORS: Chronic | ICD-10-CM

## 2021-05-19 DIAGNOSIS — I10 BENIGN ESSENTIAL HYPERTENSION: Chronic | ICD-10-CM

## 2021-05-19 DIAGNOSIS — M81.0 AGE-RELATED OSTEOPOROSIS WITHOUT CURRENT PATHOLOGICAL FRACTURE: Chronic | ICD-10-CM

## 2021-05-19 DIAGNOSIS — R49.0 CHRONIC HOARSENESS: Chronic | ICD-10-CM

## 2021-05-19 DIAGNOSIS — F41.1 GENERALIZED ANXIETY DISORDER: Chronic | ICD-10-CM

## 2021-05-19 PROCEDURE — 99214 OFFICE O/P EST MOD 30 MIN: CPT | Performed by: INTERNAL MEDICINE

## 2021-05-19 NOTE — TELEPHONE ENCOUNTER
Caller: Sachi Vora I    Relationship: Self    Best call back number: [phone]    What orders are you requesting (i.e. lab or imaging): PHYSICAL THERAPY     In what timeframe would the patient need to come in: ASAP     Where will you receive your lab/imaging services: [facility] (SAME PLACE DR THOMAS HAD PATIENT DO THERAPY BEFORE)    Additional notes: PATIENT STATES THAT HER CARDIOLOGIST SUGGESTED THAT SHE HAVE PHYSICAL THERAPY. PATIENT STATES THAT DR THOMAS HAD HER DO WATER THERAPY BEFORE AND IT WORKED REALLY WELL FOR HER.    PLEASE ADVISE

## 2021-05-19 NOTE — PROGRESS NOTES
05/19/2021    Patient Information  Sachi Vora                                                                                          [address]   [place] [address]      1940  [unfilled]  There is no work phone number on file.    Chief Complaint:     Follow-up lab work in order to monitor chronic medical issues.  Follow-up complaints of dizziness and frequent falls.    History of Present Illness:    Patient with a history of hyperlipidemia, hypertension, stage II chronic renal insufficiency, vitamin D deficiency, chronic anemia, iron deficiency, history of chronic gastric ulcer, multinodular goiter, esophageal reflux, osteoporosis, carotid artery plaque, depression with anxiety and generalized anxiety, chronic fatigue, hypersomnolence, statin intolerance, chronic hoarseness, pain disorder with psychological factors, frequent falls, complaints of dizziness poorly described and questionable history of paroxysmal positional vertigo.  She was evaluated not long ago at which time she was complaining of worsening dizziness that was poorly described and increasing number of falls.  I suggested that she see her psychiatrist due to the several medications which she is taking, some of which at high doses.  Also ordered an MRI of the brain ASAP which apparently was not done.  Also patient recommended to see a neurologist.  Her past medical history reviewed and updated were necessary including health maintenance parameters.  This reveals she is up-to-date or else accounted for.    Review of Systems   Constitutional: Positive for malaise/fatigue. Negative for chills and fever.   HENT: Positive for hoarse voice.    Eyes: Negative.  Negative for blurred vision, double vision, vision loss in left eye and vision loss in right eye.   Cardiovascular: Negative.  Negative for chest pain, dyspnea on exertion, irregular heartbeat, near-syncope, palpitations, paroxysmal nocturnal dyspnea and syncope.    Respiratory: Negative.    Endocrine: Negative.    Hematologic/Lymphatic: Negative.    Skin: Negative.    Musculoskeletal: Positive for arthritis, back pain, joint pain, neck pain and stiffness.   Gastrointestinal: Positive for abdominal pain, heartburn, nausea and vomiting. Negative for hematochezia, jaundice and melena.   Genitourinary: Negative.    Neurological: Positive for difficulty with concentration, disturbances in coordination, excessive daytime sleepiness, dizziness, headaches, light-headedness, loss of balance, vertigo and weakness. Negative for aphonia, focal weakness, numbness, paresthesias, seizures, sensory change and tremors.   Psychiatric/Behavioral: Positive for depression and memory loss. Negative for hallucinations, substance abuse, suicidal ideas and thoughts of violence. The patient has insomnia and is nervous/anxious.    Allergic/Immunologic: Negative.        Active Problems:    Patient Active Problem List   Diagnosis   • Osteoporosis, 03/29/2011--lumbar spine -2.8.  Right femoral neck -2.4.  Left femoral neck -2.4.  Patient receives Reclast infusions.   • Therapeutic drug monitoring   • Simple renal cyst   • Benign essential hypertension   • Carotid artery plaque, 04/02/2018--mild right ICA plaque, normal left ICA 10/03/2014--mild bilateral carotid artery plaque.   • Chronic gastritis   • Chronic lower back pain   • Chronic otitis externa   • Chronic renal insufficiency, stage II (mild), creatinine 1.12   • Depression with anxiety   • Functional murmur, 07/15/2015--normal echocardiogram.   • Hyperlipidemia   • Menopausal state   • Chronic migraine   • Pancreatic Duct Dilation   • Pedal edema   • Restless legs syndrome   • Bilateral sensorineural hearing loss   • Vitamin D deficiency   • Chronic gastric ulcer   • Chronic fatigue   • Hypersomnolence   • Chronic anemia   • Multinodular goiter   • Generalized anxiety disorder   • Iron deficiency anemia   • Statin intolerance   • Postmenopausal  "state   • Pulmonary hypertension (CMS/HCC)   • Gastroesophageal reflux disease without esophagitis   • Tinnitus of both ears   • Chronic hoarseness   • Pain disorder with psychological factors   • Frequent falls   • Dizziness   • Benign paroxysmal positional vertigo due to bilateral vestibular disorder         Past Medical History:   Diagnosis Date   • Benign essential hypertension 4/8/2016   • Bilateral sensorineural hearing loss 3/31/2011    03/31/2011--etiology reveals reverse \"cookie bite\" type of hearing loss for both ears of mild/moderate degree, mostly sensorineural.  Speech discrimination 100% on the right, 96% on the left.   • Carotid artery plaque, 10/03/2014--mild bilateral carotid artery plaque. 7/25/2012    10/03/2014--[facility] screening revealed mild bilateral carotid plaque, negative for atrial fibrillation, negative for AAA, negative for PAD, osteoporosis screen revealed osteopenia.  Body mass index was 25 and considered to be moderate risk.  07/25/2012--vascular screen negative for carotid plaque, negative for abdominal aneurysm, negative for PAD Description: 10/03/2014--[facility] screening revealed mild bilateral carotid plaque, negative for atrial fibrillation, negative for AAA, negative for PAD, osteoporosis screen revealed osteopenia.  Body mass index was 25 and considered to be moderate risk.  07/25/2012--vascular screen negative for carotid plaque, negative for abdominal aneurysm, negative for PAD   • Chronic anemia 8/23/2016 06/13/2017--colonoscopy revealed melanosis.  Biopsied.  There was friability with contact bleeding in the ascending colon.  Biopsied.  Pathology returned consistent with melanosis coli.  06/13/2017--EGD revealed normal esophagus.  Biopsies taken.  There was a medium-sized hiatal hernia.  Localized mild inflammation and linear erosions were found in the prepyloric region.  Biopsied.  Examined duodenum was normal.  Random biopsies taken.  Pathology of the gastroesophageal " junction was unremarkable as was the gastric fundus.  Prepyloric biopsy revealed minimal chronic inflammation and reactive change.  H pylori negative.  Duodenal biopsies are negative.  04/12/2017--hemoglobin low at 10.8, hematocrit is normal at 35.7.  Iron sulfate 325 mg per day initiated.  08/23/2016--routine follow-up.  Patient continues to have epigastric abdominal pain believed to be related to reflux with possible esophageal spasm.  Hemoglobin noted to be low at 10.6 with a hematocrit low at 33.7 and RDW elevated at 16.2.  Homocysti   • Chronic fatigue 4/21/2016 06/14/2017--overnight oximetry revealed oxygen desaturation index of only 0.44.  There were only 10 total desaturations during the period of testing which lasted 6 hours and 46 minutes.  05/31/2017--patient seen in follow-up and reports she continues to have chronic fatigue as well as hypersomnolence.  Once again, she is on multiple medications that are undoubtedly contributing to this problem including clonazepam and hydrocodone but I doubt that these could be discontinued.  Her CBC back in April revealed a low hemoglobin of 10.8 but hematocrit was normal at 35.7.  Thyroid function tests were normal and CMP normal.  Overnight oximetry test ordered.  Repeat CBC.  Iron studies.  Sedimentation rate and CRP.  04/11/2017--patient seen in follow-up and her blood pressure is now at a reasonable level at 120/64.  She reports she still feels somewhat fatigued but she is much better.  Patient has not been doing any regular exercise and I do think that that would be helpful to reduce her feeling of fatigue.  She is   • Chronic gastric ulcer 4/14/2014    02/15/2017--patient seen in follow-up in nearly 6 months later.  She has complaints of not feeling well all over.  She has complaints of diffuse myalgias and possibly tendinopathy related to Levaquin that I called in prior to her going to [place] for vacation.  She reports that 3 or 4 days after starting the  Levaquin she began to have the musculoskeletal symptoms and she reports that she continues to have them presently.  Symptoms are worse involving her left calf area.  Examination reveals significant tenderness involving the left calf and the left calf seems a little larger than the right.  She also has complaints of shortness of breath but no chest pain.  She is complaining of double vision and generalized weakness and fatigue.  She was complaining of chronic fatigue at the last visit back in September and I ordered an overnight oximetry test but apparently this was never done for reasons that are not clear to me.  Her current oxygen saturation is 94% and normally it is 100%.  Plan is as follows: Summer   • Chronic gastritis 11/19/2001    02/15/2017--patient seen in follow-up in nearly 6 months later.  She has complaints of not feeling well all over.  She has complaints of diffuse myalgias and possibly tendinopathy related to Levaquin that I called in prior to her going to [place] for vacation.  She reports that 3 or 4 days after starting the Levaquin she began to have the musculoskeletal symptoms and she reports that she continues to have them presently.  Symptoms are worse involving her left calf area.  Examination reveals significant tenderness involving the left calf and the left calf seems a little larger than the right.  She also has complaints of shortness of breath but no chest pain.  She is complaining of double vision and generalized weakness and fatigue.  She was complaining of chronic fatigue at the last visit back in September and I ordered an overnight oximetry test but apparently this was never done for reasons that are not clear to me.  Her current oxygen saturation is 94% and normally it is 100%.  Plan is as follows: Summer   • Chronic hoarseness 6/8/2020    September 15, 2020--patient presents with complaints of swelling of the soft tissues of the left side of the neck and this is associated  with pain and discomfort, particularly when she turns her head rotating to the left.  She also notices hoarseness and feels that her voice has changed.  On exam there is definite prominence of the left sternocleidomastoid muscle and there may be some shotty lymph   • Chronic lower back pain 1/31/2006 08/11/2014--MRI of the lumbar spine reveals the conus terminates at L2 and is normal.  Cauda equina unremarkable.  Stable to moderate degenerative disc disease at L5-S1.  No acute fracture or pars defect is demonstrated.  Small synovial cysts are seen posterior to the L4-L5 facet.  The perivertebral soft tissues are unremarkable.  T12-L1, L1-L2, L2-L3 are negative.  L3-L4 a broad-based disc bulge resulting in mild bilateral neural foraminal narrowing.  L4-L5 reveals a broad-based disc bulge facet disease resulting in mild bilateral neural foraminal narrowing.  L5-S1 reveals a broad-based disc bulge, posterior osseous slipping, and facet disease resulting in mild to moderate bilateral neural foraminal narrowing.  Assessment is stable mild to moderate degenerative spondylosis.  Small synovial cysts are seen posterior to the L4-L5 facets.  08/11/2014--MRI of the thoracic spine reveals mild to moderate thoracic kyphosis.  No fracture.  At T5--T6 there is a moderate sized left paracentral disc protrusion which i   • Chronic migraine 11/3/2009    01/18/2010--MRI of the brain performed for headaches and memory loss.  Mild small vessel disease in the cerebral and central pontine white matter.  There is an ovoid, somewhat pancake-shaped area of signal abnormality in the anterior inferior right temporal lobe subcortical to the juxtacortical white matter that measures 1.2 x 1 cm and anterior posterior and medial lateral dimension but only measures 3 mm in cranial caudal dimension.  I suspect that this is a benign cyst or a cystic area of encephalomalacia.  The remainder of the MRI of the head is within normal limits.   11/03/2009--CT scan of the brain without contrast performed for headache after a fall.  Mild diffuse atrophy.  No acute abnormality noted.; Description: Patient has had a long history of migraine headaches.  MRI and CT scan of the brain have been essentially negative.   • Chronic otitis externa 4/8/2016   • Chronic renal insufficiency, stage II (mild), creatinine 1.12 11/14/2015 11/14/2015--serum creatinine mildly elevated at 1.12.   • Depression with anxiety 4/8/2016   • Functional murmur, 07/15/2015--normal echocardiogram. 7/15/2015    07/15/2015--echocardiogram reveals normal left ventricular size and function with ejection fraction 55%.  Grade 1 diastolic dysfunction, abnormal relaxation pattern.  Trace tricuspid regurgitation.  Estimated right ventricular systolic pressure is 25 mmHg which is normal.   • Gastroesophageal reflux disease with esophagitis 11/19/2001 06/13/2017--EGD revealed normal esophagus.  Biopsies taken.  There was a medium-sized hiatal hernia.  Localized mild inflammation and linear erosions were found in the prepyloric region.  Biopsied.  Examined duodenum was normal.  Random biopsies taken.  Pathology of the gastroesophageal junction was unremarkable as was the gastric fundus.  Prepyloric biopsy revealed minimal chronic inflammation and reactive change.  H pylori negative.  Duodenal biopsies are negative.  11/03/2014--patient seen in follow-up and reports her epigastric pain/chest pain has resolved.  I reviewed the results of the studies with her.  It does not appear that her problem is related to biliary tract disease.  She does have reflux and although the recent upper GI revealed minimal reflux, I do think her symptoms are related to esophageal reflux with esophageal spasm.  10/21/2014--air-contrast upper GI series revealed trace upper laryngeal penetration.  Persistent small partially reducible sliding hiatal hernia the upper stomach with    • Gastroesophageal reflux disease  without esophagitis 6/6/2019   • Generalized anxiety disorder 4/11/2017   • History of Acute deep vein thrombosis (DVT) of distal end of left lower extremity 2/15/2017    03/21/2017 patient seen in follow-up and she is tolerating the Eliquis well without signs or symptoms of bleeding.  Her calf swelling and tenderness is better but not totally resolved.  I suspect that the DVT is chronic and may not resolve at all.  I will order a repeat venous study and then proceed from there.  02/23/2017--repeat Doppler venous study of the left lower extremity reveals a chronic left lower extremity DVT in the posterior tibial.  All other left sided veins appeared normal.  Fluid collection in the left calf noted.  02/17/2017--patient seen in follow-up and reports her left lower extremity symptoms are about the same.  She continues to have complaints of profound fatigue which I think is multifactorial including underlying depression that is not in remission.  Review the results of the CTA of the chest including the possible thyroid lesion.  I do not think this is contributing to any of her symptoms of fatigue particularly given the fact that her thyroid function tests are normal.  I expla   • History of palpitations 12/07/2011    Patient has had multiple admissions to the hospital for complaints of chest pain and heart palpitations. She meant admitted at least on 3 occasions. She has had at least 3 stress Cardiolite and 1 stress ECG, the last Cardiolite being performed 12/07/2011 which was negative. Patient is also had Holter monitors which have been unremarkable.   • HIstory of Schatzki's ring 02/28/2012 02/28/2012--air-contrast upper GI revealed small to moderate sized reducible sliding hiatal herniation of the upper stomach with some demonstrated gastroesophageal reflux. No esophageal, gastric, or duodenal mass or mucosal ulceration was seen.  11/03/2008--EGD performed for evaluation of iron deficiency anemia revealed hiatal  hernia without evidence of reflux, prepyloric antritis and   • Hyperlipidemia 4/8/2016   • Hypersomnolence 5/5/2016 06/14/2017--overnight oximetry revealed oxygen desaturation index of only 0.44.  There were only 10 total desaturations during the period of testing which lasted 6 hours and 46 minutes.  05/31/2017--patient seen in follow-up and reports she continues to have chronic fatigue as well as hypersomnolence.  Once again, she is on multiple medications that are undoubtedly contributing to this problem including clonazepam and hydrocodone but I doubt that these could be discontinued.  Her CBC back in April revealed a low hemoglobin of 10.8 but hematocrit was normal at 35.7.  Thyroid function tests were normal and CMP normal.  Overnight oximetry test ordered.  Repeat CBC.  Iron studies.  Sedimentation rate and CRP.  04/11/2017--patient seen in follow-up and her blood pressure is now at a reasonable level at 120/64.  She reports she still feels somewhat fatigued but she is much better.  Patient has not been doing any regular exercise and I do think that that would be helpful to reduce her feeling of fatigue.  She is   • Menopausal state 4/8/2016   • Multinodular goiter 2/17/2017 02/21/2017--thyroid ultrasound reveals multinodular thyroid with largest nodule measuring on the order of 1.6 cm in greatest dimension.  02/17/2017--patient seen in follow-up for DVT and CTA of the chest.  An incidental finding on the CTA of the chest reveals a 1.7 cm lesion in the right lobe of thyroid.  Note that thyroid function tests are currently normal.  Ultrasound of the thyroid ordered.   • Osteoporosis, 03/29/2011--lumbar spine -2.8.  Right femoral neck -2.4.  Left femoral neck -2.4.  Patient receives Reclast infusions. 2/9/2009 04/19/2017--Reclast infusion.  04/05/2016--Reclast infusion.  06/04/2012--Reclast infusion.  05/26/2011--Reclast infusion.  03/29/2011--DEXA scan revealed a lumbar T score of -2.8, right femoral  neck T score -2.4, left femoral neck T score -2.4.  Osteoporosis of the lumbar spine and severe osteopenia of the hips bilaterally.  Patient has been intolerant to Fosamax because of gastritis and gastroesophageal reflux.  04/01/2009--treatment for osteoporosis begun with Fosamax.  02/09/2009--DEXA scan revealed lumbar spine T score of -3.3.  Left femoral neck T score -2.5.  Right hip T score -2.6.  Osteoporosis.    • Pain disorder with psychological factors 10/10/2012   • Pancreatic Duct Dilation 11/30/2001 09/29/2014--patient was again evaluated by the urologist for a renal cyst.  CT scan of the abdomen and pelvis reveals a left renal cyst measuring 5.1 x 4.9 cm.  There were subcentimeter hypodense renal lesions that are too small to further characterize and are presumably related to cysts.  Recommend attention to follow up.  Distal dilated pancreatic duct noted.  The common bile duct is the upper limits of normal in size.  The ampullary region is not well evaluated.  Comparison with prior imaging is recommended if available.  If there is no prior film available for comparison, and ERCP or MRCP could be performed to further evaluate.  Small hiatal hernia noted.  03/05/2012--CT scan of the abdomen with contrast, pancreatic protocol.  This reveals some fullness of the pancreatic ductal system and apparent pancreatic divisum with separate entrance of the accessory pancreatic duct extending into the duodenum distal to the main pancreatic duct.  There is fullness of the duct diffusely but similar to the previous s   • Pedal edema 6/29/2015 06/29/2015--patient presents with a 4-6 week history of exertional dyspnea that comes on with activity such as climbing stairs or walking her dog up an incline.  No chest pain.  Relieved with rest.  No cough.  She does have complaints of her feet and legs swelling that is particularly worse at the end of the day and not improved overnight.  No orthopnea or PND.  Chest exam  reveals faint rales at the bases.  Otherwise clear.  Heart is regular and I do not appreciate a heart murmur.  Chest x-ray PA and lateral ordered.  Echocardiogram ordered.   • Pulmonary hypertension (CMS/HCC) 4/18/2019   • Restless legs syndrome 4/8/2016   • Simple renal cyst 7/20/2009 09/29/2014--patient was again evaluated by the urologist for a renal cyst.  CT scan of the abdomen and pelvis reveals a left renal cyst measuring 5.1 x 4.9 cm.  There were subcentimeter hypodense renal lesions that are too small to further characterize and are presumably related to cysts.  Recommend attention to follow up.  Distal dilated pancreatic duct noted.  The common bile duct is the upper limits of normal in size.  The ampullary region is not well evaluated.  Comparison with prior imaging is recommended if available.  If there is no prior film available for comparison, and ERCP or MRCP could be performed to further evaluate.  Small hiatal hernia noted.  07/20/2009--patient was noted to have a left renal mass consistent with a cyst.  This was evaluated by the urologist 07/20/2009.   • Statin intolerance 10/30/2017   • Tinnitus of both ears 9/30/2019 September 30, 2019--patient presents with a several year history of bilateral tinnitus, right greater than left.  It seems to be getting worse and now is associated with fluctuating hearing.  She occasionally will have worsening hearing after she coughs or sneezes.  On exam she has evidence of old otitis media scars, particularly on the right.  There is no acute infection present and there is no a   • Vitamin D deficiency 4/8/2016         Past Surgical History:   Procedure Laterality Date   • APPENDECTOMY  1965 1965   • CATARACT EXTRACTION Bilateral Remote    Remote bilateral cataract extirpation with intraocular lens implantation.   • CHOLECYSTECTOMY WITH INTRAOPERATIVE CHOLANGIOGRAM N/A 1/21/2019 01/21/2019--laparoscopic paraesophageal hernia repair with fundoplication.    • COLONOSCOPY  11/03/2008 2008--normal colonoscopy   • COLONOSCOPY  2001 2001--normal colonoscopy.   • COLONOSCOPY N/A 6/13/2017 06/13/2017--colonoscopy revealed melanosis.  Biopsied.  There was friability with contact bleeding in the ascending colon.  Biopsied.  Pathology returned consistent with melanosis coli.   • ENDOSCOPY  10/08/2014    02/28/2012--air-contrast upper GI revealed small to moderate sized reducible sliding hiatal herniation of the upper stomach with some demonstrated gastroesophageal reflux. No esophageal, gastric, or duodenal mass or mucosal ulceration was seen. 11/03/2008--EGD performed for evaluation of iron deficiency anemia revealed hiatal hernia without evidence of reflux, prepyloric antritis and   • ENDOSCOPY  11/03/2008 11/03/2008--EGD performed for evaluation of iron deficiency anemia revealed hiatal hernia without evidence of reflux, prepyloric antritis and   • ENDOSCOPY  11/19/2001 11/19/2001--EGD revealed a very tortuous distal esophagus with a Schatzki's ring. No ulcer or erosions. No Dorado's mucosa. Stomach revealed patchy erythema and erosions in the antrum. Biopsy. Normal pylorus with no obstruction. Normal duodenum with no ulcers. The Schatzki's ring was dilated with a Rhodes dilator.;   • ENDOSCOPY N/A 9/27/2016 09/27/2016--EGD revealed a normal oropharynx, esophagus, and medium sized hiatal hernia.  Nonbleeding gastric ulcer with no stigmata of bleeding.  Biopsy.  Gastritis.  Biopsy.  Normal examined duodenum.  Biopsied.  Pathology returned mild to moderate chronic active gastritis with ulceration from the stomach antral ulcer biopsy.  Gastroesophageal junction biopsy revealed minimal mixed inflammation.   • ENDOSCOPY N/A 6/13/2017 06/13/2017--colonoscopy revealed melanosis.  Biopsied.  There was friability with contact bleeding in the ascending colon.  Biopsied.  Pathology returned consistent with melanosis coli.   • ERCP N/A 1/22/2019     01/22/2019--ERCP with sphincterotomy and balloon stone extraction.   • ESOPHAGEAL DILATATION  11/19/2001 11/19/2001--EGD revealed a very tortuous distal esophagus with a Schatzki's ring. No ulcer or erosions. No Dorado's mucosa. Stomach revealed patchy erythema and erosions in the antrum. Biopsy. Normal pylorus with no obstruction. Normal duodenum with no ulcers. The Schatzki's ring was dilated with a Rhodes dilator.;    • ESOPHAGEAL MANOMETRY N/A 12/18/2018    Procedure: ESOPHAGEAL MANOMETRY;  Surgeon: Cecilia, Nurse Performed;  Location: [facility] ENDOSCOPY;  Service: Gastroenterology   • ESOPHAGOSCOPY N/A 1/21/2019    Procedure: FLEXIBLE ESOPHAGOSCOPY;  Surgeon: Reji Johnson MD;  Location: [facility] OR;  Service: General   • HIATAL HERNIA REPAIR N/A 1/21/2019    Procedure: Laparoscopic paraesophageal hernia repair with toupee fundoplication;  Surgeon: Reji Johnson MD;  Location: [facility] OR;  Service: General   • INCONTINENCE SURGERY  1979 1979--a bladder tack procedure for urinary stress incontinence   • KNEE ARTHROSCOPY Right 12/12/2011 12/12/2011--right knee arthroscopy with partial lateral and medial meniscectomies.   • SKIN SURGERY  05/25/2010 05/25/2010--skin lesion excised from right lower extremity. 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   • TOTAL ABDOMINAL HYSTERECTOMY WITH SALPINGO OOPHORECTOMY  1974 1974--total abdominal hysterectomy.         Allergies   Allergen Reactions   • Statins Nausea And Vomiting   • Morphine Hallucinations   • Penicillins Itching   • Tetanus Toxoids Itching           Current Outpatient Medications:   •  amLODIPine (NORVASC) 2.5 MG tablet, Take 1 tablet by mouth Daily., Disp: 30 tablet, Rfl: 0  •  aspirin 81 MG EC tablet, Take 81 mg by mouth Daily. HELD, Disp: , Rfl:   •  buPROPion XL (WELLBUTRIN XL) 300 MG 24 hr tablet, Take 300 mg by mouth Every Morning., Disp: , Rfl:   •  Cholecalciferol (vitamin D3) 125 MCG  (5000 UT) capsule capsule, 1 by mouth daily as directed, Disp: 30 capsule, Rfl:   •  Cimetidine (TAGAMET PO), Tagamet, Disp: , Rfl:   •  clonazePAM (KlonoPIN) 1 MG tablet, TAKE 1/2 TO 1 TABLET BY MOUTH TWICE DAILY (Patient taking differently: 0.25 mg. Caution: Look alike/sound alike drug alert. Please read the label.), Disp: 60 tablet, Rfl: 5  •  cycloSPORINE (RESTASIS) 0.05 % ophthalmic emulsion, Administer 1 drop to both eyes 2 (Two) Times a Day., Disp: , Rfl:   •  diclofenac (VOLTAREN) 1 % gel gel, Apply 4 g topically to the appropriate area as directed 4 (Four) Times a Day As Needed., Disp: , Rfl:   •  estradiol (ESTRACE) 1 MG tablet, TAKE 1 TABLET BY MOUTH DAILY, Disp: 90 tablet, Rfl: 1  •  gabapentin (NEURONTIN) 100 MG capsule, Take 1 capsule by mouth Every Night., Disp: 90 capsule, Rfl: 1  •  HYDROcodone-acetaminophen (NORCO)  MG per tablet, Take 1 tablet by mouth every 6 (six) hours as needed for moderate pain (4-6)., Disp: , Rfl:   •  Misc Natural Products (COLON CLEANSE PO), Take  by mouth., Disp: , Rfl:   •  Multiple Vitamins-Minerals (MULTIVITAMIN ADULT) chewable tablet, Chew 1 tablet Daily., Disp: , Rfl:   •  potassium chloride (K-DUR,KLOR-CON) 10 MEQ CR tablet, Take 1 p.o. 3 times daily at mealtime, Disp: 270 tablet, Rfl: 3  •  topiramate (TOPAMAX) 100 MG tablet, TAKE 1 TABLET DAILY, Disp: 90 tablet, Rfl: 4  •  traZODone (DESYREL) 50 MG tablet, Take 25-50 mg by mouth Every Night., Disp: , Rfl:   •  triamterene-hydrochlorothiazide (DYAZIDE) 37.5-25 MG per capsule, TAKE ONE CAPSULE BY MOUTH DAILY FOR BLOOD PRESSURE AND LEG SWELLING, Disp: 90 capsule, Rfl: 1  •  venlafaxine XR (EFFEXOR-XR) 150 MG 24 hr capsule, Take 150 mg p.o. in the morning and 75 mg in the evening., Disp: , Rfl:   •  venlafaxine XR (EFFEXOR-XR) 75 MG 24 hr capsule, Take 75 mg in the evening., Disp: 30 capsule, Rfl: 5  •  vitamin E 400 UNIT capsule, Take 400 Units by mouth Daily., Disp: , Rfl:       Family History   Problem  "Relation Age of Onset   • Heart attack Mother         dies age 47 from heart attack   • Heart disease Mother    • Bleeding Disorder Mother    • Heart attack Maternal Aunt         dies age 60 from heart attack   • Heart disease Father    • Malig Hyperthermia Neg Hx          Social History     Socioeconomic History   • Marital status:      Spouse name: ander   • Number of children: 4   • Years of education: 14   • Highest education level: Associate degree: academic program   Tobacco Use   • Smoking status: Never Smoker   • Smokeless tobacco: Never Used   • Tobacco comment: no caffeine    Vaping Use   • Vaping Use: Never used   Substance and Sexual Activity   • Alcohol use: No   • Drug use: No   • Sexual activity: Yes     Partners: Male         Vitals:    05/19/21 1334   BP: 132/74   Pulse: 76   Resp: 16   Temp: 98 °F (36.7 °C)   SpO2: 95%   Weight: 62.8 kg (138 lb 6.4 oz)   Height: 152.4 cm (60\")        Body mass index is 27.03 kg/m².      Physical Exam:    General: Alert and oriented x 3.  No acute distress.  Normal affect.  HEENT: Pupils equal, round, reactive to light; extraocular movements intact; sclerae nonicteric; pharynx, ear canals and TMs normal.  Neck: Without JVD, thyromegaly, bruit, or adenopathy.  Lungs: Clear to auscultation in all fields.  Heart: Regular rate and rhythm without murmur, rub, gallop, or click.  Abdomen: Soft, nontender, without hepatosplenomegaly or hernia.  Bowel sounds normal.  : Deferred.  Rectal: Deferred.  Extremities: Without clubbing, cyanosis, edema, or pulse deficit.  Neurologic: Intact without focal deficit.  Normal station and gait observed during ingress and egress from the examination room.  Skin: Without significant lesion.  Musculoskeletal: Unremarkable.    Lab/other results:    NMR is perfect other than triglycerides elevated slightly 861.  Total cholesterol 194.  LDL particle number excellent 728.  HDL particle number excellent at 55.8.  CMP is normal including " creatinine and BUN.  Estimated GFR is 55.  Vitamin D normal at 58.  Thyroid function test normal.  CBC normal.  Hepatitis C antibody nonreactive.  D-dimer and cardiac enzymes previously were normal or negative.    Assessment/Plan:     Diagnosis Plan   1. Hyperlipidemia  Comprehensive Metabolic Panel    NMR LipoProfile   2. Benign essential hypertension  Comprehensive Metabolic Panel   3. Chronic renal insufficiency, stage II (mild), creatinine 1.12  CBC (No Diff)   4. Vitamin D deficiency  Vitamin D 25 Hydroxy   5. Chronic anemia     6. Multinodular goiter  TSH    T4, Free    T3, Free   7. Iron deficiency anemia due to chronic blood loss  CBC (No Diff)    Iron Profile   8. Gastroesophageal reflux disease without esophagitis     9. Osteoporosis, 03/29/2011--lumbar spine -2.8.  Right femoral neck -2.4.  Left femoral neck -2.4.  Patient receives Reclast infusions.     10. Carotid artery plaque, 04/02/2018--mild right ICA plaque, normal left ICA 10/03/2014--mild bilateral carotid artery plaque.     11. Depression with anxiety     12. Generalized anxiety disorder     13. Chronic fatigue     14. Hypersomnolence     15. Statin intolerance     16. Chronic hoarseness     17. Pain disorder with psychological factors     18. Frequent falls     19. Dizziness     20. Benign paroxysmal positional vertigo due to bilateral vestibular disorder       Patient has hyperlipidemia which is under good control without medication.  She has been statin intolerant.  Her blood pressure seems to be well controlled.  Her chronic renal insufficiency has improved and is very mild.  Vitamin D in the normal range which is important given her osteoporosis.  Chronic anemia appears to have resolved and there is no evidence right now of iron deficiency.  She has a multinodular goiter that is of been evaluated in the past.  She has esophageal reflux and symptoms currently appear to be controlled.  She has osteoporosis and is up-to-date on her DEXA scan  until later this year.  She has carotid artery plaque which is mild and she is up-to-date on her carotid Doppler study.  One of her biggest issues is that of depression with anxiety and generalized anxiety which has been present for many years.  Patient has a therapist and a psychiatrist and I have strongly encouraged her to follow-up with them.  She has chronic fatigue and hypersomnolence which is also likely related to the severe depression.  She has chronic hoarseness which has been evaluated by ENT.  She also has chronic pain which is related to her psychological factors.  She has been having frequent falls and dizziness that is questionably related to paroxysmal positional vertigo.  Her symptoms are poorly described and difficult to sort out.  Neurology consultation has been ordered.    Plan is as follows: No change in current medical regimen.  I have encouraged patient to follow-up with her psychiatrist and therapist.  I will make sure a neurology consultation has been placed.  I have encouraged her to obtain the MRI which I ordered previously.  I will have her follow-up after November 19, 2021 with lab prior and this will also be Medicare wellness visit.  Otherwise she will follow-up as needed.      Procedures

## 2021-05-21 DIAGNOSIS — G89.29 CHRONIC LOW BACK PAIN WITH SCIATICA, SCIATICA LATERALITY UNSPECIFIED, UNSPECIFIED BACK PAIN LATERALITY: Primary | ICD-10-CM

## 2021-05-21 DIAGNOSIS — F45.42 PAIN DISORDER WITH PSYCHOLOGICAL FACTORS: ICD-10-CM

## 2021-05-21 DIAGNOSIS — M54.40 CHRONIC LOW BACK PAIN WITH SCIATICA, SCIATICA LATERALITY UNSPECIFIED, UNSPECIFIED BACK PAIN LATERALITY: Primary | ICD-10-CM

## 2021-05-25 ENCOUNTER — HOSPITAL ENCOUNTER (OUTPATIENT)
Dept: CARDIOLOGY | Facility: HOSPITAL | Age: 81
Discharge: HOME | End: 2021-05-25
Admitting: NURSE PRACTITIONER

## 2021-05-25 VITALS — WEIGHT: 134 LBS | BODY MASS INDEX: 26.31 KG/M2 | HEIGHT: 60 IN

## 2021-05-25 DIAGNOSIS — R07.89 CHEST PAIN, ATYPICAL: ICD-10-CM

## 2021-05-25 LAB
BH CV NUCLEAR PRIOR STUDY: 3
BH CV REST NUCLEAR ISOTOPE DOSE: 10.5 MCI
BH CV STRESS BP STAGE 1: NORMAL
BH CV STRESS COMMENTS STAGE 1: NORMAL
BH CV STRESS DOSE REGADENOSON STAGE 1: 0.4
BH CV STRESS DURATION MIN STAGE 1: 0
BH CV STRESS DURATION SEC STAGE 1: 10
BH CV STRESS HR STAGE 1: 115
BH CV STRESS NUCLEAR ISOTOPE DOSE: 32.7 MCI
BH CV STRESS PROTOCOL 1: NORMAL
BH CV STRESS RECOVERY BP: NORMAL MMHG
BH CV STRESS RECOVERY HR: 97 BPM
BH CV STRESS STAGE 1: 1
LV EF NUC BP: 75 %
MAXIMAL PREDICTED HEART RATE: 142 BPM
PERCENT MAX PREDICTED HR: 80.99 %
STRESS BASELINE BP: NORMAL MMHG
STRESS BASELINE HR: 73 BPM
STRESS PERCENT HR: 95 %
STRESS POST EXERCISE DUR SEC: 10 SEC
STRESS POST PEAK BP: NORMAL MMHG
STRESS POST PEAK HR: 115 BPM
STRESS TARGET HR: 121 BPM

## 2021-05-25 PROCEDURE — 78452 HT MUSCLE IMAGE SPECT MULT: CPT | Performed by: INTERNAL MEDICINE

## 2021-05-25 PROCEDURE — 93017 CV STRESS TEST TRACING ONLY: CPT

## 2021-05-25 PROCEDURE — 93018 CV STRESS TEST I&R ONLY: CPT | Performed by: INTERNAL MEDICINE

## 2021-05-25 PROCEDURE — A9502 TC99M TETROFOSMIN: HCPCS | Performed by: NURSE PRACTITIONER

## 2021-05-25 PROCEDURE — 0 TECHNETIUM TETROFOSMIN KIT: Performed by: NURSE PRACTITIONER

## 2021-05-25 PROCEDURE — 25010000002 REGADENOSON 0.4 MG/5ML SOLUTION: Performed by: NURSE PRACTITIONER

## 2021-05-25 PROCEDURE — 93016 CV STRESS TEST SUPVJ ONLY: CPT | Performed by: INTERNAL MEDICINE

## 2021-05-25 PROCEDURE — 78452 HT MUSCLE IMAGE SPECT MULT: CPT

## 2021-05-25 RX ORDER — ESTRADIOL 1 MG/1
TABLET ORAL
Qty: 90 TABLET | Refills: 1 | Status: SHIPPED | OUTPATIENT
Start: 2021-05-25 | End: 2021-11-19

## 2021-05-25 RX ADMIN — REGADENOSON 0.4 MG: 0.08 INJECTION, SOLUTION INTRAVENOUS at 13:40

## 2021-05-25 RX ADMIN — TETROFOSMIN 1 DOSE: 1.38 INJECTION, POWDER, LYOPHILIZED, FOR SOLUTION INTRAVENOUS at 13:40

## 2021-05-25 RX ADMIN — TETROFOSMIN 1 DOSE: 1.38 INJECTION, POWDER, LYOPHILIZED, FOR SOLUTION INTRAVENOUS at 12:39

## 2021-05-26 ENCOUNTER — HOSPITAL ENCOUNTER (OUTPATIENT)
Dept: MRI IMAGING | Facility: HOSPITAL | Age: 81
Discharge: HOME | End: 2021-05-26

## 2021-05-26 DIAGNOSIS — M50.10 CERVICAL DISC DISORDER WITH RADICULOPATHY: ICD-10-CM

## 2021-05-26 DIAGNOSIS — M54.16 LUMBAR RADICULOPATHY: ICD-10-CM

## 2021-05-26 PROCEDURE — 72141 MRI NECK SPINE W/O DYE: CPT

## 2021-05-26 PROCEDURE — 72148 MRI LUMBAR SPINE W/O DYE: CPT

## 2021-05-28 ENCOUNTER — HOSPITAL ENCOUNTER (OUTPATIENT)
Dept: MRI IMAGING | Facility: HOSPITAL | Age: 81
Discharge: HOME | End: 2021-05-28
Admitting: INTERNAL MEDICINE

## 2021-05-28 DIAGNOSIS — R29.6 FREQUENT FALLS: ICD-10-CM

## 2021-05-28 DIAGNOSIS — IMO0002 CHRONIC MIGRAINE: Chronic | ICD-10-CM

## 2021-05-28 DIAGNOSIS — R42 DIZZINESS: ICD-10-CM

## 2021-05-28 PROCEDURE — 70551 MRI BRAIN STEM W/O DYE: CPT

## 2021-06-02 ENCOUNTER — TELEPHONE (OUTPATIENT)
Dept: CARDIOLOGY | Facility: CLINIC | Age: 81
End: 2021-06-02

## 2021-06-02 ENCOUNTER — TELEPHONE (OUTPATIENT)
Dept: INTERNAL MEDICINE | Facility: CLINIC | Age: 81
End: 2021-06-02

## 2021-06-02 RX ORDER — AMLODIPINE BESYLATE 2.5 MG/1
2.5 TABLET ORAL DAILY
Qty: 90 TABLET | Refills: 3 | Status: SHIPPED | OUTPATIENT
Start: 2021-06-02 | End: 2021-06-03 | Stop reason: SDUPTHER

## 2021-06-02 NOTE — TELEPHONE ENCOUNTER
Caller: [facility]/PHARMACY #[id number] - [place], [place] - [address] AT [facility] - [phone] [place] [phone] FX    Relationship: Pharmacy    Best call back number: [phone]    Medication needed:   amLODIPine (NORVASC) 2.5 MG tablet     When do you need the refill by: 06/02/21    Does the patient have less than a 3 day supply:  [x] Yes  [] No    What is the patient's preferred pharmacy:    [facility]/pharmacy #[id number] - [place], [place] - [address] AT [facility] - [phone] [facility] [phone]   [phone]        PLEASE ADVISE.

## 2021-06-02 NOTE — TELEPHONE ENCOUNTER
Holter benign.  Rare premature ventricular contractions otherwise normal sinus rhythm with average heart rate 70s.        I recommended a 6-month follow-up with Dr. Bradley she is to call with any questions or concerns.  
No

## 2021-06-03 DIAGNOSIS — I10 BENIGN ESSENTIAL HYPERTENSION: Primary | ICD-10-CM

## 2021-06-03 RX ORDER — AMLODIPINE BESYLATE 2.5 MG/1
2.5 TABLET ORAL DAILY
Qty: 90 TABLET | Refills: 3 | Status: SHIPPED | OUTPATIENT
Start: 2021-06-03 | End: 2022-04-27

## 2021-06-22 ENCOUNTER — TELEPHONE (OUTPATIENT)
Dept: INTERNAL MEDICINE | Facility: CLINIC | Age: 81
End: 2021-06-22

## 2021-06-22 PROBLEM — M84.48XA PATHOLOGICAL FRACTURE OF SACRAL VERTEBRA: Status: ACTIVE | Noted: 2021-05-27

## 2021-06-22 NOTE — TELEPHONE ENCOUNTER
Pt has been contacted and would like to sit down and discuss with Dr. Weir first.  The appt is set for 7/7/21 2:30pm

## 2021-06-22 NOTE — TELEPHONE ENCOUNTER
The MRI of the lumbar spine shows the possibility of a subacute fracture of the sacrum.  These usually heal on their own.  She also has arthritis and degenerative disc disease in the lumbar spine but it is no worse than it was back in 2018.  In regards to the cervical spine she has degenerative disc changes at C4-C5 level as well as arthritis which pushes mildly on the spinal cord.  I think the studies were ordered by the pain management doctor and I do think that patient needs to sit down with them and discuss the results and the options.  If she would like, we can make an appointment here in the next week or 2 and she can sit down with me and discuss it as well.

## 2021-06-22 NOTE — TELEPHONE ENCOUNTER
Caller: Jian Vora    Relationship: Emergency Contact    Best call back number:[phone]    Caller requesting test results: YES    What test was performed: MRI HEAD    When was the test performed: 04/28/21        Additional notes: DAUGHTER CALLED, SHE DOES NOT UNDERSTAND THE MRI RESULTS FROM 04/28/21. SHE WOULD LIKE SOMEONE TO CALL PATIENT'S  AND EXPLAIN TO HIM.

## 2021-07-07 ENCOUNTER — HOSPITAL ENCOUNTER (EMERGENCY)
Facility: HOSPITAL | Age: 81
Discharge: HOME | End: 2021-07-07
Attending: EMERGENCY MEDICINE | Admitting: EMERGENCY MEDICINE

## 2021-07-07 ENCOUNTER — APPOINTMENT (OUTPATIENT)
Dept: GENERAL RADIOLOGY | Facility: HOSPITAL | Age: 81
End: 2021-07-07

## 2021-07-07 ENCOUNTER — APPOINTMENT (OUTPATIENT)
Dept: CT IMAGING | Facility: HOSPITAL | Age: 81
End: 2021-07-07

## 2021-07-07 VITALS
DIASTOLIC BLOOD PRESSURE: 75 MMHG | HEART RATE: 62 BPM | BODY MASS INDEX: 26.31 KG/M2 | WEIGHT: 134 LBS | SYSTOLIC BLOOD PRESSURE: 126 MMHG | HEIGHT: 60 IN | RESPIRATION RATE: 14 BRPM | TEMPERATURE: 98 F | OXYGEN SATURATION: 98 %

## 2021-07-07 DIAGNOSIS — S86.911A KNEE STRAIN, RIGHT, INITIAL ENCOUNTER: ICD-10-CM

## 2021-07-07 DIAGNOSIS — S66.912A MUSCLE STRAIN OF LEFT WRIST, INITIAL ENCOUNTER: ICD-10-CM

## 2021-07-07 DIAGNOSIS — S09.90XA CLOSED HEAD INJURY, INITIAL ENCOUNTER: ICD-10-CM

## 2021-07-07 DIAGNOSIS — T07.XXXA ABRASION, MULTIPLE SITES: ICD-10-CM

## 2021-07-07 DIAGNOSIS — S00.83XA TRAUMATIC HEMATOMA OF FOREHEAD, INITIAL ENCOUNTER: ICD-10-CM
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
## 2021-07-07 LAB
ALBUMIN SERPL-MCNC: 4 G/DL (ref 3.5–5.2)
ALBUMIN/GLOB SERPL: 1.3 G/DL
ALP SERPL-CCNC: 91 U/L (ref 39–117)
ALT SERPL W P-5'-P-CCNC: 28 U/L (ref 1–33)
ANION GAP SERPL CALCULATED.3IONS-SCNC: 12.3 MMOL/L (ref 5–15)
AST SERPL-CCNC: 39 U/L (ref 1–32)
BASOPHILS # BLD AUTO: 0.07 10*3/MM3 (ref 0–0.2)
BASOPHILS NFR BLD AUTO: 0.7 % (ref 0–1.5)
BILIRUB SERPL-MCNC: 0.2 MG/DL (ref 0–1.2)
BILIRUB UR QL STRIP: NEGATIVE
BUN SERPL-MCNC: 23 MG/DL (ref 8–23)
BUN/CREAT SERPL: 25.6 (ref 7–25)
CALCIUM SPEC-SCNC: 8.9 MG/DL (ref 8.6–10.5)
CHLORIDE SERPL-SCNC: 107 MMOL/L (ref 98–107)
CLARITY UR: CLEAR
CO2 SERPL-SCNC: 20.7 MMOL/L (ref 22–29)
COLOR UR: YELLOW
CREAT SERPL-MCNC: 0.9 MG/DL (ref 0.57–1)
DEPRECATED RDW RBC AUTO: 51.8 FL (ref 37–54)
EOSINOPHIL # BLD AUTO: 0.13 10*3/MM3 (ref 0–0.4)
EOSINOPHIL NFR BLD AUTO: 1.4 % (ref 0.3–6.2)
ERYTHROCYTE [DISTWIDTH] IN BLOOD BY AUTOMATED COUNT: 16 % (ref 12.3–15.4)
GFR SERPL CREATININE-BSD FRML MDRD: 61 ML/MIN/1.73
GLOBULIN UR ELPH-MCNC: 3.2 GM/DL
GLUCOSE SERPL-MCNC: 77 MG/DL (ref 65–99)
GLUCOSE UR STRIP-MCNC: NEGATIVE MG/DL
HCT VFR BLD AUTO: 41.8 % (ref 34–46.6)
HGB BLD-MCNC: 13.5 G/DL (ref 12–15.9)
HGB UR QL STRIP.AUTO: NEGATIVE
IMM GRANULOCYTES # BLD AUTO: 0.04 10*3/MM3 (ref 0–0.05)
IMM GRANULOCYTES NFR BLD AUTO: 0.4 % (ref 0–0.5)
KETONES UR QL STRIP: NEGATIVE
LEUKOCYTE ESTERASE UR QL STRIP.AUTO: NEGATIVE
LYMPHOCYTES # BLD AUTO: 3.64 10*3/MM3 (ref 0.7–3.1)
LYMPHOCYTES NFR BLD AUTO: 38.4 % (ref 19.6–45.3)
MCH RBC QN AUTO: 28.5 PG (ref 26.6–33)
MCHC RBC AUTO-ENTMCNC: 32.3 G/DL (ref 31.5–35.7)
MCV RBC AUTO: 88.2 FL (ref 79–97)
MONOCYTES # BLD AUTO: 0.94 10*3/MM3 (ref 0.1–0.9)
MONOCYTES NFR BLD AUTO: 9.9 % (ref 5–12)
NEUTROPHILS NFR BLD AUTO: 4.65 10*3/MM3 (ref 1.7–7)
NEUTROPHILS NFR BLD AUTO: 49.2 % (ref 42.7–76)
NITRITE UR QL STRIP: NEGATIVE
NRBC BLD AUTO-RTO: 0 /100 WBC (ref 0–0.2)
PH UR STRIP.AUTO: 5.5 [PH] (ref 5–8)
PLATELET # BLD AUTO: 324 10*3/MM3 (ref 140–450)
PMV BLD AUTO: 10.3 FL (ref 6–12)
POTASSIUM SERPL-SCNC: 3.8 MMOL/L (ref 3.5–5.2)
PROT SERPL-MCNC: 7.2 G/DL (ref 6–8.5)
PROT UR QL STRIP: ABNORMAL
QT INTERVAL: 417 MS
RBC # BLD AUTO: 4.74 10*6/MM3 (ref 3.77–5.28)
SODIUM SERPL-SCNC: 140 MMOL/L (ref 136–145)
SP GR UR STRIP: 1.03 (ref 1–1.03)
UROBILINOGEN UR QL STRIP: ABNORMAL
WBC # BLD AUTO: 9.47 10*3/MM3 (ref 3.4–10.8)

## 2021-07-07 PROCEDURE — 72125 CT NECK SPINE W/O DYE: CPT

## 2021-07-07 PROCEDURE — 73110 X-RAY EXAM OF WRIST: CPT

## 2021-07-07 PROCEDURE — 73560 X-RAY EXAM OF KNEE 1 OR 2: CPT

## 2021-07-07 PROCEDURE — 70450 CT HEAD/BRAIN W/O DYE: CPT

## 2021-07-07 PROCEDURE — 99284 EMERGENCY DEPT VISIT MOD MDM: CPT

## 2021-07-07 PROCEDURE — 81003 URINALYSIS AUTO W/O SCOPE: CPT | Performed by: NURSE PRACTITIONER

## 2021-07-07 PROCEDURE — 93005 ELECTROCARDIOGRAM TRACING: CPT | Performed by: NURSE PRACTITIONER

## 2021-07-07 PROCEDURE — 93010 ELECTROCARDIOGRAM REPORT: CPT | Performed by: INTERNAL MEDICINE

## 2021-07-07 PROCEDURE — P9612 CATHETERIZE FOR URINE SPEC: HCPCS

## 2021-07-07 PROCEDURE — 80053 COMPREHEN METABOLIC PANEL: CPT | Performed by: NURSE PRACTITIONER

## 2021-07-07 PROCEDURE — 85025 COMPLETE CBC W/AUTO DIFF WBC: CPT | Performed by: NURSE PRACTITIONER

## 2021-07-07 PROCEDURE — 70486 CT MAXILLOFACIAL W/O DYE: CPT

## 2021-07-07 RX ORDER — ACETAMINOPHEN 500 MG
1000 TABLET ORAL ONCE
Status: COMPLETED | OUTPATIENT
Start: 2021-07-07 | End: 2021-07-07

## 2021-07-07 RX ADMIN — ACETAMINOPHEN 1000 MG: 500 TABLET, FILM COATED ORAL at 08:14

## 2021-07-07 NOTE — DISCHARGE INSTRUCTIONS
Home to rest  Drink plenty of fluids  Use your cane and walker for ambulation   Avoid taking tylenol pm. Limit use of hydrocodone. These medications increase risk of falls and can also cause confusion.   Follow up with your doctor  Return if worse or new concerns   Continue care with your primary care physician and have your blood pressure regularly checked and managed. Normal blood pressure is 120/80.

## 2021-07-07 NOTE — ED NOTES
Orthostatic BP    Layin/73 HR 66 c/o lightheadedness      Sitting 148/76 HR 66  No complaints     Standing 138/74 HR 67 No complaints     Dorita Tapia, RN  [phone]

## 2021-07-07 NOTE — ED NOTES
Pt stable waiting in triage no distress pt using cell phone.      Carlos Alberto Corona, RN  07/07/21 9978

## 2021-07-07 NOTE — ED PROVIDER NOTES
EMERGENCY DEPARTMENT ENCOUNTER    Room Number:  04/04  Date of encounter:  7/7/2021  PCP: Cruzito Weir MD  Historian: patient , daughter Natalya   Full history not obtainable due to: None     HPI:  Chief Complaint: Fall , weakness     Context: Sachi Vora is a 80 y.o. female who presents to the ED c/o weakness onset several months prior resulting in a fall pta this am.  She hit her head but denies LOC. Weakness is constant. Generalized. Moderate to severe at times. Seems to be worse after her evening medications according to her daughter. Does report headache at this time and states she initially felt woozy after the fall. No cp. No difficulty breathing. States she is supposed to use a cane or walker for ambulation and was not using either when she fell. She does not use her assistive devices very often as she states they are a nuisance.     Tdap not UTD in last 5 years but she is allergic to Td.      PAST MEDICAL HISTORY    Active Ambulatory Problems     Diagnosis Date Noted   • Osteoporosis, 03/29/2011--lumbar spine -2.8.  Right femoral neck -2.4.  Left femoral neck -2.4.  Patient receives Reclast infusions. 02/09/2009   • Therapeutic drug monitoring 03/23/2016   • Simple renal cyst 07/20/2009   • Benign essential hypertension 04/08/2016   • Carotid artery plaque, 04/02/2018--mild right ICA plaque, normal left ICA 10/03/2014--mild bilateral carotid artery plaque. 07/25/2012   • Chronic gastritis 11/19/2001   • Chronic lower back pain 01/31/2006   • Chronic otitis externa 04/08/2016   • Chronic renal insufficiency, stage II (mild), creatinine 1.12 11/14/2015   • Depression with anxiety 04/08/2016   • Functional murmur, 07/15/2015--normal echocardiogram. 07/15/2015   • Hyperlipidemia 04/08/2016   • Menopausal state 04/08/2016   • Chronic migraine 11/03/2009   • Pancreatic Duct Dilation 11/30/2001   • Pedal edema 06/29/2015   • Restless legs syndrome 04/08/2016   • Bilateral sensorineural hearing loss  03/31/2011   • Vitamin D deficiency 04/08/2016   • Chronic gastric ulcer 04/14/2014   • Chronic fatigue 04/21/2016   • Hypersomnolence 05/05/2016   • Chronic anemia 08/23/2016   • Multinodular goiter 02/17/2017   • Generalized anxiety disorder 04/11/2017   • Iron deficiency anemia 06/01/2017   • Statin intolerance 10/30/2017   • Postmenopausal state 04/18/2018   • Pulmonary hypertension (CMS/HCC) 04/18/2019   • Gastroesophageal reflux disease without esophagitis 06/06/2019   • Tinnitus of both ears 09/30/2019   • Chronic hoarseness 06/08/2020   • Pain disorder with psychological factors 10/10/2012   • Frequent falls 02/05/2021   • Balance disorder 02/05/2021   • Pathological fracture of sacral vertebra 05/27/2021     Resolved Ambulatory Problems     Diagnosis Date Noted   • History of Dyspnea on exertion 04/08/2016   • Gastroesophageal reflux disease with esophagitis 11/19/2001   • Headache 04/08/2016   • Pancreatic divisum 11/30/2001   • Urinary hesitancy 11/20/2015   • History of Urinary urgency 11/20/2015   • History of bone density study 04/21/2016   • History of chest pain 04/21/2016   • HIstory of Schatzki's ring 04/21/2016   • History of Substernal precordial chest pain 05/05/2016   • History of cardiovascular stress test 05/13/2016   • Pain of left calf 02/15/2017   • Tenderness of left calf 02/15/2017   • Calf swelling 02/15/2017   • Shortness of breath 02/15/2017   • History of Acute deep vein thrombosis (DVT) of distal end of left lower extremity 02/15/2017   • History of mammogram 04/05/2017   • Osteopenia 04/18/2018   • Abnormal weight gain 05/04/2018   • Memory loss 05/04/2018   • Epigastric abdominal pain 09/06/2018   • Paraesophageal hernia 01/15/2019   • Calculus of gallbladder without cholecystitis without obstruction 01/15/2019   • Chronic cholecystitis 02/04/2019   • Hospital discharge follow-up 02/04/2019   • Leukocytosis 02/09/2019   • Right hip pain 02/09/2019   • ELIS (acute kidney injury)  (CMS/[facility]) 02/09/2019   • Dehydration 02/09/2019   • Status post cholecystectomy 02/09/2019   • Precordial pain 02/12/2019   • Dysfunction of both eustachian tubes 09/30/2019   • Neck swelling 09/15/2020   • Benign paroxysmal positional vertigo due to bilateral vestibular disorder 02/05/2021     Past Medical History:   Diagnosis Date   • History of palpitations 12/07/2011   • Hyperlipidemia 4/8/2016   • Osteoporosis, 03/29/2011--lumbar spine -2.8.  Right femoral neck -2.4.  Left femoral neck -2.4.  Patient receives Reclast infusions. 2/9/2009         PAST SURGICAL HISTORY  Past Surgical History:   Procedure Laterality Date   • APPENDECTOMY  1965 1965   • CATARACT EXTRACTION Bilateral Remote    Remote bilateral cataract extirpation with intraocular lens implantation.   • CHOLECYSTECTOMY WITH INTRAOPERATIVE CHOLANGIOGRAM N/A 1/21/2019 01/21/2019--laparoscopic paraesophageal hernia repair with fundoplication.   • COLONOSCOPY  11/03/2008 2008--normal colonoscopy   • COLONOSCOPY  2001 2001--normal colonoscopy.   • COLONOSCOPY N/A 6/13/2017 06/13/2017--colonoscopy revealed melanosis.  Biopsied.  There was friability with contact bleeding in the ascending colon.  Biopsied.  Pathology returned consistent with melanosis coli.   • ENDOSCOPY  10/08/2014    02/28/2012--air-contrast upper GI revealed small to moderate sized reducible sliding hiatal herniation of the upper stomach with some demonstrated gastroesophageal reflux. No esophageal, gastric, or duodenal mass or mucosal ulceration was seen. 11/03/2008--EGD performed for evaluation of iron deficiency anemia revealed hiatal hernia without evidence of reflux, prepyloric antritis and   • ENDOSCOPY  11/03/2008 11/03/2008--EGD performed for evaluation of iron deficiency anemia revealed hiatal hernia without evidence of reflux, prepyloric antritis and   • ENDOSCOPY  11/19/2001 11/19/2001--EGD revealed a very tortuous distal esophagus with a Schatzki's ring.  No ulcer or erosions. No Dorado's mucosa. Stomach revealed patchy erythema and erosions in the antrum. Biopsy. Normal pylorus with no obstruction. Normal duodenum with no ulcers. The Schatzki's ring was dilated with a Rhodes dilator.;   • ENDOSCOPY N/A 9/27/2016 09/27/2016--EGD revealed a normal oropharynx, esophagus, and medium sized hiatal hernia.  Nonbleeding gastric ulcer with no stigmata of bleeding.  Biopsy.  Gastritis.  Biopsy.  Normal examined duodenum.  Biopsied.  Pathology returned mild to moderate chronic active gastritis with ulceration from the stomach antral ulcer biopsy.  Gastroesophageal junction biopsy revealed minimal mixed inflammation.   • ENDOSCOPY N/A 6/13/2017 06/13/2017--colonoscopy revealed melanosis.  Biopsied.  There was friability with contact bleeding in the ascending colon.  Biopsied.  Pathology returned consistent with melanosis coli.   • ERCP N/A 1/22/2019 01/22/2019--ERCP with sphincterotomy and balloon stone extraction.   • ESOPHAGEAL DILATATION  11/19/2001 11/19/2001--EGD revealed a very tortuous distal esophagus with a Schatzki's ring. No ulcer or erosions. No Dorado's mucosa. Stomach revealed patchy erythema and erosions in the antrum. Biopsy. Normal pylorus with no obstruction. Normal duodenum with no ulcers. The Schatzki's ring was dilated with a Rhodes dilator.;    • ESOPHAGEAL MANOMETRY N/A 12/18/2018    Procedure: ESOPHAGEAL MANOMETRY;  Surgeon: Cecilia, Nurse Performed;  Location: [facility] ENDOSCOPY;  Service: Gastroenterology   • ESOPHAGOSCOPY N/A 1/21/2019    Procedure: FLEXIBLE ESOPHAGOSCOPY;  Surgeon: Reji Johnson MD;  Location: [facility] OR;  Service: General   • HIATAL HERNIA REPAIR N/A 1/21/2019    Procedure: Laparoscopic paraesophageal hernia repair with toupee fundoplication;  Surgeon: Reji Johnson MD;  Location: [facility] OR;  Service: General   • INCONTINENCE SURGERY  1979 1979--a bladder tack procedure for urinary  stress incontinence   • KNEE ARTHROSCOPY Right 12/12/2011 12/12/2011--right knee arthroscopy with partial lateral and medial meniscectomies.   • SKIN SURGERY  05/25/2010 05/25/2010--skin lesion excised from right lower extremity. 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   • TOTAL ABDOMINAL HYSTERECTOMY WITH SALPINGO OOPHORECTOMY  1974 1974--total abdominal hysterectomy.         FAMILY HISTORY  Family History   Problem Relation Age of Onset   • Heart attack Mother         dies age 47 from heart attack   • Heart disease Mother    • Bleeding Disorder Mother    • Heart attack Maternal Aunt         dies age 60 from heart attack   • Heart disease Father    • Malig Hyperthermia Neg Hx          SOCIAL HISTORY  Social History     Socioeconomic History   • Marital status:      Spouse name: ander   • Number of children: 4   • Years of education: 14   • Highest education level: Associate degree: academic program   Tobacco Use   • Smoking status: Never Smoker   • Smokeless tobacco: Never Used   • Tobacco comment: no caffeine    Vaping Use   • Vaping Use: Never used   Substance and Sexual Activity   • Alcohol use: No   • Drug use: No   • Sexual activity: Yes     Partners: Male         ALLERGIES  Statins, Morphine, Penicillins, and Tetanus toxoids        REVIEW OF SYSTEMS  Review of Systems   All systems reviewed and marked as negative except as listed in HPI       PHYSICAL EXAM    I have reviewed the triage vital signs and nursing notes.    ED Triage Vitals   Temp Heart Rate Resp BP SpO2   07/07/21 0124 07/07/21 0121 07/07/21 0121 07/07/21 0121 07/07/21 0121   98 °F (36.7 °C) 74 18 123/63 95 %      Temp src Heart Rate Source Patient Position BP Location FiO2 (%)   07/07/21 0124 -- -- -- --   Tympanic           GENERAL: Alert well developed, well nourished in no distress  HENT: NC, neck supple, trachea midline, abrasions to left forehead, hematoma to mid forehead. Abrasion over the nasal  bridge   EYES: no scleral icterus, PERRL, normal conjunctivae  CV: regular rhythm, regular rate, no murmur  RESPIRATORY: unlabored effort, CTAB  ABDOMEN: soft, nontender, nondistended, bowel sounds present  MUSCULOSKELETAL: no gross deformity. Abrasions of the bilateral knees. Tenderness about the right knee. ROM is preserved. Tenderness of the left wrist, radial aspect. Abrasion of the left palm.   NEURO: alert,  sensory and motor function of extremities grossly intact, speech clear, mental status normal/baseline  SKIN: warm, dry, no rash  PSYCH:  Appropriate mood and affect    Vital signs and nursing notes reviewed.          LAB RESULTS  Recent Results (from the past 24 hour(s))   ECG 12 Lead    Collection Time: 07/07/21  7:07 AM   Result Value Ref Range    QT Interval 417 ms   Comprehensive Metabolic Panel    Collection Time: 07/07/21  7:24 AM    Specimen: Blood   Result Value Ref Range    Glucose 77 65 - 99 mg/dL    BUN 23 8 - 23 mg/dL    Creatinine 0.90 0.57 - 1.00 mg/dL    Sodium 140 136 - 145 mmol/L    Potassium 3.8 3.5 - 5.2 mmol/L    Chloride 107 98 - 107 mmol/L    CO2 20.7 (L) 22.0 - 29.0 mmol/L    Calcium 8.9 8.6 - 10.5 mg/dL    Total Protein 7.2 6.0 - 8.5 g/dL    Albumin 4.00 3.50 - 5.20 g/dL    ALT (SGPT) 28 1 - 33 U/L    AST (SGOT) 39 (H) 1 - 32 U/L    Alkaline Phosphatase 91 39 - 117 U/L    Total Bilirubin 0.2 0.0 - 1.2 mg/dL    eGFR Non African Amer 61 >60 mL/min/1.73    Globulin 3.2 gm/dL    A/G Ratio 1.3 g/dL    BUN/Creatinine Ratio 25.6 (H) 7.0 - 25.0    Anion Gap 12.3 5.0 - 15.0 mmol/L   CBC Auto Differential    Collection Time: 07/07/21  7:24 AM    Specimen: Blood   Result Value Ref Range    WBC 9.47 3.40 - 10.80 10*3/mm3    RBC 4.74 3.77 - 5.28 10*6/mm3    Hemoglobin 13.5 12.0 - 15.9 g/dL    Hematocrit 41.8 34.0 - 46.6 %    MCV 88.2 79.0 - 97.0 fL    MCH 28.5 26.6 - 33.0 pg    MCHC 32.3 31.5 - 35.7 g/dL    RDW 16.0 (H) 12.3 - 15.4 %    RDW-SD 51.8 37.0 - 54.0 fl    MPV 10.3 6.0 - 12.0 fL     Platelets 324 140 - 450 10*3/mm3    Neutrophil % 49.2 42.7 - 76.0 %    Lymphocyte % 38.4 19.6 - 45.3 %    Monocyte % 9.9 5.0 - 12.0 %    Eosinophil % 1.4 0.3 - 6.2 %    Basophil % 0.7 0.0 - 1.5 %    Immature Grans % 0.4 0.0 - 0.5 %    Neutrophils, Absolute 4.65 1.70 - 7.00 10*3/mm3    Lymphocytes, Absolute 3.64 (H) 0.70 - 3.10 10*3/mm3    Monocytes, Absolute 0.94 (H) 0.10 - 0.90 10*3/mm3    Eosinophils, Absolute 0.13 0.00 - 0.40 10*3/mm3    Basophils, Absolute 0.07 0.00 - 0.20 10*3/mm3    Immature Grans, Absolute 0.04 0.00 - 0.05 10*3/mm3    nRBC 0.0 0.0 - 0.2 /100 WBC   Urinalysis With Microscopic If Indicated (No Culture) - Urine, Catheter    Collection Time: 07/07/21  8:24 AM    Specimen: Urine, Catheter   Result Value Ref Range    Color, UA Yellow Yellow, Straw    Appearance, UA Clear Clear    pH, UA 5.5 5.0 - 8.0    Specific Gravity, UA 1.029 1.005 - 1.030    Glucose, UA Negative Negative    Ketones, UA Negative Negative    Bilirubin, UA Negative Negative    Blood, UA Negative Negative    Protein, UA Trace (A) Negative    Leuk Esterase, UA Negative Negative    Nitrite, UA Negative Negative    Urobilinogen, UA 0.2 E.U./dL 0.2 - 1.0 E.U./dL       Ordered the above labs and independently reviewed the results.        RADIOLOGY  XR Wrist 3+ View Left, XR Knee 1 or 2 View Right    Result Date: 7/7/2021  XR KNEE 1 OR 2 VW RIGHT-, XR WRIST 3+ VW LEFT-  Clinical: Fell with pain  Right knee findings: Medial compartment narrowing. Patellofemoral articulation and lateral compartments preserved. No joint effusion, fracture or dislocation. There is small patellar spur.  CONCLUSION: No joint effusion nor acute osseous/articular abnormality.  Left wrist findings: There is narrowing of the first carpometacarpal articulation with bone hypertrophy. No fracture or dislocation is demonstrated. Radiocarpal joint narrowing. Soft tissues have a satisfactory appearance, no soft tissue gas to radiopaque foreign body seen.  CONCLUSION:  No acute osseous articular abnormality.  This report was finalized on 7/7/2021 7:31 AM by Dr. Ulises Caballero M.D.      CT Head Without Contrast    Result Date: 7/7/2021  Patient: FLORI PATEL  Time Out: 05:40 Exam(s): CT HEAD Without Contrast EXAM:   CT Head Without Intravenous Contrast CLINICAL HISTORY:    Reason for exam: Facial trauma. TECHNIQUE:   Axial computed tomography images of the head brain without intravenous contrast.  CTDI is 54.80 mGy and DLP is 1007.30 mGy-cm.  This CT exam was performed according to the principle of ALARA (As Low As Reasonably Achievable) by using one or more of the following dose reduction techniques: automated exposure control, adjustment of the mA and or kV according to patient size, and or use of iterative reconstruction technique. COMPARISON:   1 27 21. FINDINGS:   No midline shift.  Mild diffuse cerebral and cerebellar atrophy.  No acute intracranial hemorrhage, edema or mass.  Moderate patchy bilateral white matter low attenuation, as before, consistent with chronic microvascular ischemic changes.  Anterior right temporal lobe encephalomalacia within the middle cranial fossa, as before.  No extra- axial fluid collection.  Mild left frontal scalp hematoma with laceration.   Skull base and calvarium are intact.  Visualized orbits, sinuses and mastoids are unremarkable. IMPRESSION:   1.  No acute intracranial abnormality. 2.  Left frontal scalp hematoma with laceration.  No acute calvarial fracture.     Electronically signed by Fan Mack M.D. on 07-07-21 at 0540    CT Cervical Spine Without Contrast    Result Date: 7/7/2021  Patient: FLORI PATEL  Time Out: 05:45 Exam(s): CT C SPINE EXAM:   CT Cervical Spine Without Intravenous Contrast CLINICAL HISTORY:    Reason for exam: Neck trauma (Age >= 65y). TECHNIQUE:   Axial computed tomography images of the cervical spine without intravenous contrast.  CTDI is 11.0 mGy and DLP is 202.60 mGy-cm.  This CT exam was performed according to  the principle of ALARA (As Low As Reasonably Achievable) by using one or more of the following dose reduction techniques: automated exposure control, adjustment of the mA and or kV according to patient size, and or use of iterative reconstruction technique. COMPARISON:   MRI of the cervical spine, 5 26 21. FINDINGS:   Vertebrae:  No acute fracture or malalignment.  Multilevel spondylosis with disc space narrowing, as before.  Multilevel bilateral facet hypertrophy.   Discs spinal canal neural foramina:  No significant bony central canal or neural foraminal stenoses.   Soft tissues:  Paraspinal soft tissues are unremarkable within limits of noncontrast technique.   Lung apices:  Visualized lung apices are clear. IMPRESSION:     1.  No acute fracture. 2.  Mild to moderate multilevel degenerative changes, as before.     Electronically signed by Fan Mack M.D. on 07-07-21 at 0545    CT Facial Bones Without Contrast    Result Date: 7/7/2021  EMERGENCY NONCONTRAST FACIAL CT 07/07/2021  CLINICAL HISTORY: Patient fell, hit head, laceration on forehead, pain in nose.  TECHNIQUE: Spiral CT images were obtained through the facial bones in the axial imaging plane. Images were reformatted and submitted in 2 mm thick axial CT sections with soft tissue algorithm, 1 mm thick axial, sagittal and coronal reconstructions were performed and submitted in high-resolution bone algorithm.  COMPARISON: This is correlated to a noncontrast head CT earlier this morning on 07/07/2021.  FINDINGS: There is a scalp hematoma overlying the anterior-inferior left frontal bone from today's head trauma. No underlying skull fractures identified. The frontal sinuses, frontal recesses, ethmoid and sphenoid and maxillary sinuses are clear. The ostia of the osteomeatal complexes and ethmoid infundibula are widely patent. There is mild deviation of the anterior-inferior aspect of the nasal septum to the patient's right. There is a subtle hairline horizontal  lucency through the anterior medial left nasal bone, suspicious for a very subtle hairline nondisplaced fracture. No additional facial fractures are identified.       1. Scalp hematoma over the anterior-inferior left frontal bone from today's head trauma. There is a subtle focal horizontal hairline lucency through the anterior superior medial left nasal bone that could be a normal groove, but given its horizontal orientation is suspicious for a very subtle hairline nondisplaced fracture at this site. Correlation with physical exam suggested. The remainder of the facial CT is within normal limits. Results were communicated to LESLI Chappell by telephone on 07/07/2021 at 7:45 a.m.  Radiation dose reduction techniques were utilized, including automated exposure control and exposure modulation based on body size.  This report was finalized on 7/7/2021 8:25 AM by Dr. Je Milner M.D.        I ordered the above noted radiological studies. Independently reviewed by me and discussed with radiologist.  See dictation above for official radiology interpretation.      PROCEDURES    Procedures        MEDICATIONS GIVEN IN ER    Medications   acetaminophen (TYLENOL) tablet 1,000 mg (1,000 mg Oral Given 7/7/21 0814)         PROGRESS, DATA ANALYSIS, CONSULTS, AND MEDICAL DECISION MAKING    All labs have been independently reviewed by me.  All radiology studies have been reviewed by me.   EKG's independently reviewed by me.  Discussion below represents my analysis of pertinent findings related to patient's condition, differential diagnosis, treatment plan and final disposition.    DIFFERENTIAL DIAGNOSIS INCLUDE BUT NOT LIMITED TO: Dehydration, ELIS, metabolic encephalopathy, arrhythmia, unstable angina, AMI, hypothyroidism, electrolyte imbalance, anemia, sepsis, UTI, pneumonia, viral syndrome      ED Course as of Jul 07 1611 Wed Jul 07, 2021   0722 EKG independently viewed and contemporaneously interpreted by ED  physician. Time: 7:07 AM.  Rate 66.  Interpretation: Normal sinus rhythm, normal axis, normal QRS, no acute ST changes.    [JG]   0722 EKG shows no significant change from prior when compared with 4/29/2021.    [JG]   0730 I viewed x-ray of right knee.  My findings are no fracture.    [JS]   0804 Discussed pt with Dr Aguirre, CT facial bones shows possible hairline lucency over the nasal bridge. May represent subtle fracture. Correlate with PE.     [JS]   1022 BUN: 23 [JS]   1022 Creatinine: 0.90 [JS]   1022 The patient.  Updated on results.  Discussed with her and her daughter.  The patient has been falling but has assistive devices at home which she is just not using.  In addition they are trying to wean her off of some of her evening medications which may be contributing to her weakness.  We discussed admission versus discharge when they are not interested in hospitalization at this time.  We discussed to proceed with referral for neurology as previously planned and I encouraged her to use assistive devices.  Additionally we discussed holding Tylenol PM and hydrocodone if possible given that it is likely contributing to falls and increased weakness.  The daughter is very concerned of all of these medications.  She is discussed with her family doctor.  They are trying to wean her off her Klonopin.  She and daughter are agreeable with the plan.   WBC: 9.47 [JS]   1611 WBC: 9.47 [JS]   1611 Hemoglobin: 13.5 [JS]      ED Course User Index  [JG] Magan Day MD  [JS] Rajwinder Barboza APRN       AS OF 16:11 EDT VITALS:        BP - 126/75  HR - 62  TEMP - 98 °F (36.7 °C) (Tympanic)  O2 SATS - 98%        DIAGNOSIS  Final diagnoses:   Fall from standing, initial encounter   Closed head injury, initial encounter   Traumatic hematoma of forehead, initial encounter   Abrasion, multiple sites   Knee strain, right, initial encounter   Muscle strain of left wrist, initial encounter         DISPOSITION  Discharge      Pt masked in first look. I wore appropriate PPE throughout my encounters with the pt. I performed hand hygiene on entry into the pt room and upon exit.     Dictated utilizing Dragon dictation:  Much of this encounter note is an electronic transcription/translation of spoken language to printed text. The electronic translation of spoken language may permit erroneous, or at times, nonsensical words or phrases to be inadvertently transcribed; Although I have reviewed the note for such errors, some may still exist.     Rajwinder Barboza, LESLI  07/07/21 0045

## 2021-07-07 NOTE — ED TRIAGE NOTES
Pt arrived via [facility] with complaints of falling in the driveway after becoming weak. No LOC.  Pt has a skin tear per EMS to left forehead but no open laceration. Pt has abrasion to both knees as well.  Pt was found by the [employer] employee and was only on the ground for about 15 minutes prior to being found.  Pt reports she has been seeing a neurologist for her weakness. She took the dog out became weak and fell. Pt was inside the house with EMS arrived with her daughter. Pt takes low dose ASA but no other blood thinners.  Pt able to remember and report all about this incident.   Pt denies headache but just has pain from the laceration. 2/10 - knee pain    Pt has a mask in place upon arrival.

## 2021-07-07 NOTE — ED PROVIDER NOTES
MD ATTESTATION NOTE  Patient was placed in face mask in first look and the following protective measures were taken unless additional measures were taken and documented below in the ED course. Patient was wearing facemask when I entered the room and throughout our encounter. I wore full protective equipment throughout this patient encounter including a face mask, and gloves. Hand hygiene was performed before donning protective equipment and after removal when leaving the room.    The EMILY and I have discussed this patient's history, physical exam, and treatment plan. I have reviewed the documentation and personally had a face to face interaction with the patient. I affirm the EMILY documentation and agree with their diagnostics, findings, treatment, plan, and disposition.  The attached note describes my personal findings.    Sachi Vora is a 80 y.o. female who presents to the ED c/o is head injury.  Patient reports that she was walking the dog this morning, began to run, slipped and fell, struck her head on the concrete.  Patient has sustained abrasion to left periorbital area.  Patient denies any loss of consciousness.  Patient does complain of throbbing frontal headache, dull in nature, no aggravating or ameliorating symptoms.  Patient denies any confusion, no dizziness, no visual disturbances, no nausea or vomiting.  Patient complains of dull left-sided neck pain, worse with movement.  Patient denies any radicular pain, no weakness or numbness.  Patient also sustained an injury to her left palm as well as right knee.  Patient was able ambulate afterwards.  Patient is not anticoagulated.  Patient reports prior to fall she was at baseline without complaint.    On exam:  General: NAD  Head: Left supraorbital contusion/abrasion, no crepitus or deformity, slight tenderness and swelling to bridge of nose, no obvious deformity, no septal hematoma.  No raccoon eyes, no maldonado sign.  ENT: No facial anesthesia, visual acuity  grossly normal, extraocular motion intact, pupils equal and round reactive to light, moist mucous membranes  Neck: Supple, trachea midline.  Cervical spine: No step-offs or deformities, no midline tenderness, left paraspinal tenderness to palpation.  Cardiac: regular rate and rhythm  Lungs: Clear to auscultation bilaterally  Abdomen: Soft, nontender, no rebound tenderness/guarding/rigidity  : Deferred to EMILY  Extremities: Moves all extremities well, no peripheral edema  Left hand: Contusion and tenderness on the base of the palm, most severe on the thenar eminence.  No crepitus or deformity, skin intact, no wrist tenderness, wrist has full range of motion, no snuffbox tenderness. Positive thumbs up/okay sign/fingers abduct/fingers abduct, sensation intact light touch radial/medial/ulnar nerve distribution, 2+ radial and ulnar pulses, brisk cap refill all digits.  Right knee: Patient has abrasion and contusion on anterior knee, no crepitus or deformity, slight swelling, no effusion, knee has full range of motion, neurovascular intact distally.  Neuro: Alert and oriented, no facial droop, speech clear, no dysarthria or aphasia, moves all extremities well, sensation intact light touch all extremities, no focal deficits    Skin: Warm, dry.    Medical Decision Making:  After the initial H&P, I discussed pertinent information from history and physical exam with patient/family.  Discussed differential diagnosis.  Discussed plan for ED evaluation/work-up/treatment.  All questions answered.  Patient/family is agreeable with plan.    ED Course as of Jul 07 1447 Wed Jul 07, 2021   0722 EKG independently viewed and contemporaneously interpreted by ED physician. Time: 7:07 AM.  Rate 66.  Interpretation: Normal sinus rhythm, normal axis, normal QRS, no acute ST changes.    [JG]   0722 EKG shows no significant change from prior when compared with 4/29/2021.    [JG]   0804 Discussed pt with Dr Aguirre, CT facial bones shows  possible hairline lucency over the nasal bridge. May represent subtle fracture. Correlate with PE.     [JS]   1022 BUN: 23 [JS]   1022 Creatinine: 0.90 [JS]   1022 The patient.  Updated on results.  Discussed with her and her daughter.  The patient has been falling but has assistive devices at home which she is just not using.  In addition they are trying to wean her off of some of her evening medications which may be contributing to her weakness.  We discussed admission versus discharge when they are not interested in hospitalization at this time.  We discussed to proceed with referral for neurology as previously planned and I encouraged her to use assistive devices.  Additionally we discussed holding Tylenol PM and hydrocodone if possible given that it is likely contributing to falls and increased weakness.  The daughter is very concerned of all of these medications.  She is discussed with her family doctor.  They are trying to wean her off her Klonopin.  She and daughter are agreeable with the plan.   WBC: 9.47 [JS]      ED Course User Index  [JG] Magan Day MD  [JS] Rajwinder Barboza APRN       Diagnosis  Final diagnoses:   Fall from standing, initial encounter   Closed head injury, initial encounter   Traumatic hematoma of forehead, initial encounter   Abrasion, multiple sites   Knee strain, right, initial encounter   Muscle strain of left wrist, initial encounter        Magan Day MD  07/07/21 [id number]

## 2021-07-07 NOTE — ED NOTES
Cleaned right forehead wound with Chlorhexidine and NS, dressed wounds with triple antibiotic, telfa and 4 in. Gauze wrap. Pt has 2 abrasions noted: one at the top of her forehead and one above her left eyebrow. Pt has a scrap in between but no open laceration noted. Bleeding controlled.  Pt reports she has fallen about 15 times over the past 3 months. She has seen several doctors including her FP who has her scheduled to see a neurologist.      Marcela Daniel, RN  07/07/21 023

## 2021-07-13 ENCOUNTER — TELEPHONE (OUTPATIENT)
Dept: INTERNAL MEDICINE | Facility: CLINIC | Age: 81
End: 2021-07-13

## 2021-07-13 NOTE — TELEPHONE ENCOUNTER
PATIENT'S  CALLED CONCERNED ABOUT HIS WIFE HAD A FOOT INJURY AND IS NOT LOOKING GOOD, SKIN IS DISCOLORED AND CAN'T PUT ANY WEIGHT ON IT.  MR PATEL HAS AN APPT ON Friday AND WONDERING IF SHE COULD COME WITH HIM AND BE SEEN BY DR THOMAS    PLEASE ADVISE    [phone]

## 2021-07-14 NOTE — TELEPHONE ENCOUNTER
I need to know what foot is involved and whether or not the ankle was involved so I can order an x-ray and then I will see her Friday but she needs to understand that the only thing we are going to be seeing her about.

## 2021-07-15 ENCOUNTER — HOSPITAL ENCOUNTER (OUTPATIENT)
Dept: GENERAL RADIOLOGY | Facility: HOSPITAL | Age: 81
Discharge: HOME | End: 2021-07-15
Admitting: INTERNAL MEDICINE

## 2021-07-15 DIAGNOSIS — M79.672 ACUTE FOOT PAIN, LEFT: Primary | ICD-10-CM

## 2021-07-15 DIAGNOSIS — M79.672 ACUTE FOOT PAIN, LEFT: ICD-10-CM

## 2021-07-15 PROCEDURE — 73630 X-RAY EXAM OF FOOT: CPT

## 2021-07-16 ENCOUNTER — OFFICE VISIT (OUTPATIENT)
Dept: INTERNAL MEDICINE | Facility: CLINIC | Age: 81
End: 2021-07-16

## 2021-07-16 VITALS
BODY MASS INDEX: 26.7 KG/M2 | HEART RATE: 78 BPM | HEIGHT: 60 IN | OXYGEN SATURATION: 98 % | WEIGHT: 136 LBS | DIASTOLIC BLOOD PRESSURE: 62 MMHG | SYSTOLIC BLOOD PRESSURE: 111 MMHG

## 2021-07-16 DIAGNOSIS — H81.13 BENIGN PAROXYSMAL POSITIONAL VERTIGO DUE TO BILATERAL VESTIBULAR DISORDER: ICD-10-CM

## 2021-07-16 DIAGNOSIS — S92.912A FRACTURE OF PROXIMAL PHALANX OF TOE OF LEFT FOOT: Primary | ICD-10-CM

## 2021-07-16 DIAGNOSIS — Z12.31 BREAST CANCER SCREENING BY MAMMOGRAM: ICD-10-CM

## 2021-07-16 DIAGNOSIS — F41.1 GENERALIZED ANXIETY DISORDER: Chronic | ICD-10-CM

## 2021-07-16 DIAGNOSIS — M81.0 AGE-RELATED OSTEOPOROSIS WITHOUT CURRENT PATHOLOGICAL FRACTURE: Chronic | ICD-10-CM

## 2021-07-16 DIAGNOSIS — R29.6 FREQUENT FALLS: ICD-10-CM

## 2021-07-16 PROCEDURE — 99214 OFFICE O/P EST MOD 30 MIN: CPT | Performed by: INTERNAL MEDICINE

## 2021-07-16 RX ORDER — CLONAZEPAM 1 MG/1
TABLET ORAL
Qty: 60 TABLET | Refills: 5
Start: 2021-07-16 | End: 2021-12-21 | Stop reason: ALTCHOICE

## 2021-07-16 RX ORDER — CLONAZEPAM 0.5 MG/1
TABLET ORAL
COMMUNITY
Start: 2021-07-05 | End: 2021-12-21 | Stop reason: ALTCHOICE

## 2021-07-16 RX ORDER — POTASSIUM CHLORIDE 750 MG/1
10 TABLET, FILM COATED, EXTENDED RELEASE ORAL
COMMUNITY
Start: 2021-04-30 | End: 2021-11-19

## 2021-07-16 NOTE — PROGRESS NOTES
07/16/2021    Patient Information  Sachi Vora                                                                                          [address] [place] [address]      1940  [unfilled]  There is no work phone number on file.    Chief Complaint:     Complaining of left foot pain.    History of Present Illness:    Patient with a history of osteoporosis and frequent falls presents today with pain involving her left foot distally as described below.  Past medical history reviewed and updated were necessary including health maintenance parameters.  This reveals he is currently up-to-date or else accounted for.    History regarding left distal foot/toe pain:    July 16, 2021--patient presents with complaints of left foot pain distally.  She has a history of frequent falls and osteoporosis.  X-ray reveals nondisplaced fractures of the third and fourth proximal phalanges with soft tissue swelling.  Given her osteoporosis and multiple other pathologic fractures, patient referred to orthopedist.    Review of Systems   Constitutional: Negative.   HENT: Negative.    Eyes: Negative.    Cardiovascular: Negative.    Respiratory: Negative.    Endocrine: Negative.    Hematologic/Lymphatic: Negative.    Skin: Negative.    Musculoskeletal: Positive for arthritis and joint pain.   Gastrointestinal: Negative.    Genitourinary: Negative.    Neurological: Negative.    Psychiatric/Behavioral: Negative.    Allergic/Immunologic: Negative.        Active Problems:    Patient Active Problem List   Diagnosis   • Osteoporosis, 03/29/2011--lumbar spine -2.8.  Right femoral neck -2.4.  Left femoral neck -2.4.  Patient receives Reclast infusions.   • Therapeutic drug monitoring   • Simple renal cyst   • Benign essential hypertension   • Carotid artery plaque, 04/02/2018--mild right ICA plaque, normal left ICA 10/03/2014--mild bilateral carotid artery plaque.   • Chronic gastritis   • Chronic lower back pain   •  "Chronic otitis externa   • Chronic renal insufficiency, stage II (mild), creatinine 1.12   • Depression with anxiety   • Functional murmur, 07/15/2015--normal echocardiogram.   • Hyperlipidemia   • Menopausal state   • Chronic migraine   • Pancreatic Duct Dilation   • Pedal edema   • Restless legs syndrome   • Bilateral sensorineural hearing loss   • Vitamin D deficiency   • Chronic gastric ulcer   • Chronic fatigue   • Hypersomnolence   • Chronic anemia   • Multinodular goiter   • Generalized anxiety disorder   • Iron deficiency anemia   • Statin intolerance   • Postmenopausal state   • Pulmonary hypertension (CMS/HCC)   • Gastroesophageal reflux disease without esophagitis   • Tinnitus of both ears   • Chronic hoarseness   • Pain disorder with psychological factors   • Frequent falls   • Balance disorder   • Benign paroxysmal positional vertigo due to bilateral vestibular disorder   • Pathological fracture of sacral vertebra   • Fracture of proximal phalanx of toe of left foot, 7/15/2021--nondisplaced, third and fourth proximal phalanges/toe.         Past Medical History:   Diagnosis Date   • Balance disorder 2/5/2021 February 5, 2021--patient seen in follow-up by Dr. Weir.  I extensively reviewed the documentation from the emergency room visit as noted below.  I reviewed the history with the patient and she is describing episodes of dizziness described as a spinning sensation of her body and this seems to be precipitated by movement although it does not occur if she rolls over in bed.  If she stands up and st   • Benign essential hypertension 4/8/2016   • Bilateral sensorineural hearing loss 3/31/2011    03/31/2011--etiology reveals reverse \"cookie bite\" type of hearing loss for both ears of mild/moderate degree, mostly sensorineural.  Speech discrimination 100% on the right, 96% on the left.   • Carotid artery plaque, 10/03/2014--mild bilateral carotid artery plaque. 7/25/2012    10/03/2014--[facility] " screening revealed mild bilateral carotid plaque, negative for atrial fibrillation, negative for AAA, negative for PAD, osteoporosis screen revealed osteopenia.  Body mass index was 25 and considered to be moderate risk.  07/25/2012--vascular screen negative for carotid plaque, negative for abdominal aneurysm, negative for PAD Description: 10/03/2014--[facility] screening revealed mild bilateral carotid plaque, negative for atrial fibrillation, negative for AAA, negative for PAD, osteoporosis screen revealed osteopenia.  Body mass index was 25 and considered to be moderate risk.  07/25/2012--vascular screen negative for carotid plaque, negative for abdominal aneurysm, negative for PAD   • Chronic anemia 8/23/2016 06/13/2017--colonoscopy revealed melanosis.  Biopsied.  There was friability with contact bleeding in the ascending colon.  Biopsied.  Pathology returned consistent with melanosis coli.  06/13/2017--EGD revealed normal esophagus.  Biopsies taken.  There was a medium-sized hiatal hernia.  Localized mild inflammation and linear erosions were found in the prepyloric region.  Biopsied.  Examined duodenum was normal.  Random biopsies taken.  Pathology of the gastroesophageal junction was unremarkable as was the gastric fundus.  Prepyloric biopsy revealed minimal chronic inflammation and reactive change.  H pylori negative.  Duodenal biopsies are negative.  04/12/2017--hemoglobin low at 10.8, hematocrit is normal at 35.7.  Iron sulfate 325 mg per day initiated.  08/23/2016--routine follow-up.  Patient continues to have epigastric abdominal pain believed to be related to reflux with possible esophageal spasm.  Hemoglobin noted to be low at 10.6 with a hematocrit low at 33.7 and RDW elevated at 16.2.  Homocysti   • Chronic fatigue 4/21/2016 06/14/2017--overnight oximetry revealed oxygen desaturation index of only 0.44.  There were only 10 total desaturations during the period of testing which lasted 6 hours and  46 minutes.  05/31/2017--patient seen in follow-up and reports she continues to have chronic fatigue as well as hypersomnolence.  Once again, she is on multiple medications that are undoubtedly contributing to this problem including clonazepam and hydrocodone but I doubt that these could be discontinued.  Her CBC back in April revealed a low hemoglobin of 10.8 but hematocrit was normal at 35.7.  Thyroid function tests were normal and CMP normal.  Overnight oximetry test ordered.  Repeat CBC.  Iron studies.  Sedimentation rate and CRP.  04/11/2017--patient seen in follow-up and her blood pressure is now at a reasonable level at 120/64.  She reports she still feels somewhat fatigued but she is much better.  Patient has not been doing any regular exercise and I do think that that would be helpful to reduce her feeling of fatigue.  She is   • Chronic gastric ulcer 4/14/2014    02/15/2017--patient seen in follow-up in nearly 6 months later.  She has complaints of not feeling well all over.  She has complaints of diffuse myalgias and possibly tendinopathy related to Levaquin that I called in prior to her going to [place] for vacation.  She reports that 3 or 4 days after starting the Levaquin she began to have the musculoskeletal symptoms and she reports that she continues to have them presently.  Symptoms are worse involving her left calf area.  Examination reveals significant tenderness involving the left calf and the left calf seems a little larger than the right.  She also has complaints of shortness of breath but no chest pain.  She is complaining of double vision and generalized weakness and fatigue.  She was complaining of chronic fatigue at the last visit back in September and I ordered an overnight oximetry test but apparently this was never done for reasons that are not clear to me.  Her current oxygen saturation is 94% and normally it is 100%.  Plan is as follows: Extensi   • Chronic gastritis 11/19/2001     02/15/2017--patient seen in follow-up in nearly 6 months later.  She has complaints of not feeling well all over.  She has complaints of diffuse myalgias and possibly tendinopathy related to Levaquin that I called in prior to her going to [place] for vacation.  She reports that 3 or 4 days after starting the Levaquin she began to have the musculoskeletal symptoms and she reports that she continues to have them presently.  Symptoms are worse involving her left calf area.  Examination reveals significant tenderness involving the left calf and the left calf seems a little larger than the right.  She also has complaints of shortness of breath but no chest pain.  She is complaining of double vision and generalized weakness and fatigue.  She was complaining of chronic fatigue at the last visit back in September and I ordered an overnight oximetry test but apparently this was never done for reasons that are not clear to me.  Her current oxygen saturation is 94% and normally it is 100%.  Plan is as follows: Extensi   • Chronic hoarseness 6/8/2020    September 15, 2020--patient presents with complaints of swelling of the soft tissues of the left side of the neck and this is associated with pain and discomfort, particularly when she turns her head rotating to the left.  She also notices hoarseness and feels that her voice has changed.  On exam there is definite prominence of the left sternocleidomastoid muscle and there may be some shotty lymph   • Chronic lower back pain 1/31/2006 08/11/2014--MRI of the lumbar spine reveals the conus terminates at L2 and is normal.  Cauda equina unremarkable.  Stable to moderate degenerative disc disease at L5-S1.  No acute fracture or pars defect is demonstrated.  Small synovial cysts are seen posterior to the L4-L5 facet.  The perivertebral soft tissues are unremarkable.  T12-L1, L1-L2, L2-L3 are negative.  L3-L4 a broad-based disc bulge resulting in mild bilateral neural foraminal  narrowing.  L4-L5 reveals a broad-based disc bulge facet disease resulting in mild bilateral neural foraminal narrowing.  L5-S1 reveals a broad-based disc bulge, posterior osseous slipping, and facet disease resulting in mild to moderate bilateral neural foraminal narrowing.  Assessment is stable mild to moderate degenerative spondylosis.  Small synovial cysts are seen posterior to the L4-L5 facets.  08/11/2014--MRI of the thoracic spine reveals mild to moderate thoracic kyphosis.  No fracture.  At T5--T6 there is a moderate sized left paracentral disc protrusion which i   • Chronic migraine 11/3/2009    01/18/2010--MRI of the brain performed for headaches and memory loss.  Mild small vessel disease in the cerebral and central pontine white matter.  There is an ovoid, somewhat pancake-shaped area of signal abnormality in the anterior inferior right temporal lobe subcortical to the juxtacortical white matter that measures 1.2 x 1 cm and anterior posterior and medial lateral dimension but only measures 3 mm in cranial caudal dimension.  I suspect that this is a benign cyst or a cystic area of encephalomalacia.  The remainder of the MRI of the head is within normal limits.  11/03/2009--CT scan of the brain without contrast performed for headache after a fall.  Mild diffuse atrophy.  No acute abnormality noted.; Description: Patient has had a long history of migraine headaches.  MRI and CT scan of the brain have been essentially negative.   • Chronic otitis externa 4/8/2016   • Chronic renal insufficiency, stage II (mild), creatinine 1.12 11/14/2015 11/14/2015--serum creatinine mildly elevated at 1.12.   • Depression with anxiety 4/8/2016   • Frequent falls 2/5/2021 February 5, 2021--patient seen in follow-up by Dr. Weir.  I extensively reviewed the documentation from the emergency room visit as noted below.  I reviewed the history with the patient and she is describing episodes of dizziness described as a  spinning sensation of her body and this seems to be precipitated by movement although it does not occur if she rolls over in bed.  If she stands up and st   • Functional murmur, 07/15/2015--normal echocardiogram. 7/15/2015    07/15/2015--echocardiogram reveals normal left ventricular size and function with ejection fraction 55%.  Grade 1 diastolic dysfunction, abnormal relaxation pattern.  Trace tricuspid regurgitation.  Estimated right ventricular systolic pressure is 25 mmHg which is normal.   • Gastroesophageal reflux disease with esophagitis 11/19/2001 06/13/2017--EGD revealed normal esophagus.  Biopsies taken.  There was a medium-sized hiatal hernia.  Localized mild inflammation and linear erosions were found in the prepyloric region.  Biopsied.  Examined duodenum was normal.  Random biopsies taken.  Pathology of the gastroesophageal junction was unremarkable as was the gastric fundus.  Prepyloric biopsy revealed minimal chronic inflammation and reactive change.  H pylori negative.  Duodenal biopsies are negative.  11/03/2014--patient seen in follow-up and reports her epigastric pain/chest pain has resolved.  I reviewed the results of the studies with her.  It does not appear that her problem is related to biliary tract disease.  She does have reflux and although the recent upper GI revealed minimal reflux, I do think her symptoms are related to esophageal reflux with esophageal spasm.  10/21/2014--air-contrast upper GI series revealed trace upper laryngeal penetration.  Persistent small partially reducible sliding hiatal hernia the upper stomach with    • Gastroesophageal reflux disease without esophagitis 6/6/2019   • Generalized anxiety disorder 4/11/2017   • History of Acute deep vein thrombosis (DVT) of distal end of left lower extremity 2/15/2017    03/21/2017 patient seen in follow-up and she is tolerating the Eliquis well without signs or symptoms of bleeding.  Her calf swelling and tenderness is  better but not totally resolved.  I suspect that the DVT is chronic and may not resolve at all.  I will order a repeat venous study and then proceed from there.  02/23/2017--repeat Doppler venous study of the left lower extremity reveals a chronic left lower extremity DVT in the posterior tibial.  All other left sided veins appeared normal.  Fluid collection in the left calf noted.  02/17/2017--patient seen in follow-up and reports her left lower extremity symptoms are about the same.  She continues to have complaints of profound fatigue which I think is multifactorial including underlying depression that is not in remission.  Review the results of the CTA of the chest including the possible thyroid lesion.  I do not think this is contributing to any of her symptoms of fatigue particularly given the fact that her thyroid function tests are normal.  I expla   • History of palpitations 12/07/2011    Patient has had multiple admissions to the hospital for complaints of chest pain and heart palpitations. She meant admitted at least on 3 occasions. She has had at least 3 stress Cardiolite and 1 stress ECG, the last Cardiolite being performed 12/07/2011 which was negative. Patient is also had Holter monitors which have been unremarkable.   • HIstory of Schatzki's ring 02/28/2012 02/28/2012--air-contrast upper GI revealed small to moderate sized reducible sliding hiatal herniation of the upper stomach with some demonstrated gastroesophageal reflux. No esophageal, gastric, or duodenal mass or mucosal ulceration was seen.  11/03/2008--EGD performed for evaluation of iron deficiency anemia revealed hiatal hernia without evidence of reflux, prepyloric antritis and   • Hyperlipidemia 4/8/2016   • Hypersomnolence 5/5/2016 06/14/2017--overnight oximetry revealed oxygen desaturation index of only 0.44.  There were only 10 total desaturations during the period of testing which lasted 6 hours and 46 minutes.   05/31/2017--patient seen in follow-up and reports she continues to have chronic fatigue as well as hypersomnolence.  Once again, she is on multiple medications that are undoubtedly contributing to this problem including clonazepam and hydrocodone but I doubt that these could be discontinued.  Her CBC back in April revealed a low hemoglobin of 10.8 but hematocrit was normal at 35.7.  Thyroid function tests were normal and CMP normal.  Overnight oximetry test ordered.  Repeat CBC.  Iron studies.  Sedimentation rate and CRP.  04/11/2017--patient seen in follow-up and her blood pressure is now at a reasonable level at 120/64.  She reports she still feels somewhat fatigued but she is much better.  Patient has not been doing any regular exercise and I do think that that would be helpful to reduce her feeling of fatigue.  She is   • Menopausal state 4/8/2016   • Multinodular goiter 2/17/2017 02/21/2017--thyroid ultrasound reveals multinodular thyroid with largest nodule measuring on the order of 1.6 cm in greatest dimension.  02/17/2017--patient seen in follow-up for DVT and CTA of the chest.  An incidental finding on the CTA of the chest reveals a 1.7 cm lesion in the right lobe of thyroid.  Note that thyroid function tests are currently normal.  Ultrasound of the thyroid ordered.   • Osteoporosis, 03/29/2011--lumbar spine -2.8.  Right femoral neck -2.4.  Left femoral neck -2.4.  Patient receives Reclast infusions. 2/9/2009 04/19/2017--Reclast infusion.  04/05/2016--Reclast infusion.  06/04/2012--Reclast infusion.  05/26/2011--Reclast infusion.  03/29/2011--DEXA scan revealed a lumbar T score of -2.8, right femoral neck T score -2.4, left femoral neck T score -2.4.  Osteoporosis of the lumbar spine and severe osteopenia of the hips bilaterally.  Patient has been intolerant to Fosamax because of gastritis and gastroesophageal reflux.  04/01/2009--treatment for osteoporosis begun with Fosamax.  02/09/2009--DEXA scan  revealed lumbar spine T score of -3.3.  Left femoral neck T score -2.5.  Right hip T score -2.6.  Osteoporosis.    • Pain disorder with psychological factors 10/10/2012   • Pancreatic Duct Dilation 11/30/2001 09/29/2014--patient was again evaluated by the urologist for a renal cyst.  CT scan of the abdomen and pelvis reveals a left renal cyst measuring 5.1 x 4.9 cm.  There were subcentimeter hypodense renal lesions that are too small to further characterize and are presumably related to cysts.  Recommend attention to follow up.  Distal dilated pancreatic duct noted.  The common bile duct is the upper limits of normal in size.  The ampullary region is not well evaluated.  Comparison with prior imaging is recommended if available.  If there is no prior film available for comparison, and ERCP or MRCP could be performed to further evaluate.  Small hiatal hernia noted.  03/05/2012--CT scan of the abdomen with contrast, pancreatic protocol.  This reveals some fullness of the pancreatic ductal system and apparent pancreatic divisum with separate entrance of the accessory pancreatic duct extending into the duodenum distal to the main pancreatic duct.  There is fullness of the duct diffusely but similar to the previous s   • Pedal edema 6/29/2015 06/29/2015--patient presents with a 4-6 week history of exertional dyspnea that comes on with activity such as climbing stairs or walking her dog up an incline.  No chest pain.  Relieved with rest.  No cough.  She does have complaints of her feet and legs swelling that is particularly worse at the end of the day and not improved overnight.  No orthopnea or PND.  Chest exam reveals faint rales at the bases.  Otherwise clear.  Heart is regular and I do not appreciate a heart murmur.  Chest x-ray PA and lateral ordered.  Echocardiogram ordered.   • Pulmonary hypertension (CMS/HCC) 4/18/2019   • Restless legs syndrome 4/8/2016   • Simple renal cyst 7/20/2009 09/29/2014--patient  was again evaluated by the urologist for a renal cyst.  CT scan of the abdomen and pelvis reveals a left renal cyst measuring 5.1 x 4.9 cm.  There were subcentimeter hypodense renal lesions that are too small to further characterize and are presumably related to cysts.  Recommend attention to follow up.  Distal dilated pancreatic duct noted.  The common bile duct is the upper limits of normal in size.  The ampullary region is not well evaluated.  Comparison with prior imaging is recommended if available.  If there is no prior film available for comparison, and ERCP or MRCP could be performed to further evaluate.  Small hiatal hernia noted.  07/20/2009--patient was noted to have a left renal mass consistent with a cyst.  This was evaluated by the urologist 07/20/2009.   • Statin intolerance 10/30/2017   • Tinnitus of both ears 9/30/2019 September 30, 2019--patient presents with a several year history of bilateral tinnitus, right greater than left.  It seems to be getting worse and now is associated with fluctuating hearing.  She occasionally will have worsening hearing after she coughs or sneezes.  On exam she has evidence of old otitis media scars, particularly on the right.  There is no acute infection present and there is no a   • Vitamin D deficiency 4/8/2016         Past Surgical History:   Procedure Laterality Date   • APPENDECTOMY  1965    1965   • CATARACT EXTRACTION Bilateral Remote    Remote bilateral cataract extirpation with intraocular lens implantation.   • CHOLECYSTECTOMY WITH INTRAOPERATIVE CHOLANGIOGRAM N/A 1/21/2019 01/21/2019--laparoscopic paraesophageal hernia repair with fundoplication.   • COLONOSCOPY  11/03/2008 2008--normal colonoscopy   • COLONOSCOPY  2001 2001--normal colonoscopy.   • COLONOSCOPY N/A 6/13/2017 06/13/2017--colonoscopy revealed melanosis.  Biopsied.  There was friability with contact bleeding in the ascending colon.  Biopsied.  Pathology returned consistent with  melanosis coli.   • ENDOSCOPY  10/08/2014    02/28/2012--air-contrast upper GI revealed small to moderate sized reducible sliding hiatal herniation of the upper stomach with some demonstrated gastroesophageal reflux. No esophageal, gastric, or duodenal mass or mucosal ulceration was seen. 11/03/2008--EGD performed for evaluation of iron deficiency anemia revealed hiatal hernia without evidence of reflux, prepyloric antritis and   • ENDOSCOPY  11/03/2008 11/03/2008--EGD performed for evaluation of iron deficiency anemia revealed hiatal hernia without evidence of reflux, prepyloric antritis and   • ENDOSCOPY  11/19/2001 11/19/2001--EGD revealed a very tortuous distal esophagus with a Schatzki's ring. No ulcer or erosions. No Dorado's mucosa. Stomach revealed patchy erythema and erosions in the antrum. Biopsy. Normal pylorus with no obstruction. Normal duodenum with no ulcers. The Schatzki's ring was dilated with a Rhodes dilator.;   • ENDOSCOPY N/A 9/27/2016 09/27/2016--EGD revealed a normal oropharynx, esophagus, and medium sized hiatal hernia.  Nonbleeding gastric ulcer with no stigmata of bleeding.  Biopsy.  Gastritis.  Biopsy.  Normal examined duodenum.  Biopsied.  Pathology returned mild to moderate chronic active gastritis with ulceration from the stomach antral ulcer biopsy.  Gastroesophageal junction biopsy revealed minimal mixed inflammation.   • ENDOSCOPY N/A 6/13/2017 06/13/2017--colonoscopy revealed melanosis.  Biopsied.  There was friability with contact bleeding in the ascending colon.  Biopsied.  Pathology returned consistent with melanosis coli.   • ERCP N/A 1/22/2019 01/22/2019--ERCP with sphincterotomy and balloon stone extraction.   • ESOPHAGEAL DILATATION  11/19/2001 11/19/2001--EGD revealed a very tortuous distal esophagus with a Schatzki's ring. No ulcer or erosions. No Dorado's mucosa. Stomach revealed patchy erythema and erosions in the antrum. Biopsy. Normal pylorus with  no obstruction. Normal duodenum with no ulcers. The Schatzki's ring was dilated with a Rhodes dilator.;    • ESOPHAGEAL MANOMETRY N/A 12/18/2018    Procedure: ESOPHAGEAL MANOMETRY;  Surgeon: Cecilia, Nurse Performed;  Location: [facility] ENDOSCOPY;  Service: Gastroenterology   • ESOPHAGOSCOPY N/A 1/21/2019    Procedure: FLEXIBLE ESOPHAGOSCOPY;  Surgeon: Reji Johnson MD;  Location: [facility] MAIN OR;  Service: General   • HIATAL HERNIA REPAIR N/A 1/21/2019    Procedure: Laparoscopic paraesophageal hernia repair with toupee fundoplication;  Surgeon: Reji Johnson MD;  Location: [facility] MAIN OR;  Service: General   • INCONTINENCE SURGERY  1979 1979--a bladder tack procedure for urinary stress incontinence   • KNEE ARTHROSCOPY Right 12/12/2011 12/12/2011--right knee arthroscopy with partial lateral and medial meniscectomies.   • SKIN SURGERY  05/25/2010 05/25/2010--skin lesion excised from right lower extremity. 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   • TOTAL ABDOMINAL HYSTERECTOMY WITH SALPINGO OOPHORECTOMY  1974 1974--total abdominal hysterectomy.         Allergies   Allergen Reactions   • Statins Nausea And Vomiting   • Morphine Hallucinations   • Penicillins Itching   • Tetanus Toxoids Itching           Current Outpatient Medications:   •  amLODIPine (NORVASC) 2.5 MG tablet, Take 1 tablet by mouth Daily., Disp: 90 tablet, Rfl: 3  •  aspirin 81 MG EC tablet, Take 81 mg by mouth Daily. HELD, Disp: , Rfl:   •  buPROPion XL (WELLBUTRIN XL) 300 MG 24 hr tablet, Take 300 mg by mouth Every Morning., Disp: , Rfl:   •  Cholecalciferol (vitamin D3) 125 MCG (5000 UT) capsule capsule, 1 by mouth daily as directed, Disp: 30 capsule, Rfl:   •  Cimetidine (TAGAMET PO), Tagamet, Disp: , Rfl:   •  clonazePAM (KlonoPIN) 0.5 MG tablet, TAKE 1/2 TABLET BY MOUTH EVERY DAY AT BEDTIME, Disp: , Rfl:   •  clonazePAM (KlonoPIN) 1 MG tablet, Take 1/2-1 p.o. twice daily as directed, Disp: 60  tablet, Rfl: 5  •  cycloSPORINE (RESTASIS) 0.05 % ophthalmic emulsion, Administer 1 drop to both eyes 2 (Two) Times a Day., Disp: , Rfl:   •  diclofenac (VOLTAREN) 1 % gel gel, Apply 4 g topically to the appropriate area as directed 4 (Four) Times a Day As Needed., Disp: , Rfl:   •  diphenhydrAMINE-APAP, sleep, (Tylenol PM Extra Strength)  MG/30ML liquid, Tylenol PM Extra Strength, Disp: , Rfl:   •  estradiol (ESTRACE) 1 MG tablet, TAKE 1 TABLET BY MOUTH EVERY DAY, Disp: 90 tablet, Rfl: 1  •  gabapentin (NEURONTIN) 100 MG capsule, Take 1 capsule by mouth Every Night., Disp: 90 capsule, Rfl: 1  •  HYDROcodone-acetaminophen (NORCO)  MG per tablet, Take 1 tablet by mouth every 6 (six) hours as needed for moderate pain (4-6)., Disp: , Rfl:   •  Misc Natural Products (COLON CLEANSE PO), Take  by mouth., Disp: , Rfl:   •  Multiple Vitamins-Minerals (MULTIVITAMIN ADULT) chewable tablet, Chew 1 tablet Daily., Disp: , Rfl:   •  potassium chloride (K-DUR,KLOR-CON) 10 MEQ CR tablet, Take 1 p.o. 3 times daily at mealtime, Disp: 270 tablet, Rfl: 3  •  potassium chloride 10 MEQ CR tablet, Take 10 mEq by mouth 3 (Three) Times a Day With Meals., Disp: , Rfl:   •  Specialty Vitamins Products (ONE-A-DAY BONE STRENGTH PO), One-A-Day Bone Strength, Disp: , Rfl:   •  topiramate (TOPAMAX) 100 MG tablet, TAKE 1 TABLET DAILY, Disp: 90 tablet, Rfl: 4  •  traZODone (DESYREL) 50 MG tablet, Take 25-50 mg by mouth Every Night., Disp: , Rfl:   •  triamterene-hydrochlorothiazide (DYAZIDE) 37.5-25 MG per capsule, TAKE ONE CAPSULE BY MOUTH DAILY FOR BLOOD PRESSURE AND LEG SWELLING, Disp: 90 capsule, Rfl: 1  •  venlafaxine XR (EFFEXOR-XR) 150 MG 24 hr capsule, Take 150 mg p.o. in the morning and 75 mg in the evening., Disp: , Rfl:   •  venlafaxine XR (EFFEXOR-XR) 75 MG 24 hr capsule, Take 75 mg in the evening., Disp: 30 capsule, Rfl: 5  •  vitamin E 400 UNIT capsule, Take 400 Units by mouth Daily., Disp: , Rfl:       Family History  "  Problem Relation Age of Onset   • Heart attack Mother         dies age 47 from heart attack   • Heart disease Mother    • Bleeding Disorder Mother    • Heart attack Maternal Aunt         dies age 60 from heart attack   • Heart disease Father    • Malig Hyperthermia Neg Hx          Social History     Socioeconomic History   • Marital status:      Spouse name: ander   • Number of children: 4   • Years of education: 14   • Highest education level: Associate degree: academic program   Tobacco Use   • Smoking status: Never Smoker   • Smokeless tobacco: Never Used   • Tobacco comment: no caffeine    Vaping Use   • Vaping Use: Never used   Substance and Sexual Activity   • Alcohol use: No   • Drug use: No   • Sexual activity: Yes     Partners: Male         Vitals:    07/16/21 0913   BP: 111/62   Pulse: 78   SpO2: 98%   Weight: 61.7 kg (136 lb)   Height: 152.4 cm (60\")        Body mass index is 26.56 kg/m².      Physical Exam:    General: Alert and oriented x 3.  No acute distress.  Normal affect.  HEENT: Pupils equal, round, reactive to light; extraocular movements intact; sclerae nonicteric; pharynx, ear canals and TMs normal.  Neck: Without JVD, thyromegaly, bruit, or adenopathy.  Lungs: Clear to auscultation in all fields.  Heart: Regular rate and rhythm without murmur, rub, gallop, or click.  Abdomen: Soft, nontender, without hepatosplenomegaly or hernia.  Bowel sounds normal.  : Deferred.  Rectal: Deferred.  Extremities: Without clubbing, cyanosis, edema, or pulse deficit.  Neurologic: Intact without focal deficit.  Normal station and gait observed during ingress and egress from the examination room.  Skin: Without significant lesion.  Musculoskeletal: Examination left foot reveals soft tissue swelling over the third and fourth toes proximally.  No ecchymosis noted.  Very tender.    Lab/other results:      Assessment/Plan:     Diagnosis Plan   1. Fracture of proximal phalanx of toe of left foot, " 7/15/2021--nondisplaced, third and fourth proximal phalanges/toe.  Ambulatory Referral to Orthopedic Surgery   2. Osteoporosis, 03/29/2011--lumbar spine -2.8.  Right femoral neck -2.4.  Left femoral neck -2.4.  Patient receives Reclast infusions.  Ambulatory Referral to Orthopedic Surgery    Ambulatory Referral to Rheumatology   3. Breast cancer screening by mammogram  Mammo Screening Bilateral With CAD   4. Generalized anxiety disorder  clonazePAM (KlonoPIN) 1 MG tablet   5. Frequent falls     6. Benign paroxysmal positional vertigo due to bilateral vestibular disorder  Ambulatory Referral to Physical Therapy Vestibular     July 16, 2021--patient presents with complaints of left foot pain distally.  She has a history of frequent falls and osteoporosis.  X-ray reveals nondisplaced fractures of the third and fourth proximal phalanges with soft tissue swelling.  Given her osteoporosis and multiple other pathologic fractures, patient referred to orthopedist.    Plan is as follows: Given her multiple pathologic fractures I have referred her to the orthopedist.  I am also going to refer her to a rheumatologist to see if they have any ideas regarding treatment of her osteoporosis.  She received Reclast infusions for at least 5 years and these were subsequently discontinued when her last DEXA scan showed severe osteopenia.  I think she needs some of the other options available to treat the osteoporosis.  Mammogram ordered.  Antianxiety medications refilled.    July 16, 2021--addendum: Patient is now having symptoms consistent with benign paroxysmal positional vertigo.  She reports that when she stands up or moves she gets off balance and has a spinning sensation.  This could be contributing to her frequent falls and I am going to refer her for vestibular testing and treatment.    Procedures

## 2021-07-22 ENCOUNTER — TREATMENT (OUTPATIENT)
Dept: PHYSICAL THERAPY | Facility: CLINIC | Age: 81
End: 2021-07-22

## 2021-07-22 DIAGNOSIS — M79.672 ACUTE FOOT PAIN, LEFT: ICD-10-CM

## 2021-07-22 DIAGNOSIS — G89.29 CHRONIC BILATERAL LOW BACK PAIN WITHOUT SCIATICA: Primary | ICD-10-CM

## 2021-07-22 DIAGNOSIS — R29.3 POOR POSTURE: ICD-10-CM

## 2021-07-22 DIAGNOSIS — M54.50 CHRONIC BILATERAL LOW BACK PAIN WITHOUT SCIATICA: Primary | ICD-10-CM

## 2021-07-22 DIAGNOSIS — R26.2 DIFFICULTY WALKING: ICD-10-CM

## 2021-07-22 DIAGNOSIS — R26.89 LOSS OF BALANCE: ICD-10-CM

## 2021-07-22 PROCEDURE — 97162 PT EVAL MOD COMPLEX 30 MIN: CPT | Performed by: PHYSICAL THERAPIST

## 2021-07-22 PROCEDURE — 97116 GAIT TRAINING THERAPY: CPT | Performed by: PHYSICAL THERAPIST

## 2021-07-22 NOTE — PROGRESS NOTES
Physical Therapy Initial Evaluation and Plan of Care      Patient: Sachi Vora   : 1940  Diagnosis/ICD-10 Code:  Chronic bilateral low back pain without sciatica [M54.5, G89.29]  Referring practitioner: Cruzito Weir MD  Date of Initial Visit: 2021  Today's Date: 2021  Patient seen for 1 sessions           Subjective:  Sachi reports today with complaints of chronic B LBP. She is also experincing acute L foot pain which occurred due to a fall in her shower, two weeks ago fracturing the 3rd and 4th digits of the L foot.  She also reports a tailbone fracture from a fall on her neighbors porch 3 weeks ago.  Current peak pain in the low back area has been 6/10 and pain is intermittent.  She reports no sciatic pain or parasthesia into her legs.  She states that for approximately two months she has noticed decreasing balance and that began with a fall walking her dog. She does report previous falls but those were minor compared to ones mentioned.  She notices that when she sits up in bed, first thing in the morning, she notices the room moving and has to sit for one minute for that to cease.  Her low back pain is exacerbated by lifting anything greater than 25 lbs, standing for 1/2 hour in one place, walking fast increase pain, sitting for one hour.  Previous level of function:  Walking her dog, cleaning her house, doing laundry, community ambulation and she does perform light exercise independently at home.  Physical therapy goals:  Less LBP, better balance and normal function and no pain with the L foot.  PMH:  Fractured L thumb as a child, HTN controlled by medication, low potassium treated with potassium supplement  SH:  , retired .  She has two children, one living and 9 grandchildren.  She enjoys water coloring in her leisure    Objective   Observation: Sachi arrived in the clinic, with her , ambulating with a SBQC, independently.    Gait:  Her gait pattern  with the SBQC was erratic.  She did not consistently place all 4 points of the quad cane on the ground evenly and stride length was inconsistent and abnormal.      Posture:  1. Posterior view, her L shoulder, scapular and ilium were all higher than the R side.  She has a slight scoliosis with concavity in the lumbar area on the L.  2. Lateral view:  She has forward head, increased thoracic kyphosis and decreased lumbar lordosis.    Motor control, L2-S2 myotomes:  Hip flexion B 4 to 4+/5, knee extension B 4+ to 5/5, ankle dorsiflexion B 4+/5 (slight discomfort in the L foot), EHL B 4+/5 (slight discomfort in the L foot) and hip extension B 4+/5    DTRs:  Patellar, hypotonic/equal B and [place]'s trace/equal B    Upper motor neuron tests:  Babinski and Clonus were normal    Treatment:  I instructed her in a basic 3 point technique using the SBQC.  She ambulated 80 feet on level surface and required frequent cues for correct technique.     Functional Outcome Score: CASTILLO, 24/50, 48% disability    Assessment & Plan     Assessment  Impairments: abnormal gait, abnormal or restricted ROM, activity intolerance, impaired balance, impaired physical strength, lacks appropriate home exercise program, pain with function, safety issue and weight-bearing intolerance  Assessment details: Sachi Vora is a 80 y.o. year-old female referred to physical therapy for chronic low back pain, fall risk and acute L foot pain. She presents with an evolving clinical presentation.  She has comorbidities to include past history of falling and soft/bony tissue adaptations to poor posture habits.  She has no known personal factors  that may affect her progress in the plan of care.  Signs and symptoms are consistent with physical therapy diagnosis of chronic low back pain, fall risk, loss of balance, acute L foot pain, poor posture, loss of movement/strength and she will require education for self care..   Prognosis: good  Prognosis details: I have  recommended that she be seen by Dr. Cruzito Valera for further assessment of her L foot.  Functional Limitations: carrying objects, lifting, walking, pulling, pushing, uncomfortable because of pain, sitting, standing, stooping and unable to perform repetitive tasks  Goals  Plan Goals: STGs to be met in 4 weeks  1. Vestibular examination is performed, to include standing balance tests and appropriate treatment begins.  2. Special tests of the lumbar spine, SI joints and the dura are performed.  3. She begins appropriate lumbar spine exercises to promote less pain and improved function.    LTGs to be met in 12 weeks  1. Sachi is ambulating independently on level surface and stairs with/without an assistive device with a normal gait pattern.  2. Peak pain in the low back is reduced to 1-2/10.  3. She is independent with a HEP and education for self care.  4. CASTILLO is improved by 20%.     Plan  Therapy options: will be seen for skilled physical therapy services  Planned modality interventions: ultrasound and high voltage pulsed current (pain management)  Planned therapy interventions: manual therapy, ADL retraining, abdominal trunk stabilization, balance/weight-bearing training, body mechanics training, flexibility, functional ROM exercises, gait training, home exercise program, joint mobilization, spinal/joint mobilization, strengthening, stretching, therapeutic activities, transfer training, soft tissue mobilization, postural training, neuromuscular re-education and orthotic fitting/training  Other planned therapy interventions: aqua therapy and vestibular rehab  Frequency: 1-2 x per week.  Duration in weeks: 12  Treatment plan discussed with: patient        Timed:  Manual Therapy:         mins  41863;  Therapeutic Exercise:         mins  99408;     Neuromuscular Neil:        mins  75035;    Therapeutic Activity:          mins  38132;     Gait Training:      10     mins  91488;     Ultrasound:          mins  49106;     Electrical Stimulation:         mins  54755 ( );  Iontophoresis         mins 23012  Dry Needling        mins      Untimed:  Electrical Stimulation:         mins  36052 ( );  Mechanical Traction:         mins  46421;     Timed Treatment:   10   mins   Total Treatment:     45   mins    PT SIGNATURE: Nirmal Arteagalynn, PT   DATE TREATMENT INITIATED: 7/22/2021    Initial Certification  Certification Period: 10/20/2021  I certify that the therapy services are furnished while this patient is under my care.  The services outlined above are required by this patient, and will be reviewed every 90 days.     PHYSICIAN: Cruzito Weir MD      DATE:     Please sign and return via fax to [phone] Thank you, [facility].

## 2021-07-23 ENCOUNTER — TRANSCRIBE ORDERS (OUTPATIENT)
Dept: ADMINISTRATIVE | Facility: HOSPITAL | Age: 81
End: 2021-07-23

## 2021-07-23 DIAGNOSIS — Z12.31 VISIT FOR SCREENING MAMMOGRAM: Primary | ICD-10-CM

## 2021-08-03 ENCOUNTER — TREATMENT (OUTPATIENT)
Dept: PHYSICAL THERAPY | Facility: CLINIC | Age: 81
End: 2021-08-03

## 2021-08-03 DIAGNOSIS — M54.50 CHRONIC BILATERAL LOW BACK PAIN WITHOUT SCIATICA: Primary | ICD-10-CM

## 2021-08-03 DIAGNOSIS — R26.89 LOSS OF BALANCE: ICD-10-CM

## 2021-08-03 DIAGNOSIS — G89.29 CHRONIC BILATERAL LOW BACK PAIN WITHOUT SCIATICA: Primary | ICD-10-CM

## 2021-08-03 DIAGNOSIS — R29.3 POOR POSTURE: ICD-10-CM

## 2021-08-03 DIAGNOSIS — M79.672 ACUTE FOOT PAIN, LEFT: ICD-10-CM

## 2021-08-03 DIAGNOSIS — R26.2 DIFFICULTY WALKING: ICD-10-CM

## 2021-08-03 PROCEDURE — 97110 THERAPEUTIC EXERCISES: CPT | Performed by: PHYSICAL THERAPIST

## 2021-08-03 PROCEDURE — 97116 GAIT TRAINING THERAPY: CPT | Performed by: PHYSICAL THERAPIST

## 2021-08-03 NOTE — PROGRESS NOTES
Physical Therapy Daily Progress Note    Patient: Sachi Vora   : 1940  Diagnosis/ICD-10 Code:  Chronic bilateral low back pain without sciatica [M54.5, G89.29]  Referring practitioner: Cruzito Weir MD  Date of Initial Visit: Type: THERAPY  Noted: 2021  Today's Date: 8/3/2021  Patient seen for 2 sessions           Subjective: Sachi reports that she was seen by Dr. Valera's PA yesterday and was given a walking shoe for the L foot and is to rest the foot and be reassessed in 3 weeks. She has not fallen for 2 weeks but seems to lose her balance backwards with standing or moving forward.      Objective     Observation: She stood up from standing and lost her balance back wards into the chair.    Treatment  1. Gait training, level surface, using cane in R UE, level surface 150 feet, up/down 4 steps x 4, with SBA to CGA-1.     Assessment & Plan     Assessment  Assessment details: Ms. Jeans status with her L foot makes me feel we should wait until cleared by Dr. Tapia's office to bear weight freely on the L foot so that we can work on balance issues more.      Plan  Plan details: Sachi is on hold until cleared to do more with the L foot.              Timed:    Manual Therapy:         mins  95650;  Therapeutic Exercise:    15     mins  63072;     Neuromuscular Neil:        mins  47973;    Therapeutic Activity:          mins  53642;     Gait Trainin     mins  40666;     Ultrasound:          mins  69293;    Electrical Stimulation:         mins  55537 ( );  Iontophoresis         mins 52616;  Aquatic Therapy         mins 05565;  Dry Needling                   mins    Untimed:  Electrical Stimulation:         mins  86864 ( );  Mechanical Traction:         mins  95341;     Timed Treatment:   40   mins   Total Treatment:     40   mins  Nirmal Galeano PT  Physical Therapist

## 2021-10-12 ENCOUNTER — OFFICE VISIT (OUTPATIENT)
Dept: NEUROLOGY | Facility: CLINIC | Age: 81
End: 2021-10-12

## 2021-10-12 VITALS
SYSTOLIC BLOOD PRESSURE: 134 MMHG | BODY MASS INDEX: 26.11 KG/M2 | DIASTOLIC BLOOD PRESSURE: 70 MMHG | OXYGEN SATURATION: 98 % | HEART RATE: 75 BPM | WEIGHT: 133 LBS | HEIGHT: 60 IN

## 2021-10-12 DIAGNOSIS — M54.42 CHRONIC MIDLINE LOW BACK PAIN WITH LEFT-SIDED SCIATICA: ICD-10-CM

## 2021-10-12 DIAGNOSIS — G89.29 CHRONIC MIDLINE LOW BACK PAIN WITH LEFT-SIDED SCIATICA: ICD-10-CM

## 2021-10-12 DIAGNOSIS — G60.9 IDIOPATHIC PERIPHERAL NEUROPATHY: Primary | ICD-10-CM

## 2021-10-12 PROCEDURE — 99204 OFFICE O/P NEW MOD 45 MIN: CPT | Performed by: PSYCHIATRY & NEUROLOGY

## 2021-10-12 NOTE — PROGRESS NOTES
CC: Balance disturbance and falls    HPI:  Sachi Vora is a  80 y.o.  right-handed white female who was sent for neurological consultation by Dr. Weir regarding balance problems and falling.  She is accompanied by her  who helps with the history she states that since March she has been having episodes of falling but does not feel dizzy.  She denies any vertigo history.  When she goes to walk her dog her dog may pull her off balance and she may fall.  She has fallen backwards several times not being able to right herself.  She has hit her head but no loss of consciousness.  She denies any bowel or bladder issues.  States her memory has not been great in terms of short-term memory for about 10 months.  Her  says she shuffles a little but she does not think so.  She denies any numbness tingling or burning in her hands or in her feet.  She has some left-sided sciatica along with some chronic low back pain she says her knees contribute to having troubles getting up out of a chair in addition to her back pain she denies previous surgery on her back.  She denies substantial neck pain.  She is on a lot of medicines and when her clonazepam was stopped it seemed to help her balance a little but she has not been following as much.    She has had fairly substantial evaluation thus far.  In May she had an MRI of the brain showing moderate atherosclerotic vascular disease changes which also included some in the héctor.  I looked at the films and agree.  There is no hydrocephalus or any abnormality of the cerebellum.  MRI of the cervical spine showed no cord compression or intrinsic cord abnormality.  Lumbar spine did not show any severe spinal stenosis but there is some spinal stenosis in her low back and some foraminal stenosis at L5/S1 bilaterally.  She has had some labs also from May her TSH was 1.28 and free T4 was 1.04.  In October/November of last year she had a normal sedimentation rate of 11 her CRP was a  "little elevated at 0.77.  CK was normal at 76.      Past Medical History:   Diagnosis Date   • Balance disorder 2/5/2021 February 5, 2021--patient seen in follow-up by Dr. Weir.  I extensively reviewed the documentation from the emergency room visit as noted below.  I reviewed the history with the patient and she is describing episodes of dizziness described as a spinning sensation of her body and this seems to be precipitated by movement although it does not occur if she rolls over in bed.  If she stands up and st   • Benign essential hypertension 4/8/2016   • Bilateral sensorineural hearing loss 3/31/2011    03/31/2011--etiology reveals reverse \"cookie bite\" type of hearing loss for both ears of mild/moderate degree, mostly sensorineural.  Speech discrimination 100% on the right, 96% on the left.   • Carotid artery plaque, 10/03/2014--mild bilateral carotid artery plaque. 7/25/2012    10/03/2014--[facility] screening revealed mild bilateral carotid plaque, negative for atrial fibrillation, negative for AAA, negative for PAD, osteoporosis screen revealed osteopenia.  Body mass index was 25 and considered to be moderate risk.  07/25/2012--vascular screen negative for carotid plaque, negative for abdominal aneurysm, negative for PAD Description: 10/03/2014--[facility] screening revealed mild bilateral carotid plaque, negative for atrial fibrillation, negative for AAA, negative for PAD, osteoporosis screen revealed osteopenia.  Body mass index was 25 and considered to be moderate risk.  07/25/2012--vascular screen negative for carotid plaque, negative for abdominal aneurysm, negative for PAD   • Chronic anemia 8/23/2016 06/13/2017--colonoscopy revealed melanosis.  Biopsied.  There was friability with contact bleeding in the ascending colon.  Biopsied.  Pathology returned consistent with melanosis coli.  06/13/2017--EGD revealed normal esophagus.  Biopsies taken.  There was a medium-sized hiatal hernia.  Localized " mild inflammation and linear erosions were found in the prepyloric region.  Biopsied.  Examined duodenum was normal.  Random biopsies taken.  Pathology of the gastroesophageal junction was unremarkable as was the gastric fundus.  Prepyloric biopsy revealed minimal chronic inflammation and reactive change.  H pylori negative.  Duodenal biopsies are negative.  04/12/2017--hemoglobin low at 10.8, hematocrit is normal at 35.7.  Iron sulfate 325 mg per day initiated.  08/23/2016--routine follow-up.  Patient continues to have epigastric abdominal pain believed to be related to reflux with possible esophageal spasm.  Hemoglobin noted to be low at 10.6 with a hematocrit low at 33.7 and RDW elevated at 16.2.  Homocysti   • Chronic fatigue 4/21/2016 06/14/2017--overnight oximetry revealed oxygen desaturation index of only 0.44.  There were only 10 total desaturations during the period of testing which lasted 6 hours and 46 minutes.  05/31/2017--patient seen in follow-up and reports she continues to have chronic fatigue as well as hypersomnolence.  Once again, she is on multiple medications that are undoubtedly contributing to this problem including clonazepam and hydrocodone but I doubt that these could be discontinued.  Her CBC back in April revealed a low hemoglobin of 10.8 but hematocrit was normal at 35.7.  Thyroid function tests were normal and CMP normal.  Overnight oximetry test ordered.  Repeat CBC.  Iron studies.  Sedimentation rate and CRP.  04/11/2017--patient seen in follow-up and her blood pressure is now at a reasonable level at 120/64.  She reports she still feels somewhat fatigued but she is much better.  Patient has not been doing any regular exercise and I do think that that would be helpful to reduce her feeling of fatigue.  She is   • Chronic gastric ulcer 4/14/2014    02/15/2017--patient seen in follow-up in nearly 6 months later.  She has complaints of not feeling well all over.  She has complaints of  diffuse myalgias and possibly tendinopathy related to Levaquin that I called in prior to her going to [place] for vacation.  She reports that 3 or 4 days after starting the Levaquin she began to have the musculoskeletal symptoms and she reports that she continues to have them presently.  Symptoms are worse involving her left calf area.  Examination reveals significant tenderness involving the left calf and the left calf seems a little larger than the right.  She also has complaints of shortness of breath but no chest pain.  She is complaining of double vision and generalized weakness and fatigue.  She was complaining of chronic fatigue at the last visit back in September and I ordered an overnight oximetry test but apparently this was never done for reasons that are not clear to me.  Her current oxygen saturation is 94% and normally it is 100%.  Plan is as follows: Summer   • Chronic gastritis 11/19/2001    02/15/2017--patient seen in follow-up in nearly 6 months later.  She has complaints of not feeling well all over.  She has complaints of diffuse myalgias and possibly tendinopathy related to Levaquin that I called in prior to her going to [place] for vacation.  She reports that 3 or 4 days after starting the Levaquin she began to have the musculoskeletal symptoms and she reports that she continues to have them presently.  Symptoms are worse involving her left calf area.  Examination reveals significant tenderness involving the left calf and the left calf seems a little larger than the right.  She also has complaints of shortness of breath but no chest pain.  She is complaining of double vision and generalized weakness and fatigue.  She was complaining of chronic fatigue at the last visit back in September and I ordered an overnight oximetry test but apparently this was never done for reasons that are not clear to me.  Her current oxygen saturation is 94% and normally it is 100%.  Plan is as follows: Summer    • Chronic hoarseness 6/8/2020    September 15, 2020--patient presents with complaints of swelling of the soft tissues of the left side of the neck and this is associated with pain and discomfort, particularly when she turns her head rotating to the left.  She also notices hoarseness and feels that her voice has changed.  On exam there is definite prominence of the left sternocleidomastoid muscle and there may be some shotty lymph   • Chronic lower back pain 1/31/2006 08/11/2014--MRI of the lumbar spine reveals the conus terminates at L2 and is normal.  Cauda equina unremarkable.  Stable to moderate degenerative disc disease at L5-S1.  No acute fracture or pars defect is demonstrated.  Small synovial cysts are seen posterior to the L4-L5 facet.  The perivertebral soft tissues are unremarkable.  T12-L1, L1-L2, L2-L3 are negative.  L3-L4 a broad-based disc bulge resulting in mild bilateral neural foraminal narrowing.  L4-L5 reveals a broad-based disc bulge facet disease resulting in mild bilateral neural foraminal narrowing.  L5-S1 reveals a broad-based disc bulge, posterior osseous slipping, and facet disease resulting in mild to moderate bilateral neural foraminal narrowing.  Assessment is stable mild to moderate degenerative spondylosis.  Small synovial cysts are seen posterior to the L4-L5 facets.  08/11/2014--MRI of the thoracic spine reveals mild to moderate thoracic kyphosis.  No fracture.  At T5--T6 there is a moderate sized left paracentral disc protrusion which i   • Chronic migraine 11/3/2009    01/18/2010--MRI of the brain performed for headaches and memory loss.  Mild small vessel disease in the cerebral and central pontine white matter.  There is an ovoid, somewhat pancake-shaped area of signal abnormality in the anterior inferior right temporal lobe subcortical to the juxtacortical white matter that measures 1.2 x 1 cm and anterior posterior and medial lateral dimension but only measures 3 mm in  cranial caudal dimension.  I suspect that this is a benign cyst or a cystic area of encephalomalacia.  The remainder of the MRI of the head is within normal limits.  11/03/2009--CT scan of the brain without contrast performed for headache after a fall.  Mild diffuse atrophy.  No acute abnormality noted.; Description: Patient has had a long history of migraine headaches.  MRI and CT scan of the brain have been essentially negative.   • Chronic otitis externa 4/8/2016   • Chronic renal insufficiency, stage II (mild), creatinine 1.12 11/14/2015 11/14/2015--serum creatinine mildly elevated at 1.12.   • Depression with anxiety 4/8/2016   • Frequent falls 2/5/2021 February 5, 2021--patient seen in follow-up by Dr. Weir.  I extensively reviewed the documentation from the emergency room visit as noted below.  I reviewed the history with the patient and she is describing episodes of dizziness described as a spinning sensation of her body and this seems to be precipitated by movement although it does not occur if she rolls over in bed.  If she stands up and st   • Functional murmur, 07/15/2015--normal echocardiogram. 7/15/2015    07/15/2015--echocardiogram reveals normal left ventricular size and function with ejection fraction 55%.  Grade 1 diastolic dysfunction, abnormal relaxation pattern.  Trace tricuspid regurgitation.  Estimated right ventricular systolic pressure is 25 mmHg which is normal.   • Gastroesophageal reflux disease with esophagitis 11/19/2001 06/13/2017--EGD revealed normal esophagus.  Biopsies taken.  There was a medium-sized hiatal hernia.  Localized mild inflammation and linear erosions were found in the prepyloric region.  Biopsied.  Examined duodenum was normal.  Random biopsies taken.  Pathology of the gastroesophageal junction was unremarkable as was the gastric fundus.  Prepyloric biopsy revealed minimal chronic inflammation and reactive change.  H pylori negative.  Duodenal biopsies are  negative.  11/03/2014--patient seen in follow-up and reports her epigastric pain/chest pain has resolved.  I reviewed the results of the studies with her.  It does not appear that her problem is related to biliary tract disease.  She does have reflux and although the recent upper GI revealed minimal reflux, I do think her symptoms are related to esophageal reflux with esophageal spasm.  10/21/2014--air-contrast upper GI series revealed trace upper laryngeal penetration.  Persistent small partially reducible sliding hiatal hernia the upper stomach with    • Gastroesophageal reflux disease without esophagitis 6/6/2019   • Generalized anxiety disorder 4/11/2017   • History of Acute deep vein thrombosis (DVT) of distal end of left lower extremity 2/15/2017    03/21/2017 patient seen in follow-up and she is tolerating the Eliquis well without signs or symptoms of bleeding.  Her calf swelling and tenderness is better but not totally resolved.  I suspect that the DVT is chronic and may not resolve at all.  I will order a repeat venous study and then proceed from there.  02/23/2017--repeat Doppler venous study of the left lower extremity reveals a chronic left lower extremity DVT in the posterior tibial.  All other left sided veins appeared normal.  Fluid collection in the left calf noted.  02/17/2017--patient seen in follow-up and reports her left lower extremity symptoms are about the same.  She continues to have complaints of profound fatigue which I think is multifactorial including underlying depression that is not in remission.  Review the results of the CTA of the chest including the possible thyroid lesion.  I do not think this is contributing to any of her symptoms of fatigue particularly given the fact that her thyroid function tests are normal.  I expla   • History of palpitations 12/07/2011    Patient has had multiple admissions to the hospital for complaints of chest pain and heart palpitations. She meant  admitted at least on 3 occasions. She has had at least 3 stress Cardiolite and 1 stress ECG, the last Cardiolite being performed 12/07/2011 which was negative. Patient is also had Holter monitors which have been unremarkable.   • HIstory of Schatzki's ring 02/28/2012 02/28/2012--air-contrast upper GI revealed small to moderate sized reducible sliding hiatal herniation of the upper stomach with some demonstrated gastroesophageal reflux. No esophageal, gastric, or duodenal mass or mucosal ulceration was seen.  11/03/2008--EGD performed for evaluation of iron deficiency anemia revealed hiatal hernia without evidence of reflux, prepyloric antritis and   • Hyperlipidemia 4/8/2016   • Hypersomnolence 5/5/2016 06/14/2017--overnight oximetry revealed oxygen desaturation index of only 0.44.  There were only 10 total desaturations during the period of testing which lasted 6 hours and 46 minutes.  05/31/2017--patient seen in follow-up and reports she continues to have chronic fatigue as well as hypersomnolence.  Once again, she is on multiple medications that are undoubtedly contributing to this problem including clonazepam and hydrocodone but I doubt that these could be discontinued.  Her CBC back in April revealed a low hemoglobin of 10.8 but hematocrit was normal at 35.7.  Thyroid function tests were normal and CMP normal.  Overnight oximetry test ordered.  Repeat CBC.  Iron studies.  Sedimentation rate and CRP.  04/11/2017--patient seen in follow-up and her blood pressure is now at a reasonable level at 120/64.  She reports she still feels somewhat fatigued but she is much better.  Patient has not been doing any regular exercise and I do think that that would be helpful to reduce her feeling of fatigue.  She is   • Menopausal state 4/8/2016   • Multinodular goiter 2/17/2017 02/21/2017--thyroid ultrasound reveals multinodular thyroid with largest nodule measuring on the order of 1.6 cm in greatest dimension.   02/17/2017--patient seen in follow-up for DVT and CTA of the chest.  An incidental finding on the CTA of the chest reveals a 1.7 cm lesion in the right lobe of thyroid.  Note that thyroid function tests are currently normal.  Ultrasound of the thyroid ordered.   • Osteoporosis, 03/29/2011--lumbar spine -2.8.  Right femoral neck -2.4.  Left femoral neck -2.4.  Patient receives Reclast infusions. 2/9/2009 04/19/2017--Reclast infusion.  04/05/2016--Reclast infusion.  06/04/2012--Reclast infusion.  05/26/2011--Reclast infusion.  03/29/2011--DEXA scan revealed a lumbar T score of -2.8, right femoral neck T score -2.4, left femoral neck T score -2.4.  Osteoporosis of the lumbar spine and severe osteopenia of the hips bilaterally.  Patient has been intolerant to Fosamax because of gastritis and gastroesophageal reflux.  04/01/2009--treatment for osteoporosis begun with Fosamax.  02/09/2009--DEXA scan revealed lumbar spine T score of -3.3.  Left femoral neck T score -2.5.  Right hip T score -2.6.  Osteoporosis.    • Pain disorder with psychological factors 10/10/2012   • Pancreatic Duct Dilation 11/30/2001 09/29/2014--patient was again evaluated by the urologist for a renal cyst.  CT scan of the abdomen and pelvis reveals a left renal cyst measuring 5.1 x 4.9 cm.  There were subcentimeter hypodense renal lesions that are too small to further characterize and are presumably related to cysts.  Recommend attention to follow up.  Distal dilated pancreatic duct noted.  The common bile duct is the upper limits of normal in size.  The ampullary region is not well evaluated.  Comparison with prior imaging is recommended if available.  If there is no prior film available for comparison, and ERCP or MRCP could be performed to further evaluate.  Small hiatal hernia noted.  03/05/2012--CT scan of the abdomen with contrast, pancreatic protocol.  This reveals some fullness of the pancreatic ductal system and apparent pancreatic  divisum with separate entrance of the accessory pancreatic duct extending into the duodenum distal to the main pancreatic duct.  There is fullness of the duct diffusely but similar to the previous s   • Pedal edema 6/29/2015 06/29/2015--patient presents with a 4-6 week history of exertional dyspnea that comes on with activity such as climbing stairs or walking her dog up an incline.  No chest pain.  Relieved with rest.  No cough.  She does have complaints of her feet and legs swelling that is particularly worse at the end of the day and not improved overnight.  No orthopnea or PND.  Chest exam reveals faint rales at the bases.  Otherwise clear.  Heart is regular and I do not appreciate a heart murmur.  Chest x-ray PA and lateral ordered.  Echocardiogram ordered.   • Pulmonary hypertension (HCC) 4/18/2019   • Restless legs syndrome 4/8/2016   • Simple renal cyst 7/20/2009 09/29/2014--patient was again evaluated by the urologist for a renal cyst.  CT scan of the abdomen and pelvis reveals a left renal cyst measuring 5.1 x 4.9 cm.  There were subcentimeter hypodense renal lesions that are too small to further characterize and are presumably related to cysts.  Recommend attention to follow up.  Distal dilated pancreatic duct noted.  The common bile duct is the upper limits of normal in size.  The ampullary region is not well evaluated.  Comparison with prior imaging is recommended if available.  If there is no prior film available for comparison, and ERCP or MRCP could be performed to further evaluate.  Small hiatal hernia noted.  07/20/2009--patient was noted to have a left renal mass consistent with a cyst.  This was evaluated by the urologist 07/20/2009.   • Statin intolerance 10/30/2017   • Tinnitus of both ears 9/30/2019 September 30, 2019--patient presents with a several year history of bilateral tinnitus, right greater than left.  It seems to be getting worse and now is associated with fluctuating  hearing.  She occasionally will have worsening hearing after she coughs or sneezes.  On exam she has evidence of old otitis media scars, particularly on the right.  There is no acute infection present and there is no a   • Vitamin D deficiency 4/8/2016         Past Surgical History:   Procedure Laterality Date   • APPENDECTOMY  1965    1965   • CATARACT EXTRACTION Bilateral Remote    Remote bilateral cataract extirpation with intraocular lens implantation.   • CHOLECYSTECTOMY WITH INTRAOPERATIVE CHOLANGIOGRAM N/A 1/21/2019 01/21/2019--laparoscopic paraesophageal hernia repair with fundoplication.   • COLONOSCOPY  11/03/2008 2008--normal colonoscopy   • COLONOSCOPY  2001 2001--normal colonoscopy.   • COLONOSCOPY N/A 6/13/2017 06/13/2017--colonoscopy revealed melanosis.  Biopsied.  There was friability with contact bleeding in the ascending colon.  Biopsied.  Pathology returned consistent with melanosis coli.   • ENDOSCOPY  10/08/2014    02/28/2012--air-contrast upper GI revealed small to moderate sized reducible sliding hiatal herniation of the upper stomach with some demonstrated gastroesophageal reflux. No esophageal, gastric, or duodenal mass or mucosal ulceration was seen. 11/03/2008--EGD performed for evaluation of iron deficiency anemia revealed hiatal hernia without evidence of reflux, prepyloric antritis and   • ENDOSCOPY  11/03/2008 11/03/2008--EGD performed for evaluation of iron deficiency anemia revealed hiatal hernia without evidence of reflux, prepyloric antritis and   • ENDOSCOPY  11/19/2001 11/19/2001--EGD revealed a very tortuous distal esophagus with a Schatzki's ring. No ulcer or erosions. No Dorado's mucosa. Stomach revealed patchy erythema and erosions in the antrum. Biopsy. Normal pylorus with no obstruction. Normal duodenum with no ulcers. The Schatzki's ring was dilated with a Rhodes dilator.;   • ENDOSCOPY N/A 9/27/2016 09/27/2016--EGD revealed a normal oropharynx,  esophagus, and medium sized hiatal hernia.  Nonbleeding gastric ulcer with no stigmata of bleeding.  Biopsy.  Gastritis.  Biopsy.  Normal examined duodenum.  Biopsied.  Pathology returned mild to moderate chronic active gastritis with ulceration from the stomach antral ulcer biopsy.  Gastroesophageal junction biopsy revealed minimal mixed inflammation.   • ENDOSCOPY N/A 6/13/2017 06/13/2017--colonoscopy revealed melanosis.  Biopsied.  There was friability with contact bleeding in the ascending colon.  Biopsied.  Pathology returned consistent with melanosis coli.   • ERCP N/A 1/22/2019 01/22/2019--ERCP with sphincterotomy and balloon stone extraction.   • ESOPHAGEAL DILATATION  11/19/2001 11/19/2001--EGD revealed a very tortuous distal esophagus with a Schatzki's ring. No ulcer or erosions. No Dorado's mucosa. Stomach revealed patchy erythema and erosions in the antrum. Biopsy. Normal pylorus with no obstruction. Normal duodenum with no ulcers. The Schatzki's ring was dilated with a Rhodes dilator.;    • ESOPHAGEAL MANOMETRY N/A 12/18/2018    Procedure: ESOPHAGEAL MANOMETRY;  Surgeon: Cecilia, Nurse Performed;  Location: [facility] ENDOSCOPY;  Service: Gastroenterology   • ESOPHAGOSCOPY N/A 1/21/2019    Procedure: FLEXIBLE ESOPHAGOSCOPY;  Surgeon: Reji Johnson MD;  Location: [facility] OR;  Service: General   • HIATAL HERNIA REPAIR N/A 1/21/2019    Procedure: Laparoscopic paraesophageal hernia repair with toupee fundoplication;  Surgeon: Reji Johnson MD;  Location: [facility] OR;  Service: General   • INCONTINENCE SURGERY  1979 1979--a bladder tack procedure for urinary stress incontinence   • KNEE ARTHROSCOPY Right 12/12/2011 12/12/2011--right knee arthroscopy with partial lateral and medial meniscectomies.   • SKIN SURGERY  05/25/2010 05/25/2010--skin lesion excised from right lower extremity. 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   • TOTAL ABDOMINAL  HYSTERECTOMY WITH SALPINGO OOPHORECTOMY  1974 1974--total abdominal hysterectomy.           Current Outpatient Medications:   •  amLODIPine (NORVASC) 2.5 MG tablet, Take 1 tablet by mouth Daily., Disp: 90 tablet, Rfl: 3  •  aspirin 81 MG EC tablet, Take 81 mg by mouth Daily. HELD, Disp: , Rfl:   •  buPROPion XL (WELLBUTRIN XL) 300 MG 24 hr tablet, Take 300 mg by mouth Every Morning., Disp: , Rfl:   •  Cholecalciferol (vitamin D3) 125 MCG (5000 UT) capsule capsule, 1 by mouth daily as directed, Disp: 30 capsule, Rfl:   •  gabapentin (NEURONTIN) 100 MG capsule, Take 1 capsule by mouth Every Night., Disp: 90 capsule, Rfl: 1  •  potassium chloride (K-DUR,KLOR-CON) 10 MEQ CR tablet, Take 1 p.o. 3 times daily at mealtime, Disp: 270 tablet, Rfl: 3  •  Specialty Vitamins Products (ONE-A-DAY BONE STRENGTH PO), One-A-Day Bone Strength, Disp: , Rfl:   •  topiramate (TOPAMAX) 100 MG tablet, TAKE 1 TABLET DAILY, Disp: 90 tablet, Rfl: 4  •  venlafaxine XR (EFFEXOR-XR) 150 MG 24 hr capsule, Take 150 mg p.o. in the morning and 75 mg in the evening., Disp: , Rfl:   •  venlafaxine XR (EFFEXOR-XR) 75 MG 24 hr capsule, Take 75 mg in the evening., Disp: 30 capsule, Rfl: 5  •  Cimetidine (TAGAMET PO), Tagamet, Disp: , Rfl:   •  clonazePAM (KlonoPIN) 0.5 MG tablet, TAKE 1/2 TABLET BY MOUTH EVERY DAY AT BEDTIME, Disp: , Rfl:   •  clonazePAM (KlonoPIN) 1 MG tablet, Take 1/2-1 p.o. twice daily as directed, Disp: 60 tablet, Rfl: 5  •  cycloSPORINE (RESTASIS) 0.05 % ophthalmic emulsion, Administer 1 drop to both eyes 2 (Two) Times a Day., Disp: , Rfl:   •  diclofenac (VOLTAREN) 1 % gel gel, Apply 4 g topically to the appropriate area as directed 4 (Four) Times a Day As Needed., Disp: , Rfl:   •  diphenhydrAMINE-APAP, sleep, (Tylenol PM Extra Strength)  MG/30ML liquid, Tylenol PM Extra Strength, Disp: , Rfl:   •  estradiol (ESTRACE) 1 MG tablet, TAKE 1 TABLET BY MOUTH EVERY DAY, Disp: 90 tablet, Rfl: 1  •  HYDROcodone-acetaminophen  (NORCO)  MG per tablet, Take 1 tablet by mouth every 6 (six) hours as needed for moderate pain (4-6)., Disp: , Rfl:   •  Misc Natural Products (COLON CLEANSE PO), Take  by mouth., Disp: , Rfl:   •  Multiple Vitamins-Minerals (MULTIVITAMIN ADULT) chewable tablet, Chew 1 tablet Daily., Disp: , Rfl:   •  potassium chloride 10 MEQ CR tablet, Take 10 mEq by mouth 3 (Three) Times a Day With Meals., Disp: , Rfl:   •  traZODone (DESYREL) 50 MG tablet, Take 25-50 mg by mouth Every Night., Disp: , Rfl:   •  triamterene-hydrochlorothiazide (DYAZIDE) 37.5-25 MG per capsule, TAKE ONE CAPSULE BY MOUTH DAILY FOR BLOOD PRESSURE AND LEG SWELLING, Disp: 90 capsule, Rfl: 1  •  vitamin E 400 UNIT capsule, Take 400 Units by mouth Daily., Disp: , Rfl:       Family History   Problem Relation Age of Onset   • Heart attack Mother         dies age 47 from heart attack   • Heart disease Mother    • Bleeding Disorder Mother    • Heart attack Maternal Aunt         dies age 60 from heart attack   • Heart disease Father    • Malig Hyperthermia Neg Hx          Social History     Socioeconomic History   • Marital status:      Spouse name: ander   • Number of children: 4   • Years of education: 14   • Highest education level: Associate degree: academic program   Tobacco Use   • Smoking status: Never Smoker   • Smokeless tobacco: Never Used   • Tobacco comment: no caffeine    Vaping Use   • Vaping Use: Never used   Substance and Sexual Activity   • Alcohol use: No   • Drug use: No   • Sexual activity: Yes     Partners: Male         Allergies   Allergen Reactions   • Statins Nausea And Vomiting   • Morphine Hallucinations   • Penicillins Itching   • Tetanus Toxoids Itching         Pain Scale: 5/10 low back and left hip        ROS:  Review of Systems   Constitutional: Positive for fatigue. Negative for activity change and appetite change.   HENT: Negative for ear pain, facial swelling and hearing loss.    Eyes: Negative for pain, redness  "and itching.   Respiratory: Negative for cough, choking and shortness of breath.    Cardiovascular: Negative for chest pain and leg swelling.   Gastrointestinal: Negative for abdominal pain and nausea.   Endocrine: Negative for cold intolerance and heat intolerance.   Musculoskeletal: Negative for arthralgias, back pain and joint swelling.   Skin: Negative for color change, pallor and rash.   Allergic/Immunologic: Negative for environmental allergies and food allergies.   Neurological: Positive for headaches. Negative for dizziness, tremors, seizures, syncope, facial asymmetry, speech difficulty, weakness, light-headedness and numbness.   Psychiatric/Behavioral: Positive for confusion and sleep disturbance. Negative for agitation, behavioral problems, decreased concentration, dysphoric mood, hallucinations, self-injury and suicidal ideas. The patient is nervous/anxious. The patient is not hyperactive.          I have reviewed and agree with the above ROS completed by the medical assistant.      Physical Exam:  Vitals:    10/12/21 1454   BP: 134/70   Pulse: 75   SpO2: 98%   Weight: 60.3 kg (133 lb)   Height: 152.4 cm (60\")     Orthostatic BP: Supine blood pressure was 150/80.  Standing for 2 minutes blood pressure was 140/80    Body mass index is 25.97 kg/m².    Physical Exam  General: Fairly well-developed white female no acute distress  HEENT: Normocephalic.  There is a healed scuff on the left frontal area.  Discs are flat no AV nicking.  Throat negative  Neck: Supple.  Some neck pain with flexion but no radicular pain and no Lhermitte sign.  Heart:  Regular rateand rhythm no murmurs no pedal edema  Extremities: Radial pulses strong and simultaneous      Neurological Exam:   Mental Status: Awake, alert, oriented to person, place and time.  Conversant without evidence of an affective disorder, thought disorder, delusions or hallucinations.  Attention span and concentration are normal.  HCF: No aphasia, apraxia or " dysarthria.  Recent and remote memory intact.  Knowledge  of recent events intact.  CN: I:   II: Visual fields full without left inattention   III, IV, VI: Eye movements intact without nystagmus or ptosis.  Pupils equal    round and reactive to light.   V,VII: Light touch and pinprick intact all 3 divisions of V.  Facial muscles symmetrical.   VIII: Hearing intact to finger rub   IX,X: Soft palate elevates symmetrically   XI: Sternomastoid and trapezius are strong.   XII: Tongue midline without atrophy or fasciculations  Motor: Normal tone and bulk in the upper and lower extremities   Power testing: Full power in all muscles tested in the upper extremities.  The left tibialis anterior is mildly weak.  Toe extension is mildly weak on both sides.  All other muscles tested in the lower extremities were normal.  Reflexes: Upper extremities: +2 diffusely        Lower extremities: +2 diffusely        Toe signs: Downgoing bilaterally  Sensory: Light touch: Diffusely intact arms and legs        Pinprick: Diffusely intact arms and legs        Vibration: Reduced at the ankles        Position: Intact at the great toes    Cerebellar: Finger-to-nose: Normal           Rapid movement: Normal           Heel-to-shin: Normal  Gait and Station: Casual toe and heel  walk look normal.  Has trouble tandem walking.  No Romberg no drift    Results:      Lab Results   Component Value Date    GLUCOSE 77 07/07/2021    BUN 23 07/07/2021    CREATININE 0.90 07/07/2021    EGFRIFNONA 61 07/07/2021    EGFRIFAFRI 51 (L) 03/09/2020    BCR 25.6 (H) 07/07/2021    CO2 20.7 (L) 07/07/2021    CALCIUM 8.9 07/07/2021    PROTENTOTREF 6.4 03/09/2020    ALBUMIN 4.00 07/07/2021    LABIL2 1.6 03/09/2020    AST 39 (H) 07/07/2021    ALT 28 07/07/2021       Lab Results   Component Value Date    WBC 9.47 07/07/2021    HGB 13.5 07/07/2021    HCT 41.8 07/07/2021    MCV 88.2 07/07/2021     07/07/2021         .No results found for: RPR      Lab Results    Component Value Date    TSH 1.280 05/14/2021         No results found for: [id number]      No results found for: FOLATE      No results found for: HGBA1C      Lab Results   Component Value Date    GLUCOSE 77 07/07/2021    BUN 23 07/07/2021    CREATININE 0.90 07/07/2021    EGFRIFNONA 61 07/07/2021    EGFRIFAFRI 51 (L) 03/09/2020    BCR 25.6 (H) 07/07/2021    K 3.8 07/07/2021    CO2 20.7 (L) 07/07/2021    CALCIUM 8.9 07/07/2021    PROTENTOTREF 6.4 03/09/2020    ALBUMIN 4.00 07/07/2021    LABIL2 1.6 03/09/2020    AST 39 (H) 07/07/2021    ALT 28 07/07/2021         Lab Results   Component Value Date    WBC 9.47 07/07/2021    HGB 13.5 07/07/2021    HCT 41.8 07/07/2021    MCV 88.2 07/07/2021     07/07/2021             Assessment:   1.  Falls, many of which are backward-the only physical exam finding that seems to be useful is some left ankle dorsiflexor weakness and toe extensor weakness.  This may point to L5 radiculopathies or perhaps peroneal neuropathies.  She does not have symptoms of a diffuse polyneuropathy.  She does not have any exam findings for a myelopathy, hydrocephalus or any focal structural brain abnormality.  Cerebellar function is otherwise normal and therefore the exam is not helping point out any cerebellar issue.  She is not significantly orthostatic.          Plan:  1.  EMG both legs  2.  Check a sed rate, RPR, B12 and folate.  Recent thyroid functions were normal  3.  To follow-up with me at time of EMG          Time: 45 minutes              Dictated utilizing Dragon dictation.

## 2021-10-14 ENCOUNTER — LAB (OUTPATIENT)
Dept: LAB | Facility: HOSPITAL | Age: 81
End: 2021-10-14

## 2021-10-14 LAB
ERYTHROCYTE [SEDIMENTATION RATE] IN BLOOD: 8 MM/HR (ref 0–30)
FOLATE SERPL-MCNC: 7.5 NG/ML (ref 4.78–24.2)
VIT B12 BLD-MCNC: 430 PG/ML (ref 211–946)

## 2021-10-14 PROCEDURE — 82746 ASSAY OF FOLIC ACID SERUM: CPT | Performed by: PSYCHIATRY & NEUROLOGY

## 2021-10-14 PROCEDURE — 86592 SYPHILIS TEST NON-TREP QUAL: CPT | Performed by: PSYCHIATRY & NEUROLOGY

## 2021-10-14 PROCEDURE — 85652 RBC SED RATE AUTOMATED: CPT | Performed by: PSYCHIATRY & NEUROLOGY

## 2021-10-14 PROCEDURE — 82607 VITAMIN B-12: CPT | Performed by: PSYCHIATRY & NEUROLOGY

## 2021-10-14 PROCEDURE — 36415 COLL VENOUS BLD VENIPUNCTURE: CPT | Performed by: PSYCHIATRY & NEUROLOGY

## 2021-10-15 LAB — RPR SER QL: NORMAL

## 2021-11-17 ENCOUNTER — LAB (OUTPATIENT)
Dept: LAB | Facility: HOSPITAL | Age: 81
End: 2021-11-17

## 2021-11-17 DIAGNOSIS — E55.9 VITAMIN D DEFICIENCY: Chronic | ICD-10-CM

## 2021-11-17 DIAGNOSIS — E78.2 MIXED HYPERLIPIDEMIA: Chronic | ICD-10-CM

## 2021-11-17 DIAGNOSIS — E04.2 MULTINODULAR GOITER: Chronic | ICD-10-CM

## 2021-11-17 DIAGNOSIS — N18.2 CHRONIC RENAL INSUFFICIENCY, STAGE II (MILD): Chronic | ICD-10-CM

## 2021-11-17 DIAGNOSIS — D50.0 IRON DEFICIENCY ANEMIA DUE TO CHRONIC BLOOD LOSS: Chronic | ICD-10-CM

## 2021-11-17 DIAGNOSIS — I10 BENIGN ESSENTIAL HYPERTENSION: Chronic | ICD-10-CM

## 2021-11-17 LAB
25(OH)D3 SERPL-MCNC: 71 NG/ML (ref 30–100)
ALBUMIN SERPL-MCNC: 4.1 G/DL (ref 3.5–5.2)
ALBUMIN/GLOB SERPL: 1.8 G/DL
ALP SERPL-CCNC: 74 U/L (ref 39–117)
ALT SERPL W P-5'-P-CCNC: 19 U/L (ref 1–33)
ANION GAP SERPL CALCULATED.3IONS-SCNC: 11.1 MMOL/L (ref 5–15)
AST SERPL-CCNC: 21 U/L (ref 1–32)
BILIRUB SERPL-MCNC: 0.2 MG/DL (ref 0–1.2)
BUN SERPL-MCNC: 12 MG/DL (ref 8–23)
BUN/CREAT SERPL: 10.3 (ref 7–25)
CALCIUM SPEC-SCNC: 8.3 MG/DL (ref 8.6–10.5)
CHLORIDE SERPL-SCNC: 106 MMOL/L (ref 98–107)
CO2 SERPL-SCNC: 22.9 MMOL/L (ref 22–29)
CREAT SERPL-MCNC: 1.16 MG/DL (ref 0.57–1)
DEPRECATED RDW RBC AUTO: 45.1 FL (ref 37–54)
ERYTHROCYTE [DISTWIDTH] IN BLOOD BY AUTOMATED COUNT: 14.3 % (ref 12.3–15.4)
GFR SERPL CREATININE-BSD FRML MDRD: 45 ML/MIN/1.73
GLOBULIN UR ELPH-MCNC: 2.3 GM/DL
GLUCOSE SERPL-MCNC: 90 MG/DL (ref 65–99)
HCT VFR BLD AUTO: 36.6 % (ref 34–46.6)
HGB BLD-MCNC: 12.2 G/DL (ref 12–15.9)
IRON 24H UR-MRATE: 37 MCG/DL (ref 37–145)
IRON SATN MFR SERPL: 7 % (ref 20–50)
MCH RBC QN AUTO: 29.1 PG (ref 26.6–33)
MCHC RBC AUTO-ENTMCNC: 33.3 G/DL (ref 31.5–35.7)
MCV RBC AUTO: 87.4 FL (ref 79–97)
PLATELET # BLD AUTO: 329 10*3/MM3 (ref 140–450)
PMV BLD AUTO: 10.3 FL (ref 6–12)
POTASSIUM SERPL-SCNC: 3.6 MMOL/L (ref 3.5–5.2)
PROT SERPL-MCNC: 6.4 G/DL (ref 6–8.5)
RBC # BLD AUTO: 4.19 10*6/MM3 (ref 3.77–5.28)
SODIUM SERPL-SCNC: 140 MMOL/L (ref 136–145)
T3FREE SERPL-MCNC: 3.09 PG/ML (ref 2–4.4)
T4 FREE SERPL-MCNC: 1.08 NG/DL (ref 0.93–1.7)
TIBC SERPL-MCNC: 560 MCG/DL (ref 298–536)
TRANSFERRIN SERPL-MCNC: 376 MG/DL (ref 200–360)
TSH SERPL DL<=0.05 MIU/L-ACNC: 2.11 UIU/ML (ref 0.27–4.2)
WBC NRBC COR # BLD: 7.34 10*3/MM3 (ref 3.4–10.8)

## 2021-11-17 PROCEDURE — 82306 VITAMIN D 25 HYDROXY: CPT

## 2021-11-17 PROCEDURE — 80061 LIPID PANEL: CPT

## 2021-11-17 PROCEDURE — 85027 COMPLETE CBC AUTOMATED: CPT

## 2021-11-17 PROCEDURE — 84466 ASSAY OF TRANSFERRIN: CPT

## 2021-11-17 PROCEDURE — 36415 COLL VENOUS BLD VENIPUNCTURE: CPT

## 2021-11-17 PROCEDURE — 84481 FREE ASSAY (FT-3): CPT

## 2021-11-17 PROCEDURE — 83704 LIPOPROTEIN BLD QUAN PART: CPT

## 2021-11-17 PROCEDURE — 84443 ASSAY THYROID STIM HORMONE: CPT

## 2021-11-17 PROCEDURE — 80053 COMPREHEN METABOLIC PANEL: CPT

## 2021-11-17 PROCEDURE — 83540 ASSAY OF IRON: CPT

## 2021-11-17 PROCEDURE — 84439 ASSAY OF FREE THYROXINE: CPT

## 2021-11-19 LAB
CHOLEST SERPL-MCNC: 195 MG/DL (ref 100–199)
HDL SERPL-SCNC: 53 UMOL/L
HDLC SERPL-MCNC: 107 MG/DL
LDL SERPL QN: 21.2 NM
LDL SERPL-SCNC: 736 NMOL/L
LDL SMALL SERPL-SCNC: <90 NMOL/L
LDLC SERPL CALC-MCNC: 60 MG/DL (ref 0–99)
TRIGL SERPL-MCNC: 176 MG/DL (ref 0–149)

## 2021-11-19 RX ORDER — POTASSIUM CHLORIDE 750 MG/1
TABLET, FILM COATED, EXTENDED RELEASE ORAL
Qty: 270 TABLET | Refills: 2 | Status: SHIPPED | OUTPATIENT
Start: 2021-11-19 | End: 2022-05-25

## 2021-11-19 RX ORDER — ESTRADIOL 1 MG/1
TABLET ORAL
Qty: 90 TABLET | Refills: 1 | Status: SHIPPED | OUTPATIENT
Start: 2021-11-19 | End: 2022-04-29

## 2021-12-13 ENCOUNTER — DOCUMENTATION (OUTPATIENT)
Dept: PHYSICAL THERAPY | Facility: CLINIC | Age: 81
End: 2021-12-13

## 2021-12-13 NOTE — PROGRESS NOTES
Discharge Summary  Discharge Summary from Physical Therapy Report      Sachi Vora was seen for 2 physical therapy sessions for chronic B LBP without sciatica . Treatment included gait training,ltherapeutic exercise, neuro-muscular retraining  and patient education with home exercise program . Progress to physical therapy goals was fair.  She was discharged to an independent HEP and provided patient education to self-manage condition.      Goals:   STGs to be met in 4 weeks  1. Vestibular examination is performed, to include standing balance tests and appropriate treatment begins. (not met)  2. Special tests of the lumbar spine, SI joints and the dura are performed. (not met)  3. She begins appropriate lumbar spine exercises to promote less pain and improved function.(not met)    LTGs to be met in 12 weeks  1. Sachi is ambulating independently on level surface and stairs with/without an assistive device with a normal gait pattern. (not met)  2. Peak pain in the low back is reduced to 1-2/10. (not met)  3. She is independent with a HEP and education for self care.(not met)  4. CASTILLO is improved by 20%. (not met)  Discharge Plan: Continue with current home exercise program as instructed, although her treatment period was brief and she stopped coming after the second visit.  I assume that due to the foot issue she could not regularly attend.     Date of Last Physical Therapy Visit August 3, 2021      Nirmal Galeano, PT  Physical Therapist

## 2021-12-21 ENCOUNTER — OFFICE VISIT (OUTPATIENT)
Dept: CARDIOLOGY | Facility: CLINIC | Age: 81
End: 2021-12-21

## 2021-12-21 VITALS
HEART RATE: 82 BPM | HEIGHT: 60 IN | WEIGHT: 134 LBS | RESPIRATION RATE: 16 BRPM | OXYGEN SATURATION: 98 % | SYSTOLIC BLOOD PRESSURE: 130 MMHG | BODY MASS INDEX: 26.31 KG/M2 | DIASTOLIC BLOOD PRESSURE: 80 MMHG

## 2021-12-21 DIAGNOSIS — E78.2 MIXED HYPERLIPIDEMIA: ICD-10-CM

## 2021-12-21 DIAGNOSIS — I10 BENIGN ESSENTIAL HYPERTENSION: Primary | Chronic | ICD-10-CM

## 2021-12-21 DIAGNOSIS — I65.23 ATHEROSCLEROSIS OF BOTH CAROTID ARTERIES: Chronic | ICD-10-CM

## 2021-12-21 PROCEDURE — 99213 OFFICE O/P EST LOW 20 MIN: CPT | Performed by: INTERNAL MEDICINE

## 2021-12-21 PROCEDURE — 93000 ELECTROCARDIOGRAM COMPLETE: CPT | Performed by: INTERNAL MEDICINE

## 2021-12-21 NOTE — PROGRESS NOTES
"      CARDIOLOGY    Rajwinder Bradley MD    ENCOUNTER DATE:  12/21/2021    Sachi Vora / 81 y.o. / female        CHIEF COMPLAINT / REASON FOR OFFICE VISIT     Chest Pain (6 month )      HISTORY OF PRESENT ILLNESS       HPI    Sachi Vora is a 81 y.o. female     This is a lady I saw in the remote past for chest pain in the emergency department.  She had negative troponins and a normal stress test.  She came to the emergency room in May 2021 with frequent falls.  Holter monitor June 2021 was normal.  Nuclear stress test May 2021 normal LV function no evidence of ischemia or infarction carotid Doppler December 2020 unremarkable echo May 2020 normal LV systolic function ejection fraction 69%, grade 1 diastolic dysfunction and no significant valve disease.    She reports feeling overall pretty well.  She has not had any further falls.  She denies any palpitations or edema.  She has some shortness of breath with exertion.  She is interested in doing more exercise and I gave her one of the chair handouts.  She has an occasional focal chest pain.  No exacerbating relieving factors.  Not consistent with an anginal chest pain.    REVIEW OF SYSTEMS     Review of Systems   Constitutional: Negative for chills, fever, weight gain and weight loss.   Cardiovascular: Negative for leg swelling.   Respiratory: Negative for cough, snoring and wheezing.    Hematologic/Lymphatic: Negative for bleeding problem. Does not bruise/bleed easily.   Skin: Negative for color change.   Musculoskeletal: Negative for falls, joint pain and myalgias.   Gastrointestinal: Negative for melena.   Genitourinary: Negative for hematuria.   Neurological: Negative for excessive daytime sleepiness.   Psychiatric/Behavioral: Negative for depression. The patient is not nervous/anxious.          VITAL SIGNS     Visit Vitals  /80 (BP Location: Right arm, Patient Position: Sitting, Cuff Size: Adult)   Pulse 82   Resp 16   Ht 152.4 cm (60\")   Wt 60.8 kg (134 " lb)   SpO2 98%   BMI 26.17 kg/m²         Wt Readings from Last 3 Encounters:   12/21/21 60.8 kg (134 lb)   10/12/21 60.3 kg (133 lb)   07/16/21 61.7 kg (136 lb)     Body mass index is 26.17 kg/m².      PHYSICAL EXAMINATION     Constitutional:       General: Not in acute distress.  Neck:      Vascular: No carotid bruit or JVD.   Pulmonary:      Effort: Pulmonary effort is normal.      Breath sounds: Normal breath sounds.   Cardiovascular:      Normal rate. Regular rhythm.      Murmurs: There is no murmur.   Psychiatric:         Mood and Affect: Mood and affect normal.           REVIEWED DATA       ECG 12 Lead    Date/Time: 12/21/2021 1:20 PM  Performed by: Rajwinder Bradley MD  Authorized by: Rajwinder Bradley MD   Comparison: compared with previous ECG from 7/7/2021  Similar to previous ECG  Rhythm: sinus rhythm  Conduction: conduction normal  ST Segments: ST segments normal  T Waves: T waves normal    Clinical impression: normal ECG              Lipid Panel    Lipid Panel 5/14/21 11/17/21   Total Cholesterol 194 195   Triglycerides 161 (A) 176 (A)   (A) Abnormal value              Lab Results   Component Value Date    GLUCOSE 90 11/17/2021    BUN 12 11/17/2021    CREATININE 1.16 (H) 11/17/2021    EGFRIFNONA 45 (L) 11/17/2021    EGFRIFAFRI 51 (L) 03/09/2020    BCR 10.3 11/17/2021    K 3.6 11/17/2021    CO2 22.9 11/17/2021    CALCIUM 8.3 (L) 11/17/2021    PROTENTOTREF 6.4 03/09/2020    ALBUMIN 4.10 11/17/2021    LABIL2 1.6 03/09/2020    AST 21 11/17/2021    ALT 19 11/17/2021       ASSESSMENT & PLAN      Diagnosis Plan   1. Benign essential hypertension     2. Mixed hyperlipidemia     3. Carotid artery plaque, 04/02/2018--mild right ICA plaque, normal left ICA 10/03/2014--mild bilateral carotid artery plaque.           1.  Atypical chest pain.  Normal nuclear stress test.  2.  Hypertension  3.  Hyperlipidemia  4.  Anxiety/depression  5.  Chronic left lower extremity DVT  6.  History of falls.    Cardiac testing has  all been normal.  She is not having any symptoms consistent with any acute cardiac issues.  I recommend she follow-up with Dr. Weir and I will be happy to see her back in the future if she has any changes in her symptoms.      Orders Placed This Encounter   Procedures   • ECG 12 Lead     This order was created via procedure documentation     Order Specific Question:   Release to patient     Answer:   Immediate           MEDICATIONS         Discharge Medications          Accurate as of December 21, 2021  1:21 PM. If you have any questions, ask your nurse or doctor.            Continue These Medications      Instructions Start Date   amLODIPine 2.5 MG tablet  Commonly known as: NORVASC   2.5 mg, Oral, Daily      aspirin 81 MG EC tablet   81 mg, Oral, Daily, HELD      buPROPion  MG 24 hr tablet  Commonly known as: WELLBUTRIN XL   300 mg, Oral, Every Morning      COLON CLEANSE PO   Oral      cycloSPORINE 0.05 % ophthalmic emulsion  Commonly known as: RESTASIS   1 drop, Both Eyes, 2 Times Daily      diclofenac 1 % gel gel  Commonly known as: VOLTAREN   4 g, Topical, 4 Times Daily PRN      estradiol 1 MG tablet  Commonly known as: ESTRACE   TAKE 1 TABLET BY MOUTH EVERY DAY      HYDROcodone-acetaminophen  MG per tablet  Commonly known as: NORCO   1 tablet, Oral, Every 6 Hours PRN      Multivitamin Adult chewable tablet   1 tablet, Oral, Daily      ONE-A-DAY BONE STRENGTH PO   One-A-Day Bone Strength      potassium chloride 10 MEQ CR tablet   TAKE 1 TABLET BY MOUTH THREE TIMES A DAY WITH FOOD      TAGAMET PO   Tagamet      topiramate 100 MG tablet  Commonly known as: TOPAMAX   TAKE 1 TABLET DAILY      traZODone 50 MG tablet  Commonly known as: DESYREL   25-50 mg, Oral, Nightly      Tylenol PM Extra Strength  MG/30ML liquid  Generic drug: diphenhydrAMINE-APAP (sleep)   Tylenol PM Extra Strength      vitamin D3 125 MCG (5000 UT) capsule capsule   1 by mouth daily as directed      vitamin E 400 UNIT  capsule   400 Units, Oral, Daily               Rajwinder Bradley MD  12/21/21  13:21 EST    **Russell Disclaimer:   Much of this encounter note is an electronic transcription/translation of spoken language to printed text. The electronic translation of spoken language may permit erroneous, or at times, nonsensical words or phrases to be inadvertently transcribed. Although I have reviewed the note for such errors, some may still exist.

## 2021-12-30 ENCOUNTER — OFFICE VISIT (OUTPATIENT)
Dept: INTERNAL MEDICINE | Facility: CLINIC | Age: 81
End: 2021-12-30

## 2021-12-30 VITALS
HEART RATE: 86 BPM | DIASTOLIC BLOOD PRESSURE: 70 MMHG | RESPIRATION RATE: 18 BRPM | HEIGHT: 60 IN | SYSTOLIC BLOOD PRESSURE: 128 MMHG | BODY MASS INDEX: 25.52 KG/M2 | WEIGHT: 130 LBS | OXYGEN SATURATION: 98 %

## 2021-12-30 DIAGNOSIS — Z51.81 THERAPEUTIC DRUG MONITORING: ICD-10-CM

## 2021-12-30 DIAGNOSIS — I65.23 ATHEROSCLEROSIS OF BOTH CAROTID ARTERIES: Chronic | ICD-10-CM

## 2021-12-30 DIAGNOSIS — G43.709 CHRONIC MIGRAINE W/O AURA W/O STATUS MIGRAINOSUS, NOT INTRACTABLE: ICD-10-CM

## 2021-12-30 DIAGNOSIS — K25.7 CHRONIC GASTRIC ULCER WITHOUT HEMORRHAGE AND WITHOUT PERFORATION: Chronic | ICD-10-CM

## 2021-12-30 DIAGNOSIS — G25.81 RESTLESS LEGS SYNDROME: Chronic | ICD-10-CM

## 2021-12-30 DIAGNOSIS — I65.23 ATHEROSCLEROSIS OF BOTH CAROTID ARTERIES: ICD-10-CM

## 2021-12-30 DIAGNOSIS — F45.42 PAIN DISORDER ASSOCIATED WITH PSYCHOLOGICAL FACTORS: Chronic | ICD-10-CM

## 2021-12-30 DIAGNOSIS — F41.1 GENERALIZED ANXIETY DISORDER: Chronic | ICD-10-CM

## 2021-12-30 DIAGNOSIS — M81.0 AGE-RELATED OSTEOPOROSIS WITHOUT CURRENT PATHOLOGICAL FRACTURE: Chronic | ICD-10-CM

## 2021-12-30 DIAGNOSIS — M54.40 CHRONIC LOW BACK PAIN WITH SCIATICA, SCIATICA LATERALITY UNSPECIFIED, UNSPECIFIED BACK PAIN LATERALITY: Chronic | ICD-10-CM

## 2021-12-30 DIAGNOSIS — G89.29 CHRONIC LOW BACK PAIN WITH SCIATICA, SCIATICA LATERALITY UNSPECIFIED, UNSPECIFIED BACK PAIN LATERALITY: Chronic | ICD-10-CM

## 2021-12-30 DIAGNOSIS — Z00.00 ENCOUNTER FOR SUBSEQUENT ANNUAL WELLNESS VISIT (AWV) IN MEDICARE PATIENT: Primary | ICD-10-CM

## 2021-12-30 DIAGNOSIS — Z78.9 STATIN INTOLERANCE: Chronic | ICD-10-CM

## 2021-12-30 DIAGNOSIS — E55.9 VITAMIN D DEFICIENCY: Chronic | ICD-10-CM

## 2021-12-30 DIAGNOSIS — R53.82 CHRONIC FATIGUE: Chronic | ICD-10-CM

## 2021-12-30 DIAGNOSIS — N18.2 CHRONIC RENAL INSUFFICIENCY, STAGE II (MILD): Chronic | ICD-10-CM

## 2021-12-30 DIAGNOSIS — E04.2 MULTINODULAR GOITER: Chronic | ICD-10-CM

## 2021-12-30 DIAGNOSIS — F41.8 DEPRESSION WITH ANXIETY: Chronic | ICD-10-CM

## 2021-12-30 DIAGNOSIS — D64.9 CHRONIC ANEMIA: Chronic | ICD-10-CM

## 2021-12-30 DIAGNOSIS — D50.0 IRON DEFICIENCY ANEMIA DUE TO CHRONIC BLOOD LOSS: Chronic | ICD-10-CM

## 2021-12-30 DIAGNOSIS — H81.13 BENIGN PAROXYSMAL POSITIONAL VERTIGO DUE TO BILATERAL VESTIBULAR DISORDER: ICD-10-CM

## 2021-12-30 DIAGNOSIS — Z78.0 POSTMENOPAUSAL STATE: Chronic | ICD-10-CM

## 2021-12-30 DIAGNOSIS — I10 BENIGN ESSENTIAL HYPERTENSION: Chronic | ICD-10-CM

## 2021-12-30 DIAGNOSIS — R29.6 FREQUENT FALLS: Chronic | ICD-10-CM

## 2021-12-30 DIAGNOSIS — K21.9 GASTROESOPHAGEAL REFLUX DISEASE WITHOUT ESOPHAGITIS: Chronic | ICD-10-CM

## 2021-12-30 DIAGNOSIS — R26.89 BALANCE DISORDER: Chronic | ICD-10-CM

## 2021-12-30 DIAGNOSIS — E78.2 MIXED HYPERLIPIDEMIA: Chronic | ICD-10-CM

## 2021-12-30 DIAGNOSIS — H90.3 BILATERAL SENSORINEURAL HEARING LOSS: Chronic | ICD-10-CM

## 2021-12-30 PROBLEM — M84.48XA PATHOLOGICAL FRACTURE OF SACRAL VERTEBRA: Status: RESOLVED | Noted: 2021-05-27 | Resolved: 2021-12-30

## 2021-12-30 PROBLEM — S92.912A FRACTURE OF PROXIMAL PHALANX OF TOE OF LEFT FOOT: Status: RESOLVED | Noted: 2021-07-16 | Resolved: 2021-12-30

## 2021-12-30 PROCEDURE — 1170F FXNL STATUS ASSESSED: CPT | Performed by: INTERNAL MEDICINE

## 2021-12-30 PROCEDURE — G0439 PPPS, SUBSEQ VISIT: HCPCS | Performed by: INTERNAL MEDICINE

## 2021-12-30 PROCEDURE — 99214 OFFICE O/P EST MOD 30 MIN: CPT | Performed by: INTERNAL MEDICINE

## 2021-12-30 PROCEDURE — 1160F RVW MEDS BY RX/DR IN RCRD: CPT | Performed by: INTERNAL MEDICINE

## 2021-12-30 PROCEDURE — 1125F AMNT PAIN NOTED PAIN PRSNT: CPT | Performed by: INTERNAL MEDICINE

## 2021-12-30 RX ORDER — VENLAFAXINE HYDROCHLORIDE 150 MG/1
CAPSULE, EXTENDED RELEASE ORAL
COMMUNITY
Start: 2021-12-29 | End: 2022-04-27

## 2021-12-30 NOTE — PROGRESS NOTES
The ABCs of the Annual Wellness Visit  Subsequent Medicare Wellness Visit    No chief complaint on file.     Subjective    History of Present Illness:  Sachi Vora is a 81 y.o. female who presents for a Subsequent Medicare Wellness Visit.    The following portions of the patient's history were reviewed and   updated as appropriate: allergies, current medications, past family history, past medical history, past social history, past surgical history and problem list.    Compared to one year ago, the patient feels her physical   health is better.    Compared to one year ago, the patient feels her mental   health is better.    Recent Hospitalizations:  She was not admitted to the hospital during the last year.       Current Medical Providers:  Patient Care Team:  Cruzito Weir MD as PCP - General (Internal Medicine)    Outpatient Medications Prior to Visit   Medication Sig Dispense Refill   • amLODIPine (NORVASC) 2.5 MG tablet Take 1 tablet by mouth Daily. 90 tablet 3   • aspirin 81 MG EC tablet Take 81 mg by mouth Daily. HELD     • buPROPion XL (WELLBUTRIN XL) 300 MG 24 hr tablet Take 300 mg by mouth Every Morning.     • Cholecalciferol (vitamin D3) 125 MCG (5000 UT) capsule capsule 1 by mouth daily as directed 30 capsule    • Cimetidine (TAGAMET PO) Tagamet     • cycloSPORINE (RESTASIS) 0.05 % ophthalmic emulsion Administer 1 drop to both eyes 2 (Two) Times a Day.     • diclofenac (VOLTAREN) 1 % gel gel Apply 4 g topically to the appropriate area as directed 4 (Four) Times a Day As Needed.     • diphenhydrAMINE-APAP, sleep, (Tylenol PM Extra Strength)  MG/30ML liquid Tylenol PM Extra Strength     • estradiol (ESTRACE) 1 MG tablet TAKE 1 TABLET BY MOUTH EVERY DAY 90 tablet 1   • HYDROcodone-acetaminophen (NORCO)  MG per tablet Take 1 tablet by mouth every 6 (six) hours as needed for moderate pain (4-6).     • Misc Natural Products (COLON CLEANSE PO) Take  by mouth.     • Multiple Vitamins-Minerals  (MULTIVITAMIN ADULT) chewable tablet Chew 1 tablet Daily.     • potassium chloride 10 MEQ CR tablet TAKE 1 TABLET BY MOUTH THREE TIMES A DAY WITH FOOD 270 tablet 2   • Specialty Vitamins Products (ONE-A-DAY BONE STRENGTH PO) One-A-Day Bone Strength     • topiramate (TOPAMAX) 100 MG tablet TAKE 1 TABLET DAILY 90 tablet 4   • traZODone (DESYREL) 50 MG tablet Take 25-50 mg by mouth Every Night.     • vitamin E 400 UNIT capsule Take 400 Units by mouth Daily.     • venlafaxine XR (EFFEXOR-XR) 150 MG 24 hr capsule        No facility-administered medications prior to visit.       Opioid medication/s are on active medication list.  and I have evaluated her active treatment plan and pain score trends (see table).  Vitals:    12/30/21 1239   PainSc:   6     I have reviewed the chart for potential of high risk medication and harmful drug interactions in the elderly.            Aspirin is on active medication list. Aspirin use is indicated based on review of current medical condition/s. Pros and cons of this therapy have been discussed today. Benefits of this medication outweigh potential harm.  Patient has been encouraged to continue taking this medication.  .      Patient Active Problem List   Diagnosis   • Osteoporosis, 03/29/2011--lumbar spine -2.8.  Right femoral neck -2.4.  Left femoral neck -2.4.  Patient receives Reclast infusions.   • Therapeutic drug monitoring   • Simple renal cyst   • Benign essential hypertension   • Carotid artery plaque, 04/02/2018--mild right ICA plaque, normal left ICA 10/03/2014--mild bilateral carotid artery plaque.   • Chronic gastritis   • Chronic lower back pain   • Chronic otitis externa   • Chronic renal insufficiency, stage II (mild), creatinine 1.12   • Depression with anxiety   • Functional murmur, 07/15/2015--normal echocardiogram.   • Hyperlipidemia   • Menopausal state   • Chronic migraine w/o aura w/o status migrainosus, not intractable   • Pancreatic Duct Dilation   • Pedal edema  "  • Restless legs syndrome   • Bilateral sensorineural hearing loss   • Vitamin D deficiency   • Chronic gastric ulcer   • Chronic fatigue   • Hypersomnolence   • Chronic anemia   • Multinodular goiter   • Generalized anxiety disorder   • Iron deficiency anemia   • Statin intolerance   • Postmenopausal state   • Pulmonary hypertension (HCC)   • Gastroesophageal reflux disease without esophagitis   • Tinnitus of both ears   • Chronic hoarseness   • Pain disorder associated with psychological factors   • Frequent falls   • Balance disorder     Advance Care Planning  Advance Directive is on file.  ACP discussion was held with the patient during this visit. Patient has an advance directive in EMR which is still valid.     Review of Systems   Constitutional: Negative.    HENT: Negative.    Eyes: Negative.    Respiratory: Negative.    Cardiovascular: Negative.    Gastrointestinal: Negative.    Endocrine: Negative.    Genitourinary: Negative.    Musculoskeletal: Positive for arthralgias.   Skin: Negative.    Allergic/Immunologic: Negative.    Neurological: Negative.    Hematological: Negative.    Psychiatric/Behavioral: Positive for dysphoric mood.        Objective    Vitals:    12/30/21 1239   BP: 128/70   Pulse: 86   Resp: 18   SpO2: 98%   Weight: 59 kg (130 lb)   Height: 152.4 cm (60\")   PainSc:   6     BMI Readings from Last 1 Encounters:   12/30/21 25.39 kg/m²   BMI is above normal parameters. Recommendations include: exercise counseling and nutrition counseling    Does the patient have evidence of cognitive impairment? No    Physical Exam     General: Alert and oriented x 3.  No acute distress.  Normal affect.  HEENT: Pupils equal, round, reactive to light; extraocular movements intact; sclerae nonicteric; pharynx, ear canals and TMs normal.  Neck: Without JVD, thyromegaly, bruit, or adenopathy.  Lungs: Clear to auscultation in all fields.  Heart: Regular rate and rhythm without murmur, rub, gallop, or click.  " Abdomen: Soft, nontender, without hepatosplenomegaly or hernia.  Bowel sounds normal.  : Deferred.  Rectal: Deferred.  Extremities: Without clubbing, cyanosis, edema, or pulse deficit.  Neurologic: Intact without focal deficit.  Normal station and gait observed during ingress and egress from the examination room.  Skin: Without significant lesion.  Musculoskeletal: Unremarkable.    Lab Results   Component Value Date    CHLPL 195 11/17/2021    TRIG 176 (H) 11/17/2021            HEALTH RISK ASSESSMENT    Smoking Status:  Social History     Tobacco Use   Smoking Status Never Smoker   Smokeless Tobacco Never Used   Tobacco Comment    no caffeine      Alcohol Consumption:  Social History     Substance and Sexual Activity   Alcohol Use No     Fall Risk Screen:    REGINAADI Fall Risk Assessment was completed, and patient is at HIGH risk for falls. Assessment completed on:10/12/2021    Depression Screening:  PHQ-2/PHQ-9 Depression Screening 5/19/2021   Little interest or pleasure in doing things 2   Feeling down, depressed, or hopeless 2   Trouble falling or staying asleep, or sleeping too much 2   Feeling tired or having little energy 2   Poor appetite or overeating 1   Feeling bad about yourself - or that you are a failure or have let yourself or your family down 1   Trouble concentrating on things, such as reading the newspaper or watching television 2   Moving or speaking so slowly that other people could have noticed. Or the opposite - being so fidgety or restless that you have been moving around a lot more than usual 1   Thoughts that you would be better off dead, or of hurting yourself in some way 1   Total Score 14   If you checked off any problems, how difficult have these problems made it for you to do your work, take care of things at home, or get along with other people? Very difficult       Health Habits and Functional and Cognitive Screening:  Functional & Cognitive Status 12/30/2021   Do you have difficulty  preparing food and eating? No   Do you have difficulty bathing yourself, getting dressed or grooming yourself? No   Do you have difficulty using the toilet? No   Do you have difficulty moving around from place to place? No   Do you have trouble with steps or getting out of a bed or a chair? No   Current Diet Well Balanced Diet   Dental Exam Up to date   Eye Exam Up to date   Exercise (times per week) -   Current Exercises Include -   Current Exercise Activities Include -   Do you need help using the phone?  No   Are you deaf or do you have serious difficulty hearing?  No   Do you need help with transportation? No   Do you need help shopping? No   Do you need help preparing meals?  No   Do you need help with housework?  No   Do you need help with laundry? No   Do you need help taking your medications? No   Do you need help managing money? No   Do you ever drive or ride in a car without wearing a seat belt? No   Have you felt unusual stress, anger or loneliness in the last month? No   Who do you live with? Spouse   If you need help, do you have trouble finding someone available to you? No   Have you been bothered in the last four weeks by sexual problems? No   Do you have difficulty concentrating, remembering or making decisions? No       Age-appropriate Screening Schedule:  Refer to the list below for future screening recommendations based on patient's age, sex and/or medical conditions. Orders for these recommended tests are listed in the plan section. The patient has been provided with a written plan.    Health Maintenance   Topic Date Due   • MAMMOGRAM  07/16/2021   • DXA SCAN  12/02/2021   • INFLUENZA VACCINE  01/13/2022 (Originally 8/1/2021)   • LIPID PANEL  11/17/2022   • ZOSTER VACCINE  Discontinued              Assessment/Plan   CMS Preventative Services Quick Reference  Risk Factors Identified During Encounter  Cardiovascular Disease  Chronic Pain   Depression/Dysphoria  Immunizations Discussed/Encouraged  (specific Immunizations; Influenza  Obesity/Overweight   The above risks/problems have been discussed with the patient.  Follow up actions/plans if indicated are seen below in the Assessment/Plan Section.  Pertinent information has been shared with the patient in the After Visit Summary.    Diagnoses and all orders for this visit:    1. Encounter for subsequent annual wellness visit (AWV) in Medicare patient (Primary)    2. Hyperlipidemia  -     NMR LipoProfile; Future  -     TSH; Future  -     T3, Free; Future  -     T4, Free; Future    3. Benign essential hypertension    4. Carotid artery plaque, 04/02/2018--mild right ICA plaque, normal left ICA 10/03/2014--mild bilateral carotid artery plaque.  -     Duplex Carotid Ultrasound CAR    5. Osteoporosis, 03/29/2011--lumbar spine -2.8.  Right femoral neck -2.4.  Left femoral neck -2.4.  Patient receives Reclast infusions.  -     DEXA Bone Density Axial    6. Chronic low back pain with sciatica, sciatica laterality unspecified, unspecified back pain laterality    7. Chronic renal insufficiency, stage II (mild), creatinine 1.12  -     CBC (No Diff); Future  -     Comprehensive Metabolic Panel; Future    8. Depression with anxiety    9. Chronic migraine w/o aura w/o status migrainosus, not intractable    10. Restless legs syndrome    11. Bilateral sensorineural hearing loss    12. Vitamin D deficiency  -     Vitamin D 25 Hydroxy; Future    13. Chronic gastric ulcer without hemorrhage and without perforation    14. Chronic fatigue    15. Chronic anemia  -     Iron Profile; Future    16. Multinodular goiter    17. Generalized anxiety disorder    18. Iron deficiency anemia due to chronic blood loss    19. Statin intolerance    20. Postmenopausal state    21. Gastroesophageal reflux disease without esophagitis    22. Balance disorder    23. Frequent falls    24. Pain disorder associated with psychological factors    25. Benign paroxysmal positional vertigo due to bilateral  vestibular disorder    26. Therapeutic drug monitoring        Follow Up:   No follow-ups on file.     An After Visit Summary and PPPS were made available to the patient.

## 2021-12-30 NOTE — PROGRESS NOTES
12/30/2021    Patient Information  Sachi Vora                                                                                          [address]   [place] [address]      1940  [unfilled]  There is no work phone number on file.    Chief Complaint:     Medicare wellness visit.  Follow-up lab work in order to monitor chronic medical issues listed in history of present illness.  No new acute complaints.    History of Present Illness:    Patient with a history of multitude of medical problems including hyperlipidemia, hypertension, carotid artery plaque, osteoporosis, chronic lower back pain, chronic renal insufficiency stage II, depression with anxiety, menopausal state, chronic migraine, restless leg syndrome, bilateral sensorineural hearing loss, vitamin D deficiency, chronic gastric ulcer, chronic fatigue, chronic anemia, multinodular goiter, generalized anxiety disorder, iron deficiency anemia, statin intolerance, esophageal reflux, balance disorder and frequent falls, pain associated with psychological factors, history of benign paroxysmal positional vertigo.  She presents today for subsequent Medicare wellness visit.  Patient also had lab work we need to review to monitor chronic medical conditions.  Past medical history reviewed and updated were necessary including health maintenance parameters.  This reveals she needs a carotid Doppler study and a DEXA scan which we will order.    Review of Systems   Constitutional: Positive for malaise/fatigue.   HENT: Negative.    Eyes: Negative.    Cardiovascular: Negative.    Respiratory: Negative.    Endocrine: Negative.    Hematologic/Lymphatic: Negative.    Skin: Negative.    Musculoskeletal: Positive for arthritis, falls and joint pain.   Gastrointestinal: Negative.    Genitourinary: Negative.    Neurological: Positive for dizziness, loss of balance and weakness. Negative for focal weakness.   Psychiatric/Behavioral: Negative.     Allergic/Immunologic: Negative.        Active Problems:    Patient Active Problem List   Diagnosis   • Osteoporosis, 03/29/2011--lumbar spine -2.8.  Right femoral neck -2.4.  Left femoral neck -2.4.  Patient receives Reclast infusions.   • Therapeutic drug monitoring   • Simple renal cyst   • Benign essential hypertension   • Carotid artery plaque, 04/02/2018--mild right ICA plaque, normal left ICA 10/03/2014--mild bilateral carotid artery plaque.   • Chronic gastritis   • Chronic lower back pain   • Chronic otitis externa   • Chronic renal insufficiency, stage II (mild), creatinine 1.12   • Depression with anxiety   • Functional murmur, 07/15/2015--normal echocardiogram.   • Hyperlipidemia   • Menopausal state   • Chronic migraine w/o aura w/o status migrainosus, not intractable   • Pancreatic Duct Dilation   • Pedal edema   • Restless legs syndrome   • Bilateral sensorineural hearing loss   • Vitamin D deficiency   • Chronic gastric ulcer   • Chronic fatigue   • Hypersomnolence   • Chronic anemia   • Multinodular goiter   • Generalized anxiety disorder   • Iron deficiency anemia   • Statin intolerance   • Postmenopausal state   • Pulmonary hypertension (HCC)   • Gastroesophageal reflux disease without esophagitis   • Tinnitus of both ears   • Chronic hoarseness   • Pain disorder associated with psychological factors   • Frequent falls   • Balance disorder         Past Medical History:   Diagnosis Date   • Balance disorder 2/5/2021 February 5, 2021--patient seen in follow-up by Dr. Weir.  I extensively reviewed the documentation from the emergency room visit as noted below.  I reviewed the history with the patient and she is describing episodes of dizziness described as a spinning sensation of her body and this seems to be precipitated by movement although it does not occur if she rolls over in bed.  If she stands up and st   • Benign essential hypertension 4/8/2016   • Bilateral sensorineural hearing loss  "3/31/2011    03/31/2011--etiology reveals reverse \"cookie bite\" type of hearing loss for both ears of mild/moderate degree, mostly sensorineural.  Speech discrimination 100% on the right, 96% on the left.   • Carotid artery plaque, 10/03/2014--mild bilateral carotid artery plaque. 7/25/2012    10/03/2014--[facility] screening revealed mild bilateral carotid plaque, negative for atrial fibrillation, negative for AAA, negative for PAD, osteoporosis screen revealed osteopenia.  Body mass index was 25 and considered to be moderate risk.  07/25/2012--vascular screen negative for carotid plaque, negative for abdominal aneurysm, negative for PAD Description: 10/03/2014--[facility] screening revealed mild bilateral carotid plaque, negative for atrial fibrillation, negative for AAA, negative for PAD, osteoporosis screen revealed osteopenia.  Body mass index was 25 and considered to be moderate risk.  07/25/2012--vascular screen negative for carotid plaque, negative for abdominal aneurysm, negative for PAD   • Chronic anemia 8/23/2016 06/13/2017--colonoscopy revealed melanosis.  Biopsied.  There was friability with contact bleeding in the ascending colon.  Biopsied.  Pathology returned consistent with melanosis coli.  06/13/2017--EGD revealed normal esophagus.  Biopsies taken.  There was a medium-sized hiatal hernia.  Localized mild inflammation and linear erosions were found in the prepyloric region.  Biopsied.  Examined duodenum was normal.  Random biopsies taken.  Pathology of the gastroesophageal junction was unremarkable as was the gastric fundus.  Prepyloric biopsy revealed minimal chronic inflammation and reactive change.  H pylori negative.  Duodenal biopsies are negative.  04/12/2017--hemoglobin low at 10.8, hematocrit is normal at 35.7.  Iron sulfate 325 mg per day initiated.  08/23/2016--routine follow-up.  Patient continues to have epigastric abdominal pain believed to be related to reflux with possible esophageal " spasm.  Hemoglobin noted to be low at 10.6 with a hematocrit low at 33.7 and RDW elevated at 16.2.  Homocysti   • Chronic fatigue 4/21/2016 06/14/2017--overnight oximetry revealed oxygen desaturation index of only 0.44.  There were only 10 total desaturations during the period of testing which lasted 6 hours and 46 minutes.  05/31/2017--patient seen in follow-up and reports she continues to have chronic fatigue as well as hypersomnolence.  Once again, she is on multiple medications that are undoubtedly contributing to this problem including clonazepam and hydrocodone but I doubt that these could be discontinued.  Her CBC back in April revealed a low hemoglobin of 10.8 but hematocrit was normal at 35.7.  Thyroid function tests were normal and CMP normal.  Overnight oximetry test ordered.  Repeat CBC.  Iron studies.  Sedimentation rate and CRP.  04/11/2017--patient seen in follow-up and her blood pressure is now at a reasonable level at 120/64.  She reports she still feels somewhat fatigued but she is much better.  Patient has not been doing any regular exercise and I do think that that would be helpful to reduce her feeling of fatigue.  She is   • Chronic gastric ulcer 4/14/2014    02/15/2017--patient seen in follow-up in nearly 6 months later.  She has complaints of not feeling well all over.  She has complaints of diffuse myalgias and possibly tendinopathy related to Levaquin that I called in prior to her going to [place] for vacation.  She reports that 3 or 4 days after starting the Levaquin she began to have the musculoskeletal symptoms and she reports that she continues to have them presently.  Symptoms are worse involving her left calf area.  Examination reveals significant tenderness involving the left calf and the left calf seems a little larger than the right.  She also has complaints of shortness of breath but no chest pain.  She is complaining of double vision and generalized weakness and fatigue.   She was complaining of chronic fatigue at the last visit back in September and I ordered an overnight oximetry test but apparently this was never done for reasons that are not clear to me.  Her current oxygen saturation is 94% and normally it is 100%.  Plan is as follows: Petersi   • Chronic gastritis 11/19/2001    02/15/2017--patient seen in follow-up in nearly 6 months later.  She has complaints of not feeling well all over.  She has complaints of diffuse myalgias and possibly tendinopathy related to Levaquin that I called in prior to her going to [place] for vacation.  She reports that 3 or 4 days after starting the Levaquin she began to have the musculoskeletal symptoms and she reports that she continues to have them presently.  Symptoms are worse involving her left calf area.  Examination reveals significant tenderness involving the left calf and the left calf seems a little larger than the right.  She also has complaints of shortness of breath but no chest pain.  She is complaining of double vision and generalized weakness and fatigue.  She was complaining of chronic fatigue at the last visit back in September and I ordered an overnight oximetry test but apparently this was never done for reasons that are not clear to me.  Her current oxygen saturation is 94% and normally it is 100%.  Plan is as follows: Petersi   • Chronic hoarseness 6/8/2020    September 15, 2020--patient presents with complaints of swelling of the soft tissues of the left side of the neck and this is associated with pain and discomfort, particularly when she turns her head rotating to the left.  She also notices hoarseness and feels that her voice has changed.  On exam there is definite prominence of the left sternocleidomastoid muscle and there may be some shotty lymph   • Chronic lower back pain 1/31/2006 08/11/2014--MRI of the lumbar spine reveals the conus terminates at L2 and is normal.  Cauda equina unremarkable.  Stable to  moderate degenerative disc disease at L5-S1.  No acute fracture or pars defect is demonstrated.  Small synovial cysts are seen posterior to the L4-L5 facet.  The perivertebral soft tissues are unremarkable.  T12-L1, L1-L2, L2-L3 are negative.  L3-L4 a broad-based disc bulge resulting in mild bilateral neural foraminal narrowing.  L4-L5 reveals a broad-based disc bulge facet disease resulting in mild bilateral neural foraminal narrowing.  L5-S1 reveals a broad-based disc bulge, posterior osseous slipping, and facet disease resulting in mild to moderate bilateral neural foraminal narrowing.  Assessment is stable mild to moderate degenerative spondylosis.  Small synovial cysts are seen posterior to the L4-L5 facets.  08/11/2014--MRI of the thoracic spine reveals mild to moderate thoracic kyphosis.  No fracture.  At T5--T6 there is a moderate sized left paracentral disc protrusion which i   • Chronic migraine 11/3/2009    01/18/2010--MRI of the brain performed for headaches and memory loss.  Mild small vessel disease in the cerebral and central pontine white matter.  There is an ovoid, somewhat pancake-shaped area of signal abnormality in the anterior inferior right temporal lobe subcortical to the juxtacortical white matter that measures 1.2 x 1 cm and anterior posterior and medial lateral dimension but only measures 3 mm in cranial caudal dimension.  I suspect that this is a benign cyst or a cystic area of encephalomalacia.  The remainder of the MRI of the head is within normal limits.  11/03/2009--CT scan of the brain without contrast performed for headache after a fall.  Mild diffuse atrophy.  No acute abnormality noted.; Description: Patient has had a long history of migraine headaches.  MRI and CT scan of the brain have been essentially negative.   • Chronic otitis externa 4/8/2016   • Chronic renal insufficiency, stage II (mild), creatinine 1.12 11/14/2015 11/14/2015--serum creatinine mildly elevated at 1.12.    • Depression with anxiety 4/8/2016   • Frequent falls 2/5/2021 February 5, 2021--patient seen in follow-up by Dr. Weir.  I extensively reviewed the documentation from the emergency room visit as noted below.  I reviewed the history with the patient and she is describing episodes of dizziness described as a spinning sensation of her body and this seems to be precipitated by movement although it does not occur if she rolls over in bed.  If she stands up and st   • Functional murmur, 07/15/2015--normal echocardiogram. 7/15/2015    07/15/2015--echocardiogram reveals normal left ventricular size and function with ejection fraction 55%.  Grade 1 diastolic dysfunction, abnormal relaxation pattern.  Trace tricuspid regurgitation.  Estimated right ventricular systolic pressure is 25 mmHg which is normal.   • Gastroesophageal reflux disease with esophagitis 11/19/2001 06/13/2017--EGD revealed normal esophagus.  Biopsies taken.  There was a medium-sized hiatal hernia.  Localized mild inflammation and linear erosions were found in the prepyloric region.  Biopsied.  Examined duodenum was normal.  Random biopsies taken.  Pathology of the gastroesophageal junction was unremarkable as was the gastric fundus.  Prepyloric biopsy revealed minimal chronic inflammation and reactive change.  H pylori negative.  Duodenal biopsies are negative.  11/03/2014--patient seen in follow-up and reports her epigastric pain/chest pain has resolved.  I reviewed the results of the studies with her.  It does not appear that her problem is related to biliary tract disease.  She does have reflux and although the recent upper GI revealed minimal reflux, I do think her symptoms are related to esophageal reflux with esophageal spasm.  10/21/2014--air-contrast upper GI series revealed trace upper laryngeal penetration.  Persistent small partially reducible sliding hiatal hernia the upper stomach with    • Gastroesophageal reflux disease without  esophagitis 6/6/2019   • Generalized anxiety disorder 4/11/2017   • History of Acute deep vein thrombosis (DVT) of distal end of left lower extremity 2/15/2017    03/21/2017 patient seen in follow-up and she is tolerating the Eliquis well without signs or symptoms of bleeding.  Her calf swelling and tenderness is better but not totally resolved.  I suspect that the DVT is chronic and may not resolve at all.  I will order a repeat venous study and then proceed from there.  02/23/2017--repeat Doppler venous study of the left lower extremity reveals a chronic left lower extremity DVT in the posterior tibial.  All other left sided veins appeared normal.  Fluid collection in the left calf noted.  02/17/2017--patient seen in follow-up and reports her left lower extremity symptoms are about the same.  She continues to have complaints of profound fatigue which I think is multifactorial including underlying depression that is not in remission.  Review the results of the CTA of the chest including the possible thyroid lesion.  I do not think this is contributing to any of her symptoms of fatigue particularly given the fact that her thyroid function tests are normal.  I expla   • History of palpitations 12/07/2011    Patient has had multiple admissions to the hospital for complaints of chest pain and heart palpitations. She meant admitted at least on 3 occasions. She has had at least 3 stress Cardiolite and 1 stress ECG, the last Cardiolite being performed 12/07/2011 which was negative. Patient is also had Holter monitors which have been unremarkable.   • HIstory of Schatzki's ring 02/28/2012 02/28/2012--air-contrast upper GI revealed small to moderate sized reducible sliding hiatal herniation of the upper stomach with some demonstrated gastroesophageal reflux. No esophageal, gastric, or duodenal mass or mucosal ulceration was seen.  11/03/2008--EGD performed for evaluation of iron deficiency anemia revealed hiatal hernia  without evidence of reflux, prepyloric antritis and   • Hyperlipidemia 4/8/2016   • Hypersomnolence 5/5/2016 06/14/2017--overnight oximetry revealed oxygen desaturation index of only 0.44.  There were only 10 total desaturations during the period of testing which lasted 6 hours and 46 minutes.  05/31/2017--patient seen in follow-up and reports she continues to have chronic fatigue as well as hypersomnolence.  Once again, she is on multiple medications that are undoubtedly contributing to this problem including clonazepam and hydrocodone but I doubt that these could be discontinued.  Her CBC back in April revealed a low hemoglobin of 10.8 but hematocrit was normal at 35.7.  Thyroid function tests were normal and CMP normal.  Overnight oximetry test ordered.  Repeat CBC.  Iron studies.  Sedimentation rate and CRP.  04/11/2017--patient seen in follow-up and her blood pressure is now at a reasonable level at 120/64.  She reports she still feels somewhat fatigued but she is much better.  Patient has not been doing any regular exercise and I do think that that would be helpful to reduce her feeling of fatigue.  She is   • Menopausal state 4/8/2016   • Multinodular goiter 2/17/2017 02/21/2017--thyroid ultrasound reveals multinodular thyroid with largest nodule measuring on the order of 1.6 cm in greatest dimension.  02/17/2017--patient seen in follow-up for DVT and CTA of the chest.  An incidental finding on the CTA of the chest reveals a 1.7 cm lesion in the right lobe of thyroid.  Note that thyroid function tests are currently normal.  Ultrasound of the thyroid ordered.   • Osteoporosis, 03/29/2011--lumbar spine -2.8.  Right femoral neck -2.4.  Left femoral neck -2.4.  Patient receives Reclast infusions. 2/9/2009 04/19/2017--Reclast infusion.  04/05/2016--Reclast infusion.  06/04/2012--Reclast infusion.  05/26/2011--Reclast infusion.  03/29/2011--DEXA scan revealed a lumbar T score of -2.8, right femoral neck T  score -2.4, left femoral neck T score -2.4.  Osteoporosis of the lumbar spine and severe osteopenia of the hips bilaterally.  Patient has been intolerant to Fosamax because of gastritis and gastroesophageal reflux.  04/01/2009--treatment for osteoporosis begun with Fosamax.  02/09/2009--DEXA scan revealed lumbar spine T score of -3.3.  Left femoral neck T score -2.5.  Right hip T score -2.6.  Osteoporosis.    • Pain disorder associated with psychological factors 10/10/2012   • Pain disorder with psychological factors 10/10/2012   • Pancreatic Duct Dilation 11/30/2001 09/29/2014--patient was again evaluated by the urologist for a renal cyst.  CT scan of the abdomen and pelvis reveals a left renal cyst measuring 5.1 x 4.9 cm.  There were subcentimeter hypodense renal lesions that are too small to further characterize and are presumably related to cysts.  Recommend attention to follow up.  Distal dilated pancreatic duct noted.  The common bile duct is the upper limits of normal in size.  The ampullary region is not well evaluated.  Comparison with prior imaging is recommended if available.  If there is no prior film available for comparison, and ERCP or MRCP could be performed to further evaluate.  Small hiatal hernia noted.  03/05/2012--CT scan of the abdomen with contrast, pancreatic protocol.  This reveals some fullness of the pancreatic ductal system and apparent pancreatic divisum with separate entrance of the accessory pancreatic duct extending into the duodenum distal to the main pancreatic duct.  There is fullness of the duct diffusely but similar to the previous s   • Pedal edema 6/29/2015 06/29/2015--patient presents with a 4-6 week history of exertional dyspnea that comes on with activity such as climbing stairs or walking her dog up an incline.  No chest pain.  Relieved with rest.  No cough.  She does have complaints of her feet and legs swelling that is particularly worse at the end of the day and  not improved overnight.  No orthopnea or PND.  Chest exam reveals faint rales at the bases.  Otherwise clear.  Heart is regular and I do not appreciate a heart murmur.  Chest x-ray PA and lateral ordered.  Echocardiogram ordered.   • Pulmonary hypertension (HCC) 4/18/2019   • Restless legs syndrome 4/8/2016   • Simple renal cyst 7/20/2009 09/29/2014--patient was again evaluated by the urologist for a renal cyst.  CT scan of the abdomen and pelvis reveals a left renal cyst measuring 5.1 x 4.9 cm.  There were subcentimeter hypodense renal lesions that are too small to further characterize and are presumably related to cysts.  Recommend attention to follow up.  Distal dilated pancreatic duct noted.  The common bile duct is the upper limits of normal in size.  The ampullary region is not well evaluated.  Comparison with prior imaging is recommended if available.  If there is no prior film available for comparison, and ERCP or MRCP could be performed to further evaluate.  Small hiatal hernia noted.  07/20/2009--patient was noted to have a left renal mass consistent with a cyst.  This was evaluated by the urologist 07/20/2009.   • Statin intolerance 10/30/2017   • Tinnitus of both ears 9/30/2019 September 30, 2019--patient presents with a several year history of bilateral tinnitus, right greater than left.  It seems to be getting worse and now is associated with fluctuating hearing.  She occasionally will have worsening hearing after she coughs or sneezes.  On exam she has evidence of old otitis media scars, particularly on the right.  There is no acute infection present and there is no a   • Vitamin D deficiency 4/8/2016         Past Surgical History:   Procedure Laterality Date   • APPENDECTOMY  1965 1965   • CATARACT EXTRACTION Bilateral Remote    Remote bilateral cataract extirpation with intraocular lens implantation.   • CHOLECYSTECTOMY WITH INTRAOPERATIVE CHOLANGIOGRAM N/A 1/21/2019     01/21/2019--laparoscopic paraesophageal hernia repair with fundoplication.   • COLONOSCOPY  11/03/2008 2008--normal colonoscopy   • COLONOSCOPY  2001 2001--normal colonoscopy.   • COLONOSCOPY N/A 6/13/2017 06/13/2017--colonoscopy revealed melanosis.  Biopsied.  There was friability with contact bleeding in the ascending colon.  Biopsied.  Pathology returned consistent with melanosis coli.   • ENDOSCOPY  10/08/2014    02/28/2012--air-contrast upper GI revealed small to moderate sized reducible sliding hiatal herniation of the upper stomach with some demonstrated gastroesophageal reflux. No esophageal, gastric, or duodenal mass or mucosal ulceration was seen. 11/03/2008--EGD performed for evaluation of iron deficiency anemia revealed hiatal hernia without evidence of reflux, prepyloric antritis and   • ENDOSCOPY  11/03/2008 11/03/2008--EGD performed for evaluation of iron deficiency anemia revealed hiatal hernia without evidence of reflux, prepyloric antritis and   • ENDOSCOPY  11/19/2001 11/19/2001--EGD revealed a very tortuous distal esophagus with a Schatzki's ring. No ulcer or erosions. No Dorado's mucosa. Stomach revealed patchy erythema and erosions in the antrum. Biopsy. Normal pylorus with no obstruction. Normal duodenum with no ulcers. The Schatzki's ring was dilated with a Rhodes dilator.;   • ENDOSCOPY N/A 9/27/2016 09/27/2016--EGD revealed a normal oropharynx, esophagus, and medium sized hiatal hernia.  Nonbleeding gastric ulcer with no stigmata of bleeding.  Biopsy.  Gastritis.  Biopsy.  Normal examined duodenum.  Biopsied.  Pathology returned mild to moderate chronic active gastritis with ulceration from the stomach antral ulcer biopsy.  Gastroesophageal junction biopsy revealed minimal mixed inflammation.   • ENDOSCOPY N/A 6/13/2017 06/13/2017--colonoscopy revealed melanosis.  Biopsied.  There was friability with contact bleeding in the ascending colon.  Biopsied.  Pathology  returned consistent with melanosis coli.   • ERCP N/A 1/22/2019 01/22/2019--ERCP with sphincterotomy and balloon stone extraction.   • ESOPHAGEAL DILATATION  11/19/2001 11/19/2001--EGD revealed a very tortuous distal esophagus with a Schatzki's ring. No ulcer or erosions. No Dorado's mucosa. Stomach revealed patchy erythema and erosions in the antrum. Biopsy. Normal pylorus with no obstruction. Normal duodenum with no ulcers. The Schatzki's ring was dilated with a Rhodes dilator.;    • ESOPHAGEAL MANOMETRY N/A 12/18/2018    Procedure: ESOPHAGEAL MANOMETRY;  Surgeon: Cecilia, Nurse Performed;  Location: [facility] ENDOSCOPY;  Service: Gastroenterology   • ESOPHAGOSCOPY N/A 1/21/2019    Procedure: FLEXIBLE ESOPHAGOSCOPY;  Surgeon: Reji Johnson MD;  Location: [facility] OR;  Service: General   • HIATAL HERNIA REPAIR N/A 1/21/2019    Procedure: Laparoscopic paraesophageal hernia repair with toupee fundoplication;  Surgeon: Reji Johnson MD;  Location: [facility] OR;  Service: General   • INCONTINENCE SURGERY  1979 1979--a bladder tack procedure for urinary stress incontinence   • KNEE ARTHROSCOPY Right 12/12/2011 12/12/2011--right knee arthroscopy with partial lateral and medial meniscectomies.   • SKIN SURGERY  05/25/2010 05/25/2010--skin lesion excised from right lower extremity. 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   • TOTAL ABDOMINAL HYSTERECTOMY WITH SALPINGO OOPHORECTOMY  1974 1974--total abdominal hysterectomy.         Allergies   Allergen Reactions   • Statins Nausea And Vomiting   • Morphine Hallucinations   • Penicillins Itching   • Tetanus Toxoids Itching           Current Outpatient Medications:   •  amLODIPine (NORVASC) 2.5 MG tablet, Take 1 tablet by mouth Daily., Disp: 90 tablet, Rfl: 3  •  aspirin 81 MG EC tablet, Take 81 mg by mouth Daily. HELD, Disp: , Rfl:   •  buPROPion XL (WELLBUTRIN XL) 300 MG 24 hr tablet, Take 300 mg by mouth Every  Morning., Disp: , Rfl:   •  Cholecalciferol (vitamin D3) 125 MCG (5000 UT) capsule capsule, 1 by mouth daily as directed, Disp: 30 capsule, Rfl:   •  Cimetidine (TAGAMET PO), Tagamet, Disp: , Rfl:   •  cycloSPORINE (RESTASIS) 0.05 % ophthalmic emulsion, Administer 1 drop to both eyes 2 (Two) Times a Day., Disp: , Rfl:   •  diclofenac (VOLTAREN) 1 % gel gel, Apply 4 g topically to the appropriate area as directed 4 (Four) Times a Day As Needed., Disp: , Rfl:   •  diphenhydrAMINE-APAP, sleep, (Tylenol PM Extra Strength)  MG/30ML liquid, Tylenol PM Extra Strength, Disp: , Rfl:   •  estradiol (ESTRACE) 1 MG tablet, TAKE 1 TABLET BY MOUTH EVERY DAY, Disp: 90 tablet, Rfl: 1  •  HYDROcodone-acetaminophen (NORCO)  MG per tablet, Take 1 tablet by mouth every 6 (six) hours as needed for moderate pain (4-6)., Disp: , Rfl:   •  Misc Natural Products (COLON CLEANSE PO), Take  by mouth., Disp: , Rfl:   •  Multiple Vitamins-Minerals (MULTIVITAMIN ADULT) chewable tablet, Chew 1 tablet Daily., Disp: , Rfl:   •  potassium chloride 10 MEQ CR tablet, TAKE 1 TABLET BY MOUTH THREE TIMES A DAY WITH FOOD, Disp: 270 tablet, Rfl: 2  •  Specialty Vitamins Products (ONE-A-DAY BONE STRENGTH PO), One-A-Day Bone Strength, Disp: , Rfl:   •  topiramate (TOPAMAX) 100 MG tablet, TAKE 1 TABLET DAILY, Disp: 90 tablet, Rfl: 4  •  traZODone (DESYREL) 50 MG tablet, Take 25-50 mg by mouth Every Night., Disp: , Rfl:   •  vitamin E 400 UNIT capsule, Take 400 Units by mouth Daily., Disp: , Rfl:   •  venlafaxine XR (EFFEXOR-XR) 150 MG 24 hr capsule, , Disp: , Rfl:       Family History   Problem Relation Age of Onset   • Heart attack Mother         dies age 47 from heart attack   • Heart disease Mother    • Bleeding Disorder Mother    • Heart attack Maternal Aunt         dies age 60 from heart attack   • Heart disease Father    • Malig Hyperthermia Neg Hx          Social History     Socioeconomic History   • Marital status:      Spouse name:  "ander   • Number of children: 4   • Years of education: 14   • Highest education level: Associate degree: academic program   Tobacco Use   • Smoking status: Never Smoker   • Smokeless tobacco: Never Used   • Tobacco comment: no caffeine    Vaping Use   • Vaping Use: Never used   Substance and Sexual Activity   • Alcohol use: No   • Drug use: No   • Sexual activity: Yes     Partners: Male         Vitals:    12/30/21 1239   BP: 128/70   Pulse: 86   Resp: 18   SpO2: 98%   Weight: 59 kg (130 lb)   Height: 152.4 cm (60\")        Body mass index is 25.39 kg/m².      Physical Exam:    General: Alert and oriented x 3.  No acute distress.  Normal affect.  HEENT: Pupils equal, round, reactive to light; extraocular movements intact; sclerae nonicteric; pharynx, ear canals and TMs normal.  Neck: Without JVD, thyromegaly, bruit, or adenopathy.  Lungs: Clear to auscultation in all fields.  Heart: Regular rate and rhythm without murmur, rub, gallop, or click.  Abdomen: Soft, nontender, without hepatosplenomegaly or hernia.  Bowel sounds normal.  : Deferred.  Rectal: Deferred.  Extremities: Without clubbing, cyanosis, edema, or pulse deficit.  Neurologic: Intact without focal deficit.  Normal station and gait observed during ingress and egress from the examination room.  Skin: Without significant lesion.  Musculoskeletal: Unremarkable.    Lab/other results:    Thyroid function test normal.  Vitamin D normal at 71.  Serum iron is normal at 37, iron saturation is low at 7%.  NMR reveals a total cholesterol of 195.  Triglycerides mildly elevated 176.  LDL particle number excellent 736.  HDL particle number excellent at 53.  Small LDL particle number less than 90.  CMP normal except creatinine 1.16 and calcium is slightly low at 8.3.  CBC is normal.    Assessment/Plan:     Diagnosis Plan   1. Encounter for subsequent annual wellness visit (AWV) in Medicare patient     2. Hyperlipidemia  NMR LipoProfile    TSH    T3, Free    T4, Free "   3. Benign essential hypertension     4. Carotid artery plaque, 04/02/2018--mild right ICA plaque, normal left ICA 10/03/2014--mild bilateral carotid artery plaque.  Duplex Carotid Ultrasound CAR   5. Osteoporosis, 03/29/2011--lumbar spine -2.8.  Right femoral neck -2.4.  Left femoral neck -2.4.  Patient receives Reclast infusions.  DEXA Bone Density Axial   6. Chronic low back pain with sciatica, sciatica laterality unspecified, unspecified back pain laterality     7. Chronic renal insufficiency, stage II (mild), creatinine 1.12  CBC (No Diff)    Comprehensive Metabolic Panel   8. Depression with anxiety     9. Chronic migraine w/o aura w/o status migrainosus, not intractable     10. Restless legs syndrome     11. Bilateral sensorineural hearing loss     12. Vitamin D deficiency  Vitamin D 25 Hydroxy   13. Chronic gastric ulcer without hemorrhage and without perforation     14. Chronic fatigue     15. Chronic anemia  Iron Profile   16. Multinodular goiter     17. Generalized anxiety disorder     18. Iron deficiency anemia due to chronic blood loss     19. Statin intolerance     20. Postmenopausal state     21. Gastroesophageal reflux disease without esophagitis     22. Balance disorder     23. Frequent falls     24. Pain disorder associated with psychological factors     25. Benign paroxysmal positional vertigo due to bilateral vestibular disorder     26. Therapeutic drug monitoring       The subsequent Medicare wellness visit is documented on separate note.    Patient has multiple chronic medical issues as noted above, all of which seem to be fairly stable.  Unfortunately, patient does have significant depression as well as anxiety and her psychological issues play a large part in her somatic complaints.  Overall there is no physical reason for most of her symptoms.  Patient is seeing a psychiatrist.    Plan is as follows: No change in current medical regimen.  Carotid Doppler and DEXA scan ordered.  Patient will  follow-up in 1 year with lab prior for her subsequent Medicare wellness visit.  Otherwise she will follow-up as needed.    Procedures

## 2022-01-26 ENCOUNTER — HOSPITAL ENCOUNTER (OUTPATIENT)
Dept: CARDIOLOGY | Facility: HOSPITAL | Age: 82
Discharge: HOME | End: 2022-01-26

## 2022-01-26 ENCOUNTER — HOSPITAL ENCOUNTER (OUTPATIENT)
Dept: BONE DENSITY | Facility: HOSPITAL | Age: 82
Discharge: HOME | End: 2022-01-26

## 2022-01-26 LAB
BH CV XLRA MEAS LEFT DIST CCA EDV: 18 CM/SEC
BH CV XLRA MEAS LEFT DIST CCA PSV: 69 CM/SEC
BH CV XLRA MEAS LEFT DIST ICA EDV: 20 CM/SEC
BH CV XLRA MEAS LEFT DIST ICA PSV: 70 CM/SEC
BH CV XLRA MEAS LEFT ICA/CCA RATIO: 1
BH CV XLRA MEAS LEFT MID ICA EDV: 15 CM/SEC
BH CV XLRA MEAS LEFT MID ICA PSV: 52 CM/SEC
BH CV XLRA MEAS LEFT PROX CCA EDV: 20 CM/SEC
BH CV XLRA MEAS LEFT PROX CCA PSV: 81 CM/SEC
BH CV XLRA MEAS LEFT PROX ECA EDV: 9 CM/SEC
BH CV XLRA MEAS LEFT PROX ECA PSV: 101 CM/SEC
BH CV XLRA MEAS LEFT PROX ICA EDV: 13 CM/SEC
BH CV XLRA MEAS LEFT PROX ICA PSV: 69 CM/SEC
BH CV XLRA MEAS LEFT PROX SCLA PSV: 98 CM/SEC
BH CV XLRA MEAS LEFT VERTEBRAL A EDV: 10 CM/SEC
BH CV XLRA MEAS LEFT VERTEBRAL A PSV: 46 CM/SEC
BH CV XLRA MEAS RIGHT DIST CCA EDV: 18.2 CM/SEC
BH CV XLRA MEAS RIGHT DIST CCA PSV: 85.6 CM/SEC
BH CV XLRA MEAS RIGHT DIST ICA EDV: 24 CM/SEC
BH CV XLRA MEAS RIGHT DIST ICA PSV: 80 CM/SEC
BH CV XLRA MEAS RIGHT ICA/CCA RATIO: 1.01
BH CV XLRA MEAS RIGHT MID ICA EDV: 19 CM/SEC
BH CV XLRA MEAS RIGHT MID ICA PSV: 86 CM/SEC
BH CV XLRA MEAS RIGHT PROX CCA EDV: 12.9 CM/SEC
BH CV XLRA MEAS RIGHT PROX CCA PSV: 85.6 CM/SEC
BH CV XLRA MEAS RIGHT PROX ECA EDV: -18.8 CM/SEC
BH CV XLRA MEAS RIGHT PROX ECA PSV: -103.2 CM/SEC
BH CV XLRA MEAS RIGHT PROX ICA EDV: 11 CM/SEC
BH CV XLRA MEAS RIGHT PROX ICA PSV: 65 CM/SEC
BH CV XLRA MEAS RIGHT PROX SCLA EDV: 0 CM/SEC
BH CV XLRA MEAS RIGHT PROX SCLA PSV: 78 CM/SEC
BH CV XLRA MEAS RIGHT VERTEBRAL A EDV: 10 CM/SEC
BH CV XLRA MEAS RIGHT VERTEBRAL A PSV: 44 CM/SEC
LEFT ARM BP: NORMAL MMHG
RIGHT ARM BP: NORMAL MMHG

## 2022-01-26 PROCEDURE — 93880 EXTRACRANIAL BILAT STUDY: CPT

## 2022-01-26 PROCEDURE — 77080 DXA BONE DENSITY AXIAL: CPT

## 2022-02-16 ENCOUNTER — HOSPITAL ENCOUNTER (OUTPATIENT)
Dept: INFUSION THERAPY | Facility: HOSPITAL | Age: 82
Discharge: HOME | End: 2022-02-16
Admitting: PSYCHIATRY & NEUROLOGY

## 2022-02-16 DIAGNOSIS — M54.16 LUMBAR RADICULOPATHY: ICD-10-CM

## 2022-02-16 DIAGNOSIS — G60.9 IDIOPATHIC PERIPHERAL NEUROPATHY: ICD-10-CM

## 2022-02-16 DIAGNOSIS — R60.0 BILATERAL LOWER EXTREMITY EDEMA: Primary | ICD-10-CM

## 2022-02-16 PROCEDURE — 95886 MUSC TEST DONE W/N TEST COMP: CPT | Performed by: PSYCHIATRY & NEUROLOGY

## 2022-02-16 PROCEDURE — 95886 MUSC TEST DONE W/N TEST COMP: CPT

## 2022-02-16 PROCEDURE — 95909 NRV CNDJ TST 5-6 STUDIES: CPT

## 2022-02-16 PROCEDURE — 95909 NRV CNDJ TST 5-6 STUDIES: CPT | Performed by: PSYCHIATRY & NEUROLOGY

## 2022-02-16 NOTE — PROCEDURES
EMG and Nerve Conduction Studies    I.      Instrument used: Neuromax 1002  II.     Please see data sheets for tabular summary of NCS and details on methods, temperatures and lab standards.   III.    EMG muscles tested for upper extremity studies include the deltoid, biceps, triceps, pronator teres, extensor digitorum communis, first dorsal interosseous and abductor pollicis brevis.    IV.   EMG muscles tested for lower extremity studies include the vastus lateralis, tibialis anterior, peroneus longus, medial gastrocnemius and extensor digitorum brevis.    V.    Additional muscles tested as needed.  Paraspinal muscles tested as needed.   VI.   Please see data sheets for tabular summary of EMG findings.   VII. The complete report includes the data sheets.      Indication: Bilateral tibialis anterior weakness  History: 81-year-old white female with history of falls typically falling backwards.  There is some suspicion of bilateral tibialis anterior weakness.  The study is to rule out peroneal neuropathies versus L5 radiculopathies.  She does not have other exam findings for peripheral neuropathy.    The patient also describes some swelling in her legs.  The left one started about a month ago and the right one was more recent.  She notices a little more swelling and a little tenderness along the medial aspect of the left calf.  Olive the calf muscles is not painful.  She also has about a 1 month history of some episodes of central chest pain with shortness of breath.    Ht: Not reported  Wt: Not reported  HbA1C: No results found for: HGBA1C  TSH:   Lab Results   Component Value Date    TSH 2.110 11/17/2021       Technical summary:  Nerve conduction studies were obtained in the right leg with 1 comparison on the left.  Skin temperatures were a bit cool but temperature correction was not needed.  Needle examination was obtained on selected muscles in both legs.    Results:  1.  Normal right sural sensory distal  latency and amplitude.  2.  Normal right superficial peroneal sensory distal latency and amplitude.  3.  Normal peroneal motor conduction velocities, distal latencies and amplitudes bilaterally.  4.  Normal right tibial motor conduction velocity, distal latency and amplitudes.  5.  Needle examination of selected muscles in both legs showed normal insertional activities throughout.  There were normal motor units and recruitment patterns throughout.  Lumbar paraspinals at L5 showed a few positive sharp waves bilaterally.    Impression:  Borderline study.  There was no evidence of peripheral neuropathy or peroneal neuropathy on either side.  There were no needle exam changes in the leg muscles but the paraspinals showed a few positive sharp waves which could go along with bilateral lumbosacral radiculopathies.  The patient has had epidural steroid injections but no nerve ablations.  Study results were discussed with the patient.    Kashif Barboza M.D.      Addendum:  Given the patient's symptom history of swelling in the legs some tenderness and shortness of breath episodes I will go ahead and get a Doppler to exclude DVT in either leg.  Since that she has at least some evidence of denervation I think a follow-up MRI of the lumbar spine should be done looking for L5 radiculopathies.  GNS        Dictated utilizing Dragon dictation.

## 2022-02-21 DIAGNOSIS — R60.0 BILATERAL LOWER EXTREMITY EDEMA: Primary | ICD-10-CM

## 2022-03-11 ENCOUNTER — HOSPITAL ENCOUNTER (OUTPATIENT)
Dept: GENERAL RADIOLOGY | Facility: HOSPITAL | Age: 82
Discharge: HOME | End: 2022-03-11

## 2022-03-11 ENCOUNTER — TRANSCRIBE ORDERS (OUTPATIENT)
Dept: ADMINISTRATIVE | Facility: HOSPITAL | Age: 82
End: 2022-03-11

## 2022-03-11 DIAGNOSIS — M25.511 RIGHT SHOULDER PAIN, UNSPECIFIED CHRONICITY: ICD-10-CM

## 2022-03-11 DIAGNOSIS — M54.2 CERVICALGIA: ICD-10-CM

## 2022-03-11 DIAGNOSIS — M54.6 PAIN IN THORACIC SPINE: ICD-10-CM

## 2022-03-11 DIAGNOSIS — M25.511 RIGHT SHOULDER PAIN, UNSPECIFIED CHRONICITY: Primary | ICD-10-CM

## 2022-03-11 PROCEDURE — 72050 X-RAY EXAM NECK SPINE 4/5VWS: CPT

## 2022-03-11 PROCEDURE — 71101 X-RAY EXAM UNILAT RIBS/CHEST: CPT

## 2022-03-11 PROCEDURE — 72072 X-RAY EXAM THORAC SPINE 3VWS: CPT

## 2022-03-11 PROCEDURE — 73030 X-RAY EXAM OF SHOULDER: CPT

## 2022-03-13 ENCOUNTER — HOSPITAL ENCOUNTER (OUTPATIENT)
Dept: MRI IMAGING | Facility: HOSPITAL | Age: 82
Discharge: HOME | End: 2022-03-13
Admitting: PSYCHIATRY & NEUROLOGY

## 2022-03-13 DIAGNOSIS — M54.16 LUMBAR RADICULOPATHY: ICD-10-CM

## 2022-03-13 PROCEDURE — 72148 MRI LUMBAR SPINE W/O DYE: CPT

## 2022-04-27 ENCOUNTER — OFFICE VISIT (OUTPATIENT)
Dept: INTERNAL MEDICINE | Facility: CLINIC | Age: 82
End: 2022-04-27

## 2022-04-27 VITALS
BODY MASS INDEX: 24.9 KG/M2 | WEIGHT: 126.8 LBS | RESPIRATION RATE: 18 BRPM | HEIGHT: 60 IN | SYSTOLIC BLOOD PRESSURE: 150 MMHG | OXYGEN SATURATION: 97 % | HEART RATE: 82 BPM | DIASTOLIC BLOOD PRESSURE: 74 MMHG

## 2022-04-27 DIAGNOSIS — I10 BENIGN ESSENTIAL HYPERTENSION: Primary | Chronic | ICD-10-CM

## 2022-04-27 DIAGNOSIS — J20.9 ACUTE BRONCHITIS WITH BRONCHOSPASM: ICD-10-CM

## 2022-04-27 PROCEDURE — 99214 OFFICE O/P EST MOD 30 MIN: CPT | Performed by: INTERNAL MEDICINE

## 2022-04-27 RX ORDER — AMLODIPINE BESYLATE 5 MG/1
TABLET ORAL
Qty: 30 TABLET | Refills: 6 | Status: SHIPPED | OUTPATIENT
Start: 2022-04-27 | End: 2022-07-21 | Stop reason: SDUPTHER

## 2022-04-27 RX ORDER — VENLAFAXINE HYDROCHLORIDE 75 MG/1
75 CAPSULE, EXTENDED RELEASE ORAL DAILY
COMMUNITY
Start: 2022-02-18 | End: 2022-05-25

## 2022-04-27 RX ORDER — PREDNISONE 10 MG/1
TABLET ORAL
Qty: 46 TABLET | Refills: 0 | Status: SHIPPED | OUTPATIENT
Start: 2022-04-27 | End: 2022-05-25

## 2022-04-27 RX ORDER — CEFDINIR 300 MG/1
CAPSULE ORAL
Qty: 20 CAPSULE | Refills: 0 | Status: SHIPPED | OUTPATIENT
Start: 2022-04-27 | End: 2022-05-25

## 2022-04-27 RX ORDER — BREXPIPRAZOLE 1 MG/1
TABLET ORAL
COMMUNITY

## 2022-04-27 NOTE — PROGRESS NOTES
04/27/2022    Patient Information  Sachi Vora                                                                                          [address]   [place] [address]      1940  [unfilled]  There is no work phone number on file.    Chief Complaint:     Blood pressure is been elevated.  Complaining of chest congestion and cough.    History of Present Illness:    Patient with a long history of hypertension who has been on amlodipine for many years for high blood pressure presents today with complaints that her blood pressure has been elevated.  She also has a new complaint of chest congestion and cough productive of greenish phlegm that has been present for about 2 weeks.  She has had some chilling but no documented fever.  COVID test was negative recently.    Review of Systems   Constitutional: Negative.   HENT: Negative.    Eyes: Negative.    Cardiovascular: Negative.  Negative for chest pain.   Respiratory: Positive for cough, sputum production and wheezing. Negative for hemoptysis and shortness of breath.    Endocrine: Negative.    Hematologic/Lymphatic: Negative.    Skin: Negative.    Musculoskeletal: Negative.    Gastrointestinal: Negative.    Genitourinary: Negative.    Neurological: Negative.    Psychiatric/Behavioral: Negative.    Allergic/Immunologic: Negative.        Active Problems:    Patient Active Problem List   Diagnosis   • Osteoporosis, 03/29/2011--lumbar spine -2.8.  Right femoral neck -2.4.  Left femoral neck -2.4.  Patient receives Reclast infusions.   • Therapeutic drug monitoring   • Simple renal cyst   • Benign essential hypertension   • Carotid artery plaque, 04/02/2018--mild right ICA plaque, normal left ICA 10/03/2014--mild bilateral carotid artery plaque.   • Chronic gastritis   • Chronic lower back pain   • Chronic otitis externa   • Chronic renal insufficiency, stage II (mild), creatinine 1.12   • Depression with anxiety   • Functional murmur,  "07/15/2015--normal echocardiogram.   • Hyperlipidemia   • Menopausal state   • Chronic migraine w/o aura w/o status migrainosus, not intractable   • Pancreatic Duct Dilation   • Pedal edema   • Restless legs syndrome   • Bilateral sensorineural hearing loss   • Vitamin D deficiency   • Chronic gastric ulcer   • Chronic fatigue   • Hypersomnolence   • Chronic anemia   • Multinodular goiter   • Generalized anxiety disorder   • Iron deficiency anemia   • Statin intolerance   • Postmenopausal state   • Pulmonary hypertension (HCC)   • Gastroesophageal reflux disease without esophagitis   • Tinnitus of both ears   • Chronic hoarseness   • Pain disorder associated with psychological factors   • Frequent falls   • Balance disorder   • Acute bronchitis with bronchospasm         Past Medical History:   Diagnosis Date   • Balance disorder 2/5/2021 February 5, 2021--patient seen in follow-up by Dr. Weir.  I extensively reviewed the documentation from the emergency room visit as noted below.  I reviewed the history with the patient and she is describing episodes of dizziness described as a spinning sensation of her body and this seems to be precipitated by movement although it does not occur if she rolls over in bed.  If she stands up and st   • Benign essential hypertension 4/8/2016   • Bilateral sensorineural hearing loss 3/31/2011    03/31/2011--etiology reveals reverse \"cookie bite\" type of hearing loss for both ears of mild/moderate degree, mostly sensorineural.  Speech discrimination 100% on the right, 96% on the left.   • Carotid artery plaque, 10/03/2014--mild bilateral carotid artery plaque. 7/25/2012    10/03/2014--[facility] screening revealed mild bilateral carotid plaque, negative for atrial fibrillation, negative for AAA, negative for PAD, osteoporosis screen revealed osteopenia.  Body mass index was 25 and considered to be moderate risk.  07/25/2012--vascular screen negative for carotid plaque, negative for " abdominal aneurysm, negative for PAD Description: 10/03/2014--[facility] screening revealed mild bilateral carotid plaque, negative for atrial fibrillation, negative for AAA, negative for PAD, osteoporosis screen revealed osteopenia.  Body mass index was 25 and considered to be moderate risk.  07/25/2012--vascular screen negative for carotid plaque, negative for abdominal aneurysm, negative for PAD   • Chronic anemia 8/23/2016 06/13/2017--colonoscopy revealed melanosis.  Biopsied.  There was friability with contact bleeding in the ascending colon.  Biopsied.  Pathology returned consistent with melanosis coli.  06/13/2017--EGD revealed normal esophagus.  Biopsies taken.  There was a medium-sized hiatal hernia.  Localized mild inflammation and linear erosions were found in the prepyloric region.  Biopsied.  Examined duodenum was normal.  Random biopsies taken.  Pathology of the gastroesophageal junction was unremarkable as was the gastric fundus.  Prepyloric biopsy revealed minimal chronic inflammation and reactive change.  H pylori negative.  Duodenal biopsies are negative.  04/12/2017--hemoglobin low at 10.8, hematocrit is normal at 35.7.  Iron sulfate 325 mg per day initiated.  08/23/2016--routine follow-up.  Patient continues to have epigastric abdominal pain believed to be related to reflux with possible esophageal spasm.  Hemoglobin noted to be low at 10.6 with a hematocrit low at 33.7 and RDW elevated at 16.2.  Homocysti   • Chronic fatigue 4/21/2016 06/14/2017--overnight oximetry revealed oxygen desaturation index of only 0.44.  There were only 10 total desaturations during the period of testing which lasted 6 hours and 46 minutes.  05/31/2017--patient seen in follow-up and reports she continues to have chronic fatigue as well as hypersomnolence.  Once again, she is on multiple medications that are undoubtedly contributing to this problem including clonazepam and hydrocodone but I doubt that these could be  discontinued.  Her CBC back in April revealed a low hemoglobin of 10.8 but hematocrit was normal at 35.7.  Thyroid function tests were normal and CMP normal.  Overnight oximetry test ordered.  Repeat CBC.  Iron studies.  Sedimentation rate and CRP.  04/11/2017--patient seen in follow-up and her blood pressure is now at a reasonable level at 120/64.  She reports she still feels somewhat fatigued but she is much better.  Patient has not been doing any regular exercise and I do think that that would be helpful to reduce her feeling of fatigue.  She is   • Chronic gastric ulcer 4/14/2014    02/15/2017--patient seen in follow-up in nearly 6 months later.  She has complaints of not feeling well all over.  She has complaints of diffuse myalgias and possibly tendinopathy related to Levaquin that I called in prior to her going to [place] [employer] for vacation.  She reports that 3 or 4 days after starting the Levaquin she began to have the musculoskeletal symptoms and she reports that she continues to have them presently.  Symptoms are worse involving her left calf area.  Examination reveals significant tenderness involving the left calf and the left calf seems a little larger than the right.  She also has complaints of shortness of breath but no chest pain.  She is complaining of double vision and generalized weakness and fatigue.  She was complaining of chronic fatigue at the last visit back in September and I ordered an overnight oximetry test but apparently this was never done for reasons that are not clear to me.  Her current oxygen saturation is 94% and normally it is 100%.  Plan is as follows: Extensi   • Chronic gastritis 11/19/2001    02/15/2017--patient seen in follow-up in nearly 6 months later.  She has complaints of not feeling well all over.  She has complaints of diffuse myalgias and possibly tendinopathy related to Levaquin that I called in prior to her going to [place] [employer] for vacation.  She reports that 3 or 4 days  after starting the Levaquin she began to have the musculoskeletal symptoms and she reports that she continues to have them presently.  Symptoms are worse involving her left calf area.  Examination reveals significant tenderness involving the left calf and the left calf seems a little larger than the right.  She also has complaints of shortness of breath but no chest pain.  She is complaining of double vision and generalized weakness and fatigue.  She was complaining of chronic fatigue at the last visit back in September and I ordered an overnight oximetry test but apparently this was never done for reasons that are not clear to me.  Her current oxygen saturation is 94% and normally it is 100%.  Plan is as follows: Extensi   • Chronic hoarseness 6/8/2020    September 15, 2020--patient presents with complaints of swelling of the soft tissues of the left side of the neck and this is associated with pain and discomfort, particularly when she turns her head rotating to the left.  She also notices hoarseness and feels that her voice has changed.  On exam there is definite prominence of the left sternocleidomastoid muscle and there may be some shotty lymph   • Chronic lower back pain 1/31/2006 08/11/2014--MRI of the lumbar spine reveals the conus terminates at L2 and is normal.  Cauda equina unremarkable.  Stable to moderate degenerative disc disease at L5-S1.  No acute fracture or pars defect is demonstrated.  Small synovial cysts are seen posterior to the L4-L5 facet.  The perivertebral soft tissues are unremarkable.  T12-L1, L1-L2, L2-L3 are negative.  L3-L4 a broad-based disc bulge resulting in mild bilateral neural foraminal narrowing.  L4-L5 reveals a broad-based disc bulge facet disease resulting in mild bilateral neural foraminal narrowing.  L5-S1 reveals a broad-based disc bulge, posterior osseous slipping, and facet disease resulting in mild to moderate bilateral neural foraminal narrowing.  Assessment is  stable mild to moderate degenerative spondylosis.  Small synovial cysts are seen posterior to the L4-L5 facets.  08/11/2014--MRI of the thoracic spine reveals mild to moderate thoracic kyphosis.  No fracture.  At T5--T6 there is a moderate sized left paracentral disc protrusion which i   • Chronic migraine 11/3/2009    01/18/2010--MRI of the brain performed for headaches and memory loss.  Mild small vessel disease in the cerebral and central pontine white matter.  There is an ovoid, somewhat pancake-shaped area of signal abnormality in the anterior inferior right temporal lobe subcortical to the juxtacortical white matter that measures 1.2 x 1 cm and anterior posterior and medial lateral dimension but only measures 3 mm in cranial caudal dimension.  I suspect that this is a benign cyst or a cystic area of encephalomalacia.  The remainder of the MRI of the head is within normal limits.  11/03/2009--CT scan of the brain without contrast performed for headache after a fall.  Mild diffuse atrophy.  No acute abnormality noted.; Description: Patient has had a long history of migraine headaches.  MRI and CT scan of the brain have been essentially negative.   • Chronic otitis externa 4/8/2016   • Chronic renal insufficiency, stage II (mild), creatinine 1.12 11/14/2015 11/14/2015--serum creatinine mildly elevated at 1.12.   • Depression with anxiety 4/8/2016   • Frequent falls 2/5/2021 February 5, 2021--patient seen in follow-up by Dr. Weir.  I extensively reviewed the documentation from the emergency room visit as noted below.  I reviewed the history with the patient and she is describing episodes of dizziness described as a spinning sensation of her body and this seems to be precipitated by movement although it does not occur if she rolls over in bed.  If she stands up and st   • Functional murmur, 07/15/2015--normal echocardiogram. 7/15/2015    07/15/2015--echocardiogram reveals normal left ventricular size and  function with ejection fraction 55%.  Grade 1 diastolic dysfunction, abnormal relaxation pattern.  Trace tricuspid regurgitation.  Estimated right ventricular systolic pressure is 25 mmHg which is normal.   • Gastroesophageal reflux disease with esophagitis 11/19/2001 06/13/2017--EGD revealed normal esophagus.  Biopsies taken.  There was a medium-sized hiatal hernia.  Localized mild inflammation and linear erosions were found in the prepyloric region.  Biopsied.  Examined duodenum was normal.  Random biopsies taken.  Pathology of the gastroesophageal junction was unremarkable as was the gastric fundus.  Prepyloric biopsy revealed minimal chronic inflammation and reactive change.  H pylori negative.  Duodenal biopsies are negative.  11/03/2014--patient seen in follow-up and reports her epigastric pain/chest pain has resolved.  I reviewed the results of the studies with her.  It does not appear that her problem is related to biliary tract disease.  She does have reflux and although the recent upper GI revealed minimal reflux, I do think her symptoms are related to esophageal reflux with esophageal spasm.  10/21/2014--air-contrast upper GI series revealed trace upper laryngeal penetration.  Persistent small partially reducible sliding hiatal hernia the upper stomach with    • Gastroesophageal reflux disease without esophagitis 6/6/2019   • Generalized anxiety disorder 4/11/2017   • History of Acute deep vein thrombosis (DVT) of distal end of left lower extremity 2/15/2017    03/21/2017 patient seen in follow-up and she is tolerating the Eliquis well without signs or symptoms of bleeding.  Her calf swelling and tenderness is better but not totally resolved.  I suspect that the DVT is chronic and may not resolve at all.  I will order a repeat venous study and then proceed from there.  02/23/2017--repeat Doppler venous study of the left lower extremity reveals a chronic left lower extremity DVT in the posterior tibial.   All other left sided veins appeared normal.  Fluid collection in the left calf noted.  02/17/2017--patient seen in follow-up and reports her left lower extremity symptoms are about the same.  She continues to have complaints of profound fatigue which I think is multifactorial including underlying depression that is not in remission.  Review the results of the CTA of the chest including the possible thyroid lesion.  I do not think this is contributing to any of her symptoms of fatigue particularly given the fact that her thyroid function tests are normal.  I expla   • History of palpitations 12/07/2011    Patient has had multiple admissions to the hospital for complaints of chest pain and heart palpitations. She meant admitted at least on 3 occasions. She has had at least 3 stress Cardiolite and 1 stress ECG, the last Cardiolite being performed 12/07/2011 which was negative. Patient is also had Holter monitors which have been unremarkable.   • HIstory of Schatzki's ring 02/28/2012 02/28/2012--air-contrast upper GI revealed small to moderate sized reducible sliding hiatal herniation of the upper stomach with some demonstrated gastroesophageal reflux. No esophageal, gastric, or duodenal mass or mucosal ulceration was seen.  11/03/2008--EGD performed for evaluation of iron deficiency anemia revealed hiatal hernia without evidence of reflux, prepyloric antritis and   • Hyperlipidemia 4/8/2016   • Hypersomnolence 5/5/2016 06/14/2017--overnight oximetry revealed oxygen desaturation index of only 0.44.  There were only 10 total desaturations during the period of testing which lasted 6 hours and 46 minutes.  05/31/2017--patient seen in follow-up and reports she continues to have chronic fatigue as well as hypersomnolence.  Once again, she is on multiple medications that are undoubtedly contributing to this problem including clonazepam and hydrocodone but I doubt that these could be discontinued.  Her CBC back in April  revealed a low hemoglobin of 10.8 but hematocrit was normal at 35.7.  Thyroid function tests were normal and CMP normal.  Overnight oximetry test ordered.  Repeat CBC.  Iron studies.  Sedimentation rate and CRP.  04/11/2017--patient seen in follow-up and her blood pressure is now at a reasonable level at 120/64.  She reports she still feels somewhat fatigued but she is much better.  Patient has not been doing any regular exercise and I do think that that would be helpful to reduce her feeling of fatigue.  She is   • Menopausal state 4/8/2016   • Multinodular goiter 2/17/2017 02/21/2017--thyroid ultrasound reveals multinodular thyroid with largest nodule measuring on the order of 1.6 cm in greatest dimension.  02/17/2017--patient seen in follow-up for DVT and CTA of the chest.  An incidental finding on the CTA of the chest reveals a 1.7 cm lesion in the right lobe of thyroid.  Note that thyroid function tests are currently normal.  Ultrasound of the thyroid ordered.   • Osteoporosis, 03/29/2011--lumbar spine -2.8.  Right femoral neck -2.4.  Left femoral neck -2.4.  Patient receives Reclast infusions. 2/9/2009 04/19/2017--Reclast infusion.  04/05/2016--Reclast infusion.  06/04/2012--Reclast infusion.  05/26/2011--Reclast infusion.  03/29/2011--DEXA scan revealed a lumbar T score of -2.8, right femoral neck T score -2.4, left femoral neck T score -2.4.  Osteoporosis of the lumbar spine and severe osteopenia of the hips bilaterally.  Patient has been intolerant to Fosamax because of gastritis and gastroesophageal reflux.  04/01/2009--treatment for osteoporosis begun with Fosamax.  02/09/2009--DEXA scan revealed lumbar spine T score of -3.3.  Left femoral neck T score -2.5.  Right hip T score -2.6.  Osteoporosis.    • Pain disorder associated with psychological factors 10/10/2012   • Pain disorder with psychological factors 10/10/2012   • Pancreatic Duct Dilation 11/30/2001 09/29/2014--patient was again  evaluated by the urologist for a renal cyst.  CT scan of the abdomen and pelvis reveals a left renal cyst measuring 5.1 x 4.9 cm.  There were subcentimeter hypodense renal lesions that are too small to further characterize and are presumably related to cysts.  Recommend attention to follow up.  Distal dilated pancreatic duct noted.  The common bile duct is the upper limits of normal in size.  The ampullary region is not well evaluated.  Comparison with prior imaging is recommended if available.  If there is no prior film available for comparison, and ERCP or MRCP could be performed to further evaluate.  Small hiatal hernia noted.  03/05/2012--CT scan of the abdomen with contrast, pancreatic protocol.  This reveals some fullness of the pancreatic ductal system and apparent pancreatic divisum with separate entrance of the accessory pancreatic duct extending into the duodenum distal to the main pancreatic duct.  There is fullness of the duct diffusely but similar to the previous s   • Pedal edema 6/29/2015 06/29/2015--patient presents with a 4-6 week history of exertional dyspnea that comes on with activity such as climbing stairs or walking her dog up an incline.  No chest pain.  Relieved with rest.  No cough.  She does have complaints of her feet and legs swelling that is particularly worse at the end of the day and not improved overnight.  No orthopnea or PND.  Chest exam reveals faint rales at the bases.  Otherwise clear.  Heart is regular and I do not appreciate a heart murmur.  Chest x-ray PA and lateral ordered.  Echocardiogram ordered.   • Pulmonary hypertension (HCC) 4/18/2019   • Restless legs syndrome 4/8/2016   • Simple renal cyst 7/20/2009 09/29/2014--patient was again evaluated by the urologist for a renal cyst.  CT scan of the abdomen and pelvis reveals a left renal cyst measuring 5.1 x 4.9 cm.  There were subcentimeter hypodense renal lesions that are too small to further characterize and are  presumably related to cysts.  Recommend attention to follow up.  Distal dilated pancreatic duct noted.  The common bile duct is the upper limits of normal in size.  The ampullary region is not well evaluated.  Comparison with prior imaging is recommended if available.  If there is no prior film available for comparison, and ERCP or MRCP could be performed to further evaluate.  Small hiatal hernia noted.  07/20/2009--patient was noted to have a left renal mass consistent with a cyst.  This was evaluated by the urologist 07/20/2009.   • Statin intolerance 10/30/2017   • Tinnitus of both ears 9/30/2019 September 30, 2019--patient presents with a several year history of bilateral tinnitus, right greater than left.  It seems to be getting worse and now is associated with fluctuating hearing.  She occasionally will have worsening hearing after she coughs or sneezes.  On exam she has evidence of old otitis media scars, particularly on the right.  There is no acute infection present and there is no a   • Vitamin D deficiency 4/8/2016         Past Surgical History:   Procedure Laterality Date   • APPENDECTOMY  1965    1965   • CATARACT EXTRACTION Bilateral Remote    Remote bilateral cataract extirpation with intraocular lens implantation.   • CHOLECYSTECTOMY WITH INTRAOPERATIVE CHOLANGIOGRAM N/A 1/21/2019 01/21/2019--laparoscopic paraesophageal hernia repair with fundoplication.   • COLONOSCOPY  11/03/2008 2008--normal colonoscopy   • COLONOSCOPY  2001 2001--normal colonoscopy.   • COLONOSCOPY N/A 6/13/2017 06/13/2017--colonoscopy revealed melanosis.  Biopsied.  There was friability with contact bleeding in the ascending colon.  Biopsied.  Pathology returned consistent with melanosis coli.   • ENDOSCOPY  10/08/2014    02/28/2012--air-contrast upper GI revealed small to moderate sized reducible sliding hiatal herniation of the upper stomach with some demonstrated gastroesophageal reflux. No esophageal, gastric,  or duodenal mass or mucosal ulceration was seen. 11/03/2008--EGD performed for evaluation of iron deficiency anemia revealed hiatal hernia without evidence of reflux, prepyloric antritis and   • ENDOSCOPY  11/03/2008 11/03/2008--EGD performed for evaluation of iron deficiency anemia revealed hiatal hernia without evidence of reflux, prepyloric antritis and   • ENDOSCOPY  11/19/2001 11/19/2001--EGD revealed a very tortuous distal esophagus with a Schatzki's ring. No ulcer or erosions. No Dorado's mucosa. Stomach revealed patchy erythema and erosions in the antrum. Biopsy. Normal pylorus with no obstruction. Normal duodenum with no ulcers. The Schatzki's ring was dilated with a Rhodes dilator.;   • ENDOSCOPY N/A 9/27/2016 09/27/2016--EGD revealed a normal oropharynx, esophagus, and medium sized hiatal hernia.  Nonbleeding gastric ulcer with no stigmata of bleeding.  Biopsy.  Gastritis.  Biopsy.  Normal examined duodenum.  Biopsied.  Pathology returned mild to moderate chronic active gastritis with ulceration from the stomach antral ulcer biopsy.  Gastroesophageal junction biopsy revealed minimal mixed inflammation.   • ENDOSCOPY N/A 6/13/2017 06/13/2017--colonoscopy revealed melanosis.  Biopsied.  There was friability with contact bleeding in the ascending colon.  Biopsied.  Pathology returned consistent with melanosis coli.   • ERCP N/A 1/22/2019 01/22/2019--ERCP with sphincterotomy and balloon stone extraction.   • ESOPHAGEAL DILATATION  11/19/2001 11/19/2001--EGD revealed a very tortuous distal esophagus with a Schatzki's ring. No ulcer or erosions. No Dorado's mucosa. Stomach revealed patchy erythema and erosions in the antrum. Biopsy. Normal pylorus with no obstruction. Normal duodenum with no ulcers. The Schatzki's ring was dilated with a Rhodes dilator.;    • ESOPHAGEAL MANOMETRY N/A 12/18/2018    Procedure: ESOPHAGEAL MANOMETRY;  Surgeon: Endo, Nurse Performed;  Location: [facility]  ENDOSCOPY;  Service: Gastroenterology   • ESOPHAGOSCOPY N/A 1/21/2019    Procedure: FLEXIBLE ESOPHAGOSCOPY;  Surgeon: Reji Johnson MD;  Location: [facility] MAIN OR;  Service: General   • HIATAL HERNIA REPAIR N/A 1/21/2019    Procedure: Laparoscopic paraesophageal hernia repair with toupee fundoplication;  Surgeon: Reji Johnson MD;  Location: [facility] MAIN OR;  Service: General   • INCONTINENCE SURGERY  1979 1979--a bladder tack procedure for urinary stress incontinence   • KNEE ARTHROSCOPY Right 12/12/2011 12/12/2011--right knee arthroscopy with partial lateral and medial meniscectomies.   • SKIN SURGERY  05/25/2010 05/25/2010--skin lesion excised from right lower extremity. 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   • TOTAL ABDOMINAL HYSTERECTOMY WITH SALPINGO OOPHORECTOMY  1974 1974--total abdominal hysterectomy.         Allergies   Allergen Reactions   • Statins Nausea And Vomiting   • Morphine Hallucinations   • Penicillins Itching   • Tetanus Toxoids Itching           Current Outpatient Medications:   •  aspirin 81 MG EC tablet, Take 81 mg by mouth Daily. HELD, Disp: , Rfl:   •  Brexpiprazole (Rexulti) 1 MG tablet, Rexulti 1 mg tablet, Disp: , Rfl:   •  buPROPion XL (WELLBUTRIN XL) 300 MG 24 hr tablet, Take 300 mg by mouth Every Morning., Disp: , Rfl:   •  Cholecalciferol (vitamin D3) 125 MCG (5000 UT) capsule capsule, 1 by mouth daily as directed, Disp: 30 capsule, Rfl:   •  Cimetidine (TAGAMET PO), Tagamet, Disp: , Rfl:   •  cycloSPORINE (RESTASIS) 0.05 % ophthalmic emulsion, Administer 1 drop to both eyes 2 (Two) Times a Day., Disp: , Rfl:   •  diclofenac (VOLTAREN) 1 % gel gel, Apply 4 g topically to the appropriate area as directed 4 (Four) Times a Day As Needed., Disp: , Rfl:   •  diphenhydrAMINE-APAP, sleep, (Tylenol PM Extra Strength)  MG/30ML liquid, Tylenol PM Extra Strength, Disp: , Rfl:   •  estradiol (ESTRACE) 1 MG tablet, TAKE 1 TABLET  BY MOUTH EVERY DAY, Disp: 90 tablet, Rfl: 1  •  HYDROcodone-acetaminophen (NORCO)  MG per tablet, Take 1 tablet by mouth every 6 (six) hours as needed for moderate pain (4-6)., Disp: , Rfl:   •  Misc Natural Products (COLON CLEANSE PO), Take  by mouth., Disp: , Rfl:   •  Multiple Vitamins-Minerals (MULTIVITAMIN ADULT) chewable tablet, Chew 1 tablet Daily., Disp: , Rfl:   •  potassium chloride 10 MEQ CR tablet, TAKE 1 TABLET BY MOUTH THREE TIMES A DAY WITH FOOD, Disp: 270 tablet, Rfl: 2  •  Specialty Vitamins Products (ONE-A-DAY BONE STRENGTH PO), One-A-Day Bone Strength, Disp: , Rfl:   •  topiramate (TOPAMAX) 100 MG tablet, TAKE 1 TABLET DAILY, Disp: 90 tablet, Rfl: 4  •  traZODone (DESYREL) 50 MG tablet, Take 25-50 mg by mouth Every Night., Disp: , Rfl:   •  venlafaxine XR (EFFEXOR-XR) 75 MG 24 hr capsule, Take 75 mg by mouth Daily., Disp: , Rfl:   •  vitamin E 400 UNIT capsule, Take 400 Units by mouth Daily., Disp: , Rfl:   •  amLODIPine (NORVASC) 5 MG tablet, Take 1 p.o. every morning for high blood pressure, Disp: 30 tablet, Rfl: 6  •  cefdinir (OMNICEF) 300 MG capsule, Take 1 p.o. twice daily until gone for bronchitis, Disp: 20 capsule, Rfl: 0  •  HYDROcod Polst-CPM Polst ER (Tussionex Pennkinetic ER) 10-8 MG/5ML ER suspension, Take 1 teaspoon p.o. every 12 hours as needed for cough and congestion, Disp: 180 mL, Rfl: 0  •  predniSONE (DELTASONE) 10 MG tablet, 5 by mouth daily 5 days, then 4 daily 2 days, 3 daily 2 days, 2 daily 2 days, 1 daily 2 days, one half daily 2 days and then discontinue., Disp: 46 tablet, Rfl: 0      Family History   Problem Relation Age of Onset   • Heart attack Mother         dies age 47 from heart attack   • Heart disease Mother    • Bleeding Disorder Mother    • Heart attack Maternal Aunt         dies age 60 from heart attack   • Heart disease Father    • Malig Hyperthermia Neg Hx          Social History     Socioeconomic History   • Marital status:      Spouse name:  "ander   • Number of children: 4   • Years of education: 14   • Highest education level: Associate degree: academic program   Tobacco Use   • Smoking status: Never Smoker   • Smokeless tobacco: Never Used   • Tobacco comment: no caffeine    Vaping Use   • Vaping Use: Never used   Substance and Sexual Activity   • Alcohol use: No   • Drug use: No   • Sexual activity: Yes     Partners: Male         Vitals:    04/27/22 1343   BP: 150/74   Pulse: 82   Resp: 18   SpO2: 97%   Weight: 57.5 kg (126 lb 12.8 oz)   Height: 152.4 cm (60\")        Body mass index is 24.76 kg/m².      Physical Exam:    General: Alert and oriented x 3.  No acute distress.  Normal affect.  HEENT: Pupils equal, round, reactive to light; extraocular movements intact; sclerae nonicteric; pharynx, ear canals and TMs normal.  Neck: Without JVD, thyromegaly, bruit, or adenopathy.  Lungs: Bilateral scattered rhonchi with faint end expiratory wheezes.  No signs of consolidation.  Heart: Regular rate and rhythm without murmur, rub, gallop, or click.  Abdomen: Soft, nontender, without hepatosplenomegaly or hernia.  Bowel sounds normal.  : Deferred.  Rectal: Deferred.  Extremities: Without clubbing, cyanosis, edema, or pulse deficit.  Neurologic: Intact without focal deficit.  Normal station and gait observed during ingress and egress from the examination room.  Skin: Without significant lesion.  Musculoskeletal: Unremarkable.    Lab/other results:      Assessment/Plan:     Diagnosis Plan   1. Benign essential hypertension  amLODIPine (NORVASC) 5 MG tablet   2. Acute bronchitis with bronchospasm  cefdinir (OMNICEF) 300 MG capsule    HYDROcod Polst-CPM Polst ER (Tussionex Pennkinetic ER) 10-8 MG/5ML ER suspension    predniSONE (DELTASONE) 10 MG tablet     Patient has hypertension which is not at goal.  She is on a small dose of amlodipine which the dosage could be increased.  She also presents with a history and exam consistent with acute bronchitis with " bronchospasm.  She does not have any environmental allergies that we know of.    Plan is as follows: Increase amlodipine to 5 mg/day.  Start cefdinir 300 mg p.o. twice daily x10 days.  Tussionex cough syrup for cough and congestion.  Prednisone 50 mg p.o. daily x5 days, taper and discontinue.  I will have patient follow-up in about 1 month without lab prior in order to reassess her blood pressure.  She should contact me for any significant change or lack of resolution of her bronchitis symptoms in the meantime.        Procedures

## 2022-04-29 DIAGNOSIS — J20.9 ACUTE BRONCHITIS WITH BRONCHOSPASM: ICD-10-CM

## 2022-04-29 RX ORDER — ESTRADIOL 1 MG/1
TABLET ORAL
Qty: 90 TABLET | Refills: 3 | Status: SHIPPED | OUTPATIENT
Start: 2022-04-29

## 2022-04-29 RX ORDER — PREDNISONE 10 MG/1
TABLET ORAL
Qty: 46 TABLET | Refills: 0 | OUTPATIENT
Start: 2022-04-29

## 2022-05-02 DIAGNOSIS — J20.9 ACUTE BRONCHITIS WITH BRONCHOSPASM: ICD-10-CM

## 2022-05-02 NOTE — TELEPHONE ENCOUNTER
Caller: Jian Vora    Relationship: Emergency Contact    Best call back number: [phone]    Requested Prescriptions:   Requested Prescriptions     Pending Prescriptions Disp Refills   • HYDROcod Polst-CPM Polst ER (Tussionex Pennkinetic ER) 10-8 MG/5ML ER suspension 180 mL 0     Sig: Take 1 teaspoon p.o. every 12 hours as needed for cough and congestion        Pharmacy where request should be sent: [id number] - [place], [place] - [address] AT [facility] [phone] [facility] [phone]      Additional details provided by patient: PLEASE SEND TO DIFFERENT [facility] LOCATION, ORIGINAL LOCATION NEVER GOT IT IN FOR THE PATIENT SO SHE HAS NOT HAD THIS MEDICATION YET. PLEASE CALL PATIENT'S  WHEN RE-SENT.     Does the patient have less than a 3 day supply:  [x] Yes  [] No    Sierra Reed Rep   05/02/22 15:34 EDT

## 2022-05-25 ENCOUNTER — LAB (OUTPATIENT)
Dept: LAB | Facility: HOSPITAL | Age: 82
End: 2022-05-25

## 2022-05-25 ENCOUNTER — OFFICE VISIT (OUTPATIENT)
Dept: INTERNAL MEDICINE | Facility: CLINIC | Age: 82
End: 2022-05-25

## 2022-05-25 VITALS
RESPIRATION RATE: 18 BRPM | HEART RATE: 81 BPM | OXYGEN SATURATION: 98 % | DIASTOLIC BLOOD PRESSURE: 68 MMHG | BODY MASS INDEX: 25.91 KG/M2 | HEIGHT: 60 IN | WEIGHT: 132 LBS | SYSTOLIC BLOOD PRESSURE: 120 MMHG

## 2022-05-25 DIAGNOSIS — I10 BENIGN ESSENTIAL HYPERTENSION: Primary | Chronic | ICD-10-CM

## 2022-05-25 DIAGNOSIS — Z12.31 BREAST CANCER SCREENING BY MAMMOGRAM: ICD-10-CM

## 2022-05-25 DIAGNOSIS — E87.6 HYPOKALEMIA: ICD-10-CM

## 2022-05-25 DIAGNOSIS — R60.0 PEDAL EDEMA: Chronic | ICD-10-CM

## 2022-05-25 DIAGNOSIS — J20.9 ACUTE BRONCHITIS WITH BRONCHOSPASM: ICD-10-CM

## 2022-05-25 LAB
ALBUMIN SERPL-MCNC: 4.2 G/DL (ref 3.5–5.2)
ALBUMIN/GLOB SERPL: 2.1 G/DL
ALP SERPL-CCNC: 80 U/L (ref 39–117)
ALT SERPL W P-5'-P-CCNC: 22 U/L (ref 1–33)
ANION GAP SERPL CALCULATED.3IONS-SCNC: 11 MMOL/L (ref 5–15)
AST SERPL-CCNC: 21 U/L (ref 1–32)
BILIRUB SERPL-MCNC: 0.2 MG/DL (ref 0–1.2)
BUN SERPL-MCNC: 15 MG/DL (ref 8–23)
BUN/CREAT SERPL: 18.1 (ref 7–25)
CALCIUM SPEC-SCNC: 8.7 MG/DL (ref 8.6–10.5)
CHLORIDE SERPL-SCNC: 103 MMOL/L (ref 98–107)
CO2 SERPL-SCNC: 26 MMOL/L (ref 22–29)
CREAT SERPL-MCNC: 0.83 MG/DL (ref 0.57–1)
EGFRCR SERPLBLD CKD-EPI 2021: 71.8 ML/MIN/1.73
GLOBULIN UR ELPH-MCNC: 2 GM/DL
GLUCOSE SERPL-MCNC: 91 MG/DL (ref 65–99)
POTASSIUM SERPL-SCNC: 4 MMOL/L (ref 3.5–5.2)
PROT SERPL-MCNC: 6.2 G/DL (ref 6–8.5)
SODIUM SERPL-SCNC: 140 MMOL/L (ref 136–145)

## 2022-05-25 PROCEDURE — 99214 OFFICE O/P EST MOD 30 MIN: CPT | Performed by: INTERNAL MEDICINE

## 2022-05-25 PROCEDURE — 36415 COLL VENOUS BLD VENIPUNCTURE: CPT | Performed by: INTERNAL MEDICINE

## 2022-05-25 PROCEDURE — 80053 COMPREHEN METABOLIC PANEL: CPT | Performed by: INTERNAL MEDICINE

## 2022-05-25 NOTE — PROGRESS NOTES
05/25/2022    Patient Information  Sachi Vora                                                                                          [address]   [place] [address]      1940  [unfilled]  There is no work phone number on file.    Chief Complaint:     Follow-up hypertension, recent medication adjustment, recent acute bronchitis with bronchospasm.  Complaining of side effects from medication including swelling in lower extremities and upset stomach from potassium.    History of Present Illness:    Patient with history of hypertension which has been less than optimally controlled here recently.  She was on amlodipine 2.5 mg/day of previously and we increase this to 5 mg.  At 1 point she was on an ACE or an ARB and a water pill.  She had to take potassium but had to discontinue this because it severely upset her stomach.  She currently is having some swelling of her lower extremities and this varies from day-to-day and is particularly bad on hot days.  She seems to be tolerating the amlodipine well other than the leg swelling.  Past medical history reviewed and updated were necessary including health maintenance parameters.  This reveals patient patient has not had her fourth COVID shot which would be the second booster.  See below.    Review of Systems   Constitutional: Negative.   HENT: Negative.    Eyes: Negative.    Cardiovascular: Positive for leg swelling.   Respiratory: Negative.    Endocrine: Negative.    Hematologic/Lymphatic: Negative.    Skin: Negative.    Musculoskeletal: Negative.    Gastrointestinal: Negative.    Genitourinary: Negative.    Neurological: Negative.    Psychiatric/Behavioral: Negative.    Allergic/Immunologic: Negative.        Active Problems:    Patient Active Problem List   Diagnosis   • Osteoporosis, 03/29/2011--lumbar spine -2.8.  Right femoral neck -2.4.  Left femoral neck -2.4.  Patient receives Reclast infusions.   • Therapeutic drug monitoring   •  "Simple renal cyst   • Benign essential hypertension   • Carotid artery plaque, 04/02/2018--mild right ICA plaque, normal left ICA 10/03/2014--mild bilateral carotid artery plaque.   • Chronic gastritis   • Chronic lower back pain   • Chronic otitis externa   • Chronic renal insufficiency, stage II (mild), creatinine 1.12   • Depression with anxiety   • Functional murmur, 07/15/2015--normal echocardiogram.   • Hyperlipidemia   • Menopausal state   • Chronic migraine w/o aura w/o status migrainosus, not intractable   • Pancreatic Duct Dilation   • Pedal edema   • Restless legs syndrome   • Bilateral sensorineural hearing loss   • Vitamin D deficiency   • Chronic gastric ulcer   • Chronic fatigue   • Hypersomnolence   • Chronic anemia   • Multinodular goiter   • Generalized anxiety disorder   • Iron deficiency anemia   • Statin intolerance   • Postmenopausal state   • Pulmonary hypertension (HCC)   • Gastroesophageal reflux disease without esophagitis   • Tinnitus of both ears   • Chronic hoarseness   • Pain disorder associated with psychological factors   • Frequent falls   • Balance disorder         Past Medical History:   Diagnosis Date   • Balance disorder 2/5/2021 February 5, 2021--patient seen in follow-up by Dr. Weir.  I extensively reviewed the documentation from the emergency room visit as noted below.  I reviewed the history with the patient and she is describing episodes of dizziness described as a spinning sensation of her body and this seems to be precipitated by movement although it does not occur if she rolls over in bed.  If she stands up and st   • Benign essential hypertension 4/8/2016   • Bilateral sensorineural hearing loss 3/31/2011    03/31/2011--etiology reveals reverse \"cookie bite\" type of hearing loss for both ears of mild/moderate degree, mostly sensorineural.  Speech discrimination 100% on the right, 96% on the left.   • Carotid artery plaque, 10/03/2014--mild bilateral carotid artery " plaque. 7/25/2012    10/03/2014--[facility] screening revealed mild bilateral carotid plaque, negative for atrial fibrillation, negative for AAA, negative for PAD, osteoporosis screen revealed osteopenia.  Body mass index was 25 and considered to be moderate risk.  07/25/2012--vascular screen negative for carotid plaque, negative for abdominal aneurysm, negative for PAD Description: 10/03/2014--[facility] screening revealed mild bilateral carotid plaque, negative for atrial fibrillation, negative for AAA, negative for PAD, osteoporosis screen revealed osteopenia.  Body mass index was 25 and considered to be moderate risk.  07/25/2012--vascular screen negative for carotid plaque, negative for abdominal aneurysm, negative for PAD   • Chronic anemia 8/23/2016 06/13/2017--colonoscopy revealed melanosis.  Biopsied.  There was friability with contact bleeding in the ascending colon.  Biopsied.  Pathology returned consistent with melanosis coli.  06/13/2017--EGD revealed normal esophagus.  Biopsies taken.  There was a medium-sized hiatal hernia.  Localized mild inflammation and linear erosions were found in the prepyloric region.  Biopsied.  Examined duodenum was normal.  Random biopsies taken.  Pathology of the gastroesophageal junction was unremarkable as was the gastric fundus.  Prepyloric biopsy revealed minimal chronic inflammation and reactive change.  H pylori negative.  Duodenal biopsies are negative.  04/12/2017--hemoglobin low at 10.8, hematocrit is normal at 35.7.  Iron sulfate 325 mg per day initiated.  08/23/2016--routine follow-up.  Patient continues to have epigastric abdominal pain believed to be related to reflux with possible esophageal spasm.  Hemoglobin noted to be low at 10.6 with a hematocrit low at 33.7 and RDW elevated at 16.2.  Homocysti   • Chronic fatigue 4/21/2016 06/14/2017--overnight oximetry revealed oxygen desaturation index of only 0.44.  There were only 10 total desaturations during the  period of testing which lasted 6 hours and 46 minutes.  05/31/2017--patient seen in follow-up and reports she continues to have chronic fatigue as well as hypersomnolence.  Once again, she is on multiple medications that are undoubtedly contributing to this problem including clonazepam and hydrocodone but I doubt that these could be discontinued.  Her CBC back in April revealed a low hemoglobin of 10.8 but hematocrit was normal at 35.7.  Thyroid function tests were normal and CMP normal.  Overnight oximetry test ordered.  Repeat CBC.  Iron studies.  Sedimentation rate and CRP.  04/11/2017--patient seen in follow-up and her blood pressure is now at a reasonable level at 120/64.  She reports she still feels somewhat fatigued but she is much better.  Patient has not been doing any regular exercise and I do think that that would be helpful to reduce her feeling of fatigue.  She is   • Chronic gastric ulcer 4/14/2014    02/15/2017--patient seen in follow-up in nearly 6 months later.  She has complaints of not feeling well all over.  She has complaints of diffuse myalgias and possibly tendinopathy related to Levaquin that I called in prior to her going to [place] for vacation.  She reports that 3 or 4 days after starting the Levaquin she began to have the musculoskeletal symptoms and she reports that she continues to have them presently.  Symptoms are worse involving her left calf area.  Examination reveals significant tenderness involving the left calf and the left calf seems a little larger than the right.  She also has complaints of shortness of breath but no chest pain.  She is complaining of double vision and generalized weakness and fatigue.  She was complaining of chronic fatigue at the last visit back in September and I ordered an overnight oximetry test but apparently this was never done for reasons that are not clear to me.  Her current oxygen saturation is 94% and normally it is 100%.  Plan is as follows:  Extensi   • Chronic gastritis 11/19/2001    02/15/2017--patient seen in follow-up in nearly 6 months later.  She has complaints of not feeling well all over.  She has complaints of diffuse myalgias and possibly tendinopathy related to Levaquin that I called in prior to her going to [place] for vacation.  She reports that 3 or 4 days after starting the Levaquin she began to have the musculoskeletal symptoms and she reports that she continues to have them presently.  Symptoms are worse involving her left calf area.  Examination reveals significant tenderness involving the left calf and the left calf seems a little larger than the right.  She also has complaints of shortness of breath but no chest pain.  She is complaining of double vision and generalized weakness and fatigue.  She was complaining of chronic fatigue at the last visit back in September and I ordered an overnight oximetry test but apparently this was never done for reasons that are not clear to me.  Her current oxygen saturation is 94% and normally it is 100%.  Plan is as follows: Extensi   • Chronic hoarseness 6/8/2020    September 15, 2020--patient presents with complaints of swelling of the soft tissues of the left side of the neck and this is associated with pain and discomfort, particularly when she turns her head rotating to the left.  She also notices hoarseness and feels that her voice has changed.  On exam there is definite prominence of the left sternocleidomastoid muscle and there may be some shotty lymph   • Chronic lower back pain 1/31/2006 08/11/2014--MRI of the lumbar spine reveals the conus terminates at L2 and is normal.  Cauda equina unremarkable.  Stable to moderate degenerative disc disease at L5-S1.  No acute fracture or pars defect is demonstrated.  Small synovial cysts are seen posterior to the L4-L5 facet.  The perivertebral soft tissues are unremarkable.  T12-L1, L1-L2, L2-L3 are negative.  L3-L4 a broad-based disc bulge  resulting in mild bilateral neural foraminal narrowing.  L4-L5 reveals a broad-based disc bulge facet disease resulting in mild bilateral neural foraminal narrowing.  L5-S1 reveals a broad-based disc bulge, posterior osseous slipping, and facet disease resulting in mild to moderate bilateral neural foraminal narrowing.  Assessment is stable mild to moderate degenerative spondylosis.  Small synovial cysts are seen posterior to the L4-L5 facets.  08/11/2014--MRI of the thoracic spine reveals mild to moderate thoracic kyphosis.  No fracture.  At T5--T6 there is a moderate sized left paracentral disc protrusion which i   • Chronic migraine 11/3/2009    01/18/2010--MRI of the brain performed for headaches and memory loss.  Mild small vessel disease in the cerebral and central pontine white matter.  There is an ovoid, somewhat pancake-shaped area of signal abnormality in the anterior inferior right temporal lobe subcortical to the juxtacortical white matter that measures 1.2 x 1 cm and anterior posterior and medial lateral dimension but only measures 3 mm in cranial caudal dimension.  I suspect that this is a benign cyst or a cystic area of encephalomalacia.  The remainder of the MRI of the head is within normal limits.  11/03/2009--CT scan of the brain without contrast performed for headache after a fall.  Mild diffuse atrophy.  No acute abnormality noted.; Description: Patient has had a long history of migraine headaches.  MRI and CT scan of the brain have been essentially negative.   • Chronic otitis externa 4/8/2016   • Chronic renal insufficiency, stage II (mild), creatinine 1.12 11/14/2015 11/14/2015--serum creatinine mildly elevated at 1.12.   • Depression with anxiety 4/8/2016   • Frequent falls 2/5/2021 February 5, 2021--patient seen in follow-up by Dr. Weir.  I extensively reviewed the documentation from the emergency room visit as noted below.  I reviewed the history with the patient and she is  describing episodes of dizziness described as a spinning sensation of her body and this seems to be precipitated by movement although it does not occur if she rolls over in bed.  If she stands up and st   • Functional murmur, 07/15/2015--normal echocardiogram. 7/15/2015    07/15/2015--echocardiogram reveals normal left ventricular size and function with ejection fraction 55%.  Grade 1 diastolic dysfunction, abnormal relaxation pattern.  Trace tricuspid regurgitation.  Estimated right ventricular systolic pressure is 25 mmHg which is normal.   • Gastroesophageal reflux disease with esophagitis 11/19/2001 06/13/2017--EGD revealed normal esophagus.  Biopsies taken.  There was a medium-sized hiatal hernia.  Localized mild inflammation and linear erosions were found in the prepyloric region.  Biopsied.  Examined duodenum was normal.  Random biopsies taken.  Pathology of the gastroesophageal junction was unremarkable as was the gastric fundus.  Prepyloric biopsy revealed minimal chronic inflammation and reactive change.  H pylori negative.  Duodenal biopsies are negative.  11/03/2014--patient seen in follow-up and reports her epigastric pain/chest pain has resolved.  I reviewed the results of the studies with her.  It does not appear that her problem is related to biliary tract disease.  She does have reflux and although the recent upper GI revealed minimal reflux, I do think her symptoms are related to esophageal reflux with esophageal spasm.  10/21/2014--air-contrast upper GI series revealed trace upper laryngeal penetration.  Persistent small partially reducible sliding hiatal hernia the upper stomach with    • Gastroesophageal reflux disease without esophagitis 6/6/2019   • Generalized anxiety disorder 4/11/2017   • History of Acute deep vein thrombosis (DVT) of distal end of left lower extremity 2/15/2017    03/21/2017 patient seen in follow-up and she is tolerating the Eliquis well without signs or symptoms of  bleeding.  Her calf swelling and tenderness is better but not totally resolved.  I suspect that the DVT is chronic and may not resolve at all.  I will order a repeat venous study and then proceed from there.  02/23/2017--repeat Doppler venous study of the left lower extremity reveals a chronic left lower extremity DVT in the posterior tibial.  All other left sided veins appeared normal.  Fluid collection in the left calf noted.  02/17/2017--patient seen in follow-up and reports her left lower extremity symptoms are about the same.  She continues to have complaints of profound fatigue which I think is multifactorial including underlying depression that is not in remission.  Review the results of the CTA of the chest including the possible thyroid lesion.  I do not think this is contributing to any of her symptoms of fatigue particularly given the fact that her thyroid function tests are normal.  I expla   • History of palpitations 12/07/2011    Patient has had multiple admissions to the hospital for complaints of chest pain and heart palpitations. She meant admitted at least on 3 occasions. She has had at least 3 stress Cardiolite and 1 stress ECG, the last Cardiolite being performed 12/07/2011 which was negative. Patient is also had Holter monitors which have been unremarkable.   • HIstory of Schatzki's ring 02/28/2012 02/28/2012--air-contrast upper GI revealed small to moderate sized reducible sliding hiatal herniation of the upper stomach with some demonstrated gastroesophageal reflux. No esophageal, gastric, or duodenal mass or mucosal ulceration was seen.  11/03/2008--EGD performed for evaluation of iron deficiency anemia revealed hiatal hernia without evidence of reflux, prepyloric antritis and   • Hyperlipidemia 4/8/2016   • Hypersomnolence 5/5/2016 06/14/2017--overnight oximetry revealed oxygen desaturation index of only 0.44.  There were only 10 total desaturations during the period of testing which  lasted 6 hours and 46 minutes.  05/31/2017--patient seen in follow-up and reports she continues to have chronic fatigue as well as hypersomnolence.  Once again, she is on multiple medications that are undoubtedly contributing to this problem including clonazepam and hydrocodone but I doubt that these could be discontinued.  Her CBC back in April revealed a low hemoglobin of 10.8 but hematocrit was normal at 35.7.  Thyroid function tests were normal and CMP normal.  Overnight oximetry test ordered.  Repeat CBC.  Iron studies.  Sedimentation rate and CRP.  04/11/2017--patient seen in follow-up and her blood pressure is now at a reasonable level at 120/64.  She reports she still feels somewhat fatigued but she is much better.  Patient has not been doing any regular exercise and I do think that that would be helpful to reduce her feeling of fatigue.  She is   • Menopausal state 4/8/2016   • Multinodular goiter 2/17/2017 02/21/2017--thyroid ultrasound reveals multinodular thyroid with largest nodule measuring on the order of 1.6 cm in greatest dimension.  02/17/2017--patient seen in follow-up for DVT and CTA of the chest.  An incidental finding on the CTA of the chest reveals a 1.7 cm lesion in the right lobe of thyroid.  Note that thyroid function tests are currently normal.  Ultrasound of the thyroid ordered.   • Osteoporosis, 03/29/2011--lumbar spine -2.8.  Right femoral neck -2.4.  Left femoral neck -2.4.  Patient receives Reclast infusions. 2/9/2009 04/19/2017--Reclast infusion.  04/05/2016--Reclast infusion.  06/04/2012--Reclast infusion.  05/26/2011--Reclast infusion.  03/29/2011--DEXA scan revealed a lumbar T score of -2.8, right femoral neck T score -2.4, left femoral neck T score -2.4.  Osteoporosis of the lumbar spine and severe osteopenia of the hips bilaterally.  Patient has been intolerant to Fosamax because of gastritis and gastroesophageal reflux.  04/01/2009--treatment for osteoporosis begun with  Fosamax.  02/09/2009--DEXA scan revealed lumbar spine T score of -3.3.  Left femoral neck T score -2.5.  Right hip T score -2.6.  Osteoporosis.    • Pain disorder associated with psychological factors 10/10/2012   • Pain disorder with psychological factors 10/10/2012   • Pancreatic Duct Dilation 11/30/2001 09/29/2014--patient was again evaluated by the urologist for a renal cyst.  CT scan of the abdomen and pelvis reveals a left renal cyst measuring 5.1 x 4.9 cm.  There were subcentimeter hypodense renal lesions that are too small to further characterize and are presumably related to cysts.  Recommend attention to follow up.  Distal dilated pancreatic duct noted.  The common bile duct is the upper limits of normal in size.  The ampullary region is not well evaluated.  Comparison with prior imaging is recommended if available.  If there is no prior film available for comparison, and ERCP or MRCP could be performed to further evaluate.  Small hiatal hernia noted.  03/05/2012--CT scan of the abdomen with contrast, pancreatic protocol.  This reveals some fullness of the pancreatic ductal system and apparent pancreatic divisum with separate entrance of the accessory pancreatic duct extending into the duodenum distal to the main pancreatic duct.  There is fullness of the duct diffusely but similar to the previous s   • Pedal edema 6/29/2015 06/29/2015--patient presents with a 4-6 week history of exertional dyspnea that comes on with activity such as climbing stairs or walking her dog up an incline.  No chest pain.  Relieved with rest.  No cough.  She does have complaints of her feet and legs swelling that is particularly worse at the end of the day and not improved overnight.  No orthopnea or PND.  Chest exam reveals faint rales at the bases.  Otherwise clear.  Heart is regular and I do not appreciate a heart murmur.  Chest x-ray PA and lateral ordered.  Echocardiogram ordered.   • Pulmonary hypertension (HCC)  4/18/2019   • Restless legs syndrome 4/8/2016   • Simple renal cyst 7/20/2009 09/29/2014--patient was again evaluated by the urologist for a renal cyst.  CT scan of the abdomen and pelvis reveals a left renal cyst measuring 5.1 x 4.9 cm.  There were subcentimeter hypodense renal lesions that are too small to further characterize and are presumably related to cysts.  Recommend attention to follow up.  Distal dilated pancreatic duct noted.  The common bile duct is the upper limits of normal in size.  The ampullary region is not well evaluated.  Comparison with prior imaging is recommended if available.  If there is no prior film available for comparison, and ERCP or MRCP could be performed to further evaluate.  Small hiatal hernia noted.  07/20/2009--patient was noted to have a left renal mass consistent with a cyst.  This was evaluated by the urologist 07/20/2009.   • Statin intolerance 10/30/2017   • Tinnitus of both ears 9/30/2019 September 30, 2019--patient presents with a several year history of bilateral tinnitus, right greater than left.  It seems to be getting worse and now is associated with fluctuating hearing.  She occasionally will have worsening hearing after she coughs or sneezes.  On exam she has evidence of old otitis media scars, particularly on the right.  There is no acute infection present and there is no a   • Vitamin D deficiency 4/8/2016         Past Surgical History:   Procedure Laterality Date   • APPENDECTOMY  1965 1965   • CATARACT EXTRACTION Bilateral Remote    Remote bilateral cataract extirpation with intraocular lens implantation.   • CHOLECYSTECTOMY WITH INTRAOPERATIVE CHOLANGIOGRAM N/A 1/21/2019 01/21/2019--laparoscopic paraesophageal hernia repair with fundoplication.   • COLONOSCOPY  11/03/2008 2008--normal colonoscopy   • COLONOSCOPY  2001 2001--normal colonoscopy.   • COLONOSCOPY N/A 6/13/2017 06/13/2017--colonoscopy revealed melanosis.  Biopsied.  There was  friability with contact bleeding in the ascending colon.  Biopsied.  Pathology returned consistent with melanosis coli.   • ENDOSCOPY  10/08/2014    02/28/2012--air-contrast upper GI revealed small to moderate sized reducible sliding hiatal herniation of the upper stomach with some demonstrated gastroesophageal reflux. No esophageal, gastric, or duodenal mass or mucosal ulceration was seen. 11/03/2008--EGD performed for evaluation of iron deficiency anemia revealed hiatal hernia without evidence of reflux, prepyloric antritis and   • ENDOSCOPY  11/03/2008 11/03/2008--EGD performed for evaluation of iron deficiency anemia revealed hiatal hernia without evidence of reflux, prepyloric antritis and   • ENDOSCOPY  11/19/2001 11/19/2001--EGD revealed a very tortuous distal esophagus with a Schatzki's ring. No ulcer or erosions. No Dorado's mucosa. Stomach revealed patchy erythema and erosions in the antrum. Biopsy. Normal pylorus with no obstruction. Normal duodenum with no ulcers. The Schatzki's ring was dilated with a Rhodes dilator.;   • ENDOSCOPY N/A 9/27/2016 09/27/2016--EGD revealed a normal oropharynx, esophagus, and medium sized hiatal hernia.  Nonbleeding gastric ulcer with no stigmata of bleeding.  Biopsy.  Gastritis.  Biopsy.  Normal examined duodenum.  Biopsied.  Pathology returned mild to moderate chronic active gastritis with ulceration from the stomach antral ulcer biopsy.  Gastroesophageal junction biopsy revealed minimal mixed inflammation.   • ENDOSCOPY N/A 6/13/2017 06/13/2017--colonoscopy revealed melanosis.  Biopsied.  There was friability with contact bleeding in the ascending colon.  Biopsied.  Pathology returned consistent with melanosis coli.   • ERCP N/A 1/22/2019 01/22/2019--ERCP with sphincterotomy and balloon stone extraction.   • ESOPHAGEAL DILATATION  11/19/2001 11/19/2001--EGD revealed a very tortuous distal esophagus with a Schatzki's ring. No ulcer or erosions. No  Dorado's mucosa. Stomach revealed patchy erythema and erosions in the antrum. Biopsy. Normal pylorus with no obstruction. Normal duodenum with no ulcers. The Schatzki's ring was dilated with a Rhodes dilator.;    • ESOPHAGEAL MANOMETRY N/A 12/18/2018    Procedure: ESOPHAGEAL MANOMETRY;  Surgeon: Cecilia, Nurse Performed;  Location: [facility] ENDOSCOPY;  Service: Gastroenterology   • ESOPHAGOSCOPY N/A 1/21/2019    Procedure: FLEXIBLE ESOPHAGOSCOPY;  Surgeon: Reji Johnson MD;  Location: [facility] MAIN OR;  Service: General   • HIATAL HERNIA REPAIR N/A 1/21/2019    Procedure: Laparoscopic paraesophageal hernia repair with toupee fundoplication;  Surgeon: Reji Johnson MD;  Location: [facility] OR;  Service: General   • INCONTINENCE SURGERY  1979 1979--a bladder tack procedure for urinary stress incontinence   • KNEE ARTHROSCOPY Right 12/12/2011 12/12/2011--right knee arthroscopy with partial lateral and medial meniscectomies.   • SKIN SURGERY  05/25/2010 05/25/2010--skin lesion excised from right lower extremity. 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   • TOTAL ABDOMINAL HYSTERECTOMY WITH SALPINGO OOPHORECTOMY  1974 1974--total abdominal hysterectomy.         Allergies   Allergen Reactions   • Statins Nausea And Vomiting   • Morphine Hallucinations   • Penicillins Itching   • Tetanus Toxoids Itching           Current Outpatient Medications:   •  amLODIPine (NORVASC) 5 MG tablet, Take 1 p.o. every morning for high blood pressure, Disp: 30 tablet, Rfl: 6  •  aspirin 81 MG EC tablet, Take 81 mg by mouth Daily. HELD, Disp: , Rfl:   •  Brexpiprazole (Rexulti) 1 MG tablet, Rexulti 1 mg tablet, Disp: , Rfl:   •  buPROPion XL (WELLBUTRIN XL) 300 MG 24 hr tablet, Take 300 mg by mouth Every Morning., Disp: , Rfl:   •  Cholecalciferol (vitamin D3) 125 MCG (5000 UT) capsule capsule, 1 by mouth daily as directed, Disp: 30 capsule, Rfl:   •  Cimetidine (TAGAMET PO), Tagamet,  Disp: , Rfl:   •  cycloSPORINE (RESTASIS) 0.05 % ophthalmic emulsion, Administer 1 drop to both eyes 2 (Two) Times a Day., Disp: , Rfl:   •  diclofenac (VOLTAREN) 1 % gel gel, Apply 4 g topically to the appropriate area as directed 4 (Four) Times a Day As Needed., Disp: , Rfl:   •  diphenhydrAMINE-APAP, sleep, (Tylenol PM Extra Strength)  MG/30ML liquid, Tylenol PM Extra Strength, Disp: , Rfl:   •  estradiol (ESTRACE) 1 MG tablet, TAKE 1 TABLET BY MOUTH EVERY DAY, Disp: 90 tablet, Rfl: 3  •  HYDROcodone-acetaminophen (NORCO)  MG per tablet, Take 1 tablet by mouth every 6 (six) hours as needed for moderate pain (4-6)., Disp: , Rfl:   •  Misc Natural Products (COLON CLEANSE PO), Take  by mouth., Disp: , Rfl:   •  Multiple Vitamins-Minerals (MULTIVITAMIN ADULT) chewable tablet, Chew 1 tablet Daily., Disp: , Rfl:   •  Specialty Vitamins Products (ONE-A-DAY BONE STRENGTH PO), One-A-Day Bone Strength, Disp: , Rfl:   •  topiramate (TOPAMAX) 100 MG tablet, TAKE 1 TABLET DAILY, Disp: 90 tablet, Rfl: 4  •  traZODone (DESYREL) 50 MG tablet, Take 25-50 mg by mouth Every Night., Disp: , Rfl:   •  vitamin E 400 UNIT capsule, Take 400 Units by mouth Daily., Disp: , Rfl:       Family History   Problem Relation Age of Onset   • Heart attack Mother         dies age 47 from heart attack   • Heart disease Mother    • Bleeding Disorder Mother    • Heart attack Maternal Aunt         dies age 60 from heart attack   • Heart disease Father    • Malig Hyperthermia Neg Hx          Social History     Socioeconomic History   • Marital status:      Spouse name: ander   • Number of children: 4   • Years of education: 14   • Highest education level: Associate degree: academic program   Tobacco Use   • Smoking status: Never Smoker   • Smokeless tobacco: Never Used   • Tobacco comment: no caffeine    Vaping Use   • Vaping Use: Never used   Substance and Sexual Activity   • Alcohol use: No   • Drug use: No   • Sexual activity:  "Yes     Partners: Male         Vitals:    05/25/22 1330   BP: 120/68   Pulse: 81   Resp: 18   SpO2: 98%   Weight: 59.9 kg (132 lb)   Height: 152.4 cm (60\")        Body mass index is 25.78 kg/m².      Physical Exam:    General: Alert and oriented x 3.  No acute distress.  Normal affect.  HEENT: Pupils equal, round, reactive to light; extraocular movements intact; sclerae nonicteric; pharynx, ear canals and TMs normal.  Neck: Without JVD, thyromegaly, bruit, or adenopathy.  Lungs: Clear to auscultation in all fields.  Heart: Regular rate and rhythm without murmur, rub, gallop, or click.  Abdomen: Soft, nontender, without hepatosplenomegaly or hernia.  Bowel sounds normal.  : Deferred.  Rectal: Deferred.  Extremities: Without clubbing, cyanosis,  or pulse deficit.  There is Maybe trace bilateral pretibial edema.  Neurologic: Intact without focal deficit.  Normal station and gait observed during ingress and egress from the examination room.  Skin: Without significant lesion.  Musculoskeletal: Unremarkable.    Lab/other results:      Assessment/Plan:     Diagnosis Plan   1. Benign essential hypertension  Comprehensive Metabolic Panel   2. Acute bronchitis with bronchospasm     3. Hypokalemia  Comprehensive Metabolic Panel   4. Pedal edema     5. Breast cancer screening by mammogram  Mammo Screening Bilateral With CAD     Patient blood pressure is definitely better and appears controlled.  She has a history of hypokalemia and this needs to be reassessed particularly given the fact that she could not tolerate potassium supplementation.  She got a history of lower extremity edema and after increasing the amlodipine to 5 mg it may be a little worse although she does not have any significant edema today.  This will need to be monitored.  Her bronchitis totally resolved.    Plan is as follows: No change in current medical regimen at present time.  Check CMP today.  I will contact patient tomorrow with results and possible " further instructions.  Patient should contact me if she has any significant worsening of her lower extremity edema.  1 option we have is to start spironolactone and I will make that decision either tomorrow or later and patient has significant edema.  We will go ahead and schedule her for wellness visit and lab and follow-up after December 30, 2022.  Mammogram ordered.      Procedures

## 2022-06-02 ENCOUNTER — TRANSCRIBE ORDERS (OUTPATIENT)
Dept: ADMINISTRATIVE | Facility: HOSPITAL | Age: 82
End: 2022-06-02

## 2022-06-02 DIAGNOSIS — Z92.89 HISTORY OF MAMMOGRAPHY, SCREENING: Primary | ICD-10-CM

## 2022-06-16 ENCOUNTER — TRANSCRIBE ORDERS (OUTPATIENT)
Dept: ADMINISTRATIVE | Facility: HOSPITAL | Age: 82
End: 2022-06-16

## 2022-06-16 ENCOUNTER — HOSPITAL ENCOUNTER (OUTPATIENT)
Dept: GENERAL RADIOLOGY | Facility: HOSPITAL | Age: 82
Discharge: HOME | End: 2022-06-16

## 2022-06-16 ENCOUNTER — HOSPITAL ENCOUNTER (OUTPATIENT)
Dept: MAMMOGRAPHY | Facility: HOSPITAL | Age: 82
Discharge: HOME | End: 2022-06-16

## 2022-06-16 DIAGNOSIS — M25.562 LEFT KNEE PAIN, UNSPECIFIED CHRONICITY: ICD-10-CM

## 2022-06-16 DIAGNOSIS — Z92.89 HISTORY OF MAMMOGRAPHY, SCREENING: ICD-10-CM

## 2022-06-16 DIAGNOSIS — M25.562 LEFT KNEE PAIN, UNSPECIFIED CHRONICITY: Primary | ICD-10-CM

## 2022-06-16 PROCEDURE — 77067 SCR MAMMO BI INCL CAD: CPT

## 2022-06-16 PROCEDURE — 73562 X-RAY EXAM OF KNEE 3: CPT

## 2022-06-16 PROCEDURE — 77063 BREAST TOMOSYNTHESIS BI: CPT

## 2022-07-21 ENCOUNTER — OFFICE VISIT (OUTPATIENT)
Dept: INTERNAL MEDICINE | Facility: CLINIC | Age: 82
End: 2022-07-21

## 2022-07-21 ENCOUNTER — TELEPHONE (OUTPATIENT)
Dept: INTERNAL MEDICINE | Facility: CLINIC | Age: 82
End: 2022-07-21

## 2022-07-21 ENCOUNTER — HOSPITAL ENCOUNTER (OUTPATIENT)
Dept: CT IMAGING | Facility: HOSPITAL | Age: 82
Discharge: HOME | End: 2022-07-21
Admitting: INTERNAL MEDICINE

## 2022-07-21 VITALS
RESPIRATION RATE: 18 BRPM | WEIGHT: 130 LBS | SYSTOLIC BLOOD PRESSURE: 186 MMHG | HEART RATE: 77 BPM | OXYGEN SATURATION: 97 % | HEIGHT: 60 IN | DIASTOLIC BLOOD PRESSURE: 84 MMHG | BODY MASS INDEX: 25.52 KG/M2

## 2022-07-21 DIAGNOSIS — R60.0 BILATERAL LOWER EXTREMITY EDEMA: Chronic | ICD-10-CM

## 2022-07-21 DIAGNOSIS — S09.90XA CLOSED HEAD INJURY, INITIAL ENCOUNTER: ICD-10-CM

## 2022-07-21 DIAGNOSIS — E87.6 HYPOKALEMIA: ICD-10-CM

## 2022-07-21 DIAGNOSIS — R29.6 FREQUENT FALLS: Chronic | ICD-10-CM

## 2022-07-21 DIAGNOSIS — I10 BENIGN ESSENTIAL HYPERTENSION: Primary | Chronic | ICD-10-CM

## 2022-07-21 DIAGNOSIS — R26.89 BALANCE DISORDER: Chronic | ICD-10-CM

## 2022-07-21 DIAGNOSIS — N18.2 CHRONIC RENAL INSUFFICIENCY, STAGE II (MILD): Chronic | ICD-10-CM

## 2022-07-21 DIAGNOSIS — E26.9 HYPERALDOSTERONISM: ICD-10-CM

## 2022-07-21 PROCEDURE — 70450 CT HEAD/BRAIN W/O DYE: CPT

## 2022-07-21 PROCEDURE — 99214 OFFICE O/P EST MOD 30 MIN: CPT | Performed by: INTERNAL MEDICINE

## 2022-07-21 RX ORDER — SPIRONOLACTONE 50 MG/1
TABLET, FILM COATED ORAL
Qty: 90 TABLET | Refills: 1 | Status: SHIPPED | OUTPATIENT
Start: 2022-07-21 | End: 2022-09-06

## 2022-07-21 RX ORDER — AMLODIPINE BESYLATE 5 MG/1
TABLET ORAL
Qty: 30 TABLET | Refills: 6 | Status: SHIPPED | OUTPATIENT
Start: 2022-07-21 | End: 2023-01-19

## 2022-07-21 NOTE — PROGRESS NOTES
07/21/2022    Patient Information  Sachi Vora                                                                                          [address]   [place] [address]      1940  [unfilled]  There is no work phone number on file.    Chief Complaint:     My blood pressure has been elevated.  Recent fall striking head.    History of Present Illness:    Patient with multiple medical problems including osteoporosis, carotid artery plaque, chronic gastritis, chronic lower back pain, stage II chronic renal insufficiency, depression with anxiety and generalized anxiety, hyperlipidemia, chronic migraine, pancreatic duct dilatation, lower extremity edema, restless leg syndrome, bilateral sensorineural hearing loss, chronic gastritis, chronic fatigue, iron deficiency anemia, multinodular goiter, chronic anemia, statin intolerance, pulmonary hypertension, esophageal reflux, chronic pain, balance disorder and frequent falls.  She presents today because she reports her blood pressure has been elevated for some time now.    See below in assessment and plan regarding the history from recent fall.    Review of Systems   Constitutional: Positive for malaise/fatigue.   HENT: Negative.    Eyes: Negative.    Cardiovascular: Positive for leg swelling.   Respiratory: Negative.    Endocrine: Negative.    Hematologic/Lymphatic: Negative.    Skin: Negative.    Musculoskeletal: Positive for arthritis, back pain, joint pain, neck pain and stiffness.   Gastrointestinal: Negative.    Genitourinary: Negative.    Neurological: Negative.    Psychiatric/Behavioral: Positive for depression. The patient is nervous/anxious.    Allergic/Immunologic: Negative.        Active Problems:    Patient Active Problem List   Diagnosis   • Osteoporosis, 03/29/2011--lumbar spine -2.8.  Right femoral neck -2.4.  Left femoral neck -2.4.  Patient receives Reclast infusions.   • Therapeutic drug monitoring   • Simple renal cyst   •  "Benign essential hypertension   • Carotid artery plaque, 04/02/2018--mild right ICA plaque, normal left ICA 10/03/2014--mild bilateral carotid artery plaque.   • Chronic gastritis   • Chronic lower back pain   • Chronic otitis externa   • Chronic renal insufficiency, stage II (mild), creatinine 1.12   • Depression with anxiety   • Functional murmur, 07/15/2015--normal echocardiogram.   • Hyperlipidemia   • Menopausal state   • Chronic migraine w/o aura w/o status migrainosus, not intractable   • Pancreatic Duct Dilation   • Bilateral lower extremity edema   • Restless legs syndrome   • Bilateral sensorineural hearing loss   • Vitamin D deficiency   • Chronic gastric ulcer   • Chronic fatigue   • Hypersomnolence   • Chronic anemia   • Multinodular goiter   • Generalized anxiety disorder   • Iron deficiency anemia   • Statin intolerance   • Postmenopausal state   • Pulmonary hypertension (HCC)   • Gastroesophageal reflux disease without esophagitis   • Tinnitus of both ears   • Chronic hoarseness   • Pain disorder associated with psychological factors   • Frequent falls   • Balance disorder   • Hyperaldosteronism (HCC)   • Hypokalemia         Past Medical History:   Diagnosis Date   • Balance disorder 02/05/2021 February 5, 2021--patient seen in follow-up by Dr. Weir.  I extensively reviewed the documentation from the emergency room visit as noted below.  I reviewed the history with the patient and she is describing episodes of dizziness described as a spinning sensation of her body and this seems to be precipitated by movement although it does not occur if she rolls over in bed.  If she stands up and st   • Benign essential hypertension 04/08/2016   • Bilateral sensorineural hearing loss 03/31/2011 03/31/2011--etiology reveals reverse \"cookie bite\" type of hearing loss for both ears of mild/moderate degree, mostly sensorineural.  Speech discrimination 100% on the right, 96% on the left.   • Carotid artery " plaque, 10/03/2014--mild bilateral carotid artery plaque. 07/25/2012    10/03/2014--[facility] screening revealed mild bilateral carotid plaque, negative for atrial fibrillation, negative for AAA, negative for PAD, osteoporosis screen revealed osteopenia.  Body mass index was 25 and considered to be moderate risk.  07/25/2012--vascular screen negative for carotid plaque, negative for abdominal aneurysm, negative for PAD Description: 10/03/2014--[facility] screening revealed mild bilateral carotid plaque, negative for atrial fibrillation, negative for AAA, negative for PAD, osteoporosis screen revealed osteopenia.  Body mass index was 25 and considered to be moderate risk.  07/25/2012--vascular screen negative for carotid plaque, negative for abdominal aneurysm, negative for PAD   • Chronic anemia 08/23/2016 06/13/2017--colonoscopy revealed melanosis.  Biopsied.  There was friability with contact bleeding in the ascending colon.  Biopsied.  Pathology returned consistent with melanosis coli.  06/13/2017--EGD revealed normal esophagus.  Biopsies taken.  There was a medium-sized hiatal hernia.  Localized mild inflammation and linear erosions were found in the prepyloric region.  Biopsied.  Examined duodenum was normal.  Random biopsies taken.  Pathology of the gastroesophageal junction was unremarkable as was the gastric fundus.  Prepyloric biopsy revealed minimal chronic inflammation and reactive change.  H pylori negative.  Duodenal biopsies are negative.  04/12/2017--hemoglobin low at 10.8, hematocrit is normal at 35.7.  Iron sulfate 325 mg per day initiated.  08/23/2016--routine follow-up.  Patient continues to have epigastric abdominal pain believed to be related to reflux with possible esophageal spasm.  Hemoglobin noted to be low at 10.6 with a hematocrit low at 33.7 and RDW elevated at 16.2.  Homocysti   • Chronic fatigue 04/21/2016 06/14/2017--overnight oximetry revealed oxygen desaturation index of only  0.44.  There were only 10 total desaturations during the period of testing which lasted 6 hours and 46 minutes.  05/31/2017--patient seen in follow-up and reports she continues to have chronic fatigue as well as hypersomnolence.  Once again, she is on multiple medications that are undoubtedly contributing to this problem including clonazepam and hydrocodone but I doubt that these could be discontinued.  Her CBC back in April revealed a low hemoglobin of 10.8 but hematocrit was normal at 35.7.  Thyroid function tests were normal and CMP normal.  Overnight oximetry test ordered.  Repeat CBC.  Iron studies.  Sedimentation rate and CRP.  04/11/2017--patient seen in follow-up and her blood pressure is now at a reasonable level at 120/64.  She reports she still feels somewhat fatigued but she is much better.  Patient has not been doing any regular exercise and I do think that that would be helpful to reduce her feeling of fatigue.  She is   • Chronic gastric ulcer 04/14/2014    02/15/2017--patient seen in follow-up in nearly 6 months later.  She has complaints of not feeling well all over.  She has complaints of diffuse myalgias and possibly tendinopathy related to Levaquin that I called in prior to her going to [place] for vacation.  She reports that 3 or 4 days after starting the Levaquin she began to have the musculoskeletal symptoms and she reports that she continues to have them presently.  Symptoms are worse involving her left calf area.  Examination reveals significant tenderness involving the left calf and the left calf seems a little larger than the right.  She also has complaints of shortness of breath but no chest pain.  She is complaining of double vision and generalized weakness and fatigue.  She was complaining of chronic fatigue at the last visit back in September and I ordered an overnight oximetry test but apparently this was never done for reasons that are not clear to me.  Her current oxygen  saturation is 94% and normally it is 100%.  Plan is as follows: Petersi   • Chronic gastritis 11/19/2001    02/15/2017--patient seen in follow-up in nearly 6 months later.  She has complaints of not feeling well all over.  She has complaints of diffuse myalgias and possibly tendinopathy related to Levaquin that I called in prior to her going to [place] for vacation.  She reports that 3 or 4 days after starting the Levaquin she began to have the musculoskeletal symptoms and she reports that she continues to have them presently.  Symptoms are worse involving her left calf area.  Examination reveals significant tenderness involving the left calf and the left calf seems a little larger than the right.  She also has complaints of shortness of breath but no chest pain.  She is complaining of double vision and generalized weakness and fatigue.  She was complaining of chronic fatigue at the last visit back in September and I ordered an overnight oximetry test but apparently this was never done for reasons that are not clear to me.  Her current oxygen saturation is 94% and normally it is 100%.  Plan is as follows: Petersi   • Chronic hoarseness 06/08/2020    September 15, 2020--patient presents with complaints of swelling of the soft tissues of the left side of the neck and this is associated with pain and discomfort, particularly when she turns her head rotating to the left.  She also notices hoarseness and feels that her voice has changed.  On exam there is definite prominence of the left sternocleidomastoid muscle and there may be some shotty lymph   • Chronic lower back pain 01/31/2006 08/11/2014--MRI of the lumbar spine reveals the conus terminates at L2 and is normal.  Cauda equina unremarkable.  Stable to moderate degenerative disc disease at L5-S1.  No acute fracture or pars defect is demonstrated.  Small synovial cysts are seen posterior to the L4-L5 facet.  The perivertebral soft tissues are unremarkable.   T12-L1, L1-L2, L2-L3 are negative.  L3-L4 a broad-based disc bulge resulting in mild bilateral neural foraminal narrowing.  L4-L5 reveals a broad-based disc bulge facet disease resulting in mild bilateral neural foraminal narrowing.  L5-S1 reveals a broad-based disc bulge, posterior osseous slipping, and facet disease resulting in mild to moderate bilateral neural foraminal narrowing.  Assessment is stable mild to moderate degenerative spondylosis.  Small synovial cysts are seen posterior to the L4-L5 facets.  08/11/2014--MRI of the thoracic spine reveals mild to moderate thoracic kyphosis.  No fracture.  At T5--T6 there is a moderate sized left paracentral disc protrusion which i   • Chronic migraine 11/03/2009 01/18/2010--MRI of the brain performed for headaches and memory loss.  Mild small vessel disease in the cerebral and central pontine white matter.  There is an ovoid, somewhat pancake-shaped area of signal abnormality in the anterior inferior right temporal lobe subcortical to the juxtacortical white matter that measures 1.2 x 1 cm and anterior posterior and medial lateral dimension but only measures 3 mm in cranial caudal dimension.  I suspect that this is a benign cyst or a cystic area of encephalomalacia.  The remainder of the MRI of the head is within normal limits.  11/03/2009--CT scan of the brain without contrast performed for headache after a fall.  Mild diffuse atrophy.  No acute abnormality noted.; Description: Patient has had a long history of migraine headaches.  MRI and CT scan of the brain have been essentially negative.   • Chronic otitis externa 04/08/2016   • Chronic renal insufficiency, stage II (mild), creatinine 1.12 11/14/2015 11/14/2015--serum creatinine mildly elevated at 1.12.   • Depression with anxiety 04/08/2016   • Frequent falls 02/05/2021 February 5, 2021--patient seen in follow-up by Dr. Weir.  I extensively reviewed the documentation from the emergency room visit as  noted below.  I reviewed the history with the patient and she is describing episodes of dizziness described as a spinning sensation of her body and this seems to be precipitated by movement although it does not occur if she rolls over in bed.  If she stands up and st   • Functional murmur, 07/15/2015--normal echocardiogram. 07/15/2015    07/15/2015--echocardiogram reveals normal left ventricular size and function with ejection fraction 55%.  Grade 1 diastolic dysfunction, abnormal relaxation pattern.  Trace tricuspid regurgitation.  Estimated right ventricular systolic pressure is 25 mmHg which is normal.   • Gastroesophageal reflux disease with esophagitis 11/19/2001 06/13/2017--EGD revealed normal esophagus.  Biopsies taken.  There was a medium-sized hiatal hernia.  Localized mild inflammation and linear erosions were found in the prepyloric region.  Biopsied.  Examined duodenum was normal.  Random biopsies taken.  Pathology of the gastroesophageal junction was unremarkable as was the gastric fundus.  Prepyloric biopsy revealed minimal chronic inflammation and reactive change.  H pylori negative.  Duodenal biopsies are negative.  11/03/2014--patient seen in follow-up and reports her epigastric pain/chest pain has resolved.  I reviewed the results of the studies with her.  It does not appear that her problem is related to biliary tract disease.  She does have reflux and although the recent upper GI revealed minimal reflux, I do think her symptoms are related to esophageal reflux with esophageal spasm.  10/21/2014--air-contrast upper GI series revealed trace upper laryngeal penetration.  Persistent small partially reducible sliding hiatal hernia the upper stomach with    • Gastroesophageal reflux disease without esophagitis 06/06/2019   • Generalized anxiety disorder 04/11/2017   • History of Acute deep vein thrombosis (DVT) of distal end of left lower extremity 02/15/2017    03/21/2017 patient seen in follow-up  and she is tolerating the Eliquis well without signs or symptoms of bleeding.  Her calf swelling and tenderness is better but not totally resolved.  I suspect that the DVT is chronic and may not resolve at all.  I will order a repeat venous study and then proceed from there.  02/23/2017--repeat Doppler venous study of the left lower extremity reveals a chronic left lower extremity DVT in the posterior tibial.  All other left sided veins appeared normal.  Fluid collection in the left calf noted.  02/17/2017--patient seen in follow-up and reports her left lower extremity symptoms are about the same.  She continues to have complaints of profound fatigue which I think is multifactorial including underlying depression that is not in remission.  Review the results of the CTA of the chest including the possible thyroid lesion.  I do not think this is contributing to any of her symptoms of fatigue particularly given the fact that her thyroid function tests are normal.  I expla   • History of palpitations 12/07/2011    Patient has had multiple admissions to the hospital for complaints of chest pain and heart palpitations. She meant admitted at least on 3 occasions. She has had at least 3 stress Cardiolite and 1 stress ECG, the last Cardiolite being performed 12/07/2011 which was negative. Patient is also had Holter monitors which have been unremarkable.   • HIstory of Schatzki's ring 02/28/2012 02/28/2012--air-contrast upper GI revealed small to moderate sized reducible sliding hiatal herniation of the upper stomach with some demonstrated gastroesophageal reflux. No esophageal, gastric, or duodenal mass or mucosal ulceration was seen.  11/03/2008--EGD performed for evaluation of iron deficiency anemia revealed hiatal hernia without evidence of reflux, prepyloric antritis and   • Hyperlipidemia 04/08/2016   • Hypersomnolence 05/05/2016 06/14/2017--overnight oximetry revealed oxygen desaturation index of only 0.44.   There were only 10 total desaturations during the period of testing which lasted 6 hours and 46 minutes.  05/31/2017--patient seen in follow-up and reports she continues to have chronic fatigue as well as hypersomnolence.  Once again, she is on multiple medications that are undoubtedly contributing to this problem including clonazepam and hydrocodone but I doubt that these could be discontinued.  Her CBC back in April revealed a low hemoglobin of 10.8 but hematocrit was normal at 35.7.  Thyroid function tests were normal and CMP normal.  Overnight oximetry test ordered.  Repeat CBC.  Iron studies.  Sedimentation rate and CRP.  04/11/2017--patient seen in follow-up and her blood pressure is now at a reasonable level at 120/64.  She reports she still feels somewhat fatigued but she is much better.  Patient has not been doing any regular exercise and I do think that that would be helpful to reduce her feeling of fatigue.  She is   • Menopausal state 04/08/2016   • Multinodular goiter 02/17/2017 02/21/2017--thyroid ultrasound reveals multinodular thyroid with largest nodule measuring on the order of 1.6 cm in greatest dimension.  02/17/2017--patient seen in follow-up for DVT and CTA of the chest.  An incidental finding on the CTA of the chest reveals a 1.7 cm lesion in the right lobe of thyroid.  Note that thyroid function tests are currently normal.  Ultrasound of the thyroid ordered.   • Osteoporosis, 03/29/2011--lumbar spine -2.8.  Right femoral neck -2.4.  Left femoral neck -2.4.  Patient receives Reclast infusions. 02/09/2009 04/19/2017--Reclast infusion.  04/05/2016--Reclast infusion.  06/04/2012--Reclast infusion.  05/26/2011--Reclast infusion.  03/29/2011--DEXA scan revealed a lumbar T score of -2.8, right femoral neck T score -2.4, left femoral neck T score -2.4.  Osteoporosis of the lumbar spine and severe osteopenia of the hips bilaterally.  Patient has been intolerant to Fosamax because of gastritis  and gastroesophageal reflux.  04/01/2009--treatment for osteoporosis begun with Fosamax.  02/09/2009--DEXA scan revealed lumbar spine T score of -3.3.  Left femoral neck T score -2.5.  Right hip T score -2.6.  Osteoporosis.    • Pain disorder associated with psychological factors 10/10/2012   • Pain disorder with psychological factors 10/10/2012   • Pancreatic Duct Dilation 11/30/2001 09/29/2014--patient was again evaluated by the urologist for a renal cyst.  CT scan of the abdomen and pelvis reveals a left renal cyst measuring 5.1 x 4.9 cm.  There were subcentimeter hypodense renal lesions that are too small to further characterize and are presumably related to cysts.  Recommend attention to follow up.  Distal dilated pancreatic duct noted.  The common bile duct is the upper limits of normal in size.  The ampullary region is not well evaluated.  Comparison with prior imaging is recommended if available.  If there is no prior film available for comparison, and ERCP or MRCP could be performed to further evaluate.  Small hiatal hernia noted.  03/05/2012--CT scan of the abdomen with contrast, pancreatic protocol.  This reveals some fullness of the pancreatic ductal system and apparent pancreatic divisum with separate entrance of the accessory pancreatic duct extending into the duodenum distal to the main pancreatic duct.  There is fullness of the duct diffusely but similar to the previous s   • Pedal edema 06/29/2015 06/29/2015--patient presents with a 4-6 week history of exertional dyspnea that comes on with activity such as climbing stairs or walking her dog up an incline.  No chest pain.  Relieved with rest.  No cough.  She does have complaints of her feet and legs swelling that is particularly worse at the end of the day and not improved overnight.  No orthopnea or PND.  Chest exam reveals faint rales at the bases.  Otherwise clear.  Heart is regular and I do not appreciate a heart murmur.  Chest x-ray PA and  lateral ordered.  Echocardiogram ordered.   • Pulmonary hypertension (HCC) 04/18/2019   • Restless legs syndrome 04/08/2016   • Simple renal cyst 07/20/2009 09/29/2014--patient was again evaluated by the urologist for a renal cyst.  CT scan of the abdomen and pelvis reveals a left renal cyst measuring 5.1 x 4.9 cm.  There were subcentimeter hypodense renal lesions that are too small to further characterize and are presumably related to cysts.  Recommend attention to follow up.  Distal dilated pancreatic duct noted.  The common bile duct is the upper limits of normal in size.  The ampullary region is not well evaluated.  Comparison with prior imaging is recommended if available.  If there is no prior film available for comparison, and ERCP or MRCP could be performed to further evaluate.  Small hiatal hernia noted.  07/20/2009--patient was noted to have a left renal mass consistent with a cyst.  This was evaluated by the urologist 07/20/2009.   • Statin intolerance 10/30/2017   • Tinnitus of both ears 09/30/2019 September 30, 2019--patient presents with a several year history of bilateral tinnitus, right greater than left.  It seems to be getting worse and now is associated with fluctuating hearing.  She occasionally will have worsening hearing after she coughs or sneezes.  On exam she has evidence of old otitis media scars, particularly on the right.  There is no acute infection present and there is no a   • Vitamin D deficiency 04/08/2016         Past Surgical History:   Procedure Laterality Date   • APPENDECTOMY  1965 1965   • CATARACT EXTRACTION Bilateral Remote    Remote bilateral cataract extirpation with intraocular lens implantation.   • CHOLECYSTECTOMY WITH INTRAOPERATIVE CHOLANGIOGRAM N/A 01/21/2019 01/21/2019--laparoscopic paraesophageal hernia repair with fundoplication.   • COLONOSCOPY  11/03/2008 2008--normal colonoscopy   • COLONOSCOPY  2001 2001--normal colonoscopy.   • COLONOSCOPY  N/A 06/13/2017 06/13/2017--colonoscopy revealed melanosis.  Biopsied.  There was friability with contact bleeding in the ascending colon.  Biopsied.  Pathology returned consistent with melanosis coli.   • ENDOSCOPY  10/08/2014    02/28/2012--air-contrast upper GI revealed small to moderate sized reducible sliding hiatal herniation of the upper stomach with some demonstrated gastroesophageal reflux. No esophageal, gastric, or duodenal mass or mucosal ulceration was seen. 11/03/2008--EGD performed for evaluation of iron deficiency anemia revealed hiatal hernia without evidence of reflux, prepyloric antritis and   • ENDOSCOPY  11/03/2008 11/03/2008--EGD performed for evaluation of iron deficiency anemia revealed hiatal hernia without evidence of reflux, prepyloric antritis and   • ENDOSCOPY  11/19/2001 11/19/2001--EGD revealed a very tortuous distal esophagus with a Schatzki's ring. No ulcer or erosions. No Dorado's mucosa. Stomach revealed patchy erythema and erosions in the antrum. Biopsy. Normal pylorus with no obstruction. Normal duodenum with no ulcers. The Schatzki's ring was dilated with a Rhodes dilator.;   • ENDOSCOPY N/A 09/27/2016 09/27/2016--EGD revealed a normal oropharynx, esophagus, and medium sized hiatal hernia.  Nonbleeding gastric ulcer with no stigmata of bleeding.  Biopsy.  Gastritis.  Biopsy.  Normal examined duodenum.  Biopsied.  Pathology returned mild to moderate chronic active gastritis with ulceration from the stomach antral ulcer biopsy.  Gastroesophageal junction biopsy revealed minimal mixed inflammation.   • ENDOSCOPY N/A 06/13/2017 06/13/2017--colonoscopy revealed melanosis.  Biopsied.  There was friability with contact bleeding in the ascending colon.  Biopsied.  Pathology returned consistent with melanosis coli.   • ERCP N/A 01/22/2019 01/22/2019--ERCP with sphincterotomy and balloon stone extraction.   • ESOPHAGEAL DILATATION  11/19/2001 11/19/2001--EGD revealed  a very tortuous distal esophagus with a Schatzki's ring. No ulcer or erosions. No Dorado's mucosa. Stomach revealed patchy erythema and erosions in the antrum. Biopsy. Normal pylorus with no obstruction. Normal duodenum with no ulcers. The Schatzki's ring was dilated with a Rhodes dilator.;    • ESOPHAGEAL MANOMETRY N/A 12/18/2018    Procedure: ESOPHAGEAL MANOMETRY;  Surgeon: Cecilia, Nurse Performed;  Location: [facility] ENDOSCOPY;  Service: Gastroenterology   • ESOPHAGOSCOPY N/A 01/21/2019    Procedure: FLEXIBLE ESOPHAGOSCOPY;  Surgeon: Reji Johnson MD;  Location: [facility] MAIN OR;  Service: General   • HIATAL HERNIA REPAIR N/A 01/21/2019    Procedure: Laparoscopic paraesophageal hernia repair with toupee fundoplication;  Surgeon: Reji Johnson MD;  Location: [facility] MAIN OR;  Service: General   • INCONTINENCE SURGERY  1979 1979--a bladder tack procedure for urinary stress incontinence   • KNEE ARTHROSCOPY Right 12/12/2011 12/12/2011--right knee arthroscopy with partial lateral and medial meniscectomies.   • SKIN SURGERY  05/25/2010 05/25/2010--skin lesion excised from right lower extremity. 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   • TOTAL ABDOMINAL HYSTERECTOMY WITH SALPINGO OOPHORECTOMY  1974 1974--total abdominal hysterectomy.         Allergies   Allergen Reactions   • Statins Nausea And Vomiting   • Morphine Hallucinations   • Penicillins Itching   • Tetanus Toxoids Itching           Current Outpatient Medications:   •  amLODIPine (NORVASC) 5 MG tablet, Take 1 p.o. every morning for high blood pressure, Disp: 30 tablet, Rfl: 6  •  aspirin 81 MG EC tablet, Take 81 mg by mouth Daily. HELD, Disp: , Rfl:   •  Brexpiprazole (Rexulti) 1 MG tablet, Rexulti 1 mg tablet, Disp: , Rfl:   •  buPROPion XL (WELLBUTRIN XL) 300 MG 24 hr tablet, Take 300 mg by mouth Every Morning., Disp: , Rfl:   •  Cholecalciferol (vitamin D3) 125 MCG (5000 UT) capsule capsule, 1 by mouth  daily as directed, Disp: 30 capsule, Rfl:   •  Cimetidine (TAGAMET PO), Tagamet, Disp: , Rfl:   •  cycloSPORINE (RESTASIS) 0.05 % ophthalmic emulsion, Administer 1 drop to both eyes 2 (Two) Times a Day., Disp: , Rfl:   •  diclofenac (VOLTAREN) 1 % gel gel, Apply 4 g topically to the appropriate area as directed 4 (Four) Times a Day As Needed., Disp: , Rfl:   •  diphenhydrAMINE-APAP, sleep, (Tylenol PM Extra Strength)  MG/30ML liquid, Tylenol PM Extra Strength, Disp: , Rfl:   •  estradiol (ESTRACE) 1 MG tablet, TAKE 1 TABLET BY MOUTH EVERY DAY, Disp: 90 tablet, Rfl: 3  •  HYDROcodone-acetaminophen (NORCO)  MG per tablet, Take 1 tablet by mouth every 6 (six) hours as needed for moderate pain (4-6)., Disp: , Rfl:   •  Misc Natural Products (COLON CLEANSE PO), Take  by mouth., Disp: , Rfl:   •  Multiple Vitamins-Minerals (MULTIVITAMIN ADULT) chewable tablet, Chew 1 tablet Daily., Disp: , Rfl:   •  Specialty Vitamins Products (ONE-A-DAY BONE STRENGTH PO), One-A-Day Bone Strength, Disp: , Rfl:   •  topiramate (TOPAMAX) 100 MG tablet, TAKE 1 TABLET DAILY, Disp: 90 tablet, Rfl: 4  •  traZODone (DESYREL) 50 MG tablet, Take 25-50 mg by mouth Every Night., Disp: , Rfl:   •  vitamin E 400 UNIT capsule, Take 400 Units by mouth Daily., Disp: , Rfl:   •  spironolactone (Aldactone) 50 MG tablet, Take 1 p.o. daily for high blood pressure, leg swelling, and low potassium/hyperaldosteronism, Disp: 90 tablet, Rfl: 1      Family History   Problem Relation Age of Onset   • Heart attack Mother         dies age 47 from heart attack   • Heart disease Mother    • Bleeding Disorder Mother    • Heart disease Father    • Ovarian cancer Maternal Grandmother    • Heart attack Maternal Aunt         dies age 60 from heart attack   • Malig Hyperthermia Neg Hx    • Breast cancer Neg Hx          Social History     Socioeconomic History   • Marital status:      Spouse name: ander   • Number of children: 4   • Years of education: 14  "  • Highest education level: Associate degree: academic program   Tobacco Use   • Smoking status: Never Smoker   • Smokeless tobacco: Never Used   • Tobacco comment: no caffeine    Vaping Use   • Vaping Use: Never used   Substance and Sexual Activity   • Alcohol use: No   • Drug use: No   • Sexual activity: Yes     Partners: Male         Vitals:    07/21/22 1311   BP: (!) 186/84   BP Location: Right arm   Patient Position: Sitting   Cuff Size: Adult   Pulse: 77   Resp: 18   SpO2: 97%   Weight: 59 kg (130 lb)   Height: 152.4 cm (60\")        Body mass index is 25.39 kg/m².      Physical Exam:    General: Alert and oriented x 3.  No acute distress.  Normal affect.  HEENT: Pupils equal, round, reactive to light; extraocular movements intact; sclerae nonicteric; pharynx, ear canals and TMs normal.  Neck: Without JVD, thyromegaly, bruit, or adenopathy.  Lungs: Clear to auscultation in all fields.  Heart: Regular rate and rhythm without murmur, rub, gallop, or click.  Abdomen: Soft, nontender, without hepatosplenomegaly or hernia.  Bowel sounds normal.  : Deferred.  Rectal: Deferred.  Extremities: Without clubbing, cyanosis, edema, or pulse deficit.  Neurologic: Intact without focal deficit.  Normal station and gait observed during ingress and egress from the examination room.  Skin: Without significant lesion.  Musculoskeletal: Unremarkable.    Lab/other results:     Status: Final result     Visible to patient: Yes (seen)     Next appt: 08/23/2022 at 11:00 AM in Cardiology ([facility])     Dx: Benign essential hypertension; [place]...    Specimen Information: Blood         2 Result Notes    Component   Ref Range & Units 1 mo ago   (5/25/22) 8 mo ago   (11/17/21) 1 yr ago   (7/7/21) 1 yr ago   (5/14/21) 1 yr ago   (4/29/21) 1 yr ago   (1/27/21) 1 yr ago   (11/12/20)   Glucose   65 - 99 mg/dL 91  90  77  89  98  84  81    BUN   8 - 23 mg/dL 15  12  23  16  15  14  21    Creatinine   0.57 - 1.00 mg/dL 0.83  " 1.16 High   0.90  0.98  0.87  0.90  0.65    Sodium   136 - 145 mmol/L 140  140  140  141  142  144  138    Potassium   3.5 - 5.2 mmol/L 4.0  3.6  3.8 CM  3.6  3.4 Low   3.2 Low   3.1 Low     Chloride   98 - 107 mmol/L 103  106  107  106  107  110 High   104    CO2   22.0 - 29.0 mmol/L 26.0  22.9  20.7 Low   24.0  23.9  25.3  22.7    Calcium   8.6 - 10.5 mg/dL 8.7  8.3 Low   8.9  9.3  9.1  8.9  8.5 Low     Total Protein   6.0 - 8.5 g/dL 6.2  6.4  7.2  6.8  6.7  6.6  6.2    Albumin   3.50 - 5.20 g/dL 4.20  4.10  4.00  4.00  4.10  3.90  4.00    ALT (SGPT)   1 - 33 U/L 22  19  28 CM  15  16  26  23    AST (SGOT)   1 - 32 U/L 21  21  39 High  CM  14  16  19  26    Alkaline Phosphatase   39 - 117 U/L 80  74  91  89  120 High   74  68    Total Bilirubin   0.0 - 1.2 mg/dL 0.2  0.2  0.2  0.2  0.3  0.2  0.3    Globulin   gm/dL 2.0  2.3  3.2  2.8  2.6  2.7  2.2    A/G Ratio   g/dL 2.1  1.8  1.3  1.4  1.6  1.4  1.8    BUN/Creatinine Ratio   7.0 - 25.0 18.1  10.3  25.6 High   16.3  17.2  15.6  32.3 High     Anion Gap   5.0 - 15.0 mmol/L 11.0  11.1  12.3  11.0  11.1  8.7  11.3    eGFR   >60.0 mL/min/1.73 71.8                   Assessment/Plan:     Diagnosis Plan   1. Benign essential hypertension  spironolactone (Aldactone) 50 MG tablet    amLODIPine (NORVASC) 5 MG tablet   2. Hyperaldosteronism (HCC)  spironolactone (Aldactone) 50 MG tablet   3. Hypokalemia  spironolactone (Aldactone) 50 MG tablet   4. Chronic renal insufficiency, stage II (mild), creatinine 1.12     5. Balance disorder  CT Head Without Contrast   6. Frequent falls  CT Head Without Contrast   7. Bilateral lower extremity edema  spironolactone (Aldactone) 50 MG tablet   8. Closed head injury, initial encounter  CT Head Without Contrast     Patient has hypertension which is not controlled using amlodipine 5 mg/day.  She has several other problems that are somewhat concerning including chronic renal insufficiency which is only stage II but we do have to be careful  about additional medications for the high blood pressure that might cause problems with her chronic renal insufficiency.  She also has lower extremity edema and this certainly could worsen by increasing her amlodipine from 5 mg a day to 10 mg/day.  Also need to be careful given her problems with balance disorder and frequent falls.  I certainly do not want her blood pressure to get too low.  Also patient has had a problem with hypokalemia in the past and she cannot tolerate potassium supplementation.  Hyperaldosteronism is certainly a possibility.    Plan is as follows: Start spironolactone 50 mg/day.  We will need to monitor patient closely for any changes in her chronic renal insufficiency as well as potassium levels and of course to get her blood pressure down within a reasonable time course.  We also need to make sure that her blood pressure does not get too low given her history of frequent falls and the balance disorder.  We may be able to get her off of the amlodipine by adjusting the spironolactone but this remains to be seen.  I will have her follow-up in 2 weeks to reassess.  No lab prior but I can send her to the lab that day after seeing her.    July 21, 2022--addendum: Patient fell recently and struck her head.  No loss of consciousness.  She did have dizziness after the injury that subsequently went away.  She has a fairly good sized ecchymosis above her right eye that appears to be in the process of resolving.  There is no hematoma.  Her neurologic exam is not really any different from previous.  Given her recent extreme elevations of blood pressure as well as the head injury, I think it would be reasonable to obtain a noncontrasted CT of the brain.    Procedures

## 2022-08-02 ENCOUNTER — OFFICE VISIT (OUTPATIENT)
Dept: INTERNAL MEDICINE | Facility: CLINIC | Age: 82
End: 2022-08-02

## 2022-08-02 ENCOUNTER — LAB (OUTPATIENT)
Dept: LAB | Facility: HOSPITAL | Age: 82
End: 2022-08-02

## 2022-08-02 VITALS
HEIGHT: 60 IN | WEIGHT: 132.4 LBS | HEART RATE: 74 BPM | BODY MASS INDEX: 26 KG/M2 | DIASTOLIC BLOOD PRESSURE: 70 MMHG | OXYGEN SATURATION: 97 % | SYSTOLIC BLOOD PRESSURE: 152 MMHG

## 2022-08-02 DIAGNOSIS — E26.9 HYPERALDOSTERONISM: ICD-10-CM

## 2022-08-02 DIAGNOSIS — S09.90XD CLOSED HEAD INJURY, SUBSEQUENT ENCOUNTER: ICD-10-CM

## 2022-08-02 DIAGNOSIS — R26.89 BALANCE DISORDER: Chronic | ICD-10-CM

## 2022-08-02 DIAGNOSIS — R29.6 FREQUENT FALLS: Chronic | ICD-10-CM

## 2022-08-02 DIAGNOSIS — E87.6 HYPOKALEMIA: ICD-10-CM

## 2022-08-02 DIAGNOSIS — I10 BENIGN ESSENTIAL HYPERTENSION: Primary | Chronic | ICD-10-CM

## 2022-08-02 PROBLEM — S09.90XA CLOSED HEAD INJURY: Status: ACTIVE | Noted: 2022-08-02

## 2022-08-02 LAB
ALBUMIN SERPL-MCNC: 4.2 G/DL (ref 3.5–5.2)
ALBUMIN/GLOB SERPL: 1.6 G/DL
ALP SERPL-CCNC: 75 U/L (ref 39–117)
ALT SERPL W P-5'-P-CCNC: 25 U/L (ref 1–33)
ANION GAP SERPL CALCULATED.3IONS-SCNC: 12.8 MMOL/L (ref 5–15)
AST SERPL-CCNC: 26 U/L (ref 1–32)
BILIRUB SERPL-MCNC: 0.3 MG/DL (ref 0–1.2)
BUN SERPL-MCNC: 13 MG/DL (ref 8–23)
BUN/CREAT SERPL: 15.3 (ref 7–25)
CALCIUM SPEC-SCNC: 9.2 MG/DL (ref 8.6–10.5)
CHLORIDE SERPL-SCNC: 101 MMOL/L (ref 98–107)
CO2 SERPL-SCNC: 25.2 MMOL/L (ref 22–29)
CREAT SERPL-MCNC: 0.85 MG/DL (ref 0.57–1)
EGFRCR SERPLBLD CKD-EPI 2021: 69.8 ML/MIN/1.73
GLOBULIN UR ELPH-MCNC: 2.6 GM/DL
GLUCOSE SERPL-MCNC: 98 MG/DL (ref 65–99)
POTASSIUM SERPL-SCNC: 4.1 MMOL/L (ref 3.5–5.2)
PROT SERPL-MCNC: 6.8 G/DL (ref 6–8.5)
SODIUM SERPL-SCNC: 139 MMOL/L (ref 136–145)

## 2022-08-02 PROCEDURE — 99214 OFFICE O/P EST MOD 30 MIN: CPT | Performed by: INTERNAL MEDICINE

## 2022-08-02 PROCEDURE — 36415 COLL VENOUS BLD VENIPUNCTURE: CPT | Performed by: INTERNAL MEDICINE

## 2022-08-02 PROCEDURE — 80053 COMPREHEN METABOLIC PANEL: CPT | Performed by: INTERNAL MEDICINE

## 2022-08-02 RX ORDER — VENLAFAXINE HYDROCHLORIDE 75 MG/1
CAPSULE, EXTENDED RELEASE ORAL
COMMUNITY
Start: 2022-07-30

## 2022-08-02 NOTE — PROGRESS NOTES
08/02/2022    Patient Information  Sachi Vora                                                                                          [address] [place] [address]      1940  [unfilled]  There is no work phone number on file.    Chief Complaint:     Follow-up hypertension, hyperaldosteronism and hypokalemia, recent medication change/adjustment, follow-up recent fall and closed head injury requiring CT scan.  No new acute complaints.    History of Present Illness:     Patient with hypertension that has not been optimally controlled and who has suspected hyperaldosteronism with low potassium.  I evaluated her about 2 weeks ago at which time her blood pressure was elevated and we started her on spironolactone 50 mg/day.  She seems to be tolerating this well although she is complaining of some generalized fatigue which has not really been a new problem.  Her blood pressure is lower and it may be that her body is just getting used to the medication and the lower blood pressure.  Also patient was seen at that time with a closed head injury and she had a hematoma of her forehead.  I sent her for a CT scan of the head to rule out subdural and we will review that today.  Her past medical history reviewed and updated were necessary including health maintenance parameters.  This reveals she is up-to-date or else accounted for.    Review of Systems   Constitutional: Positive for malaise/fatigue.   HENT: Negative.    Eyes: Negative.    Cardiovascular: Negative.    Respiratory: Negative.    Endocrine: Negative.    Hematologic/Lymphatic: Negative.    Skin: Negative.    Musculoskeletal: Negative.    Gastrointestinal: Negative.    Genitourinary: Negative.    Neurological: Negative.    Psychiatric/Behavioral: Negative.    Allergic/Immunologic: Negative.        Active Problems:    Patient Active Problem List   Diagnosis   • Osteoporosis, 03/29/2011--lumbar spine -2.8.  Right femoral neck -2.4.  Left  "femoral neck -2.4.  Patient receives Reclast infusions.   • Therapeutic drug monitoring   • Simple renal cyst   • Benign essential hypertension   • Carotid artery plaque, 04/02/2018--mild right ICA plaque, normal left ICA 10/03/2014--mild bilateral carotid artery plaque.   • Chronic gastritis   • Chronic lower back pain   • Chronic otitis externa   • Chronic renal insufficiency, stage II (mild), creatinine 1.12   • Depression with anxiety   • Functional murmur, 07/15/2015--normal echocardiogram.   • Hyperlipidemia   • Menopausal state   • Chronic migraine w/o aura w/o status migrainosus, not intractable   • Pancreatic Duct Dilation   • Bilateral lower extremity edema   • Restless legs syndrome   • Bilateral sensorineural hearing loss   • Vitamin D deficiency   • Chronic gastric ulcer   • Chronic fatigue   • Hypersomnolence   • Chronic anemia   • Multinodular goiter   • Generalized anxiety disorder   • Iron deficiency anemia   • Statin intolerance   • Postmenopausal state   • Pulmonary hypertension (HCC)   • Gastroesophageal reflux disease without esophagitis   • Tinnitus of both ears   • Chronic hoarseness   • Pain disorder associated with psychological factors   • Frequent falls   • Balance disorder   • Hyperaldosteronism (HCC)   • Hypokalemia   • Closed head injury         Past Medical History:   Diagnosis Date   • Balance disorder 02/05/2021 February 5, 2021--patient seen in follow-up by Dr. Weir.  I extensively reviewed the documentation from the emergency room visit as noted below.  I reviewed the history with the patient and she is describing episodes of dizziness described as a spinning sensation of her body and this seems to be precipitated by movement although it does not occur if she rolls over in bed.  If she stands up and st   • Benign essential hypertension 04/08/2016   • Bilateral sensorineural hearing loss 03/31/2011 03/31/2011--etiology reveals reverse \"cookie bite\" type of hearing loss for " both ears of mild/moderate degree, mostly sensorineural.  Speech discrimination 100% on the right, 96% on the left.   • Carotid artery plaque, 10/03/2014--mild bilateral carotid artery plaque. 07/25/2012    10/03/2014--[facility] screening revealed mild bilateral carotid plaque, negative for atrial fibrillation, negative for AAA, negative for PAD, osteoporosis screen revealed osteopenia.  Body mass index was 25 and considered to be moderate risk.  07/25/2012--vascular screen negative for carotid plaque, negative for abdominal aneurysm, negative for PAD Description: 10/03/2014--[facility] screening revealed mild bilateral carotid plaque, negative for atrial fibrillation, negative for AAA, negative for PAD, osteoporosis screen revealed osteopenia.  Body mass index was 25 and considered to be moderate risk.  07/25/2012--vascular screen negative for carotid plaque, negative for abdominal aneurysm, negative for PAD   • Chronic anemia 08/23/2016 06/13/2017--colonoscopy revealed melanosis.  Biopsied.  There was friability with contact bleeding in the ascending colon.  Biopsied.  Pathology returned consistent with melanosis coli.  06/13/2017--EGD revealed normal esophagus.  Biopsies taken.  There was a medium-sized hiatal hernia.  Localized mild inflammation and linear erosions were found in the prepyloric region.  Biopsied.  Examined duodenum was normal.  Random biopsies taken.  Pathology of the gastroesophageal junction was unremarkable as was the gastric fundus.  Prepyloric biopsy revealed minimal chronic inflammation and reactive change.  H pylori negative.  Duodenal biopsies are negative.  04/12/2017--hemoglobin low at 10.8, hematocrit is normal at 35.7.  Iron sulfate 325 mg per day initiated.  08/23/2016--routine follow-up.  Patient continues to have epigastric abdominal pain believed to be related to reflux with possible esophageal spasm.  Hemoglobin noted to be low at 10.6 with a hematocrit low at 33.7 and RDW  elevated at 16.2.  Homocysti   • Chronic fatigue 04/21/2016 06/14/2017--overnight oximetry revealed oxygen desaturation index of only 0.44.  There were only 10 total desaturations during the period of testing which lasted 6 hours and 46 minutes.  05/31/2017--patient seen in follow-up and reports she continues to have chronic fatigue as well as hypersomnolence.  Once again, she is on multiple medications that are undoubtedly contributing to this problem including clonazepam and hydrocodone but I doubt that these could be discontinued.  Her CBC back in April revealed a low hemoglobin of 10.8 but hematocrit was normal at 35.7.  Thyroid function tests were normal and CMP normal.  Overnight oximetry test ordered.  Repeat CBC.  Iron studies.  Sedimentation rate and CRP.  04/11/2017--patient seen in follow-up and her blood pressure is now at a reasonable level at 120/64.  She reports she still feels somewhat fatigued but she is much better.  Patient has not been doing any regular exercise and I do think that that would be helpful to reduce her feeling of fatigue.  She is   • Chronic gastric ulcer 04/14/2014    02/15/2017--patient seen in follow-up in nearly 6 months later.  She has complaints of not feeling well all over.  She has complaints of diffuse myalgias and possibly tendinopathy related to Levaquin that I called in prior to her going to [place] for vacation.  She reports that 3 or 4 days after starting the Levaquin she began to have the musculoskeletal symptoms and she reports that she continues to have them presently.  Symptoms are worse involving her left calf area.  Examination reveals significant tenderness involving the left calf and the left calf seems a little larger than the right.  She also has complaints of shortness of breath but no chest pain.  She is complaining of double vision and generalized weakness and fatigue.  She was complaining of chronic fatigue at the last visit back in September and I  ordered an overnight oximetry test but apparently this was never done for reasons that are not clear to me.  Her current oxygen saturation is 94% and normally it is 100%.  Plan is as follows: Petersi   • Chronic gastritis 11/19/2001    02/15/2017--patient seen in follow-up in nearly 6 months later.  She has complaints of not feeling well all over.  She has complaints of diffuse myalgias and possibly tendinopathy related to Levaquin that I called in prior to her going to [place] for vacation.  She reports that 3 or 4 days after starting the Levaquin she began to have the musculoskeletal symptoms and she reports that she continues to have them presently.  Symptoms are worse involving her left calf area.  Examination reveals significant tenderness involving the left calf and the left calf seems a little larger than the right.  She also has complaints of shortness of breath but no chest pain.  She is complaining of double vision and generalized weakness and fatigue.  She was complaining of chronic fatigue at the last visit back in September and I ordered an overnight oximetry test but apparently this was never done for reasons that are not clear to me.  Her current oxygen saturation is 94% and normally it is 100%.  Plan is as follows: Extensi   • Chronic hoarseness 06/08/2020    September 15, 2020--patient presents with complaints of swelling of the soft tissues of the left side of the neck and this is associated with pain and discomfort, particularly when she turns her head rotating to the left.  She also notices hoarseness and feels that her voice has changed.  On exam there is definite prominence of the left sternocleidomastoid muscle and there may be some shotty lymph   • Chronic lower back pain 01/31/2006 08/11/2014--MRI of the lumbar spine reveals the conus terminates at L2 and is normal.  Cauda equina unremarkable.  Stable to moderate degenerative disc disease at L5-S1.  No acute fracture or pars defect is  demonstrated.  Small synovial cysts are seen posterior to the L4-L5 facet.  The perivertebral soft tissues are unremarkable.  T12-L1, L1-L2, L2-L3 are negative.  L3-L4 a broad-based disc bulge resulting in mild bilateral neural foraminal narrowing.  L4-L5 reveals a broad-based disc bulge facet disease resulting in mild bilateral neural foraminal narrowing.  L5-S1 reveals a broad-based disc bulge, posterior osseous slipping, and facet disease resulting in mild to moderate bilateral neural foraminal narrowing.  Assessment is stable mild to moderate degenerative spondylosis.  Small synovial cysts are seen posterior to the L4-L5 facets.  08/11/2014--MRI of the thoracic spine reveals mild to moderate thoracic kyphosis.  No fracture.  At T5--T6 there is a moderate sized left paracentral disc protrusion which i   • Chronic migraine 11/03/2009 01/18/2010--MRI of the brain performed for headaches and memory loss.  Mild small vessel disease in the cerebral and central pontine white matter.  There is an ovoid, somewhat pancake-shaped area of signal abnormality in the anterior inferior right temporal lobe subcortical to the juxtacortical white matter that measures 1.2 x 1 cm and anterior posterior and medial lateral dimension but only measures 3 mm in cranial caudal dimension.  I suspect that this is a benign cyst or a cystic area of encephalomalacia.  The remainder of the MRI of the head is within normal limits.  11/03/2009--CT scan of the brain without contrast performed for headache after a fall.  Mild diffuse atrophy.  No acute abnormality noted.; Description: Patient has had a long history of migraine headaches.  MRI and CT scan of the brain have been essentially negative.   • Chronic otitis externa 04/08/2016   • Chronic renal insufficiency, stage II (mild), creatinine 1.12 11/14/2015 11/14/2015--serum creatinine mildly elevated at 1.12.   • Depression with anxiety 04/08/2016   • Frequent falls 02/05/2021     February 5, 2021--patient seen in follow-up by Dr. Weir.  I extensively reviewed the documentation from the emergency room visit as noted below.  I reviewed the history with the patient and she is describing episodes of dizziness described as a spinning sensation of her body and this seems to be precipitated by movement although it does not occur if she rolls over in bed.  If she stands up and st   • Functional murmur, 07/15/2015--normal echocardiogram. 07/15/2015    07/15/2015--echocardiogram reveals normal left ventricular size and function with ejection fraction 55%.  Grade 1 diastolic dysfunction, abnormal relaxation pattern.  Trace tricuspid regurgitation.  Estimated right ventricular systolic pressure is 25 mmHg which is normal.   • Gastroesophageal reflux disease with esophagitis 11/19/2001 06/13/2017--EGD revealed normal esophagus.  Biopsies taken.  There was a medium-sized hiatal hernia.  Localized mild inflammation and linear erosions were found in the prepyloric region.  Biopsied.  Examined duodenum was normal.  Random biopsies taken.  Pathology of the gastroesophageal junction was unremarkable as was the gastric fundus.  Prepyloric biopsy revealed minimal chronic inflammation and reactive change.  H pylori negative.  Duodenal biopsies are negative.  11/03/2014--patient seen in follow-up and reports her epigastric pain/chest pain has resolved.  I reviewed the results of the studies with her.  It does not appear that her problem is related to biliary tract disease.  She does have reflux and although the recent upper GI revealed minimal reflux, I do think her symptoms are related to esophageal reflux with esophageal spasm.  10/21/2014--air-contrast upper GI series revealed trace upper laryngeal penetration.  Persistent small partially reducible sliding hiatal hernia the upper stomach with    • Gastroesophageal reflux disease without esophagitis 06/06/2019   • Generalized anxiety disorder 04/11/2017   •  History of Acute deep vein thrombosis (DVT) of distal end of left lower extremity 02/15/2017    03/21/2017 patient seen in follow-up and she is tolerating the Eliquis well without signs or symptoms of bleeding.  Her calf swelling and tenderness is better but not totally resolved.  I suspect that the DVT is chronic and may not resolve at all.  I will order a repeat venous study and then proceed from there.  02/23/2017--repeat Doppler venous study of the left lower extremity reveals a chronic left lower extremity DVT in the posterior tibial.  All other left sided veins appeared normal.  Fluid collection in the left calf noted.  02/17/2017--patient seen in follow-up and reports her left lower extremity symptoms are about the same.  She continues to have complaints of profound fatigue which I think is multifactorial including underlying depression that is not in remission.  Review the results of the CTA of the chest including the possible thyroid lesion.  I do not think this is contributing to any of her symptoms of fatigue particularly given the fact that her thyroid function tests are normal.  I expla   • History of palpitations 12/07/2011    Patient has had multiple admissions to the hospital for complaints of chest pain and heart palpitations. She meant admitted at least on 3 occasions. She has had at least 3 stress Cardiolite and 1 stress ECG, the last Cardiolite being performed 12/07/2011 which was negative. Patient is also had Holter monitors which have been unremarkable.   • HIstory of Schatzki's ring 02/28/2012 02/28/2012--air-contrast upper GI revealed small to moderate sized reducible sliding hiatal herniation of the upper stomach with some demonstrated gastroesophageal reflux. No esophageal, gastric, or duodenal mass or mucosal ulceration was seen.  11/03/2008--EGD performed for evaluation of iron deficiency anemia revealed hiatal hernia without evidence of reflux, prepyloric antritis and   •  Hyperlipidemia 04/08/2016   • Hypersomnolence 05/05/2016 06/14/2017--overnight oximetry revealed oxygen desaturation index of only 0.44.  There were only 10 total desaturations during the period of testing which lasted 6 hours and 46 minutes.  05/31/2017--patient seen in follow-up and reports she continues to have chronic fatigue as well as hypersomnolence.  Once again, she is on multiple medications that are undoubtedly contributing to this problem including clonazepam and hydrocodone but I doubt that these could be discontinued.  Her CBC back in April revealed a low hemoglobin of 10.8 but hematocrit was normal at 35.7.  Thyroid function tests were normal and CMP normal.  Overnight oximetry test ordered.  Repeat CBC.  Iron studies.  Sedimentation rate and CRP.  04/11/2017--patient seen in follow-up and her blood pressure is now at a reasonable level at 120/64.  She reports she still feels somewhat fatigued but she is much better.  Patient has not been doing any regular exercise and I do think that that would be helpful to reduce her feeling of fatigue.  She is   • Menopausal state 04/08/2016   • Multinodular goiter 02/17/2017 02/21/2017--thyroid ultrasound reveals multinodular thyroid with largest nodule measuring on the order of 1.6 cm in greatest dimension.  02/17/2017--patient seen in follow-up for DVT and CTA of the chest.  An incidental finding on the CTA of the chest reveals a 1.7 cm lesion in the right lobe of thyroid.  Note that thyroid function tests are currently normal.  Ultrasound of the thyroid ordered.   • Osteoporosis, 03/29/2011--lumbar spine -2.8.  Right femoral neck -2.4.  Left femoral neck -2.4.  Patient receives Reclast infusions. 02/09/2009 04/19/2017--Reclast infusion.  04/05/2016--Reclast infusion.  06/04/2012--Reclast infusion.  05/26/2011--Reclast infusion.  03/29/2011--DEXA scan revealed a lumbar T score of -2.8, right femoral neck T score -2.4, left femoral neck T score -2.4.   Osteoporosis of the lumbar spine and severe osteopenia of the hips bilaterally.  Patient has been intolerant to Fosamax because of gastritis and gastroesophageal reflux.  04/01/2009--treatment for osteoporosis begun with Fosamax.  02/09/2009--DEXA scan revealed lumbar spine T score of -3.3.  Left femoral neck T score -2.5.  Right hip T score -2.6.  Osteoporosis.    • Pain disorder associated with psychological factors 10/10/2012   • Pain disorder with psychological factors 10/10/2012   • Pancreatic Duct Dilation 11/30/2001 09/29/2014--patient was again evaluated by the urologist for a renal cyst.  CT scan of the abdomen and pelvis reveals a left renal cyst measuring 5.1 x 4.9 cm.  There were subcentimeter hypodense renal lesions that are too small to further characterize and are presumably related to cysts.  Recommend attention to follow up.  Distal dilated pancreatic duct noted.  The common bile duct is the upper limits of normal in size.  The ampullary region is not well evaluated.  Comparison with prior imaging is recommended if available.  If there is no prior film available for comparison, and ERCP or MRCP could be performed to further evaluate.  Small hiatal hernia noted.  03/05/2012--CT scan of the abdomen with contrast, pancreatic protocol.  This reveals some fullness of the pancreatic ductal system and apparent pancreatic divisum with separate entrance of the accessory pancreatic duct extending into the duodenum distal to the main pancreatic duct.  There is fullness of the duct diffusely but similar to the previous s   • Pedal edema 06/29/2015 06/29/2015--patient presents with a 4-6 week history of exertional dyspnea that comes on with activity such as climbing stairs or walking her dog up an incline.  No chest pain.  Relieved with rest.  No cough.  She does have complaints of her feet and legs swelling that is particularly worse at the end of the day and not improved overnight.  No orthopnea or PND.   Chest exam reveals faint rales at the bases.  Otherwise clear.  Heart is regular and I do not appreciate a heart murmur.  Chest x-ray PA and lateral ordered.  Echocardiogram ordered.   • Pulmonary hypertension (HCC) 04/18/2019   • Restless legs syndrome 04/08/2016   • Simple renal cyst 07/20/2009 09/29/2014--patient was again evaluated by the urologist for a renal cyst.  CT scan of the abdomen and pelvis reveals a left renal cyst measuring 5.1 x 4.9 cm.  There were subcentimeter hypodense renal lesions that are too small to further characterize and are presumably related to cysts.  Recommend attention to follow up.  Distal dilated pancreatic duct noted.  The common bile duct is the upper limits of normal in size.  The ampullary region is not well evaluated.  Comparison with prior imaging is recommended if available.  If there is no prior film available for comparison, and ERCP or MRCP could be performed to further evaluate.  Small hiatal hernia noted.  07/20/2009--patient was noted to have a left renal mass consistent with a cyst.  This was evaluated by the urologist 07/20/2009.   • Statin intolerance 10/30/2017   • Tinnitus of both ears 09/30/2019 September 30, 2019--patient presents with a several year history of bilateral tinnitus, right greater than left.  It seems to be getting worse and now is associated with fluctuating hearing.  She occasionally will have worsening hearing after she coughs or sneezes.  On exam she has evidence of old otitis media scars, particularly on the right.  There is no acute infection present and there is no a   • Vitamin D deficiency 04/08/2016         Past Surgical History:   Procedure Laterality Date   • APPENDECTOMY  1965 1965   • CATARACT EXTRACTION Bilateral Remote    Remote bilateral cataract extirpation with intraocular lens implantation.   • CHOLECYSTECTOMY WITH INTRAOPERATIVE CHOLANGIOGRAM N/A 01/21/2019 01/21/2019--laparoscopic paraesophageal hernia repair with  fundoplication.   • COLONOSCOPY  11/03/2008 2008--normal colonoscopy   • COLONOSCOPY  2001 2001--normal colonoscopy.   • COLONOSCOPY N/A 06/13/2017 06/13/2017--colonoscopy revealed melanosis.  Biopsied.  There was friability with contact bleeding in the ascending colon.  Biopsied.  Pathology returned consistent with melanosis coli.   • ENDOSCOPY  10/08/2014    02/28/2012--air-contrast upper GI revealed small to moderate sized reducible sliding hiatal herniation of the upper stomach with some demonstrated gastroesophageal reflux. No esophageal, gastric, or duodenal mass or mucosal ulceration was seen. 11/03/2008--EGD performed for evaluation of iron deficiency anemia revealed hiatal hernia without evidence of reflux, prepyloric antritis and   • ENDOSCOPY  11/03/2008 11/03/2008--EGD performed for evaluation of iron deficiency anemia revealed hiatal hernia without evidence of reflux, prepyloric antritis and   • ENDOSCOPY  11/19/2001 11/19/2001--EGD revealed a very tortuous distal esophagus with a Schatzki's ring. No ulcer or erosions. No Dorado's mucosa. Stomach revealed patchy erythema and erosions in the antrum. Biopsy. Normal pylorus with no obstruction. Normal duodenum with no ulcers. The Schatzki's ring was dilated with a Rhodes dilator.;   • ENDOSCOPY N/A 09/27/2016 09/27/2016--EGD revealed a normal oropharynx, esophagus, and medium sized hiatal hernia.  Nonbleeding gastric ulcer with no stigmata of bleeding.  Biopsy.  Gastritis.  Biopsy.  Normal examined duodenum.  Biopsied.  Pathology returned mild to moderate chronic active gastritis with ulceration from the stomach antral ulcer biopsy.  Gastroesophageal junction biopsy revealed minimal mixed inflammation.   • ENDOSCOPY N/A 06/13/2017 06/13/2017--colonoscopy revealed melanosis.  Biopsied.  There was friability with contact bleeding in the ascending colon.  Biopsied.  Pathology returned consistent with melanosis coli.   • ERCP N/A  01/22/2019 01/22/2019--ERCP with sphincterotomy and balloon stone extraction.   • ESOPHAGEAL DILATATION  11/19/2001 11/19/2001--EGD revealed a very tortuous distal esophagus with a Schatzki's ring. No ulcer or erosions. No Dorado's mucosa. Stomach revealed patchy erythema and erosions in the antrum. Biopsy. Normal pylorus with no obstruction. Normal duodenum with no ulcers. The Schatzki's ring was dilated with a Rhodes dilator.;    • ESOPHAGEAL MANOMETRY N/A 12/18/2018    Procedure: ESOPHAGEAL MANOMETRY;  Surgeon: Cecilia, Nurse Performed;  Location: [facility] ENDOSCOPY;  Service: Gastroenterology   • ESOPHAGOSCOPY N/A 01/21/2019    Procedure: FLEXIBLE ESOPHAGOSCOPY;  Surgeon: Reji Johnson MD;  Location: [facility] OR;  Service: General   • HIATAL HERNIA REPAIR N/A 01/21/2019    Procedure: Laparoscopic paraesophageal hernia repair with toupee fundoplication;  Surgeon: Reji Johnson MD;  Location: [facility] OR;  Service: General   • INCONTINENCE SURGERY  1979 1979--a bladder tack procedure for urinary stress incontinence   • KNEE ARTHROSCOPY Right 12/12/2011 12/12/2011--right knee arthroscopy with partial lateral and medial meniscectomies.   • SKIN SURGERY  05/25/2010 05/25/2010--skin lesion excised from right lower extremity. 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   • TOTAL ABDOMINAL HYSTERECTOMY WITH SALPINGO OOPHORECTOMY  1974 1974--total abdominal hysterectomy.         Allergies   Allergen Reactions   • Statins Nausea And Vomiting   • Morphine Hallucinations   • Penicillins Itching   • Tetanus Toxoids Itching           Current Outpatient Medications:   •  amLODIPine (NORVASC) 5 MG tablet, Take 1 p.o. every morning for high blood pressure, Disp: 30 tablet, Rfl: 6  •  aspirin 81 MG EC tablet, Take 81 mg by mouth Daily. HELD, Disp: , Rfl:   •  Brexpiprazole (Rexulti) 1 MG tablet, Rexulti 1 mg tablet, Disp: , Rfl:   •  buPROPion XL (WELLBUTRIN XL) 300  MG 24 hr tablet, Take 300 mg by mouth Every Morning., Disp: , Rfl:   •  Cholecalciferol (vitamin D3) 125 MCG (5000 UT) capsule capsule, 1 by mouth daily as directed, Disp: 30 capsule, Rfl:   •  Cimetidine (TAGAMET PO), Tagamet, Disp: , Rfl:   •  cycloSPORINE (RESTASIS) 0.05 % ophthalmic emulsion, Administer 1 drop to both eyes 2 (Two) Times a Day., Disp: , Rfl:   •  diclofenac (VOLTAREN) 1 % gel gel, Apply 4 g topically to the appropriate area as directed 4 (Four) Times a Day As Needed., Disp: , Rfl:   •  diphenhydrAMINE-APAP, sleep, (Tylenol PM Extra Strength)  MG/30ML liquid, Tylenol PM Extra Strength, Disp: , Rfl:   •  estradiol (ESTRACE) 1 MG tablet, TAKE 1 TABLET BY MOUTH EVERY DAY, Disp: 90 tablet, Rfl: 3  •  HYDROcodone-acetaminophen (NORCO)  MG per tablet, Take 1 tablet by mouth every 6 (six) hours as needed for moderate pain (4-6)., Disp: , Rfl:   •  Misc Natural Products (COLON CLEANSE PO), Take  by mouth., Disp: , Rfl:   •  Multiple Vitamins-Minerals (MULTIVITAMIN ADULT) chewable tablet, Chew 1 tablet Daily., Disp: , Rfl:   •  Specialty Vitamins Products (ONE-A-DAY BONE STRENGTH PO), One-A-Day Bone Strength, Disp: , Rfl:   •  spironolactone (Aldactone) 50 MG tablet, Take 1 p.o. daily for high blood pressure, leg swelling, and low potassium/hyperaldosteronism, Disp: 90 tablet, Rfl: 1  •  topiramate (TOPAMAX) 100 MG tablet, TAKE 1 TABLET DAILY, Disp: 90 tablet, Rfl: 4  •  traZODone (DESYREL) 50 MG tablet, Take 25-50 mg by mouth Every Night., Disp: , Rfl:   •  venlafaxine XR (EFFEXOR-XR) 75 MG 24 hr capsule, , Disp: , Rfl:   •  vitamin E 400 UNIT capsule, Take 400 Units by mouth Daily., Disp: , Rfl:       Family History   Problem Relation Age of Onset   • Heart attack Mother         dies age 47 from heart attack   • Heart disease Mother    • Bleeding Disorder Mother    • Heart disease Father    • Ovarian cancer Maternal Grandmother    • Heart attack Maternal Aunt         dies age 60 from heart  "attack   • Malig Hyperthermia Neg Hx    • Breast cancer Neg Hx          Social History     Socioeconomic History   • Marital status:      Spouse name: ander   • Number of children: 4   • Years of education: 14   • Highest education level: Associate degree: academic program   Tobacco Use   • Smoking status: Never Smoker   • Smokeless tobacco: Never Used   • Tobacco comment: no caffeine    Vaping Use   • Vaping Use: Never used   Substance and Sexual Activity   • Alcohol use: No   • Drug use: No   • Sexual activity: Yes     Partners: Male         Vitals:    08/02/22 1337   BP: 152/70   Pulse: 74   SpO2: 97%   Weight: 60.1 kg (132 lb 6.4 oz)   Height: 152.4 cm (60\")        Body mass index is 25.86 kg/m².      Physical Exam:    General: Alert and oriented x 3.  No acute distress.  Normal affect.  HEENT: Pupils equal, round, reactive to light; extraocular movements intact; sclerae nonicteric; pharynx, ear canals and TMs normal.  Neck: Without JVD, thyromegaly, bruit, or adenopathy.  Lungs: Clear to auscultation in all fields.  Heart: Regular rate and rhythm without murmur, rub, gallop, or click.  Abdomen: Soft, nontender, without hepatosplenomegaly or hernia.  Bowel sounds normal.  : Deferred.  Rectal: Deferred.  Extremities: Without clubbing, cyanosis, edema, or pulse deficit.  Neurologic: Intact without focal deficit.  Normal station and gait observed during ingress and egress from the examination room.  Skin: Without significant lesion.  Musculoskeletal: Unremarkable.    Lab/other results:    CT scan of the head reviewed and this reveals stable findings when compared to the previous study dated July 7, 2021.  There are moderate changes of chronic small vessel ischemic phenomenon as well as a 1.6 x 0.8 enlarged perivascular space within the anterior aspect of the right temporal lobe.    Assessment/Plan:     Diagnosis Plan   1. Benign essential hypertension     2. Hyperaldosteronism (HCC)     3. Hypokalemia   "   4. Balance disorder     5. Frequent falls     6. Closed head injury, subsequent encounter       Patient's blood pressure has definitely improved with the addition of spironolactone.  She remains on amlodipine.  Patient has balance disorder and frequent falls and had a recent fall that resulted in contusion/hematoma of her forehead.  Fortunately there was no evidence of subdural hematoma.    Plan is as follows: Check CMP today to assess her electrolyte status and renal function after the addition of the spironolactone.  I will make a decision once I receive these results tomorrow whether or not to increase the dose of the spironolactone 200 mg/day or whether to leave it the same and reassess in the next few weeks.  I made aware that this medication can take a while to reach maximum benefit and I have encouraged that she is already received a definite response even after 2 weeks.  We will go ahead and make a follow-up appointment for 3 weeks.        Procedures

## 2022-08-23 ENCOUNTER — HOSPITAL ENCOUNTER (OUTPATIENT)
Dept: CARDIOLOGY | Facility: HOSPITAL | Age: 82
Discharge: HOME | End: 2022-08-23
Admitting: PSYCHIATRY & NEUROLOGY

## 2022-08-23 DIAGNOSIS — R60.0 BILATERAL LOWER EXTREMITY EDEMA: ICD-10-CM

## 2022-08-23 LAB

## 2022-08-23 PROCEDURE — 93970 EXTREMITY STUDY: CPT

## 2022-09-06 ENCOUNTER — OFFICE VISIT (OUTPATIENT)
Dept: INTERNAL MEDICINE | Facility: CLINIC | Age: 82
End: 2022-09-06

## 2022-09-06 VITALS
DIASTOLIC BLOOD PRESSURE: 78 MMHG | RESPIRATION RATE: 18 BRPM | BODY MASS INDEX: 25.56 KG/M2 | WEIGHT: 130.2 LBS | HEART RATE: 80 BPM | HEIGHT: 60 IN | OXYGEN SATURATION: 98 % | SYSTOLIC BLOOD PRESSURE: 140 MMHG

## 2022-09-06 DIAGNOSIS — E87.6 HYPOKALEMIA: ICD-10-CM

## 2022-09-06 DIAGNOSIS — E26.9 HYPERALDOSTERONISM: ICD-10-CM

## 2022-09-06 DIAGNOSIS — I10 BENIGN ESSENTIAL HYPERTENSION: Primary | Chronic | ICD-10-CM

## 2022-09-06 PROBLEM — S09.90XA CLOSED HEAD INJURY: Status: RESOLVED | Noted: 2022-08-02 | Resolved: 2022-09-06

## 2022-09-06 PROCEDURE — 99213 OFFICE O/P EST LOW 20 MIN: CPT | Performed by: INTERNAL MEDICINE

## 2022-09-06 RX ORDER — SPIRONOLACTONE 100 MG/1
TABLET, FILM COATED ORAL
Qty: 90 TABLET | Refills: 1 | Status: SHIPPED | OUTPATIENT
Start: 2022-09-06 | End: 2023-01-27

## 2022-09-06 NOTE — PROGRESS NOTES
09/06/2022    Patient Information  Sachi Vora                                                                                          [address] [place] [address]      1940  [unfilled]  There is no work phone number on file.    Chief Complaint:     Follow-up high blood pressure, low potassium, hyperaldosteronism.    History of Present Illness:    Patient with a history of hypertension and suspected hyperaldosteronism who was started on spironolactone several weeks ago.  She is here today for follow-up.  Her blood pressure is better.  See below.  She seems to be tolerating medication okay.  Past medical history reviewed and updated were necessary including health maintenance parameters.  This reveals she is currently up-to-date or else accounted for.    Review of Systems   Constitutional: Negative.   HENT: Negative.    Eyes: Negative.    Cardiovascular: Negative.    Respiratory: Negative.    Endocrine: Negative.    Hematologic/Lymphatic: Negative.    Skin: Negative.    Musculoskeletal: Negative.    Gastrointestinal: Negative.    Genitourinary: Negative.    Neurological: Negative.    Psychiatric/Behavioral: Negative.    Allergic/Immunologic: Negative.        Active Problems:    Patient Active Problem List   Diagnosis   • Osteoporosis, 03/29/2011--lumbar spine -2.8.  Right femoral neck -2.4.  Left femoral neck -2.4.  Patient receives Reclast infusions.   • Therapeutic drug monitoring   • Simple renal cyst   • Benign essential hypertension   • Carotid artery plaque, 04/02/2018--mild right ICA plaque, normal left ICA 10/03/2014--mild bilateral carotid artery plaque.   • Chronic gastritis   • Chronic lower back pain   • Chronic otitis externa   • Chronic renal insufficiency, stage II (mild), creatinine 1.12   • Depression with anxiety   • Functional murmur, 07/15/2015--normal echocardiogram.   • Hyperlipidemia   • Menopausal state   • Chronic migraine w/o aura w/o status migrainosus,  "not intractable   • Pancreatic Duct Dilation   • Bilateral lower extremity edema   • Restless legs syndrome   • Bilateral sensorineural hearing loss   • Vitamin D deficiency   • Chronic gastric ulcer   • Chronic fatigue   • Hypersomnolence   • Chronic anemia   • Multinodular goiter   • Generalized anxiety disorder   • Iron deficiency anemia   • Statin intolerance   • Postmenopausal state   • Pulmonary hypertension (HCC)   • Gastroesophageal reflux disease without esophagitis   • Tinnitus of both ears   • Chronic hoarseness   • Pain disorder associated with psychological factors   • Frequent falls   • Balance disorder   • Hyperaldosteronism (HCC)   • Hypokalemia         Past Medical History:   Diagnosis Date   • Balance disorder 02/05/2021 February 5, 2021--patient seen in follow-up by Dr. Weir.  I extensively reviewed the documentation from the emergency room visit as noted below.  I reviewed the history with the patient and she is describing episodes of dizziness described as a spinning sensation of her body and this seems to be precipitated by movement although it does not occur if she rolls over in bed.  If she stands up and st   • Benign essential hypertension 04/08/2016   • Bilateral sensorineural hearing loss 03/31/2011 03/31/2011--etiology reveals reverse \"cookie bite\" type of hearing loss for both ears of mild/moderate degree, mostly sensorineural.  Speech discrimination 100% on the right, 96% on the left.   • Carotid artery plaque, 10/03/2014--mild bilateral carotid artery plaque. 07/25/2012    10/03/2014--[facility] screening revealed mild bilateral carotid plaque, negative for atrial fibrillation, negative for AAA, negative for PAD, osteoporosis screen revealed osteopenia.  Body mass index was 25 and considered to be moderate risk.  07/25/2012--vascular screen negative for carotid plaque, negative for abdominal aneurysm, negative for PAD Description: 10/03/2014--[facility] screening revealed mild " bilateral carotid plaque, negative for atrial fibrillation, negative for AAA, negative for PAD, osteoporosis screen revealed osteopenia.  Body mass index was 25 and considered to be moderate risk.  07/25/2012--vascular screen negative for carotid plaque, negative for abdominal aneurysm, negative for PAD   • Chronic anemia 08/23/2016 06/13/2017--colonoscopy revealed melanosis.  Biopsied.  There was friability with contact bleeding in the ascending colon.  Biopsied.  Pathology returned consistent with melanosis coli.  06/13/2017--EGD revealed normal esophagus.  Biopsies taken.  There was a medium-sized hiatal hernia.  Localized mild inflammation and linear erosions were found in the prepyloric region.  Biopsied.  Examined duodenum was normal.  Random biopsies taken.  Pathology of the gastroesophageal junction was unremarkable as was the gastric fundus.  Prepyloric biopsy revealed minimal chronic inflammation and reactive change.  H pylori negative.  Duodenal biopsies are negative.  04/12/2017--hemoglobin low at 10.8, hematocrit is normal at 35.7.  Iron sulfate 325 mg per day initiated.  08/23/2016--routine follow-up.  Patient continues to have epigastric abdominal pain believed to be related to reflux with possible esophageal spasm.  Hemoglobin noted to be low at 10.6 with a hematocrit low at 33.7 and RDW elevated at 16.2.  Homocysti   • Chronic fatigue 04/21/2016 06/14/2017--overnight oximetry revealed oxygen desaturation index of only 0.44.  There were only 10 total desaturations during the period of testing which lasted 6 hours and 46 minutes.  05/31/2017--patient seen in follow-up and reports she continues to have chronic fatigue as well as hypersomnolence.  Once again, she is on multiple medications that are undoubtedly contributing to this problem including clonazepam and hydrocodone but I doubt that these could be discontinued.  Her CBC back in April revealed a low hemoglobin of 10.8 but hematocrit was  normal at 35.7.  Thyroid function tests were normal and CMP normal.  Overnight oximetry test ordered.  Repeat CBC.  Iron studies.  Sedimentation rate and CRP.  04/11/2017--patient seen in follow-up and her blood pressure is now at a reasonable level at 120/64.  She reports she still feels somewhat fatigued but she is much better.  Patient has not been doing any regular exercise and I do think that that would be helpful to reduce her feeling of fatigue.  She is   • Chronic gastric ulcer 04/14/2014    02/15/2017--patient seen in follow-up in nearly 6 months later.  She has complaints of not feeling well all over.  She has complaints of diffuse myalgias and possibly tendinopathy related to Levaquin that I called in prior to her going to [place] for vacation.  She reports that 3 or 4 days after starting the Levaquin she began to have the musculoskeletal symptoms and she reports that she continues to have them presently.  Symptoms are worse involving her left calf area.  Examination reveals significant tenderness involving the left calf and the left calf seems a little larger than the right.  She also has complaints of shortness of breath but no chest pain.  She is complaining of double vision and generalized weakness and fatigue.  She was complaining of chronic fatigue at the last visit back in September and I ordered an overnight oximetry test but apparently this was never done for reasons that are not clear to me.  Her current oxygen saturation is 94% and normally it is 100%.  Plan is as follows: Extensi   • Chronic gastritis 11/19/2001    02/15/2017--patient seen in follow-up in nearly 6 months later.  She has complaints of not feeling well all over.  She has complaints of diffuse myalgias and possibly tendinopathy related to Levaquin that I called in prior to her going to [place] for vacation.  She reports that 3 or 4 days after starting the Levaquin she began to have the musculoskeletal symptoms and she reports  that she continues to have them presently.  Symptoms are worse involving her left calf area.  Examination reveals significant tenderness involving the left calf and the left calf seems a little larger than the right.  She also has complaints of shortness of breath but no chest pain.  She is complaining of double vision and generalized weakness and fatigue.  She was complaining of chronic fatigue at the last visit back in September and I ordered an overnight oximetry test but apparently this was never done for reasons that are not clear to me.  Her current oxygen saturation is 94% and normally it is 100%.  Plan is as follows: Extensi   • Chronic hoarseness 06/08/2020    September 15, 2020--patient presents with complaints of swelling of the soft tissues of the left side of the neck and this is associated with pain and discomfort, particularly when she turns her head rotating to the left.  She also notices hoarseness and feels that her voice has changed.  On exam there is definite prominence of the left sternocleidomastoid muscle and there may be some shotty lymph   • Chronic lower back pain 01/31/2006 08/11/2014--MRI of the lumbar spine reveals the conus terminates at L2 and is normal.  Cauda equina unremarkable.  Stable to moderate degenerative disc disease at L5-S1.  No acute fracture or pars defect is demonstrated.  Small synovial cysts are seen posterior to the L4-L5 facet.  The perivertebral soft tissues are unremarkable.  T12-L1, L1-L2, L2-L3 are negative.  L3-L4 a broad-based disc bulge resulting in mild bilateral neural foraminal narrowing.  L4-L5 reveals a broad-based disc bulge facet disease resulting in mild bilateral neural foraminal narrowing.  L5-S1 reveals a broad-based disc bulge, posterior osseous slipping, and facet disease resulting in mild to moderate bilateral neural foraminal narrowing.  Assessment is stable mild to moderate degenerative spondylosis.  Small synovial cysts are seen posterior  to the L4-L5 facets.  08/11/2014--MRI of the thoracic spine reveals mild to moderate thoracic kyphosis.  No fracture.  At T5--T6 there is a moderate sized left paracentral disc protrusion which i   • Chronic migraine 11/03/2009 01/18/2010--MRI of the brain performed for headaches and memory loss.  Mild small vessel disease in the cerebral and central pontine white matter.  There is an ovoid, somewhat pancake-shaped area of signal abnormality in the anterior inferior right temporal lobe subcortical to the juxtacortical white matter that measures 1.2 x 1 cm and anterior posterior and medial lateral dimension but only measures 3 mm in cranial caudal dimension.  I suspect that this is a benign cyst or a cystic area of encephalomalacia.  The remainder of the MRI of the head is within normal limits.  11/03/2009--CT scan of the brain without contrast performed for headache after a fall.  Mild diffuse atrophy.  No acute abnormality noted.; Description: Patient has had a long history of migraine headaches.  MRI and CT scan of the brain have been essentially negative.   • Chronic otitis externa 04/08/2016   • Chronic renal insufficiency, stage II (mild), creatinine 1.12 11/14/2015 11/14/2015--serum creatinine mildly elevated at 1.12.   • Depression with anxiety 04/08/2016   • Frequent falls 02/05/2021 February 5, 2021--patient seen in follow-up by Dr. Weir.  I extensively reviewed the documentation from the emergency room visit as noted below.  I reviewed the history with the patient and she is describing episodes of dizziness described as a spinning sensation of her body and this seems to be precipitated by movement although it does not occur if she rolls over in bed.  If she stands up and st   • Functional murmur, 07/15/2015--normal echocardiogram. 07/15/2015    07/15/2015--echocardiogram reveals normal left ventricular size and function with ejection fraction 55%.  Grade 1 diastolic dysfunction, abnormal  relaxation pattern.  Trace tricuspid regurgitation.  Estimated right ventricular systolic pressure is 25 mmHg which is normal.   • Gastroesophageal reflux disease with esophagitis 11/19/2001 06/13/2017--EGD revealed normal esophagus.  Biopsies taken.  There was a medium-sized hiatal hernia.  Localized mild inflammation and linear erosions were found in the prepyloric region.  Biopsied.  Examined duodenum was normal.  Random biopsies taken.  Pathology of the gastroesophageal junction was unremarkable as was the gastric fundus.  Prepyloric biopsy revealed minimal chronic inflammation and reactive change.  H pylori negative.  Duodenal biopsies are negative.  11/03/2014--patient seen in follow-up and reports her epigastric pain/chest pain has resolved.  I reviewed the results of the studies with her.  It does not appear that her problem is related to biliary tract disease.  She does have reflux and although the recent upper GI revealed minimal reflux, I do think her symptoms are related to esophageal reflux with esophageal spasm.  10/21/2014--air-contrast upper GI series revealed trace upper laryngeal penetration.  Persistent small partially reducible sliding hiatal hernia the upper stomach with    • Gastroesophageal reflux disease without esophagitis 06/06/2019   • Generalized anxiety disorder 04/11/2017   • History of Acute deep vein thrombosis (DVT) of distal end of left lower extremity 02/15/2017    03/21/2017 patient seen in follow-up and she is tolerating the Eliquis well without signs or symptoms of bleeding.  Her calf swelling and tenderness is better but not totally resolved.  I suspect that the DVT is chronic and may not resolve at all.  I will order a repeat venous study and then proceed from there.  02/23/2017--repeat Doppler venous study of the left lower extremity reveals a chronic left lower extremity DVT in the posterior tibial.  All other left sided veins appeared normal.  Fluid collection in the  left calf noted.  02/17/2017--patient seen in follow-up and reports her left lower extremity symptoms are about the same.  She continues to have complaints of profound fatigue which I think is multifactorial including underlying depression that is not in remission.  Review the results of the CTA of the chest including the possible thyroid lesion.  I do not think this is contributing to any of her symptoms of fatigue particularly given the fact that her thyroid function tests are normal.  I expla   • History of palpitations 12/07/2011    Patient has had multiple admissions to the hospital for complaints of chest pain and heart palpitations. She meant admitted at least on 3 occasions. She has had at least 3 stress Cardiolite and 1 stress ECG, the last Cardiolite being performed 12/07/2011 which was negative. Patient is also had Holter monitors which have been unremarkable.   • HIstory of Schatzki's ring 02/28/2012 02/28/2012--air-contrast upper GI revealed small to moderate sized reducible sliding hiatal herniation of the upper stomach with some demonstrated gastroesophageal reflux. No esophageal, gastric, or duodenal mass or mucosal ulceration was seen.  11/03/2008--EGD performed for evaluation of iron deficiency anemia revealed hiatal hernia without evidence of reflux, prepyloric antritis and   • Hyperlipidemia 04/08/2016   • Hypersomnolence 05/05/2016 06/14/2017--overnight oximetry revealed oxygen desaturation index of only 0.44.  There were only 10 total desaturations during the period of testing which lasted 6 hours and 46 minutes.  05/31/2017--patient seen in follow-up and reports she continues to have chronic fatigue as well as hypersomnolence.  Once again, she is on multiple medications that are undoubtedly contributing to this problem including clonazepam and hydrocodone but I doubt that these could be discontinued.  Her CBC back in April revealed a low hemoglobin of 10.8 but hematocrit was normal at  35.7.  Thyroid function tests were normal and CMP normal.  Overnight oximetry test ordered.  Repeat CBC.  Iron studies.  Sedimentation rate and CRP.  04/11/2017--patient seen in follow-up and her blood pressure is now at a reasonable level at 120/64.  She reports she still feels somewhat fatigued but she is much better.  Patient has not been doing any regular exercise and I do think that that would be helpful to reduce her feeling of fatigue.  She is   • Menopausal state 04/08/2016   • Multinodular goiter 02/17/2017 02/21/2017--thyroid ultrasound reveals multinodular thyroid with largest nodule measuring on the order of 1.6 cm in greatest dimension.  02/17/2017--patient seen in follow-up for DVT and CTA of the chest.  An incidental finding on the CTA of the chest reveals a 1.7 cm lesion in the right lobe of thyroid.  Note that thyroid function tests are currently normal.  Ultrasound of the thyroid ordered.   • Osteoporosis, 03/29/2011--lumbar spine -2.8.  Right femoral neck -2.4.  Left femoral neck -2.4.  Patient receives Reclast infusions. 02/09/2009 04/19/2017--Reclast infusion.  04/05/2016--Reclast infusion.  06/04/2012--Reclast infusion.  05/26/2011--Reclast infusion.  03/29/2011--DEXA scan revealed a lumbar T score of -2.8, right femoral neck T score -2.4, left femoral neck T score -2.4.  Osteoporosis of the lumbar spine and severe osteopenia of the hips bilaterally.  Patient has been intolerant to Fosamax because of gastritis and gastroesophageal reflux.  04/01/2009--treatment for osteoporosis begun with Fosamax.  02/09/2009--DEXA scan revealed lumbar spine T score of -3.3.  Left femoral neck T score -2.5.  Right hip T score -2.6.  Osteoporosis.    • Pain disorder associated with psychological factors 10/10/2012   • Pain disorder with psychological factors 10/10/2012   • Pancreatic Duct Dilation 11/30/2001 09/29/2014--patient was again evaluated by the urologist for a renal cyst.  CT scan of the  abdomen and pelvis reveals a left renal cyst measuring 5.1 x 4.9 cm.  There were subcentimeter hypodense renal lesions that are too small to further characterize and are presumably related to cysts.  Recommend attention to follow up.  Distal dilated pancreatic duct noted.  The common bile duct is the upper limits of normal in size.  The ampullary region is not well evaluated.  Comparison with prior imaging is recommended if available.  If there is no prior film available for comparison, and ERCP or MRCP could be performed to further evaluate.  Small hiatal hernia noted.  03/05/2012--CT scan of the abdomen with contrast, pancreatic protocol.  This reveals some fullness of the pancreatic ductal system and apparent pancreatic divisum with separate entrance of the accessory pancreatic duct extending into the duodenum distal to the main pancreatic duct.  There is fullness of the duct diffusely but similar to the previous s   • Pedal edema 06/29/2015 06/29/2015--patient presents with a 4-6 week history of exertional dyspnea that comes on with activity such as climbing stairs or walking her dog up an incline.  No chest pain.  Relieved with rest.  No cough.  She does have complaints of her feet and legs swelling that is particularly worse at the end of the day and not improved overnight.  No orthopnea or PND.  Chest exam reveals faint rales at the bases.  Otherwise clear.  Heart is regular and I do not appreciate a heart murmur.  Chest x-ray PA and lateral ordered.  Echocardiogram ordered.   • Pulmonary hypertension (HCC) 04/18/2019   • Restless legs syndrome 04/08/2016   • Simple renal cyst 07/20/2009 09/29/2014--patient was again evaluated by the urologist for a renal cyst.  CT scan of the abdomen and pelvis reveals a left renal cyst measuring 5.1 x 4.9 cm.  There were subcentimeter hypodense renal lesions that are too small to further characterize and are presumably related to cysts.  Recommend attention to follow  up.  Distal dilated pancreatic duct noted.  The common bile duct is the upper limits of normal in size.  The ampullary region is not well evaluated.  Comparison with prior imaging is recommended if available.  If there is no prior film available for comparison, and ERCP or MRCP could be performed to further evaluate.  Small hiatal hernia noted.  07/20/2009--patient was noted to have a left renal mass consistent with a cyst.  This was evaluated by the urologist 07/20/2009.   • Statin intolerance 10/30/2017   • Tinnitus of both ears 09/30/2019 September 30, 2019--patient presents with a several year history of bilateral tinnitus, right greater than left.  It seems to be getting worse and now is associated with fluctuating hearing.  She occasionally will have worsening hearing after she coughs or sneezes.  On exam she has evidence of old otitis media scars, particularly on the right.  There is no acute infection present and there is no a   • Vitamin D deficiency 04/08/2016         Past Surgical History:   Procedure Laterality Date   • APPENDECTOMY  1965    1965   • CATARACT EXTRACTION Bilateral Remote    Remote bilateral cataract extirpation with intraocular lens implantation.   • CHOLECYSTECTOMY WITH INTRAOPERATIVE CHOLANGIOGRAM N/A 01/21/2019 01/21/2019--laparoscopic paraesophageal hernia repair with fundoplication.   • COLONOSCOPY  11/03/2008 2008--normal colonoscopy   • COLONOSCOPY  2001 2001--normal colonoscopy.   • COLONOSCOPY N/A 06/13/2017 06/13/2017--colonoscopy revealed melanosis.  Biopsied.  There was friability with contact bleeding in the ascending colon.  Biopsied.  Pathology returned consistent with melanosis coli.   • ENDOSCOPY  10/08/2014    02/28/2012--air-contrast upper GI revealed small to moderate sized reducible sliding hiatal herniation of the upper stomach with some demonstrated gastroesophageal reflux. No esophageal, gastric, or duodenal mass or mucosal ulceration was seen.  11/03/2008--EGD performed for evaluation of iron deficiency anemia revealed hiatal hernia without evidence of reflux, prepyloric antritis and   • ENDOSCOPY  11/03/2008 11/03/2008--EGD performed for evaluation of iron deficiency anemia revealed hiatal hernia without evidence of reflux, prepyloric antritis and   • ENDOSCOPY  11/19/2001 11/19/2001--EGD revealed a very tortuous distal esophagus with a Schatzki's ring. No ulcer or erosions. No Dorado's mucosa. Stomach revealed patchy erythema and erosions in the antrum. Biopsy. Normal pylorus with no obstruction. Normal duodenum with no ulcers. The Schatzki's ring was dilated with a Rhodes dilator.;   • ENDOSCOPY N/A 09/27/2016 09/27/2016--EGD revealed a normal oropharynx, esophagus, and medium sized hiatal hernia.  Nonbleeding gastric ulcer with no stigmata of bleeding.  Biopsy.  Gastritis.  Biopsy.  Normal examined duodenum.  Biopsied.  Pathology returned mild to moderate chronic active gastritis with ulceration from the stomach antral ulcer biopsy.  Gastroesophageal junction biopsy revealed minimal mixed inflammation.   • ENDOSCOPY N/A 06/13/2017 06/13/2017--colonoscopy revealed melanosis.  Biopsied.  There was friability with contact bleeding in the ascending colon.  Biopsied.  Pathology returned consistent with melanosis coli.   • ERCP N/A 01/22/2019 01/22/2019--ERCP with sphincterotomy and balloon stone extraction.   • ESOPHAGEAL DILATATION  11/19/2001 11/19/2001--EGD revealed a very tortuous distal esophagus with a Schatzki's ring. No ulcer or erosions. No Dorado's mucosa. Stomach revealed patchy erythema and erosions in the antrum. Biopsy. Normal pylorus with no obstruction. Normal duodenum with no ulcers. The Schatzki's ring was dilated with a Rhodes dilator.;    • ESOPHAGEAL MANOMETRY N/A 12/18/2018    Procedure: ESOPHAGEAL MANOMETRY;  Surgeon: Endo, Nurse Performed;  Location: [facility] ENDOSCOPY;  Service: Gastroenterology   • ESOPHAGOSCOPY  N/A 01/21/2019    Procedure: FLEXIBLE ESOPHAGOSCOPY;  Surgeon: Reji Johnson MD;  Location:  [facility];  Service: General   • HIATAL HERNIA REPAIR N/A 01/21/2019    Procedure: Laparoscopic paraesophageal hernia repair with toupee fundoplication;  Surgeon: Reji Johnson MD;  Location:  [facility];  Service: General   • INCONTINENCE SURGERY  1979 1979--a bladder tack procedure for urinary stress incontinence   • KNEE ARTHROSCOPY Right 12/12/2011 12/12/2011--right knee arthroscopy with partial lateral and medial meniscectomies.   • SKIN SURGERY  05/25/2010 05/25/2010--skin lesion excised from right lower extremity. 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   • TOTAL ABDOMINAL HYSTERECTOMY WITH SALPINGO OOPHORECTOMY  1974 1974--total abdominal hysterectomy.         Allergies   Allergen Reactions   • Statins Nausea And Vomiting   • Morphine Hallucinations   • Penicillins Itching   • Tetanus Toxoids Itching           Current Outpatient Medications:   •  amLODIPine (NORVASC) 5 MG tablet, Take 1 p.o. every morning for high blood pressure, Disp: 30 tablet, Rfl: 6  •  aspirin 81 MG EC tablet, Take 81 mg by mouth Daily. HELD, Disp: , Rfl:   •  Brexpiprazole (Rexulti) 1 MG tablet, Rexulti 1 mg tablet, Disp: , Rfl:   •  buPROPion XL (WELLBUTRIN XL) 300 MG 24 hr tablet, Take 300 mg by mouth Every Morning., Disp: , Rfl:   •  Cholecalciferol (vitamin D3) 125 MCG (5000 UT) capsule capsule, 1 by mouth daily as directed, Disp: 30 capsule, Rfl:   •  Cimetidine (TAGAMET PO), Tagamet, Disp: , Rfl:   •  cycloSPORINE (RESTASIS) 0.05 % ophthalmic emulsion, Administer 1 drop to both eyes 2 (Two) Times a Day., Disp: , Rfl:   •  diclofenac (VOLTAREN) 1 % gel gel, Apply 4 g topically to the appropriate area as directed 4 (Four) Times a Day As Needed., Disp: , Rfl:   •  diphenhydrAMINE-APAP, sleep, (Tylenol PM Extra Strength)  MG/30ML liquid, Tylenol PM Extra Strength, Disp:  , Rfl:   •  estradiol (ESTRACE) 1 MG tablet, TAKE 1 TABLET BY MOUTH EVERY DAY, Disp: 90 tablet, Rfl: 3  •  HYDROcodone-acetaminophen (NORCO)  MG per tablet, Take 1 tablet by mouth every 6 (six) hours as needed for moderate pain (4-6)., Disp: , Rfl:   •  Misc Natural Products (COLON CLEANSE PO), Take  by mouth., Disp: , Rfl:   •  Multiple Vitamins-Minerals (MULTIVITAMIN ADULT) chewable tablet, Chew 1 tablet Daily., Disp: , Rfl:   •  Specialty Vitamins Products (ONE-A-DAY BONE STRENGTH PO), One-A-Day Bone Strength, Disp: , Rfl:   •  topiramate (TOPAMAX) 100 MG tablet, TAKE 1 TABLET DAILY, Disp: 90 tablet, Rfl: 4  •  traZODone (DESYREL) 50 MG tablet, Take 25-50 mg by mouth Every Night., Disp: , Rfl:   •  venlafaxine XR (EFFEXOR-XR) 75 MG 24 hr capsule, , Disp: , Rfl:   •  vitamin E 400 UNIT capsule, Take 400 Units by mouth Daily., Disp: , Rfl:   •  spironolactone (Aldactone) 100 MG tablet, Take 1 p.o. every morning for high blood pressure and leg swelling/edema, Disp: 90 tablet, Rfl: 1      Family History   Problem Relation Age of Onset   • Heart attack Mother         dies age 47 from heart attack   • Heart disease Mother    • Bleeding Disorder Mother    • Heart disease Father    • Ovarian cancer Maternal Grandmother    • Heart attack Maternal Aunt         dies age 60 from heart attack   • Malig Hyperthermia Neg Hx    • Breast cancer Neg Hx          Social History     Socioeconomic History   • Marital status:      Spouse name: ander   • Number of children: 4   • Years of education: 14   • Highest education level: Associate degree: academic program   Tobacco Use   • Smoking status: Never Smoker   • Smokeless tobacco: Never Used   • Tobacco comment: no caffeine    Vaping Use   • Vaping Use: Never used   Substance and Sexual Activity   • Alcohol use: No   • Drug use: No   • Sexual activity: Yes     Partners: Male         Vitals:    09/06/22 1356   BP: 140/78   Pulse: 80   Resp: 18   SpO2: 98%   Weight: 59.1  "kg (130 lb 3.2 oz)   Height: 152.4 cm (60\")        Body mass index is 25.43 kg/m².      Physical Exam:    General: Alert and oriented x 3.  No acute distress.  Normal affect.  HEENT: Pupils equal, round, reactive to light; extraocular movements intact; sclerae nonicteric; pharynx, ear canals and TMs normal.  Neck: Without JVD, thyromegaly, bruit, or adenopathy.  Lungs: Clear to auscultation in all fields.  Heart: Regular rate and rhythm without murmur, rub, gallop, or click.  Abdomen: Soft, nontender, without hepatosplenomegaly or hernia.  Bowel sounds normal.  : Deferred.  Rectal: Deferred.  Extremities: Without clubbing, cyanosis, edema, or pulse deficit.  Neurologic: Intact without focal deficit.  Normal station and gait observed during ingress and egress from the examination room.  Skin: Without significant lesion.  Musculoskeletal: Unremarkable.    Lab/other results:      Assessment/Plan:     Diagnosis Plan   1. Benign essential hypertension  spironolactone (Aldactone) 100 MG tablet   2. Hypokalemia     3. Hyperaldosteronism (HCC)  spironolactone (Aldactone) 100 MG tablet       Patient with hypertension that has not been controlled here recently.  She has suspected hyperaldosteronism and her potassium levels have been chronically low.  I checked a CMP at the last visit and they look just fine including her potassium levels.  Her blood pressure just not quite at goal and I think we need to make an adjustment.    Plan is as follows: Increase spironolactone to 100 mg/day.  I will have her follow-up in about 3-1/2 to 4 weeks to check her blood pressure and I can send her to the lab after that visit to check her CMP for electrolytes and renal function.      Procedures        "

## 2022-09-28 ENCOUNTER — LAB (OUTPATIENT)
Dept: LAB | Facility: HOSPITAL | Age: 82
End: 2022-09-28

## 2022-09-28 DIAGNOSIS — E78.2 MIXED HYPERLIPIDEMIA: Chronic | ICD-10-CM

## 2022-09-28 DIAGNOSIS — E55.9 VITAMIN D DEFICIENCY: Chronic | ICD-10-CM

## 2022-09-28 DIAGNOSIS — N18.2 CHRONIC RENAL INSUFFICIENCY, STAGE II (MILD): Chronic | ICD-10-CM

## 2022-09-28 DIAGNOSIS — D64.9 CHRONIC ANEMIA: Chronic | ICD-10-CM

## 2022-09-28 LAB
25(OH)D3 SERPL-MCNC: 69 NG/ML (ref 30–100)
ALBUMIN SERPL-MCNC: 4.3 G/DL (ref 3.5–5.2)
ALBUMIN/GLOB SERPL: 2 G/DL
ALP SERPL-CCNC: 66 U/L (ref 39–117)
ALT SERPL W P-5'-P-CCNC: 20 U/L (ref 1–33)
ANION GAP SERPL CALCULATED.3IONS-SCNC: 12.4 MMOL/L (ref 5–15)
AST SERPL-CCNC: 20 U/L (ref 1–32)
BILIRUB SERPL-MCNC: 0.3 MG/DL (ref 0–1.2)
BUN SERPL-MCNC: 20 MG/DL (ref 8–23)
BUN/CREAT SERPL: 20 (ref 7–25)
CALCIUM SPEC-SCNC: 9.2 MG/DL (ref 8.6–10.5)
CHLORIDE SERPL-SCNC: 100 MMOL/L (ref 98–107)
CO2 SERPL-SCNC: 25.6 MMOL/L (ref 22–29)
CREAT SERPL-MCNC: 1 MG/DL (ref 0.57–1)
DEPRECATED RDW RBC AUTO: 45.3 FL (ref 37–54)
EGFRCR SERPLBLD CKD-EPI 2021: 57.4 ML/MIN/1.73
ERYTHROCYTE [DISTWIDTH] IN BLOOD BY AUTOMATED COUNT: 14.2 % (ref 12.3–15.4)
GLOBULIN UR ELPH-MCNC: 2.2 GM/DL
GLUCOSE SERPL-MCNC: 108 MG/DL (ref 65–99)
HCT VFR BLD AUTO: 39.1 % (ref 34–46.6)
HGB BLD-MCNC: 13.3 G/DL (ref 12–15.9)
IRON 24H UR-MRATE: 100 MCG/DL (ref 37–145)
IRON SATN MFR SERPL: 15 % (ref 20–50)
MCH RBC QN AUTO: 29.2 PG (ref 26.6–33)
MCHC RBC AUTO-ENTMCNC: 34 G/DL (ref 31.5–35.7)
MCV RBC AUTO: 85.7 FL (ref 79–97)
PLATELET # BLD AUTO: 329 10*3/MM3 (ref 140–450)
PMV BLD AUTO: 10.4 FL (ref 6–12)
POTASSIUM SERPL-SCNC: 4.2 MMOL/L (ref 3.5–5.2)
PROT SERPL-MCNC: 6.5 G/DL (ref 6–8.5)
RBC # BLD AUTO: 4.56 10*6/MM3 (ref 3.77–5.28)
SODIUM SERPL-SCNC: 138 MMOL/L (ref 136–145)
T3FREE SERPL-MCNC: 2.86 PG/ML (ref 2–4.4)
T4 FREE SERPL-MCNC: 1.13 NG/DL (ref 0.93–1.7)
TIBC SERPL-MCNC: 678 MCG/DL (ref 298–536)
TRANSFERRIN SERPL-MCNC: 455 MG/DL (ref 200–360)
TSH SERPL DL<=0.05 MIU/L-ACNC: 0.97 UIU/ML (ref 0.27–4.2)
WBC NRBC COR # BLD: 9.54 10*3/MM3 (ref 3.4–10.8)

## 2022-09-28 PROCEDURE — 80053 COMPREHEN METABOLIC PANEL: CPT

## 2022-09-28 PROCEDURE — 84481 FREE ASSAY (FT-3): CPT

## 2022-09-28 PROCEDURE — 84466 ASSAY OF TRANSFERRIN: CPT

## 2022-09-28 PROCEDURE — 80061 LIPID PANEL: CPT

## 2022-09-28 PROCEDURE — 84439 ASSAY OF FREE THYROXINE: CPT

## 2022-09-28 PROCEDURE — 84443 ASSAY THYROID STIM HORMONE: CPT

## 2022-09-28 PROCEDURE — 82306 VITAMIN D 25 HYDROXY: CPT

## 2022-09-28 PROCEDURE — 85027 COMPLETE CBC AUTOMATED: CPT

## 2022-09-28 PROCEDURE — 83704 LIPOPROTEIN BLD QUAN PART: CPT

## 2022-09-28 PROCEDURE — 83540 ASSAY OF IRON: CPT

## 2022-09-28 PROCEDURE — 36415 COLL VENOUS BLD VENIPUNCTURE: CPT

## 2022-09-30 LAB
CHOLEST SERPL-MCNC: 208 MG/DL (ref 100–199)
HDL SERPL-SCNC: 62.6 UMOL/L
HDLC SERPL-MCNC: 110 MG/DL
LDL SERPL QN: 21.1 NM
LDL SERPL-SCNC: 761 NMOL/L
LDL SMALL SERPL-SCNC: <90 NMOL/L
LDLC SERPL CALC-MCNC: 73 MG/DL (ref 0–99)
TRIGL SERPL-MCNC: 154 MG/DL (ref 0–149)

## 2022-10-04 ENCOUNTER — OFFICE VISIT (OUTPATIENT)
Dept: INTERNAL MEDICINE | Facility: CLINIC | Age: 82
End: 2022-10-04

## 2022-10-04 VITALS
RESPIRATION RATE: 18 BRPM | HEIGHT: 60 IN | DIASTOLIC BLOOD PRESSURE: 66 MMHG | OXYGEN SATURATION: 95 % | HEART RATE: 77 BPM | WEIGHT: 130 LBS | SYSTOLIC BLOOD PRESSURE: 138 MMHG | BODY MASS INDEX: 25.52 KG/M2

## 2022-10-04 DIAGNOSIS — E78.2 MIXED HYPERLIPIDEMIA: Chronic | ICD-10-CM

## 2022-10-04 DIAGNOSIS — E04.2 MULTINODULAR GOITER: Chronic | ICD-10-CM

## 2022-10-04 DIAGNOSIS — D50.0 IRON DEFICIENCY ANEMIA DUE TO CHRONIC BLOOD LOSS: Chronic | ICD-10-CM

## 2022-10-04 DIAGNOSIS — E26.9 HYPERALDOSTERONISM: ICD-10-CM

## 2022-10-04 DIAGNOSIS — E87.6 HYPOKALEMIA: ICD-10-CM

## 2022-10-04 DIAGNOSIS — N18.2 CHRONIC RENAL INSUFFICIENCY, STAGE II (MILD): Chronic | ICD-10-CM

## 2022-10-04 DIAGNOSIS — E55.9 VITAMIN D DEFICIENCY: Chronic | ICD-10-CM

## 2022-10-04 DIAGNOSIS — Z23 NEED FOR INFLUENZA VACCINATION: ICD-10-CM

## 2022-10-04 DIAGNOSIS — D64.9 CHRONIC ANEMIA: Chronic | ICD-10-CM

## 2022-10-04 DIAGNOSIS — I10 BENIGN ESSENTIAL HYPERTENSION: Primary | Chronic | ICD-10-CM

## 2022-10-04 PROCEDURE — 99214 OFFICE O/P EST MOD 30 MIN: CPT | Performed by: INTERNAL MEDICINE

## 2022-10-04 PROCEDURE — G0008 ADMIN INFLUENZA VIRUS VAC: HCPCS | Performed by: INTERNAL MEDICINE

## 2022-10-04 PROCEDURE — 90662 IIV NO PRSV INCREASED AG IM: CPT | Performed by: INTERNAL MEDICINE

## 2022-10-04 NOTE — PROGRESS NOTES
10/04/2022    Patient Information  Sachi Vora                                                                                          [address] [place] [address]      1940  [unfilled]  There is no work phone number on file.    Chief Complaint:     Follow-up hypertension, hyperaldosteronism with hypokalemia, chronic renal insufficiency, recent medication adjustment.  Follow-up lab work in order to monitor chronic medical issues listed in history of present illness.    History of Present Illness:    Patient with hypertension who has had a long history of hypokalemia and therefore we have suspected hyperaldosteronism.  We changed her medications around and basically started her on spironolactone which we have been titrating upward.  She also remains on amlodipine 5 mg/day.  At the last visit we increased her spironolactone to a total of 100 mg/day.  This was done about 3 to 4 weeks ago.  Patient also had lab work about a week ago in order to monitor chronic medical issues listed below.  Her past medical history reviewed and updated were necessary including health maintenance parameters.  This reveals she needs influenza vaccine.    Review of Systems   Constitutional: Negative.   HENT: Negative.    Eyes: Negative.    Cardiovascular: Negative.    Respiratory: Negative.    Endocrine: Negative.    Hematologic/Lymphatic: Negative.    Skin: Negative.    Musculoskeletal: Negative.    Gastrointestinal: Negative.    Genitourinary: Negative.    Neurological: Negative.    Psychiatric/Behavioral: Negative.    Allergic/Immunologic: Negative.        Active Problems:    Patient Active Problem List   Diagnosis   • Osteoporosis, 03/29/2011--lumbar spine -2.8.  Right femoral neck -2.4.  Left femoral neck -2.4.  Patient receives Reclast infusions.   • Therapeutic drug monitoring   • Simple renal cyst   • Benign essential hypertension   • Carotid artery plaque, 04/02/2018--mild right ICA plaque, normal  "left ICA 10/03/2014--mild bilateral carotid artery plaque.   • Chronic gastritis   • Chronic lower back pain   • Chronic otitis externa   • Chronic renal insufficiency, stage II (mild), creatinine 1.12   • Depression with anxiety   • Functional murmur, 07/15/2015--normal echocardiogram.   • Hyperlipidemia   • Menopausal state   • Chronic migraine w/o aura w/o status migrainosus, not intractable   • Pancreatic Duct Dilation   • Bilateral lower extremity edema   • Restless legs syndrome   • Bilateral sensorineural hearing loss   • Vitamin D deficiency   • Chronic gastric ulcer   • Chronic fatigue   • Hypersomnolence   • Chronic anemia   • Multinodular goiter   • Generalized anxiety disorder   • Iron deficiency anemia   • Statin intolerance   • Postmenopausal state   • Pulmonary hypertension (HCC)   • Gastroesophageal reflux disease without esophagitis   • Tinnitus of both ears   • Chronic hoarseness   • Pain disorder associated with psychological factors   • Frequent falls   • Balance disorder   • Hyperaldosteronism (HCC)   • Hypokalemia         Past Medical History:   Diagnosis Date   • Balance disorder 02/05/2021 February 5, 2021--patient seen in follow-up by Dr. Weir.  I extensively reviewed the documentation from the emergency room visit as noted below.  I reviewed the history with the patient and she is describing episodes of dizziness described as a spinning sensation of her body and this seems to be precipitated by movement although it does not occur if she rolls over in bed.  If she stands up and st   • Benign essential hypertension 04/08/2016   • Bilateral sensorineural hearing loss 03/31/2011 03/31/2011--etiology reveals reverse \"cookie bite\" type of hearing loss for both ears of mild/moderate degree, mostly sensorineural.  Speech discrimination 100% on the right, 96% on the left.   • Carotid artery plaque, 10/03/2014--mild bilateral carotid artery plaque. 07/25/2012    10/03/2014--[facility] screening " revealed mild bilateral carotid plaque, negative for atrial fibrillation, negative for AAA, negative for PAD, osteoporosis screen revealed osteopenia.  Body mass index was 25 and considered to be moderate risk.  07/25/2012--vascular screen negative for carotid plaque, negative for abdominal aneurysm, negative for PAD Description: 10/03/2014--[facility] screening revealed mild bilateral carotid plaque, negative for atrial fibrillation, negative for AAA, negative for PAD, osteoporosis screen revealed osteopenia.  Body mass index was 25 and considered to be moderate risk.  07/25/2012--vascular screen negative for carotid plaque, negative for abdominal aneurysm, negative for PAD   • Chronic anemia 08/23/2016 06/13/2017--colonoscopy revealed melanosis.  Biopsied.  There was friability with contact bleeding in the ascending colon.  Biopsied.  Pathology returned consistent with melanosis coli.  06/13/2017--EGD revealed normal esophagus.  Biopsies taken.  There was a medium-sized hiatal hernia.  Localized mild inflammation and linear erosions were found in the prepyloric region.  Biopsied.  Examined duodenum was normal.  Random biopsies taken.  Pathology of the gastroesophageal junction was unremarkable as was the gastric fundus.  Prepyloric biopsy revealed minimal chronic inflammation and reactive change.  H pylori negative.  Duodenal biopsies are negative.  04/12/2017--hemoglobin low at 10.8, hematocrit is normal at 35.7.  Iron sulfate 325 mg per day initiated.  08/23/2016--routine follow-up.  Patient continues to have epigastric abdominal pain believed to be related to reflux with possible esophageal spasm.  Hemoglobin noted to be low at 10.6 with a hematocrit low at 33.7 and RDW elevated at 16.2.  Homocysti   • Chronic fatigue 04/21/2016 06/14/2017--overnight oximetry revealed oxygen desaturation index of only 0.44.  There were only 10 total desaturations during the period of testing which lasted 6 hours and 46  minutes.  05/31/2017--patient seen in follow-up and reports she continues to have chronic fatigue as well as hypersomnolence.  Once again, she is on multiple medications that are undoubtedly contributing to this problem including clonazepam and hydrocodone but I doubt that these could be discontinued.  Her CBC back in April revealed a low hemoglobin of 10.8 but hematocrit was normal at 35.7.  Thyroid function tests were normal and CMP normal.  Overnight oximetry test ordered.  Repeat CBC.  Iron studies.  Sedimentation rate and CRP.  04/11/2017--patient seen in follow-up and her blood pressure is now at a reasonable level at 120/64.  She reports she still feels somewhat fatigued but she is much better.  Patient has not been doing any regular exercise and I do think that that would be helpful to reduce her feeling of fatigue.  She is   • Chronic gastric ulcer 04/14/2014    02/15/2017--patient seen in follow-up in nearly 6 months later.  She has complaints of not feeling well all over.  She has complaints of diffuse myalgias and possibly tendinopathy related to Levaquin that I called in prior to her going to [place] for vacation.  She reports that 3 or 4 days after starting the Levaquin she began to have the musculoskeletal symptoms and she reports that she continues to have them presently.  Symptoms are worse involving her left calf area.  Examination reveals significant tenderness involving the left calf and the left calf seems a little larger than the right.  She also has complaints of shortness of breath but no chest pain.  She is complaining of double vision and generalized weakness and fatigue.  She was complaining of chronic fatigue at the last visit back in September and I ordered an overnight oximetry test but apparently this was never done for reasons that are not clear to me.  Her current oxygen saturation is 94% and normally it is 100%.  Plan is as follows: Extensi   • Chronic gastritis 11/19/2001     02/15/2017--patient seen in follow-up in nearly 6 months later.  She has complaints of not feeling well all over.  She has complaints of diffuse myalgias and possibly tendinopathy related to Levaquin that I called in prior to her going to [place] for vacation.  She reports that 3 or 4 days after starting the Levaquin she began to have the musculoskeletal symptoms and she reports that she continues to have them presently.  Symptoms are worse involving her left calf area.  Examination reveals significant tenderness involving the left calf and the left calf seems a little larger than the right.  She also has complaints of shortness of breath but no chest pain.  She is complaining of double vision and generalized weakness and fatigue.  She was complaining of chronic fatigue at the last visit back in September and I ordered an overnight oximetry test but apparently this was never done for reasons that are not clear to me.  Her current oxygen saturation is 94% and normally it is 100%.  Plan is as follows: Extensi   • Chronic hoarseness 06/08/2020    September 15, 2020--patient presents with complaints of swelling of the soft tissues of the left side of the neck and this is associated with pain and discomfort, particularly when she turns her head rotating to the left.  She also notices hoarseness and feels that her voice has changed.  On exam there is definite prominence of the left sternocleidomastoid muscle and there may be some shotty lymph   • Chronic lower back pain 01/31/2006 08/11/2014--MRI of the lumbar spine reveals the conus terminates at L2 and is normal.  Cauda equina unremarkable.  Stable to moderate degenerative disc disease at L5-S1.  No acute fracture or pars defect is demonstrated.  Small synovial cysts are seen posterior to the L4-L5 facet.  The perivertebral soft tissues are unremarkable.  T12-L1, L1-L2, L2-L3 are negative.  L3-L4 a broad-based disc bulge resulting in mild bilateral neural foraminal  narrowing.  L4-L5 reveals a broad-based disc bulge facet disease resulting in mild bilateral neural foraminal narrowing.  L5-S1 reveals a broad-based disc bulge, posterior osseous slipping, and facet disease resulting in mild to moderate bilateral neural foraminal narrowing.  Assessment is stable mild to moderate degenerative spondylosis.  Small synovial cysts are seen posterior to the L4-L5 facets.  08/11/2014--MRI of the thoracic spine reveals mild to moderate thoracic kyphosis.  No fracture.  At T5--T6 there is a moderate sized left paracentral disc protrusion which i   • Chronic migraine 11/03/2009 01/18/2010--MRI of the brain performed for headaches and memory loss.  Mild small vessel disease in the cerebral and central pontine white matter.  There is an ovoid, somewhat pancake-shaped area of signal abnormality in the anterior inferior right temporal lobe subcortical to the juxtacortical white matter that measures 1.2 x 1 cm and anterior posterior and medial lateral dimension but only measures 3 mm in cranial caudal dimension.  I suspect that this is a benign cyst or a cystic area of encephalomalacia.  The remainder of the MRI of the head is within normal limits.  11/03/2009--CT scan of the brain without contrast performed for headache after a fall.  Mild diffuse atrophy.  No acute abnormality noted.; Description: Patient has had a long history of migraine headaches.  MRI and CT scan of the brain have been essentially negative.   • Chronic otitis externa 04/08/2016   • Chronic renal insufficiency, stage II (mild), creatinine 1.12 11/14/2015 11/14/2015--serum creatinine mildly elevated at 1.12.   • Depression with anxiety 04/08/2016   • Frequent falls 02/05/2021 February 5, 2021--patient seen in follow-up by Dr. Weir.  I extensively reviewed the documentation from the emergency room visit as noted below.  I reviewed the history with the patient and she is describing episodes of dizziness described as a  spinning sensation of her body and this seems to be precipitated by movement although it does not occur if she rolls over in bed.  If she stands up and st   • Functional murmur, 07/15/2015--normal echocardiogram. 07/15/2015    07/15/2015--echocardiogram reveals normal left ventricular size and function with ejection fraction 55%.  Grade 1 diastolic dysfunction, abnormal relaxation pattern.  Trace tricuspid regurgitation.  Estimated right ventricular systolic pressure is 25 mmHg which is normal.   • Gastroesophageal reflux disease with esophagitis 11/19/2001 06/13/2017--EGD revealed normal esophagus.  Biopsies taken.  There was a medium-sized hiatal hernia.  Localized mild inflammation and linear erosions were found in the prepyloric region.  Biopsied.  Examined duodenum was normal.  Random biopsies taken.  Pathology of the gastroesophageal junction was unremarkable as was the gastric fundus.  Prepyloric biopsy revealed minimal chronic inflammation and reactive change.  H pylori negative.  Duodenal biopsies are negative.  11/03/2014--patient seen in follow-up and reports her epigastric pain/chest pain has resolved.  I reviewed the results of the studies with her.  It does not appear that her problem is related to biliary tract disease.  She does have reflux and although the recent upper GI revealed minimal reflux, I do think her symptoms are related to esophageal reflux with esophageal spasm.  10/21/2014--air-contrast upper GI series revealed trace upper laryngeal penetration.  Persistent small partially reducible sliding hiatal hernia the upper stomach with    • Gastroesophageal reflux disease without esophagitis 06/06/2019   • Generalized anxiety disorder 04/11/2017   • History of Acute deep vein thrombosis (DVT) of distal end of left lower extremity 02/15/2017    03/21/2017 patient seen in follow-up and she is tolerating the Eliquis well without signs or symptoms of bleeding.  Her calf swelling and tenderness  is better but not totally resolved.  I suspect that the DVT is chronic and may not resolve at all.  I will order a repeat venous study and then proceed from there.  02/23/2017--repeat Doppler venous study of the left lower extremity reveals a chronic left lower extremity DVT in the posterior tibial.  All other left sided veins appeared normal.  Fluid collection in the left calf noted.  02/17/2017--patient seen in follow-up and reports her left lower extremity symptoms are about the same.  She continues to have complaints of profound fatigue which I think is multifactorial including underlying depression that is not in remission.  Review the results of the CTA of the chest including the possible thyroid lesion.  I do not think this is contributing to any of her symptoms of fatigue particularly given the fact that her thyroid function tests are normal.  I expla   • History of palpitations 12/07/2011    Patient has had multiple admissions to the hospital for complaints of chest pain and heart palpitations. She meant admitted at least on 3 occasions. She has had at least 3 stress Cardiolite and 1 stress ECG, the last Cardiolite being performed 12/07/2011 which was negative. Patient is also had Holter monitors which have been unremarkable.   • HIstory of Schatzki's ring 02/28/2012 02/28/2012--air-contrast upper GI revealed small to moderate sized reducible sliding hiatal herniation of the upper stomach with some demonstrated gastroesophageal reflux. No esophageal, gastric, or duodenal mass or mucosal ulceration was seen.  11/03/2008--EGD performed for evaluation of iron deficiency anemia revealed hiatal hernia without evidence of reflux, prepyloric antritis and   • Hyperlipidemia 04/08/2016   • Hypersomnolence 05/05/2016 06/14/2017--overnight oximetry revealed oxygen desaturation index of only 0.44.  There were only 10 total desaturations during the period of testing which lasted 6 hours and 46 minutes.   05/31/2017--patient seen in follow-up and reports she continues to have chronic fatigue as well as hypersomnolence.  Once again, she is on multiple medications that are undoubtedly contributing to this problem including clonazepam and hydrocodone but I doubt that these could be discontinued.  Her CBC back in April revealed a low hemoglobin of 10.8 but hematocrit was normal at 35.7.  Thyroid function tests were normal and CMP normal.  Overnight oximetry test ordered.  Repeat CBC.  Iron studies.  Sedimentation rate and CRP.  04/11/2017--patient seen in follow-up and her blood pressure is now at a reasonable level at 120/64.  She reports she still feels somewhat fatigued but she is much better.  Patient has not been doing any regular exercise and I do think that that would be helpful to reduce her feeling of fatigue.  She is   • Menopausal state 04/08/2016   • Multinodular goiter 02/17/2017 02/21/2017--thyroid ultrasound reveals multinodular thyroid with largest nodule measuring on the order of 1.6 cm in greatest dimension.  02/17/2017--patient seen in follow-up for DVT and CTA of the chest.  An incidental finding on the CTA of the chest reveals a 1.7 cm lesion in the right lobe of thyroid.  Note that thyroid function tests are currently normal.  Ultrasound of the thyroid ordered.   • Osteoporosis, 03/29/2011--lumbar spine -2.8.  Right femoral neck -2.4.  Left femoral neck -2.4.  Patient receives Reclast infusions. 02/09/2009 04/19/2017--Reclast infusion.  04/05/2016--Reclast infusion.  06/04/2012--Reclast infusion.  05/26/2011--Reclast infusion.  03/29/2011--DEXA scan revealed a lumbar T score of -2.8, right femoral neck T score -2.4, left femoral neck T score -2.4.  Osteoporosis of the lumbar spine and severe osteopenia of the hips bilaterally.  Patient has been intolerant to Fosamax because of gastritis and gastroesophageal reflux.  04/01/2009--treatment for osteoporosis begun with Fosamax.  02/09/2009--DEXA  scan revealed lumbar spine T score of -3.3.  Left femoral neck T score -2.5.  Right hip T score -2.6.  Osteoporosis.    • Pain disorder associated with psychological factors 10/10/2012   • Pain disorder with psychological factors 10/10/2012   • Pancreatic Duct Dilation 11/30/2001 09/29/2014--patient was again evaluated by the urologist for a renal cyst.  CT scan of the abdomen and pelvis reveals a left renal cyst measuring 5.1 x 4.9 cm.  There were subcentimeter hypodense renal lesions that are too small to further characterize and are presumably related to cysts.  Recommend attention to follow up.  Distal dilated pancreatic duct noted.  The common bile duct is the upper limits of normal in size.  The ampullary region is not well evaluated.  Comparison with prior imaging is recommended if available.  If there is no prior film available for comparison, and ERCP or MRCP could be performed to further evaluate.  Small hiatal hernia noted.  03/05/2012--CT scan of the abdomen with contrast, pancreatic protocol.  This reveals some fullness of the pancreatic ductal system and apparent pancreatic divisum with separate entrance of the accessory pancreatic duct extending into the duodenum distal to the main pancreatic duct.  There is fullness of the duct diffusely but similar to the previous s   • Pedal edema 06/29/2015 06/29/2015--patient presents with a 4-6 week history of exertional dyspnea that comes on with activity such as climbing stairs or walking her dog up an incline.  No chest pain.  Relieved with rest.  No cough.  She does have complaints of her feet and legs swelling that is particularly worse at the end of the day and not improved overnight.  No orthopnea or PND.  Chest exam reveals faint rales at the bases.  Otherwise clear.  Heart is regular and I do not appreciate a heart murmur.  Chest x-ray PA and lateral ordered.  Echocardiogram ordered.   • Pulmonary hypertension (HCC) 04/18/2019   • Restless legs  syndrome 04/08/2016   • Simple renal cyst 07/20/2009 09/29/2014--patient was again evaluated by the urologist for a renal cyst.  CT scan of the abdomen and pelvis reveals a left renal cyst measuring 5.1 x 4.9 cm.  There were subcentimeter hypodense renal lesions that are too small to further characterize and are presumably related to cysts.  Recommend attention to follow up.  Distal dilated pancreatic duct noted.  The common bile duct is the upper limits of normal in size.  The ampullary region is not well evaluated.  Comparison with prior imaging is recommended if available.  If there is no prior film available for comparison, and ERCP or MRCP could be performed to further evaluate.  Small hiatal hernia noted.  07/20/2009--patient was noted to have a left renal mass consistent with a cyst.  This was evaluated by the urologist 07/20/2009.   • Statin intolerance 10/30/2017   • Tinnitus of both ears 09/30/2019 September 30, 2019--patient presents with a several year history of bilateral tinnitus, right greater than left.  It seems to be getting worse and now is associated with fluctuating hearing.  She occasionally will have worsening hearing after she coughs or sneezes.  On exam she has evidence of old otitis media scars, particularly on the right.  There is no acute infection present and there is no a   • Vitamin D deficiency 04/08/2016         Past Surgical History:   Procedure Laterality Date   • APPENDECTOMY  1965 1965   • CATARACT EXTRACTION Bilateral Remote    Remote bilateral cataract extirpation with intraocular lens implantation.   • CHOLECYSTECTOMY WITH INTRAOPERATIVE CHOLANGIOGRAM N/A 01/21/2019 01/21/2019--laparoscopic paraesophageal hernia repair with fundoplication.   • COLONOSCOPY  11/03/2008 2008--normal colonoscopy   • COLONOSCOPY  2001 2001--normal colonoscopy.   • COLONOSCOPY N/A 06/13/2017 06/13/2017--colonoscopy revealed melanosis.  Biopsied.  There was friability with  contact bleeding in the ascending colon.  Biopsied.  Pathology returned consistent with melanosis coli.   • ENDOSCOPY  10/08/2014    02/28/2012--air-contrast upper GI revealed small to moderate sized reducible sliding hiatal herniation of the upper stomach with some demonstrated gastroesophageal reflux. No esophageal, gastric, or duodenal mass or mucosal ulceration was seen. 11/03/2008--EGD performed for evaluation of iron deficiency anemia revealed hiatal hernia without evidence of reflux, prepyloric antritis and   • ENDOSCOPY  11/03/2008 11/03/2008--EGD performed for evaluation of iron deficiency anemia revealed hiatal hernia without evidence of reflux, prepyloric antritis and   • ENDOSCOPY  11/19/2001 11/19/2001--EGD revealed a very tortuous distal esophagus with a Schatzki's ring. No ulcer or erosions. No Dorado's mucosa. Stomach revealed patchy erythema and erosions in the antrum. Biopsy. Normal pylorus with no obstruction. Normal duodenum with no ulcers. The Schatzki's ring was dilated with a Rhodes dilator.;   • ENDOSCOPY N/A 09/27/2016 09/27/2016--EGD revealed a normal oropharynx, esophagus, and medium sized hiatal hernia.  Nonbleeding gastric ulcer with no stigmata of bleeding.  Biopsy.  Gastritis.  Biopsy.  Normal examined duodenum.  Biopsied.  Pathology returned mild to moderate chronic active gastritis with ulceration from the stomach antral ulcer biopsy.  Gastroesophageal junction biopsy revealed minimal mixed inflammation.   • ENDOSCOPY N/A 06/13/2017 06/13/2017--colonoscopy revealed melanosis.  Biopsied.  There was friability with contact bleeding in the ascending colon.  Biopsied.  Pathology returned consistent with melanosis coli.   • ERCP N/A 01/22/2019 01/22/2019--ERCP with sphincterotomy and balloon stone extraction.   • ESOPHAGEAL DILATATION  11/19/2001 11/19/2001--EGD revealed a very tortuous distal esophagus with a Schatzki's ring. No ulcer or erosions. No Dorado's mucosa.  Stomach revealed patchy erythema and erosions in the antrum. Biopsy. Normal pylorus with no obstruction. Normal duodenum with no ulcers. The Schatzki's ring was dilated with a Rhodes dilator.;    • ESOPHAGEAL MANOMETRY N/A 12/18/2018    Procedure: ESOPHAGEAL MANOMETRY;  Surgeon: Cecilia, Nurse Performed;  Location: [facility] ENDOSCOPY;  Service: Gastroenterology   • ESOPHAGOSCOPY N/A 01/21/2019    Procedure: FLEXIBLE ESOPHAGOSCOPY;  Surgeon: Reji Johnson MD;  Location: [facility] MAIN OR;  Service: General   • HIATAL HERNIA REPAIR N/A 01/21/2019    Procedure: Laparoscopic paraesophageal hernia repair with toupee fundoplication;  Surgeon: Reji Johnson MD;  Location: [facility] OR;  Service: General   • INCONTINENCE SURGERY  1979 1979--a bladder tack procedure for urinary stress incontinence   • KNEE ARTHROSCOPY Right 12/12/2011 12/12/2011--right knee arthroscopy with partial lateral and medial meniscectomies.   • SKIN SURGERY  05/25/2010 05/25/2010--skin lesion excised from right lower extremity. 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   • TOTAL ABDOMINAL HYSTERECTOMY WITH SALPINGO OOPHORECTOMY  1974 1974--total abdominal hysterectomy.         Allergies   Allergen Reactions   • Statins Nausea And Vomiting   • Morphine Hallucinations   • Penicillins Itching   • Tetanus Toxoids Itching           Current Outpatient Medications:   •  amLODIPine (NORVASC) 5 MG tablet, Take 1 p.o. every morning for high blood pressure, Disp: 30 tablet, Rfl: 6  •  aspirin 81 MG EC tablet, Take 81 mg by mouth Daily. HELD, Disp: , Rfl:   •  Brexpiprazole (Rexulti) 1 MG tablet, Rexulti 1 mg tablet, Disp: , Rfl:   •  buPROPion XL (WELLBUTRIN XL) 300 MG 24 hr tablet, Take 300 mg by mouth Every Morning., Disp: , Rfl:   •  Cholecalciferol (vitamin D3) 125 MCG (5000 UT) capsule capsule, 1 by mouth daily as directed, Disp: 30 capsule, Rfl:   •  Cimetidine (TAGAMET PO), Tagamet, Disp: , Rfl:   •   cycloSPORINE (RESTASIS) 0.05 % ophthalmic emulsion, Administer 1 drop to both eyes 2 (Two) Times a Day., Disp: , Rfl:   •  diclofenac (VOLTAREN) 1 % gel gel, Apply 4 g topically to the appropriate area as directed 4 (Four) Times a Day As Needed., Disp: , Rfl:   •  diphenhydrAMINE-APAP, sleep, (Tylenol PM Extra Strength)  MG/30ML liquid, Tylenol PM Extra Strength, Disp: , Rfl:   •  estradiol (ESTRACE) 1 MG tablet, TAKE 1 TABLET BY MOUTH EVERY DAY, Disp: 90 tablet, Rfl: 3  •  HYDROcodone-acetaminophen (NORCO)  MG per tablet, Take 1 tablet by mouth every 6 (six) hours as needed for moderate pain (4-6)., Disp: , Rfl:   •  Misc Natural Products (COLON CLEANSE PO), Take  by mouth., Disp: , Rfl:   •  Multiple Vitamins-Minerals (MULTIVITAMIN ADULT) chewable tablet, Chew 1 tablet Daily., Disp: , Rfl:   •  Specialty Vitamins Products (ONE-A-DAY BONE STRENGTH PO), One-A-Day Bone Strength, Disp: , Rfl:   •  spironolactone (Aldactone) 100 MG tablet, Take 1 p.o. every morning for high blood pressure and leg swelling/edema, Disp: 90 tablet, Rfl: 1  •  topiramate (TOPAMAX) 100 MG tablet, TAKE 1 TABLET DAILY, Disp: 90 tablet, Rfl: 4  •  traZODone (DESYREL) 50 MG tablet, Take 25-50 mg by mouth Every Night., Disp: , Rfl:   •  venlafaxine XR (EFFEXOR-XR) 75 MG 24 hr capsule, , Disp: , Rfl:   •  vitamin E 400 UNIT capsule, Take 400 Units by mouth Daily., Disp: , Rfl:       Family History   Problem Relation Age of Onset   • Heart attack Mother         dies age 47 from heart attack   • Heart disease Mother    • Bleeding Disorder Mother    • Heart disease Father    • Ovarian cancer Maternal Grandmother    • Heart attack Maternal Aunt         dies age 60 from heart attack   • Malig Hyperthermia Neg Hx    • Breast cancer Neg Hx          Social History     Socioeconomic History   • Marital status:      Spouse name: ander   • Number of children: 4   • Years of education: 14   • Highest education level: Associate degree:  "academic program   Tobacco Use   • Smoking status: Never Smoker   • Smokeless tobacco: Never Used   • Tobacco comment: no caffeine    Vaping Use   • Vaping Use: Never used   Substance and Sexual Activity   • Alcohol use: No   • Drug use: No   • Sexual activity: Yes     Partners: Male         Vitals:    10/04/22 1304   BP: 138/66   Pulse: 77   Resp: 18   SpO2: 95%   Weight: 59 kg (130 lb)   Height: 152.4 cm (60\")        Body mass index is 25.39 kg/m².      Physical Exam:    General: Alert and oriented x 3.  No acute distress.  Normal affect.  HEENT: Pupils equal, round, reactive to light; extraocular movements intact; sclerae nonicteric; pharynx, ear canals and TMs normal.  Neck: Without JVD, thyromegaly, bruit, or adenopathy.  Lungs: Clear to auscultation in all fields.  Heart: Regular rate and rhythm without murmur, rub, gallop, or click.  Abdomen: Soft, nontender, without hepatosplenomegaly or hernia.  Bowel sounds normal.  : Deferred.  Rectal: Deferred.  Extremities: Without clubbing, cyanosis, edema, or pulse deficit.  Neurologic: Intact without focal deficit.  Normal station and gait observed during ingress and egress from the examination room.  Skin: Without significant lesion.  Musculoskeletal: Unremarkable.    Lab/other results:    Thyroid function tests are normal.  CBC is normal.  CMP normal except glucose mildly elevated 108.  Estimated GFR slightly low at 57.4.  Serum iron is normal at 100 but iron saturation is little bit low at 15%.  TIBC and transferrin are both elevated.  Total cholesterol 208, triglyceride 254, LDL particle number excellent 761, small LDL particle number excellent less than 90.  HDL particle number excellent at 62.6.  Vitamin D normal at 69.     Assessment/Plan:     Diagnosis Plan   1. Benign essential hypertension     2. Hyperaldosteronism (HCC)     3. Hypokalemia     4. Chronic renal insufficiency, stage II (mild), creatinine 1.12     5. Iron deficiency anemia due to chronic " blood loss     6. Chronic anemia     7. Vitamin D deficiency     8. Multinodular goiter     9. Hyperlipidemia     10. Need for influenza vaccination  Fluzone High-Dose 65+yrs ([id number])     Patient's blood pressure continues to improve.  I think we would want need to increase the dose of spironolactone anymore.  We need to check her renal function electrolyte status today.  Hyperlipidemia is under good control with an excellent HDL and LDL profile.This is without medication.  Her chronic renal insufficiency is very mild and stable.  Iron deficiency anemia has resolved and we can resolve this issue.  She has multinodular goiter and her thyroid function tests are totally normal.    Plan is as follows: Recommend influenza vaccine.  No change in current medical regimen.  Check lab work today and I will follow-up with patient on the phone tomorrow with the results and possible further instructions.  I will have her follow-up in about 5 to 6 weeks to reassess her blood pressure and electrolyte status 1 more time before going back to her regular follow-up schedule.        Procedures

## 2022-11-16 ENCOUNTER — OFFICE VISIT (OUTPATIENT)
Dept: INTERNAL MEDICINE | Facility: CLINIC | Age: 82
End: 2022-11-16

## 2022-11-16 ENCOUNTER — HOSPITAL ENCOUNTER (OUTPATIENT)
Dept: GENERAL RADIOLOGY | Facility: HOSPITAL | Age: 82
Discharge: HOME | End: 2022-11-16
Admitting: INTERNAL MEDICINE

## 2022-11-16 VITALS
SYSTOLIC BLOOD PRESSURE: 126 MMHG | DIASTOLIC BLOOD PRESSURE: 74 MMHG | WEIGHT: 130 LBS | RESPIRATION RATE: 18 BRPM | OXYGEN SATURATION: 98 % | HEART RATE: 88 BPM | HEIGHT: 60 IN | BODY MASS INDEX: 25.52 KG/M2

## 2022-11-16 DIAGNOSIS — Z78.9 STATIN INTOLERANCE: Chronic | ICD-10-CM

## 2022-11-16 DIAGNOSIS — R26.89 BALANCE DISORDER: Chronic | ICD-10-CM

## 2022-11-16 DIAGNOSIS — G89.29 CHRONIC PAIN OF LEFT KNEE: ICD-10-CM

## 2022-11-16 DIAGNOSIS — E55.9 VITAMIN D DEFICIENCY: Chronic | ICD-10-CM

## 2022-11-16 DIAGNOSIS — E87.6 HYPOKALEMIA: ICD-10-CM

## 2022-11-16 DIAGNOSIS — R29.6 FREQUENT FALLS: Chronic | ICD-10-CM

## 2022-11-16 DIAGNOSIS — I27.20 PULMONARY HYPERTENSION: Chronic | ICD-10-CM

## 2022-11-16 DIAGNOSIS — D50.0 IRON DEFICIENCY ANEMIA DUE TO CHRONIC BLOOD LOSS: Chronic | ICD-10-CM

## 2022-11-16 DIAGNOSIS — N18.2 CHRONIC RENAL INSUFFICIENCY, STAGE II (MILD): Chronic | ICD-10-CM

## 2022-11-16 DIAGNOSIS — I10 BENIGN ESSENTIAL HYPERTENSION: Primary | Chronic | ICD-10-CM

## 2022-11-16 DIAGNOSIS — M25.562 CHRONIC PAIN OF LEFT KNEE: ICD-10-CM

## 2022-11-16 DIAGNOSIS — Z23 NEED FOR PNEUMOCOCCAL VACCINATION: ICD-10-CM

## 2022-11-16 DIAGNOSIS — E26.9 HYPERALDOSTERONISM: ICD-10-CM

## 2022-11-16 DIAGNOSIS — E78.2 MIXED HYPERLIPIDEMIA: Chronic | ICD-10-CM

## 2022-11-16 DIAGNOSIS — R60.0 BILATERAL LOWER EXTREMITY EDEMA: Chronic | ICD-10-CM

## 2022-11-16 DIAGNOSIS — Z51.81 THERAPEUTIC DRUG MONITORING: ICD-10-CM

## 2022-11-16 PROCEDURE — 90677 PCV20 VACCINE IM: CPT | Performed by: INTERNAL MEDICINE

## 2022-11-16 PROCEDURE — 99214 OFFICE O/P EST MOD 30 MIN: CPT | Performed by: INTERNAL MEDICINE

## 2022-11-16 PROCEDURE — 73562 X-RAY EXAM OF KNEE 3: CPT

## 2022-11-16 PROCEDURE — G0009 ADMIN PNEUMOCOCCAL VACCINE: HCPCS | Performed by: INTERNAL MEDICINE

## 2022-11-16 NOTE — PROGRESS NOTES
11/16/2022    Patient Information  Sachi Vora                                                                                          [address]   [place] [address]      1940  [unfilled]  There is no work phone number on file.    Chief Complaint:     Follow-up medical problems as noted in the history of present illness.  Recent medication adjustment.  No new acute complaints.    History of Present Illness:    Patient with multiple chronic medical problems including hypertension that has been less than optimally controlled.  Patient has had persistent hypokalemia and I determined that she has hyperaldosteronism and we placed her on spironolactone which we have titrated up to 200 mg/day.  She seems to be tolerating this well and her lower extremity edema seems to be controlled.  Her blood pressure also is looking better as well.  Patient also has iron deficiency anemia that needs to be assessed.  She has hyperlipidemia but is statin intolerant unfortunately.  She also has pulmonary hypertension and hopefully the spironolactone will help with this condition as well.  Her past medical history reviewed and updated were necessary including health maintenance parameters.  This reveals she is up-to-date or else accounted for currently.    Review of Systems   Constitutional: Negative.   HENT: Negative.    Eyes: Negative.    Cardiovascular: Negative.    Respiratory: Negative.    Endocrine: Negative.    Hematologic/Lymphatic: Negative.    Skin: Negative.    Musculoskeletal: Negative.    Gastrointestinal: Negative.    Genitourinary: Negative.    Neurological: Negative.    Psychiatric/Behavioral: Negative.    Allergic/Immunologic: Negative.        Active Problems:    Patient Active Problem List   Diagnosis   • Osteoporosis, 03/29/2011--lumbar spine -2.8.  Right femoral neck -2.4.  Left femoral neck -2.4.  Patient receives Reclast infusions.   • Therapeutic drug monitoring   • Simple renal cyst  "  • Benign essential hypertension   • Carotid artery plaque, 04/02/2018--mild right ICA plaque, normal left ICA 10/03/2014--mild bilateral carotid artery plaque.   • Chronic gastritis   • Chronic lower back pain   • Chronic otitis externa   • Chronic renal insufficiency, stage II (mild), creatinine 1.12   • Depression with anxiety   • Functional murmur, 07/15/2015--normal echocardiogram.   • Hyperlipidemia   • Menopausal state   • Chronic migraine w/o aura w/o status migrainosus, not intractable   • Pancreatic Duct Dilation   • Bilateral lower extremity edema   • Restless legs syndrome   • Bilateral sensorineural hearing loss   • Vitamin D deficiency   • Chronic gastric ulcer   • Chronic fatigue   • Hypersomnolence   • Chronic anemia   • Multinodular goiter   • Generalized anxiety disorder   • Iron deficiency anemia   • Statin intolerance   • Postmenopausal state   • Pulmonary hypertension (HCC)   • Gastroesophageal reflux disease without esophagitis   • Tinnitus of both ears   • Chronic hoarseness   • Pain disorder associated with psychological factors   • Frequent falls   • Balance disorder   • Hyperaldosteronism (HCC)   • Hypokalemia   • Chronic pain of left knee         Past Medical History:   Diagnosis Date   • Balance disorder 02/05/2021 February 5, 2021--patient seen in follow-up by Dr. Weir.  I extensively reviewed the documentation from the emergency room visit as noted below.  I reviewed the history with the patient and she is describing episodes of dizziness described as a spinning sensation of her body and this seems to be precipitated by movement although it does not occur if she rolls over in bed.  If she stands up and st   • Benign essential hypertension 04/08/2016   • Bilateral sensorineural hearing loss 03/31/2011 03/31/2011--etiology reveals reverse \"cookie bite\" type of hearing loss for both ears of mild/moderate degree, mostly sensorineural.  Speech discrimination 100% on the right, 96% on " the left.   • Carotid artery plaque, 10/03/2014--mild bilateral carotid artery plaque. 07/25/2012    10/03/2014--[facility] screening revealed mild bilateral carotid plaque, negative for atrial fibrillation, negative for AAA, negative for PAD, osteoporosis screen revealed osteopenia.  Body mass index was 25 and considered to be moderate risk.  07/25/2012--vascular screen negative for carotid plaque, negative for abdominal aneurysm, negative for PAD Description: 10/03/2014--[facility] screening revealed mild bilateral carotid plaque, negative for atrial fibrillation, negative for AAA, negative for PAD, osteoporosis screen revealed osteopenia.  Body mass index was 25 and considered to be moderate risk.  07/25/2012--vascular screen negative for carotid plaque, negative for abdominal aneurysm, negative for PAD   • Chronic anemia 08/23/2016 06/13/2017--colonoscopy revealed melanosis.  Biopsied.  There was friability with contact bleeding in the ascending colon.  Biopsied.  Pathology returned consistent with melanosis coli.  06/13/2017--EGD revealed normal esophagus.  Biopsies taken.  There was a medium-sized hiatal hernia.  Localized mild inflammation and linear erosions were found in the prepyloric region.  Biopsied.  Examined duodenum was normal.  Random biopsies taken.  Pathology of the gastroesophageal junction was unremarkable as was the gastric fundus.  Prepyloric biopsy revealed minimal chronic inflammation and reactive change.  H pylori negative.  Duodenal biopsies are negative.  04/12/2017--hemoglobin low at 10.8, hematocrit is normal at 35.7.  Iron sulfate 325 mg per day initiated.  08/23/2016--routine follow-up.  Patient continues to have epigastric abdominal pain believed to be related to reflux with possible esophageal spasm.  Hemoglobin noted to be low at 10.6 with a hematocrit low at 33.7 and RDW elevated at 16.2.  Homocysti   • Chronic fatigue 04/21/2016 06/14/2017--overnight oximetry revealed oxygen  desaturation index of only 0.44.  There were only 10 total desaturations during the period of testing which lasted 6 hours and 46 minutes.  05/31/2017--patient seen in follow-up and reports she continues to have chronic fatigue as well as hypersomnolence.  Once again, she is on multiple medications that are undoubtedly contributing to this problem including clonazepam and hydrocodone but I doubt that these could be discontinued.  Her CBC back in April revealed a low hemoglobin of 10.8 but hematocrit was normal at 35.7.  Thyroid function tests were normal and CMP normal.  Overnight oximetry test ordered.  Repeat CBC.  Iron studies.  Sedimentation rate and CRP.  04/11/2017--patient seen in follow-up and her blood pressure is now at a reasonable level at 120/64.  She reports she still feels somewhat fatigued but she is much better.  Patient has not been doing any regular exercise and I do think that that would be helpful to reduce her feeling of fatigue.  She is   • Chronic gastric ulcer 04/14/2014    02/15/2017--patient seen in follow-up in nearly 6 months later.  She has complaints of not feeling well all over.  She has complaints of diffuse myalgias and possibly tendinopathy related to Levaquin that I called in prior to her going to [place] for vacation.  She reports that 3 or 4 days after starting the Levaquin she began to have the musculoskeletal symptoms and she reports that she continues to have them presently.  Symptoms are worse involving her left calf area.  Examination reveals significant tenderness involving the left calf and the left calf seems a little larger than the right.  She also has complaints of shortness of breath but no chest pain.  She is complaining of double vision and generalized weakness and fatigue.  She was complaining of chronic fatigue at the last visit back in September and I ordered an overnight oximetry test but apparently this was never done for reasons that are not clear to me.   Her current oxygen saturation is 94% and normally it is 100%.  Plan is as follows: Petersi   • Chronic gastritis 11/19/2001    02/15/2017--patient seen in follow-up in nearly 6 months later.  She has complaints of not feeling well all over.  She has complaints of diffuse myalgias and possibly tendinopathy related to Levaquin that I called in prior to her going to [place] for vacation.  She reports that 3 or 4 days after starting the Levaquin she began to have the musculoskeletal symptoms and she reports that she continues to have them presently.  Symptoms are worse involving her left calf area.  Examination reveals significant tenderness involving the left calf and the left calf seems a little larger than the right.  She also has complaints of shortness of breath but no chest pain.  She is complaining of double vision and generalized weakness and fatigue.  She was complaining of chronic fatigue at the last visit back in September and I ordered an overnight oximetry test but apparently this was never done for reasons that are not clear to me.  Her current oxygen saturation is 94% and normally it is 100%.  Plan is as follows: Petersi   • Chronic hoarseness 06/08/2020    September 15, 2020--patient presents with complaints of swelling of the soft tissues of the left side of the neck and this is associated with pain and discomfort, particularly when she turns her head rotating to the left.  She also notices hoarseness and feels that her voice has changed.  On exam there is definite prominence of the left sternocleidomastoid muscle and there may be some shotty lymph   • Chronic lower back pain 01/31/2006 08/11/2014--MRI of the lumbar spine reveals the conus terminates at L2 and is normal.  Cauda equina unremarkable.  Stable to moderate degenerative disc disease at L5-S1.  No acute fracture or pars defect is demonstrated.  Small synovial cysts are seen posterior to the L4-L5 facet.  The perivertebral soft tissues are  unremarkable.  T12-L1, L1-L2, L2-L3 are negative.  L3-L4 a broad-based disc bulge resulting in mild bilateral neural foraminal narrowing.  L4-L5 reveals a broad-based disc bulge facet disease resulting in mild bilateral neural foraminal narrowing.  L5-S1 reveals a broad-based disc bulge, posterior osseous slipping, and facet disease resulting in mild to moderate bilateral neural foraminal narrowing.  Assessment is stable mild to moderate degenerative spondylosis.  Small synovial cysts are seen posterior to the L4-L5 facets.  08/11/2014--MRI of the thoracic spine reveals mild to moderate thoracic kyphosis.  No fracture.  At T5--T6 there is a moderate sized left paracentral disc protrusion which i   • Chronic migraine 11/03/2009 01/18/2010--MRI of the brain performed for headaches and memory loss.  Mild small vessel disease in the cerebral and central pontine white matter.  There is an ovoid, somewhat pancake-shaped area of signal abnormality in the anterior inferior right temporal lobe subcortical to the juxtacortical white matter that measures 1.2 x 1 cm and anterior posterior and medial lateral dimension but only measures 3 mm in cranial caudal dimension.  I suspect that this is a benign cyst or a cystic area of encephalomalacia.  The remainder of the MRI of the head is within normal limits.  11/03/2009--CT scan of the brain without contrast performed for headache after a fall.  Mild diffuse atrophy.  No acute abnormality noted.; Description: Patient has had a long history of migraine headaches.  MRI and CT scan of the brain have been essentially negative.   • Chronic otitis externa 04/08/2016   • Chronic renal insufficiency, stage II (mild), creatinine 1.12 11/14/2015 11/14/2015--serum creatinine mildly elevated at 1.12.   • Depression with anxiety 04/08/2016   • Frequent falls 02/05/2021 February 5, 2021--patient seen in follow-up by Dr. Weir.  I extensively reviewed the documentation from the emergency  room visit as noted below.  I reviewed the history with the patient and she is describing episodes of dizziness described as a spinning sensation of her body and this seems to be precipitated by movement although it does not occur if she rolls over in bed.  If she stands up and st   • Functional murmur, 07/15/2015--normal echocardiogram. 07/15/2015    07/15/2015--echocardiogram reveals normal left ventricular size and function with ejection fraction 55%.  Grade 1 diastolic dysfunction, abnormal relaxation pattern.  Trace tricuspid regurgitation.  Estimated right ventricular systolic pressure is 25 mmHg which is normal.   • Gastroesophageal reflux disease with esophagitis 11/19/2001 06/13/2017--EGD revealed normal esophagus.  Biopsies taken.  There was a medium-sized hiatal hernia.  Localized mild inflammation and linear erosions were found in the prepyloric region.  Biopsied.  Examined duodenum was normal.  Random biopsies taken.  Pathology of the gastroesophageal junction was unremarkable as was the gastric fundus.  Prepyloric biopsy revealed minimal chronic inflammation and reactive change.  H pylori negative.  Duodenal biopsies are negative.  11/03/2014--patient seen in follow-up and reports her epigastric pain/chest pain has resolved.  I reviewed the results of the studies with her.  It does not appear that her problem is related to biliary tract disease.  She does have reflux and although the recent upper GI revealed minimal reflux, I do think her symptoms are related to esophageal reflux with esophageal spasm.  10/21/2014--air-contrast upper GI series revealed trace upper laryngeal penetration.  Persistent small partially reducible sliding hiatal hernia the upper stomach with    • Gastroesophageal reflux disease without esophagitis 06/06/2019   • Generalized anxiety disorder 04/11/2017   • History of Acute deep vein thrombosis (DVT) of distal end of left lower extremity 02/15/2017    03/21/2017 patient seen  in follow-up and she is tolerating the Eliquis well without signs or symptoms of bleeding.  Her calf swelling and tenderness is better but not totally resolved.  I suspect that the DVT is chronic and may not resolve at all.  I will order a repeat venous study and then proceed from there.  02/23/2017--repeat Doppler venous study of the left lower extremity reveals a chronic left lower extremity DVT in the posterior tibial.  All other left sided veins appeared normal.  Fluid collection in the left calf noted.  02/17/2017--patient seen in follow-up and reports her left lower extremity symptoms are about the same.  She continues to have complaints of profound fatigue which I think is multifactorial including underlying depression that is not in remission.  Review the results of the CTA of the chest including the possible thyroid lesion.  I do not think this is contributing to any of her symptoms of fatigue particularly given the fact that her thyroid function tests are normal.  I expla   • History of palpitations 12/07/2011    Patient has had multiple admissions to the hospital for complaints of chest pain and heart palpitations. She meant admitted at least on 3 occasions. She has had at least 3 stress Cardiolite and 1 stress ECG, the last Cardiolite being performed 12/07/2011 which was negative. Patient is also had Holter monitors which have been unremarkable.   • HIstory of Schatzki's ring 02/28/2012 02/28/2012--air-contrast upper GI revealed small to moderate sized reducible sliding hiatal herniation of the upper stomach with some demonstrated gastroesophageal reflux. No esophageal, gastric, or duodenal mass or mucosal ulceration was seen.  11/03/2008--EGD performed for evaluation of iron deficiency anemia revealed hiatal hernia without evidence of reflux, prepyloric antritis and   • Hyperlipidemia 04/08/2016   • Hypersomnolence 05/05/2016 06/14/2017--overnight oximetry revealed oxygen desaturation index of  only 0.44.  There were only 10 total desaturations during the period of testing which lasted 6 hours and 46 minutes.  05/31/2017--patient seen in follow-up and reports she continues to have chronic fatigue as well as hypersomnolence.  Once again, she is on multiple medications that are undoubtedly contributing to this problem including clonazepam and hydrocodone but I doubt that these could be discontinued.  Her CBC back in April revealed a low hemoglobin of 10.8 but hematocrit was normal at 35.7.  Thyroid function tests were normal and CMP normal.  Overnight oximetry test ordered.  Repeat CBC.  Iron studies.  Sedimentation rate and CRP.  04/11/2017--patient seen in follow-up and her blood pressure is now at a reasonable level at 120/64.  She reports she still feels somewhat fatigued but she is much better.  Patient has not been doing any regular exercise and I do think that that would be helpful to reduce her feeling of fatigue.  She is   • Menopausal state 04/08/2016   • Multinodular goiter 02/17/2017 02/21/2017--thyroid ultrasound reveals multinodular thyroid with largest nodule measuring on the order of 1.6 cm in greatest dimension.  02/17/2017--patient seen in follow-up for DVT and CTA of the chest.  An incidental finding on the CTA of the chest reveals a 1.7 cm lesion in the right lobe of thyroid.  Note that thyroid function tests are currently normal.  Ultrasound of the thyroid ordered.   • Osteoporosis, 03/29/2011--lumbar spine -2.8.  Right femoral neck -2.4.  Left femoral neck -2.4.  Patient receives Reclast infusions. 02/09/2009 04/19/2017--Reclast infusion.  04/05/2016--Reclast infusion.  06/04/2012--Reclast infusion.  05/26/2011--Reclast infusion.  03/29/2011--DEXA scan revealed a lumbar T score of -2.8, right femoral neck T score -2.4, left femoral neck T score -2.4.  Osteoporosis of the lumbar spine and severe osteopenia of the hips bilaterally.  Patient has been intolerant to Fosamax because of  gastritis and gastroesophageal reflux.  04/01/2009--treatment for osteoporosis begun with Fosamax.  02/09/2009--DEXA scan revealed lumbar spine T score of -3.3.  Left femoral neck T score -2.5.  Right hip T score -2.6.  Osteoporosis.    • Pain disorder associated with psychological factors 10/10/2012   • Pain disorder with psychological factors 10/10/2012   • Pancreatic Duct Dilation 11/30/2001 09/29/2014--patient was again evaluated by the urologist for a renal cyst.  CT scan of the abdomen and pelvis reveals a left renal cyst measuring 5.1 x 4.9 cm.  There were subcentimeter hypodense renal lesions that are too small to further characterize and are presumably related to cysts.  Recommend attention to follow up.  Distal dilated pancreatic duct noted.  The common bile duct is the upper limits of normal in size.  The ampullary region is not well evaluated.  Comparison with prior imaging is recommended if available.  If there is no prior film available for comparison, and ERCP or MRCP could be performed to further evaluate.  Small hiatal hernia noted.  03/05/2012--CT scan of the abdomen with contrast, pancreatic protocol.  This reveals some fullness of the pancreatic ductal system and apparent pancreatic divisum with separate entrance of the accessory pancreatic duct extending into the duodenum distal to the main pancreatic duct.  There is fullness of the duct diffusely but similar to the previous s   • Pedal edema 06/29/2015 06/29/2015--patient presents with a 4-6 week history of exertional dyspnea that comes on with activity such as climbing stairs or walking her dog up an incline.  No chest pain.  Relieved with rest.  No cough.  She does have complaints of her feet and legs swelling that is particularly worse at the end of the day and not improved overnight.  No orthopnea or PND.  Chest exam reveals faint rales at the bases.  Otherwise clear.  Heart is regular and I do not appreciate a heart murmur.  Chest  x-ray PA and lateral ordered.  Echocardiogram ordered.   • Pulmonary hypertension (HCC) 04/18/2019   • Restless legs syndrome 04/08/2016   • Simple renal cyst 07/20/2009 09/29/2014--patient was again evaluated by the urologist for a renal cyst.  CT scan of the abdomen and pelvis reveals a left renal cyst measuring 5.1 x 4.9 cm.  There were subcentimeter hypodense renal lesions that are too small to further characterize and are presumably related to cysts.  Recommend attention to follow up.  Distal dilated pancreatic duct noted.  The common bile duct is the upper limits of normal in size.  The ampullary region is not well evaluated.  Comparison with prior imaging is recommended if available.  If there is no prior film available for comparison, and ERCP or MRCP could be performed to further evaluate.  Small hiatal hernia noted.  07/20/2009--patient was noted to have a left renal mass consistent with a cyst.  This was evaluated by the urologist 07/20/2009.   • Statin intolerance 10/30/2017   • Tinnitus of both ears 09/30/2019 September 30, 2019--patient presents with a several year history of bilateral tinnitus, right greater than left.  It seems to be getting worse and now is associated with fluctuating hearing.  She occasionally will have worsening hearing after she coughs or sneezes.  On exam she has evidence of old otitis media scars, particularly on the right.  There is no acute infection present and there is no a   • Vitamin D deficiency 04/08/2016         Past Surgical History:   Procedure Laterality Date   • APPENDECTOMY  1965    1965   • CATARACT EXTRACTION Bilateral Remote    Remote bilateral cataract extirpation with intraocular lens implantation.   • CHOLECYSTECTOMY WITH INTRAOPERATIVE CHOLANGIOGRAM N/A 01/21/2019 01/21/2019--laparoscopic paraesophageal hernia repair with fundoplication.   • COLONOSCOPY  11/03/2008 2008--normal colonoscopy   • COLONOSCOPY  2001 2001--normal colonoscopy.   •  COLONOSCOPY N/A 06/13/2017 06/13/2017--colonoscopy revealed melanosis.  Biopsied.  There was friability with contact bleeding in the ascending colon.  Biopsied.  Pathology returned consistent with melanosis coli.   • ENDOSCOPY  10/08/2014    02/28/2012--air-contrast upper GI revealed small to moderate sized reducible sliding hiatal herniation of the upper stomach with some demonstrated gastroesophageal reflux. No esophageal, gastric, or duodenal mass or mucosal ulceration was seen. 11/03/2008--EGD performed for evaluation of iron deficiency anemia revealed hiatal hernia without evidence of reflux, prepyloric antritis and   • ENDOSCOPY  11/03/2008 11/03/2008--EGD performed for evaluation of iron deficiency anemia revealed hiatal hernia without evidence of reflux, prepyloric antritis and   • ENDOSCOPY  11/19/2001 11/19/2001--EGD revealed a very tortuous distal esophagus with a Schatzki's ring. No ulcer or erosions. No Dorado's mucosa. Stomach revealed patchy erythema and erosions in the antrum. Biopsy. Normal pylorus with no obstruction. Normal duodenum with no ulcers. The Schatzki's ring was dilated with a Rhodes dilator.;   • ENDOSCOPY N/A 09/27/2016 09/27/2016--EGD revealed a normal oropharynx, esophagus, and medium sized hiatal hernia.  Nonbleeding gastric ulcer with no stigmata of bleeding.  Biopsy.  Gastritis.  Biopsy.  Normal examined duodenum.  Biopsied.  Pathology returned mild to moderate chronic active gastritis with ulceration from the stomach antral ulcer biopsy.  Gastroesophageal junction biopsy revealed minimal mixed inflammation.   • ENDOSCOPY N/A 06/13/2017 06/13/2017--colonoscopy revealed melanosis.  Biopsied.  There was friability with contact bleeding in the ascending colon.  Biopsied.  Pathology returned consistent with melanosis coli.   • ERCP N/A 01/22/2019 01/22/2019--ERCP with sphincterotomy and balloon stone extraction.   • ESOPHAGEAL DILATATION  11/19/2001     11/19/2001--EGD revealed a very tortuous distal esophagus with a Schatzki's ring. No ulcer or erosions. No Dorado's mucosa. Stomach revealed patchy erythema and erosions in the antrum. Biopsy. Normal pylorus with no obstruction. Normal duodenum with no ulcers. The Schatzki's ring was dilated with a Rhodes dilator.;    • ESOPHAGEAL MANOMETRY N/A 12/18/2018    Procedure: ESOPHAGEAL MANOMETRY;  Surgeon: Cecilia, Nurse Performed;  [facility];  Service: Gastroenterology   • ESOPHAGOSCOPY N/A 01/21/2019    Procedure: FLEXIBLE ESOPHAGOSCOPY;  Surgeon: Reji Johnson MD;  Location: [facility] MAIN OR;  Service: General   • HIATAL HERNIA REPAIR N/A 01/21/2019    Procedure: Laparoscopic paraesophageal hernia repair with toupee fundoplication;  Surgeon: Reji Johnson MD;  Location: [facility] MAIN OR;  Service: General   • INCONTINENCE SURGERY  1979 1979--a bladder tack procedure for urinary stress incontinence   • KNEE ARTHROSCOPY Right 12/12/2011 12/12/2011--right knee arthroscopy with partial lateral and medial meniscectomies.   • SKIN SURGERY  05/25/2010 05/25/2010--skin lesion excised from right lower extremity. 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   • TOTAL ABDOMINAL HYSTERECTOMY WITH SALPINGO OOPHORECTOMY  1974 1974--total abdominal hysterectomy.         Allergies   Allergen Reactions   • Statins Nausea And Vomiting   • Morphine Hallucinations   • Penicillins Itching   • Tetanus Toxoids Itching           Current Outpatient Medications:   •  amLODIPine (NORVASC) 5 MG tablet, Take 1 p.o. every morning for high blood pressure, Disp: 30 tablet, Rfl: 6  •  aspirin 81 MG EC tablet, Take 81 mg by mouth Daily. HELD, Disp: , Rfl:   •  Brexpiprazole (Rexulti) 1 MG tablet, Rexulti 1 mg tablet, Disp: , Rfl:   •  buPROPion XL (WELLBUTRIN XL) 300 MG 24 hr tablet, Take 300 mg by mouth Every Morning., Disp: , Rfl:   •  Cholecalciferol (vitamin D3) 125 MCG (5000 UT)  capsule capsule, 1 by mouth daily as directed, Disp: 30 capsule, Rfl:   •  Cimetidine (TAGAMET PO), Tagamet, Disp: , Rfl:   •  cycloSPORINE (RESTASIS) 0.05 % ophthalmic emulsion, Administer 1 drop to both eyes 2 (Two) Times a Day., Disp: , Rfl:   •  diclofenac (VOLTAREN) 1 % gel gel, Apply 4 g topically to the appropriate area as directed 4 (Four) Times a Day As Needed., Disp: , Rfl:   •  diphenhydrAMINE-APAP, sleep, (Tylenol PM Extra Strength)  MG/30ML liquid, Tylenol PM Extra Strength, Disp: , Rfl:   •  estradiol (ESTRACE) 1 MG tablet, TAKE 1 TABLET BY MOUTH EVERY DAY, Disp: 90 tablet, Rfl: 3  •  HYDROcodone-acetaminophen (NORCO)  MG per tablet, Take 1 tablet by mouth every 6 (six) hours as needed for moderate pain (4-6)., Disp: , Rfl:   •  Misc Natural Products (COLON CLEANSE PO), Take  by mouth., Disp: , Rfl:   •  Multiple Vitamins-Minerals (MULTIVITAMIN ADULT) chewable tablet, Chew 1 tablet Daily., Disp: , Rfl:   •  Specialty Vitamins Products (ONE-A-DAY BONE STRENGTH PO), One-A-Day Bone Strength, Disp: , Rfl:   •  spironolactone (Aldactone) 100 MG tablet, Take 1 p.o. every morning for high blood pressure and leg swelling/edema, Disp: 90 tablet, Rfl: 1  •  topiramate (TOPAMAX) 100 MG tablet, TAKE 1 TABLET DAILY, Disp: 90 tablet, Rfl: 4  •  traZODone (DESYREL) 50 MG tablet, Take 25-50 mg by mouth Every Night., Disp: , Rfl:   •  venlafaxine XR (EFFEXOR-XR) 75 MG 24 hr capsule, , Disp: , Rfl:   •  vitamin E 400 UNIT capsule, Take 400 Units by mouth Daily., Disp: , Rfl:       Family History   Problem Relation Age of Onset   • Heart attack Mother         dies age 47 from heart attack   • Heart disease Mother    • Bleeding Disorder Mother    • Heart disease Father    • Ovarian cancer Maternal Grandmother    • Heart attack Maternal Aunt         dies age 60 from heart attack   • Malig Hyperthermia Neg Hx    • Breast cancer Neg Hx          Social History     Socioeconomic History   • Marital status:   "    Spouse name: ander   • Number of children: 4   • Years of education: 14   • Highest education level: Associate degree: academic program   Tobacco Use   • Smoking status: Never   • Smokeless tobacco: Never   • Tobacco comments:     no caffeine    Vaping Use   • Vaping Use: Never used   Substance and Sexual Activity   • Alcohol use: No   • Drug use: No   • Sexual activity: Yes     Partners: Male         Vitals:    11/16/22 1450   BP: 126/74   Pulse: 88   Resp: 18   SpO2: 98%   Weight: 59 kg (130 lb)   Height: 152.4 cm (60\")        Body mass index is 25.39 kg/m².      Physical Exam:    General: Alert and oriented x 3.  No acute distress.  Normal affect.  HEENT: Pupils equal, round, reactive to light; extraocular movements intact; sclerae nonicteric; pharynx, ear canals and TMs normal.  Neck: Without JVD, thyromegaly, bruit, or adenopathy.  Lungs: Clear to auscultation in all fields.  Heart: Regular rate and rhythm without murmur, rub, gallop, or click.  Abdomen: Soft, nontender, without hepatosplenomegaly or hernia.  Bowel sounds normal.  : Deferred.  Rectal: Deferred.  Extremities: Without clubbing, cyanosis, edema, or pulse deficit.  Neurologic: Intact without focal deficit.  Normal station and gait observed during ingress and egress from the examination room.  Skin: Without significant lesion.  Musculoskeletal: Unremarkable.    Lab/other results:      Assessment/Plan:     Diagnosis Plan   1. Benign essential hypertension  Comprehensive Metabolic Panel    Comprehensive Metabolic Panel      2. Hyperaldosteronism (HCC)  Comprehensive Metabolic Panel    Comprehensive Metabolic Panel    Urinalysis With Microscopic If Indicated (No Culture) - Urine, Clean Catch      3. Hypokalemia  Comprehensive Metabolic Panel    Comprehensive Metabolic Panel      4. Chronic renal insufficiency, stage II (mild), creatinine 1.12  CBC (No Diff)    Urinalysis With Microscopic If Indicated (No Culture) - Urine, Clean Catch      5. " Bilateral lower extremity edema        6. Iron deficiency anemia due to chronic blood loss  CBC (No Diff)    Iron Profile    CBC (No Diff)    Iron Profile      7. Hyperlipidemia  Comprehensive Metabolic Panel    NMR LipoProfile    TSH    T4, Free    T3, Free      8. Statin intolerance        9. Vitamin D deficiency  Vitamin D,25-Hydroxy      10. Pulmonary hypertension (HCC)        11. Chronic pain of left knee  XR knee 3 vw left    Ambulatory Referral to Orthopedic Surgery    Uric Acid      12. Frequent falls  XR knee 3 vw left    Ambulatory Referral to Orthopedic Surgery      13. Balance disorder  Ambulatory Referral to Orthopedic Surgery      14. Therapeutic drug monitoring  Uric Acid        Patient's blood pressure appears to be in excellent control with the spironolactone.  Her potassium levels have been normal but we need to recheck this along with her chronic renal insufficiency which may actually have resolved after the medication change.  Her lower extremity edema is well controlled.  A few months ago her iron levels were still low although she was no longer anemic.  This needs to be assessed with lab work as well.  She has hyperlipidemia but unfortunately is statin intolerant.  We do not need to check her cholesterol at this time.    Plan is as follows: No change in current medical regimen.  Check lab work today.  Follow-up on the phone for the results and possible further instructions.  We will schedule fasting lab and follow-up in March 2023    Addendum: Patient has balance disorder and frequent falls and has had multiple falls this past year.  She reports a 3-week history at least of pain in her left knee which is new and  is worse with weightbearing and ambulation.  On examination there may be a small effusion.  There is no erythema.  There is definite pain and tenderness with manipulation of the joint.  This compromises evaluation of the ligaments.  Concern is for possible internal derangement  particularly given her history of falls.  Plan is as follows: X-ray of the left knee to rule out possible subacute or acute fracture.  Referral to orthopedist as soon as possible.  Also I will check a uric acid today      Procedures

## 2022-11-17 LAB
ALBUMIN SERPL-MCNC: 4.3 G/DL (ref 3.7–4.7)
ALBUMIN/GLOB SERPL: 1.8 {RATIO} (ref 1.2–2.2)
ALP SERPL-CCNC: 61 IU/L (ref 44–121)
ALT SERPL-CCNC: 18 IU/L (ref 0–32)
AST SERPL-CCNC: 18 IU/L (ref 0–40)
BILIRUB SERPL-MCNC: 0.3 MG/DL (ref 0–1.2)
BUN SERPL-MCNC: 15 MG/DL (ref 8–27)
BUN/CREAT SERPL: 15 (ref 12–28)
CALCIUM SERPL-MCNC: 9.4 MG/DL (ref 8.7–10.3)
CHLORIDE SERPL-SCNC: 102 MMOL/L (ref 96–106)
CO2 SERPL-SCNC: 24 MMOL/L (ref 20–29)
CREAT SERPL-MCNC: 0.97 MG/DL (ref 0.57–1)
EGFRCR SERPLBLD CKD-EPI 2021: 59 ML/MIN/1.73
ERYTHROCYTE [DISTWIDTH] IN BLOOD BY AUTOMATED COUNT: 14.8 % (ref 11.7–15.4)
GLOBULIN SER CALC-MCNC: 2.4 G/DL (ref 1.5–4.5)
GLUCOSE SERPL-MCNC: 91 MG/DL (ref 70–99)
HCT VFR BLD AUTO: 44.3 % (ref 34–46.6)
HGB BLD-MCNC: 14.2 G/DL (ref 11.1–15.9)
IRON SATN MFR SERPL: 13 % (ref 15–55)
IRON SERPL-MCNC: 69 UG/DL (ref 27–139)
MCH RBC QN AUTO: 29 PG (ref 26.6–33)
MCHC RBC AUTO-ENTMCNC: 32.1 G/DL (ref 31.5–35.7)
MCV RBC AUTO: 90 FL (ref 79–97)
PLATELET # BLD AUTO: 346 X10E3/UL (ref 150–450)
POTASSIUM SERPL-SCNC: 4.5 MMOL/L (ref 3.5–5.2)
PROT SERPL-MCNC: 6.7 G/DL (ref 6–8.5)
RBC # BLD AUTO: 4.9 X10E6/UL (ref 3.77–5.28)
SODIUM SERPL-SCNC: 141 MMOL/L (ref 134–144)
TIBC SERPL-MCNC: 519 UG/DL (ref 250–450)
UIBC SERPL-MCNC: 450 UG/DL (ref 118–369)
URATE SERPL-MCNC: 3.5 MG/DL (ref 3.1–7.9)
WBC # BLD AUTO: 10.2 X10E3/UL (ref 3.4–10.8)

## 2022-12-27 DIAGNOSIS — E55.9 VITAMIN D DEFICIENCY: Chronic | ICD-10-CM

## 2022-12-27 DIAGNOSIS — D50.0 IRON DEFICIENCY ANEMIA DUE TO CHRONIC BLOOD LOSS: Chronic | ICD-10-CM

## 2022-12-27 DIAGNOSIS — E87.6 HYPOKALEMIA: ICD-10-CM

## 2022-12-27 DIAGNOSIS — E26.9 HYPERALDOSTERONISM: ICD-10-CM

## 2022-12-27 DIAGNOSIS — I10 BENIGN ESSENTIAL HYPERTENSION: Chronic | ICD-10-CM

## 2022-12-27 DIAGNOSIS — N18.2 CHRONIC RENAL INSUFFICIENCY, STAGE II (MILD): Chronic | ICD-10-CM

## 2022-12-27 DIAGNOSIS — E78.2 MIXED HYPERLIPIDEMIA: Chronic | ICD-10-CM

## 2022-12-28 LAB
25(OH)D3+25(OH)D2 SERPL-MCNC: 69.9 NG/ML (ref 30–100)
ALBUMIN SERPL-MCNC: 4.1 G/DL (ref 3.5–5.2)
ALBUMIN/GLOB SERPL: 1.7 G/DL
ALP SERPL-CCNC: 60 U/L (ref 39–117)
ALT SERPL-CCNC: 17 U/L (ref 1–33)
APPEARANCE UR: CLEAR
AST SERPL-CCNC: 17 U/L (ref 1–32)
BACTERIA #/AREA URNS HPF: NORMAL /HPF
BILIRUB SERPL-MCNC: 0.4 MG/DL (ref 0–1.2)
BILIRUB UR QL STRIP: NEGATIVE
BUN SERPL-MCNC: 20 MG/DL (ref 8–23)
BUN/CREAT SERPL: 20.2 (ref 7–25)
CALCIUM SERPL-MCNC: 9.1 MG/DL (ref 8.6–10.5)
CASTS URNS MICRO: NORMAL
CHLORIDE SERPL-SCNC: 101 MMOL/L (ref 98–107)
CHOLEST SERPL-MCNC: 198 MG/DL (ref 100–199)
CO2 SERPL-SCNC: 26.5 MMOL/L (ref 22–29)
COLOR UR: YELLOW
CREAT SERPL-MCNC: 0.99 MG/DL (ref 0.57–1)
EGFRCR SERPLBLD CKD-EPI 2021: 57.8 ML/MIN/1.73
EPI CELLS #/AREA URNS HPF: NORMAL /HPF
ERYTHROCYTE [DISTWIDTH] IN BLOOD BY AUTOMATED COUNT: 13.8 % (ref 12.3–15.4)
GLOBULIN SER CALC-MCNC: 2.4 GM/DL
GLUCOSE SERPL-MCNC: 92 MG/DL (ref 65–99)
GLUCOSE UR QL STRIP: NEGATIVE
HCT VFR BLD AUTO: 40.8 % (ref 34–46.6)
HDL SERPL-SCNC: >65 UMOL/L
HDLC SERPL-MCNC: 106 MG/DL
HGB BLD-MCNC: 13.9 G/DL (ref 12–15.9)
HGB UR QL STRIP: NEGATIVE
IRON SATN MFR SERPL: 16 % (ref 20–50)
IRON SERPL-MCNC: 106 MCG/DL (ref 37–145)
KETONES UR QL STRIP: NEGATIVE
LDL SERPL QN: 20.1 NM
LDL SERPL-SCNC: 1008 NMOL/L
LDL SMALL SERPL-SCNC: 624 NMOL/L
LDLC SERPL CALC-MCNC: 72 MG/DL (ref 0–99)
LEUKOCYTE ESTERASE UR QL STRIP: ABNORMAL
MCH RBC QN AUTO: 29.6 PG (ref 26.6–33)
MCHC RBC AUTO-ENTMCNC: 34.1 G/DL (ref 31.5–35.7)
MCV RBC AUTO: 86.8 FL (ref 79–97)
NITRITE UR QL STRIP: NEGATIVE
PH UR STRIP: 6.5 [PH] (ref 5–8)
PLATELET # BLD AUTO: 382 10*3/MM3 (ref 140–450)
POTASSIUM SERPL-SCNC: 4.8 MMOL/L (ref 3.5–5.2)
PROT SERPL-MCNC: 6.5 G/DL (ref 6–8.5)
PROT UR QL STRIP: NEGATIVE
RBC # BLD AUTO: 4.7 10*6/MM3 (ref 3.77–5.28)
RBC #/AREA URNS HPF: NORMAL /HPF
SODIUM SERPL-SCNC: 138 MMOL/L (ref 136–145)
SP GR UR STRIP: 1.02 (ref 1–1.03)
T3FREE SERPL-MCNC: 2.7 PG/ML (ref 2–4.4)
T4 FREE SERPL-MCNC: 1.33 NG/DL (ref 0.93–1.7)
TIBC SERPL-MCNC: 647 MCG/DL
TRIGL SERPL-MCNC: 119 MG/DL (ref 0–149)
TSH SERPL DL<=0.005 MIU/L-ACNC: 0.76 UIU/ML (ref 0.27–4.2)
UIBC SERPL-MCNC: 541 MCG/DL (ref 112–346)
UROBILINOGEN UR STRIP-MCNC: ABNORMAL MG/DL
WBC # BLD AUTO: 11.31 10*3/MM3 (ref 3.4–10.8)
WBC #/AREA URNS HPF: NORMAL /HPF

## 2023-01-19 DIAGNOSIS — I10 BENIGN ESSENTIAL HYPERTENSION: Chronic | ICD-10-CM

## 2023-01-19 RX ORDER — AMLODIPINE BESYLATE 5 MG/1
TABLET ORAL
Qty: 90 TABLET | Refills: 2 | Status: SHIPPED | OUTPATIENT
Start: 2023-01-19

## 2023-01-26 DIAGNOSIS — I10 BENIGN ESSENTIAL HYPERTENSION: Chronic | ICD-10-CM

## 2023-01-26 DIAGNOSIS — E26.9 HYPERALDOSTERONISM: ICD-10-CM

## 2023-01-27 RX ORDER — SPIRONOLACTONE 100 MG/1
TABLET, FILM COATED ORAL
Qty: 90 TABLET | Refills: 1 | Status: SHIPPED | OUTPATIENT
Start: 2023-01-27

## 2023-02-07 ENCOUNTER — OFFICE VISIT (OUTPATIENT)
Dept: INTERNAL MEDICINE | Facility: CLINIC | Age: 83
End: 2023-02-07
Payer: MEDICARE

## 2023-02-07 VITALS
DIASTOLIC BLOOD PRESSURE: 80 MMHG | WEIGHT: 130.4 LBS | SYSTOLIC BLOOD PRESSURE: 124 MMHG | OXYGEN SATURATION: 93 % | BODY MASS INDEX: 25.6 KG/M2 | HEIGHT: 60 IN | HEART RATE: 82 BPM | RESPIRATION RATE: 18 BRPM

## 2023-02-07 DIAGNOSIS — F41.1 GENERALIZED ANXIETY DISORDER: Chronic | ICD-10-CM

## 2023-02-07 DIAGNOSIS — I65.23 ATHEROSCLEROSIS OF BOTH CAROTID ARTERIES: Chronic | ICD-10-CM

## 2023-02-07 DIAGNOSIS — I27.20 PULMONARY HYPERTENSION: Chronic | ICD-10-CM

## 2023-02-07 DIAGNOSIS — E04.2 MULTINODULAR GOITER: Chronic | ICD-10-CM

## 2023-02-07 DIAGNOSIS — D50.0 IRON DEFICIENCY ANEMIA DUE TO CHRONIC BLOOD LOSS: Chronic | ICD-10-CM

## 2023-02-07 DIAGNOSIS — K21.9 GASTROESOPHAGEAL REFLUX DISEASE WITHOUT ESOPHAGITIS: Chronic | ICD-10-CM

## 2023-02-07 DIAGNOSIS — M17.12 PRIMARY OSTEOARTHRITIS OF LEFT KNEE: Chronic | ICD-10-CM

## 2023-02-07 DIAGNOSIS — E87.6 HYPOKALEMIA: Chronic | ICD-10-CM

## 2023-02-07 DIAGNOSIS — N18.2 CHRONIC RENAL INSUFFICIENCY, STAGE II (MILD): Chronic | ICD-10-CM

## 2023-02-07 DIAGNOSIS — E78.2 MIXED HYPERLIPIDEMIA: Primary | Chronic | ICD-10-CM

## 2023-02-07 DIAGNOSIS — Z51.81 THERAPEUTIC DRUG MONITORING: ICD-10-CM

## 2023-02-07 DIAGNOSIS — F41.8 DEPRESSION WITH ANXIETY: Chronic | ICD-10-CM

## 2023-02-07 DIAGNOSIS — R60.0 BILATERAL LOWER EXTREMITY EDEMA: Chronic | ICD-10-CM

## 2023-02-07 DIAGNOSIS — I10 BENIGN ESSENTIAL HYPERTENSION: Chronic | ICD-10-CM

## 2023-02-07 DIAGNOSIS — G25.81 RESTLESS LEGS SYNDROME: Chronic | ICD-10-CM

## 2023-02-07 DIAGNOSIS — R29.6 FREQUENT FALLS: Chronic | ICD-10-CM

## 2023-02-07 DIAGNOSIS — Z78.0 POSTMENOPAUSAL STATE: Chronic | ICD-10-CM

## 2023-02-07 DIAGNOSIS — D64.9 CHRONIC ANEMIA: Chronic | ICD-10-CM

## 2023-02-07 DIAGNOSIS — Z78.9 STATIN INTOLERANCE: Chronic | ICD-10-CM

## 2023-02-07 DIAGNOSIS — R26.89 BALANCE DISORDER: Chronic | ICD-10-CM

## 2023-02-07 DIAGNOSIS — E55.9 VITAMIN D DEFICIENCY: Chronic | ICD-10-CM

## 2023-02-07 DIAGNOSIS — E26.9 HYPERALDOSTERONISM: Chronic | ICD-10-CM

## 2023-02-07 DIAGNOSIS — F45.42 PAIN DISORDER ASSOCIATED WITH PSYCHOLOGICAL FACTORS: Chronic | ICD-10-CM

## 2023-02-07 DIAGNOSIS — M81.0 AGE-RELATED OSTEOPOROSIS WITHOUT CURRENT PATHOLOGICAL FRACTURE: Chronic | ICD-10-CM

## 2023-02-07 DIAGNOSIS — H90.3 BILATERAL SENSORINEURAL HEARING LOSS: Chronic | ICD-10-CM

## 2023-02-07 DIAGNOSIS — K25.7 CHRONIC GASTRIC ULCER, UNSPECIFIED WHETHER GASTRIC ULCER HEMORRHAGE OR PERFORATION PRESENT: Chronic | ICD-10-CM

## 2023-02-07 DIAGNOSIS — R53.82 CHRONIC FATIGUE: Chronic | ICD-10-CM

## 2023-02-07 PROBLEM — M25.562 CHRONIC PAIN OF LEFT KNEE: Chronic | Status: ACTIVE | Noted: 2022-11-16

## 2023-02-07 PROBLEM — G89.29 CHRONIC PAIN OF LEFT KNEE: Chronic | Status: ACTIVE | Noted: 2022-11-16

## 2023-02-07 PROCEDURE — 99214 OFFICE O/P EST MOD 30 MIN: CPT | Performed by: INTERNAL MEDICINE

## 2023-02-07 NOTE — PROGRESS NOTES
02/07/2023    Patient Information  Sachi Vora                                                                                          [address] [place] [address]      1940  [unfilled]  There is no work phone number on file.    Chief Complaint:     Follow-up lab work in order to monitor chronic medical issues listed below.  No new acute complaints.    History of Present Illness:    Patient with a history of several chronic medical issues as noted below in assessment plan presents today for follow-up with lab prior in order to monitor chronic medical issues.  Her past medical history reviewed and updated were necessary including health maintenance parameters.  This reveals she is up-to-date or else accounted for.    Review of Systems   Constitutional: Negative.   HENT: Negative.    Eyes: Negative.    Cardiovascular: Negative.    Respiratory: Negative.    Endocrine: Negative.    Hematologic/Lymphatic: Negative.    Skin: Negative.    Musculoskeletal: Positive for arthritis, back pain and joint pain.   Gastrointestinal: Negative.    Genitourinary: Negative.    Neurological: Positive for difficulty with concentration and loss of balance.   Psychiatric/Behavioral: Negative.    Allergic/Immunologic: Negative.        Active Problems:    Patient Active Problem List   Diagnosis   • Osteoporosis, 03/29/2011--lumbar spine -2.8.  Right femoral neck -2.4.  Left femoral neck -2.4.  Patient receives Reclast infusions.   • Therapeutic drug monitoring   • Simple renal cyst   • Benign essential hypertension   • Carotid artery plaque, 04/02/2018--mild right ICA plaque, normal left ICA 10/03/2014--mild bilateral carotid artery plaque.   • Chronic gastritis   • Chronic lower back pain   • Chronic otitis externa   • Chronic renal insufficiency, stage II (mild), creatinine 1.12   • Depression with anxiety   • Functional murmur, 07/15/2015--normal echocardiogram.   • Hyperlipidemia   • Chronic migraine  "w/o aura w/o status migrainosus, not intractable   • Pancreatic Duct Dilation   • Bilateral lower extremity edema   • Restless legs syndrome   • Bilateral sensorineural hearing loss   • Vitamin D deficiency   • Chronic gastric ulcer   • Chronic fatigue   • Hypersomnolence   • Chronic anemia   • Multinodular goiter   • Generalized anxiety disorder   • Iron deficiency anemia   • Statin intolerance   • Postmenopausal state   • Pulmonary hypertension (HCC)   • Gastroesophageal reflux disease without esophagitis   • Tinnitus of both ears   • Chronic hoarseness   • Pain disorder associated with psychological factors   • Frequent falls   • Balance disorder   • Hyperaldosteronism (HCC)   • Hypokalemia   • Chronic pain of left knee   • Primary osteoarthritis of left knee         Past Medical History:   Diagnosis Date   • Balance disorder 02/05/2021 February 5, 2021--patient seen in follow-up by Dr. Weir.  I extensively reviewed the documentation from the emergency room visit as noted below.  I reviewed the history with the patient and she is describing episodes of dizziness described as a spinning sensation of her body and this seems to be precipitated by movement although it does not occur if she rolls over in bed.  If she stands up and st   • Benign essential hypertension 04/08/2016   • Bilateral sensorineural hearing loss 03/31/2011 03/31/2011--etiology reveals reverse \"cookie bite\" type of hearing loss for both ears of mild/moderate degree, mostly sensorineural.  Speech discrimination 100% on the right, 96% on the left.   • Carotid artery plaque, 10/03/2014--mild bilateral carotid artery plaque. 07/25/2012    10/03/2014--[facility] screening revealed mild bilateral carotid plaque, negative for atrial fibrillation, negative for AAA, negative for PAD, osteoporosis screen revealed osteopenia.  Body mass index was 25 and considered to be moderate risk.  07/25/2012--vascular screen negative for carotid plaque, negative " for abdominal aneurysm, negative for PAD Description: 10/03/2014--[facility] screening revealed mild bilateral carotid plaque, negative for atrial fibrillation, negative for AAA, negative for PAD, osteoporosis screen revealed osteopenia.  Body mass index was 25 and considered to be moderate risk.  07/25/2012--vascular screen negative for carotid plaque, negative for abdominal aneurysm, negative for PAD   • Chronic anemia 08/23/2016 06/13/2017--colonoscopy revealed melanosis.  Biopsied.  There was friability with contact bleeding in the ascending colon.  Biopsied.  Pathology returned consistent with melanosis coli.  06/13/2017--EGD revealed normal esophagus.  Biopsies taken.  There was a medium-sized hiatal hernia.  Localized mild inflammation and linear erosions were found in the prepyloric region.  Biopsied.  Examined duodenum was normal.  Random biopsies taken.  Pathology of the gastroesophageal junction was unremarkable as was the gastric fundus.  Prepyloric biopsy revealed minimal chronic inflammation and reactive change.  H pylori negative.  Duodenal biopsies are negative.  04/12/2017--hemoglobin low at 10.8, hematocrit is normal at 35.7.  Iron sulfate 325 mg per day initiated.  08/23/2016--routine follow-up.  Patient continues to have epigastric abdominal pain believed to be related to reflux with possible esophageal spasm.  Hemoglobin noted to be low at 10.6 with a hematocrit low at 33.7 and RDW elevated at 16.2.  Homocysti   • Chronic fatigue 04/21/2016 06/14/2017--overnight oximetry revealed oxygen desaturation index of only 0.44.  There were only 10 total desaturations during the period of testing which lasted 6 hours and 46 minutes.  05/31/2017--patient seen in follow-up and reports she continues to have chronic fatigue as well as hypersomnolence.  Once again, she is on multiple medications that are undoubtedly contributing to this problem including clonazepam and hydrocodone but I doubt that these  could be discontinued.  Her CBC back in April revealed a low hemoglobin of 10.8 but hematocrit was normal at 35.7.  Thyroid function tests were normal and CMP normal.  Overnight oximetry test ordered.  Repeat CBC.  Iron studies.  Sedimentation rate and CRP.  04/11/2017--patient seen in follow-up and her blood pressure is now at a reasonable level at 120/64.  She reports she still feels somewhat fatigued but she is much better.  Patient has not been doing any regular exercise and I do think that that would be helpful to reduce her feeling of fatigue.  She is   • Chronic gastric ulcer 04/14/2014    02/15/2017--patient seen in follow-up in nearly 6 months later.  She has complaints of not feeling well all over.  She has complaints of diffuse myalgias and possibly tendinopathy related to Levaquin that I called in prior to her going to [place] for vacation.  She reports that 3 or 4 days after starting the Levaquin she began to have the musculoskeletal symptoms and she reports that she continues to have them presently.  Symptoms are worse involving her left calf area.  Examination reveals significant tenderness involving the left calf and the left calf seems a little larger than the right.  She also has complaints of shortness of breath but no chest pain.  She is complaining of double vision and generalized weakness and fatigue.  She was complaining of chronic fatigue at the last visit back in September and I ordered an overnight oximetry test but apparently this was never done for reasons that are not clear to me.  Her current oxygen saturation is 94% and normally it is 100%.  Plan is as follows: Extensi   • Chronic gastritis 11/19/2001    02/15/2017--patient seen in follow-up in nearly 6 months later.  She has complaints of not feeling well all over.  She has complaints of diffuse myalgias and possibly tendinopathy related to Levaquin that I called in prior to her going to [place] for vacation.  She reports that 3  or 4 days after starting the Levaquin she began to have the musculoskeletal symptoms and she reports that she continues to have them presently.  Symptoms are worse involving her left calf area.  Examination reveals significant tenderness involving the left calf and the left calf seems a little larger than the right.  She also has complaints of shortness of breath but no chest pain.  She is complaining of double vision and generalized weakness and fatigue.  She was complaining of chronic fatigue at the last visit back in September and I ordered an overnight oximetry test but apparently this was never done for reasons that are not clear to me.  Her current oxygen saturation is 94% and normally it is 100%.  Plan is as follows: Extensi   • Chronic hoarseness 06/08/2020    September 15, 2020--patient presents with complaints of swelling of the soft tissues of the left side of the neck and this is associated with pain and discomfort, particularly when she turns her head rotating to the left.  She also notices hoarseness and feels that her voice has changed.  On exam there is definite prominence of the left sternocleidomastoid muscle and there may be some shotty lymph   • Chronic lower back pain 01/31/2006 08/11/2014--MRI of the lumbar spine reveals the conus terminates at L2 and is normal.  Cauda equina unremarkable.  Stable to moderate degenerative disc disease at L5-S1.  No acute fracture or pars defect is demonstrated.  Small synovial cysts are seen posterior to the L4-L5 facet.  The perivertebral soft tissues are unremarkable.  T12-L1, L1-L2, L2-L3 are negative.  L3-L4 a broad-based disc bulge resulting in mild bilateral neural foraminal narrowing.  L4-L5 reveals a broad-based disc bulge facet disease resulting in mild bilateral neural foraminal narrowing.  L5-S1 reveals a broad-based disc bulge, posterior osseous slipping, and facet disease resulting in mild to moderate bilateral neural foraminal narrowing.   Assessment is stable mild to moderate degenerative spondylosis.  Small synovial cysts are seen posterior to the L4-L5 facets.  08/11/2014--MRI of the thoracic spine reveals mild to moderate thoracic kyphosis.  No fracture.  At T5--T6 there is a moderate sized left paracentral disc protrusion which i   • Chronic migraine 11/03/2009 01/18/2010--MRI of the brain performed for headaches and memory loss.  Mild small vessel disease in the cerebral and central pontine white matter.  There is an ovoid, somewhat pancake-shaped area of signal abnormality in the anterior inferior right temporal lobe subcortical to the juxtacortical white matter that measures 1.2 x 1 cm and anterior posterior and medial lateral dimension but only measures 3 mm in cranial caudal dimension.  I suspect that this is a benign cyst or a cystic area of encephalomalacia.  The remainder of the MRI of the head is within normal limits.  11/03/2009--CT scan of the brain without contrast performed for headache after a fall.  Mild diffuse atrophy.  No acute abnormality noted.; Description: Patient has had a long history of migraine headaches.  MRI and CT scan of the brain have been essentially negative.   • Chronic otitis externa 04/08/2016   • Chronic renal insufficiency, stage II (mild), creatinine 1.12 11/14/2015 11/14/2015--serum creatinine mildly elevated at 1.12.   • Depression with anxiety 04/08/2016   • Frequent falls 02/05/2021 February 5, 2021--patient seen in follow-up by Dr. Weir.  I extensively reviewed the documentation from the emergency room visit as noted below.  I reviewed the history with the patient and she is describing episodes of dizziness described as a spinning sensation of her body and this seems to be precipitated by movement although it does not occur if she rolls over in bed.  If she stands up and st   • Functional murmur, 07/15/2015--normal echocardiogram. 07/15/2015    07/15/2015--echocardiogram reveals normal left  ventricular size and function with ejection fraction 55%.  Grade 1 diastolic dysfunction, abnormal relaxation pattern.  Trace tricuspid regurgitation.  Estimated right ventricular systolic pressure is 25 mmHg which is normal.   • Gastroesophageal reflux disease with esophagitis 11/19/2001 06/13/2017--EGD revealed normal esophagus.  Biopsies taken.  There was a medium-sized hiatal hernia.  Localized mild inflammation and linear erosions were found in the prepyloric region.  Biopsied.  Examined duodenum was normal.  Random biopsies taken.  Pathology of the gastroesophageal junction was unremarkable as was the gastric fundus.  Prepyloric biopsy revealed minimal chronic inflammation and reactive change.  H pylori negative.  Duodenal biopsies are negative.  11/03/2014--patient seen in follow-up and reports her epigastric pain/chest pain has resolved.  I reviewed the results of the studies with her.  It does not appear that her problem is related to biliary tract disease.  She does have reflux and although the recent upper GI revealed minimal reflux, I do think her symptoms are related to esophageal reflux with esophageal spasm.  10/21/2014--air-contrast upper GI series revealed trace upper laryngeal penetration.  Persistent small partially reducible sliding hiatal hernia the upper stomach with    • Gastroesophageal reflux disease without esophagitis 06/06/2019   • Generalized anxiety disorder 04/11/2017   • History of Acute deep vein thrombosis (DVT) of distal end of left lower extremity 02/15/2017    03/21/2017 patient seen in follow-up and she is tolerating the Eliquis well without signs or symptoms of bleeding.  Her calf swelling and tenderness is better but not totally resolved.  I suspect that the DVT is chronic and may not resolve at all.  I will order a repeat venous study and then proceed from there.  02/23/2017--repeat Doppler venous study of the left lower extremity reveals a chronic left lower extremity DVT  in the posterior tibial.  All other left sided veins appeared normal.  Fluid collection in the left calf noted.  02/17/2017--patient seen in follow-up and reports her left lower extremity symptoms are about the same.  She continues to have complaints of profound fatigue which I think is multifactorial including underlying depression that is not in remission.  Review the results of the CTA of the chest including the possible thyroid lesion.  I do not think this is contributing to any of her symptoms of fatigue particularly given the fact that her thyroid function tests are normal.  I expla   • History of palpitations 12/07/2011    Patient has had multiple admissions to the hospital for complaints of chest pain and heart palpitations. She meant admitted at least on 3 occasions. She has had at least 3 stress Cardiolite and 1 stress ECG, the last Cardiolite being performed 12/07/2011 which was negative. Patient is also had Holter monitors which have been unremarkable.   • HIstory of Schatzki's ring 02/28/2012 02/28/2012--air-contrast upper GI revealed small to moderate sized reducible sliding hiatal herniation of the upper stomach with some demonstrated gastroesophageal reflux. No esophageal, gastric, or duodenal mass or mucosal ulceration was seen.  11/03/2008--EGD performed for evaluation of iron deficiency anemia revealed hiatal hernia without evidence of reflux, prepyloric antritis and   • Hyperlipidemia 04/08/2016   • Hypersomnolence 05/05/2016 06/14/2017--overnight oximetry revealed oxygen desaturation index of only 0.44.  There were only 10 total desaturations during the period of testing which lasted 6 hours and 46 minutes.  05/31/2017--patient seen in follow-up and reports she continues to have chronic fatigue as well as hypersomnolence.  Once again, she is on multiple medications that are undoubtedly contributing to this problem including clonazepam and hydrocodone but I doubt that these could be  discontinued.  Her CBC back in April revealed a low hemoglobin of 10.8 but hematocrit was normal at 35.7.  Thyroid function tests were normal and CMP normal.  Overnight oximetry test ordered.  Repeat CBC.  Iron studies.  Sedimentation rate and CRP.  04/11/2017--patient seen in follow-up and her blood pressure is now at a reasonable level at 120/64.  She reports she still feels somewhat fatigued but she is much better.  Patient has not been doing any regular exercise and I do think that that would be helpful to reduce her feeling of fatigue.  She is   • Menopausal state 04/08/2016   • Multinodular goiter 02/17/2017 02/21/2017--thyroid ultrasound reveals multinodular thyroid with largest nodule measuring on the order of 1.6 cm in greatest dimension.  02/17/2017--patient seen in follow-up for DVT and CTA of the chest.  An incidental finding on the CTA of the chest reveals a 1.7 cm lesion in the right lobe of thyroid.  Note that thyroid function tests are currently normal.  Ultrasound of the thyroid ordered.   • Osteoporosis, 03/29/2011--lumbar spine -2.8.  Right femoral neck -2.4.  Left femoral neck -2.4.  Patient receives Reclast infusions. 02/09/2009 04/19/2017--Reclast infusion.  04/05/2016--Reclast infusion.  06/04/2012--Reclast infusion.  05/26/2011--Reclast infusion.  03/29/2011--DEXA scan revealed a lumbar T score of -2.8, right femoral neck T score -2.4, left femoral neck T score -2.4.  Osteoporosis of the lumbar spine and severe osteopenia of the hips bilaterally.  Patient has been intolerant to Fosamax because of gastritis and gastroesophageal reflux.  04/01/2009--treatment for osteoporosis begun with Fosamax.  02/09/2009--DEXA scan revealed lumbar spine T score of -3.3.  Left femoral neck T score -2.5.  Right hip T score -2.6.  Osteoporosis.    • Pain disorder associated with psychological factors 10/10/2012   • Pain disorder with psychological factors 10/10/2012   • Pancreatic Duct Dilation 11/30/2001     09/29/2014--patient was again evaluated by the urologist for a renal cyst.  CT scan of the abdomen and pelvis reveals a left renal cyst measuring 5.1 x 4.9 cm.  There were subcentimeter hypodense renal lesions that are too small to further characterize and are presumably related to cysts.  Recommend attention to follow up.  Distal dilated pancreatic duct noted.  The common bile duct is the upper limits of normal in size.  The ampullary region is not well evaluated.  Comparison with prior imaging is recommended if available.  If there is no prior film available for comparison, and ERCP or MRCP could be performed to further evaluate.  Small hiatal hernia noted.  03/05/2012--CT scan of the abdomen with contrast, pancreatic protocol.  This reveals some fullness of the pancreatic ductal system and apparent pancreatic divisum with separate entrance of the accessory pancreatic duct extending into the duodenum distal to the main pancreatic duct.  There is fullness of the duct diffusely but similar to the previous s   • Pedal edema 06/29/2015 06/29/2015--patient presents with a 4-6 week history of exertional dyspnea that comes on with activity such as climbing stairs or walking her dog up an incline.  No chest pain.  Relieved with rest.  No cough.  She does have complaints of her feet and legs swelling that is particularly worse at the end of the day and not improved overnight.  No orthopnea or PND.  Chest exam reveals faint rales at the bases.  Otherwise clear.  Heart is regular and I do not appreciate a heart murmur.  Chest x-ray PA and lateral ordered.  Echocardiogram ordered.   • Pulmonary hypertension (HCC) 04/18/2019   • Restless legs syndrome 04/08/2016   • Simple renal cyst 07/20/2009 09/29/2014--patient was again evaluated by the urologist for a renal cyst.  CT scan of the abdomen and pelvis reveals a left renal cyst measuring 5.1 x 4.9 cm.  There were subcentimeter hypodense renal lesions that are too  small to further characterize and are presumably related to cysts.  Recommend attention to follow up.  Distal dilated pancreatic duct noted.  The common bile duct is the upper limits of normal in size.  The ampullary region is not well evaluated.  Comparison with prior imaging is recommended if available.  If there is no prior film available for comparison, and ERCP or MRCP could be performed to further evaluate.  Small hiatal hernia noted.  07/20/2009--patient was noted to have a left renal mass consistent with a cyst.  This was evaluated by the urologist 07/20/2009.   • Statin intolerance 10/30/2017   • Tinnitus of both ears 09/30/2019 September 30, 2019--patient presents with a several year history of bilateral tinnitus, right greater than left.  It seems to be getting worse and now is associated with fluctuating hearing.  She occasionally will have worsening hearing after she coughs or sneezes.  On exam she has evidence of old otitis media scars, particularly on the right.  There is no acute infection present and there is no a   • Vitamin D deficiency 04/08/2016         Past Surgical History:   Procedure Laterality Date   • APPENDECTOMY  1965    1965   • CATARACT EXTRACTION Bilateral Remote    Remote bilateral cataract extirpation with intraocular lens implantation.   • CHOLECYSTECTOMY WITH INTRAOPERATIVE CHOLANGIOGRAM N/A 01/21/2019 01/21/2019--laparoscopic paraesophageal hernia repair with fundoplication.   • COLONOSCOPY  11/03/2008 2008--normal colonoscopy   • COLONOSCOPY  2001 2001--normal colonoscopy.   • COLONOSCOPY N/A 06/13/2017 06/13/2017--colonoscopy revealed melanosis.  Biopsied.  There was friability with contact bleeding in the ascending colon.  Biopsied.  Pathology returned consistent with melanosis coli.   • ENDOSCOPY  10/08/2014    02/28/2012--air-contrast upper GI revealed small to moderate sized reducible sliding hiatal herniation of the upper stomach with some demonstrated  gastroesophageal reflux. No esophageal, gastric, or duodenal mass or mucosal ulceration was seen. 11/03/2008--EGD performed for evaluation of iron deficiency anemia revealed hiatal hernia without evidence of reflux, prepyloric antritis and   • ENDOSCOPY  11/03/2008 11/03/2008--EGD performed for evaluation of iron deficiency anemia revealed hiatal hernia without evidence of reflux, prepyloric antritis and   • ENDOSCOPY  11/19/2001 11/19/2001--EGD revealed a very tortuous distal esophagus with a Schatzki's ring. No ulcer or erosions. No Dorado's mucosa. Stomach revealed patchy erythema and erosions in the antrum. Biopsy. Normal pylorus with no obstruction. Normal duodenum with no ulcers. The Schatzki's ring was dilated with a Rhodes dilator.;   • ENDOSCOPY N/A 09/27/2016 09/27/2016--EGD revealed a normal oropharynx, esophagus, and medium sized hiatal hernia.  Nonbleeding gastric ulcer with no stigmata of bleeding.  Biopsy.  Gastritis.  Biopsy.  Normal examined duodenum.  Biopsied.  Pathology returned mild to moderate chronic active gastritis with ulceration from the stomach antral ulcer biopsy.  Gastroesophageal junction biopsy revealed minimal mixed inflammation.   • ENDOSCOPY N/A 06/13/2017 06/13/2017--colonoscopy revealed melanosis.  Biopsied.  There was friability with contact bleeding in the ascending colon.  Biopsied.  Pathology returned consistent with melanosis coli.   • ERCP N/A 01/22/2019 01/22/2019--ERCP with sphincterotomy and balloon stone extraction.   • ESOPHAGEAL DILATATION  11/19/2001 11/19/2001--EGD revealed a very tortuous distal esophagus with a Schatzki's ring. No ulcer or erosions. No Dorado's mucosa. Stomach revealed patchy erythema and erosions in the antrum. Biopsy. Normal pylorus with no obstruction. Normal duodenum with no ulcers. The Schatzki's ring was dilated with a Rhodes dilator.;    • ESOPHAGEAL MANOMETRY N/A 12/18/2018    Procedure: ESOPHAGEAL MANOMETRY;   Surgeon: Endo, Nurse Performed;  Location: [facility] ENDOSCOPY;  Service: Gastroenterology   • ESOPHAGOSCOPY N/A 01/21/2019    Procedure: FLEXIBLE ESOPHAGOSCOPY;  Surgeon: Reji Johnson MD;  Location: [facility] MAIN OR;  Service: General   • HIATAL HERNIA REPAIR N/A 01/21/2019    Procedure: Laparoscopic paraesophageal hernia repair with toupee fundoplication;  Surgeon: Reji Johnson MD;  Location: [facility] MAIN OR;  Service: General   • INCONTINENCE SURGERY  1979 1979--a bladder tack procedure for urinary stress incontinence   • KNEE ARTHROSCOPY Right 12/12/2011 12/12/2011--right knee arthroscopy with partial lateral and medial meniscectomies.   • SKIN SURGERY  05/25/2010 05/25/2010--skin lesion excised from right lower extremity. 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   • TOTAL ABDOMINAL HYSTERECTOMY WITH SALPINGO OOPHORECTOMY  1974 1974--total abdominal hysterectomy.         Allergies   Allergen Reactions   • Statins Nausea And Vomiting   • Morphine Hallucinations   • Penicillins Itching   • Tetanus Toxoids Itching           Current Outpatient Medications:   •  amLODIPine (NORVASC) 5 MG tablet, TAKE 1 TABLET BY MOUTH EVERY MORNING FOR HIGH BLOOD PRESSURE, Disp: 90 tablet, Rfl: 2  •  aspirin 81 MG EC tablet, Take 81 mg by mouth Daily. HELD, Disp: , Rfl:   •  Brexpiprazole (Rexulti) 1 MG tablet, Rexulti 1 mg tablet, Disp: , Rfl:   •  buPROPion XL (WELLBUTRIN XL) 300 MG 24 hr tablet, Take 300 mg by mouth Every Morning., Disp: , Rfl:   •  Cholecalciferol (vitamin D3) 125 MCG (5000 UT) capsule capsule, 1 by mouth daily as directed, Disp: 30 capsule, Rfl:   •  Cimetidine (TAGAMET PO), Tagamet, Disp: , Rfl:   •  cycloSPORINE (RESTASIS) 0.05 % ophthalmic emulsion, Administer 1 drop to both eyes 2 (Two) Times a Day., Disp: , Rfl:   •  diclofenac (VOLTAREN) 1 % gel gel, Apply 4 g topically to the appropriate area as directed 4 (Four) Times a Day As Needed., Disp: , Rfl:    •  diphenhydrAMINE-APAP, sleep, (Tylenol PM Extra Strength)  MG/30ML liquid, Tylenol PM Extra Strength, Disp: , Rfl:   •  estradiol (ESTRACE) 1 MG tablet, TAKE 1 TABLET BY MOUTH EVERY DAY, Disp: 90 tablet, Rfl: 3  •  HYDROcodone-acetaminophen (NORCO)  MG per tablet, Take 1 tablet by mouth every 6 (six) hours as needed for moderate pain (4-6)., Disp: , Rfl:   •  Misc Natural Products (COLON CLEANSE PO), Take  by mouth., Disp: , Rfl:   •  Multiple Vitamins-Minerals (MULTIVITAMIN ADULT) chewable tablet, Chew 1 tablet Daily., Disp: , Rfl:   •  Specialty Vitamins Products (ONE-A-DAY BONE STRENGTH PO), One-A-Day Bone Strength, Disp: , Rfl:   •  spironolactone (ALDACTONE) 100 MG tablet, TAKE 1 BY MOUTH EVERY MORNING FOR HIGH BLOOD PRESSURE AND LEG SWELLING/EDEMA, Disp: 90 tablet, Rfl: 1  •  topiramate (TOPAMAX) 100 MG tablet, TAKE 1 TABLET DAILY, Disp: 90 tablet, Rfl: 4  •  traZODone (DESYREL) 50 MG tablet, Take 25-50 mg by mouth Every Night., Disp: , Rfl:   •  venlafaxine XR (EFFEXOR-XR) 75 MG 24 hr capsule, , Disp: , Rfl:   •  vitamin E 400 UNIT capsule, Take 400 Units by mouth Daily., Disp: , Rfl:       Family History   Problem Relation Age of Onset   • Heart attack Mother         dies age 47 from heart attack   • Heart disease Mother    • Bleeding Disorder Mother    • Heart disease Father    • Ovarian cancer Maternal Grandmother    • Heart attack Maternal Aunt         dies age 60 from heart attack   • Malig Hyperthermia Neg Hx    • Breast cancer Neg Hx          Social History     Socioeconomic History   • Marital status:      Spouse name: ander   • Number of children: 4   • Years of education: 14   • Highest education level: Associate degree: academic program   Tobacco Use   • Smoking status: Never   • Smokeless tobacco: Never   • Tobacco comments:     no caffeine    Vaping Use   • Vaping Use: Never used   Substance and Sexual Activity   • Alcohol use: No   • Drug use: No   • Sexual activity: Yes  "    Partners: Male         Vitals:    02/07/23 1205   BP: 124/80   Pulse: 82   Resp: 18   SpO2: 93%   Weight: 59.1 kg (130 lb 6.4 oz)   Height: 152.4 cm (60\")        Body mass index is 25.47 kg/m².      Physical Exam:    General: Alert and oriented x 3.  No acute distress.  Normal affect.  HEENT: Pupils equal, round, reactive to light; extraocular movements intact; sclerae nonicteric; pharynx, ear canals and TMs normal.  Neck: Without JVD, thyromegaly, bruit, or adenopathy.  Lungs: Clear to auscultation in all fields.  Heart: Regular rate and rhythm without murmur, rub, gallop, or click.  Abdomen: Soft, nontender, without hepatosplenomegaly or hernia.  Bowel sounds normal.  : Deferred.  Rectal: Deferred.  Extremities: Without clubbing, cyanosis, edema, or pulse deficit.  Neurologic: Intact without focal deficit.  Normal station and gait observed during ingress and egress from the examination room.  Skin: Without significant lesion.  Musculoskeletal: Unremarkable.    Lab/other results:    CBC normal except white count slightly elevated 11.31.  CMP normal except estimated GFR 57.8.  Serum iron is normal at 106 but iron saturation slightly low at 16%.  Total cholesterol 198.  Triglycerides 119.  LDL particle number slightly elevated 1008.  HDL particle number greater than 65.  Thyroid function tests are normal.  Vitamin D normal at 69.9.  Urine microscopic normal.    Assessment/Plan:     Diagnosis Plan   1. Hyperlipidemia  Comprehensive Metabolic Panel    NMR LipoProfile      2. Iron deficiency anemia due to chronic blood loss  Iron Profile      3. Vitamin D deficiency  Vitamin D,25-Hydroxy      4. Benign essential hypertension        5. Chronic renal insufficiency, stage II (mild), creatinine 1.12  CBC (No Diff)    Comprehensive Metabolic Panel      6. Chronic gastric ulcer, unspecified whether gastric ulcer hemorrhage or perforation present        7. Chronic anemia        8. Multinodular goiter  TSH    T4, Free    " T3, Free      9. Gastroesophageal reflux disease without esophagitis        10. Pain disorder associated with psychological factors        11. Frequent falls        12. Balance disorder        13. Primary osteoarthritis of left knee        14. Pulmonary hypertension (HCC)        15. Postmenopausal state        16. Statin intolerance        17. Generalized anxiety disorder        18. Osteoporosis, 03/29/2011--lumbar spine -2.8.  Right femoral neck -2.4.  Left femoral neck -2.4.  Patient receives Reclast infusions.        19. Depression with anxiety        20. Carotid artery plaque, 04/02/2018--mild right ICA plaque, normal left ICA 10/03/2014--mild bilateral carotid artery plaque.        21. Bilateral lower extremity edema        22. Restless legs syndrome        23. Bilateral sensorineural hearing loss        24. Chronic fatigue        25. Hyperaldosteronism (HCC)        26. Hypokalemia        27. Therapeutic drug monitoring          Patient has multiple medical problems as noted above that are fairly stable.  I do not see anything that we need to act on at the present time.    Plan is as follows: No change in current medical regimen.  Patient will follow-up in 6 months with lab prior or follow-up as needed.        Procedures

## 2023-04-17 NOTE — TELEPHONE ENCOUNTER
"Chief Complaint  Initial Evaluation of the Right Shoulder     Subjective      Samira Kramer presents to [facility] for initial evaluation of the right shoulder. She fell from the bottom ring of the shoulder.  Her fall was on 4/13/23.  She went to the ER and had X rays completed and here in a sling.  She is here today for treatment.     Allergies   Allergen Reactions   • Aleve [Naproxen] Unknown - High Severity   • Atorvastatin Unknown - High Severity   • Fenofibrate Hives   • Omega 3-6-9 Fatty Acids Diarrhea   • Penicillins Unknown - High Severity   • Zetia [Ezetimibe] Rash        Social History     Socioeconomic History   • Marital status:    Tobacco Use   • Smoking status: Every Day     Packs/day: 1.00     Types: Cigarettes   • Smokeless tobacco: Never   Vaping Use   • Vaping Use: Never used   Substance and Sexual Activity   • Alcohol use: Never   • Drug use: Never   • Sexual activity: Defer        Review of Systems     Objective   Vital Signs:   Pulse 74   Ht 157.5 cm (62\")   Wt 51.7 kg (114 lb)   SpO2 98%   BMI 20.85 kg/m²       Physical Exam  Constitutional:       Appearance: Normal appearance. Patient is well-developed and normal weight.   HENT:      Head: Normocephalic.      Right Ear: Hearing and external ear normal.      Left Ear: Hearing and external ear normal.      Nose: Nose normal.   Eyes:      Conjunctiva/sclera: Conjunctivae normal.   Cardiovascular:      Rate and Rhythm: Normal rate.   Pulmonary:      Effort: Pulmonary effort is normal.      Breath sounds: No wheezing or rales.   Abdominal:      Palpations: Abdomen is soft.      Tenderness: There is no abdominal tenderness.   Musculoskeletal:      Cervical back: Normal range of motion.   Skin:     Findings: No rash.   Neurological:      Mental Status: Patient  is alert and oriented to person, place, and time.   Psychiatric:         Mood and Affect: Mood and affect normal.         Judgment: Judgment " Make a follow-up appointment.   normal.       Ortho Exam      RIGHT SHOULDER  Patient in a sling.  Sensation intact to light touch, median, radial, ulnar nerve. Positive AIN, PIN, ulnar nerve motor. Positive pulses. Good strength in wrist extensors and wrist flexors. Full , thumb opposition, MCP flexors, DIP flexors and PIP flexors.         Procedures        Imaging Results (Most Recent)     None           Result Review :         XR Shoulder 2+ View Right    Result Date: 4/13/2023  Narrative: PROCEDURE: XR SHOULDER 2+ VW RIGHT  COMPARISON: None  INDICATIONS: Fall off ladder at home today x90 minutes ago, right anterior shoulder pain, no neck pain.  Unable to abduct right shoulder, pain with flexing lower right forea  FINDINGS:  There is a comminuted and impacted fracture of the proximal humerus.  The glenoid is intact.  The AC joint is congruent.      Impression:  Comminuted and impacted proximal right humeral fracture.      FERMIN BARRETT MD       Electronically Signed and Approved By: FERMIN BARRETT MD on 4/13/2023 at 14:39                      Assessment and Plan     Diagnoses and all orders for this visit:    1. Closed 3-part fracture of proximal end of right humerus, initial encounter (Primary)        Discussed the treatment plan with the patient. I reviewed the X-rays that were obtained 4/13/23 with the patient.  Discussed the treatment options with the patient, operative vs non-operative. The patient expressed understanding and wished to proceed with a proximal humerus ORIF.      Educated on risk of smoking. Discussed options for smoking cessation., Discussed surgery., Risks/benefits discussed with patient including, but not limited to: infection, bleeding, neurovascular damage, re-rupture, aesthetic deformity, need for further surgery, and death., Discussed with patient the implant type being used during surgery and patient understands., Surgery pamphlet given. and Call or return if worsening symptoms.    Follow Up     2 weeks  postoperatively       Patient was given instructions and counseling regarding her condition or for health maintenance advice. Please see specific information pulled into the AVS if appropriate.     Scribed for Keshawn Arellano MD by Tala Gale MA.  04/17/23   10:21 EDT    I have personally performed the services described in this document as scribed by the above individual and it is both accurate and complete. Keshawn Arellano MD 04/19/23

## 2023-05-01 ENCOUNTER — TRANSCRIBE ORDERS (OUTPATIENT)
Dept: ADMINISTRATIVE | Facility: HOSPITAL | Age: 83
End: 2023-05-01
Payer: MEDICARE

## 2023-05-01 DIAGNOSIS — Z12.31 SCREENING MAMMOGRAM FOR BREAST CANCER: Primary | ICD-10-CM

## 2023-06-06 NOTE — TELEPHONE ENCOUNTER
Rx Refill Note  Requested Prescriptions     Pending Prescriptions Disp Refills    estradiol (ESTRACE) 1 MG tablet [Pharmacy Med Name: ESTRADIOL 1 MG TABLET] 90 tablet 0     Sig: TAKE 1 TABLET BY MOUTH EVERY DAY      Last office visit with prescribing clinician: 2/7/2023   Last telemedicine visit with prescribing clinician: Visit date not found   Next office visit with prescribing clinician: 7/11/2023       Ezekiel Hughes MA  06/06/23, 14:14 EDT

## 2023-06-07 ENCOUNTER — LAB (OUTPATIENT)
Dept: LAB | Facility: HOSPITAL | Age: 83
End: 2023-06-07
Payer: MEDICARE

## 2023-06-07 ENCOUNTER — TRANSCRIBE ORDERS (OUTPATIENT)
Dept: ADMINISTRATIVE | Facility: HOSPITAL | Age: 83
End: 2023-06-07
Payer: MEDICARE

## 2023-06-07 DIAGNOSIS — R53.83 TIREDNESS: ICD-10-CM

## 2023-06-07 DIAGNOSIS — R53.83 TIREDNESS: Primary | ICD-10-CM

## 2023-06-07 DIAGNOSIS — E55.9 VITAMIN D DEFICIENCY: ICD-10-CM

## 2023-06-07 LAB
25(OH)D3 SERPL-MCNC: 59.5 NG/ML (ref 30–100)
ALBUMIN SERPL-MCNC: 4.1 G/DL (ref 3.5–5.2)
ALBUMIN/GLOB SERPL: 1.7 G/DL
ALP SERPL-CCNC: 60 U/L (ref 39–117)
ALT SERPL W P-5'-P-CCNC: 15 U/L (ref 1–33)
ANION GAP SERPL CALCULATED.3IONS-SCNC: 10 MMOL/L (ref 5–15)
AST SERPL-CCNC: 18 U/L (ref 1–32)
BASOPHILS # BLD AUTO: 0.04 10*3/MM3 (ref 0–0.2)
BASOPHILS NFR BLD AUTO: 0.4 % (ref 0–1.5)
BILIRUB SERPL-MCNC: 0.4 MG/DL (ref 0–1.2)
BUN SERPL-MCNC: 21 MG/DL (ref 8–23)
BUN/CREAT SERPL: 19.1 (ref 7–25)
CALCIUM SPEC-SCNC: 9.4 MG/DL (ref 8.6–10.5)
CHLORIDE SERPL-SCNC: 102 MMOL/L (ref 98–107)
CO2 SERPL-SCNC: 26 MMOL/L (ref 22–29)
CREAT SERPL-MCNC: 1.1 MG/DL (ref 0.57–1)
DEPRECATED RDW RBC AUTO: 43.1 FL (ref 37–54)
EGFRCR SERPLBLD CKD-EPI 2021: 50.9 ML/MIN/1.73
EOSINOPHIL # BLD AUTO: 0.13 10*3/MM3 (ref 0–0.4)
EOSINOPHIL NFR BLD AUTO: 1.3 % (ref 0.3–6.2)
ERYTHROCYTE [DISTWIDTH] IN BLOOD BY AUTOMATED COUNT: 13.4 % (ref 12.3–15.4)
ERYTHROCYTE [SEDIMENTATION RATE] IN BLOOD: 8 MM/HR (ref 0–30)
GLOBULIN UR ELPH-MCNC: 2.4 GM/DL
GLUCOSE SERPL-MCNC: 93 MG/DL (ref 65–99)
HCT VFR BLD AUTO: 41.7 % (ref 34–46.6)
HGB BLD-MCNC: 14 G/DL (ref 12–15.9)
IMM GRANULOCYTES # BLD AUTO: 0.08 10*3/MM3 (ref 0–0.05)
IMM GRANULOCYTES NFR BLD AUTO: 0.8 % (ref 0–0.5)
LYMPHOCYTES # BLD AUTO: 2.66 10*3/MM3 (ref 0.7–3.1)
LYMPHOCYTES NFR BLD AUTO: 26.1 % (ref 19.6–45.3)
MAGNESIUM SERPL-MCNC: 2 MG/DL (ref 1.6–2.4)
MCH RBC QN AUTO: 29.6 PG (ref 26.6–33)
MCHC RBC AUTO-ENTMCNC: 33.6 G/DL (ref 31.5–35.7)
MCV RBC AUTO: 88.2 FL (ref 79–97)
MONOCYTES # BLD AUTO: 0.66 10*3/MM3 (ref 0.1–0.9)
MONOCYTES NFR BLD AUTO: 6.5 % (ref 5–12)
NEUTROPHILS NFR BLD AUTO: 6.63 10*3/MM3 (ref 1.7–7)
NEUTROPHILS NFR BLD AUTO: 64.9 % (ref 42.7–76)
NRBC BLD AUTO-RTO: 0 /100 WBC (ref 0–0.2)
PLATELET # BLD AUTO: 350 10*3/MM3 (ref 140–450)
PMV BLD AUTO: 10.5 FL (ref 6–12)
POTASSIUM SERPL-SCNC: 4.5 MMOL/L (ref 3.5–5.2)
PROT SERPL-MCNC: 6.5 G/DL (ref 6–8.5)
RBC # BLD AUTO: 4.73 10*6/MM3 (ref 3.77–5.28)
SODIUM SERPL-SCNC: 138 MMOL/L (ref 136–145)
T3FREE SERPL-MCNC: 2.53 PG/ML (ref 2–4.4)
T4 FREE SERPL-MCNC: 1.19 NG/DL (ref 0.93–1.7)
TSH SERPL DL<=0.05 MIU/L-ACNC: 0.92 UIU/ML (ref 0.27–4.2)
VIT B12 BLD-MCNC: 451 PG/ML (ref 211–946)
WBC NRBC COR # BLD: 10.2 10*3/MM3 (ref 3.4–10.8)

## 2023-06-07 PROCEDURE — 82747 ASSAY OF FOLIC ACID RBC: CPT

## 2023-06-07 PROCEDURE — 83735 ASSAY OF MAGNESIUM: CPT

## 2023-06-07 PROCEDURE — 36415 COLL VENOUS BLD VENIPUNCTURE: CPT

## 2023-06-07 PROCEDURE — 85014 HEMATOCRIT: CPT

## 2023-06-07 PROCEDURE — 84481 FREE ASSAY (FT-3): CPT

## 2023-06-07 PROCEDURE — 85025 COMPLETE CBC W/AUTO DIFF WBC: CPT

## 2023-06-07 PROCEDURE — 84443 ASSAY THYROID STIM HORMONE: CPT

## 2023-06-07 PROCEDURE — 85652 RBC SED RATE AUTOMATED: CPT

## 2023-06-07 PROCEDURE — 82607 VITAMIN B-12: CPT

## 2023-06-07 PROCEDURE — 82306 VITAMIN D 25 HYDROXY: CPT

## 2023-06-07 PROCEDURE — 84439 ASSAY OF FREE THYROXINE: CPT

## 2023-06-07 PROCEDURE — 80053 COMPREHEN METABOLIC PANEL: CPT

## 2023-06-07 RX ORDER — ESTRADIOL 1 MG/1
TABLET ORAL
Qty: 90 TABLET | Refills: 0 | Status: SHIPPED | OUTPATIENT
Start: 2023-06-07

## 2023-06-08 LAB
FOLATE BLD-MCNC: 426 NG/ML
FOLATE RBC-MCNC: 1017 NG/ML
HCT VFR BLD AUTO: 41.9 % (ref 34–46.6)

## 2023-08-30 DIAGNOSIS — I10 BENIGN ESSENTIAL HYPERTENSION: Chronic | ICD-10-CM

## 2023-08-30 RX ORDER — AMLODIPINE BESYLATE 5 MG/1
TABLET ORAL
Qty: 90 TABLET | Refills: 2 | Status: SHIPPED | OUTPATIENT
Start: 2023-08-30

## 2023-08-31 RX ORDER — ESTRADIOL 1 MG/1
TABLET ORAL
Qty: 90 TABLET | Refills: 0 | Status: SHIPPED | OUTPATIENT
Start: 2023-08-31

## 2023-08-31 NOTE — TELEPHONE ENCOUNTER
Rx Refill Note  Requested Prescriptions     Pending Prescriptions Disp Refills    estradiol (ESTRACE) 1 MG tablet [Pharmacy Med Name: ESTRADIOL 1 MG TABLET] 90 tablet 0     Sig: TAKE 1 TABLET BY MOUTH EVERY DAY      Last office visit with prescribing clinician: Visit date not found   Last telemedicine visit with prescribing clinician: Visit date not found   Next office visit with prescribing clinician: Visit date not found                         Would you like a call back once the refill request has been completed: [] Yes [] No    If the office needs to give you a call back, can they leave a voicemail: [] Yes [] No    Vianney Galloway MA  08/31/23, 08:53 EDT

## 2023-09-05 ENCOUNTER — OFFICE VISIT (OUTPATIENT)
Dept: INTERNAL MEDICINE | Facility: CLINIC | Age: 83
End: 2023-09-05
Payer: MEDICARE

## 2023-09-05 VITALS
WEIGHT: 131.2 LBS | HEIGHT: 60 IN | DIASTOLIC BLOOD PRESSURE: 70 MMHG | RESPIRATION RATE: 14 BRPM | SYSTOLIC BLOOD PRESSURE: 126 MMHG | BODY MASS INDEX: 25.76 KG/M2 | HEART RATE: 73 BPM | OXYGEN SATURATION: 98 %

## 2023-09-05 DIAGNOSIS — G43.709 CHRONIC MIGRAINE W/O AURA W/O STATUS MIGRAINOSUS, NOT INTRACTABLE: Chronic | ICD-10-CM

## 2023-09-05 DIAGNOSIS — E04.2 MULTINODULAR GOITER: Chronic | ICD-10-CM

## 2023-09-05 DIAGNOSIS — M81.0 AGE-RELATED OSTEOPOROSIS WITHOUT CURRENT PATHOLOGICAL FRACTURE: Chronic | ICD-10-CM

## 2023-09-05 DIAGNOSIS — E26.9 HYPERALDOSTERONISM: Chronic | ICD-10-CM

## 2023-09-05 DIAGNOSIS — G25.81 RESTLESS LEGS SYNDROME: Chronic | ICD-10-CM

## 2023-09-05 DIAGNOSIS — R49.0 CHRONIC HOARSENESS: Chronic | ICD-10-CM

## 2023-09-05 DIAGNOSIS — Z51.81 THERAPEUTIC DRUG MONITORING: ICD-10-CM

## 2023-09-05 DIAGNOSIS — Z78.0 POSTMENOPAUSAL STATE: Chronic | ICD-10-CM

## 2023-09-05 DIAGNOSIS — N18.2 CHRONIC RENAL INSUFFICIENCY, STAGE II (MILD): Chronic | ICD-10-CM

## 2023-09-05 DIAGNOSIS — Z78.9 STATIN INTOLERANCE: Chronic | ICD-10-CM

## 2023-09-05 DIAGNOSIS — D50.0 IRON DEFICIENCY ANEMIA DUE TO CHRONIC BLOOD LOSS: Chronic | ICD-10-CM

## 2023-09-05 DIAGNOSIS — H93.13 TINNITUS OF BOTH EARS: Chronic | ICD-10-CM

## 2023-09-05 DIAGNOSIS — K21.9 GASTROESOPHAGEAL REFLUX DISEASE WITHOUT ESOPHAGITIS: Chronic | ICD-10-CM

## 2023-09-05 DIAGNOSIS — N28.1 SIMPLE RENAL CYST: Chronic | ICD-10-CM

## 2023-09-05 DIAGNOSIS — F45.42 PAIN DISORDER ASSOCIATED WITH PSYCHOLOGICAL FACTORS: Chronic | ICD-10-CM

## 2023-09-05 DIAGNOSIS — Z00.00 ENCOUNTER FOR SUBSEQUENT ANNUAL WELLNESS VISIT (AWV) IN MEDICARE PATIENT: Primary | ICD-10-CM

## 2023-09-05 DIAGNOSIS — K86.89 DILATION OF PANCREATIC DUCT: Chronic | ICD-10-CM

## 2023-09-05 DIAGNOSIS — E87.6 HYPOKALEMIA: Chronic | ICD-10-CM

## 2023-09-05 DIAGNOSIS — R29.6 FREQUENT FALLS: Chronic | ICD-10-CM

## 2023-09-05 DIAGNOSIS — M54.40 CHRONIC LOW BACK PAIN WITH SCIATICA, SCIATICA LATERALITY UNSPECIFIED, UNSPECIFIED BACK PAIN LATERALITY: Chronic | ICD-10-CM

## 2023-09-05 DIAGNOSIS — E78.2 MIXED HYPERLIPIDEMIA: Chronic | ICD-10-CM

## 2023-09-05 DIAGNOSIS — D64.9 CHRONIC ANEMIA: Chronic | ICD-10-CM

## 2023-09-05 DIAGNOSIS — R26.89 BALANCE DISORDER: Chronic | ICD-10-CM

## 2023-09-05 DIAGNOSIS — R49.0 CHRONIC HOARSENESS: Primary | ICD-10-CM

## 2023-09-05 DIAGNOSIS — I65.23 ATHEROSCLEROSIS OF BOTH CAROTID ARTERIES: Chronic | ICD-10-CM

## 2023-09-05 DIAGNOSIS — I10 BENIGN ESSENTIAL HYPERTENSION: Chronic | ICD-10-CM

## 2023-09-05 DIAGNOSIS — G89.29 CHRONIC LOW BACK PAIN WITH SCIATICA, SCIATICA LATERALITY UNSPECIFIED, UNSPECIFIED BACK PAIN LATERALITY: Chronic | ICD-10-CM

## 2023-09-05 DIAGNOSIS — R60.0 BILATERAL LOWER EXTREMITY EDEMA: Chronic | ICD-10-CM

## 2023-09-05 DIAGNOSIS — E55.9 VITAMIN D DEFICIENCY: Chronic | ICD-10-CM

## 2023-09-05 DIAGNOSIS — F41.8 DEPRESSION WITH ANXIETY: Chronic | ICD-10-CM

## 2023-09-05 DIAGNOSIS — F41.1 GENERALIZED ANXIETY DISORDER: Chronic | ICD-10-CM

## 2023-09-05 NOTE — PROGRESS NOTES
The ABCs of the Annual Wellness Visit  Subsequent Medicare Wellness Visit    Subjective      Sachi Vora is a 82 y.o. female who presents for a Subsequent Medicare Wellness Visit.    The following portions of the patient's history were reviewed and   updated as appropriate: allergies, current medications, past family history, past medical history, past social history, past surgical history, and problem list.    Compared to one year ago, the patient feels her physical   health is better.    Compared to one year ago, the patient feels her mental   health is better.    Recent Hospitalizations:  She was not admitted to the hospital during the last year.       Current Medical Providers:  Patient Care Team:  Cruzito Weir MD as PCP - General (Internal Medicine)    Outpatient Medications Prior to Visit   Medication Sig Dispense Refill    amLODIPine (NORVASC) 5 MG tablet TAKE 1 TABLET BY MOUTH EVERY MORNING FOR HIGH BLOOD PRESSURE 90 tablet 2    aspirin 81 MG EC tablet Take 1 tablet by mouth Daily. HELD      Brexpiprazole (Rexulti) 1 MG tablet Rexulti 1 mg tablet      buPROPion XL (WELLBUTRIN XL) 300 MG 24 hr tablet Take 1 tablet by mouth Every Morning.      Cholecalciferol (vitamin D3) 125 MCG (5000 UT) capsule capsule 1 by mouth daily as directed 30 capsule     Cimetidine (TAGAMET PO) Tagamet      cycloSPORINE (RESTASIS) 0.05 % ophthalmic emulsion Administer 1 drop to both eyes 2 (Two) Times a Day.      diclofenac (VOLTAREN) 1 % gel gel Apply 4 g topically to the appropriate area as directed 4 (Four) Times a Day As Needed.      diphenhydrAMINE-APAP, sleep, (Tylenol PM Extra Strength)  MG/30ML liquid Tylenol PM Extra Strength      estradiol (ESTRACE) 1 MG tablet TAKE 1 TABLET BY MOUTH EVERY DAY 90 tablet 0    HYDROcodone-acetaminophen (NORCO)  MG per tablet Take 1 tablet by mouth Every 6 (Six) Hours As Needed for Moderate Pain.      Misc Natural Products (COLON CLEANSE PO) Take  by mouth.      Multiple  Vitamins-Minerals (MULTIVITAMIN ADULT) chewable tablet Chew 1 tablet Daily.      Specialty Vitamins Products (ONE-A-DAY BONE STRENGTH PO) One-A-Day Bone Strength      spironolactone (ALDACTONE) 100 MG tablet TAKE 1 BY MOUTH EVERY MORNING FOR HIGH BLOOD PRESSURE AND LEG SWELLING/EDEMA 90 tablet 0    topiramate (TOPAMAX) 100 MG tablet TAKE 1 TABLET DAILY 90 tablet 4    traZODone (DESYREL) 50 MG tablet Take 0.5-1 tablets by mouth Every Night.      venlafaxine XR (EFFEXOR-XR) 75 MG 24 hr capsule       vitamin E 400 UNIT capsule Take 1 capsule by mouth Daily.       No facility-administered medications prior to visit.       Opioid medication/s are on active medication list.  and I have evaluated her active treatment plan and pain score trends (see table).  Vitals:    09/05/23 1258   PainSc: 0-No pain     I have reviewed the chart for potential of high risk medication and harmful drug interactions in the elderly.          Aspirin is on active medication list. Aspirin use is indicated based on review of current medical condition/s. Pros and cons of this therapy have been discussed today. Benefits of this medication outweigh potential harm.  Patient has been encouraged to continue taking this medication.  .      Patient Active Problem List   Diagnosis    Osteoporosis, 03/29/2011--lumbar spine -2.8.  Right femoral neck -2.4.  Left femoral neck -2.4.  Patient receives Reclast infusions.    Therapeutic drug monitoring    Simple renal cyst    Benign essential hypertension    Carotid artery plaque, 04/02/2018--mild right ICA plaque, normal left ICA 10/03/2014--mild bilateral carotid artery plaque.    Chronic gastritis    Chronic lower back pain    Chronic otitis externa    Chronic renal insufficiency, stage II (mild), creatinine 1.12    Depression with anxiety    Functional murmur, 07/15/2015--normal echocardiogram.    Hyperlipidemia    Chronic migraine w/o aura w/o status migrainosus, not intractable    Pancreatic Duct Dilation  "   Bilateral lower extremity edema    Restless legs syndrome    Bilateral sensorineural hearing loss    Vitamin D deficiency    Chronic gastric ulcer    Chronic fatigue    Hypersomnolence    Chronic anemia    Multinodular goiter    Generalized anxiety disorder    Iron deficiency anemia    Statin intolerance    Postmenopausal state    Pulmonary hypertension    Gastroesophageal reflux disease without esophagitis    Tinnitus of both ears    Chronic hoarseness    Pain disorder associated with psychological factors    Frequent falls    Balance disorder    Hyperaldosteronism    Hypokalemia    Chronic pain of left knee    Primary osteoarthritis of left knee     Advance Care Planning   Advance Care Planning     Advance Directive is on file.  ACP discussion was held with the patient during this visit. Patient has an advance directive in EMR which is still valid.      Objective    Vitals:    09/05/23 1258   BP: 126/70   BP Location: Right arm   Patient Position: Sitting   Cuff Size: Adult   Pulse: 73   Resp: 14   SpO2: 98%   Weight: 59.5 kg (131 lb 3.2 oz)   Height: 152.4 cm (60\")   PainSc: 0-No pain     Estimated body mass index is 25.62 kg/m² as calculated from the following:    Height as of this encounter: 152.4 cm (60\").    Weight as of this encounter: 59.5 kg (131 lb 3.2 oz).    BMI is >= 25 and <30. (Overweight) The following options were offered after discussion;: weight loss educational material (shared in after visit summary), exercise counseling/recommendations, nutrition counseling/recommendations, and patient really does not have a serious weight problem.  She will monitor.      Does the patient have evidence of cognitive impairment?   No            HEALTH RISK ASSESSMENT    Smoking Status:  Social History     Tobacco Use   Smoking Status Never   Smokeless Tobacco Never   Tobacco Comments    None     Alcohol Consumption:  Social History     Substance and Sexual Activity   Alcohol Use No     Fall Risk " Screen:    ERASMO Fall Risk Assessment was completed, and patient is at HIGH risk for falls. Assessment completed on:2023    Depression Screenin/7/2023    12:18 PM   PHQ-2/PHQ-9 Depression Screening   Little Interest or Pleasure in Doing Things 1-->several days   Feeling Down, Depressed or Hopeless 1-->several days   Trouble Falling or Staying Asleep, or Sleeping Too Much 1-->several days   Feeling Tired or Having Little Energy 2-->more than half the days   Poor Appetite or Overeating 0-->not at all   Feeling Bad about Yourself - or that You are a Failure or Have Let Yourself or Your Family Down 0-->not at all   Trouble Concentrating on Things, Such as Reading the Newspaper or Watching Television 1-->several days   Moving or Speaking So Slowly that Other People Could Have Noticed? Or the Opposite - Being So Fidgety 0-->not at all   Thoughts that You Would be Better Off Dead or of Hurting Yourself in Some Way 0-->not at all   PHQ-9: Brief Depression Severity Measure Score 6   If You Checked Off Any Problems, How Difficult Have These Problems Made It For You to Do Your Work, Take Care of Things at Home, or Get Along with Other People? somewhat difficult       Health Habits and Functional and Cognitive Screenin/5/2023     1:02 PM   Functional & Cognitive Status   Do you have difficulty preparing food and eating? No   Do you have difficulty bathing yourself, getting dressed or grooming yourself? No   Do you have difficulty using the toilet? No   Do you have difficulty moving around from place to place? No   Do you have trouble with steps or getting out of a bed or a chair? No   Current Diet Well Balanced Diet   Dental Exam Up to date   Eye Exam Up to date   Exercise (times per week) 5 times per week   Current Exercises Include Walking   Do you need help using the phone?  No   Are you deaf or do you have serious difficulty hearing?  No   Do you need help to go to places out of walking distance? No    Do you need help shopping? No   Do you need help preparing meals?  No   Do you need help with housework?  No   Do you need help with laundry? No   Do you need help taking your medications? No   Do you need help managing money? No   Do you ever drive or ride in a car without wearing a seat belt? No   Have you felt unusual stress, anger or loneliness in the last month? No   Who do you live with? Spouse   If you need help, do you have trouble finding someone available to you? No   Have you been bothered in the last four weeks by sexual problems? No   Do you have difficulty concentrating, remembering or making decisions? No       Age-appropriate Screening Schedule:  Refer to the list below for future screening recommendations based on patient's age, sex and/or medical conditions. Orders for these recommended tests are listed in the plan section. The patient has been provided with a written plan.    Health Maintenance   Topic Date Due    ANNUAL WELLNESS VISIT  12/30/2022    INFLUENZA VACCINE  10/01/2023    LIPID PANEL  12/27/2023    DXA SCAN  01/26/2024    COLORECTAL CANCER SCREENING  06/13/2027    Pneumococcal Vaccine 65+  Completed    COVID-19 Vaccine  Discontinued    ZOSTER VACCINE  Discontinued    BMI FOLLOWUP  Discontinued                  CMS Preventative Services Quick Reference  Risk Factors Identified During Encounter:    Chronic Pain: Natural history and expected course discussed. Questions answered.  Depression/Dysphoria: Current medication is effective, no change recommended  Fall Risk-High or Moderate: Discussed Fall Prevention in the home  Immunizations Discussed/Encouraged: Influenza and COVID19  Urinary Incontinence:  Wears pads.  Not significant problem.    The above risks/problems have been discussed with the patient.  Pertinent information has been shared with the patient in the After Visit Summary.    Diagnoses and all orders for this visit:    1. Encounter for subsequent annual wellness visit (AWV)  in Medicare patient (Primary)    2. Hyperlipidemia    3. Chronic anemia    4. Iron deficiency anemia due to chronic blood loss    5. Multinodular goiter    6. Statin intolerance    7. Vitamin D deficiency    8. Simple renal cyst    9. Restless legs syndrome    10. Postmenopausal state    11. Pancreatic Duct Dilation    12. Pain disorder associated with psychological factors    13. Osteoporosis, 03/29/2011--lumbar spine -2.8.  Right femoral neck -2.4.  Left femoral neck -2.4.  Patient receives Reclast infusions.    14. Hypokalemia    15. Hyperaldosteronism    16. Generalized anxiety disorder    17. Gastroesophageal reflux disease without esophagitis    18. Depression with anxiety    19. Frequent falls    20. Chronic renal insufficiency, stage II (mild), creatinine 1.12    21. Chronic migraine w/o aura w/o status migrainosus, not intractable    22. Chronic low back pain with sciatica, sciatica laterality unspecified, unspecified back pain laterality    23. Chronic hoarseness    24. Carotid artery plaque, 04/02/2018--mild right ICA plaque, normal left ICA 10/03/2014--mild bilateral carotid artery plaque.    25. Bilateral lower extremity edema    26. Benign essential hypertension    27. Balance disorder    28. Tinnitus of both ears    29. Therapeutic drug monitoring    CBC normal.  CMP normal except creatinine 1.1.  Estimated GFR 50.9.  Thyroid function tests are normal.  Vitamin B12 normal.  Sed rate, magnesium, RBC folate is normal.  Vitamin D normal at 59.5.    Follow Up:   Next Medicare Wellness visit to be scheduled in 1 year.      An After Visit Summary and PPPS were made available to the patient.

## 2023-10-09 DIAGNOSIS — I10 BENIGN ESSENTIAL HYPERTENSION: Chronic | ICD-10-CM

## 2023-10-09 DIAGNOSIS — E26.9 HYPERALDOSTERONISM: ICD-10-CM

## 2023-10-09 RX ORDER — ESTRADIOL 1 MG/1
TABLET ORAL
Qty: 90 TABLET | Refills: 0 | Status: SHIPPED | OUTPATIENT
Start: 2023-10-09

## 2023-10-09 RX ORDER — SPIRONOLACTONE 100 MG/1
TABLET, FILM COATED ORAL
Qty: 90 TABLET | Refills: 0 | Status: SHIPPED | OUTPATIENT
Start: 2023-10-09

## 2023-12-14 ENCOUNTER — OFFICE VISIT (OUTPATIENT)
Dept: INTERNAL MEDICINE | Facility: CLINIC | Age: 83
End: 2023-12-14
Payer: MEDICARE

## 2023-12-14 VITALS
HEART RATE: 81 BPM | SYSTOLIC BLOOD PRESSURE: 120 MMHG | TEMPERATURE: 97.1 F | HEIGHT: 60 IN | DIASTOLIC BLOOD PRESSURE: 66 MMHG | WEIGHT: 128 LBS | OXYGEN SATURATION: 97 % | BODY MASS INDEX: 25.13 KG/M2

## 2023-12-14 DIAGNOSIS — R26.89 BALANCE DISORDER: ICD-10-CM

## 2023-12-14 DIAGNOSIS — R29.818 PARKINSONIAN FEATURES: Primary | ICD-10-CM

## 2023-12-14 DIAGNOSIS — R49.0 CHRONIC HOARSENESS: Chronic | ICD-10-CM

## 2023-12-14 DIAGNOSIS — R29.6 FREQUENT FALLS: Chronic | ICD-10-CM

## 2023-12-14 DIAGNOSIS — F51.04 CHRONIC INSOMNIA: ICD-10-CM

## 2023-12-14 DIAGNOSIS — F41.8 DEPRESSION WITH ANXIETY: Chronic | ICD-10-CM

## 2023-12-14 DIAGNOSIS — R25.1 TREMOR OF LEFT HAND: ICD-10-CM

## 2023-12-14 DIAGNOSIS — R41.3 SHORT-TERM MEMORY LOSS: ICD-10-CM

## 2023-12-14 PROCEDURE — 3074F SYST BP LT 130 MM HG: CPT | Performed by: INTERNAL MEDICINE

## 2023-12-14 PROCEDURE — 1160F RVW MEDS BY RX/DR IN RCRD: CPT | Performed by: INTERNAL MEDICINE

## 2023-12-14 PROCEDURE — 1159F MED LIST DOCD IN RCRD: CPT | Performed by: INTERNAL MEDICINE

## 2023-12-14 PROCEDURE — 3078F DIAST BP <80 MM HG: CPT | Performed by: INTERNAL MEDICINE

## 2023-12-14 PROCEDURE — 99214 OFFICE O/P EST MOD 30 MIN: CPT | Performed by: INTERNAL MEDICINE

## 2023-12-14 RX ORDER — BREXPIPRAZOLE 1 MG/1
2 TABLET ORAL DAILY
Start: 2023-12-14

## 2023-12-14 RX ORDER — VENLAFAXINE HYDROCHLORIDE 150 MG/1
150 CAPSULE, EXTENDED RELEASE ORAL EVERY MORNING
COMMUNITY
Start: 2023-11-29

## 2023-12-14 RX ORDER — VENLAFAXINE HYDROCHLORIDE 75 MG/1
CAPSULE, EXTENDED RELEASE ORAL
Start: 2023-12-14

## 2023-12-14 RX ORDER — TRAZODONE HYDROCHLORIDE 50 MG/1
25-50 TABLET ORAL NIGHTLY
Start: 2023-12-14

## 2023-12-14 NOTE — PROGRESS NOTES
12/14/2023    Patient Information  Sachi Vora                                                                                          [address] [place] [address]      1940  [unfilled]  There is no work phone number on file.    Chief Complaint:     Concerned about tremor and possible Parkinson's    History of Present Illness:    Patient with several chronic medical problems including some psychiatric issues of generalized anxiety and depression that is being managed by the psychiatrist with trazodone and venlafaxine and Wellbutrin.  I received a call from her psychiatrist with concerns that patient is having some difficulty with ambulation and a tremor and concern for parkinsonism.  She is here today to have that evaluated by me and further disposition.  Past medical history reviewed and updated were necessary.    Review of Systems   Constitutional: Negative.   HENT: Negative.     Eyes: Negative.    Cardiovascular: Negative.    Respiratory: Negative.     Endocrine: Negative.    Hematologic/Lymphatic: Negative.    Skin: Negative.    Musculoskeletal: Negative.    Gastrointestinal: Negative.    Genitourinary: Negative.    Neurological:  Positive for disturbances in coordination, loss of balance and tremors. Negative for aphonia, brief paralysis, dizziness, focal weakness, headaches, light-headedness, numbness, paresthesias, seizures, sensory change and vertigo.   Psychiatric/Behavioral: Negative.     Allergic/Immunologic: Negative.        Active Problems:    Patient Active Problem List   Diagnosis    Osteoporosis, 03/29/2011--lumbar spine -2.8.  Right femoral neck -2.4.  Left femoral neck -2.4.  Patient receives Reclast infusions.    Therapeutic drug monitoring    Simple renal cyst    Benign essential hypertension    Carotid artery plaque, 04/02/2018--mild right ICA plaque, normal left ICA 10/03/2014--mild bilateral carotid artery plaque.    Chronic gastritis    Chronic lower back  "pain    Chronic otitis externa    Chronic renal insufficiency, stage II (mild), creatinine 1.12    Depression with anxiety    Functional murmur, 07/15/2015--normal echocardiogram.    Hyperlipidemia    Chronic migraine w/o aura w/o status migrainosus, not intractable    Pancreatic Duct Dilation    Bilateral lower extremity edema    Restless legs syndrome    Bilateral sensorineural hearing loss    Vitamin D deficiency    Chronic gastric ulcer    Chronic fatigue    Hypersomnolence    Chronic anemia    Multinodular goiter    Generalized anxiety disorder    Iron deficiency anemia    Statin intolerance    Postmenopausal state    Short-term memory loss    Pulmonary hypertension    Gastroesophageal reflux disease without esophagitis    Tinnitus of both ears    Chronic hoarseness    Pain disorder associated with psychological factors    Frequent falls    Balance disorder    Hyperaldosteronism    Hypokalemia    Chronic pain of left knee    Primary osteoarthritis of left knee    Chronic hoarseness    Tremor of left hand    Parkinsonian features         Past Medical History:   Diagnosis Date    Balance disorder 02/05/2021 February 5, 2021--patient seen in follow-up by Dr. Weir.  I extensively reviewed the documentation from the emergency room visit as noted below.  I reviewed the history with the patient and she is describing episodes of dizziness described as a spinning sensation of her body and this seems to be precipitated by movement although it does not occur if she rolls over in bed.  If she stands up and st    Benign essential hypertension 04/08/2016    Bilateral sensorineural hearing loss 03/31/2011 03/31/2011--etiology reveals reverse \"cookie bite\" type of hearing loss for both ears of mild/moderate degree, mostly sensorineural.  Speech discrimination 100% on the right, 96% on the left.    Carotid artery plaque, 10/03/2014--mild bilateral carotid artery plaque. 07/25/2012    10/03/2014--[facility] screening " revealed mild bilateral carotid plaque, negative for atrial fibrillation, negative for AAA, negative for PAD, osteoporosis screen revealed osteopenia.  Body mass index was 25 and considered to be moderate risk.  07/25/2012--vascular screen negative for carotid plaque, negative for abdominal aneurysm, negative for PAD Description: 10/03/2014--[facility] screening revealed mild bilateral carotid plaque, negative for atrial fibrillation, negative for AAA, negative for PAD, osteoporosis screen revealed osteopenia.  Body mass index was 25 and considered to be moderate risk.  07/25/2012--vascular screen negative for carotid plaque, negative for abdominal aneurysm, negative for PAD    Chronic anemia 08/23/2016 06/13/2017--colonoscopy revealed melanosis.  Biopsied.  There was friability with contact bleeding in the ascending colon.  Biopsied.  Pathology returned consistent with melanosis coli.  06/13/2017--EGD revealed normal esophagus.  Biopsies taken.  There was a medium-sized hiatal hernia.  Localized mild inflammation and linear erosions were found in the prepyloric region.  Biopsied.  Examined duodenum was normal.  Random biopsies taken.  Pathology of the gastroesophageal junction was unremarkable as was the gastric fundus.  Prepyloric biopsy revealed minimal chronic inflammation and reactive change.  H pylori negative.  Duodenal biopsies are negative.  04/12/2017--hemoglobin low at 10.8, hematocrit is normal at 35.7.  Iron sulfate 325 mg per day initiated.  08/23/2016--routine follow-up.  Patient continues to have epigastric abdominal pain believed to be related to reflux with possible esophageal spasm.  Hemoglobin noted to be low at 10.6 with a hematocrit low at 33.7 and RDW elevated at 16.2.  Homocysti    Chronic fatigue 04/21/2016 06/14/2017--overnight oximetry revealed oxygen desaturation index of only 0.44.  There were only 10 total desaturations during the period of testing which lasted 6 hours and 46  minutes.  05/31/2017--patient seen in follow-up and reports she continues to have chronic fatigue as well as hypersomnolence.  Once again, she is on multiple medications that are undoubtedly contributing to this problem including clonazepam and hydrocodone but I doubt that these could be discontinued.  Her CBC back in April revealed a low hemoglobin of 10.8 but hematocrit was normal at 35.7.  Thyroid function tests were normal and CMP normal.  Overnight oximetry test ordered.  Repeat CBC.  Iron studies.  Sedimentation rate and CRP.  04/11/2017--patient seen in follow-up and her blood pressure is now at a reasonable level at 120/64.  She reports she still feels somewhat fatigued but she is much better.  Patient has not been doing any regular exercise and I do think that that would be helpful to reduce her feeling of fatigue.  She is    Chronic gastric ulcer 04/14/2014    02/15/2017--patient seen in follow-up in nearly 6 months later.  She has complaints of not feeling well all over.  She has complaints of diffuse myalgias and possibly tendinopathy related to Levaquin that I called in prior to her going to [place] for vacation.  She reports that 3 or 4 days after starting the Levaquin she began to have the musculoskeletal symptoms and she reports that she continues to have them presently.  Symptoms are worse involving her left calf area.  Examination reveals significant tenderness involving the left calf and the left calf seems a little larger than the right.  She also has complaints of shortness of breath but no chest pain.  She is complaining of double vision and generalized weakness and fatigue.  She was complaining of chronic fatigue at the last visit back in September and I ordered an overnight oximetry test but apparently this was never done for reasons that are not clear to me.  Her current oxygen saturation is 94% and normally it is 100%.  Plan is as follows: Extensi    Chronic gastritis 11/19/2001     02/15/2017--patient seen in follow-up in nearly 6 months later.  She has complaints of not feeling well all over.  She has complaints of diffuse myalgias and possibly tendinopathy related to Levaquin that I called in prior to her going to [place] for vacation.  She reports that 3 or 4 days after starting the Levaquin she began to have the musculoskeletal symptoms and she reports that she continues to have them presently.  Symptoms are worse involving her left calf area.  Examination reveals significant tenderness involving the left calf and the left calf seems a little larger than the right.  She also has complaints of shortness of breath but no chest pain.  She is complaining of double vision and generalized weakness and fatigue.  She was complaining of chronic fatigue at the last visit back in September and I ordered an overnight oximetry test but apparently this was never done for reasons that are not clear to me.  Her current oxygen saturation is 94% and normally it is 100%.  Plan is as follows: Extensi    Chronic hoarseness 06/08/2020    September 15, 2020--patient presents with complaints of swelling of the soft tissues of the left side of the neck and this is associated with pain and discomfort, particularly when she turns her head rotating to the left.  She also notices hoarseness and feels that her voice has changed.  On exam there is definite prominence of the left sternocleidomastoid muscle and there may be some shotty lymph    Chronic lower back pain 01/31/2006 08/11/2014--MRI of the lumbar spine reveals the conus terminates at L2 and is normal.  Cauda equina unremarkable.  Stable to moderate degenerative disc disease at L5-S1.  No acute fracture or pars defect is demonstrated.  Small synovial cysts are seen posterior to the L4-L5 facet.  The perivertebral soft tissues are unremarkable.  T12-L1, L1-L2, L2-L3 are negative.  L3-L4 a broad-based disc bulge resulting in mild bilateral neural foraminal  narrowing.  L4-L5 reveals a broad-based disc bulge facet disease resulting in mild bilateral neural foraminal narrowing.  L5-S1 reveals a broad-based disc bulge, posterior osseous slipping, and facet disease resulting in mild to moderate bilateral neural foraminal narrowing.  Assessment is stable mild to moderate degenerative spondylosis.  Small synovial cysts are seen posterior to the L4-L5 facets.  08/11/2014--MRI of the thoracic spine reveals mild to moderate thoracic kyphosis.  No fracture.  At T5--T6 there is a moderate sized left paracentral disc protrusion which i    Chronic migraine 11/03/2009 01/18/2010--MRI of the brain performed for headaches and memory loss.  Mild small vessel disease in the cerebral and central pontine white matter.  There is an ovoid, somewhat pancake-shaped area of signal abnormality in the anterior inferior right temporal lobe subcortical to the juxtacortical white matter that measures 1.2 x 1 cm and anterior posterior and medial lateral dimension but only measures 3 mm in cranial caudal dimension.  I suspect that this is a benign cyst or a cystic area of encephalomalacia.  The remainder of the MRI of the head is within normal limits.  11/03/2009--CT scan of the brain without contrast performed for headache after a fall.  Mild diffuse atrophy.  No acute abnormality noted.; Description: Patient has had a long history of migraine headaches.  MRI and CT scan of the brain have been essentially negative.    Chronic otitis externa 04/08/2016    Chronic renal insufficiency, stage II (mild), creatinine 1.12 11/14/2015 11/14/2015--serum creatinine mildly elevated at 1.12.    Depression with anxiety 04/08/2016    Frequent falls 02/05/2021 February 5, 2021--patient seen in follow-up by Dr. Weir.  I extensively reviewed the documentation from the emergency room visit as noted below.  I reviewed the history with the patient and she is describing episodes of dizziness described as a  spinning sensation of her body and this seems to be precipitated by movement although it does not occur if she rolls over in bed.  If she stands up and st    Functional murmur, 07/15/2015--normal echocardiogram. 07/15/2015    07/15/2015--echocardiogram reveals normal left ventricular size and function with ejection fraction 55%.  Grade 1 diastolic dysfunction, abnormal relaxation pattern.  Trace tricuspid regurgitation.  Estimated right ventricular systolic pressure is 25 mmHg which is normal.    Gastroesophageal reflux disease without esophagitis 06/06/2019    Generalized anxiety disorder 04/11/2017    Glaucoma     History of Acute deep vein thrombosis (DVT) of distal end of left lower extremity 02/15/2017    03/21/2017 patient seen in follow-up and she is tolerating the Eliquis well without signs or symptoms of bleeding.  Her calf swelling and tenderness is better but not totally resolved.  I suspect that the DVT is chronic and may not resolve at all.  I will order a repeat venous study and then proceed from there.  02/23/2017--repeat Doppler venous study of the left lower extremity reveals a chronic left lower extremity DVT in the posterior tibial.  All other left sided veins appeared normal.  Fluid collection in the left calf noted.  02/17/2017--patient seen in follow-up and reports her left lower extremity symptoms are about the same.  She continues to have complaints of profound fatigue which I think is multifactorial including underlying depression that is not in remission.  Review the results of the CTA of the chest including the possible thyroid lesion.  I do not think this is contributing to any of her symptoms of fatigue particularly given the fact that her thyroid function tests are normal.  I expla    History of palpitations 12/07/2011    Patient has had multiple admissions to the hospital for complaints of chest pain and heart palpitations. She meant admitted at least on 3 occasions. She has had at  least 3 stress Cardiolite and 1 stress ECG, the last Cardiolite being performed 12/07/2011 which was negative. Patient is also had Holter monitors which have been unremarkable.    HIstory of Schatzki's ring 02/28/2012 02/28/2012--air-contrast upper GI revealed small to moderate sized reducible sliding hiatal herniation of the upper stomach with some demonstrated gastroesophageal reflux. No esophageal, gastric, or duodenal mass or mucosal ulceration was seen.  11/03/2008--EGD performed for evaluation of iron deficiency anemia revealed hiatal hernia without evidence of reflux, prepyloric antritis and    Hyperlipidemia 04/08/2016    Hypersomnolence 05/05/2016 06/14/2017--overnight oximetry revealed oxygen desaturation index of only 0.44.  There were only 10 total desaturations during the period of testing which lasted 6 hours and 46 minutes.  05/31/2017--patient seen in follow-up and reports she continues to have chronic fatigue as well as hypersomnolence.  Once again, she is on multiple medications that are undoubtedly contributing to this problem including clonazepam and hydrocodone but I doubt that these could be discontinued.  Her CBC back in April revealed a low hemoglobin of 10.8 but hematocrit was normal at 35.7.  Thyroid function tests were normal and CMP normal.  Overnight oximetry test ordered.  Repeat CBC.  Iron studies.  Sedimentation rate and CRP.  04/11/2017--patient seen in follow-up and her blood pressure is now at a reasonable level at 120/64.  She reports she still feels somewhat fatigued but she is much better.  Patient has not been doing any regular exercise and I do think that that would be helpful to reduce her feeling of fatigue.  She is    Menopausal state 04/08/2016    Multinodular goiter 02/17/2017 02/21/2017--thyroid ultrasound reveals multinodular thyroid with largest nodule measuring on the order of 1.6 cm in greatest dimension.  02/17/2017--patient seen in follow-up for DVT and  CTA of the chest.  An incidental finding on the CTA of the chest reveals a 1.7 cm lesion in the right lobe of thyroid.  Note that thyroid function tests are currently normal.  Ultrasound of the thyroid ordered.    Osteoporosis, 03/29/2011--lumbar spine -2.8.  Right femoral neck -2.4.  Left femoral neck -2.4.  Patient receives Reclast infusions. 02/09/2009 04/19/2017--Reclast infusion.  04/05/2016--Reclast infusion.  06/04/2012--Reclast infusion.  05/26/2011--Reclast infusion.  03/29/2011--DEXA scan revealed a lumbar T score of -2.8, right femoral neck T score -2.4, left femoral neck T score -2.4.  Osteoporosis of the lumbar spine and severe osteopenia of the hips bilaterally.  Patient has been intolerant to Fosamax because of gastritis and gastroesophageal reflux.  04/01/2009--treatment for osteoporosis begun with Fosamax.  02/09/2009--DEXA scan revealed lumbar spine T score of -3.3.  Left femoral neck T score -2.5.  Right hip T score -2.6.  Osteoporosis.     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  CT scan of the abdomen and pelvis reveals a left renal cyst measuring 5.1 x 4.9 cm.  There were subcentimeter hypodense renal lesions that are too small to further characterize and are presumably related to cysts.  Recommend attention to follow up.  Distal dilated pancreatic duct noted.  The common bile duct is the upper limits of normal in size.  The ampullary region is not well evaluated.  Comparison with prior imaging is recommended if available.  If there is no prior film available for comparison, and ERCP or MRCP could be performed to further evaluate.  Small hiatal hernia noted.  03/05/2012--CT scan of the abdomen with contrast, pancreatic protocol.  This reveals some fullness of the pancreatic ductal system and apparent pancreatic divisum with separate entrance of the  accessory pancreatic duct extending into the duodenum distal to the main pancreatic duct.  There is fullness of the duct diffusely but similar to the previous s    Pedal edema 06/29/2015 06/29/2015--patient presents with a 4-6 week history of exertional dyspnea that comes on with activity such as climbing stairs or walking her dog up an incline.  No chest pain.  Relieved with rest.  No cough.  She does have complaints of her feet and legs swelling that is particularly worse at the end of the day and not improved overnight.  No orthopnea or PND.  Chest exam reveals faint rales at the bases.  Otherwise clear.  Heart is regular and I do not appreciate a heart murmur.  Chest x-ray PA and lateral ordered.  Echocardiogram ordered.    Pulmonary hypertension 04/18/2019    Restless legs syndrome 04/08/2016    Simple renal cyst 07/20/2009 09/29/2014--patient was again evaluated by the urologist for a renal cyst.  CT scan of the abdomen and pelvis reveals a left renal cyst measuring 5.1 x 4.9 cm.  There were subcentimeter hypodense renal lesions that are too small to further characterize and are presumably related to cysts.  Recommend attention to follow up.  Distal dilated pancreatic duct noted.  The common bile duct is the upper limits of normal in size.  The ampullary region is not well evaluated.  Comparison with prior imaging is recommended if available.  If there is no prior film available for comparison, and ERCP or MRCP could be performed to further evaluate.  Small hiatal hernia noted.  07/20/2009--patient was noted to have a left renal mass consistent with a cyst.  This was evaluated by the urologist 07/20/2009.    Statin intolerance 10/30/2017    Tinnitus of both ears 09/30/2019 September 30, 2019--patient presents with a several year history of bilateral tinnitus, right greater than left.  It seems to be getting worse and now is associated with fluctuating hearing.  She occasionally will have worsening  hearing after she coughs or sneezes.  On exam she has evidence of old otitis media scars, particularly on the right.  There is no acute infection present and there is no a    Vitamin D deficiency 04/08/2016         Past Surgical History:   Procedure Laterality Date    APPENDECTOMY  1965 1965    CATARACT EXTRACTION Bilateral Remote    Remote bilateral cataract extirpation with intraocular lens implantation.    CHOLECYSTECTOMY WITH INTRAOPERATIVE CHOLANGIOGRAM N/A 01/21/2019 01/21/2019--laparoscopic paraesophageal hernia repair with fundoplication.    COLONOSCOPY  11/03/2008 2008--normal colonoscopy    COLONOSCOPY  2001 2001--normal colonoscopy.    COLONOSCOPY N/A 06/13/2017 06/13/2017--colonoscopy revealed melanosis.  Biopsied.  There was friability with contact bleeding in the ascending colon.  Biopsied.  Pathology returned consistent with melanosis coli.    ENDOSCOPY  10/08/2014    02/28/2012--air-contrast upper GI revealed small to moderate sized reducible sliding hiatal herniation of the upper stomach with some demonstrated gastroesophageal reflux. No esophageal, gastric, or duodenal mass or mucosal ulceration was seen. 11/03/2008--EGD performed for evaluation of iron deficiency anemia revealed hiatal hernia without evidence of reflux, prepyloric antritis and    ENDOSCOPY  11/03/2008 11/03/2008--EGD performed for evaluation of iron deficiency anemia revealed hiatal hernia without evidence of reflux, prepyloric antritis and    ENDOSCOPY  11/19/2001 11/19/2001--EGD revealed a very tortuous distal esophagus with a Schatzki's ring. No ulcer or erosions. No Dorado's mucosa. Stomach revealed patchy erythema and erosions in the antrum. Biopsy. Normal pylorus with no obstruction. Normal duodenum with no ulcers. The Schatzki's ring was dilated with a Rhodes dilator.;    ENDOSCOPY N/A 09/27/2016 09/27/2016--EGD revealed a normal oropharynx, esophagus, and medium sized hiatal hernia.  Nonbleeding  gastric ulcer with no stigmata of bleeding.  Biopsy.  Gastritis.  Biopsy.  Normal examined duodenum.  Biopsied.  Pathology returned mild to moderate chronic active gastritis with ulceration from the stomach antral ulcer biopsy.  Gastroesophageal junction biopsy revealed minimal mixed inflammation.    ENDOSCOPY N/A 06/13/2017 06/13/2017--colonoscopy revealed melanosis.  Biopsied.  There was friability with contact bleeding in the ascending colon.  Biopsied.  Pathology returned consistent with melanosis coli.    ERCP N/A 01/22/2019 01/22/2019--ERCP with sphincterotomy and balloon stone extraction.    ESOPHAGEAL DILATATION  11/19/2001 11/19/2001--EGD revealed a very tortuous distal esophagus with a Schatzki's ring. No ulcer or erosions. No Dorado's mucosa. Stomach revealed patchy erythema and erosions in the antrum. Biopsy. Normal pylorus with no obstruction. Normal duodenum with no ulcers. The Schatzki's ring was dilated with a Rhodes dilator.;     ESOPHAGEAL MANOMETRY N/A 12/18/2018    Procedure: ESOPHAGEAL MANOMETRY;  Surgeon: Cecilia, Nurse Performed;  Location: [facility] ENDOSCOPY;  Service: Gastroenterology    ESOPHAGOSCOPY N/A 01/21/2019    Procedure: FLEXIBLE ESOPHAGOSCOPY;  Surgeon: Reji Johnson MD;  Location: [facility] OR;  Service: General    HIATAL HERNIA REPAIR N/A 01/21/2019    Procedure: Laparoscopic paraesophageal hernia repair with toupee fundoplication;  Surgeon: Reji Johnson MD;  Location: [facility] OR;  Service: General    INCONTINENCE SURGERY  1979 1979--a bladder tack procedure for urinary stress incontinence    KNEE ARTHROSCOPY Right 12/12/2011 12/12/2011--right knee arthroscopy with partial lateral and medial meniscectomies.    SKIN SURGERY  05/25/2010 05/25/2010--skin lesion excised from right lower extremity. 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    TONSILLECTOMY  1953    TOTAL ABDOMINAL HYSTERECTOMY WITH SALPINGO  OOPHORECTOMY  1974 1974--total abdominal hysterectomy.         Allergies   Allergen Reactions    Statins Nausea And Vomiting    Morphine Hallucinations    Penicillins Itching    Tetanus Toxoids Itching           Current Outpatient Medications:     amLODIPine (NORVASC) 5 MG tablet, TAKE 1 TABLET BY MOUTH EVERY MORNING FOR HIGH BLOOD PRESSURE, Disp: 90 tablet, Rfl: 2    aspirin 81 MG EC tablet, Take 1 tablet by mouth Daily. HELD, Disp: , Rfl:     Brexpiprazole (Rexulti) 1 MG tablet, Take 2 mg by mouth Daily., Disp: , Rfl:     buPROPion XL (WELLBUTRIN XL) 300 MG 24 hr tablet, Take 1 tablet by mouth Every Morning., Disp: , Rfl:     Cholecalciferol (vitamin D3) 125 MCG (5000 UT) capsule capsule, 1 by mouth daily as directed, Disp: 30 capsule, Rfl:     Cimetidine (TAGAMET PO), Tagamet, Disp: , Rfl:     diclofenac (VOLTAREN) 1 % gel gel, Apply 4 g topically to the appropriate area as directed 4 (Four) Times a Day As Needed., Disp: , Rfl:     diphenhydrAMINE-APAP, sleep, (Tylenol PM Extra Strength)  MG/30ML liquid, Tylenol PM Extra Strength, Disp: , Rfl:     estradiol (ESTRACE) 1 MG tablet, TAKE 1 TABLET BY MOUTH EVERY DAY, Disp: 90 tablet, Rfl: 0    HYDROcodone-acetaminophen (NORCO)  MG per tablet, Take 1 tablet by mouth Every 6 (Six) Hours As Needed for Moderate Pain., Disp: , Rfl:     Misc Natural Products (COLON CLEANSE PO), Take  by mouth., Disp: , Rfl:     Multiple Vitamins-Minerals (MULTIVITAMIN ADULT) chewable tablet, Chew 1 tablet Daily., Disp: , Rfl:     Specialty Vitamins Products (ONE-A-DAY BONE STRENGTH PO), One-A-Day Bone Strength, Disp: , Rfl:     spironolactone (ALDACTONE) 100 MG tablet, TAKE 1 TABLET BY MOUTH EVERY MORNING FOR HIGH BLOOD PRESSURE AND LEG SWELLING/EDEMA, Disp: 90 tablet, Rfl: 0    traZODone (DESYREL) 50 MG tablet, Take 0.5-1 tablets by mouth Every Night., Disp: , Rfl:     venlafaxine XR (EFFEXOR-XR) 150 MG 24 hr capsule, Take 1 capsule by mouth Every Morning., Disp: , Rfl:     " venlafaxine XR (EFFEXOR-XR) 75 MG 24 hr capsule, Take one 75 mg capsule venlafaxine along with one 150 mg capsule daily for depression.  Total daily dose is 225 mg., Disp: , Rfl:     vitamin E 400 UNIT capsule, Take 1 capsule by mouth Daily., Disp: , Rfl:       Family History   Problem Relation Age of Onset    Heart attack Mother         dies age 47 from heart attack    Heart disease Mother     Bleeding Disorder Mother     Heart disease Father     Ovarian cancer Maternal Grandmother     Heart attack Maternal Aunt         dies age 60 from heart attack    Malig Hyperthermia Neg Hx     Breast cancer Neg Hx          Social History     Socioeconomic History    Marital status:      Spouse name: ander    Number of children: 4    Years of education: 14    Highest education level: Associate degree: academic program   Tobacco Use    Smoking status: Never    Smokeless tobacco: Never    Tobacco comments:     None   Vaping Use    Vaping Use: Never used   Substance and Sexual Activity    Alcohol use: No    Drug use: No    Sexual activity: Not Currently     Partners: Male     Birth control/protection: Diaphragm, Birth control pill, Hysterectomy         Vitals:    12/14/23 1233   BP: 120/66   Pulse: 81   Temp: 97.1 °F (36.2 °C)   SpO2: 97%   Weight: 58.1 kg (128 lb)   Height: 152.4 cm (60\")        Body mass index is 25 kg/m².      Physical Exam:    General: Alert and oriented x 3.  No acute distress.  Facies/affect somewhat flat.  HEENT: Pupils equal, round, reactive to light; extraocular movements intact; sclerae nonicteric; pharynx, ear canals and TMs normal.  Neck: Without JVD, thyromegaly, bruit, or adenopathy.  Lungs: Clear to auscultation in all fields.  Heart: Regular rate and rhythm without murmur, rub, gallop, or click.  Abdomen: Soft, nontender, without hepatosplenomegaly or hernia.  Bowel sounds normal.  : Deferred.  Rectal: Deferred.  Extremities: Without clubbing, cyanosis, edema, or pulse deficit.  " Neurologic: Intact without focal deficit.  Patient ambulates with a slow ataxic gait and appears unsteady.  Her turns are en bloc.  No definite cogwheeling rigidity of the upper extremities.  Intermittent tremor left hand.  No focal neurologic deficit.  Skin: Without significant lesion.  Musculoskeletal: Unremarkable.    Lab/other results:      Assessment/Plan:     Diagnosis Plan   1. Parkinsonian features  MRI brain wo contrast    Ambulatory Referral to Neurology      2. Balance disorder  MRI brain wo contrast    Ambulatory Referral to Neurology      3. Frequent falls  MRI brain wo contrast    Ambulatory Referral to Neurology      4. Tremor of left hand  MRI brain wo contrast    Ambulatory Referral to Neurology      5. Chronic hoarseness  MRI brain wo contrast    Ambulatory Referral to Neurology      6. Short-term memory loss  MRI brain wo contrast    Ambulatory Referral to Neurology      7. Depression with anxiety  Brexpiprazole (Rexulti) 1 MG tablet    venlafaxine XR (EFFEXOR-XR) 75 MG 24 hr capsule      8. Chronic insomnia  traZODone (DESYREL) 50 MG tablet          Patient who has had a history of balance disorder previously felt to be related to vertigo that transiently improved with some treatment presents with features concerning for development of parkinsonism.  She has problems with her balance and her gait is definitely slow and ataxic and is noticed by her  especially.  She has an intermittent hand tremor and she is followed recently.  She also has hoarseness that has been chronic and evaluated by ENT in the past but seems to be worsening.  She is also developed a short-term memory loss.  Given his clinical picture the best course of action is referral to neurology.  I think that she needs an initial evaluation with brain scanning to rule out other possibilities.  Patient had a CT scan of the brain in July of last year after a fall and this revealed nothing acute.  There was a 1.6 x 0.8 cm enlarged  perivascular space within the anterior aspect of the right temporal lobe.  No comment about possibilities of etiology.  There are moderate changes of chronic small vessel phenomena.  The ventricles, sulci, and cisterns were otherwise age-appropriate.  The basal ganglia and thalamic are unremarkable in appearance as well as a posterior fossa in normal limits.  Based on this, I think an MRI would be appropriate.  As mentioned, she is on multiple medications for long history of depression and I question whether or not this could be playing a role although none of the medications stand out as being definite causes of gait problems.  Certainly is possible.    Plan is as follows: MRI of the brain.  Neurology referral which hopefully we can facilitate as the appointments have been taking a while to schedule.  I will have her follow-up after the results of the MRI are known.      Procedures

## 2023-12-19 ENCOUNTER — HOSPITAL ENCOUNTER (OUTPATIENT)
Dept: MRI IMAGING | Facility: HOSPITAL | Age: 83
Discharge: HOME | End: 2023-12-19
Admitting: INTERNAL MEDICINE
Payer: MEDICARE

## 2023-12-19 DIAGNOSIS — R29.818 PARKINSONIAN FEATURES: ICD-10-CM

## 2023-12-19 DIAGNOSIS — R41.3 SHORT-TERM MEMORY LOSS: ICD-10-CM

## 2023-12-19 DIAGNOSIS — R29.6 FREQUENT FALLS: Chronic | ICD-10-CM

## 2023-12-19 DIAGNOSIS — R49.0 CHRONIC HOARSENESS: Chronic | ICD-10-CM

## 2023-12-19 DIAGNOSIS — R25.1 TREMOR OF LEFT HAND: ICD-10-CM

## 2023-12-19 DIAGNOSIS — R26.89 BALANCE DISORDER: ICD-10-CM

## 2023-12-19 PROCEDURE — 70551 MRI BRAIN STEM W/O DYE: CPT

## 2023-12-20 DIAGNOSIS — E26.9 HYPERALDOSTERONISM: ICD-10-CM

## 2023-12-20 DIAGNOSIS — I10 BENIGN ESSENTIAL HYPERTENSION: Chronic | ICD-10-CM

## 2023-12-20 RX ORDER — SPIRONOLACTONE 100 MG/1
TABLET, FILM COATED ORAL
Qty: 90 TABLET | Refills: 0 | Status: SHIPPED | OUTPATIENT
Start: 2023-12-20

## 2023-12-20 RX ORDER — ESTRADIOL 1 MG/1
TABLET ORAL
Qty: 90 TABLET | Refills: 0 | Status: SHIPPED | OUTPATIENT
Start: 2023-12-20

## 2023-12-20 NOTE — TELEPHONE ENCOUNTER
Rx Refill Note  Requested Prescriptions     Pending Prescriptions Disp Refills    spironolactone (ALDACTONE) 100 MG tablet [Pharmacy Med Name: SPIRONOLACTONE 100 MG TABLET] 90 tablet 0     Sig: TAKE 1 TABLET BY MOUTH EVERY MORNING FOR HIGH BLOOD PRESSURE AND LEG SWELLING/EDEMA      Last office visit with prescribing clinician: 12/14/2023   Last telemedicine visit with prescribing clinician: Visit date not found   Next office visit with prescribing clinician: 12/22/2023       Ezekiel Hughes MA  12/20/23, 13:44 EST

## 2023-12-20 NOTE — TELEPHONE ENCOUNTER
Rx Refill Note  Requested Prescriptions     Pending Prescriptions Disp Refills    estradiol (ESTRACE) 1 MG tablet [Pharmacy Med Name: ESTRADIOL 1 MG TABLET] 90 tablet 0     Sig: TAKE 1 TABLET BY MOUTH EVERY DAY          Ezekiel Hughes MA  12/20/23, 13:45 EST

## 2023-12-22 ENCOUNTER — OFFICE VISIT (OUTPATIENT)
Dept: INTERNAL MEDICINE | Facility: CLINIC | Age: 83
End: 2023-12-22
Payer: MEDICARE

## 2023-12-22 VITALS
OXYGEN SATURATION: 97 % | HEIGHT: 60 IN | BODY MASS INDEX: 24.7 KG/M2 | WEIGHT: 125.8 LBS | HEART RATE: 63 BPM | DIASTOLIC BLOOD PRESSURE: 64 MMHG | RESPIRATION RATE: 12 BRPM | SYSTOLIC BLOOD PRESSURE: 94 MMHG

## 2023-12-22 DIAGNOSIS — I10 BENIGN ESSENTIAL HYPERTENSION: Chronic | ICD-10-CM

## 2023-12-22 DIAGNOSIS — R29.6 FREQUENT FALLS: Chronic | ICD-10-CM

## 2023-12-22 DIAGNOSIS — R41.3 SHORT-TERM MEMORY LOSS: ICD-10-CM

## 2023-12-22 DIAGNOSIS — R26.89 BALANCE DISORDER: Primary | ICD-10-CM

## 2023-12-22 DIAGNOSIS — R25.1 TREMOR OF LEFT HAND: ICD-10-CM

## 2023-12-22 DIAGNOSIS — R29.818 PARKINSONIAN FEATURES: ICD-10-CM

## 2023-12-22 PROCEDURE — 99214 OFFICE O/P EST MOD 30 MIN: CPT | Performed by: INTERNAL MEDICINE

## 2023-12-22 PROCEDURE — 3078F DIAST BP <80 MM HG: CPT | Performed by: INTERNAL MEDICINE

## 2023-12-22 PROCEDURE — 3074F SYST BP LT 130 MM HG: CPT | Performed by: INTERNAL MEDICINE

## 2023-12-22 RX ORDER — AMLODIPINE BESYLATE 5 MG/1
TABLET ORAL
Start: 2023-12-22

## 2023-12-22 NOTE — PROGRESS NOTES
12/22/2023    Patient Information  Sachi Vora                                                                                          [address] [place] [address]      1940  [unfilled]  There is no work phone number on file.    Chief Complaint:     Follow-up recent MRI to help evaluate with balance disorder with parkinsonian features associated with short-term memory loss.  No new acute complaints.    History of Present Illness:    Patient was evaluated recently with worsening balance disorder and development of possible parkinsonian features and this is associated with short-term memory loss.  The history taken at the last visit is documented under the appropriate diagnoses by Dr. Weir and subsequently transferred to this note as follows:    December 14, 2023-- Patient who has had a history of balance disorder previously felt to be related to vertigo that transiently improved with some treatment presents with features concerning for development of parkinsonism.  She has problems with her balance and her gait is definitely slow and ataxic and is noticed by her  especially.  She has an intermittent hand tremor and she is followed recently.  She also has hoarseness that has been chronic and evaluated by ENT in the past but seems to be worsening.  She is also developed a short-term memory loss.  Given his clinical picture the best course of action is referral to neurology.  I think that she needs an initial evaluation with brain scanning to rule out other possibilities.  Patient had a CT scan of the brain in July of last year after a fall and this revealed nothing acute.  There was a 1.6 x 0.8 cm enlarged perivascular space within the anterior aspect of the right temporal lobe.  No comment about possibilities of etiology.  There are moderate changes of chronic small vessel phenomena.  The ventricles, sulci, and cisterns were otherwise age-appropriate.  The basal ganglia and  thalamic are unremarkable in appearance as well as a posterior fossa in normal limits.  Based on this, I think an MRI would be appropriate.  As mentioned, she is on multiple medications for long history of depression and I question whether or not this could be playing a role although none of the medications stand out as being definite causes of gait problems.  Certainly is possible.     Plan is as follows: MRI of the brain.  Neurology referral which hopefully we can facilitate as the appointments have been taking a while to schedule.  I will have her follow-up after the results of the MRI are known.    Review of Systems   HENT: Negative.     Eyes: Negative.    Cardiovascular: Negative.    Respiratory: Negative.     Endocrine: Negative.    Hematologic/Lymphatic: Negative.    Skin: Negative.    Musculoskeletal:  Positive for arthritis.   Gastrointestinal: Negative.    Genitourinary: Negative.    Neurological:  Positive for difficulty with concentration, disturbances in coordination, loss of balance, tremors and weakness. Negative for aphonia, brief paralysis, dizziness, focal weakness, headaches, light-headedness, numbness, paresthesias, seizures, sensory change and vertigo.   Psychiatric/Behavioral:  Positive for memory loss.    Allergic/Immunologic: Negative.        Active Problems:    Patient Active Problem List   Diagnosis    Osteoporosis, 03/29/2011--lumbar spine -2.8.  Right femoral neck -2.4.  Left femoral neck -2.4.  Patient receives Reclast infusions.    Therapeutic drug monitoring    Simple renal cyst    Benign essential hypertension    Carotid artery plaque, 04/02/2018--mild right ICA plaque, normal left ICA 10/03/2014--mild bilateral carotid artery plaque.    Chronic gastritis    Chronic lower back pain    Chronic otitis externa    Chronic renal insufficiency, stage II (mild), creatinine 1.12    Depression with anxiety    Functional murmur, 07/15/2015--normal echocardiogram.    Hyperlipidemia    Chronic  "migraine w/o aura w/o status migrainosus, not intractable    Pancreatic Duct Dilation    Bilateral lower extremity edema    Restless legs syndrome    Bilateral sensorineural hearing loss    Vitamin D deficiency    Chronic gastric ulcer    Chronic fatigue    Hypersomnolence    Chronic anemia    Multinodular goiter    Generalized anxiety disorder    Iron deficiency anemia    Statin intolerance    Postmenopausal state    Short-term memory loss    Pulmonary hypertension    Gastroesophageal reflux disease without esophagitis    Tinnitus of both ears    Chronic hoarseness    Pain disorder associated with psychological factors    Frequent falls    Balance disorder    Hyperaldosteronism    Hypokalemia    Chronic pain of left knee    Primary osteoarthritis of left knee    Chronic hoarseness    Tremor of left hand    Parkinsonian features         Past Medical History:   Diagnosis Date    Balance disorder 02/05/2021 February 5, 2021--patient seen in follow-up by Dr. Weir.  I extensively reviewed the documentation from the emergency room visit as noted below.  I reviewed the history with the patient and she is describing episodes of dizziness described as a spinning sensation of her body and this seems to be precipitated by movement although it does not occur if she rolls over in bed.  If she stands up and st    Benign essential hypertension 04/08/2016    Bilateral sensorineural hearing loss 03/31/2011 03/31/2011--etiology reveals reverse \"cookie bite\" type of hearing loss for both ears of mild/moderate degree, mostly sensorineural.  Speech discrimination 100% on the right, 96% on the left.    Carotid artery plaque, 10/03/2014--mild bilateral carotid artery plaque. 07/25/2012    10/03/2014--[facility] screening revealed mild bilateral carotid plaque, negative for atrial fibrillation, negative for AAA, negative for PAD, osteoporosis screen revealed osteopenia.  Body mass index was 25 and considered to be moderate risk.  " 07/25/2012--vascular screen negative for carotid plaque, negative for abdominal aneurysm, negative for PAD Description: 10/03/2014--[facility] screening revealed mild bilateral carotid plaque, negative for atrial fibrillation, negative for AAA, negative for PAD, osteoporosis screen revealed osteopenia.  Body mass index was 25 and considered to be moderate risk.  07/25/2012--vascular screen negative for carotid plaque, negative for abdominal aneurysm, negative for PAD    Chronic anemia 08/23/2016 06/13/2017--colonoscopy revealed melanosis.  Biopsied.  There was friability with contact bleeding in the ascending colon.  Biopsied.  Pathology returned consistent with melanosis coli.  06/13/2017--EGD revealed normal esophagus.  Biopsies taken.  There was a medium-sized hiatal hernia.  Localized mild inflammation and linear erosions were found in the prepyloric region.  Biopsied.  Examined duodenum was normal.  Random biopsies taken.  Pathology of the gastroesophageal junction was unremarkable as was the gastric fundus.  Prepyloric biopsy revealed minimal chronic inflammation and reactive change.  H pylori negative.  Duodenal biopsies are negative.  04/12/2017--hemoglobin low at 10.8, hematocrit is normal at 35.7.  Iron sulfate 325 mg per day initiated.  08/23/2016--routine follow-up.  Patient continues to have epigastric abdominal pain believed to be related to reflux with possible esophageal spasm.  Hemoglobin noted to be low at 10.6 with a hematocrit low at 33.7 and RDW elevated at 16.2.  Homocysti    Chronic fatigue 04/21/2016 06/14/2017--overnight oximetry revealed oxygen desaturation index of only 0.44.  There were only 10 total desaturations during the period of testing which lasted 6 hours and 46 minutes.  05/31/2017--patient seen in follow-up and reports she continues to have chronic fatigue as well as hypersomnolence.  Once again, she is on multiple medications that are undoubtedly contributing to this  problem including clonazepam and hydrocodone but I doubt that these could be discontinued.  Her CBC back in April revealed a low hemoglobin of 10.8 but hematocrit was normal at 35.7.  Thyroid function tests were normal and CMP normal.  Overnight oximetry test ordered.  Repeat CBC.  Iron studies.  Sedimentation rate and CRP.  04/11/2017--patient seen in follow-up and her blood pressure is now at a reasonable level at 120/64.  She reports she still feels somewhat fatigued but she is much better.  Patient has not been doing any regular exercise and I do think that that would be helpful to reduce her feeling of fatigue.  She is    Chronic gastric ulcer 04/14/2014    02/15/2017--patient seen in follow-up in nearly 6 months later.  She has complaints of not feeling well all over.  She has complaints of diffuse myalgias and possibly tendinopathy related to Levaquin that I called in prior to her going to [place] for vacation.  She reports that 3 or 4 days after starting the Levaquin she began to have the musculoskeletal symptoms and she reports that she continues to have them presently.  Symptoms are worse involving her left calf area.  Examination reveals significant tenderness involving the left calf and the left calf seems a little larger than the right.  She also has complaints of shortness of breath but no chest pain.  She is complaining of double vision and generalized weakness and fatigue.  She was complaining of chronic fatigue at the last visit back in September and I ordered an overnight oximetry test but apparently this was never done for reasons that are not clear to me.  Her current oxygen saturation is 94% and normally it is 100%.  Plan is as follows: Extensi    Chronic gastritis 11/19/2001    02/15/2017--patient seen in follow-up in nearly 6 months later.  She has complaints of not feeling well all over.  She has complaints of diffuse myalgias and possibly tendinopathy related to Levaquin that I called in  prior to her going to [place] for vacation.  She reports that 3 or 4 days after starting the Levaquin she began to have the musculoskeletal symptoms and she reports that she continues to have them presently.  Symptoms are worse involving her left calf area.  Examination reveals significant tenderness involving the left calf and the left calf seems a little larger than the right.  She also has complaints of shortness of breath but no chest pain.  She is complaining of double vision and generalized weakness and fatigue.  She was complaining of chronic fatigue at the last visit back in September and I ordered an overnight oximetry test but apparently this was never done for reasons that are not clear to me.  Her current oxygen saturation is 94% and normally it is 100%.  Plan is as follows: Extensi    Chronic hoarseness 06/08/2020    September 15, 2020--patient presents with complaints of swelling of the soft tissues of the left side of the neck and this is associated with pain and discomfort, particularly when she turns her head rotating to the left.  She also notices hoarseness and feels that her voice has changed.  On exam there is definite prominence of the left sternocleidomastoid muscle and there may be some shotty lymph    Chronic lower back pain 01/31/2006 08/11/2014--MRI of the lumbar spine reveals the conus terminates at L2 and is normal.  Cauda equina unremarkable.  Stable to moderate degenerative disc disease at L5-S1.  No acute fracture or pars defect is demonstrated.  Small synovial cysts are seen posterior to the L4-L5 facet.  The perivertebral soft tissues are unremarkable.  T12-L1, L1-L2, L2-L3 are negative.  L3-L4 a broad-based disc bulge resulting in mild bilateral neural foraminal narrowing.  L4-L5 reveals a broad-based disc bulge facet disease resulting in mild bilateral neural foraminal narrowing.  L5-S1 reveals a broad-based disc bulge, posterior osseous slipping, and facet disease resulting  in mild to moderate bilateral neural foraminal narrowing.  Assessment is stable mild to moderate degenerative spondylosis.  Small synovial cysts are seen posterior to the L4-L5 facets.  08/11/2014--MRI of the thoracic spine reveals mild to moderate thoracic kyphosis.  No fracture.  At T5--T6 there is a moderate sized left paracentral disc protrusion which i    Chronic migraine 11/03/2009 01/18/2010--MRI of the brain performed for headaches and memory loss.  Mild small vessel disease in the cerebral and central pontine white matter.  There is an ovoid, somewhat pancake-shaped area of signal abnormality in the anterior inferior right temporal lobe subcortical to the juxtacortical white matter that measures 1.2 x 1 cm and anterior posterior and medial lateral dimension but only measures 3 mm in cranial caudal dimension.  I suspect that this is a benign cyst or a cystic area of encephalomalacia.  The remainder of the MRI of the head is within normal limits.  11/03/2009--CT scan of the brain without contrast performed for headache after a fall.  Mild diffuse atrophy.  No acute abnormality noted.; Description: Patient has had a long history of migraine headaches.  MRI and CT scan of the brain have been essentially negative.    Chronic otitis externa 04/08/2016    Chronic renal insufficiency, stage II (mild), creatinine 1.12 11/14/2015 11/14/2015--serum creatinine mildly elevated at 1.12.    Depression with anxiety 04/08/2016    Frequent falls 02/05/2021 February 5, 2021--patient seen in follow-up by Dr. Weir.  I extensively reviewed the documentation from the emergency room visit as noted below.  I reviewed the history with the patient and she is describing episodes of dizziness described as a spinning sensation of her body and this seems to be precipitated by movement although it does not occur if she rolls over in bed.  If she stands up and st    Functional murmur, 07/15/2015--normal echocardiogram. 07/15/2015     07/15/2015--echocardiogram reveals normal left ventricular size and function with ejection fraction 55%.  Grade 1 diastolic dysfunction, abnormal relaxation pattern.  Trace tricuspid regurgitation.  Estimated right ventricular systolic pressure is 25 mmHg which is normal.    Gastroesophageal reflux disease without esophagitis 06/06/2019    Generalized anxiety disorder 04/11/2017    Glaucoma     History of Acute deep vein thrombosis (DVT) of distal end of left lower extremity 02/15/2017    03/21/2017 patient seen in follow-up and she is tolerating the Eliquis well without signs or symptoms of bleeding.  Her calf swelling and tenderness is better but not totally resolved.  I suspect that the DVT is chronic and may not resolve at all.  I will order a repeat venous study and then proceed from there.  02/23/2017--repeat Doppler venous study of the left lower extremity reveals a chronic left lower extremity DVT in the posterior tibial.  All other left sided veins appeared normal.  Fluid collection in the left calf noted.  02/17/2017--patient seen in follow-up and reports her left lower extremity symptoms are about the same.  She continues to have complaints of profound fatigue which I think is multifactorial including underlying depression that is not in remission.  Review the results of the CTA of the chest including the possible thyroid lesion.  I do not think this is contributing to any of her symptoms of fatigue particularly given the fact that her thyroid function tests are normal.  I expla    History of palpitations 12/07/2011    Patient has had multiple admissions to the hospital for complaints of chest pain and heart palpitations. She meant admitted at least on 3 occasions. She has had at least 3 stress Cardiolite and 1 stress ECG, the last Cardiolite being performed 12/07/2011 which was negative. Patient is also had Holter monitors which have been unremarkable.    HIstory of Schatzki's ring 02/28/2012     02/28/2012--air-contrast upper GI revealed small to moderate sized reducible sliding hiatal herniation of the upper stomach with some demonstrated gastroesophageal reflux. No esophageal, gastric, or duodenal mass or mucosal ulceration was seen.  11/03/2008--EGD performed for evaluation of iron deficiency anemia revealed hiatal hernia without evidence of reflux, prepyloric antritis and    Hyperlipidemia 04/08/2016    Hypersomnolence 05/05/2016 06/14/2017--overnight oximetry revealed oxygen desaturation index of only 0.44.  There were only 10 total desaturations during the period of testing which lasted 6 hours and 46 minutes.  05/31/2017--patient seen in follow-up and reports she continues to have chronic fatigue as well as hypersomnolence.  Once again, she is on multiple medications that are undoubtedly contributing to this problem including clonazepam and hydrocodone but I doubt that these could be discontinued.  Her CBC back in April revealed a low hemoglobin of 10.8 but hematocrit was normal at 35.7.  Thyroid function tests were normal and CMP normal.  Overnight oximetry test ordered.  Repeat CBC.  Iron studies.  Sedimentation rate and CRP.  04/11/2017--patient seen in follow-up and her blood pressure is now at a reasonable level at 120/64.  She reports she still feels somewhat fatigued but she is much better.  Patient has not been doing any regular exercise and I do think that that would be helpful to reduce her feeling of fatigue.  She is    Menopausal state 04/08/2016    Multinodular goiter 02/17/2017 02/21/2017--thyroid ultrasound reveals multinodular thyroid with largest nodule measuring on the order of 1.6 cm in greatest dimension.  02/17/2017--patient seen in follow-up for DVT and CTA of the chest.  An incidental finding on the CTA of the chest reveals a 1.7 cm lesion in the right lobe of thyroid.  Note that thyroid function tests are currently normal.  Ultrasound of the thyroid ordered.     Osteoporosis, 03/29/2011--lumbar spine -2.8.  Right femoral neck -2.4.  Left femoral neck -2.4.  Patient receives Reclast infusions. 02/09/2009 04/19/2017--Reclast infusion.  04/05/2016--Reclast infusion.  06/04/2012--Reclast infusion.  05/26/2011--Reclast infusion.  03/29/2011--DEXA scan revealed a lumbar T score of -2.8, right femoral neck T score -2.4, left femoral neck T score -2.4.  Osteoporosis of the lumbar spine and severe osteopenia of the hips bilaterally.  Patient has been intolerant to Fosamax because of gastritis and gastroesophageal reflux.  04/01/2009--treatment for osteoporosis begun with Fosamax.  02/09/2009--DEXA scan revealed lumbar spine T score of -3.3.  Left femoral neck T score -2.5.  Right hip T score -2.6.  Osteoporosis.     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  CT scan of the abdomen and pelvis reveals a left renal cyst measuring 5.1 x 4.9 cm.  There were subcentimeter hypodense renal lesions that are too small to further characterize and are presumably related to cysts.  Recommend attention to follow up.  Distal dilated pancreatic duct noted.  The common bile duct is the upper limits of normal in size.  The ampullary region is not well evaluated.  Comparison with prior imaging is recommended if available.  If there is no prior film available for comparison, and ERCP or MRCP could be performed to further evaluate.  Small hiatal hernia noted.  03/05/2012--CT scan of the abdomen with contrast, pancreatic protocol.  This reveals some fullness of the pancreatic ductal system and apparent pancreatic divisum with separate entrance of the accessory pancreatic duct extending into the duodenum distal to the main pancreatic duct.  There is fullness of the duct diffusely but similar to the previous s    Pedal edema 06/29/2015 06/29/2015--patient presents  with a 4-6 week history of exertional dyspnea that comes on with activity such as climbing stairs or walking her dog up an incline.  No chest pain.  Relieved with rest.  No cough.  She does have complaints of her feet and legs swelling that is particularly worse at the end of the day and not improved overnight.  No orthopnea or PND.  Chest exam reveals faint rales at the bases.  Otherwise clear.  Heart is regular and I do not appreciate a heart murmur.  Chest x-ray PA and lateral ordered.  Echocardiogram ordered.    Pulmonary hypertension 04/18/2019    Restless legs syndrome 04/08/2016    Simple renal cyst 07/20/2009 09/29/2014--patient was again evaluated by the urologist for a renal cyst.  CT scan of the abdomen and pelvis reveals a left renal cyst measuring 5.1 x 4.9 cm.  There were subcentimeter hypodense renal lesions that are too small to further characterize and are presumably related to cysts.  Recommend attention to follow up.  Distal dilated pancreatic duct noted.  The common bile duct is the upper limits of normal in size.  The ampullary region is not well evaluated.  Comparison with prior imaging is recommended if available.  If there is no prior film available for comparison, and ERCP or MRCP could be performed to further evaluate.  Small hiatal hernia noted.  07/20/2009--patient was noted to have a left renal mass consistent with a cyst.  This was evaluated by the urologist 07/20/2009.    Statin intolerance 10/30/2017    Tinnitus of both ears 09/30/2019 September 30, 2019--patient presents with a several year history of bilateral tinnitus, right greater than left.  It seems to be getting worse and now is associated with fluctuating hearing.  She occasionally will have worsening hearing after she coughs or sneezes.  On exam she has evidence of old otitis media scars, particularly on the right.  There is no acute infection present and there is no a    Vitamin D deficiency 04/08/2016         Past  Surgical History:   Procedure Laterality Date    APPENDECTOMY  1965    1965    CATARACT EXTRACTION Bilateral Remote    Remote bilateral cataract extirpation with intraocular lens implantation.    CHOLECYSTECTOMY WITH INTRAOPERATIVE CHOLANGIOGRAM N/A 01/21/2019 01/21/2019--laparoscopic paraesophageal hernia repair with fundoplication.    COLONOSCOPY  11/03/2008 2008--normal colonoscopy    COLONOSCOPY  2001 2001--normal colonoscopy.    COLONOSCOPY N/A 06/13/2017 06/13/2017--colonoscopy revealed melanosis.  Biopsied.  There was friability with contact bleeding in the ascending colon.  Biopsied.  Pathology returned consistent with melanosis coli.    ENDOSCOPY  10/08/2014    02/28/2012--air-contrast upper GI revealed small to moderate sized reducible sliding hiatal herniation of the upper stomach with some demonstrated gastroesophageal reflux. No esophageal, gastric, or duodenal mass or mucosal ulceration was seen. 11/03/2008--EGD performed for evaluation of iron deficiency anemia revealed hiatal hernia without evidence of reflux, prepyloric antritis and    ENDOSCOPY  11/03/2008 11/03/2008--EGD performed for evaluation of iron deficiency anemia revealed hiatal hernia without evidence of reflux, prepyloric antritis and    ENDOSCOPY  11/19/2001 11/19/2001--EGD revealed a very tortuous distal esophagus with a Schatzki's ring. No ulcer or erosions. No Dorado's mucosa. Stomach revealed patchy erythema and erosions in the antrum. Biopsy. Normal pylorus with no obstruction. Normal duodenum with no ulcers. The Schatzki's ring was dilated with a Rhodes dilator.;    ENDOSCOPY N/A 09/27/2016 09/27/2016--EGD revealed a normal oropharynx, esophagus, and medium sized hiatal hernia.  Nonbleeding gastric ulcer with no stigmata of bleeding.  Biopsy.  Gastritis.  Biopsy.  Normal examined duodenum.  Biopsied.  Pathology returned mild to moderate chronic active gastritis with ulceration from the stomach antral ulcer  biopsy.  Gastroesophageal junction biopsy revealed minimal mixed inflammation.    ENDOSCOPY N/A 06/13/2017 06/13/2017--colonoscopy revealed melanosis.  Biopsied.  There was friability with contact bleeding in the ascending colon.  Biopsied.  Pathology returned consistent with melanosis coli.    ERCP N/A 01/22/2019 01/22/2019--ERCP with sphincterotomy and balloon stone extraction.    ESOPHAGEAL DILATATION  11/19/2001 11/19/2001--EGD revealed a very tortuous distal esophagus with a Schatzki's ring. No ulcer or erosions. No Dorado's mucosa. Stomach revealed patchy erythema and erosions in the antrum. Biopsy. Normal pylorus with no obstruction. Normal duodenum with no ulcers. The Schatzki's ring was dilated with a Rhodes dilator.;     ESOPHAGEAL MANOMETRY N/A 12/18/2018    Procedure: ESOPHAGEAL MANOMETRY;  Surgeon: Cecilia, Nurse Performed;  Location: [facility] ENDOSCOPY;  Service: Gastroenterology    ESOPHAGOSCOPY N/A 01/21/2019    Procedure: FLEXIBLE ESOPHAGOSCOPY;  Surgeon: Reji Johnson MD;  Location: [facility] OR;  Service: General    HIATAL HERNIA REPAIR N/A 01/21/2019    Procedure: Laparoscopic paraesophageal hernia repair with toupee fundoplication;  Surgeon: Reji Johnson MD;  Location: [facility] OR;  Service: General    INCONTINENCE SURGERY  1979 1979--a bladder tack procedure for urinary stress incontinence    KNEE ARTHROSCOPY Right 12/12/2011 12/12/2011--right knee arthroscopy with partial lateral and medial meniscectomies.    SKIN SURGERY  05/25/2010 05/25/2010--skin lesion excised from right lower extremity. 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    TONSILLECTOMY  1953    TOTAL ABDOMINAL HYSTERECTOMY WITH SALPINGO OOPHORECTOMY  1974 1974--total abdominal hysterectomy.         Allergies   Allergen Reactions    Statins Nausea And Vomiting    Morphine Hallucinations    Penicillins Itching    Tetanus Toxoids Itching           Current  Outpatient Medications:     amLODIPine (NORVASC) 5 MG tablet, Half a pill every morning orally for high blood pressure, Disp: , Rfl:     aspirin 81 MG EC tablet, Take 1 tablet by mouth Daily. HELD, Disp: , Rfl:     Brexpiprazole (Rexulti) 1 MG tablet, Take 2 mg by mouth Daily., Disp: , Rfl:     buPROPion XL (WELLBUTRIN XL) 300 MG 24 hr tablet, Take 1 tablet by mouth Every Morning., Disp: , Rfl:     Cholecalciferol (vitamin D3) 125 MCG (5000 UT) capsule capsule, 1 by mouth daily as directed, Disp: 30 capsule, Rfl:     Cimetidine (TAGAMET PO), Tagamet, Disp: , Rfl:     diclofenac (VOLTAREN) 1 % gel gel, Apply 4 g topically to the appropriate area as directed 4 (Four) Times a Day As Needed., Disp: , Rfl:     diphenhydrAMINE-APAP, sleep, (Tylenol PM Extra Strength)  MG/30ML liquid, Tylenol PM Extra Strength, Disp: , Rfl:     estradiol (ESTRACE) 1 MG tablet, TAKE 1 TABLET BY MOUTH EVERY DAY, Disp: 90 tablet, Rfl: 0    HYDROcodone-acetaminophen (NORCO)  MG per tablet, Take 1 tablet by mouth Every 6 (Six) Hours As Needed for Moderate Pain., Disp: , Rfl:     Misc Natural Products (COLON CLEANSE PO), Take  by mouth., Disp: , Rfl:     Multiple Vitamins-Minerals (MULTIVITAMIN ADULT) chewable tablet, Chew 1 tablet Daily., Disp: , Rfl:     Specialty Vitamins Products (ONE-A-DAY BONE STRENGTH PO), One-A-Day Bone Strength, Disp: , Rfl:     spironolactone (ALDACTONE) 100 MG tablet, TAKE 1 TABLET BY MOUTH EVERY MORNING FOR HIGH BLOOD PRESSURE AND LEG SWELLING/EDEMA, Disp: 90 tablet, Rfl: 0    traZODone (DESYREL) 50 MG tablet, Take 0.5-1 tablets by mouth Every Night., Disp: , Rfl:     venlafaxine XR (EFFEXOR-XR) 150 MG 24 hr capsule, Take 1 capsule by mouth Every Morning., Disp: , Rfl:     venlafaxine XR (EFFEXOR-XR) 75 MG 24 hr capsule, Take one 75 mg capsule venlafaxine along with one 150 mg capsule daily for depression.  Total daily dose is 225 mg., Disp: , Rfl:     vitamin E 400 UNIT capsule, Take 1 capsule by mouth  "Daily., Disp: , Rfl:       Family History   Problem Relation Age of Onset    Heart attack Mother         dies age 47 from heart attack    Heart disease Mother     Bleeding Disorder Mother     Heart disease Father     Ovarian cancer Maternal Grandmother     Heart attack Maternal Aunt         dies age 60 from heart attack    Malig Hyperthermia Neg Hx     Breast cancer Neg Hx          Social History     Socioeconomic History    Marital status:      Spouse name: ander    Number of children: 4    Years of education: 14    Highest education level: Associate degree: academic program   Tobacco Use    Smoking status: Never    Smokeless tobacco: Never    Tobacco comments:     None   Vaping Use    Vaping Use: Never used   Substance and Sexual Activity    Alcohol use: No    Drug use: No    Sexual activity: Not Currently     Partners: Male     Birth control/protection: Diaphragm, Birth control pill, Hysterectomy         Vitals:    12/22/23 1212   BP: 94/64   BP Location: Left arm   Patient Position: Sitting   Cuff Size: Adult   Pulse: 63   Resp: 12   SpO2: 97%   Weight: 57.1 kg (125 lb 12.8 oz)   Height: 152.4 cm (60\")        Body mass index is 24.57 kg/m².      Physical Exam:    General: Alert and oriented x 3.  No acute distress.  Normal affect.  HEENT: Pupils equal, round, reactive to light; extraocular movements intact; sclerae nonicteric; pharynx, ear canals and TMs normal.  Neck: Without JVD, thyromegaly, bruit, or adenopathy.  Lungs: Clear to auscultation in all fields.  Heart: Regular rate and rhythm without murmur, rub, gallop, or click.  Abdomen: Soft, nontender, without hepatosplenomegaly or hernia.  Bowel sounds normal.  : Deferred.  Rectal: Deferred.  Extremities: Without clubbing, cyanosis, edema, or pulse deficit.  Neurologic: Intact without focal deficit.  However, she has a slow ataxic gait and appears unsteady as she did previously.  Her turns appear en bloc.  Tremor left hand noted.  No definite " cogwheeling noted.  Once again, no focal neurologic deficit.    Lab/other results:      Assessment/Plan:     Diagnosis Plan   1. Balance disorder        2. Parkinsonian features        3. Short-term memory loss        4. Tremor of left hand        5. Benign essential hypertension  amLODIPine (NORVASC) 5 MG tablet      6. Frequent falls          Patient we strongly suspect Parkinson's.  Trying to rule out any other acute or subacute possibilities. I reviewed the results of the MRI with the patient and her  and this reveals no change from previous study back in May 2021.  There was moderate changes of chronic small vessel ischemic phenomenon and the enlarged perivascular space of unknown significance which is unchanged.  I explained to the patient and her  that there is nothing on the CT scan that would explain her symptoms and sudden deterioration.  Plan is to refer patient to neurology as soon as possible.  This had been ordered at previous visit.  I well defer further testing to the neurologist.  She has hypertension and her blood pressure is running low and given the overall neurologic picture, I think the best course of action would be to have her start cutting her amlodipine 5 mg in half every day and follow-up in about 3 weeks to reassess the blood pressure.    Plan is as follows: Decrease amlodipine down to half a pill which would be 2.5 mg daily.  Patient will follow-up in 2 to 3 weeks to reassess her blood pressure.  Patient should contact me for any significant change in her symptoms.        Procedures

## 2024-01-11 ENCOUNTER — TRANSCRIBE ORDERS (OUTPATIENT)
Dept: ADMINISTRATIVE | Facility: HOSPITAL | Age: 84
End: 2024-01-11
Payer: COMMERCIAL

## 2024-01-11 DIAGNOSIS — M54.14 THORACIC NEURITIS: Primary | ICD-10-CM

## 2024-01-12 ENCOUNTER — OFFICE VISIT (OUTPATIENT)
Dept: INTERNAL MEDICINE | Facility: CLINIC | Age: 84
End: 2024-01-12
Payer: MEDICARE

## 2024-01-12 VITALS
DIASTOLIC BLOOD PRESSURE: 70 MMHG | WEIGHT: 122 LBS | TEMPERATURE: 97.5 F | HEIGHT: 60 IN | BODY MASS INDEX: 23.95 KG/M2 | SYSTOLIC BLOOD PRESSURE: 100 MMHG | OXYGEN SATURATION: 95 % | RESPIRATION RATE: 18 BRPM | HEART RATE: 77 BPM

## 2024-01-12 DIAGNOSIS — R29.818 PARKINSONIAN FEATURES: ICD-10-CM

## 2024-01-12 DIAGNOSIS — I10 BENIGN ESSENTIAL HYPERTENSION: Primary | Chronic | ICD-10-CM

## 2024-01-12 DIAGNOSIS — N18.2 CHRONIC RENAL INSUFFICIENCY, STAGE II (MILD): Chronic | ICD-10-CM

## 2024-01-12 DIAGNOSIS — D64.9 CHRONIC ANEMIA: Chronic | ICD-10-CM

## 2024-01-12 DIAGNOSIS — E26.9 HYPERALDOSTERONISM: Chronic | ICD-10-CM

## 2024-01-12 DIAGNOSIS — E78.2 MIXED HYPERLIPIDEMIA: Chronic | ICD-10-CM

## 2024-01-12 DIAGNOSIS — R41.3 SHORT-TERM MEMORY LOSS: ICD-10-CM

## 2024-01-12 DIAGNOSIS — E55.9 VITAMIN D DEFICIENCY: Chronic | ICD-10-CM

## 2024-01-12 RX ORDER — AMLODIPINE BESYLATE 5 MG/1
TABLET ORAL
Start: 2024-01-12

## 2024-01-12 NOTE — PROGRESS NOTES
01/12/2024    Patient Information  Sachi Vora                                                                                          [address] [place] [address]      1940  [unfilled]  There is no work phone number on file.    Chief Complaint:     Follow-up blood pressure and recent medication adjustment.    History of Present Illness:    Patient with a history of multiple medical problems including new diagnosis of parkinsonism presents today after we decreased her amlodipine from 5 mg down to 2.5 mg/day due to concerns over low blood pressure particularly in combination with parkinsonism.  Patient also has lower extremity edema which has not changed since the change in the medication.  As a matter fact I expect it probably will improve.  Previously has been diagnosed with hyperaldosteronism.  She also has chronic renal insufficiency that may improve as well with the medication change.    Review of Systems   Constitutional: Negative.   HENT: Negative.     Eyes: Negative.    Cardiovascular: Negative.    Respiratory: Negative.     Endocrine: Negative.    Hematologic/Lymphatic: Negative.    Skin: Negative.    Musculoskeletal:  Positive for arthritis.   Gastrointestinal: Negative.    Genitourinary: Negative.    Neurological:  Positive for disturbances in coordination and loss of balance.   Psychiatric/Behavioral:  Positive for memory loss.    Allergic/Immunologic: Negative.        Active Problems:    Patient Active Problem List   Diagnosis    Osteoporosis, 03/29/2011--lumbar spine -2.8.  Right femoral neck -2.4.  Left femoral neck -2.4.  Patient receives Reclast infusions.    Therapeutic drug monitoring    Simple renal cyst    Benign essential hypertension    Carotid artery plaque, 04/02/2018--mild right ICA plaque, normal left ICA 10/03/2014--mild bilateral carotid artery plaque.    Chronic gastritis    Chronic lower back pain    Chronic otitis externa    Chronic renal  "insufficiency, stage II (mild), creatinine 1.12    Depression with anxiety    Functional murmur, 07/15/2015--normal echocardiogram.    Hyperlipidemia    Chronic migraine w/o aura w/o status migrainosus, not intractable    Pancreatic Duct Dilation    Bilateral lower extremity edema    Restless legs syndrome    Bilateral sensorineural hearing loss    Vitamin D deficiency    Chronic gastric ulcer    Chronic fatigue    Hypersomnolence    Chronic anemia    Multinodular goiter    Generalized anxiety disorder    Iron deficiency anemia    Statin intolerance    Postmenopausal state    Short-term memory loss    Pulmonary hypertension    Gastroesophageal reflux disease without esophagitis    Tinnitus of both ears    Chronic hoarseness    Pain disorder associated with psychological factors    Frequent falls    Balance disorder    Hyperaldosteronism    Hypokalemia    Chronic pain of left knee    Primary osteoarthritis of left knee    Chronic hoarseness    Tremor of left hand    Parkinsonian features         Past Medical History:   Diagnosis Date    Balance disorder 02/05/2021 February 5, 2021--patient seen in follow-up by Dr. Weir.  I extensively reviewed the documentation from the emergency room visit as noted below.  I reviewed the history with the patient and she is describing episodes of dizziness described as a spinning sensation of her body and this seems to be precipitated by movement although it does not occur if she rolls over in bed.  If she stands up and st    Benign essential hypertension 04/08/2016    Bilateral sensorineural hearing loss 03/31/2011 03/31/2011--etiology reveals reverse \"cookie bite\" type of hearing loss for both ears of mild/moderate degree, mostly sensorineural.  Speech discrimination 100% on the right, 96% on the left.    Carotid artery plaque, 10/03/2014--mild bilateral carotid artery plaque. 07/25/2012    10/03/2014--[facility] screening revealed mild bilateral carotid plaque, negative for " atrial fibrillation, negative for AAA, negative for PAD, osteoporosis screen revealed osteopenia.  Body mass index was 25 and considered to be moderate risk.  07/25/2012--vascular screen negative for carotid plaque, negative for abdominal aneurysm, negative for PAD Description: 10/03/2014--[facility] screening revealed mild bilateral carotid plaque, negative for atrial fibrillation, negative for AAA, negative for PAD, osteoporosis screen revealed osteopenia.  Body mass index was 25 and considered to be moderate risk.  07/25/2012--vascular screen negative for carotid plaque, negative for abdominal aneurysm, negative for PAD    Chronic anemia 08/23/2016 06/13/2017--colonoscopy revealed melanosis.  Biopsied.  There was friability with contact bleeding in the ascending colon.  Biopsied.  Pathology returned consistent with melanosis coli.  06/13/2017--EGD revealed normal esophagus.  Biopsies taken.  There was a medium-sized hiatal hernia.  Localized mild inflammation and linear erosions were found in the prepyloric region.  Biopsied.  Examined duodenum was normal.  Random biopsies taken.  Pathology of the gastroesophageal junction was unremarkable as was the gastric fundus.  Prepyloric biopsy revealed minimal chronic inflammation and reactive change.  H pylori negative.  Duodenal biopsies are negative.  04/12/2017--hemoglobin low at 10.8, hematocrit is normal at 35.7.  Iron sulfate 325 mg per day initiated.  08/23/2016--routine follow-up.  Patient continues to have epigastric abdominal pain believed to be related to reflux with possible esophageal spasm.  Hemoglobin noted to be low at 10.6 with a hematocrit low at 33.7 and RDW elevated at 16.2.  Homocysti    Chronic fatigue 04/21/2016 06/14/2017--overnight oximetry revealed oxygen desaturation index of only 0.44.  There were only 10 total desaturations during the period of testing which lasted 6 hours and 46 minutes.  05/31/2017--patient seen in follow-up and reports  she continues to have chronic fatigue as well as hypersomnolence.  Once again, she is on multiple medications that are undoubtedly contributing to this problem including clonazepam and hydrocodone but I doubt that these could be discontinued.  Her CBC back in April revealed a low hemoglobin of 10.8 but hematocrit was normal at 35.7.  Thyroid function tests were normal and CMP normal.  Overnight oximetry test ordered.  Repeat CBC.  Iron studies.  Sedimentation rate and CRP.  04/11/2017--patient seen in follow-up and her blood pressure is now at a reasonable level at 120/64.  She reports she still feels somewhat fatigued but she is much better.  Patient has not been doing any regular exercise and I do think that that would be helpful to reduce her feeling of fatigue.  She is    Chronic gastric ulcer 04/14/2014    02/15/2017--patient seen in follow-up in nearly 6 months later.  She has complaints of not feeling well all over.  She has complaints of diffuse myalgias and possibly tendinopathy related to Levaquin that I called in prior to her going to [place] for vacation.  She reports that 3 or 4 days after starting the Levaquin she began to have the musculoskeletal symptoms and she reports that she continues to have them presently.  Symptoms are worse involving her left calf area.  Examination reveals significant tenderness involving the left calf and the left calf seems a little larger than the right.  She also has complaints of shortness of breath but no chest pain.  She is complaining of double vision and generalized weakness and fatigue.  She was complaining of chronic fatigue at the last visit back in September and I ordered an overnight oximetry test but apparently this was never done for reasons that are not clear to me.  Her current oxygen saturation is 94% and normally it is 100%.  Plan is as follows: Extensi    Chronic gastritis 11/19/2001    02/15/2017--patient seen in follow-up in nearly 6 months later.   She has complaints of not feeling well all over.  She has complaints of diffuse myalgias and possibly tendinopathy related to Levaquin that I called in prior to her going to [place] for vacation.  She reports that 3 or 4 days after starting the Levaquin she began to have the musculoskeletal symptoms and she reports that she continues to have them presently.  Symptoms are worse involving her left calf area.  Examination reveals significant tenderness involving the left calf and the left calf seems a little larger than the right.  She also has complaints of shortness of breath but no chest pain.  She is complaining of double vision and generalized weakness and fatigue.  She was complaining of chronic fatigue at the last visit back in September and I ordered an overnight oximetry test but apparently this was never done for reasons that are not clear to me.  Her current oxygen saturation is 94% and normally it is 100%.  Plan is as follows: Extensi    Chronic hoarseness 06/08/2020    September 15, 2020--patient presents with complaints of swelling of the soft tissues of the left side of the neck and this is associated with pain and discomfort, particularly when she turns her head rotating to the left.  She also notices hoarseness and feels that her voice has changed.  On exam there is definite prominence of the left sternocleidomastoid muscle and there may be some shotty lymph    Chronic lower back pain 01/31/2006 08/11/2014--MRI of the lumbar spine reveals the conus terminates at L2 and is normal.  Cauda equina unremarkable.  Stable to moderate degenerative disc disease at L5-S1.  No acute fracture or pars defect is demonstrated.  Small synovial cysts are seen posterior to the L4-L5 facet.  The perivertebral soft tissues are unremarkable.  T12-L1, L1-L2, L2-L3 are negative.  L3-L4 a broad-based disc bulge resulting in mild bilateral neural foraminal narrowing.  L4-L5 reveals a broad-based disc bulge facet disease  resulting in mild bilateral neural foraminal narrowing.  L5-S1 reveals a broad-based disc bulge, posterior osseous slipping, and facet disease resulting in mild to moderate bilateral neural foraminal narrowing.  Assessment is stable mild to moderate degenerative spondylosis.  Small synovial cysts are seen posterior to the L4-L5 facets.  08/11/2014--MRI of the thoracic spine reveals mild to moderate thoracic kyphosis.  No fracture.  At T5--T6 there is a moderate sized left paracentral disc protrusion which i    Chronic migraine 11/03/2009 01/18/2010--MRI of the brain performed for headaches and memory loss.  Mild small vessel disease in the cerebral and central pontine white matter.  There is an ovoid, somewhat pancake-shaped area of signal abnormality in the anterior inferior right temporal lobe subcortical to the juxtacortical white matter that measures 1.2 x 1 cm and anterior posterior and medial lateral dimension but only measures 3 mm in cranial caudal dimension.  I suspect that this is a benign cyst or a cystic area of encephalomalacia.  The remainder of the MRI of the head is within normal limits.  11/03/2009--CT scan of the brain without contrast performed for headache after a fall.  Mild diffuse atrophy.  No acute abnormality noted.; Description: Patient has had a long history of migraine headaches.  MRI and CT scan of the brain have been essentially negative.    Chronic otitis externa 04/08/2016    Chronic renal insufficiency, stage II (mild), creatinine 1.12 11/14/2015 11/14/2015--serum creatinine mildly elevated at 1.12.    Depression with anxiety 04/08/2016    Frequent falls 02/05/2021 February 5, 2021--patient seen in follow-up by Dr. Weir.  I extensively reviewed the documentation from the emergency room visit as noted below.  I reviewed the history with the patient and she is describing episodes of dizziness described as a spinning sensation of her body and this seems to be precipitated by  movement although it does not occur if she rolls over in bed.  If she stands up and st    Functional murmur, 07/15/2015--normal echocardiogram. 07/15/2015    07/15/2015--echocardiogram reveals normal left ventricular size and function with ejection fraction 55%.  Grade 1 diastolic dysfunction, abnormal relaxation pattern.  Trace tricuspid regurgitation.  Estimated right ventricular systolic pressure is 25 mmHg which is normal.    Gastroesophageal reflux disease without esophagitis 06/06/2019    Generalized anxiety disorder 04/11/2017    Glaucoma     History of Acute deep vein thrombosis (DVT) of distal end of left lower extremity 02/15/2017    03/21/2017 patient seen in follow-up and she is tolerating the Eliquis well without signs or symptoms of bleeding.  Her calf swelling and tenderness is better but not totally resolved.  I suspect that the DVT is chronic and may not resolve at all.  I will order a repeat venous study and then proceed from there.  02/23/2017--repeat Doppler venous study of the left lower extremity reveals a chronic left lower extremity DVT in the posterior tibial.  All other left sided veins appeared normal.  Fluid collection in the left calf noted.  02/17/2017--patient seen in follow-up and reports her left lower extremity symptoms are about the same.  She continues to have complaints of profound fatigue which I think is multifactorial including underlying depression that is not in remission.  Review the results of the CTA of the chest including the possible thyroid lesion.  I do not think this is contributing to any of her symptoms of fatigue particularly given the fact that her thyroid function tests are normal.  I expla    History of palpitations 12/07/2011    Patient has had multiple admissions to the hospital for complaints of chest pain and heart palpitations. She meant admitted at least on 3 occasions. She has had at least 3 stress Cardiolite and 1 stress ECG, the last Cardiolite being  performed 12/07/2011 which was negative. Patient is also had Holter monitors which have been unremarkable.    HIstory of Schatzki's ring 02/28/2012 02/28/2012--air-contrast upper GI revealed small to moderate sized reducible sliding hiatal herniation of the upper stomach with some demonstrated gastroesophageal reflux. No esophageal, gastric, or duodenal mass or mucosal ulceration was seen.  11/03/2008--EGD performed for evaluation of iron deficiency anemia revealed hiatal hernia without evidence of reflux, prepyloric antritis and    Hyperlipidemia 04/08/2016    Hypersomnolence 05/05/2016 06/14/2017--overnight oximetry revealed oxygen desaturation index of only 0.44.  There were only 10 total desaturations during the period of testing which lasted 6 hours and 46 minutes.  05/31/2017--patient seen in follow-up and reports she continues to have chronic fatigue as well as hypersomnolence.  Once again, she is on multiple medications that are undoubtedly contributing to this problem including clonazepam and hydrocodone but I doubt that these could be discontinued.  Her CBC back in April revealed a low hemoglobin of 10.8 but hematocrit was normal at 35.7.  Thyroid function tests were normal and CMP normal.  Overnight oximetry test ordered.  Repeat CBC.  Iron studies.  Sedimentation rate and CRP.  04/11/2017--patient seen in follow-up and her blood pressure is now at a reasonable level at 120/64.  She reports she still feels somewhat fatigued but she is much better.  Patient has not been doing any regular exercise and I do think that that would be helpful to reduce her feeling of fatigue.  She is    Menopausal state 04/08/2016    Multinodular goiter 02/17/2017 02/21/2017--thyroid ultrasound reveals multinodular thyroid with largest nodule measuring on the order of 1.6 cm in greatest dimension.  02/17/2017--patient seen in follow-up for DVT and CTA of the chest.  An incidental finding on the CTA of the chest reveals  a 1.7 cm lesion in the right lobe of thyroid.  Note that thyroid function tests are currently normal.  Ultrasound of the thyroid ordered.    Osteoporosis, 03/29/2011--lumbar spine -2.8.  Right femoral neck -2.4.  Left femoral neck -2.4.  Patient receives Reclast infusions. 02/09/2009 04/19/2017--Reclast infusion.  04/05/2016--Reclast infusion.  06/04/2012--Reclast infusion.  05/26/2011--Reclast infusion.  03/29/2011--DEXA scan revealed a lumbar T score of -2.8, right femoral neck T score -2.4, left femoral neck T score -2.4.  Osteoporosis of the lumbar spine and severe osteopenia of the hips bilaterally.  Patient has been intolerant to Fosamax because of gastritis and gastroesophageal reflux.  04/01/2009--treatment for osteoporosis begun with Fosamax.  02/09/2009--DEXA scan revealed lumbar spine T score of -3.3.  Left femoral neck T score -2.5.  Right hip T score -2.6.  Osteoporosis.     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  CT scan of the abdomen and pelvis reveals a left renal cyst measuring 5.1 x 4.9 cm.  There were subcentimeter hypodense renal lesions that are too small to further characterize and are presumably related to cysts.  Recommend attention to follow up.  Distal dilated pancreatic duct noted.  The common bile duct is the upper limits of normal in size.  The ampullary region is not well evaluated.  Comparison with prior imaging is recommended if available.  If there is no prior film available for comparison, and ERCP or MRCP could be performed to further evaluate.  Small hiatal hernia noted.  03/05/2012--CT scan of the abdomen with contrast, pancreatic protocol.  This reveals some fullness of the pancreatic ductal system and apparent pancreatic divisum with separate entrance of the accessory pancreatic duct extending into the duodenum distal to the main  pancreatic duct.  There is fullness of the duct diffusely but similar to the previous s    Pedal edema 06/29/2015 06/29/2015--patient presents with a 4-6 week history of exertional dyspnea that comes on with activity such as climbing stairs or walking her dog up an incline.  No chest pain.  Relieved with rest.  No cough.  She does have complaints of her feet and legs swelling that is particularly worse at the end of the day and not improved overnight.  No orthopnea or PND.  Chest exam reveals faint rales at the bases.  Otherwise clear.  Heart is regular and I do not appreciate a heart murmur.  Chest x-ray PA and lateral ordered.  Echocardiogram ordered.    Pulmonary hypertension 04/18/2019    Restless legs syndrome 04/08/2016    Simple renal cyst 07/20/2009 09/29/2014--patient was again evaluated by the urologist for a renal cyst.  CT scan of the abdomen and pelvis reveals a left renal cyst measuring 5.1 x 4.9 cm.  There were subcentimeter hypodense renal lesions that are too small to further characterize and are presumably related to cysts.  Recommend attention to follow up.  Distal dilated pancreatic duct noted.  The common bile duct is the upper limits of normal in size.  The ampullary region is not well evaluated.  Comparison with prior imaging is recommended if available.  If there is no prior film available for comparison, and ERCP or MRCP could be performed to further evaluate.  Small hiatal hernia noted.  07/20/2009--patient was noted to have a left renal mass consistent with a cyst.  This was evaluated by the urologist 07/20/2009.    Statin intolerance 10/30/2017    Tinnitus of both ears 09/30/2019 September 30, 2019--patient presents with a several year history of bilateral tinnitus, right greater than left.  It seems to be getting worse and now is associated with fluctuating hearing.  She occasionally will have worsening hearing after she coughs or sneezes.  On exam she has evidence of old otitis  media scars, particularly on the right.  There is no acute infection present and there is no a    Vitamin D deficiency 04/08/2016         Past Surgical History:   Procedure Laterality Date    APPENDECTOMY  1965    1965    CATARACT EXTRACTION Bilateral Remote    Remote bilateral cataract extirpation with intraocular lens implantation.    CHOLECYSTECTOMY WITH INTRAOPERATIVE CHOLANGIOGRAM N/A 01/21/2019 01/21/2019--laparoscopic paraesophageal hernia repair with fundoplication.    COLONOSCOPY  11/03/2008 2008--normal colonoscopy    COLONOSCOPY  2001 2001--normal colonoscopy.    COLONOSCOPY N/A 06/13/2017 06/13/2017--colonoscopy revealed melanosis.  Biopsied.  There was friability with contact bleeding in the ascending colon.  Biopsied.  Pathology returned consistent with melanosis coli.    ENDOSCOPY  10/08/2014    02/28/2012--air-contrast upper GI revealed small to moderate sized reducible sliding hiatal herniation of the upper stomach with some demonstrated gastroesophageal reflux. No esophageal, gastric, or duodenal mass or mucosal ulceration was seen. 11/03/2008--EGD performed for evaluation of iron deficiency anemia revealed hiatal hernia without evidence of reflux, prepyloric antritis and    ENDOSCOPY  11/03/2008 11/03/2008--EGD performed for evaluation of iron deficiency anemia revealed hiatal hernia without evidence of reflux, prepyloric antritis and    ENDOSCOPY  11/19/2001 11/19/2001--EGD revealed a very tortuous distal esophagus with a Schatzki's ring. No ulcer or erosions. No Dorado's mucosa. Stomach revealed patchy erythema and erosions in the antrum. Biopsy. Normal pylorus with no obstruction. Normal duodenum with no ulcers. The Schatzki's ring was dilated with a Rhodes dilator.;    ENDOSCOPY N/A 09/27/2016 09/27/2016--EGD revealed a normal oropharynx, esophagus, and medium sized hiatal hernia.  Nonbleeding gastric ulcer with no stigmata of bleeding.  Biopsy.  Gastritis.  Biopsy.   Normal examined duodenum.  Biopsied.  Pathology returned mild to moderate chronic active gastritis with ulceration from the stomach antral ulcer biopsy.  Gastroesophageal junction biopsy revealed minimal mixed inflammation.    ENDOSCOPY N/A 06/13/2017 06/13/2017--colonoscopy revealed melanosis.  Biopsied.  There was friability with contact bleeding in the ascending colon.  Biopsied.  Pathology returned consistent with melanosis coli.    ERCP N/A 01/22/2019 01/22/2019--ERCP with sphincterotomy and balloon stone extraction.    ESOPHAGEAL DILATATION  11/19/2001 11/19/2001--EGD revealed a very tortuous distal esophagus with a Schatzki's ring. No ulcer or erosions. No Dorado's mucosa. Stomach revealed patchy erythema and erosions in the antrum. Biopsy. Normal pylorus with no obstruction. Normal duodenum with no ulcers. The Schatzki's ring was dilated with a Rhodes dilator.;     ESOPHAGEAL MANOMETRY N/A 12/18/2018    Procedure: ESOPHAGEAL MANOMETRY;  Surgeon: Cecilia, Nurse Performed;  Location: [facility] ENDOSCOPY;  Service: Gastroenterology    ESOPHAGOSCOPY N/A 01/21/2019    Procedure: FLEXIBLE ESOPHAGOSCOPY;  Surgeon: Reji Johnson MD;  Location: [facility] OR;  Service: General    HIATAL HERNIA REPAIR N/A 01/21/2019    Procedure: Laparoscopic paraesophageal hernia repair with toupee fundoplication;  Surgeon: Reji Johnson MD;  Location: [facility] OR;  Service: General    INCONTINENCE SURGERY  1979 1979--a bladder tack procedure for urinary stress incontinence    KNEE ARTHROSCOPY Right 12/12/2011 12/12/2011--right knee arthroscopy with partial lateral and medial meniscectomies.    SKIN SURGERY  05/25/2010 05/25/2010--skin lesion excised from right lower extremity. 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    TONSILLECTOMY  1953    TOTAL ABDOMINAL HYSTERECTOMY WITH SALPINGO OOPHORECTOMY  1974 1974--total abdominal hysterectomy.         Allergies   Allergen  Reactions    Statins Nausea And Vomiting    Morphine Hallucinations    Penicillins Itching    Tetanus Toxoids Itching           Current Outpatient Medications:     amLODIPine (NORVASC) 5 MG tablet, Half a pill every morning orally for high blood pressure, Disp: , Rfl:     aspirin 81 MG EC tablet, Take 1 tablet by mouth Daily. HELD, Disp: , Rfl:     Brexpiprazole (Rexulti) 1 MG tablet, Take 2 mg by mouth Daily., Disp: , Rfl:     buPROPion XL (WELLBUTRIN XL) 300 MG 24 hr tablet, Take 1 tablet by mouth Every Morning., Disp: , Rfl:     Cholecalciferol (vitamin D3) 125 MCG (5000 UT) capsule capsule, 1 by mouth daily as directed, Disp: 30 capsule, Rfl:     Cimetidine (TAGAMET PO), Tagamet, Disp: , Rfl:     diclofenac (VOLTAREN) 1 % gel gel, Apply 4 g topically to the appropriate area as directed 4 (Four) Times a Day As Needed., Disp: , Rfl:     diphenhydrAMINE-APAP, sleep, (Tylenol PM Extra Strength)  MG/30ML liquid, Tylenol PM Extra Strength, Disp: , Rfl:     estradiol (ESTRACE) 1 MG tablet, TAKE 1 TABLET BY MOUTH EVERY DAY, Disp: 90 tablet, Rfl: 0    HYDROcodone-acetaminophen (NORCO)  MG per tablet, Take 1 tablet by mouth Every 6 (Six) Hours As Needed for Moderate Pain., Disp: , Rfl:     Misc Natural Products (COLON CLEANSE PO), Take  by mouth., Disp: , Rfl:     spironolactone (ALDACTONE) 100 MG tablet, TAKE 1 TABLET BY MOUTH EVERY MORNING FOR HIGH BLOOD PRESSURE AND LEG SWELLING/EDEMA, Disp: 90 tablet, Rfl: 0    traZODone (DESYREL) 50 MG tablet, Take 0.5-1 tablets by mouth Every Night., Disp: , Rfl:     venlafaxine XR (EFFEXOR-XR) 150 MG 24 hr capsule, Take 1 capsule by mouth Every Morning., Disp: , Rfl:     venlafaxine XR (EFFEXOR-XR) 75 MG 24 hr capsule, Take one 75 mg capsule venlafaxine along with one 150 mg capsule daily for depression.  Total daily dose is 225 mg., Disp: , Rfl:     vitamin E 400 UNIT capsule, Take 1 capsule by mouth Daily., Disp: , Rfl:       Family History   Problem Relation Age of  "Onset    Heart attack Mother         dies age 47 from heart attack    Heart disease Mother     Bleeding Disorder Mother     Heart disease Father     Ovarian cancer Maternal Grandmother     Heart attack Maternal Aunt         dies age 60 from heart attack    Malig Hyperthermia Neg Hx     Breast cancer Neg Hx          Social History     Socioeconomic History    Marital status:      Spouse name: ander    Number of children: 4    Years of education: 14    Highest education level: Associate degree: academic program   Tobacco Use    Smoking status: Never    Smokeless tobacco: Never    Tobacco comments:     None   Vaping Use    Vaping Use: Never used   Substance and Sexual Activity    Alcohol use: No    Drug use: No    Sexual activity: Not Currently     Partners: Male     Birth control/protection: Diaphragm, Birth control pill, Hysterectomy         Vitals:    01/12/24 1302   BP: 100/70   Pulse: 77   Resp: 18   Temp: 97.5 °F (36.4 °C)   SpO2: 95%   Weight: 55.3 kg (122 lb)   Height: 152.4 cm (60\")        Body mass index is 23.83 kg/m².      Physical Exam:    General: Alert and oriented x 3.  No acute distress.  Normal affect.  HEENT: Pupils equal, round, reactive to light; extraocular movements intact; sclerae nonicteric; pharynx, ear canals and TMs normal.  Neck: Without JVD, thyromegaly, bruit, or adenopathy.  Lungs: Clear to auscultation in all fields.  Heart: Regular rate and rhythm without murmur, rub, gallop, or click.  Abdomen: Soft, nontender, without hepatosplenomegaly or hernia.  Bowel sounds normal.  : Deferred.  Rectal: Deferred.  Extremities: Without clubbing, cyanosis, edema, or pulse deficit.  Neurologic: Intact without focal deficit.  Normal station and gait observed during ingress and egress from the examination room.  Skin: Without significant lesion.  Musculoskeletal: Unremarkable.    Lab/other results:      Assessment/Plan:     Diagnosis Plan   1. Benign essential hypertension  amLODIPine " (NORVASC) 5 MG tablet    Comprehensive Metabolic Panel      2. Hyperaldosteronism        3. Chronic renal insufficiency, stage II (mild), creatinine 1.12  CBC (No Diff)    Comprehensive Metabolic Panel      4. Hyperlipidemia  Comprehensive Metabolic Panel    NMR LipoProfile    TSH    T4, Free    T3, Free      5. Chronic anemia  CBC (No Diff)      6. Vitamin D deficiency  Vitamin D,25-Hydroxy      7. Parkinsonian features        8. Short-term memory loss          Patient hypertension appears to be better after reduction of amlodipine.  Blood pressure seems little low today but patient  is not checking it at home with systolics around 130 and diastolics around 70.  I do not think I will make any additional changes at this time but continue to monitor this closely.    Plan is as follows: Patient will contact me for any abnormally elevated or low blood pressures.  He does need to be checking it every day but 2 or 3 times a week will be fine at various times.  Certainly he will check it if there is any symptoms such as lightheadedness.  I am not can make any changes to the current regimen and I will have her follow-up in about 2 months without lab prior.  I will likely send her to the lab for nonfasting lab to assess her electrolyte status at that time.        Procedures

## 2024-01-16 ENCOUNTER — HOSPITAL ENCOUNTER (OUTPATIENT)
Dept: MRI IMAGING | Facility: HOSPITAL | Age: 84
Discharge: HOME | End: 2024-01-16
Admitting: NURSE PRACTITIONER
Payer: MEDICARE

## 2024-01-16 DIAGNOSIS — M54.14 THORACIC NEURITIS: ICD-10-CM

## 2024-01-16 PROCEDURE — 72146 MRI CHEST SPINE W/O DYE: CPT

## 2024-01-22 ENCOUNTER — OFFICE VISIT (OUTPATIENT)
Dept: NEUROLOGY | Facility: CLINIC | Age: 84
End: 2024-01-22
Payer: MEDICARE

## 2024-01-22 VITALS
HEIGHT: 60 IN | DIASTOLIC BLOOD PRESSURE: 82 MMHG | WEIGHT: 122 LBS | HEART RATE: 73 BPM | BODY MASS INDEX: 23.95 KG/M2 | SYSTOLIC BLOOD PRESSURE: 138 MMHG | OXYGEN SATURATION: 97 %

## 2024-01-22 DIAGNOSIS — G20.A1 PARKINSON'S DISEASE WITHOUT DYSKINESIA OR FLUCTUATING MANIFESTATIONS: Primary | ICD-10-CM

## 2024-01-22 DIAGNOSIS — G60.8 SENSORY PERIPHERAL NEUROPATHY: ICD-10-CM

## 2024-01-22 PROCEDURE — 3075F SYST BP GE 130 - 139MM HG: CPT | Performed by: PSYCHIATRY & NEUROLOGY

## 2024-01-22 PROCEDURE — 1160F RVW MEDS BY RX/DR IN RCRD: CPT | Performed by: PSYCHIATRY & NEUROLOGY

## 2024-01-22 PROCEDURE — 99215 OFFICE O/P EST HI 40 MIN: CPT | Performed by: PSYCHIATRY & NEUROLOGY

## 2024-01-22 PROCEDURE — 1159F MED LIST DOCD IN RCRD: CPT | Performed by: PSYCHIATRY & NEUROLOGY

## 2024-01-22 PROCEDURE — 3079F DIAST BP 80-89 MM HG: CPT | Performed by: PSYCHIATRY & NEUROLOGY

## 2024-01-22 NOTE — PROGRESS NOTES
CC: Falls    HPI:  Sachi Vora is a  83 y.o.  right-handed white female who I had seen previously in 2021 with some falling backwards episodes.  Her exam showed some degree of ankle dorsiflexor weakness and I thought that peripheral neuropathy may be the origin.  An EMG which I performed 2/16/2022 showing the following impression:    Impression:  Borderline study.  There was no evidence of peripheral neuropathy or peroneal neuropathy on either side.  There were no needle exam changes in the leg muscles but the paraspinals showed a few positive sharp waves which could go along with bilateral lumbosacral radiculopathies.  The patient has had epidural steroid injections but no nerve ablations.  Study results were discussed with the patient.    No nerve conduction findings but some mild needle exam changes that could go along with lumbosacral radiculopathy.  An MRI of the lumbar spine was obtained which did not show significant problems at the L5 level..  She also had a Doppler of the lower extremities because of some swelling which did not demonstrate any evidence of a DVT on either side.    The patient has had progressive problems with balance and falls.  She falls backwards hitting the floor twice in the last month.  She can stop her self.  Her daughter and  came with her today and provided additional observations including shuffling gait and occasional mild resting tremor, poor writing where he gets small and not legible as well as some reduced volume voice wise and the pitch seems a little higher.  She also has apparently some short-term memory issues.  No visual hallucinations described but occasional mild urinary incontinence.    There is no family history of Parkinson's disease.  She also describes some numbness in her fingers and feet.  This had been suggested though by Dr. Weir her primary.        Past Medical History:   Diagnosis Date    Balance disorder 02/05/2021 February 5, 2021--patient seen  "in follow-up by Dr. Weir.  I extensively reviewed the documentation from the emergency room visit as noted below.  I reviewed the history with the patient and she is describing episodes of dizziness described as a spinning sensation of her body and this seems to be precipitated by movement although it does not occur if she rolls over in bed.  If she stands up and st    Benign essential hypertension 04/08/2016    Bilateral sensorineural hearing loss 03/31/2011 03/31/2011--etiology reveals reverse \"cookie bite\" type of hearing loss for both ears of mild/moderate degree, mostly sensorineural.  Speech discrimination 100% on the right, 96% on the left.    Carotid artery plaque, 10/03/2014--mild bilateral carotid artery plaque. 07/25/2012    10/03/2014--[facility] screening revealed mild bilateral carotid plaque, negative for atrial fibrillation, negative for AAA, negative for PAD, osteoporosis screen revealed osteopenia.  Body mass index was 25 and considered to be moderate risk.  07/25/2012--vascular screen negative for carotid plaque, negative for abdominal aneurysm, negative for PAD Description: 10/03/2014--[facility] screening revealed mild bilateral carotid plaque, negative for atrial fibrillation, negative for AAA, negative for PAD, osteoporosis screen revealed osteopenia.  Body mass index was 25 and considered to be moderate risk.  07/25/2012--vascular screen negative for carotid plaque, negative for abdominal aneurysm, negative for PAD    Chronic anemia 08/23/2016 06/13/2017--colonoscopy revealed melanosis.  Biopsied.  There was friability with contact bleeding in the ascending colon.  Biopsied.  Pathology returned consistent with melanosis coli.  06/13/2017--EGD revealed normal esophagus.  Biopsies taken.  There was a medium-sized hiatal hernia.  Localized mild inflammation and linear erosions were found in the prepyloric region.  Biopsied.  Examined duodenum was normal.  Random biopsies taken.  Pathology of " the gastroesophageal junction was unremarkable as was the gastric fundus.  Prepyloric biopsy revealed minimal chronic inflammation and reactive change.  H pylori negative.  Duodenal biopsies are negative.  04/12/2017--hemoglobin low at 10.8, hematocrit is normal at 35.7.  Iron sulfate 325 mg per day initiated.  08/23/2016--routine follow-up.  Patient continues to have epigastric abdominal pain believed to be related to reflux with possible esophageal spasm.  Hemoglobin noted to be low at 10.6 with a hematocrit low at 33.7 and RDW elevated at 16.2.  Homocysti    Chronic fatigue 04/21/2016 06/14/2017--overnight oximetry revealed oxygen desaturation index of only 0.44.  There were only 10 total desaturations during the period of testing which lasted 6 hours and 46 minutes.  05/31/2017--patient seen in follow-up and reports she continues to have chronic fatigue as well as hypersomnolence.  Once again, she is on multiple medications that are undoubtedly contributing to this problem including clonazepam and hydrocodone but I doubt that these could be discontinued.  Her CBC back in April revealed a low hemoglobin of 10.8 but hematocrit was normal at 35.7.  Thyroid function tests were normal and CMP normal.  Overnight oximetry test ordered.  Repeat CBC.  Iron studies.  Sedimentation rate and CRP.  04/11/2017--patient seen in follow-up and her blood pressure is now at a reasonable level at 120/64.  She reports she still feels somewhat fatigued but she is much better.  Patient has not been doing any regular exercise and I do think that that would be helpful to reduce her feeling of fatigue.  She is    Chronic gastric ulcer 04/14/2014    02/15/2017--patient seen in follow-up in nearly 6 months later.  She has complaints of not feeling well all over.  She has complaints of diffuse myalgias and possibly tendinopathy related to Levaquin that I called in prior to her going to [place] for vacation.  She reports that 3 or 4  days after starting the Levaquin she began to have the musculoskeletal symptoms and she reports that she continues to have them presently.  Symptoms are worse involving her left calf area.  Examination reveals significant tenderness involving the left calf and the left calf seems a little larger than the right.  She also has complaints of shortness of breath but no chest pain.  She is complaining of double vision and generalized weakness and fatigue.  She was complaining of chronic fatigue at the last visit back in September and I ordered an overnight oximetry test but apparently this was never done for reasons that are not clear to me.  Her current oxygen saturation is 94% and normally it is 100%.  Plan is as follows: Extensi    Chronic gastritis 11/19/2001    02/15/2017--patient seen in follow-up in nearly 6 months later.  She has complaints of not feeling well all over.  She has complaints of diffuse myalgias and possibly tendinopathy related to Levaquin that I called in prior to her going to [place] for vacation.  She reports that 3 or 4 days after starting the Levaquin she began to have the musculoskeletal symptoms and she reports that she continues to have them presently.  Symptoms are worse involving her left calf area.  Examination reveals significant tenderness involving the left calf and the left calf seems a little larger than the right.  She also has complaints of shortness of breath but no chest pain.  She is complaining of double vision and generalized weakness and fatigue.  She was complaining of chronic fatigue at the last visit back in September and I ordered an overnight oximetry test but apparently this was never done for reasons that are not clear to me.  Her current oxygen saturation is 94% and normally it is 100%.  Plan is as follows: Extensi    Chronic hoarseness 06/08/2020    September 15, 2020--patient presents with complaints of swelling of the soft tissues of the left side of the neck  and this is associated with pain and discomfort, particularly when she turns her head rotating to the left.  She also notices hoarseness and feels that her voice has changed.  On exam there is definite prominence of the left sternocleidomastoid muscle and there may be some shotty lymph    Chronic lower back pain 01/31/2006 08/11/2014--MRI of the lumbar spine reveals the conus terminates at L2 and is normal.  Cauda equina unremarkable.  Stable to moderate degenerative disc disease at L5-S1.  No acute fracture or pars defect is demonstrated.  Small synovial cysts are seen posterior to the L4-L5 facet.  The perivertebral soft tissues are unremarkable.  T12-L1, L1-L2, L2-L3 are negative.  L3-L4 a broad-based disc bulge resulting in mild bilateral neural foraminal narrowing.  L4-L5 reveals a broad-based disc bulge facet disease resulting in mild bilateral neural foraminal narrowing.  L5-S1 reveals a broad-based disc bulge, posterior osseous slipping, and facet disease resulting in mild to moderate bilateral neural foraminal narrowing.  Assessment is stable mild to moderate degenerative spondylosis.  Small synovial cysts are seen posterior to the L4-L5 facets.  08/11/2014--MRI of the thoracic spine reveals mild to moderate thoracic kyphosis.  No fracture.  At T5--T6 there is a moderate sized left paracentral disc protrusion which i    Chronic migraine 11/03/2009 01/18/2010--MRI of the brain performed for headaches and memory loss.  Mild small vessel disease in the cerebral and central pontine white matter.  There is an ovoid, somewhat pancake-shaped area of signal abnormality in the anterior inferior right temporal lobe subcortical to the juxtacortical white matter that measures 1.2 x 1 cm and anterior posterior and medial lateral dimension but only measures 3 mm in cranial caudal dimension.  I suspect that this is a benign cyst or a cystic area of encephalomalacia.  The remainder of the MRI of the head is within  normal limits.  11/03/2009--CT scan of the brain without contrast performed for headache after a fall.  Mild diffuse atrophy.  No acute abnormality noted.; Description: Patient has had a long history of migraine headaches.  MRI and CT scan of the brain have been essentially negative.    Chronic otitis externa 04/08/2016    Chronic renal insufficiency, stage II (mild), creatinine 1.12 11/14/2015 11/14/2015--serum creatinine mildly elevated at 1.12.    Depression with anxiety 04/08/2016    Frequent falls 02/05/2021 February 5, 2021--patient seen in follow-up by Dr. Weir.  I extensively reviewed the documentation from the emergency room visit as noted below.  I reviewed the history with the patient and she is describing episodes of dizziness described as a spinning sensation of her body and this seems to be precipitated by movement although it does not occur if she rolls over in bed.  If she stands up and st    Functional murmur, 07/15/2015--normal echocardiogram. 07/15/2015    07/15/2015--echocardiogram reveals normal left ventricular size and function with ejection fraction 55%.  Grade 1 diastolic dysfunction, abnormal relaxation pattern.  Trace tricuspid regurgitation.  Estimated right ventricular systolic pressure is 25 mmHg which is normal.    Gastroesophageal reflux disease without esophagitis 06/06/2019    Generalized anxiety disorder 04/11/2017    Glaucoma     History of Acute deep vein thrombosis (DVT) of distal end of left lower extremity 02/15/2017    03/21/2017 patient seen in follow-up and she is tolerating the Eliquis well without signs or symptoms of bleeding.  Her calf swelling and tenderness is better but not totally resolved.  I suspect that the DVT is chronic and may not resolve at all.  I will order a repeat venous study and then proceed from there.  02/23/2017--repeat Doppler venous study of the left lower extremity reveals a chronic left lower extremity DVT in the posterior tibial.  All  other left sided veins appeared normal.  Fluid collection in the left calf noted.  02/17/2017--patient seen in follow-up and reports her left lower extremity symptoms are about the same.  She continues to have complaints of profound fatigue which I think is multifactorial including underlying depression that is not in remission.  Review the results of the CTA of the chest including the possible thyroid lesion.  I do not think this is contributing to any of her symptoms of fatigue particularly given the fact that her thyroid function tests are normal.  I expla    History of palpitations 12/07/2011    Patient has had multiple admissions to the hospital for complaints of chest pain and heart palpitations. She meant admitted at least on 3 occasions. She has had at least 3 stress Cardiolite and 1 stress ECG, the last Cardiolite being performed 12/07/2011 which was negative. Patient is also had Holter monitors which have been unremarkable.    HIstory of Schatzki's ring 02/28/2012 02/28/2012--air-contrast upper GI revealed small to moderate sized reducible sliding hiatal herniation of the upper stomach with some demonstrated gastroesophageal reflux. No esophageal, gastric, or duodenal mass or mucosal ulceration was seen.  11/03/2008--EGD performed for evaluation of iron deficiency anemia revealed hiatal hernia without evidence of reflux, prepyloric antritis and    Hyperlipidemia 04/08/2016    Hypersomnolence 05/05/2016 06/14/2017--overnight oximetry revealed oxygen desaturation index of only 0.44.  There were only 10 total desaturations during the period of testing which lasted 6 hours and 46 minutes.  05/31/2017--patient seen in follow-up and reports she continues to have chronic fatigue as well as hypersomnolence.  Once again, she is on multiple medications that are undoubtedly contributing to this problem including clonazepam and hydrocodone but I doubt that these could be discontinued.  Her CBC back in April  revealed a low hemoglobin of 10.8 but hematocrit was normal at 35.7.  Thyroid function tests were normal and CMP normal.  Overnight oximetry test ordered.  Repeat CBC.  Iron studies.  Sedimentation rate and CRP.  04/11/2017--patient seen in follow-up and her blood pressure is now at a reasonable level at 120/64.  She reports she still feels somewhat fatigued but she is much better.  Patient has not been doing any regular exercise and I do think that that would be helpful to reduce her feeling of fatigue.  She is    Menopausal state 04/08/2016    Multinodular goiter 02/17/2017 02/21/2017--thyroid ultrasound reveals multinodular thyroid with largest nodule measuring on the order of 1.6 cm in greatest dimension.  02/17/2017--patient seen in follow-up for DVT and CTA of the chest.  An incidental finding on the CTA of the chest reveals a 1.7 cm lesion in the right lobe of thyroid.  Note that thyroid function tests are currently normal.  Ultrasound of the thyroid ordered.    Osteoporosis, 03/29/2011--lumbar spine -2.8.  Right femoral neck -2.4.  Left femoral neck -2.4.  Patient receives Reclast infusions. 02/09/2009 04/19/2017--Reclast infusion.  04/05/2016--Reclast infusion.  06/04/2012--Reclast infusion.  05/26/2011--Reclast infusion.  03/29/2011--DEXA scan revealed a lumbar T score of -2.8, right femoral neck T score -2.4, left femoral neck T score -2.4.  Osteoporosis of the lumbar spine and severe osteopenia of the hips bilaterally.  Patient has been intolerant to Fosamax because of gastritis and gastroesophageal reflux.  04/01/2009--treatment for osteoporosis begun with Fosamax.  02/09/2009--DEXA scan revealed lumbar spine T score of -3.3.  Left femoral neck T score -2.5.  Right hip T score -2.6.  Osteoporosis.     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  CT scan of the  abdomen and pelvis reveals a left renal cyst measuring 5.1 x 4.9 cm.  There were subcentimeter hypodense renal lesions that are too small to further characterize and are presumably related to cysts.  Recommend attention to follow up.  Distal dilated pancreatic duct noted.  The common bile duct is the upper limits of normal in size.  The ampullary region is not well evaluated.  Comparison with prior imaging is recommended if available.  If there is no prior film available for comparison, and ERCP or MRCP could be performed to further evaluate.  Small hiatal hernia noted.  03/05/2012--CT scan of the abdomen with contrast, pancreatic protocol.  This reveals some fullness of the pancreatic ductal system and apparent pancreatic divisum with separate entrance of the accessory pancreatic duct extending into the duodenum distal to the main pancreatic duct.  There is fullness of the duct diffusely but similar to the previous s    Pedal edema 06/29/2015 06/29/2015--patient presents with a 4-6 week history of exertional dyspnea that comes on with activity such as climbing stairs or walking her dog up an incline.  No chest pain.  Relieved with rest.  No cough.  She does have complaints of her feet and legs swelling that is particularly worse at the end of the day and not improved overnight.  No orthopnea or PND.  Chest exam reveals faint rales at the bases.  Otherwise clear.  Heart is regular and I do not appreciate a heart murmur.  Chest x-ray PA and lateral ordered.  Echocardiogram ordered.    Pulmonary hypertension 04/18/2019    Restless legs syndrome 04/08/2016    Simple renal cyst 07/20/2009 09/29/2014--patient was again evaluated by the urologist for a renal cyst.  CT scan of the abdomen and pelvis reveals a left renal cyst measuring 5.1 x 4.9 cm.  There were subcentimeter hypodense renal lesions that are too small to further characterize and are presumably related to cysts.  Recommend attention to follow up.   Distal dilated pancreatic duct noted.  The common bile duct is the upper limits of normal in size.  The ampullary region is not well evaluated.  Comparison with prior imaging is recommended if available.  If there is no prior film available for comparison, and ERCP or MRCP could be performed to further evaluate.  Small hiatal hernia noted.  07/20/2009--patient was noted to have a left renal mass consistent with a cyst.  This was evaluated by the urologist 07/20/2009.    Statin intolerance 10/30/2017    Tinnitus of both ears 09/30/2019 September 30, 2019--patient presents with a several year history of bilateral tinnitus, right greater than left.  It seems to be getting worse and now is associated with fluctuating hearing.  She occasionally will have worsening hearing after she coughs or sneezes.  On exam she has evidence of old otitis media scars, particularly on the right.  There is no acute infection present and there is no a    Vitamin D deficiency 04/08/2016         Past Surgical History:   Procedure Laterality Date    APPENDECTOMY  1965    1965    CATARACT EXTRACTION Bilateral Remote    Remote bilateral cataract extirpation with intraocular lens implantation.    CHOLECYSTECTOMY WITH INTRAOPERATIVE CHOLANGIOGRAM N/A 01/21/2019 01/21/2019--laparoscopic paraesophageal hernia repair with fundoplication.    COLONOSCOPY  11/03/2008 2008--normal colonoscopy    COLONOSCOPY  2001 2001--normal colonoscopy.    COLONOSCOPY N/A 06/13/2017 06/13/2017--colonoscopy revealed melanosis.  Biopsied.  There was friability with contact bleeding in the ascending colon.  Biopsied.  Pathology returned consistent with melanosis coli.    ENDOSCOPY  10/08/2014    02/28/2012--air-contrast upper GI revealed small to moderate sized reducible sliding hiatal herniation of the upper stomach with some demonstrated gastroesophageal reflux. No esophageal, gastric, or duodenal mass or mucosal ulceration was seen. 11/03/2008--EGD  performed for evaluation of iron deficiency anemia revealed hiatal hernia without evidence of reflux, prepyloric antritis and    ENDOSCOPY  11/03/2008 11/03/2008--EGD performed for evaluation of iron deficiency anemia revealed hiatal hernia without evidence of reflux, prepyloric antritis and    ENDOSCOPY  11/19/2001 11/19/2001--EGD revealed a very tortuous distal esophagus with a Schatzki's ring. No ulcer or erosions. No Dorado's mucosa. Stomach revealed patchy erythema and erosions in the antrum. Biopsy. Normal pylorus with no obstruction. Normal duodenum with no ulcers. The Schatzki's ring was dilated with a Rhodes dilator.;    ENDOSCOPY N/A 09/27/2016 09/27/2016--EGD revealed a normal oropharynx, esophagus, and medium sized hiatal hernia.  Nonbleeding gastric ulcer with no stigmata of bleeding.  Biopsy.  Gastritis.  Biopsy.  Normal examined duodenum.  Biopsied.  Pathology returned mild to moderate chronic active gastritis with ulceration from the stomach antral ulcer biopsy.  Gastroesophageal junction biopsy revealed minimal mixed inflammation.    ENDOSCOPY N/A 06/13/2017 06/13/2017--colonoscopy revealed melanosis.  Biopsied.  There was friability with contact bleeding in the ascending colon.  Biopsied.  Pathology returned consistent with melanosis coli.    ERCP N/A 01/22/2019 01/22/2019--ERCP with sphincterotomy and balloon stone extraction.    ESOPHAGEAL DILATATION  11/19/2001 11/19/2001--EGD revealed a very tortuous distal esophagus with a Schatzki's ring. No ulcer or erosions. No Dorado's mucosa. Stomach revealed patchy erythema and erosions in the antrum. Biopsy. Normal pylorus with no obstruction. Normal duodenum with no ulcers. The Schatzki's ring was dilated with a Rhodes dilator.;     ESOPHAGEAL MANOMETRY N/A 12/18/2018    Procedure: ESOPHAGEAL MANOMETRY;  Surgeon: Endo, Nurse Performed;  Location: [facility] ENDOSCOPY;  Service: Gastroenterology    ESOPHAGOSCOPY N/A 01/21/2019     Procedure: FLEXIBLE ESOPHAGOSCOPY;  Surgeon: Reji Johnson MD;  Location: [facility] MAIN OR;  Service: General    HIATAL HERNIA REPAIR N/A 01/21/2019    Procedure: Laparoscopic paraesophageal hernia repair with toupee fundoplication;  Surgeon: Reji Johnson MD;  Location: [facility] MAIN OR;  Service: General    INCONTINENCE SURGERY  1979 1979--a bladder tack procedure for urinary stress incontinence    KNEE ARTHROSCOPY Right 12/12/2011 12/12/2011--right knee arthroscopy with partial lateral and medial meniscectomies.    SKIN SURGERY  05/25/2010 05/25/2010--skin lesion excised from right lower extremity. 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    TONSILLECTOMY  1953    TOTAL ABDOMINAL HYSTERECTOMY WITH SALPINGO OOPHORECTOMY  1974 1974--total abdominal hysterectomy.           Current Outpatient Medications:     amLODIPine (NORVASC) 5 MG tablet, Half a pill every morning orally for high blood pressure, Disp: , Rfl:     aspirin 81 MG EC tablet, Take 1 tablet by mouth Daily. HELD, Disp: , Rfl:     Brexpiprazole (Rexulti) 1 MG tablet, Take 2 mg by mouth Daily., Disp: , Rfl:     buPROPion XL (WELLBUTRIN XL) 300 MG 24 hr tablet, Take 1 tablet by mouth Every Morning., Disp: , Rfl:     Cholecalciferol (vitamin D3) 125 MCG (5000 UT) capsule capsule, 1 by mouth daily as directed, Disp: 30 capsule, Rfl:     Cimetidine (TAGAMET PO), Tagamet, Disp: , Rfl:     diclofenac (VOLTAREN) 1 % gel gel, Apply 4 g topically to the appropriate area as directed 4 (Four) Times a Day As Needed., Disp: , Rfl:     diphenhydrAMINE-APAP, sleep, (Tylenol PM Extra Strength)  MG/30ML liquid, Tylenol PM Extra Strength, Disp: , Rfl:     estradiol (ESTRACE) 1 MG tablet, TAKE 1 TABLET BY MOUTH EVERY DAY, Disp: 90 tablet, Rfl: 0    HYDROcodone-acetaminophen (NORCO)  MG per tablet, Take 1 tablet by mouth Every 6 (Six) Hours As Needed for Moderate Pain., Disp: , Rfl:     Misc Natural Products  (COLON CLEANSE PO), Take  by mouth., Disp: , Rfl:     spironolactone (ALDACTONE) 100 MG tablet, TAKE 1 TABLET BY MOUTH EVERY MORNING FOR HIGH BLOOD PRESSURE AND LEG SWELLING/EDEMA, Disp: 90 tablet, Rfl: 0    traZODone (DESYREL) 50 MG tablet, Take 0.5-1 tablets by mouth Every Night., Disp: , Rfl:     venlafaxine XR (EFFEXOR-XR) 150 MG 24 hr capsule, Take 1 capsule by mouth Every Morning., Disp: , Rfl:     venlafaxine XR (EFFEXOR-XR) 75 MG 24 hr capsule, Take one 75 mg capsule venlafaxine along with one 150 mg capsule daily for depression.  Total daily dose is 225 mg., Disp: , Rfl:     vitamin E 400 UNIT capsule, Take 1 capsule by mouth Daily., Disp: , Rfl:       Family History   Problem Relation Age of Onset    Heart attack Mother         dies age 47 from heart attack    Heart disease Mother     Bleeding Disorder Mother     Heart disease Father     Ovarian cancer Maternal Grandmother     Heart attack Maternal Aunt         dies age 60 from heart attack    Malig Hyperthermia Neg Hx     Breast cancer Neg Hx          Social History     Socioeconomic History    Marital status:      Spouse name: ander    Number of children: 4    Years of education: 14    Highest education level: Associate degree: academic program   Tobacco Use    Smoking status: Never    Smokeless tobacco: Never    Tobacco comments:     None   Vaping Use    Vaping Use: Never used   Substance and Sexual Activity    Alcohol use: No    Drug use: No    Sexual activity: Not Currently     Partners: Male     Birth control/protection: Diaphragm, Birth control pill, Hysterectomy         Allergies   Allergen Reactions    Statins Nausea And Vomiting    Morphine Hallucinations    Penicillins Itching    Tetanus Toxoids Itching         Pain Scale: 5/10        ROS:  Review of Systems   Musculoskeletal:  Positive for gait problem.   Neurological:  Positive for tremors (bi lat hands), weakness (bi lat arms/legs/hands) and numbness (bi lat feet/hands). Negative for  "dizziness, seizures, syncope, facial asymmetry, speech difficulty, light-headedness and headaches.   Psychiatric/Behavioral:  Positive for agitation, confusion and decreased concentration. Negative for behavioral problems, dysphoric mood, hallucinations, self-injury, sleep disturbance and suicidal ideas. The patient is nervous/anxious. The patient is not hyperactive.        I have reviewed and agree with the above ROS completed by the medical assistant.      Physical Exam:  Vitals:    01/22/24 1424   Weight: 55.3 kg (122 lb)   Height: 152.4 cm (60\")     Orthostatic BP:    Body mass index is 23.83 kg/m².    Physical Exam  General: Well-developed white female no acute distress  HEENT: Normocephalic no evidence of trauma.  Discs poorly seen.  Throat negative.  Mildly masked face  Neck: Supple.  No thyromegaly.  No cervical bruits.  Heart: Regular rate and rhythm .  No pedal edema  Extremities: Radial pulses strong and simultaneous      Neurological Exam:   Mental Status: Awake, alert, oriented 8/10 on Mini-Mental state exam conversant without evidence of an affective disorder, thought disorder, delusions or hallucinations.  Attention span and concentration are normal.  HCF: No aphasia, apraxia or dysarthria but voice seems to be a bit low in volume.  Immediate and delayed recall 3/3.  Knowledge of recent events intact.  Patient recalls 15 animals in 1 minute.  Clock draw 4/4.  MMSE score 26/30  CN: I:   II: Visual fields full without left inattention   III, IV, VI: Eye movements intact without nystagmus or ptosis.  Pupils equal  round and reactive to light.   V,VII: Light touch and pinprick intact all 3 divisions of V.  Facial muscles symmetrical.   VIII: Hearing intact to finger rub   IX,X: Soft palate elevates symmetrically   XI: Sternomastoid and trapezius are strong.   XII: Tongue midline without atrophy or fasciculations  Motor: Mild cogwheeling bilaterally arms and legs with good bulk in the upper and lower " "extremities   Power testing: Full power in all muscles tested arms and legs  Reflexes: Upper extremities: Diffusely brisk        Lower extremities: Diffusely brisk        Toe signs: Equivocal  Sensory: Light touch: Reduced in the fingers and feet otherwise diffusely intact arms and legs        Pinprick: Reduced in the fingers and feet.  Otherwise diffusely intact arms and legs        Vibration: Intact ankles        Position: Intact great toes    Cerebellar: Finger-to-nose: Intact but slowed           Rapid movement: Intact           Heel-to-shin: Intact but slowed  Gait and Station: Comes to stand with some effort.  Shuffles.  Reduced arm swing.  No tremor seen during ambulation    Results:      Lab Results   Component Value Date    GLUCOSE 93 06/07/2023    BUN 21 06/07/2023    CREATININE 1.10 (H) 06/07/2023    EGFRIFNONA 45 (L) 11/17/2021    EGFRIFAFRI 51 (L) 03/09/2020    BCR 19.1 06/07/2023    CO2 26.0 06/07/2023    CALCIUM 9.4 06/07/2023    PROTENTOTREF 6.5 12/27/2022    ALBUMIN 4.1 06/07/2023    LABIL2 1.7 12/27/2022    AST 18 06/07/2023    ALT 15 06/07/2023       Lab Results   Component Value Date    WBC 10.20 06/07/2023    HGB 14.0 06/07/2023    HCT 41.9 06/07/2023    HCT 41.7 06/07/2023    MCV 88.2 06/07/2023     06/07/2023         .  Lab Results   Component Value Date    RPR Non-Reactive 10/14/2021         Lab Results   Component Value Date    TSH 0.924 06/07/2023         Lab Results   Component Value Date    SBUVRAUB71 451 06/07/2023         Lab Results   Component Value Date    FOLATE 7.50 10/14/2021         No results found for: \"HGBA1C\"      Lab Results   Component Value Date    GLUCOSE 93 06/07/2023    BUN 21 06/07/2023    CREATININE 1.10 (H) 06/07/2023    EGFRIFNONA 45 (L) 11/17/2021    EGFRIFAFRI 51 (L) 03/09/2020    BCR 19.1 06/07/2023    K 4.5 06/07/2023    CO2 26.0 06/07/2023    CALCIUM 9.4 06/07/2023    PROTENTOTREF 6.5 12/27/2022    ALBUMIN 4.1 06/07/2023    LABIL2 1.7 12/27/2022    AST " 18 06/07/2023    ALT 15 06/07/2023         Lab Results   Component Value Date    WBC 10.20 06/07/2023    HGB 14.0 06/07/2023    HCT 41.9 06/07/2023    HCT 41.7 06/07/2023    MCV 88.2 06/07/2023     06/07/2023             Assessment:   1.  Parkinson's disease, mild   2.  Suspicion of sensory peripheral neuropathy  3.  Mild cognitive impairment versus mild dementia      Plan:  1.  Trial of carbidopa/levodopa, 10/100, half tablet 3 times daily for 1 to 2 weeks then 1 tablet 3 times daily.  If this is not helpful call in with further dosage adjustment.  I started her out on a small dose of carbidopa but likely escalate to 25/100 on follow-up  2.  Will hold on further investigation of the peripheral neuropathy and MCI but this will likely need further investigation in the future  3.  Follow-up with Lacy Bonilla in about 3 months      Time: 60 minutes                Dictated utilizing Dragon dictation.

## 2024-03-19 ENCOUNTER — OFFICE VISIT (OUTPATIENT)
Dept: INTERNAL MEDICINE | Facility: CLINIC | Age: 84
End: 2024-03-19
Payer: MEDICARE

## 2024-03-19 VITALS
BODY MASS INDEX: 24.7 KG/M2 | DIASTOLIC BLOOD PRESSURE: 48 MMHG | WEIGHT: 125.8 LBS | SYSTOLIC BLOOD PRESSURE: 110 MMHG | RESPIRATION RATE: 12 BRPM | HEIGHT: 60 IN | HEART RATE: 68 BPM | OXYGEN SATURATION: 96 %

## 2024-03-19 DIAGNOSIS — D64.9 CHRONIC ANEMIA: Chronic | ICD-10-CM

## 2024-03-19 DIAGNOSIS — N18.2 CHRONIC RENAL INSUFFICIENCY, STAGE II (MILD): Chronic | ICD-10-CM

## 2024-03-19 DIAGNOSIS — E55.9 VITAMIN D DEFICIENCY: Chronic | ICD-10-CM

## 2024-03-19 DIAGNOSIS — R53.82 CHRONIC FATIGUE: Chronic | ICD-10-CM

## 2024-03-19 DIAGNOSIS — E04.2 MULTINODULAR GOITER: Chronic | ICD-10-CM

## 2024-03-19 DIAGNOSIS — I10 BENIGN ESSENTIAL HYPERTENSION: Primary | Chronic | ICD-10-CM

## 2024-03-19 DIAGNOSIS — E26.9 HYPERALDOSTERONISM: Chronic | ICD-10-CM

## 2024-03-19 DIAGNOSIS — E87.6 HYPOKALEMIA: Chronic | ICD-10-CM

## 2024-03-19 DIAGNOSIS — G20.B2 PARKINSON'S DISEASE WITH DYSKINESIA AND FLUCTUATING MANIFESTATIONS: Chronic | ICD-10-CM

## 2024-03-19 DIAGNOSIS — E78.2 MIXED HYPERLIPIDEMIA: Chronic | ICD-10-CM

## 2024-03-19 DIAGNOSIS — R26.89 BALANCE DISORDER: Chronic | ICD-10-CM

## 2024-03-19 PROBLEM — R25.1 TREMOR OF LEFT HAND: Status: RESOLVED | Noted: 2023-12-14 | Resolved: 2024-03-19

## 2024-03-19 PROBLEM — R41.3 SHORT-TERM MEMORY LOSS: Chronic | Status: ACTIVE | Noted: 2018-05-04

## 2024-03-19 RX ORDER — SPIRONOLACTONE 100 MG/1
TABLET, FILM COATED ORAL
Start: 2024-03-19

## 2024-03-19 NOTE — PROGRESS NOTES
03/19/2024    Patient Information  Sachi Vora                                                                                          [address] [place] [address]      1940  [unfilled]  There is no work phone number on file.    Chief Complaint:     Follow-up medical problems as noted below.    History of Present Illness:    Patient with suspected parkinsonism who is also had blood pressure that has been elevated but then subsequently got low and necessitated just cutting back on her amlodipine from 10 mg/day down to 5 mg.  She was doing fairly well up until a few weeks ago when the neurologist started her on medication for parkinsonism and since that time she has had quite a bit of fatigue as well as some occasional lightheadedness.  She fell once but with no significant injury.  She is just quite tired.  Her past medical history reviewed and updated were necessary.  She reports her tremor and Parkinson features are better with the carbidopa/levodopa medication.    Review of Systems   Constitutional: Positive for malaise/fatigue.   HENT: Negative.     Eyes: Negative.    Cardiovascular: Negative.  Negative for orthopnea.        Occasional lightheadedness   Respiratory: Negative.     Endocrine: Negative.    Hematologic/Lymphatic: Negative.    Skin: Negative.    Musculoskeletal: Negative.    Gastrointestinal: Negative.    Genitourinary: Negative.    Neurological: Negative.    Psychiatric/Behavioral: Negative.     Allergic/Immunologic: Negative.        Active Problems:    Patient Active Problem List   Diagnosis    Osteoporosis, 03/29/2011--lumbar spine -2.8.  Right femoral neck -2.4.  Left femoral neck -2.4.  Patient receives Reclast infusions.    Therapeutic drug monitoring    Simple renal cyst    Benign essential hypertension    Carotid artery plaque, 04/02/2018--mild right ICA plaque, normal left ICA 10/03/2014--mild bilateral carotid artery plaque.    Chronic gastritis    Chronic  "lower back pain    Chronic otitis externa    Chronic renal insufficiency, stage II (mild), creatinine 1.12    Depression with anxiety    Functional murmur, 07/15/2015--normal echocardiogram.    Hyperlipidemia    Chronic migraine w/o aura w/o status migrainosus, not intractable    Pancreatic Duct Dilation    Bilateral lower extremity edema    Restless legs syndrome    Bilateral sensorineural hearing loss    Vitamin D deficiency    Chronic gastric ulcer    Chronic fatigue    Hypersomnolence    Chronic anemia    Multinodular goiter    Generalized anxiety disorder    Iron deficiency anemia    Statin intolerance    Postmenopausal state    Short-term memory loss    Pulmonary hypertension    Gastroesophageal reflux disease without esophagitis    Tinnitus of both ears    Chronic hoarseness    Pain disorder associated with psychological factors    Frequent falls    Balance disorder, related to Parkinson's    Hyperaldosteronism    Hypokalemia    Chronic pain of left knee    Primary osteoarthritis of left knee    Chronic hoarseness    Parkinson's disease with dyskinesia and fluctuating manifestations         Past Medical History:   Diagnosis Date    Balance disorder, related to Parkinson's 02/05/2021 February 5, 2021--patient seen in follow-up by Dr. Weir.  I extensively reviewed the documentation from the emergency room visit as noted below.  I reviewed the history with the patient and she is describing episodes of dizziness described as a spinning sensation of her body and this seems to be precipitated by movement although it does not occur if she rolls over in bed.  If she stands up and st    Benign essential hypertension 04/08/2016    Bilateral sensorineural hearing loss 03/31/2011 03/31/2011--etiology reveals reverse \"cookie bite\" type of hearing loss for both ears of mild/moderate degree, mostly sensorineural.  Speech discrimination 100% on the right, 96% on the left.    Carotid artery plaque, 10/03/2014--mild " bilateral carotid artery plaque. 07/25/2012    10/03/2014--[facility] screening revealed mild bilateral carotid plaque, negative for atrial fibrillation, negative for AAA, negative for PAD, osteoporosis screen revealed osteopenia.  Body mass index was 25 and considered to be moderate risk.  07/25/2012--vascular screen negative for carotid plaque, negative for abdominal aneurysm, negative for PAD Description: 10/03/2014--[facility] screening revealed mild bilateral carotid plaque, negative for atrial fibrillation, negative for AAA, negative for PAD, osteoporosis screen revealed osteopenia.  Body mass index was 25 and considered to be moderate risk.  07/25/2012--vascular screen negative for carotid plaque, negative for abdominal aneurysm, negative for PAD    Chronic anemia 08/23/2016 06/13/2017--colonoscopy revealed melanosis.  Biopsied.  There was friability with contact bleeding in the ascending colon.  Biopsied.  Pathology returned consistent with melanosis coli.  06/13/2017--EGD revealed normal esophagus.  Biopsies taken.  There was a medium-sized hiatal hernia.  Localized mild inflammation and linear erosions were found in the prepyloric region.  Biopsied.  Examined duodenum was normal.  Random biopsies taken.  Pathology of the gastroesophageal junction was unremarkable as was the gastric fundus.  Prepyloric biopsy revealed minimal chronic inflammation and reactive change.  H pylori negative.  Duodenal biopsies are negative.  04/12/2017--hemoglobin low at 10.8, hematocrit is normal at 35.7.  Iron sulfate 325 mg per day initiated.  08/23/2016--routine follow-up.  Patient continues to have epigastric abdominal pain believed to be related to reflux with possible esophageal spasm.  Hemoglobin noted to be low at 10.6 with a hematocrit low at 33.7 and RDW elevated at 16.2.  Homocysti    Chronic fatigue 04/21/2016 06/14/2017--overnight oximetry revealed oxygen desaturation index of only 0.44.  There were only 10 total  desaturations during the period of testing which lasted 6 hours and 46 minutes.  05/31/2017--patient seen in follow-up and reports she continues to have chronic fatigue as well as hypersomnolence.  Once again, she is on multiple medications that are undoubtedly contributing to this problem including clonazepam and hydrocodone but I doubt that these could be discontinued.  Her CBC back in April revealed a low hemoglobin of 10.8 but hematocrit was normal at 35.7.  Thyroid function tests were normal and CMP normal.  Overnight oximetry test ordered.  Repeat CBC.  Iron studies.  Sedimentation rate and CRP.  04/11/2017--patient seen in follow-up and her blood pressure is now at a reasonable level at 120/64.  She reports she still feels somewhat fatigued but she is much better.  Patient has not been doing any regular exercise and I do think that that would be helpful to reduce her feeling of fatigue.  She is    Chronic gastric ulcer 04/14/2014    02/15/2017--patient seen in follow-up in nearly 6 months later.  She has complaints of not feeling well all over.  She has complaints of diffuse myalgias and possibly tendinopathy related to Levaquin that I called in prior to her going to [place] for vacation.  She reports that 3 or 4 days after starting the Levaquin she began to have the musculoskeletal symptoms and she reports that she continues to have them presently.  Symptoms are worse involving her left calf area.  Examination reveals significant tenderness involving the left calf and the left calf seems a little larger than the right.  She also has complaints of shortness of breath but no chest pain.  She is complaining of double vision and generalized weakness and fatigue.  She was complaining of chronic fatigue at the last visit back in September and I ordered an overnight oximetry test but apparently this was never done for reasons that are not clear to me.  Her current oxygen saturation is 94% and normally it is 100%.   Plan is as follows: Extensi    Chronic gastritis 11/19/2001    02/15/2017--patient seen in follow-up in nearly 6 months later.  She has complaints of not feeling well all over.  She has complaints of diffuse myalgias and possibly tendinopathy related to Levaquin that I called in prior to her going to [place] for vacation.  She reports that 3 or 4 days after starting the Levaquin she began to have the musculoskeletal symptoms and she reports that she continues to have them presently.  Symptoms are worse involving her left calf area.  Examination reveals significant tenderness involving the left calf and the left calf seems a little larger than the right.  She also has complaints of shortness of breath but no chest pain.  She is complaining of double vision and generalized weakness and fatigue.  She was complaining of chronic fatigue at the last visit back in September and I ordered an overnight oximetry test but apparently this was never done for reasons that are not clear to me.  Her current oxygen saturation is 94% and normally it is 100%.  Plan is as follows: Extensi    Chronic hoarseness 06/08/2020    September 15, 2020--patient presents with complaints of swelling of the soft tissues of the left side of the neck and this is associated with pain and discomfort, particularly when she turns her head rotating to the left.  She also notices hoarseness and feels that her voice has changed.  On exam there is definite prominence of the left sternocleidomastoid muscle and there may be some shotty lymph    Chronic lower back pain 01/31/2006 08/11/2014--MRI of the lumbar spine reveals the conus terminates at L2 and is normal.  Cauda equina unremarkable.  Stable to moderate degenerative disc disease at L5-S1.  No acute fracture or pars defect is demonstrated.  Small synovial cysts are seen posterior to the L4-L5 facet.  The perivertebral soft tissues are unremarkable.  T12-L1, L1-L2, L2-L3 are negative.  L3-L4 a  broad-based disc bulge resulting in mild bilateral neural foraminal narrowing.  L4-L5 reveals a broad-based disc bulge facet disease resulting in mild bilateral neural foraminal narrowing.  L5-S1 reveals a broad-based disc bulge, posterior osseous slipping, and facet disease resulting in mild to moderate bilateral neural foraminal narrowing.  Assessment is stable mild to moderate degenerative spondylosis.  Small synovial cysts are seen posterior to the L4-L5 facets.  08/11/2014--MRI of the thoracic spine reveals mild to moderate thoracic kyphosis.  No fracture.  At T5--T6 there is a moderate sized left paracentral disc protrusion which i    Chronic migraine 11/03/2009 01/18/2010--MRI of the brain performed for headaches and memory loss.  Mild small vessel disease in the cerebral and central pontine white matter.  There is an ovoid, somewhat pancake-shaped area of signal abnormality in the anterior inferior right temporal lobe subcortical to the juxtacortical white matter that measures 1.2 x 1 cm and anterior posterior and medial lateral dimension but only measures 3 mm in cranial caudal dimension.  I suspect that this is a benign cyst or a cystic area of encephalomalacia.  The remainder of the MRI of the head is within normal limits.  11/03/2009--CT scan of the brain without contrast performed for headache after a fall.  Mild diffuse atrophy.  No acute abnormality noted.; Description: Patient has had a long history of migraine headaches.  MRI and CT scan of the brain have been essentially negative.    Chronic otitis externa 04/08/2016    Chronic renal insufficiency, stage II (mild), creatinine 1.12 11/14/2015 11/14/2015--serum creatinine mildly elevated at 1.12.    Depression with anxiety 04/08/2016    Frequent falls 02/05/2021 February 5, 2021--patient seen in follow-up by Dr. Weir.  I extensively reviewed the documentation from the emergency room visit as noted below.  I reviewed the history with the  patient and she is describing episodes of dizziness described as a spinning sensation of her body and this seems to be precipitated by movement although it does not occur if she rolls over in bed.  If she stands up and st    Functional murmur, 07/15/2015--normal echocardiogram. 07/15/2015    07/15/2015--echocardiogram reveals normal left ventricular size and function with ejection fraction 55%.  Grade 1 diastolic dysfunction, abnormal relaxation pattern.  Trace tricuspid regurgitation.  Estimated right ventricular systolic pressure is 25 mmHg which is normal.    Gastroesophageal reflux disease without esophagitis 06/06/2019    Generalized anxiety disorder 04/11/2017    Glaucoma     History of Acute deep vein thrombosis (DVT) of distal end of left lower extremity 02/15/2017    03/21/2017 patient seen in follow-up and she is tolerating the Eliquis well without signs or symptoms of bleeding.  Her calf swelling and tenderness is better but not totally resolved.  I suspect that the DVT is chronic and may not resolve at all.  I will order a repeat venous study and then proceed from there.  02/23/2017--repeat Doppler venous study of the left lower extremity reveals a chronic left lower extremity DVT in the posterior tibial.  All other left sided veins appeared normal.  Fluid collection in the left calf noted.  02/17/2017--patient seen in follow-up and reports her left lower extremity symptoms are about the same.  She continues to have complaints of profound fatigue which I think is multifactorial including underlying depression that is not in remission.  Review the results of the CTA of the chest including the possible thyroid lesion.  I do not think this is contributing to any of her symptoms of fatigue particularly given the fact that her thyroid function tests are normal.  I expla    History of palpitations 12/07/2011    Patient has had multiple admissions to the hospital for complaints of chest pain and heart  palpitations. She meant admitted at least on 3 occasions. She has had at least 3 stress Cardiolite and 1 stress ECG, the last Cardiolite being performed 12/07/2011 which was negative. Patient is also had Holter monitors which have been unremarkable.    HIstory of Schatzki's ring 02/28/2012 02/28/2012--air-contrast upper GI revealed small to moderate sized reducible sliding hiatal herniation of the upper stomach with some demonstrated gastroesophageal reflux. No esophageal, gastric, or duodenal mass or mucosal ulceration was seen.  11/03/2008--EGD performed for evaluation of iron deficiency anemia revealed hiatal hernia without evidence of reflux, prepyloric antritis and    Hyperlipidemia 04/08/2016    Hypersomnolence 05/05/2016 06/14/2017--overnight oximetry revealed oxygen desaturation index of only 0.44.  There were only 10 total desaturations during the period of testing which lasted 6 hours and 46 minutes.  05/31/2017--patient seen in follow-up and reports she continues to have chronic fatigue as well as hypersomnolence.  Once again, she is on multiple medications that are undoubtedly contributing to this problem including clonazepam and hydrocodone but I doubt that these could be discontinued.  Her CBC back in April revealed a low hemoglobin of 10.8 but hematocrit was normal at 35.7.  Thyroid function tests were normal and CMP normal.  Overnight oximetry test ordered.  Repeat CBC.  Iron studies.  Sedimentation rate and CRP.  04/11/2017--patient seen in follow-up and her blood pressure is now at a reasonable level at 120/64.  She reports she still feels somewhat fatigued but she is much better.  Patient has not been doing any regular exercise and I do think that that would be helpful to reduce her feeling of fatigue.  She is    Menopausal state 04/08/2016    Multinodular goiter 02/17/2017 02/21/2017--thyroid ultrasound reveals multinodular thyroid with largest nodule measuring on the order of 1.6 cm in  greatest dimension.  02/17/2017--patient seen in follow-up for DVT and CTA of the chest.  An incidental finding on the CTA of the chest reveals a 1.7 cm lesion in the right lobe of thyroid.  Note that thyroid function tests are currently normal.  Ultrasound of the thyroid ordered.    Osteoporosis, 03/29/2011--lumbar spine -2.8.  Right femoral neck -2.4.  Left femoral neck -2.4.  Patient receives Reclast infusions. 02/09/2009 04/19/2017--Reclast infusion.  04/05/2016--Reclast infusion.  06/04/2012--Reclast infusion.  05/26/2011--Reclast infusion.  03/29/2011--DEXA scan revealed a lumbar T score of -2.8, right femoral neck T score -2.4, left femoral neck T score -2.4.  Osteoporosis of the lumbar spine and severe osteopenia of the hips bilaterally.  Patient has been intolerant to Fosamax because of gastritis and gastroesophageal reflux.  04/01/2009--treatment for osteoporosis begun with Fosamax.  02/09/2009--DEXA scan revealed lumbar spine T score of -3.3.  Left femoral neck T score -2.5.  Right hip T score -2.6.  Osteoporosis.     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  CT scan of the abdomen and pelvis reveals a left renal cyst measuring 5.1 x 4.9 cm.  There were subcentimeter hypodense renal lesions that are too small to further characterize and are presumably related to cysts.  Recommend attention to follow up.  Distal dilated pancreatic duct noted.  The common bile duct is the upper limits of normal in size.  The ampullary region is not well evaluated.  Comparison with prior imaging is recommended if available.  If there is no prior film available for comparison, and ERCP or MRCP could be performed to further evaluate.  Small hiatal hernia noted.  03/05/2012--CT scan of the abdomen with contrast, pancreatic protocol.  This reveals some fullness of the pancreatic ductal  system and apparent pancreatic divisum with separate entrance of the accessory pancreatic duct extending into the duodenum distal to the main pancreatic duct.  There is fullness of the duct diffusely but similar to the previous s    Parkinson's disease with dyskinesia and fluctuating manifestations 12/14/2023 December 19, 2023--MRI of the brain without contrast reveals stable findings compared to the prior study dated May 28, 2021.  There are moderate changes of chronic small vessel ischemic phenomena.  Additionally there are findings consistent with an end of the insulin large perivascular space within the anterior portion of the right temporal lobe measuring 1.7 x 1.2 cm in greatest axial dimensions.    Pedal edema 06/29/2015 06/29/2015--patient presents with a 4-6 week history of exertional dyspnea that comes on with activity such as climbing stairs or walking her dog up an incline.  No chest pain.  Relieved with rest.  No cough.  She does have complaints of her feet and legs swelling that is particularly worse at the end of the day and not improved overnight.  No orthopnea or PND.  Chest exam reveals faint rales at the bases.  Otherwise clear.  Heart is regular and I do not appreciate a heart murmur.  Chest x-ray PA and lateral ordered.  Echocardiogram ordered.    Pulmonary hypertension 04/18/2019    Restless legs syndrome 04/08/2016    Short-term memory loss 05/04/2018 05/04/2018--patient reports development of problems with her memory over the past several months and is concerned about possibility of Alzheimer's.  No other neurologic symptoms other than occasional sensation of being off balance.  We will not evaluate that problem at the present time unless it gets worse.  I we will however send her for neuropsychological testing regarding memory loss.      Simple renal cyst 07/20/2009 09/29/2014--patient was again evaluated by the urologist for a renal cyst.  CT scan of the abdomen and pelvis  reveals a left renal cyst measuring 5.1 x 4.9 cm.  There were subcentimeter hypodense renal lesions that are too small to further characterize and are presumably related to cysts.  Recommend attention to follow up.  Distal dilated pancreatic duct noted.  The common bile duct is the upper limits of normal in size.  The ampullary region is not well evaluated.  Comparison with prior imaging is recommended if available.  If there is no prior film available for comparison, and ERCP or MRCP could be performed to further evaluate.  Small hiatal hernia noted.  07/20/2009--patient was noted to have a left renal mass consistent with a cyst.  This was evaluated by the urologist 07/20/2009.    Statin intolerance 10/30/2017    Tinnitus of both ears 09/30/2019 September 30, 2019--patient presents with a several year history of bilateral tinnitus, right greater than left.  It seems to be getting worse and now is associated with fluctuating hearing.  She occasionally will have worsening hearing after she coughs or sneezes.  On exam she has evidence of old otitis media scars, particularly on the right.  There is no acute infection present and there is no a    Tremor of left hand 12/14/2023    Vitamin D deficiency 04/08/2016         Past Surgical History:   Procedure Laterality Date    APPENDECTOMY  1965    1965    CATARACT EXTRACTION Bilateral Remote    Remote bilateral cataract extirpation with intraocular lens implantation.    CHOLECYSTECTOMY WITH INTRAOPERATIVE CHOLANGIOGRAM N/A 01/21/2019 01/21/2019--laparoscopic paraesophageal hernia repair with fundoplication.    COLONOSCOPY  11/03/2008 2008--normal colonoscopy    COLONOSCOPY  2001 2001--normal colonoscopy.    COLONOSCOPY N/A 06/13/2017 06/13/2017--colonoscopy revealed melanosis.  Biopsied.  There was friability with contact bleeding in the ascending colon.  Biopsied.  Pathology returned consistent with melanosis coli.    ENDOSCOPY  10/08/2014     02/28/2012--air-contrast upper GI revealed small to moderate sized reducible sliding hiatal herniation of the upper stomach with some demonstrated gastroesophageal reflux. No esophageal, gastric, or duodenal mass or mucosal ulceration was seen. 11/03/2008--EGD performed for evaluation of iron deficiency anemia revealed hiatal hernia without evidence of reflux, prepyloric antritis and    ENDOSCOPY  11/03/2008 11/03/2008--EGD performed for evaluation of iron deficiency anemia revealed hiatal hernia without evidence of reflux, prepyloric antritis and    ENDOSCOPY  11/19/2001 11/19/2001--EGD revealed a very tortuous distal esophagus with a Schatzki's ring. No ulcer or erosions. No Dorado's mucosa. Stomach revealed patchy erythema and erosions in the antrum. Biopsy. Normal pylorus with no obstruction. Normal duodenum with no ulcers. The Schatzki's ring was dilated with a Rhodes dilator.;    ENDOSCOPY N/A 09/27/2016 09/27/2016--EGD revealed a normal oropharynx, esophagus, and medium sized hiatal hernia.  Nonbleeding gastric ulcer with no stigmata of bleeding.  Biopsy.  Gastritis.  Biopsy.  Normal examined duodenum.  Biopsied.  Pathology returned mild to moderate chronic active gastritis with ulceration from the stomach antral ulcer biopsy.  Gastroesophageal junction biopsy revealed minimal mixed inflammation.    ENDOSCOPY N/A 06/13/2017 06/13/2017--colonoscopy revealed melanosis.  Biopsied.  There was friability with contact bleeding in the ascending colon.  Biopsied.  Pathology returned consistent with melanosis coli.    ERCP N/A 01/22/2019 01/22/2019--ERCP with sphincterotomy and balloon stone extraction.    ESOPHAGEAL DILATATION  11/19/2001 11/19/2001--EGD revealed a very tortuous distal esophagus with a Schatzki's ring. No ulcer or erosions. No Dorado's mucosa. Stomach revealed patchy erythema and erosions in the antrum. Biopsy. Normal pylorus with no obstruction. Normal duodenum with no ulcers.  The Schatzki's ring was dilated with a Rhodes dilator.;     ESOPHAGEAL MANOMETRY N/A 12/18/2018    Procedure: ESOPHAGEAL MANOMETRY;  Surgeon: Cecilia, Nurse Performed;  Location: [facility] ENDOSCOPY;  Service: Gastroenterology    ESOPHAGOSCOPY N/A 01/21/2019    Procedure: FLEXIBLE ESOPHAGOSCOPY;  Surgeon: Reji Johnson MD;  Location: [facility] MAIN OR;  Service: General    HIATAL HERNIA REPAIR N/A 01/21/2019    Procedure: Laparoscopic paraesophageal hernia repair with toupee fundoplication;  Surgeon: Reji Johnson MD;  Location: [facility] MAIN OR;  Service: General    INCONTINENCE SURGERY  1979 1979--a bladder tack procedure for urinary stress incontinence    KNEE ARTHROSCOPY Right 12/12/2011 12/12/2011--right knee arthroscopy with partial lateral and medial meniscectomies.    SKIN SURGERY  05/25/2010 05/25/2010--skin lesion excised from right lower extremity. 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    TONSILLECTOMY  1953    TOTAL ABDOMINAL HYSTERECTOMY WITH SALPINGO OOPHORECTOMY  1974 1974--total abdominal hysterectomy.         Allergies   Allergen Reactions    Statins Nausea And Vomiting    Morphine Hallucinations    Penicillins Itching    Tetanus Toxoids Itching           Current Outpatient Medications:     aspirin 81 MG EC tablet, Take 1 tablet by mouth Daily. HELD, Disp: , Rfl:     Brexpiprazole (Rexulti) 1 MG tablet, Take 2 mg by mouth Daily., Disp: , Rfl:     buPROPion XL (WELLBUTRIN XL) 300 MG 24 hr tablet, Take 1 tablet by mouth Every Morning., Disp: , Rfl:     carbidopa-levodopa (SINEMET)  MG per tablet, 1/2 tab by mouth 3 times daily for 1-2 weeks then 1 tab 3 times daily, Disp: 90 tablet, Rfl: 5    Cholecalciferol (vitamin D3) 125 MCG (5000 UT) capsule capsule, 1 by mouth daily as directed, Disp: 30 capsule, Rfl:     Cimetidine (TAGAMET PO), Tagamet, Disp: , Rfl:     diclofenac (VOLTAREN) 1 % gel gel, Apply 4 g topically to the appropriate area as  directed 4 (Four) Times a Day As Needed., Disp: , Rfl:     diphenhydrAMINE-APAP, sleep, (Tylenol PM Extra Strength)  MG/30ML liquid, Tylenol PM Extra Strength, Disp: , Rfl:     estradiol (ESTRACE) 1 MG tablet, TAKE 1 TABLET BY MOUTH EVERY DAY, Disp: 90 tablet, Rfl: 0    HYDROcodone-acetaminophen (NORCO)  MG per tablet, Take 1 tablet by mouth Every 6 (Six) Hours As Needed for Moderate Pain., Disp: , Rfl:     Misc Natural Products (COLON CLEANSE PO), Take  by mouth., Disp: , Rfl:     spironolactone (ALDACTONE) 100 MG tablet, Take a half a tablet (50 mg) a day for high blood pressure and leg swelling, Disp: , Rfl:     traZODone (DESYREL) 50 MG tablet, Take 0.5-1 tablets by mouth Every Night., Disp: , Rfl:     venlafaxine XR (EFFEXOR-XR) 150 MG 24 hr capsule, Take 1 capsule by mouth Every Morning., Disp: , Rfl:     venlafaxine XR (EFFEXOR-XR) 75 MG 24 hr capsule, Take one 75 mg capsule venlafaxine along with one 150 mg capsule daily for depression.  Total daily dose is 225 mg., Disp: , Rfl:     vitamin E 400 UNIT capsule, Take 1 capsule by mouth Daily., Disp: , Rfl:       Family History   Problem Relation Age of Onset    Heart attack Mother         dies age 47 from heart attack    Heart disease Mother     Bleeding Disorder Mother     Heart disease Father     Ovarian cancer Maternal Grandmother     Heart attack Maternal Aunt         dies age 60 from heart attack    Malig Hyperthermia Neg Hx     Breast cancer Neg Hx          Social History     Socioeconomic History    Marital status:      Spouse name: ander    Number of children: 4    Years of education: 14    Highest education level: Associate degree: academic program   Tobacco Use    Smoking status: Never    Smokeless tobacco: Never    Tobacco comments:     None   Vaping Use    Vaping status: Never Used   Substance and Sexual Activity    Alcohol use: No    Drug use: No    Sexual activity: Not Currently     Partners: Male     Birth  "control/protection: Diaphragm, Birth control pill, Hysterectomy         Vitals:    03/19/24 1323 03/19/24 1355   BP: 98/48 110/48   BP Location: Left arm Left arm   Patient Position: Sitting Sitting   Cuff Size: Small Adult Adult   Pulse: 68    Resp: 12    SpO2: 96%    Weight: 57.1 kg (125 lb 12.8 oz)    Height: 152.4 cm (60\")         Body mass index is 24.57 kg/m².      Physical Exam:    General: Alert and oriented x 3.  No acute distress.  Normal affect.  HEENT: Pupils equal, round, reactive to light; extraocular movements intact; sclerae nonicteric; pharynx, ear canals and TMs normal.  Neck: Without JVD, thyromegaly, bruit, or adenopathy.  Lungs: Clear to auscultation in all fields.  Heart: Regular rate and rhythm without murmur, rub, gallop, or click.  Abdomen: Soft, nontender, without hepatosplenomegaly or hernia.  Bowel sounds normal.  : Deferred.  Rectal: Deferred.  Extremities: Without clubbing, cyanosis, edema, or pulse deficit.  Neurologic: Intact without focal deficit.  Normal station and gait observed during ingress and egress from the examination room.  Skin: Without significant lesion.  Musculoskeletal: Unremarkable.    Lab/other results:      Assessment/Plan:     Diagnosis Plan   1. Benign essential hypertension  spironolactone (ALDACTONE) 100 MG tablet    Comprehensive Metabolic Panel      2. Chronic renal insufficiency, stage II (mild), creatinine 1.12  CBC (No Diff)    Comprehensive Metabolic Panel      3. Hyperaldosteronism  spironolactone (ALDACTONE) 100 MG tablet      4. Hypokalemia  Comprehensive Metabolic Panel      5. 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 Parkinson's disease with dyskinesia and fluctuating manifestations        7. Hyperlipidemia  T4, Free    T3, Free    TSH    NMR LipoProfile      8. Vitamin D deficiency  Vitamin D,25-Hydroxy      9. Chronic fatigue        10. Multinodular goiter  T4, Free    T3, Free    TSH      11. Chronic anemia  CBC (No Diff)        Patient " seen in follow-up regarding her renal function as well as blood pressure and diagnosis of hyperaldosteronism with low potassium.  Patient has become very fatigued and lightheaded at times after initiation of levodopa carbidopa by the neurologist.  Indeed her blood pressure is on the low side today despite the fact that we had reduced her amlodipine from 10 mg down to 5 mg a couple of months ago.  The new medication can certainly be lowering her blood pressure and the Parkinson's itself can cause fluctuating blood pressure and I am worried about patient's risk of falling and injuring herself and brother her blood pressure be a little bit on the high side then to be significantly low.  Medication adjustment is indicated.    Start plan is as follows: Patient should discontinue amlodipine altogether.  She should start cutting her spironolactone, 100 mg, and half every day for a total dose of 50 mg/day.  I will have her follow back up in about 3 to 4 weeks to reassess her symptoms and the blood pressure.  I realize that has been quite sometime since patient has had some blood work and therefore we will go ahead and obtain the blood work today and review the results when she comes back in about 3 weeks.        Procedures

## 2024-03-20 LAB
25(OH)D3+25(OH)D2 SERPL-MCNC: 55.2 NG/ML (ref 30–100)
ALBUMIN SERPL-MCNC: 4.3 G/DL (ref 3.7–4.7)
ALBUMIN/GLOB SERPL: 1.8 {RATIO} (ref 1.2–2.2)
ALP SERPL-CCNC: 60 IU/L (ref 44–121)
ALT SERPL-CCNC: 9 IU/L (ref 0–32)
AST SERPL-CCNC: 18 IU/L (ref 0–40)
BILIRUB SERPL-MCNC: 0.4 MG/DL (ref 0–1.2)
BUN SERPL-MCNC: 21 MG/DL (ref 8–27)
BUN/CREAT SERPL: 18 (ref 12–28)
CALCIUM SERPL-MCNC: 9.7 MG/DL (ref 8.7–10.3)
CHLORIDE SERPL-SCNC: 101 MMOL/L (ref 96–106)
CHOLEST SERPL-MCNC: 207 MG/DL (ref 100–199)
CO2 SERPL-SCNC: 20 MMOL/L (ref 20–29)
CREAT SERPL-MCNC: 1.17 MG/DL (ref 0.57–1)
EGFRCR SERPLBLD CKD-EPI 2021: 47 ML/MIN/1.73
ERYTHROCYTE [DISTWIDTH] IN BLOOD BY AUTOMATED COUNT: 13.5 % (ref 11.7–15.4)
GLOBULIN SER CALC-MCNC: 2.4 G/DL (ref 1.5–4.5)
GLUCOSE SERPL-MCNC: 79 MG/DL (ref 70–99)
HCT VFR BLD AUTO: 43.6 % (ref 34–46.6)
HDL SERPL-SCNC: 64.3 UMOL/L
HDLC SERPL-MCNC: 109 MG/DL
HGB BLD-MCNC: 14.1 G/DL (ref 11.1–15.9)
LDL SERPL QN: 20.9 NM
LDL SERPL-SCNC: 656 NMOL/L
LDL SMALL SERPL-SCNC: <90 NMOL/L
LDLC SERPL CALC-MCNC: 78 MG/DL (ref 0–99)
MCH RBC QN AUTO: 29.3 PG (ref 26.6–33)
MCHC RBC AUTO-ENTMCNC: 32.3 G/DL (ref 31.5–35.7)
MCV RBC AUTO: 91 FL (ref 79–97)
PLATELET # BLD AUTO: 350 X10E3/UL (ref 150–450)
POTASSIUM SERPL-SCNC: 4.7 MMOL/L (ref 3.5–5.2)
PROT SERPL-MCNC: 6.7 G/DL (ref 6–8.5)
RBC # BLD AUTO: 4.81 X10E6/UL (ref 3.77–5.28)
SODIUM SERPL-SCNC: 136 MMOL/L (ref 134–144)
T3FREE SERPL-MCNC: 2.5 PG/ML (ref 2–4.4)
T4 FREE SERPL-MCNC: 1.19 NG/DL (ref 0.82–1.77)
TRIGL SERPL-MCNC: 121 MG/DL (ref 0–149)
TSH SERPL DL<=0.005 MIU/L-ACNC: 1.35 UIU/ML (ref 0.45–4.5)
WBC # BLD AUTO: 11.7 X10E3/UL (ref 3.4–10.8)

## 2024-04-09 ENCOUNTER — OFFICE VISIT (OUTPATIENT)
Dept: INTERNAL MEDICINE | Facility: CLINIC | Age: 84
End: 2024-04-09
Payer: MEDICARE

## 2024-04-09 VITALS
TEMPERATURE: 97 F | WEIGHT: 128 LBS | HEART RATE: 78 BPM | DIASTOLIC BLOOD PRESSURE: 76 MMHG | SYSTOLIC BLOOD PRESSURE: 118 MMHG | BODY MASS INDEX: 25.13 KG/M2 | OXYGEN SATURATION: 94 % | RESPIRATION RATE: 18 BRPM | HEIGHT: 60 IN

## 2024-04-09 DIAGNOSIS — I10 BENIGN ESSENTIAL HYPERTENSION: Primary | Chronic | ICD-10-CM

## 2024-04-09 DIAGNOSIS — R29.6 FREQUENT FALLS: Chronic | ICD-10-CM

## 2024-04-09 DIAGNOSIS — R26.9 GAIT DISORDER: ICD-10-CM

## 2024-04-09 DIAGNOSIS — E55.9 VITAMIN D DEFICIENCY: Chronic | ICD-10-CM

## 2024-04-09 DIAGNOSIS — E78.2 MIXED HYPERLIPIDEMIA: Chronic | ICD-10-CM

## 2024-04-09 DIAGNOSIS — E04.2 MULTINODULAR GOITER: Chronic | ICD-10-CM

## 2024-04-09 DIAGNOSIS — Z51.81 THERAPEUTIC DRUG MONITORING: ICD-10-CM

## 2024-04-09 DIAGNOSIS — N18.31 STAGE 3A CHRONIC KIDNEY DISEASE: ICD-10-CM

## 2024-04-09 DIAGNOSIS — D50.0 IRON DEFICIENCY ANEMIA DUE TO CHRONIC BLOOD LOSS: Chronic | ICD-10-CM

## 2024-04-09 DIAGNOSIS — G20.B2 PARKINSON'S DISEASE WITH DYSKINESIA AND FLUCTUATING MANIFESTATIONS: Chronic | ICD-10-CM

## 2024-04-09 DIAGNOSIS — E26.9 HYPERALDOSTERONISM: Chronic | ICD-10-CM

## 2024-04-09 DIAGNOSIS — D64.9 CHRONIC ANEMIA: Chronic | ICD-10-CM

## 2024-04-09 DIAGNOSIS — M81.0 AGE-RELATED OSTEOPOROSIS WITHOUT CURRENT PATHOLOGICAL FRACTURE: Chronic | ICD-10-CM

## 2024-04-09 DIAGNOSIS — E87.6 HYPOKALEMIA: Chronic | ICD-10-CM

## 2024-04-09 DIAGNOSIS — R60.0 BILATERAL LOWER EXTREMITY EDEMA: Chronic | ICD-10-CM

## 2024-04-09 DIAGNOSIS — Z78.0 POSTMENOPAUSAL STATE: Chronic | ICD-10-CM

## 2024-04-09 NOTE — PROGRESS NOTES
04/09/2024    Patient Information  Sachi Vora                                                                                          [address] [place] [address]      1940  [unfilled]  There is no work phone number on file.    Chief Complaint:     Follow-up several chronic medical problems noted below.    History of Present Illness:    Patient with a multitude of chronic medical problems as listed below in assessment and plan presents today to follow-up.  Patient had some blood work that we need to review in order to monitor these chronic medical issues.  She has a history of hypertension and was diagnosed with parkinsonism and since that time her blood pressure has been fluctuating and progressed to the low side with symptoms of lightheadedness/orthostasis at times.  She has a history of risk of falls and therefore we have been backing off of her blood pressure medication in order to get her blood pressure reasonably controlled but not too low as to increase her risk of falls and subsequent injury.  Her past medical history reviewed and updated were necessary including health maintenance parameters.  This reveals she is currently up-to-date or else accounted for.    Review of Systems   Constitutional: Positive for malaise/fatigue. Negative for chills and fever.   HENT: Negative.     Eyes: Negative.    Cardiovascular: Negative.  Negative for leg swelling.   Respiratory: Negative.     Endocrine: Negative.    Hematologic/Lymphatic: Negative.    Skin: Negative.    Musculoskeletal:  Positive for arthritis and joint pain.   Gastrointestinal: Negative.    Genitourinary: Negative.    Neurological:  Positive for disturbances in coordination, loss of balance, tremors and weakness. Negative for numbness, paresthesias and vertigo.   Psychiatric/Behavioral:  Positive for memory loss.    Allergic/Immunologic: Negative.        Active Problems:    Patient Active Problem List   Diagnosis     Osteoporosis, 03/29/2011--lumbar spine -2.8.  Right femoral neck -2.4.  Left femoral neck -2.4.  Patient receives Reclast infusions.    Therapeutic drug monitoring    Simple renal cyst    Benign essential hypertension    Carotid artery plaque, 04/02/2018--mild right ICA plaque, normal left ICA 10/03/2014--mild bilateral carotid artery plaque.    Chronic gastritis    Chronic lower back pain    Chronic otitis externa    Stage 3a chronic kidney disease    Depression with anxiety    Functional murmur, 07/15/2015--normal echocardiogram.    Hyperlipidemia    Chronic migraine w/o aura w/o status migrainosus, not intractable    Pancreatic Duct Dilation    Bilateral lower extremity edema    Restless legs syndrome    Bilateral sensorineural hearing loss    Vitamin D deficiency    Chronic gastric ulcer    Chronic fatigue    Hypersomnolence    Chronic anemia    Multinodular goiter    Generalized anxiety disorder    Iron deficiency anemia    Statin intolerance    Postmenopausal state    Short-term memory loss    Pulmonary hypertension    Gastroesophageal reflux disease without esophagitis    Tinnitus of both ears    Chronic hoarseness    Pain disorder associated with psychological factors    Frequent falls    Balance disorder, related to Parkinson's    Hyperaldosteronism    Hypokalemia    Chronic pain of left knee    Primary osteoarthritis of left knee    Chronic hoarseness    Parkinson's disease with dyskinesia and fluctuating manifestations         Past Medical History:   Diagnosis Date    Balance disorder, related to Parkinson's 02/05/2021 February 5, 2021--patient seen in follow-up by Dr. Weir.  I extensively reviewed the documentation from the emergency room visit as noted below.  I reviewed the history with the patient and she is describing episodes of dizziness described as a spinning sensation of her body and this seems to be precipitated by movement although it does not occur if she rolls over in bed.  If she stands  "up and st    Benign essential hypertension 04/08/2016    Bilateral sensorineural hearing loss 03/31/2011 03/31/2011--etiology reveals reverse \"cookie bite\" type of hearing loss for both ears of mild/moderate degree, mostly sensorineural.  Speech discrimination 100% on the right, 96% on the left.    Carotid artery plaque, 10/03/2014--mild bilateral carotid artery plaque. 07/25/2012    10/03/2014--[facility] screening revealed mild bilateral carotid plaque, negative for atrial fibrillation, negative for AAA, negative for PAD, osteoporosis screen revealed osteopenia.  Body mass index was 25 and considered to be moderate risk.  07/25/2012--vascular screen negative for carotid plaque, negative for abdominal aneurysm, negative for PAD Description: 10/03/2014--[facility] screening revealed mild bilateral carotid plaque, negative for atrial fibrillation, negative for AAA, negative for PAD, osteoporosis screen revealed osteopenia.  Body mass index was 25 and considered to be moderate risk.  07/25/2012--vascular screen negative for carotid plaque, negative for abdominal aneurysm, negative for PAD    Chronic anemia 08/23/2016 06/13/2017--colonoscopy revealed melanosis.  Biopsied.  There was friability with contact bleeding in the ascending colon.  Biopsied.  Pathology returned consistent with melanosis coli.  06/13/2017--EGD revealed normal esophagus.  Biopsies taken.  There was a medium-sized hiatal hernia.  Localized mild inflammation and linear erosions were found in the prepyloric region.  Biopsied.  Examined duodenum was normal.  Random biopsies taken.  Pathology of the gastroesophageal junction was unremarkable as was the gastric fundus.  Prepyloric biopsy revealed minimal chronic inflammation and reactive change.  H pylori negative.  Duodenal biopsies are negative.  04/12/2017--hemoglobin low at 10.8, hematocrit is normal at 35.7.  Iron sulfate 325 mg per day initiated.  08/23/2016--routine follow-up.  Patient " continues to have epigastric abdominal pain believed to be related to reflux with possible esophageal spasm.  Hemoglobin noted to be low at 10.6 with a hematocrit low at 33.7 and RDW elevated at 16.2.  Homocysti    Chronic fatigue 04/21/2016 06/14/2017--overnight oximetry revealed oxygen desaturation index of only 0.44.  There were only 10 total desaturations during the period of testing which lasted 6 hours and 46 minutes.  05/31/2017--patient seen in follow-up and reports she continues to have chronic fatigue as well as hypersomnolence.  Once again, she is on multiple medications that are undoubtedly contributing to this problem including clonazepam and hydrocodone but I doubt that these could be discontinued.  Her CBC back in April revealed a low hemoglobin of 10.8 but hematocrit was normal at 35.7.  Thyroid function tests were normal and CMP normal.  Overnight oximetry test ordered.  Repeat CBC.  Iron studies.  Sedimentation rate and CRP.  04/11/2017--patient seen in follow-up and her blood pressure is now at a reasonable level at 120/64.  She reports she still feels somewhat fatigued but she is much better.  Patient has not been doing any regular exercise and I do think that that would be helpful to reduce her feeling of fatigue.  She is    Chronic gastric ulcer 04/14/2014    02/15/2017--patient seen in follow-up in nearly 6 months later.  She has complaints of not feeling well all over.  She has complaints of diffuse myalgias and possibly tendinopathy related to Levaquin that I called in prior to her going to [place] for vacation.  She reports that 3 or 4 days after starting the Levaquin she began to have the musculoskeletal symptoms and she reports that she continues to have them presently.  Symptoms are worse involving her left calf area.  Examination reveals significant tenderness involving the left calf and the left calf seems a little larger than the right.  She also has complaints of shortness of  breath but no chest pain.  She is complaining of double vision and generalized weakness and fatigue.  She was complaining of chronic fatigue at the last visit back in September and I ordered an overnight oximetry test but apparently this was never done for reasons that are not clear to me.  Her current oxygen saturation is 94% and normally it is 100%.  Plan is as follows: Extensi    Chronic gastritis 11/19/2001    02/15/2017--patient seen in follow-up in nearly 6 months later.  She has complaints of not feeling well all over.  She has complaints of diffuse myalgias and possibly tendinopathy related to Levaquin that I called in prior to her going to [place] for vacation.  She reports that 3 or 4 days after starting the Levaquin she began to have the musculoskeletal symptoms and she reports that she continues to have them presently.  Symptoms are worse involving her left calf area.  Examination reveals significant tenderness involving the left calf and the left calf seems a little larger than the right.  She also has complaints of shortness of breath but no chest pain.  She is complaining of double vision and generalized weakness and fatigue.  She was complaining of chronic fatigue at the last visit back in September and I ordered an overnight oximetry test but apparently this was never done for reasons that are not clear to me.  Her current oxygen saturation is 94% and normally it is 100%.  Plan is as follows: Extensi    Chronic hoarseness 06/08/2020    September 15, 2020--patient presents with complaints of swelling of the soft tissues of the left side of the neck and this is associated with pain and discomfort, particularly when she turns her head rotating to the left.  She also notices hoarseness and feels that her voice has changed.  On exam there is definite prominence of the left sternocleidomastoid muscle and there may be some shotty lymph    Chronic lower back pain 01/31/2006 08/11/2014--MRI of the  lumbar spine reveals the conus terminates at L2 and is normal.  Cauda equina unremarkable.  Stable to moderate degenerative disc disease at L5-S1.  No acute fracture or pars defect is demonstrated.  Small synovial cysts are seen posterior to the L4-L5 facet.  The perivertebral soft tissues are unremarkable.  T12-L1, L1-L2, L2-L3 are negative.  L3-L4 a broad-based disc bulge resulting in mild bilateral neural foraminal narrowing.  L4-L5 reveals a broad-based disc bulge facet disease resulting in mild bilateral neural foraminal narrowing.  L5-S1 reveals a broad-based disc bulge, posterior osseous slipping, and facet disease resulting in mild to moderate bilateral neural foraminal narrowing.  Assessment is stable mild to moderate degenerative spondylosis.  Small synovial cysts are seen posterior to the L4-L5 facets.  08/11/2014--MRI of the thoracic spine reveals mild to moderate thoracic kyphosis.  No fracture.  At T5--T6 there is a moderate sized left paracentral disc protrusion which i    Chronic migraine 11/03/2009 01/18/2010--MRI of the brain performed for headaches and memory loss.  Mild small vessel disease in the cerebral and central pontine white matter.  There is an ovoid, somewhat pancake-shaped area of signal abnormality in the anterior inferior right temporal lobe subcortical to the juxtacortical white matter that measures 1.2 x 1 cm and anterior posterior and medial lateral dimension but only measures 3 mm in cranial caudal dimension.  I suspect that this is a benign cyst or a cystic area of encephalomalacia.  The remainder of the MRI of the head is within normal limits.  11/03/2009--CT scan of the brain without contrast performed for headache after a fall.  Mild diffuse atrophy.  No acute abnormality noted.; Description: Patient has had a long history of migraine headaches.  MRI and CT scan of the brain have been essentially negative.    Chronic otitis externa 04/08/2016    Chronic renal insufficiency,  stage II (mild), creatinine 1.12 11/14/2015 11/14/2015--serum creatinine mildly elevated at 1.12.    Depression with anxiety 04/08/2016    Frequent falls 02/05/2021 February 5, 2021--patient seen in follow-up by Dr. Weir.  I extensively reviewed the documentation from the emergency room visit as noted below.  I reviewed the history with the patient and she is describing episodes of dizziness described as a spinning sensation of her body and this seems to be precipitated by movement although it does not occur if she rolls over in bed.  If she stands up and st    Functional murmur, 07/15/2015--normal echocardiogram. 07/15/2015    07/15/2015--echocardiogram reveals normal left ventricular size and function with ejection fraction 55%.  Grade 1 diastolic dysfunction, abnormal relaxation pattern.  Trace tricuspid regurgitation.  Estimated right ventricular systolic pressure is 25 mmHg which is normal.    Gastroesophageal reflux disease without esophagitis 06/06/2019    Generalized anxiety disorder 04/11/2017    Glaucoma     History of Acute deep vein thrombosis (DVT) of distal end of left lower extremity 02/15/2017    03/21/2017 patient seen in follow-up and she is tolerating the Eliquis well without signs or symptoms of bleeding.  Her calf swelling and tenderness is better but not totally resolved.  I suspect that the DVT is chronic and may not resolve at all.  I will order a repeat venous study and then proceed from there.  02/23/2017--repeat Doppler venous study of the left lower extremity reveals a chronic left lower extremity DVT in the posterior tibial.  All other left sided veins appeared normal.  Fluid collection in the left calf noted.  02/17/2017--patient seen in follow-up and reports her left lower extremity symptoms are about the same.  She continues to have complaints of profound fatigue which I think is multifactorial including underlying depression that is not in remission.  Review the results of the  CTA of the chest including the possible thyroid lesion.  I do not think this is contributing to any of her symptoms of fatigue particularly given the fact that her thyroid function tests are normal.  I expla    History of palpitations 12/07/2011    Patient has had multiple admissions to the hospital for complaints of chest pain and heart palpitations. She meant admitted at least on 3 occasions. She has had at least 3 stress Cardiolite and 1 stress ECG, the last Cardiolite being performed 12/07/2011 which was negative. Patient is also had Holter monitors which have been unremarkable.    HIstory of Schatzki's ring 02/28/2012 02/28/2012--air-contrast upper GI revealed small to moderate sized reducible sliding hiatal herniation of the upper stomach with some demonstrated gastroesophageal reflux. No esophageal, gastric, or duodenal mass or mucosal ulceration was seen.  11/03/2008--EGD performed for evaluation of iron deficiency anemia revealed hiatal hernia without evidence of reflux, prepyloric antritis and    Hyperlipidemia 04/08/2016    Hypersomnolence 05/05/2016 06/14/2017--overnight oximetry revealed oxygen desaturation index of only 0.44.  There were only 10 total desaturations during the period of testing which lasted 6 hours and 46 minutes.  05/31/2017--patient seen in follow-up and reports she continues to have chronic fatigue as well as hypersomnolence.  Once again, she is on multiple medications that are undoubtedly contributing to this problem including clonazepam and hydrocodone but I doubt that these could be discontinued.  Her CBC back in April revealed a low hemoglobin of 10.8 but hematocrit was normal at 35.7.  Thyroid function tests were normal and CMP normal.  Overnight oximetry test ordered.  Repeat CBC.  Iron studies.  Sedimentation rate and CRP.  04/11/2017--patient seen in follow-up and her blood pressure is now at a reasonable level at 120/64.  She reports she still feels somewhat fatigued  but she is much better.  Patient has not been doing any regular exercise and I do think that that would be helpful to reduce her feeling of fatigue.  She is    Menopausal state 04/08/2016    Multinodular goiter 02/17/2017 02/21/2017--thyroid ultrasound reveals multinodular thyroid with largest nodule measuring on the order of 1.6 cm in greatest dimension.  02/17/2017--patient seen in follow-up for DVT and CTA of the chest.  An incidental finding on the CTA of the chest reveals a 1.7 cm lesion in the right lobe of thyroid.  Note that thyroid function tests are currently normal.  Ultrasound of the thyroid ordered.    Osteoporosis, 03/29/2011--lumbar spine -2.8.  Right femoral neck -2.4.  Left femoral neck -2.4.  Patient receives Reclast infusions. 02/09/2009 04/19/2017--Reclast infusion.  04/05/2016--Reclast infusion.  06/04/2012--Reclast infusion.  05/26/2011--Reclast infusion.  03/29/2011--DEXA scan revealed a lumbar T score of -2.8, right femoral neck T score -2.4, left femoral neck T score -2.4.  Osteoporosis of the lumbar spine and severe osteopenia of the hips bilaterally.  Patient has been intolerant to Fosamax because of gastritis and gastroesophageal reflux.  04/01/2009--treatment for osteoporosis begun with Fosamax.  02/09/2009--DEXA scan revealed lumbar spine T score of -3.3.  Left femoral neck T score -2.5.  Right hip T score -2.6.  Osteoporosis.     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  CT scan of the abdomen and pelvis reveals a left renal cyst measuring 5.1 x 4.9 cm.  There were subcentimeter hypodense renal lesions that are too small to further characterize and are presumably related to cysts.  Recommend attention to follow up.  Distal dilated pancreatic duct noted.  The common bile duct is the upper limits of normal in size.  The ampullary region is not  well evaluated.  Comparison with prior imaging is recommended if available.  If there is no prior film available for comparison, and ERCP or MRCP could be performed to further evaluate.  Small hiatal hernia noted.  03/05/2012--CT scan of the abdomen with contrast, pancreatic protocol.  This reveals some fullness of the pancreatic ductal system and apparent pancreatic divisum with separate entrance of the accessory pancreatic duct extending into the duodenum distal to the main pancreatic duct.  There is fullness of the duct diffusely but similar to the previous s    Parkinson's disease with dyskinesia and fluctuating manifestations 12/14/2023 December 19, 2023--MRI of the brain without contrast reveals stable findings compared to the prior study dated May 28, 2021.  There are moderate changes of chronic small vessel ischemic phenomena.  Additionally there are findings consistent with an end of the insulin large perivascular space within the anterior portion of the right temporal lobe measuring 1.7 x 1.2 cm in greatest axial dimensions.    Pedal edema 06/29/2015 06/29/2015--patient presents with a 4-6 week history of exertional dyspnea that comes on with activity such as climbing stairs or walking her dog up an incline.  No chest pain.  Relieved with rest.  No cough.  She does have complaints of her feet and legs swelling that is particularly worse at the end of the day and not improved overnight.  No orthopnea or PND.  Chest exam reveals faint rales at the bases.  Otherwise clear.  Heart is regular and I do not appreciate a heart murmur.  Chest x-ray PA and lateral ordered.  Echocardiogram ordered.    Pulmonary hypertension 04/18/2019    Restless legs syndrome 04/08/2016    Short-term memory loss 05/04/2018 05/04/2018--patient reports development of problems with her memory over the past several months and is concerned about possibility of Alzheimer's.  No other neurologic symptoms other than occasional  sensation of being off balance.  We will not evaluate that problem at the present time unless it gets worse.  I we will however send her for neuropsychological testing regarding memory loss.      Simple renal cyst 07/20/2009 09/29/2014--patient was again evaluated by the urologist for a renal cyst.  CT scan of the abdomen and pelvis reveals a left renal cyst measuring 5.1 x 4.9 cm.  There were subcentimeter hypodense renal lesions that are too small to further characterize and are presumably related to cysts.  Recommend attention to follow up.  Distal dilated pancreatic duct noted.  The common bile duct is the upper limits of normal in size.  The ampullary region is not well evaluated.  Comparison with prior imaging is recommended if available.  If there is no prior film available for comparison, and ERCP or MRCP could be performed to further evaluate.  Small hiatal hernia noted.  07/20/2009--patient was noted to have a left renal mass consistent with a cyst.  This was evaluated by the urologist 07/20/2009.    Stage 3a chronic kidney disease 11/14/2015 11/14/2015--serum creatinine mildly elevated at 1.12.      Statin intolerance 10/30/2017    Tinnitus of both ears 09/30/2019 September 30, 2019--patient presents with a several year history of bilateral tinnitus, right greater than left.  It seems to be getting worse and now is associated with fluctuating hearing.  She occasionally will have worsening hearing after she coughs or sneezes.  On exam she has evidence of old otitis media scars, particularly on the right.  There is no acute infection present and there is no a    Tremor of left hand 12/14/2023    Vitamin D deficiency 04/08/2016         Past Surgical History:   Procedure Laterality Date    APPENDECTOMY  1965 1965    CATARACT EXTRACTION Bilateral Remote    Remote bilateral cataract extirpation with intraocular lens implantation.    CHOLECYSTECTOMY WITH INTRAOPERATIVE CHOLANGIOGRAM N/A 01/21/2019     01/21/2019--laparoscopic paraesophageal hernia repair with fundoplication.    COLONOSCOPY  11/03/2008 2008--normal colonoscopy    COLONOSCOPY  2001 2001--normal colonoscopy.    COLONOSCOPY N/A 06/13/2017 06/13/2017--colonoscopy revealed melanosis.  Biopsied.  There was friability with contact bleeding in the ascending colon.  Biopsied.  Pathology returned consistent with melanosis coli.    ENDOSCOPY  10/08/2014    02/28/2012--air-contrast upper GI revealed small to moderate sized reducible sliding hiatal herniation of the upper stomach with some demonstrated gastroesophageal reflux. No esophageal, gastric, or duodenal mass or mucosal ulceration was seen. 11/03/2008--EGD performed for evaluation of iron deficiency anemia revealed hiatal hernia without evidence of reflux, prepyloric antritis and    ENDOSCOPY  11/03/2008 11/03/2008--EGD performed for evaluation of iron deficiency anemia revealed hiatal hernia without evidence of reflux, prepyloric antritis and    ENDOSCOPY  11/19/2001 11/19/2001--EGD revealed a very tortuous distal esophagus with a Schatzki's ring. No ulcer or erosions. No Dorado's mucosa. Stomach revealed patchy erythema and erosions in the antrum. Biopsy. Normal pylorus with no obstruction. Normal duodenum with no ulcers. The Schatzki's ring was dilated with a Rhodes dilator.;    ENDOSCOPY N/A 09/27/2016 09/27/2016--EGD revealed a normal oropharynx, esophagus, and medium sized hiatal hernia.  Nonbleeding gastric ulcer with no stigmata of bleeding.  Biopsy.  Gastritis.  Biopsy.  Normal examined duodenum.  Biopsied.  Pathology returned mild to moderate chronic active gastritis with ulceration from the stomach antral ulcer biopsy.  Gastroesophageal junction biopsy revealed minimal mixed inflammation.    ENDOSCOPY N/A 06/13/2017 06/13/2017--colonoscopy revealed melanosis.  Biopsied.  There was friability with contact bleeding in the ascending colon.  Biopsied.  Pathology returned  consistent with melanosis coli.    ERCP N/A 01/22/2019 01/22/2019--ERCP with sphincterotomy and balloon stone extraction.    ESOPHAGEAL DILATATION  11/19/2001 11/19/2001--EGD revealed a very tortuous distal esophagus with a Schatzki's ring. No ulcer or erosions. No Dorado's mucosa. Stomach revealed patchy erythema and erosions in the antrum. Biopsy. Normal pylorus with no obstruction. Normal duodenum with no ulcers. The Schatzki's ring was dilated with a Rhodes dilator.;     ESOPHAGEAL MANOMETRY N/A 12/18/2018    Procedure: ESOPHAGEAL MANOMETRY;  Surgeon: Cecilia, Nurse Performed;  Location: [facility] ENDOSCOPY;  Service: Gastroenterology    ESOPHAGOSCOPY N/A 01/21/2019    Procedure: FLEXIBLE ESOPHAGOSCOPY;  Surgeon: Rjei Johnson MD;  Location: [facility] OR;  Service: General    HIATAL HERNIA REPAIR N/A 01/21/2019    Procedure: Laparoscopic paraesophageal hernia repair with toupee fundoplication;  Surgeon: Reji Johnson MD;  Location: [facility] OR;  Service: General    INCONTINENCE SURGERY  1979 1979--a bladder tack procedure for urinary stress incontinence    KNEE ARTHROSCOPY Right 12/12/2011 12/12/2011--right knee arthroscopy with partial lateral and medial meniscectomies.    SKIN SURGERY  05/25/2010 05/25/2010--skin lesion excised from right lower extremity. 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    TONSILLECTOMY  1953    TOTAL ABDOMINAL HYSTERECTOMY WITH SALPINGO OOPHORECTOMY  1974 1974--total abdominal hysterectomy.         Allergies   Allergen Reactions    Statins Nausea And Vomiting    Morphine Hallucinations    Penicillins Itching    Tetanus Toxoids Itching           Current Outpatient Medications:     aspirin 81 MG EC tablet, Take 1 tablet by mouth Daily. HELD, Disp: , Rfl:     Brexpiprazole (Rexulti) 1 MG tablet, Take 2 mg by mouth Daily., Disp: , Rfl:     buPROPion XL (WELLBUTRIN XL) 300 MG 24 hr tablet, Take 1 tablet by mouth Every  Morning., Disp: , Rfl:     carbidopa-levodopa (SINEMET)  MG per tablet, 1/2 tab by mouth 3 times daily for 1-2 weeks then 1 tab 3 times daily, Disp: 90 tablet, Rfl: 5    Cholecalciferol (vitamin D3) 125 MCG (5000 UT) capsule capsule, 1 by mouth daily as directed, Disp: 30 capsule, Rfl:     Cimetidine (TAGAMET PO), Tagamet, Disp: , Rfl:     diclofenac (VOLTAREN) 1 % gel gel, Apply 4 g topically to the appropriate area as directed 4 (Four) Times a Day As Needed., Disp: , Rfl:     diphenhydrAMINE-APAP, sleep, (Tylenol PM Extra Strength)  MG/30ML liquid, Tylenol PM Extra Strength, Disp: , Rfl:     estradiol (ESTRACE) 1 MG tablet, TAKE 1 TABLET BY MOUTH EVERY DAY, Disp: 90 tablet, Rfl: 0    HYDROcodone-acetaminophen (NORCO)  MG per tablet, Take 1 tablet by mouth Every 6 (Six) Hours As Needed for Moderate Pain., Disp: , Rfl:     Misc Natural Products (COLON CLEANSE PO), Take  by mouth., Disp: , Rfl:     spironolactone (ALDACTONE) 100 MG tablet, Take a half a tablet (50 mg) a day for high blood pressure and leg swelling, Disp: , Rfl:     traZODone (DESYREL) 50 MG tablet, Take 0.5-1 tablets by mouth Every Night., Disp: , Rfl:     venlafaxine XR (EFFEXOR-XR) 150 MG 24 hr capsule, Take 1 capsule by mouth Every Morning., Disp: , Rfl:     venlafaxine XR (EFFEXOR-XR) 75 MG 24 hr capsule, Take one 75 mg capsule venlafaxine along with one 150 mg capsule daily for depression.  Total daily dose is 225 mg., Disp: , Rfl:     vitamin E 400 UNIT capsule, Take 1 capsule by mouth Daily., Disp: , Rfl:       Family History   Problem Relation Age of Onset    Heart attack Mother         dies age 47 from heart attack    Heart disease Mother     Bleeding Disorder Mother     Heart disease Father     Ovarian cancer Maternal Grandmother     Heart attack Maternal Aunt         dies age 60 from heart attack    Malig Hyperthermia Neg Hx     Breast cancer Neg Hx          Social History     Socioeconomic History    Marital status:  "     Spouse name: ander    Number of children: 4    Years of education: 14    Highest education level: Associate degree: academic program   Tobacco Use    Smoking status: Never    Smokeless tobacco: Never    Tobacco comments:     None   Vaping Use    Vaping status: Never Used   Substance and Sexual Activity    Alcohol use: No    Drug use: No    Sexual activity: Not Currently     Partners: Male     Birth control/protection: Diaphragm, Birth control pill, Hysterectomy         Vitals:    04/09/24 1355   BP: 118/76   Pulse: 78   Resp: 18   Temp: 97 °F (36.1 °C)   SpO2: 94%   Weight: 58.1 kg (128 lb)   Height: 152.4 cm (60\")        Body mass index is 25 kg/m².      Physical Exam:    General: Alert and oriented x 3.  No acute distress.  Normal affect.  HEENT: Pupils equal, round, reactive to light; extraocular movements intact; sclerae nonicteric; pharynx, ear canals and TMs normal.  Neck: Without JVD, thyromegaly, bruit, or adenopathy.  Lungs: Clear to auscultation in all fields.  Heart: Regular rate and rhythm without murmur, rub, gallop, or click.  Abdomen: Soft, nontender, without hepatosplenomegaly or hernia.  Bowel sounds normal.  : Deferred.  Rectal: Deferred.  Extremities: Without clubbing, cyanosis, edema, or pulse deficit.  Neurologic: Intact without focal deficit.  I have patient ambulates with an ataxic gait.  I do not see any significant tremor at this time.  Skin: Without significant lesion.  Musculoskeletal: Unremarkable.    Lab/other results:    Total cholesterol is mildly elevated at 207.  LDL particle number excellent at 656.  HDL particle number excellent at 64.3.  Triglycerides are normal.  Vitamin D normal at 55.2.  Thyroid function tests are normal.  CMP normal except creatinine 1.17.  Estimated GFR 47.  CBC normal except mild elevation of white count of 11.6.    Assessment/Plan:     Diagnosis Plan   1. Benign essential hypertension        2. Frequent falls        3. Hyperlipidemia  " Comprehensive Metabolic Panel    NMR LipoProfile      4. Hyperaldosteronism        5. Hypokalemia        6. Iron deficiency anemia due to chronic blood loss  CBC (No Diff)      7. Multinodular goiter  TSH    T4, Free    T3, Free      8. Parkinson's disease with dyskinesia and fluctuating manifestations  Ambulatory Referral to Physical Therapy Evaluate and treat, Neuro      9. Bilateral lower extremity edema        10. Chronic anemia        11. Stage 3a chronic kidney disease  CBC (No Diff)    Comprehensive Metabolic Panel      12. Vitamin D deficiency  Vitamin D,25-Hydroxy      13. Therapeutic drug monitoring        14. Osteoporosis, 03/29/2011--lumbar spine -2.8.  Right femoral neck -2.4.  Left femoral neck -2.4.  Patient receives Reclast infusions.  DEXA Bone Density Axial      15. Postmenopausal state  DEXA Bone Density Axial      16. Gait disorder  Ambulatory Referral to Physical Therapy Evaluate and treat, Neuro        Patient's blood pressure is better and not too low and her symptoms of lightheadedness also are better.  She has history of frequent falls that is multifactorial including her parkinsonism as well as some element of orthostasis related to this.  This is better at the present time with backing off on her blood pressure medication.  Her potassium levels are normal.  There is no evidence of iron deficiency anemia at this time we will continue to monitor.  She has a multinodular goiter and her thyroid has been stable.  Her parkinsonism is better with treatment and she is followed by the neurologist.  She previously had chronic renal insufficiency stage II which is now little bit worse at stage IIIa.  We will continue to monitor.  Vitamin D in normal range with supplementation.  Patient has osteoporosis and is due for DEXA scan in the near future.    Plan is as follows: No further changes to current medical regimen at this time.  I will have her follow-up on or after September 5, 2024 for her  "subsequent Medicare wellness visit.  Otherwise she will follow-up as needed.    This patient has a PCP that is the continuing focal point for all health care services, and the patient sees this physician to be evaluated for multiple chronic medical problems. The inherent complexity that this code () captures is not in the clinical condition itself, but rather the cognitition of the continued responsibility of being the focal point for all needed services for this patient.\"     Procedures        "

## 2024-04-30 ENCOUNTER — OFFICE VISIT (OUTPATIENT)
Dept: NEUROLOGY | Facility: CLINIC | Age: 84
End: 2024-04-30
Payer: MEDICARE

## 2024-04-30 VITALS
OXYGEN SATURATION: 96 % | HEART RATE: 77 BPM | BODY MASS INDEX: 25 KG/M2 | WEIGHT: 128 LBS | SYSTOLIC BLOOD PRESSURE: 132 MMHG | DIASTOLIC BLOOD PRESSURE: 80 MMHG

## 2024-04-30 DIAGNOSIS — G20.A1 PARKINSON'S DISEASE WITHOUT DYSKINESIA OR FLUCTUATING MANIFESTATIONS: Primary | ICD-10-CM

## 2024-04-30 DIAGNOSIS — R29.6 RECURRENT FALLS: ICD-10-CM

## 2024-04-30 DIAGNOSIS — G60.8 SENSORY PERIPHERAL NEUROPATHY: ICD-10-CM

## 2024-04-30 DIAGNOSIS — R41.3 MEMORY LOSS: ICD-10-CM

## 2024-04-30 NOTE — PROGRESS NOTES
CC: Falls and Parkinson's disease    HPI:  Sachi Vora is a  83 y.o.  right-handed white female who I am seeing for the first time in follow-up regarding following backwards and Parkinson's disease.  She is accompanied by her  who adds to the history.  She was last seen by Dr. Barboza on 1/22/2024, with the following history taken from that note with additions/modifications as indicated:    Patient was initially seen by Dr. Barboza in 2021 regarding balance problems and falls.  She denied any dizziness but states that she was just fall backwards.  She reported hitting her head but no loss of consciousness.  She also reported some short-term memory loss.  Her  reported that she shuffles but she did not think so.  She was found to have left ankle dorsiflexor weakness and toe extensor weakness on exam and an EMG was ordered.  Dr. Barboza performed an EMG on 2/16/2022 with the following impression:    Borderline study. There was no evidence of peripheral neuropathy or peroneal neuropathy on either side. There were no needle exam changes in the leg muscles but the paraspinals showed a few positive sharp waves which could go along with bilateral lumbosacral radiculopathies. The patient has had epidural steroid injections but no nerve ablations. Study results were discussed with the patient.     No nerve conduction findings but some mild needle exam changes that could go along with lumbosacral radiculopathy.  An MRI of the lumbar spine was obtained which did not show significant problems at the L5 level..  She also had a Doppler of the lower extremities because of some swelling which did not demonstrate any evidence of a DVT on either side.     The patient had progressive problems with balance and falls and return to the office in 2024.  Her family reported shuffling gait, occasional mild resting tremor, poor writing they get smaller and is illegible as well as some reduced volume in her voice and the pitch seems  a little higher.  Parkinson's disease was suspected and she was trialed on carbidopa/levodopa 10/100 mg 1 tablet 3 times a day.    History interim    Patient has noticed improvement after starting carbidopa/levodopa.  She denies any side effects specifically no nausea.  She states that her resting tremor has improved but she has noticed involuntary movements in the left leg.  She denies any pain or weakness in the leg just states that it is irritating.  She is unsure when it started but thinks it was after she started carbidopa/levodopa.  She denies any involuntary movements in other limbs.  She reports a shuffling gait but states that it has also improved after starting physical therapy 2 weeks ago.  She states she goes 3 times a week.  She denies any freezing or festination's.  She reports slow movements but denies any significant stiffness.  She does report a fall back in March when she was leaving a restaurant and tripped on the curb and hit her head on a rock.  She denies loss of consciousness and did not go to the emergency room.  She reports occasional headaches in the left frontal area where she hit her head.  She states that they are not frequent and are not severe, and they go away on their own.  She denies any nausea vomiting, change in vision or hearing loss.  She denies any trouble swallowing but does report increased drooling especially at night.  Her speech remains soft and hoarse.  She states she is sleeping good and denies any hallucinations or mood changes but she does have vivid dreams.  She does report some short-term memory loss but is independent with all ADLs and chores.  She reports rare episodes of dizziness but does not relate them with the falls.    As for the numbness and tingling, she reports the feet are worse in the hands.  She states it is just in the soles of her feet.  She denies any burning or shooting shocklike pains.  She does report difficulty opening jars.  She denies any neck  "or back pain.  She denies any incontinence of bowel or bladder but states she does have occasional have difficulty urinating before bedtime.    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 Weir.  I extensively reviewed the documentation from the emergency room visit as noted below.  I reviewed the history with the patient and she is describing episodes of dizziness described as a spinning sensation of her body and this seems to be precipitated by movement although it does not occur if she rolls over in bed.  If she stands up and st    Benign essential hypertension 04/08/2016    Bilateral sensorineural hearing loss 03/31/2011 03/31/2011--etiology reveals reverse \"cookie bite\" type of hearing loss for both ears of mild/moderate degree, mostly sensorineural.  Speech discrimination 100% on the right, 96% on the left.    Carotid artery plaque, 10/03/2014--mild bilateral carotid artery plaque. 07/25/2012    10/03/2014--[facility] screening revealed mild bilateral carotid plaque, negative for atrial fibrillation, negative for AAA, negative for PAD, osteoporosis screen revealed osteopenia.  Body mass index was 25 and considered to be moderate risk.  07/25/2012--vascular screen negative for carotid plaque, negative for abdominal aneurysm, negative for PAD Description: 10/03/2014--[facility] screening revealed mild bilateral carotid plaque, negative for atrial fibrillation, negative for AAA, negative for PAD, osteoporosis screen revealed osteopenia.  Body mass index was 25 and considered to be moderate risk.  07/25/2012--vascular screen negative for carotid plaque, negative for abdominal aneurysm, negative for PAD    Chronic anemia 08/23/2016 06/13/2017--colonoscopy revealed melanosis.  Biopsied.  There was friability with contact bleeding in the ascending colon.  Biopsied.  Pathology returned consistent with melanosis " coli.  06/13/2017--EGD revealed normal esophagus.  Biopsies taken.  There was a medium-sized hiatal hernia.  Localized mild inflammation and linear erosions were found in the prepyloric region.  Biopsied.  Examined duodenum was normal.  Random biopsies taken.  Pathology of the gastroesophageal junction was unremarkable as was the gastric fundus.  Prepyloric biopsy revealed minimal chronic inflammation and reactive change.  H pylori negative.  Duodenal biopsies are negative.  04/12/2017--hemoglobin low at 10.8, hematocrit is normal at 35.7.  Iron sulfate 325 mg per day initiated.  08/23/2016--routine follow-up.  Patient continues to have epigastric abdominal pain believed to be related to reflux with possible esophageal spasm.  Hemoglobin noted to be low at 10.6 with a hematocrit low at 33.7 and RDW elevated at 16.2.  Homocysti    Chronic fatigue 04/21/2016 06/14/2017--overnight oximetry revealed oxygen desaturation index of only 0.44.  There were only 10 total desaturations during the period of testing which lasted 6 hours and 46 minutes.  05/31/2017--patient seen in follow-up and reports she continues to have chronic fatigue as well as hypersomnolence.  Once again, she is on multiple medications that are undoubtedly contributing to this problem including clonazepam and hydrocodone but I doubt that these could be discontinued.  Her CBC back in April revealed a low hemoglobin of 10.8 but hematocrit was normal at 35.7.  Thyroid function tests were normal and CMP normal.  Overnight oximetry test ordered.  Repeat CBC.  Iron studies.  Sedimentation rate and CRP.  04/11/2017--patient seen in follow-up and her blood pressure is now at a reasonable level at 120/64.  She reports she still feels somewhat fatigued but she is much better.  Patient has not been doing any regular exercise and I do think that that would be helpful to reduce her feeling of fatigue.  She is    Chronic gastric ulcer 04/14/2014     02/15/2017--patient seen in follow-up in nearly 6 months later.  She has complaints of not feeling well all over.  She has complaints of diffuse myalgias and possibly tendinopathy related to Levaquin that I called in prior to her going to [place] for vacation.  She reports that 3 or 4 days after starting the Levaquin she began to have the musculoskeletal symptoms and she reports that she continues to have them presently.  Symptoms are worse involving her left calf area.  Examination reveals significant tenderness involving the left calf and the left calf seems a little larger than the right.  She also has complaints of shortness of breath but no chest pain.  She is complaining of double vision and generalized weakness and fatigue.  She was complaining of chronic fatigue at the last visit back in September and I ordered an overnight oximetry test but apparently this was never done for reasons that are not clear to me.  Her current oxygen saturation is 94% and normally it is 100%.  Plan is as follows: Extensi    Chronic gastritis 11/19/2001    02/15/2017--patient seen in follow-up in nearly 6 months later.  She has complaints of not feeling well all over.  She has complaints of diffuse myalgias and possibly tendinopathy related to Levaquin that I called in prior to her going to [place] for vacation.  She reports that 3 or 4 days after starting the Levaquin she began to have the musculoskeletal symptoms and she reports that she continues to have them presently.  Symptoms are worse involving her left calf area.  Examination reveals significant tenderness involving the left calf and the left calf seems a little larger than the right.  She also has complaints of shortness of breath but no chest pain.  She is complaining of double vision and generalized weakness and fatigue.  She was complaining of chronic fatigue at the last visit back in September and I ordered an overnight oximetry test but apparently this was never  done for reasons that are not clear to me.  Her current oxygen saturation is 94% and normally it is 100%.  Plan is as follows: Extensi    Chronic hoarseness 06/08/2020    September 15, 2020--patient presents with complaints of swelling of the soft tissues of the left side of the neck and this is associated with pain and discomfort, particularly when she turns her head rotating to the left.  She also notices hoarseness and feels that her voice has changed.  On exam there is definite prominence of the left sternocleidomastoid muscle and there may be some shotty lymph    Chronic lower back pain 01/31/2006 08/11/2014--MRI of the lumbar spine reveals the conus terminates at L2 and is normal.  Cauda equina unremarkable.  Stable to moderate degenerative disc disease at L5-S1.  No acute fracture or pars defect is demonstrated.  Small synovial cysts are seen posterior to the L4-L5 facet.  The perivertebral soft tissues are unremarkable.  T12-L1, L1-L2, L2-L3 are negative.  L3-L4 a broad-based disc bulge resulting in mild bilateral neural foraminal narrowing.  L4-L5 reveals a broad-based disc bulge facet disease resulting in mild bilateral neural foraminal narrowing.  L5-S1 reveals a broad-based disc bulge, posterior osseous slipping, and facet disease resulting in mild to moderate bilateral neural foraminal narrowing.  Assessment is stable mild to moderate degenerative spondylosis.  Small synovial cysts are seen posterior to the L4-L5 facets.  08/11/2014--MRI of the thoracic spine reveals mild to moderate thoracic kyphosis.  No fracture.  At T5--T6 there is a moderate sized left paracentral disc protrusion which i    Chronic migraine 11/03/2009 01/18/2010--MRI of the brain performed for headaches and memory loss.  Mild small vessel disease in the cerebral and central pontine white matter.  There is an ovoid, somewhat pancake-shaped area of signal abnormality in the anterior inferior right temporal lobe subcortical to  the juxtacortical white matter that measures 1.2 x 1 cm and anterior posterior and medial lateral dimension but only measures 3 mm in cranial caudal dimension.  I suspect that this is a benign cyst or a cystic area of encephalomalacia.  The remainder of the MRI of the head is within normal limits.  11/03/2009--CT scan of the brain without contrast performed for headache after a fall.  Mild diffuse atrophy.  No acute abnormality noted.; Description: Patient has had a long history of migraine headaches.  MRI and CT scan of the brain have been essentially negative.    Chronic otitis externa 04/08/2016    Chronic renal insufficiency, stage II (mild), creatinine 1.12 11/14/2015 11/14/2015--serum creatinine mildly elevated at 1.12.    Depression with anxiety 04/08/2016    Frequent falls 02/05/2021 February 5, 2021--patient seen in follow-up by Dr. Weir.  I extensively reviewed the documentation from the emergency room visit as noted below.  I reviewed the history with the patient and she is describing episodes of dizziness described as a spinning sensation of her body and this seems to be precipitated by movement although it does not occur if she rolls over in bed.  If she stands up and st    Functional murmur, 07/15/2015--normal echocardiogram. 07/15/2015    07/15/2015--echocardiogram reveals normal left ventricular size and function with ejection fraction 55%.  Grade 1 diastolic dysfunction, abnormal relaxation pattern.  Trace tricuspid regurgitation.  Estimated right ventricular systolic pressure is 25 mmHg which is normal.    Gastroesophageal reflux disease without esophagitis 06/06/2019    Generalized anxiety disorder 04/11/2017    Glaucoma     History of Acute deep vein thrombosis (DVT) of distal end of left lower extremity 02/15/2017    03/21/2017 patient seen in follow-up and she is tolerating the Eliquis well without signs or symptoms of bleeding.  Her calf swelling and tenderness is better but not totally  resolved.  I suspect that the DVT is chronic and may not resolve at all.  I will order a repeat venous study and then proceed from there.  02/23/2017--repeat Doppler venous study of the left lower extremity reveals a chronic left lower extremity DVT in the posterior tibial.  All other left sided veins appeared normal.  Fluid collection in the left calf noted.  02/17/2017--patient seen in follow-up and reports her left lower extremity symptoms are about the same.  She continues to have complaints of profound fatigue which I think is multifactorial including underlying depression that is not in remission.  Review the results of the CTA of the chest including the possible thyroid lesion.  I do not think this is contributing to any of her symptoms of fatigue particularly given the fact that her thyroid function tests are normal.  I expla    History of palpitations 12/07/2011    Patient has had multiple admissions to the hospital for complaints of chest pain and heart palpitations. She meant admitted at least on 3 occasions. She has had at least 3 stress Cardiolite and 1 stress ECG, the last Cardiolite being performed 12/07/2011 which was negative. Patient is also had Holter monitors which have been unremarkable.    HIstory of Schatzki's ring 02/28/2012 02/28/2012--air-contrast upper GI revealed small to moderate sized reducible sliding hiatal herniation of the upper stomach with some demonstrated gastroesophageal reflux. No esophageal, gastric, or duodenal mass or mucosal ulceration was seen.  11/03/2008--EGD performed for evaluation of iron deficiency anemia revealed hiatal hernia without evidence of reflux, prepyloric antritis and    Hyperlipidemia 04/08/2016    Hypersomnolence 05/05/2016 06/14/2017--overnight oximetry revealed oxygen desaturation index of only 0.44.  There were only 10 total desaturations during the period of testing which lasted 6 hours and 46 minutes.  05/31/2017--patient seen in follow-up  and reports she continues to have chronic fatigue as well as hypersomnolence.  Once again, she is on multiple medications that are undoubtedly contributing to this problem including clonazepam and hydrocodone but I doubt that these could be discontinued.  Her CBC back in April revealed a low hemoglobin of 10.8 but hematocrit was normal at 35.7.  Thyroid function tests were normal and CMP normal.  Overnight oximetry test ordered.  Repeat CBC.  Iron studies.  Sedimentation rate and CRP.  04/11/2017--patient seen in follow-up and her blood pressure is now at a reasonable level at 120/64.  She reports she still feels somewhat fatigued but she is much better.  Patient has not been doing any regular exercise and I do think that that would be helpful to reduce her feeling of fatigue.  She is    Menopausal state 04/08/2016    Multinodular goiter 02/17/2017 02/21/2017--thyroid ultrasound reveals multinodular thyroid with largest nodule measuring on the order of 1.6 cm in greatest dimension.  02/17/2017--patient seen in follow-up for DVT and CTA of the chest.  An incidental finding on the CTA of the chest reveals a 1.7 cm lesion in the right lobe of thyroid.  Note that thyroid function tests are currently normal.  Ultrasound of the thyroid ordered.    Osteoporosis, 03/29/2011--lumbar spine -2.8.  Right femoral neck -2.4.  Left femoral neck -2.4.  Patient receives Reclast infusions. 02/09/2009 04/19/2017--Reclast infusion.  04/05/2016--Reclast infusion.  06/04/2012--Reclast infusion.  05/26/2011--Reclast infusion.  03/29/2011--DEXA scan revealed a lumbar T score of -2.8, right femoral neck T score -2.4, left femoral neck T score -2.4.  Osteoporosis of the lumbar spine and severe osteopenia of the hips bilaterally.  Patient has been intolerant to Fosamax because of gastritis and gastroesophageal reflux.  04/01/2009--treatment for osteoporosis begun with Fosamax.  02/09/2009--DEXA scan revealed lumbar spine T score of  -3.3.  Left femoral neck T score -2.5.  Right hip T score -2.6.  Osteoporosis.     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  CT scan of the abdomen and pelvis reveals a left renal cyst measuring 5.1 x 4.9 cm.  There were subcentimeter hypodense renal lesions that are too small to further characterize and are presumably related to cysts.  Recommend attention to follow up.  Distal dilated pancreatic duct noted.  The common bile duct is the upper limits of normal in size.  The ampullary region is not well evaluated.  Comparison with prior imaging is recommended if available.  If there is no prior film available for comparison, and ERCP or MRCP could be performed to further evaluate.  Small hiatal hernia noted.  03/05/2012--CT scan of the abdomen with contrast, pancreatic protocol.  This reveals some fullness of the pancreatic ductal system and apparent pancreatic divisum with separate entrance of the accessory pancreatic duct extending into the duodenum distal to the main pancreatic duct.  There is fullness of the duct diffusely but similar to the previous s    Parkinson's disease with dyskinesia and fluctuating manifestations 12/14/2023 December 19, 2023--MRI of the brain without contrast reveals stable findings compared to the prior study dated May 28, 2021.  There are moderate changes of chronic small vessel ischemic phenomena.  Additionally there are findings consistent with an end of the insulin large perivascular space within the anterior portion of the right temporal lobe measuring 1.7 x 1.2 cm in greatest axial dimensions.    Pedal edema 06/29/2015 06/29/2015--patient presents with a 4-6 week history of exertional dyspnea that comes on with activity such as climbing stairs or walking her dog up an incline.  No chest pain.  Relieved with rest.  No cough.  She does have  complaints of her feet and legs swelling that is particularly worse at the end of the day and not improved overnight.  No orthopnea or PND.  Chest exam reveals faint rales at the bases.  Otherwise clear.  Heart is regular and I do not appreciate a heart murmur.  Chest x-ray PA and lateral ordered.  Echocardiogram ordered.    Pulmonary hypertension 04/18/2019    Restless legs syndrome 04/08/2016    Short-term memory loss 05/04/2018 05/04/2018--patient reports development of problems with her memory over the past several months and is concerned about possibility of Alzheimer's.  No other neurologic symptoms other than occasional sensation of being off balance.  We will not evaluate that problem at the present time unless it gets worse.  I we will however send her for neuropsychological testing regarding memory loss.      Simple renal cyst 07/20/2009 09/29/2014--patient was again evaluated by the urologist for a renal cyst.  CT scan of the abdomen and pelvis reveals a left renal cyst measuring 5.1 x 4.9 cm.  There were subcentimeter hypodense renal lesions that are too small to further characterize and are presumably related to cysts.  Recommend attention to follow up.  Distal dilated pancreatic duct noted.  The common bile duct is the upper limits of normal in size.  The ampullary region is not well evaluated.  Comparison with prior imaging is recommended if available.  If there is no prior film available for comparison, and ERCP or MRCP could be performed to further evaluate.  Small hiatal hernia noted.  07/20/2009--patient was noted to have a left renal mass consistent with a cyst.  This was evaluated by the urologist 07/20/2009.    Stage 3a chronic kidney disease 11/14/2015 11/14/2015--serum creatinine mildly elevated at 1.12.      Statin intolerance 10/30/2017    Tinnitus of both ears 09/30/2019 September 30, 2019--patient presents with a several year history of bilateral tinnitus, right greater than  left.  It seems to be getting worse and now is associated with fluctuating hearing.  She occasionally will have worsening hearing after she coughs or sneezes.  On exam she has evidence of old otitis media scars, particularly on the right.  There is no acute infection present and there is no a    Tremor of left hand 12/14/2023    Vitamin D deficiency 04/08/2016         Past Surgical History:   Procedure Laterality Date    APPENDECTOMY  1965    1965    CATARACT EXTRACTION Bilateral Remote    Remote bilateral cataract extirpation with intraocular lens implantation.    CHOLECYSTECTOMY WITH INTRAOPERATIVE CHOLANGIOGRAM N/A 01/21/2019 01/21/2019--laparoscopic paraesophageal hernia repair with fundoplication.    COLONOSCOPY  11/03/2008 2008--normal colonoscopy    COLONOSCOPY  2001 2001--normal colonoscopy.    COLONOSCOPY N/A 06/13/2017 06/13/2017--colonoscopy revealed melanosis.  Biopsied.  There was friability with contact bleeding in the ascending colon.  Biopsied.  Pathology returned consistent with melanosis coli.    ENDOSCOPY  10/08/2014    02/28/2012--air-contrast upper GI revealed small to moderate sized reducible sliding hiatal herniation of the upper stomach with some demonstrated gastroesophageal reflux. No esophageal, gastric, or duodenal mass or mucosal ulceration was seen. 11/03/2008--EGD performed for evaluation of iron deficiency anemia revealed hiatal hernia without evidence of reflux, prepyloric antritis and    ENDOSCOPY  11/03/2008 11/03/2008--EGD performed for evaluation of iron deficiency anemia revealed hiatal hernia without evidence of reflux, prepyloric antritis and    ENDOSCOPY  11/19/2001 11/19/2001--EGD revealed a very tortuous distal esophagus with a Schatzki's ring. No ulcer or erosions. No Dorado's mucosa. Stomach revealed patchy erythema and erosions in the antrum. Biopsy. Normal pylorus with no obstruction. Normal duodenum with no ulcers. The Schatzki's ring was dilated  with a Rhodes dilator.;    ENDOSCOPY N/A 09/27/2016 09/27/2016--EGD revealed a normal oropharynx, esophagus, and medium sized hiatal hernia.  Nonbleeding gastric ulcer with no stigmata of bleeding.  Biopsy.  Gastritis.  Biopsy.  Normal examined duodenum.  Biopsied.  Pathology returned mild to moderate chronic active gastritis with ulceration from the stomach antral ulcer biopsy.  Gastroesophageal junction biopsy revealed minimal mixed inflammation.    ENDOSCOPY N/A 06/13/2017 06/13/2017--colonoscopy revealed melanosis.  Biopsied.  There was friability with contact bleeding in the ascending colon.  Biopsied.  Pathology returned consistent with melanosis coli.    ERCP N/A 01/22/2019 01/22/2019--ERCP with sphincterotomy and balloon stone extraction.    ESOPHAGEAL DILATATION  11/19/2001 11/19/2001--EGD revealed a very tortuous distal esophagus with a Schatzki's ring. No ulcer or erosions. No Dorado's mucosa. Stomach revealed patchy erythema and erosions in the antrum. Biopsy. Normal pylorus with no obstruction. Normal duodenum with no ulcers. The Schatzki's ring was dilated with a Rhodes dilator.;     ESOPHAGEAL MANOMETRY N/A 12/18/2018    Procedure: ESOPHAGEAL MANOMETRY;  Surgeon: Cecilia, Nurse Performed;  Location: [facility] ENDOSCOPY;  Service: Gastroenterology    ESOPHAGOSCOPY N/A 01/21/2019    Procedure: FLEXIBLE ESOPHAGOSCOPY;  Surgeon: Reji Johnson MD;  Location: [facility] OR;  Service: General    HIATAL HERNIA REPAIR N/A 01/21/2019    Procedure: Laparoscopic paraesophageal hernia repair with toupee fundoplication;  Surgeon: Reji Johnson MD;  Location: [facility] OR;  Service: General    INCONTINENCE SURGERY  1979 1979--a bladder tack procedure for urinary stress incontinence    KNEE ARTHROSCOPY Right 12/12/2011 12/12/2011--right knee arthroscopy with partial lateral and medial meniscectomies.    SKIN SURGERY  05/25/2010 05/25/2010--skin lesion excised from right  lower extremity. 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    TONSILLECTOMY  1953    TOTAL ABDOMINAL HYSTERECTOMY WITH SALPINGO OOPHORECTOMY  1974 1974--total abdominal hysterectomy.           Current Outpatient Medications:     aspirin 81 MG EC tablet, Take 1 tablet by mouth Daily. HELD, Disp: , Rfl:     Brexpiprazole (Rexulti) 1 MG tablet, Take 2 mg by mouth Daily., Disp: , Rfl:     buPROPion XL (WELLBUTRIN XL) 300 MG 24 hr tablet, Take 1 tablet by mouth Every Morning., Disp: , Rfl:     carbidopa-levodopa (SINEMET)  MG per tablet, 1/2 tab by mouth 3 times daily for 1-2 weeks then 1 tab 3 times daily (Patient taking differently: Take 1 tablet by mouth 3 (Three) Times a Day.), Disp: 90 tablet, Rfl: 5    Cholecalciferol (vitamin D3) 125 MCG (5000 UT) capsule capsule, 1 by mouth daily as directed, Disp: 30 capsule, Rfl:     diphenhydrAMINE-APAP, sleep, (Tylenol PM Extra Strength)  MG/30ML liquid, Tylenol PM Extra Strength, Disp: , Rfl:     estradiol (ESTRACE) 1 MG tablet, TAKE 1 TABLET BY MOUTH EVERY DAY, Disp: 90 tablet, Rfl: 0    HYDROcodone-acetaminophen (NORCO)  MG per tablet, Take 1 tablet by mouth Every 6 (Six) Hours As Needed for Moderate Pain., Disp: , Rfl:     Misc Natural Products (COLON CLEANSE PO), Take  by mouth., Disp: , Rfl:     spironolactone (ALDACTONE) 100 MG tablet, Take a half a tablet (50 mg) a day for high blood pressure and leg swelling, Disp: , Rfl:     traZODone (DESYREL) 50 MG tablet, Take 0.5-1 tablets by mouth Every Night., Disp: , Rfl:     venlafaxine XR (EFFEXOR-XR) 150 MG 24 hr capsule, Take 1 capsule by mouth Every Morning., Disp: , Rfl:     venlafaxine XR (EFFEXOR-XR) 75 MG 24 hr capsule, Take one 75 mg capsule venlafaxine along with one 150 mg capsule daily for depression.  Total daily dose is 225 mg., Disp: , Rfl:     vitamin E 400 UNIT capsule, Take 1 capsule by mouth Daily., Disp: , Rfl:     Cimetidine (TAGAMET PO), Tagamet (Patient not  taking: Reported on 4/30/2024), Disp: , Rfl:     diclofenac (VOLTAREN) 1 % gel gel, Apply 4 g topically to the appropriate area as directed 4 (Four) Times a Day As Needed. (Patient not taking: Reported on 4/30/2024), Disp: , Rfl:       Family History   Problem Relation Age of Onset    Heart attack Mother         dies age 47 from heart attack    Heart disease Mother     Bleeding Disorder Mother     Heart disease Father     Ovarian cancer Maternal Grandmother     Heart attack Maternal Aunt         dies age 60 from heart attack    Malig Hyperthermia Neg Hx     Breast cancer Neg Hx          Social History     Socioeconomic History    Marital status:      Spouse name: ander    Number of children: 4    Years of education: 14    Highest education level: Associate degree: academic program   Tobacco Use    Smoking status: Never    Smokeless tobacco: Never    Tobacco comments:     None   Vaping Use    Vaping status: Never Used   Substance and Sexual Activity    Alcohol use: No    Drug use: No    Sexual activity: Not Currently     Partners: Male     Birth control/protection: Diaphragm, Birth control pill, Hysterectomy         Allergies   Allergen Reactions    Statins Nausea And Vomiting    Morphine Hallucinations    Penicillins Itching    Tetanus Toxoids Itching         Pain Scale: 4/10        ROS:  Review of Systems   Musculoskeletal:  Positive for gait problem.   Neurological:  Positive for tremors and headaches. Negative for dizziness, seizures, syncope, facial asymmetry, speech difficulty, weakness, light-headedness and numbness.   Psychiatric/Behavioral:  Positive for confusion and decreased concentration. Negative for agitation, behavioral problems, dysphoric mood, hallucinations, self-injury, sleep disturbance and suicidal ideas. The patient is not nervous/anxious and is not hyperactive.        I have reviewed and agree with the above ROS completed by medical assistant.    Physical Exam:  Vitals:    04/30/24  1400   Weight: 58.1 kg (128 lb)     Orthostatic BP:    Body mass index is 25 kg/m².    Physical Exam  General: Well-developed white female no acute distress.  Hypomimia and reduced lid blink  HEENT: Normocephalic no evidence of trauma  Neck: Supple.  No thyromegaly.  No cervical bruits.  Heart: Regular rate and rhythm no murmur  Extremities: Radial pulses strong and simultaneous.  No pedal edema.      Neurological Exam:   Mental Status: Awake, alert, oriented to person, place and time.  Conversant without evidence of an affective disorder, thought disorder, delusions or hallucinations.  Attention span and concentration are normal.  HCF: No aphasia, apraxia or dysarthria.  Recent and remote memory intact.  Knowledge of recent events intact.  CN: I:   II: Visual fields full without left inattention   III, IV, VI: Eye movements intact without nystagmus or ptosis.  Pupils equal round and reactive to light.   V,VII: Light touch and pinprick intact all 3 divisions of V.  Facial muscles symmetrical.   VIII: Hearing intact to finger rub   IX,X: Soft palate elevates symmetrically   XI: Sternomastoid and trapezius are strong.   XII: Tongue midline without atrophy or fasciculations  Motor: Normal in the upper and lower extremities.  Mild cogwheeling in the left arm   Power testing: Full power in all muscles tested in the arms and legs  Reflexes: Upper extremities: Diffusely brisk        Lower extremities: Diffusely brisk        Toe signs: Equivocal  Sensory: Light touch: Reduced in the fingers and feet        Pinprick: Diffusely intact in the arms and legs        Vibration: Intact at the ankles        Position: Intact at the great toes    Cerebellar: Finger-to-nose: Slow           Rapid movement: Slow and reduced amplitude on the left           Heel-to-shin: Slow  Gait and Station: Comes to stand pushing off the chair.  Reduced step length and armswing.  No tremor activated with ambulation.  No Romberg no drift.  No freezing  "or festination's.    Results:      Lab Results   Component Value Date    GLUCOSE 79 03/19/2024    BUN 21 03/19/2024    CREATININE 1.17 (H) 03/19/2024    EGFRIFNONA 45 (L) 11/17/2021    EGFRIFAFRI 51 (L) 03/09/2020    BCR 18 03/19/2024    CO2 20 03/19/2024    CALCIUM 9.7 03/19/2024    PROTENTOTREF 6.7 03/19/2024    ALBUMIN 4.3 03/19/2024    LABIL2 1.8 03/19/2024    AST 18 03/19/2024    ALT 9 03/19/2024       Lab Results   Component Value Date    WBC 11.7 (H) 03/19/2024    HGB 14.1 03/19/2024    HCT 43.6 03/19/2024    MCV 91 03/19/2024     03/19/2024       .  Lab Results   Component Value Date    RPR Non-Reactive 10/14/2021       Lab Results   Component Value Date    TSH 1.350 03/19/2024       Lab Results   Component Value Date    UTQXRVMT07 451 06/07/2023       Lab Results   Component Value Date    FOLATE 7.50 10/14/2021       Lab Results   Component Value Date    SEDRATE 8 06/07/2023       Lab Results   Component Value Date    CRP 0.77 (H) 10/22/2020       No components found for: \"CK\"        Assessment:   1.  Parkinson's disease-mild.  Some improvement after starting carbidopa/levodopa.  Denies any nausea or other side effects.  Patient does report involuntary movement/jerks and left leg.  She is on a pretty low dose likely not dyskinesia but will see if escalated dose helps or makes worse.  Started physical therapy 2 weeks ago and has not noticed improvement in her gait.  2.  Suspicion of sensory peripheral neuropathy-discussed labs for treatable causes of neuropathy.  Will investigate further at next visit.  3.  Mild cognitive impairment-more short-term memory.  No change.  4.  Falls-most recent fall was in March, patient hit head without loss of consciousness but did not go to the emergency room.          Plan:  1.  Escalate carbidopa/levodopa 25/100 mg 1 tablet 3 times a day.  Told patient to call me if involuntary left leg movements progress with escalated dose.  2.  CT head without contrast rule out " traumatic injury after fall and hitting head on a rock.  3.  Referral to speech therapy [facility]  4.  Follow-up in 3 months or sooner if needed    **Avoid taking your carbidopa/levodopa with food (particularly protein).  Take one hour before or 2 hours after meals.              **May not particularly help with falls, changes in posture, speech, or sometimes tremor.              **Side effects include nausea and dizziness upon standing.              **At higher doses, may cause excessive movements called dyskinesias, confusion, or hallucinations.     **Check out the Parkinson's Foundation at parkinson.org, the [email or web address] at [email or web address], or [email or web address]     **Consider increasing exercise with yoga, dance, garrison chi, boxing, or music therapy.  Some [facility] gyms offer a free Parkinson's clinic.  Call your local [facility] to see if it is available.  Rock steady boxing is also a great boxing based fitness curriculum for Parkinson's disease.  [facility] offers [employer] boxing. [facility].     **Consider cognitive exercise as well such as puzzles, word search, Sudoku, etc.     **For constipation, increase water intake, eat fruits and vegetables, whole grains, exercise, consider polythylene glycol (Miralax) or bisacodyl suppository, or abdominal massage.          [employer]     Nilam Shearer  [email or web address] [place] [address] [phone], [phone]  [email or web address]     In-Person: Monday and Wednesday, 9:30 AM  [address] [address]     Zoom: Monday, Wednesday, Friday 9:30 AM  ID# [phone]  Website: [email or web address]  Facebook: [facility]  YouTube: [employer]       Time: Spent 55 minutes in total encounter time. This included time for chart review, obtaining history, performing pertinent physical examination, updating medical information, ordering tests/referrals, discussion of diagnosis, medical management,  counseling, and encounter documentation.                  Dictated utilizing Dragon dictation.

## 2024-05-06 ENCOUNTER — TRANSCRIBE ORDERS (OUTPATIENT)
Dept: ADMINISTRATIVE | Facility: HOSPITAL | Age: 84
End: 2024-05-06
Payer: MEDICARE

## 2024-05-06 DIAGNOSIS — Z12.31 VISIT FOR SCREENING MAMMOGRAM: Primary | ICD-10-CM

## 2024-06-25 ENCOUNTER — HOSPITAL ENCOUNTER (OUTPATIENT)
Dept: MAMMOGRAPHY | Facility: HOSPITAL | Age: 84
Discharge: HOME | End: 2024-06-25
Payer: MEDICARE

## 2024-06-25 ENCOUNTER — HOSPITAL ENCOUNTER (OUTPATIENT)
Dept: CT IMAGING | Facility: HOSPITAL | Age: 84
Discharge: HOME | End: 2024-06-25
Payer: MEDICARE

## 2024-06-25 DIAGNOSIS — R29.6 RECURRENT FALLS: ICD-10-CM

## 2024-06-25 DIAGNOSIS — Z12.31 VISIT FOR SCREENING MAMMOGRAM: ICD-10-CM

## 2024-06-25 PROCEDURE — 70450 CT HEAD/BRAIN W/O DYE: CPT

## 2024-06-25 PROCEDURE — 77067 SCR MAMMO BI INCL CAD: CPT

## 2024-06-25 PROCEDURE — 77063 BREAST TOMOSYNTHESIS BI: CPT

## 2024-07-30 ENCOUNTER — OFFICE VISIT (OUTPATIENT)
Dept: NEUROLOGY | Facility: CLINIC | Age: 84
End: 2024-07-30
Payer: MEDICARE

## 2024-07-30 VITALS
DIASTOLIC BLOOD PRESSURE: 70 MMHG | SYSTOLIC BLOOD PRESSURE: 116 MMHG | WEIGHT: 129 LBS | BODY MASS INDEX: 25.19 KG/M2 | OXYGEN SATURATION: 95 % | HEART RATE: 72 BPM

## 2024-07-30 DIAGNOSIS — G60.8 SENSORY PERIPHERAL NEUROPATHY: ICD-10-CM

## 2024-07-30 DIAGNOSIS — G31.84 MCI (MILD COGNITIVE IMPAIRMENT): ICD-10-CM

## 2024-07-30 DIAGNOSIS — G20.A1 PARKINSON'S DISEASE WITHOUT DYSKINESIA OR FLUCTUATING MANIFESTATIONS: Primary | ICD-10-CM

## 2024-07-30 DIAGNOSIS — Z91.81 AT HIGH RISK FOR FALLS: ICD-10-CM

## 2024-07-30 DIAGNOSIS — R29.6 RECURRENT FALLS: ICD-10-CM

## 2024-07-30 PROCEDURE — 99215 OFFICE O/P EST HI 40 MIN: CPT

## 2024-07-30 PROCEDURE — 3074F SYST BP LT 130 MM HG: CPT

## 2024-07-30 PROCEDURE — 1160F RVW MEDS BY RX/DR IN RCRD: CPT

## 2024-07-30 PROCEDURE — 1159F MED LIST DOCD IN RCRD: CPT

## 2024-07-30 PROCEDURE — 3078F DIAST BP <80 MM HG: CPT

## 2024-07-30 RX ORDER — DONEPEZIL HYDROCHLORIDE 5 MG/1
5 TABLET, FILM COATED ORAL
COMMUNITY
Start: 2024-07-08

## 2024-07-30 NOTE — PROGRESS NOTES
CC: Parkinson's disease    HPI:  Sachi Vora is a  83 y.o.  right-handed female who I am seeing in follow-up regarding Parkinson's disease and falls.  She is accompanied by her  who adds to the history.  I last saw her on 4/30/2024, with the following history taken from that note with additions/modifications as indicated:    Patient was initially seen by Dr. Barboza in 2021 regarding balance problems and falls.  She denied any dizziness but states that she was just fall backwards.  She also reported some short-term memory loss.  Her  reported that she shuffles but she did not think so.  She was found to have left ankle dorsiflexor weakness and toe extensor weakness on exam and an EMG was ordered.  Dr. Barboza performed an EMG on 2/16/2022 with the following impression:     Borderline study. There was no evidence of peripheral neuropathy or peroneal neuropathy on either side. There were no needle exam changes in the leg muscles but the paraspinals showed a few positive sharp waves which could go along with bilateral lumbosacral radiculopathies. The patient has had epidural steroid injections but no nerve ablations.     An MRI of the lumbar spine was obtained which did not show significant problems at the L5 level.       The patient had progressive problems with balance and falls and return to the office in 2024.  Her family reported shuffling gait, occasional mild resting tremor, poor writing that got smaller and is illegible, as well as, some reduced volume in her voice and the pitch seems a little higher.  Parkinson's disease was suspected and she was trialed on carbidopa/levodopa 10/100 mg 1 tablet 3 times a day with some improvements.      History interim    Patient reports that the tremors in her left hand have improved on carbidopa/levodopa.  Patient's  states that she has only been taking 10/100 mg 1 tablet 3 times a day and forgot to give her the 25/100 mg 3 times a day.  She continues to  have very slight involuntary movements in the left leg.  She states that she will cross her leg and then uncross her leg.  She reports occasional shuffling gait and rare festination's but denies any freezing gait.  She reports being slow and cautious.  She states that her balance is so-so.  She had a fall about 2 months ago in her bathroom without any significant injuries other than a bruised hip and a skin tear on the left arm.  He denies any dizziness.  She reports difficulty turning over in bed and slow or stiff movements.  She reports increased drooling at nighttime.  Her voice is hoarse and softer.  I referred her to [facility] for speech therapy but patient states that they never received a call to schedule an appointment.  Recently she has had difficulty swallowing and got choked up on a bagel.  She states that it is not frequent but it is new.  She states that she sleeps okay but has vivid dreams.  She denies any hallucinations or acting out her dreams.  She states that her mood is normally good but she woke up this morning anxious and irritated and she is not sure why.  She was recently started on donepezil by her psychiatrist.  She reports diarrhea but states that it is not new and she has had that for quite some time.  She is independent with all ADLs.  She is independent with most chores but some are limited due to her physical mobility.  She has difficulty recalling names, conversations and dates as well as some word finding troubles.  She has not noticed any difference after starting donepezil.  They leave for [place] next week.    CT head ordered at prior visit after patient fell and hit head.  CT head without contrast impression:    No evidence for acute traumatic intracranial pathology.     Incidental note is made of moderate changes of chronic small vessel  ischemic phenomenon as well as an incidental enlarged perivascular space  within the anterior aspect the right temporal lobe. These  "findings  demonstrate no significant interval change when compared to the prior  brain MRI dated 12/19/2023.    She denies any numbness, tingling or painful symptoms in her hands or feet.  She states the numbness in the soles of her feet resolved.      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 Weir.  I extensively reviewed the documentation from the emergency room visit as noted below.  I reviewed the history with the patient and she is describing episodes of dizziness described as a spinning sensation of her body and this seems to be precipitated by movement although it does not occur if she rolls over in bed.  If she stands up and st    Benign essential hypertension 04/08/2016    Bilateral sensorineural hearing loss 03/31/2011 03/31/2011--etiology reveals reverse \"cookie bite\" type of hearing loss for both ears of mild/moderate degree, mostly sensorineural.  Speech discrimination 100% on the right, 96% on the left.    Carotid artery plaque, 10/03/2014--mild bilateral carotid artery plaque. 07/25/2012    10/03/2014--[facility] screening revealed mild bilateral carotid plaque, negative for atrial fibrillation, negative for AAA, negative for PAD, osteoporosis screen revealed osteopenia.  Body mass index was 25 and considered to be moderate risk.  07/25/2012--vascular screen negative for carotid plaque, negative for abdominal aneurysm, negative for PAD Description: 10/03/2014--[facility] screening revealed mild bilateral carotid plaque, negative for atrial fibrillation, negative for AAA, negative for PAD, osteoporosis screen revealed osteopenia.  Body mass index was 25 and considered to be moderate risk.  07/25/2012--vascular screen negative for carotid plaque, negative for abdominal aneurysm, negative for PAD    Chronic anemia 08/23/2016 06/13/2017--colonoscopy revealed melanosis.  Biopsied.  There was " friability with contact bleeding in the ascending colon.  Biopsied.  Pathology returned consistent with melanosis coli.  06/13/2017--EGD revealed normal esophagus.  Biopsies taken.  There was a medium-sized hiatal hernia.  Localized mild inflammation and linear erosions were found in the prepyloric region.  Biopsied.  Examined duodenum was normal.  Random biopsies taken.  Pathology of the gastroesophageal junction was unremarkable as was the gastric fundus.  Prepyloric biopsy revealed minimal chronic inflammation and reactive change.  H pylori negative.  Duodenal biopsies are negative.  04/12/2017--hemoglobin low at 10.8, hematocrit is normal at 35.7.  Iron sulfate 325 mg per day initiated.  08/23/2016--routine follow-up.  Patient continues to have epigastric abdominal pain believed to be related to reflux with possible esophageal spasm.  Hemoglobin noted to be low at 10.6 with a hematocrit low at 33.7 and RDW elevated at 16.2.  Homocysti    Chronic fatigue 04/21/2016 06/14/2017--overnight oximetry revealed oxygen desaturation index of only 0.44.  There were only 10 total desaturations during the period of testing which lasted 6 hours and 46 minutes.  05/31/2017--patient seen in follow-up and reports she continues to have chronic fatigue as well as hypersomnolence.  Once again, she is on multiple medications that are undoubtedly contributing to this problem including clonazepam and hydrocodone but I doubt that these could be discontinued.  Her CBC back in April revealed a low hemoglobin of 10.8 but hematocrit was normal at 35.7.  Thyroid function tests were normal and CMP normal.  Overnight oximetry test ordered.  Repeat CBC.  Iron studies.  Sedimentation rate and CRP.  04/11/2017--patient seen in follow-up and her blood pressure is now at a reasonable level at 120/64.  She reports she still feels somewhat fatigued but she is much better.  Patient has not been doing any regular exercise and I do think that that  would be helpful to reduce her feeling of fatigue.  She is    Chronic gastric ulcer 04/14/2014    02/15/2017--patient seen in follow-up in nearly 6 months later.  She has complaints of not feeling well all over.  She has complaints of diffuse myalgias and possibly tendinopathy related to Levaquin that I called in prior to her going to [place] for vacation.  She reports that 3 or 4 days after starting the Levaquin she began to have the musculoskeletal symptoms and she reports that she continues to have them presently.  Symptoms are worse involving her left calf area.  Examination reveals significant tenderness involving the left calf and the left calf seems a little larger than the right.  She also has complaints of shortness of breath but no chest pain.  She is complaining of double vision and generalized weakness and fatigue.  She was complaining of chronic fatigue at the last visit back in September and I ordered an overnight oximetry test but apparently this was never done for reasons that are not clear to me.  Her current oxygen saturation is 94% and normally it is 100%.  Plan is as follows: Extensi    Chronic gastritis 11/19/2001    02/15/2017--patient seen in follow-up in nearly 6 months later.  She has complaints of not feeling well all over.  She has complaints of diffuse myalgias and possibly tendinopathy related to Levaquin that I called in prior to her going to [place] for vacation.  She reports that 3 or 4 days after starting the Levaquin she began to have the musculoskeletal symptoms and she reports that she continues to have them presently.  Symptoms are worse involving her left calf area.  Examination reveals significant tenderness involving the left calf and the left calf seems a little larger than the right.  She also has complaints of shortness of breath but no chest pain.  She is complaining of double vision and generalized weakness and fatigue.  She was complaining of chronic fatigue at the  last visit back in September and I ordered an overnight oximetry test but apparently this was never done for reasons that are not clear to me.  Her current oxygen saturation is 94% and normally it is 100%.  Plan is as follows: Extensi    Chronic hoarseness 06/08/2020    September 15, 2020--patient presents with complaints of swelling of the soft tissues of the left side of the neck and this is associated with pain and discomfort, particularly when she turns her head rotating to the left.  She also notices hoarseness and feels that her voice has changed.  On exam there is definite prominence of the left sternocleidomastoid muscle and there may be some shotty lymph    Chronic lower back pain 01/31/2006 08/11/2014--MRI of the lumbar spine reveals the conus terminates at L2 and is normal.  Cauda equina unremarkable.  Stable to moderate degenerative disc disease at L5-S1.  No acute fracture or pars defect is demonstrated.  Small synovial cysts are seen posterior to the L4-L5 facet.  The perivertebral soft tissues are unremarkable.  T12-L1, L1-L2, L2-L3 are negative.  L3-L4 a broad-based disc bulge resulting in mild bilateral neural foraminal narrowing.  L4-L5 reveals a broad-based disc bulge facet disease resulting in mild bilateral neural foraminal narrowing.  L5-S1 reveals a broad-based disc bulge, posterior osseous slipping, and facet disease resulting in mild to moderate bilateral neural foraminal narrowing.  Assessment is stable mild to moderate degenerative spondylosis.  Small synovial cysts are seen posterior to the L4-L5 facets.  08/11/2014--MRI of the thoracic spine reveals mild to moderate thoracic kyphosis.  No fracture.  At T5--T6 there is a moderate sized left paracentral disc protrusion which i    Chronic migraine 11/03/2009 01/18/2010--MRI of the brain performed for headaches and memory loss.  Mild small vessel disease in the cerebral and central pontine white matter.  There is an ovoid, somewhat  pancake-shaped area of signal abnormality in the anterior inferior right temporal lobe subcortical to the juxtacortical white matter that measures 1.2 x 1 cm and anterior posterior and medial lateral dimension but only measures 3 mm in cranial caudal dimension.  I suspect that this is a benign cyst or a cystic area of encephalomalacia.  The remainder of the MRI of the head is within normal limits.  11/03/2009--CT scan of the brain without contrast performed for headache after a fall.  Mild diffuse atrophy.  No acute abnormality noted.; Description: Patient has had a long history of migraine headaches.  MRI and CT scan of the brain have been essentially negative.    Chronic otitis externa 04/08/2016    Chronic renal insufficiency, stage II (mild), creatinine 1.12 11/14/2015 11/14/2015--serum creatinine mildly elevated at 1.12.    Depression with anxiety 04/08/2016    Frequent falls 02/05/2021 February 5, 2021--patient seen in follow-up by Dr. Weir.  I extensively reviewed the documentation from the emergency room visit as noted below.  I reviewed the history with the patient and she is describing episodes of dizziness described as a spinning sensation of her body and this seems to be precipitated by movement although it does not occur if she rolls over in bed.  If she stands up and st    Functional murmur, 07/15/2015--normal echocardiogram. 07/15/2015    07/15/2015--echocardiogram reveals normal left ventricular size and function with ejection fraction 55%.  Grade 1 diastolic dysfunction, abnormal relaxation pattern.  Trace tricuspid regurgitation.  Estimated right ventricular systolic pressure is 25 mmHg which is normal.    Gastroesophageal reflux disease without esophagitis 06/06/2019    Generalized anxiety disorder 04/11/2017    Glaucoma     History of Acute deep vein thrombosis (DVT) of distal end of left lower extremity 02/15/2017    03/21/2017 patient seen in follow-up and she is tolerating the Eliquis  well without signs or symptoms of bleeding.  Her calf swelling and tenderness is better but not totally resolved.  I suspect that the DVT is chronic and may not resolve at all.  I will order a repeat venous study and then proceed from there.  02/23/2017--repeat Doppler venous study of the left lower extremity reveals a chronic left lower extremity DVT in the posterior tibial.  All other left sided veins appeared normal.  Fluid collection in the left calf noted.  02/17/2017--patient seen in follow-up and reports her left lower extremity symptoms are about the same.  She continues to have complaints of profound fatigue which I think is multifactorial including underlying depression that is not in remission.  Review the results of the CTA of the chest including the possible thyroid lesion.  I do not think this is contributing to any of her symptoms of fatigue particularly given the fact that her thyroid function tests are normal.  I expla    History of palpitations 12/07/2011    Patient has had multiple admissions to the hospital for complaints of chest pain and heart palpitations. She meant admitted at least on 3 occasions. She has had at least 3 stress Cardiolite and 1 stress ECG, the last Cardiolite being performed 12/07/2011 which was negative. Patient is also had Holter monitors which have been unremarkable.    HIstory of Schatzki's ring 02/28/2012 02/28/2012--air-contrast upper GI revealed small to moderate sized reducible sliding hiatal herniation of the upper stomach with some demonstrated gastroesophageal reflux. No esophageal, gastric, or duodenal mass or mucosal ulceration was seen.  11/03/2008--EGD performed for evaluation of iron deficiency anemia revealed hiatal hernia without evidence of reflux, prepyloric antritis and    Hyperlipidemia 04/08/2016    Hypersomnolence 05/05/2016 06/14/2017--overnight oximetry revealed oxygen desaturation index of only 0.44.  There were only 10 total desaturations  during the period of testing which lasted 6 hours and 46 minutes.  05/31/2017--patient seen in follow-up and reports she continues to have chronic fatigue as well as hypersomnolence.  Once again, she is on multiple medications that are undoubtedly contributing to this problem including clonazepam and hydrocodone but I doubt that these could be discontinued.  Her CBC back in April revealed a low hemoglobin of 10.8 but hematocrit was normal at 35.7.  Thyroid function tests were normal and CMP normal.  Overnight oximetry test ordered.  Repeat CBC.  Iron studies.  Sedimentation rate and CRP.  04/11/2017--patient seen in follow-up and her blood pressure is now at a reasonable level at 120/64.  She reports she still feels somewhat fatigued but she is much better.  Patient has not been doing any regular exercise and I do think that that would be helpful to reduce her feeling of fatigue.  She is    Menopausal state 04/08/2016    Multinodular goiter 02/17/2017 02/21/2017--thyroid ultrasound reveals multinodular thyroid with largest nodule measuring on the order of 1.6 cm in greatest dimension.  02/17/2017--patient seen in follow-up for DVT and CTA of the chest.  An incidental finding on the CTA of the chest reveals a 1.7 cm lesion in the right lobe of thyroid.  Note that thyroid function tests are currently normal.  Ultrasound of the thyroid ordered.    Osteoporosis, 03/29/2011--lumbar spine -2.8.  Right femoral neck -2.4.  Left femoral neck -2.4.  Patient receives Reclast infusions. 02/09/2009 04/19/2017--Reclast infusion.  04/05/2016--Reclast infusion.  06/04/2012--Reclast infusion.  05/26/2011--Reclast infusion.  03/29/2011--DEXA scan revealed a lumbar T score of -2.8, right femoral neck T score -2.4, left femoral neck T score -2.4.  Osteoporosis of the lumbar spine and severe osteopenia of the hips bilaterally.  Patient has been intolerant to Fosamax because of gastritis and gastroesophageal reflux.   04/01/2009--treatment for osteoporosis begun with Fosamax.  02/09/2009--DEXA scan revealed lumbar spine T score of -3.3.  Left femoral neck T score -2.5.  Right hip T score -2.6.  Osteoporosis.     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  CT scan of the abdomen and pelvis reveals a left renal cyst measuring 5.1 x 4.9 cm.  There were subcentimeter hypodense renal lesions that are too small to further characterize and are presumably related to cysts.  Recommend attention to follow up.  Distal dilated pancreatic duct noted.  The common bile duct is the upper limits of normal in size.  The ampullary region is not well evaluated.  Comparison with prior imaging is recommended if available.  If there is no prior film available for comparison, and ERCP or MRCP could be performed to further evaluate.  Small hiatal hernia noted.  03/05/2012--CT scan of the abdomen with contrast, pancreatic protocol.  This reveals some fullness of the pancreatic ductal system and apparent pancreatic divisum with separate entrance of the accessory pancreatic duct extending into the duodenum distal to the main pancreatic duct.  There is fullness of the duct diffusely but similar to the previous s    Parkinson's disease with dyskinesia and fluctuating manifestations 12/14/2023 December 19, 2023--MRI of the brain without contrast reveals stable findings compared to the prior study dated May 28, 2021.  There are moderate changes of chronic small vessel ischemic phenomena.  Additionally there are findings consistent with an end of the insulin large perivascular space within the anterior portion of the right temporal lobe measuring 1.7 x 1.2 cm in greatest axial dimensions.    Pedal edema 06/29/2015 06/29/2015--patient presents with a 4-6 week history of exertional dyspnea that comes on with activity such  as climbing stairs or walking her dog up an incline.  No chest pain.  Relieved with rest.  No cough.  She does have complaints of her feet and legs swelling that is particularly worse at the end of the day and not improved overnight.  No orthopnea or PND.  Chest exam reveals faint rales at the bases.  Otherwise clear.  Heart is regular and I do not appreciate a heart murmur.  Chest x-ray PA and lateral ordered.  Echocardiogram ordered.    Pulmonary hypertension 04/18/2019    Restless legs syndrome 04/08/2016    Short-term memory loss 05/04/2018 05/04/2018--patient reports development of problems with her memory over the past several months and is concerned about possibility of Alzheimer's.  No other neurologic symptoms other than occasional sensation of being off balance.  We will not evaluate that problem at the present time unless it gets worse.  I we will however send her for neuropsychological testing regarding memory loss.      Simple renal cyst 07/20/2009 09/29/2014--patient was again evaluated by the urologist for a renal cyst.  CT scan of the abdomen and pelvis reveals a left renal cyst measuring 5.1 x 4.9 cm.  There were subcentimeter hypodense renal lesions that are too small to further characterize and are presumably related to cysts.  Recommend attention to follow up.  Distal dilated pancreatic duct noted.  The common bile duct is the upper limits of normal in size.  The ampullary region is not well evaluated.  Comparison with prior imaging is recommended if available.  If there is no prior film available for comparison, and ERCP or MRCP could be performed to further evaluate.  Small hiatal hernia noted.  07/20/2009--patient was noted to have a left renal mass consistent with a cyst.  This was evaluated by the urologist 07/20/2009.    Stage 3a chronic kidney disease 11/14/2015 11/14/2015--serum creatinine mildly elevated at 1.12.      Statin intolerance 10/30/2017    Tinnitus of both ears  09/30/2019 September 30, 2019--patient presents with a several year history of bilateral tinnitus, right greater than left.  It seems to be getting worse and now is associated with fluctuating hearing.  She occasionally will have worsening hearing after she coughs or sneezes.  On exam she has evidence of old otitis media scars, particularly on the right.  There is no acute infection present and there is no a    Tremor of left hand 12/14/2023    Vitamin D deficiency 04/08/2016         Past Surgical History:   Procedure Laterality Date    APPENDECTOMY  1965    1965    CATARACT EXTRACTION Bilateral Remote    Remote bilateral cataract extirpation with intraocular lens implantation.    CHOLECYSTECTOMY WITH INTRAOPERATIVE CHOLANGIOGRAM N/A 01/21/2019 01/21/2019--laparoscopic paraesophageal hernia repair with fundoplication.    COLONOSCOPY  11/03/2008 2008--normal colonoscopy    COLONOSCOPY  2001 2001--normal colonoscopy.    COLONOSCOPY N/A 06/13/2017 06/13/2017--colonoscopy revealed melanosis.  Biopsied.  There was friability with contact bleeding in the ascending colon.  Biopsied.  Pathology returned consistent with melanosis coli.    ENDOSCOPY  10/08/2014    02/28/2012--air-contrast upper GI revealed small to moderate sized reducible sliding hiatal herniation of the upper stomach with some demonstrated gastroesophageal reflux. No esophageal, gastric, or duodenal mass or mucosal ulceration was seen. 11/03/2008--EGD performed for evaluation of iron deficiency anemia revealed hiatal hernia without evidence of reflux, prepyloric antritis and    ENDOSCOPY  11/03/2008 11/03/2008--EGD performed for evaluation of iron deficiency anemia revealed hiatal hernia without evidence of reflux, prepyloric antritis and    ENDOSCOPY  11/19/2001 11/19/2001--EGD revealed a very tortuous distal esophagus with a Schatzki's ring. No ulcer or erosions. No Dorado's mucosa. Stomach revealed patchy erythema and erosions in  the antrum. Biopsy. Normal pylorus with no obstruction. Normal duodenum with no ulcers. The Schatzki's ring was dilated with a Rhodes dilator.;    ENDOSCOPY N/A 09/27/2016 09/27/2016--EGD revealed a normal oropharynx, esophagus, and medium sized hiatal hernia.  Nonbleeding gastric ulcer with no stigmata of bleeding.  Biopsy.  Gastritis.  Biopsy.  Normal examined duodenum.  Biopsied.  Pathology returned mild to moderate chronic active gastritis with ulceration from the stomach antral ulcer biopsy.  Gastroesophageal junction biopsy revealed minimal mixed inflammation.    ENDOSCOPY N/A 06/13/2017 06/13/2017--colonoscopy revealed melanosis.  Biopsied.  There was friability with contact bleeding in the ascending colon.  Biopsied.  Pathology returned consistent with melanosis coli.    ERCP N/A 01/22/2019 01/22/2019--ERCP with sphincterotomy and balloon stone extraction.    ESOPHAGEAL DILATATION  11/19/2001 11/19/2001--EGD revealed a very tortuous distal esophagus with a Schatzki's ring. No ulcer or erosions. No Dorado's mucosa. Stomach revealed patchy erythema and erosions in the antrum. Biopsy. Normal pylorus with no obstruction. Normal duodenum with no ulcers. The Schatzki's ring was dilated with a Rhodes dilator.;     ESOPHAGEAL MANOMETRY N/A 12/18/2018    Procedure: ESOPHAGEAL MANOMETRY;  Surgeon: Cecilia, Nurse Performed;  Location: [facility] ENDOSCOPY;  Service: Gastroenterology    ESOPHAGOSCOPY N/A 01/21/2019    Procedure: FLEXIBLE ESOPHAGOSCOPY;  Surgeon: Reji Johnson MD;  Location: [facility] OR;  Service: General    HIATAL HERNIA REPAIR N/A 01/21/2019    Procedure: Laparoscopic paraesophageal hernia repair with toupee fundoplication;  Surgeon: Reji Johnson MD;  Location: [facility] OR;  Service: General    INCONTINENCE SURGERY  1979 1979--a bladder tack procedure for urinary stress incontinence    KNEE ARTHROSCOPY Right 12/12/2011 12/12/2011--right knee arthroscopy  with partial lateral and medial meniscectomies.    SKIN SURGERY  05/25/2010 05/25/2010--skin lesion excised from right lower extremity. 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    TONSILLECTOMY  1953    TOTAL ABDOMINAL HYSTERECTOMY WITH SALPINGO OOPHORECTOMY  1974 1974--total abdominal hysterectomy.           Current Outpatient Medications:     aspirin 81 MG EC tablet, Take 1 tablet by mouth Daily. HELD, Disp: , Rfl:     Brexpiprazole (Rexulti) 1 MG tablet, Take 2 mg by mouth Daily., Disp: , Rfl:     buPROPion XL (WELLBUTRIN XL) 300 MG 24 hr tablet, Take 1 tablet by mouth Every Morning., Disp: , Rfl:     carbidopa-levodopa (SINEMET)  MG per tablet, Take 1 tablet by mouth 3 (Three) Times a Day., Disp: 90 tablet, Rfl: 2    Cholecalciferol (vitamin D3) 125 MCG (5000 UT) capsule capsule, 1 by mouth daily as directed, Disp: 30 capsule, Rfl:     diphenhydrAMINE-APAP, sleep, (Tylenol PM Extra Strength)  MG/30ML liquid, Tylenol PM Extra Strength, Disp: , Rfl:     donepezil (ARICEPT) 5 MG tablet, Take 1 tablet by mouth every night at bedtime., Disp: , Rfl:     estradiol (ESTRACE) 1 MG tablet, TAKE 1 TABLET BY MOUTH EVERY DAY, Disp: 90 tablet, Rfl: 0    HYDROcodone-acetaminophen (NORCO)  MG per tablet, Take 1 tablet by mouth Every 6 (Six) Hours As Needed for Moderate Pain., Disp: , Rfl:     Misc Natural Products (COLON CLEANSE PO), Take  by mouth., Disp: , Rfl:     spironolactone (ALDACTONE) 100 MG tablet, Take a half a tablet (50 mg) a day for high blood pressure and leg swelling, Disp: , Rfl:     traZODone (DESYREL) 50 MG tablet, Take 0.5-1 tablets by mouth Every Night., Disp: , Rfl:     venlafaxine XR (EFFEXOR-XR) 150 MG 24 hr capsule, Take 1 capsule by mouth Every Morning., Disp: , Rfl:     venlafaxine XR (EFFEXOR-XR) 75 MG 24 hr capsule, Take one 75 mg capsule venlafaxine along with one 150 mg capsule daily for depression.  Total daily dose is 225 mg., Disp: , Rfl:      vitamin E 400 UNIT capsule, Take 1 capsule by mouth Daily., Disp: , Rfl:     Cimetidine (TAGAMET PO), Tagamet (Patient not taking: Reported on 4/30/2024), Disp: , Rfl:     diclofenac (VOLTAREN) 1 % gel gel, Apply 4 g topically to the appropriate area as directed 4 (Four) Times a Day As Needed. (Patient not taking: Reported on 4/30/2024), Disp: , Rfl:       Family History   Problem Relation Age of Onset    Heart attack Mother         dies age 47 from heart attack    Heart disease Mother     Bleeding Disorder Mother     Heart disease Father     Ovarian cancer Maternal Grandmother     Heart attack Maternal Aunt         dies age 60 from heart attack    Malig Hyperthermia Neg Hx     Breast cancer Neg Hx          Social History     Socioeconomic History    Marital status:      Spouse name: ander    Number of children: 4    Years of education: 14    Highest education level: Associate degree: academic program   Tobacco Use    Smoking status: Never    Smokeless tobacco: Never    Tobacco comments:     None   Vaping Use    Vaping status: Never Used   Substance and Sexual Activity    Alcohol use: No    Drug use: No    Sexual activity: Not Currently     Partners: Male     Birth control/protection: Diaphragm, Birth control pill, Hysterectomy         Allergies   Allergen Reactions    Statins Nausea And Vomiting    Morphine Hallucinations    Penicillins Itching    Tetanus Toxoids Itching         Pain Scale: 0/10        ROS:  Review of Systems   Musculoskeletal:  Positive for gait problem.   Neurological:  Positive for dizziness, tremors, speech difficulty, weakness, light-headedness and numbness. Negative for seizures, syncope, facial asymmetry and headaches.   Psychiatric/Behavioral:  Positive for agitation, confusion and hallucinations. Negative for behavioral problems, decreased concentration, dysphoric mood, self-injury, sleep disturbance and suicidal ideas. The patient is nervous/anxious. The patient is not  hyperactive.          I have reviewed and agree with the above ROS completed by medical assistant.    Physical Exam:  Vitals:    07/30/24 1300   Weight: 58.5 kg (129 lb)     Orthostatic BP:    Body mass index is 25.19 kg/m².    Physical Exam  General: Well-developed white female in no acute distress.  Hypomimia.  HEENT: Normocephalic no evidence of trauma  Neck: Supple  Heart: Regular rate and rhythm  Extremities: Radial pulses strong and simultaneous.  No pedal edema.      Neurological Exam:   Mental Status: Awake, alert, oriented to person, place and time.  Conversant without evidence of an affective disorder, thought disorder, delusions or hallucinations.  Attention span and concentration are normal.  HCF: No aphasia, apraxia or dysarthria.  Recent and remote memory intact.  Knowledge of recent events intact.  CN: I:   II: Visual fields full without left inattention   III, IV, VI: Eye movements intact without nystagmus or ptosis.  Pupils equal round and reactive to light.   V,VII: Light touch and pinprick intact all 3 divisions of V.  Facial muscles symmetrical.   VIII: Hearing intact to finger rub   IX,X: Soft palate elevates symmetrically   XI: Sternomastoid and trapezius are strong.   XII: Tongue midline without atrophy or fasciculations  Motor: Normal bulk in the upper and lower extremities.  Very minimal cogwheeling   Power testing: Pretty good strength in all muscles tested in the arms and legs  Reflexes: Upper extremities:        Lower extremities:        Toe signs:  Sensory: Light touch:        Pinprick:        Vibration:        Position:    Cerebellar: Finger-to-nose: Slow.  Minimal endpoint tremor.  No resting tremor on exam           Rapid movement: Slow           Heel-to-shin:  Gait and Station: Comes to stand pushing off the chair.  Reduced step length and armswing.  No tremor activated with ambulation.  No Romberg no drift.  No freezing or festination's.    Results:      Lab Results   Component  Value Date    GLUCOSE 79 03/19/2024    BUN 21 03/19/2024    CREATININE 1.17 (H) 03/19/2024    EGFRIFNONA 45 (L) 11/17/2021    EGFRIFAFRI 51 (L) 03/09/2020    BCR 18 03/19/2024    CO2 20 03/19/2024    CALCIUM 9.7 03/19/2024    PROTENTOTREF 6.7 03/19/2024    ALBUMIN 4.3 03/19/2024    LABIL2 1.8 03/19/2024    AST 18 03/19/2024    ALT 9 03/19/2024       Lab Results   Component Value Date    WBC 11.7 (H) 03/19/2024    HGB 14.1 03/19/2024    HCT 43.6 03/19/2024    MCV 91 03/19/2024     03/19/2024       .  Lab Results   Component Value Date    RPR Non-Reactive 10/14/2021       Lab Results   Component Value Date    TSH 1.350 03/19/2024       Lab Results   Component Value Date    IGWVGTOG03 451 06/07/2023       Lab Results   Component Value Date    FOLATE 7.50 10/14/2021       Lab Results   Component Value Date    SEDRATE 8 06/07/2023       Lab Results   Component Value Date    CRP 0.77 (H) 10/22/2020           Assessment:   1.  Parkinson's disease-mild.  Some improvement on carbidopa/levodopa but patient and  forgot to start increased dose of 25/100 mg 1 tablet 3 times a day.  Denies any side effects.  I told him to pay attention to the involuntary movements in the left leg to see if they progress after increasing the dosage.  She noticed improvement after completing physical therapy and would like to return but she will give me a call after vacation for me to place the order.  Will send her to [facility] speech therapist for big and loud therapy, patient states that at [facility] did not reach out to them for appointment.  2.  Suspicion of sensory peripheral neuropathy-patient denies any numbness or tingling currently and states that it resolved.  3.  Mild cognitive impairment-short-term memory loss.  Psychiatry started patient on donepezil little over a month ago.  Patient states she has not noticed any improvement after starting donepezil.  Will check MMSE at next visit.  4.  High risk  falls          Plan:  1.  Start carbidopa/levodopa 25/100 mg 1 tablet 3 times a day.  Side effects reviewed.  2.  Referral to speech therapy for big and loud  3.  Discussed use of assistive device but patient declined  4.  Patient will call when they return from vacation for PT at [facility].  5.  Follow-up in 3 months or sooner if needed    Avoid taking your carbidopa/levodopa with food (particularly protein).  Take one hour before or 2 hours after meals.  - Side effects include nausea and dizziness upon standing.  - At higher doses, may cause excessive movements called dyskinesias, confusion, or hallucinations.     Check out the Parkinson's Foundation at parkinson.org, the [employer] at [employer], or Apdaparkinson.org    Consider increasing exercise with yoga, dance, garrison chi, boxing, or music therapy.  Some [facility] gyms offer a free Parkinson's clinic.  Call your local [facility] to see if it is available.  Rock steady boxing is also a great boxing based fitness curriculum for Parkinson's disease.  [facility] offers [employer]. Big and Loud Therapy.     Consider cognitive exercise as well such as puzzles, word search, Sudoku, etc.     For constipation, increase water intake, eat fruits and vegetables, whole grains, exercise, consider polythylene glycol (Miralax) or bisacodyl suppository, or abdominal massage.          "1[employer]2,3 [employer]" BOXING     Nilam Shearer  [address] [place] [address]  [phone], [phone]  [email or web address]     In-Person: Monday and Wednesday, 9:30 AM  [address] [address]     Zoom: Monday, Wednesday, Friday 9:30 AM  ID# [phone]  Website: [employer]  Facebook: [facility]  YouTube: [employer] [place]       Time: Spent 55 minutes in total encounter time. This included time for chart review, obtaining history, performing pertinent physical examination, updating medical information, ordering  tests/referrals, discussion of diagnosis, medical management, counseling, and encounter documentation.                  Dictated utilizing Dragon dictation.

## 2024-08-27 ENCOUNTER — OFFICE VISIT (OUTPATIENT)
Dept: INTERNAL MEDICINE | Facility: CLINIC | Age: 84
End: 2024-08-27
Payer: MEDICARE

## 2024-08-27 VITALS
TEMPERATURE: 95.8 F | SYSTOLIC BLOOD PRESSURE: 126 MMHG | RESPIRATION RATE: 16 BRPM | OXYGEN SATURATION: 95 % | DIASTOLIC BLOOD PRESSURE: 70 MMHG | HEART RATE: 89 BPM | HEIGHT: 60 IN | WEIGHT: 127 LBS | BODY MASS INDEX: 24.94 KG/M2

## 2024-08-27 DIAGNOSIS — H57.11 EYE PAIN, RIGHT: ICD-10-CM

## 2024-08-27 DIAGNOSIS — J01.00 ACUTE NON-RECURRENT MAXILLARY SINUSITIS: ICD-10-CM

## 2024-08-27 DIAGNOSIS — J30.9 ALLERGIC RHINITIS, UNSPECIFIED SEASONALITY, UNSPECIFIED TRIGGER: Primary | ICD-10-CM

## 2024-08-27 RX ORDER — FLUTICASONE PROPIONATE 50 MCG
2 SPRAY, SUSPENSION (ML) NASAL DAILY
Qty: 18 ML | Refills: 6 | Status: SHIPPED | OUTPATIENT
Start: 2024-08-27

## 2024-08-27 RX ORDER — CEFDINIR 300 MG/1
300 CAPSULE ORAL 2 TIMES DAILY
Qty: 14 CAPSULE | Refills: 0 | Status: SHIPPED | OUTPATIENT
Start: 2024-08-27 | End: 2024-09-03

## 2024-08-27 RX ORDER — LORATADINE 10 MG/1
10 TABLET ORAL DAILY
Qty: 90 TABLET | Refills: 1 | Status: SHIPPED | OUTPATIENT
Start: 2024-08-27

## 2024-08-27 NOTE — PROGRESS NOTES
"Chief Complaint  Sinus Problem (For about 3 months)    Subjective          Sachi Vora presents to [facility]  History of Present Illness  The patient is an 83-year-old female who presents for evaluation of multiple medical concerns.    She has been experiencing persistent allergies, characterized by a constant runny nose. She has been self-medicating with an over-the-counter medication, the name of which she does not recall.    She reports a sensation of something lodged in her eye, which she is unable to remove or identify. She does not currently have an ophthalmologist.    Objective   Vital Signs:   /70   Pulse 89   Temp 95.8 °F (35.4 °C) (Temporal)   Resp 16   Ht 152.4 cm (60\")   Wt 57.6 kg (127 lb)   SpO2 95%   BMI 24.80 kg/m²     Physical Exam  Vitals and nursing note reviewed.   Constitutional:       Appearance: She is well-developed.   HENT:      Head: Normocephalic and atraumatic.   Eyes:      General:         Right eye: No discharge.         Left eye: No discharge.      Extraocular Movements: Extraocular movements intact.      Conjunctiva/sclera: Conjunctivae normal.   Musculoskeletal:      Cervical back: Normal range of motion and neck supple.   Neurological:      Mental Status: She is alert and oriented to person, place, and time.   Psychiatric:         Behavior: Behavior normal.         Physical Exam       Result Review :                 Assessment and Plan    Diagnoses and all orders for this visit:    1. Allergic rhinitis, unspecified seasonality, unspecified trigger (Primary)  -     fluticasone (FLONASE) 50 MCG/ACT nasal spray; 2 sprays into the nostril(s) as directed by provider Daily.  Dispense: 18 mL; Refill: 6  -     loratadine (Claritin) 10 MG tablet; Take 1 tablet by mouth Daily.  Dispense: 90 tablet; Refill: 1    2. Acute non-recurrent maxillary sinusitis  -     cefdinir (OMNICEF) 300 MG capsule; Take 1 capsule by mouth 2 (Two) Times a Day for 7 " days.  Dispense: 14 capsule; Refill: 0    3. Eye pain, right  -     Ambulatory Referral to Ophthalmology      Assessment & Plan  1. Allergic Rhinitis.  Flonase nasal spray and Claritin 10 mg once daily have been prescribed to manage her allergy symptoms.     2. Acute sinusitis  If there is no improvement with these medications, an antibiotic, Omnicef 300 mg twice daily, will be considered.    3. Foreign Body Sensation in Eye.  A referral has been made to Dr. Se Cheney, an ophthalmologist, for further evaluation of the sensation in her eye.        Follow Up   No follow-ups on file.  Patient was given instructions and counseling regarding her condition or for health maintenance advice. Please see specific information pulled into the AVS if appropriate.           Patient or patient representative verbalized consent for the use of Ambient Listening during the visit with  Willem Maria MD for chart documentation. 8/27/2024  11:13 EDT

## 2024-09-03 DIAGNOSIS — I10 BENIGN ESSENTIAL HYPERTENSION: Chronic | ICD-10-CM

## 2024-09-03 DIAGNOSIS — E26.9 HYPERALDOSTERONISM: Chronic | ICD-10-CM

## 2024-09-03 RX ORDER — SPIRONOLACTONE 100 MG/1
TABLET, FILM COATED ORAL
Qty: 30 TABLET | Refills: 1 | Status: SHIPPED | OUTPATIENT
Start: 2024-09-03

## 2024-09-03 NOTE — TELEPHONE ENCOUNTER
Caller: Jian Vora    Relationship: Emergency Contact    Best call back number: [phone]     Requested Prescriptions:   Requested Prescriptions     Pending Prescriptions Disp Refills    spironolactone (ALDACTONE) 100 MG tablet       Sig: Take a half a tablet (50 mg) a day for high blood pressure and leg swelling        Pharmacy where request should be sent: [id number] [place] [address] [phone] [facility] [phone]      Last office visit with prescribing clinician: 4/9/2024   Last telemedicine visit with prescribing clinician: Visit date not found   Next office visit with prescribing clinician: 9/10/2024         Does the patient have less than a 3 day supply:  [] Yes  [x] No    Would you like a call back once the refill request has been completed: [] Yes [] No    If the office needs to give you a call back, can they leave a voicemail: [] Yes [] No    Sierra Iyer Rep   09/03/24 12:49 EDT

## 2024-09-10 ENCOUNTER — OFFICE VISIT (OUTPATIENT)
Dept: INTERNAL MEDICINE | Facility: CLINIC | Age: 84
End: 2024-09-10
Payer: MEDICARE

## 2024-09-10 VITALS
RESPIRATION RATE: 16 BRPM | SYSTOLIC BLOOD PRESSURE: 118 MMHG | TEMPERATURE: 97.1 F | WEIGHT: 130 LBS | BODY MASS INDEX: 25.52 KG/M2 | DIASTOLIC BLOOD PRESSURE: 64 MMHG | OXYGEN SATURATION: 92 % | HEART RATE: 81 BPM | HEIGHT: 60 IN

## 2024-09-10 DIAGNOSIS — I10 BENIGN ESSENTIAL HYPERTENSION: Chronic | ICD-10-CM

## 2024-09-10 DIAGNOSIS — G25.81 RESTLESS LEGS SYNDROME: Chronic | ICD-10-CM

## 2024-09-10 DIAGNOSIS — G89.29 CHRONIC LOW BACK PAIN WITH SCIATICA, SCIATICA LATERALITY UNSPECIFIED, UNSPECIFIED BACK PAIN LATERALITY: Chronic | ICD-10-CM

## 2024-09-10 DIAGNOSIS — E04.2 MULTINODULAR GOITER: Chronic | ICD-10-CM

## 2024-09-10 DIAGNOSIS — I27.20 PULMONARY HYPERTENSION: Chronic | ICD-10-CM

## 2024-09-10 DIAGNOSIS — F45.42 PAIN DISORDER ASSOCIATED WITH PSYCHOLOGICAL FACTORS: Chronic | ICD-10-CM

## 2024-09-10 DIAGNOSIS — F41.8 DEPRESSION WITH ANXIETY: Chronic | ICD-10-CM

## 2024-09-10 DIAGNOSIS — R49.0 CHRONIC HOARSENESS: Chronic | ICD-10-CM

## 2024-09-10 DIAGNOSIS — N18.31 STAGE 3A CHRONIC KIDNEY DISEASE: Chronic | ICD-10-CM

## 2024-09-10 DIAGNOSIS — K29.20 CHRONIC ALCOHOLIC GASTRITIS WITHOUT HEMORRHAGE: Chronic | ICD-10-CM

## 2024-09-10 DIAGNOSIS — I65.23 ATHEROSCLEROSIS OF BOTH CAROTID ARTERIES: Chronic | ICD-10-CM

## 2024-09-10 DIAGNOSIS — Z78.0 POSTMENOPAUSAL STATE: Chronic | ICD-10-CM

## 2024-09-10 DIAGNOSIS — D50.0 IRON DEFICIENCY ANEMIA DUE TO CHRONIC BLOOD LOSS: Chronic | ICD-10-CM

## 2024-09-10 DIAGNOSIS — M17.12 PRIMARY OSTEOARTHRITIS OF LEFT KNEE: Chronic | ICD-10-CM

## 2024-09-10 DIAGNOSIS — M81.0 AGE-RELATED OSTEOPOROSIS WITHOUT CURRENT PATHOLOGICAL FRACTURE: Chronic | ICD-10-CM

## 2024-09-10 DIAGNOSIS — R41.3 SHORT-TERM MEMORY LOSS: Chronic | ICD-10-CM

## 2024-09-10 DIAGNOSIS — M54.40 CHRONIC LOW BACK PAIN WITH SCIATICA, SCIATICA LATERALITY UNSPECIFIED, UNSPECIFIED BACK PAIN LATERALITY: Chronic | ICD-10-CM

## 2024-09-10 DIAGNOSIS — G47.10 HYPERSOMNOLENCE: Chronic | ICD-10-CM

## 2024-09-10 DIAGNOSIS — K86.89 DILATION OF PANCREATIC DUCT: Chronic | ICD-10-CM

## 2024-09-10 DIAGNOSIS — R26.89 BALANCE DISORDER: Chronic | ICD-10-CM

## 2024-09-10 DIAGNOSIS — F41.1 GENERALIZED ANXIETY DISORDER: Chronic | ICD-10-CM

## 2024-09-10 DIAGNOSIS — D64.9 CHRONIC ANEMIA: Chronic | ICD-10-CM

## 2024-09-10 DIAGNOSIS — N28.1 SIMPLE RENAL CYST: Chronic | ICD-10-CM

## 2024-09-10 DIAGNOSIS — Z51.81 THERAPEUTIC DRUG MONITORING: ICD-10-CM

## 2024-09-10 DIAGNOSIS — G43.709 CHRONIC MIGRAINE W/O AURA W/O STATUS MIGRAINOSUS, NOT INTRACTABLE: Chronic | ICD-10-CM

## 2024-09-10 DIAGNOSIS — H60.20 CHRONIC MALIGNANT OTITIS EXTERNA, UNSPECIFIED LATERALITY: Chronic | ICD-10-CM

## 2024-09-10 DIAGNOSIS — R29.6 FREQUENT FALLS: Chronic | ICD-10-CM

## 2024-09-10 DIAGNOSIS — R53.82 CHRONIC FATIGUE: Chronic | ICD-10-CM

## 2024-09-10 DIAGNOSIS — E87.6 HYPOKALEMIA: Chronic | ICD-10-CM

## 2024-09-10 DIAGNOSIS — E55.9 VITAMIN D DEFICIENCY: Chronic | ICD-10-CM

## 2024-09-10 DIAGNOSIS — G89.29 CHRONIC PAIN OF LEFT KNEE: Chronic | ICD-10-CM

## 2024-09-10 DIAGNOSIS — K21.9 GASTROESOPHAGEAL REFLUX DISEASE WITHOUT ESOPHAGITIS: Chronic | ICD-10-CM

## 2024-09-10 DIAGNOSIS — E26.9 HYPERALDOSTERONISM: Chronic | ICD-10-CM

## 2024-09-10 DIAGNOSIS — H93.13 TINNITUS OF BOTH EARS: Chronic | ICD-10-CM

## 2024-09-10 DIAGNOSIS — Z78.9 STATIN INTOLERANCE: Chronic | ICD-10-CM

## 2024-09-10 DIAGNOSIS — E78.2 MIXED HYPERLIPIDEMIA: Chronic | ICD-10-CM

## 2024-09-10 DIAGNOSIS — R01.0 FUNCTIONAL MURMUR: Chronic | ICD-10-CM

## 2024-09-10 DIAGNOSIS — R60.0 BILATERAL LOWER EXTREMITY EDEMA: Chronic | ICD-10-CM

## 2024-09-10 DIAGNOSIS — H90.3 BILATERAL SENSORINEURAL HEARING LOSS: Chronic | ICD-10-CM

## 2024-09-10 DIAGNOSIS — Z00.00 ENCOUNTER FOR SUBSEQUENT ANNUAL WELLNESS VISIT (AWV) IN MEDICARE PATIENT: Primary | ICD-10-CM

## 2024-09-10 DIAGNOSIS — M25.562 CHRONIC PAIN OF LEFT KNEE: Chronic | ICD-10-CM

## 2024-09-10 DIAGNOSIS — K25.7 CHRONIC GASTRIC ULCER WITHOUT HEMORRHAGE AND WITHOUT PERFORATION: Chronic | ICD-10-CM

## 2024-09-10 DIAGNOSIS — G20.B2 PARKINSON'S DISEASE WITH DYSKINESIA AND FLUCTUATING MANIFESTATIONS: Chronic | ICD-10-CM

## 2024-09-10 PROCEDURE — 1170F FXNL STATUS ASSESSED: CPT | Performed by: INTERNAL MEDICINE

## 2024-09-10 PROCEDURE — 3074F SYST BP LT 130 MM HG: CPT | Performed by: INTERNAL MEDICINE

## 2024-09-10 PROCEDURE — G0439 PPPS, SUBSEQ VISIT: HCPCS | Performed by: INTERNAL MEDICINE

## 2024-09-10 PROCEDURE — 1159F MED LIST DOCD IN RCRD: CPT | Performed by: INTERNAL MEDICINE

## 2024-09-10 PROCEDURE — 3078F DIAST BP <80 MM HG: CPT | Performed by: INTERNAL MEDICINE

## 2024-09-10 PROCEDURE — 1126F AMNT PAIN NOTED NONE PRSNT: CPT | Performed by: INTERNAL MEDICINE

## 2024-09-10 PROCEDURE — 1160F RVW MEDS BY RX/DR IN RCRD: CPT | Performed by: INTERNAL MEDICINE

## 2024-09-10 NOTE — PROGRESS NOTES
Subjective   The ABCs of the Annual Wellness Visit  Medicare Wellness Visit      Sachi Vora is a 83 y.o. patient who presents for a Medicare Wellness Visit.    The following portions of the patient's history were reviewed and   updated as appropriate: allergies, current medications, past family history, past medical history, past social history, past surgical history, and problem list.    Compared to one year ago, the patient's physical   health is worse.  Compared to one year ago, the patient's mental   health is the same.    Recent Hospitalizations:  She was not admitted to the hospital during the last year.     Current Medical Providers:  Patient Care Team:  Cruzito Weir MD as PCP - General (Internal Medicine)    Outpatient Medications Prior to Visit   Medication Sig Dispense Refill    Brexpiprazole (Rexulti) 1 MG tablet Take 2 mg by mouth Daily.      buPROPion XL (WELLBUTRIN XL) 300 MG 24 hr tablet Take 1 tablet by mouth Every Morning.      carbidopa-levodopa (SINEMET)  MG per tablet Take 1 tablet by mouth 3 (Three) Times a Day. 90 tablet 2    Cholecalciferol (vitamin D3) 125 MCG (5000 UT) capsule capsule 1 by mouth daily as directed 30 capsule     diphenhydrAMINE-APAP, sleep, (Tylenol PM Extra Strength)  MG/30ML liquid Tylenol PM Extra Strength      donepezil (ARICEPT) 5 MG tablet Take 1 tablet by mouth every night at bedtime.      estradiol (ESTRACE) 1 MG tablet TAKE 1 TABLET BY MOUTH EVERY DAY 90 tablet 0    fluticasone (FLONASE) 50 MCG/ACT nasal spray 2 sprays into the nostril(s) as directed by provider Daily. 18 mL 6    HYDROcodone-acetaminophen (NORCO)  MG per tablet Take 1 tablet by mouth Every 6 (Six) Hours As Needed for Moderate Pain.      loratadine (Claritin) 10 MG tablet Take 1 tablet by mouth Daily. 90 tablet 1    Misc Natural Products (COLON CLEANSE PO) Take  by mouth.      spironolactone (ALDACTONE) 100 MG tablet Take a half a tablet (50 mg) a day for high blood  pressure and leg swelling 30 tablet 1    traZODone (DESYREL) 50 MG tablet Take 0.5-1 tablets by mouth Every Night.      venlafaxine XR (EFFEXOR-XR) 150 MG 24 hr capsule Take 1 capsule by mouth Every Morning.      venlafaxine XR (EFFEXOR-XR) 75 MG 24 hr capsule Take one 75 mg capsule venlafaxine along with one 150 mg capsule daily for depression.  Total daily dose is 225 mg.      vitamin E 400 UNIT capsule Take 1 capsule by mouth Daily.      aspirin 81 MG EC tablet Take 1 tablet by mouth Daily. HELD       No facility-administered medications prior to visit.     Opioid medication/s are on active medication list.  and I have evaluated her active treatment plan and pain score trends (see table).  There were no vitals filed for this visit.  I have reviewed the chart for potential of high risk medication and harmful drug interactions in the elderly.        Aspirin is on active medication list. Aspirin use is contraindicated for this patient due to: Frequent falls and risk of bleeding.  Patient has been instructed to discontinue this medication. .      Patient Active Problem List   Diagnosis    Osteoporosis, 03/29/2011--lumbar spine -2.8.  Right femoral neck -2.4.  Left femoral neck -2.4.  Patient receives Reclast infusions.    Therapeutic drug monitoring    Simple renal cyst    Benign essential hypertension    Carotid artery plaque, 04/02/2018--mild right ICA plaque, normal left ICA 10/03/2014--mild bilateral carotid artery plaque.    Chronic gastritis    Chronic lower back pain    Chronic otitis externa    Stage 3a chronic kidney disease    Depression with anxiety    Functional murmur, 07/15/2015--normal echocardiogram.    Hyperlipidemia    Chronic migraine w/o aura w/o status migrainosus, not intractable    Pancreatic Duct Dilation    Bilateral lower extremity edema    Restless legs syndrome    Bilateral sensorineural hearing loss    Vitamin D deficiency    Chronic gastric ulcer    Chronic fatigue    Hypersomnolence     "Chronic anemia    Multinodular goiter    Generalized anxiety disorder    Iron deficiency anemia    Statin intolerance    Postmenopausal state    Short-term memory loss    Pulmonary hypertension    Gastroesophageal reflux disease without esophagitis    Tinnitus of both ears    Chronic hoarseness    Pain disorder associated with psychological factors    Frequent falls    Balance disorder, related to Parkinson's    Hyperaldosteronism    Hypokalemia    Chronic pain of left knee    Primary osteoarthritis of left knee    Chronic hoarseness    Parkinson's disease with dyskinesia and fluctuating manifestations     Advance Care Planning Advance Directive is on file.  ACP discussion was held with the patient during this visit. Patient has an advance directive in EMR which is still valid.             Objective   Vitals:    09/10/24 1011   BP: 118/64   Pulse: 81   Resp: 16   Temp: 97.1 °F (36.2 °C)   TempSrc: Temporal   SpO2: 92%   Weight: 59 kg (130 lb)   Height: 152.4 cm (60\")       Estimated body mass index is 25.39 kg/m² as calculated from the following:    Height as of this encounter: 152.4 cm (60\").    Weight as of this encounter: 59 kg (130 lb).            Does the patient have evidence of cognitive impairment? Yes                                                                                               Health  Risk Assessment    Smoking Status:  Social History     Tobacco Use   Smoking Status Never   Smokeless Tobacco Never   Tobacco Comments    None     Alcohol Consumption:  Social History     Substance and Sexual Activity   Alcohol Use No       Fall Risk Screen  STEADI Fall Risk Assessment was completed, and patient is at MODERATE risk for falls. Assessment completed on:9/10/2024    Depression Screenin/10/2024    10:11 AM   PHQ-2/PHQ-9 Depression Screening   Little Interest or Pleasure in Doing Things 0-->not at all   Feeling Down, Depressed or Hopeless 0-->not at all   PHQ-9: Brief Depression Severity " Measure Score 0     Health Habits and Functional and Cognitive Screenin/10/2024    10:11 AM   Functional & Cognitive Status   Do you have difficulty preparing food and eating? No   Do you have difficulty bathing yourself, getting dressed or grooming yourself? No   Do you have difficulty using the toilet? No   Do you have difficulty moving around from place to place? No   Do you have trouble with steps or getting out of a bed or a chair? No   Current Diet Well Balanced Diet   Dental Exam Up to date   Eye Exam Up to date   Exercise (times per week) 3 times per week   Current Exercises Include Stationary Bicycling/Spin Class   Do you need help using the phone?  No   Are you deaf or do you have serious difficulty hearing?  No   Do you need help to go to places out of walking distance? No   Do you need help shopping? No   Do you need help preparing meals?  No   Do you need help with housework?  No   Do you need help with laundry? No   Do you need help taking your medications? No   Do you need help managing money? No   Do you ever drive or ride in a car without wearing a seat belt? No   Have you felt unusual stress, anger or loneliness in the last month? No   Who do you live with? Spouse   If you need help, do you have trouble finding someone available to you? No   Have you been bothered in the last four weeks by sexual problems? No   Do you have difficulty concentrating, remembering or making decisions? No           Age-appropriate Screening Schedule:  Refer to the list below for future screening recommendations based on patient's age, sex and/or medical conditions. Orders for these recommended tests are listed in the plan section. The patient has been provided with a written plan.    Health Maintenance List  Health Maintenance   Topic Date Due    DXA SCAN  2024    INFLUENZA VACCINE  2024    LIPID PANEL  2025    ANNUAL WELLNESS VISIT  09/10/2025    MAMMOGRAM  2026    COLORECTAL CANCER  SCREENING  06/13/2027    RSV Vaccine - Adults  Completed    Pneumococcal Vaccine 65+  Completed    COVID-19 Vaccine  Discontinued    ZOSTER VACCINE  Discontinued    BMI FOLLOWUP  Discontinued                                                                                                                                                CMS Preventative Services Quick Reference  Risk Factors Identified During Encounter  Chronic Pain: Natural history and expected course discussed. Questions answered.  Depression/Dysphoria: Current medication is effective, no change recommended  Fall Risk-High or Moderate: Discussed Fall Prevention in the home  Immunizations Discussed/Encouraged: Influenza and RSV (Respiratory Syncytial Virus)    The above risks/problems have been discussed with the patient.  Pertinent information has been shared with the patient in the After Visit Summary.  An After Visit Summary and PPPS were made available to the patient.    Follow Up:   Next Medicare Wellness visit to be scheduled in 1 year.     Assessment & Plan  Encounter for subsequent annual wellness visit (AWV) in Medicare patient    Balance disorder, related to Parkinson's    Benign essential hypertension    Bilateral lower extremity edema    Bilateral sensorineural hearing loss    Carotid artery plaque, 04/02/2018--mild right ICA plaque, normal left ICA 10/03/2014--mild bilateral carotid artery plaque.    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    Gastroesophageal reflux disease without esophagitis    Generalized anxiety disorder    Hyperaldosteronism    Hypersomnolence    Hyperlipidemia     Hypokalemia    Iron deficiency anemia due to chronic blood loss    Multinodular goiter    Osteoporosis, 03/29/2011--lumbar spine -2.8.  Right femoral neck -2.4.  Left femoral neck -2.4.  Patient receives Reclast infusions.    Pain disorder associated with psychological factors    Pancreatic Duct Dilation    Parkinson's disease with dyskinesia and fluctuating manifestations    Postmenopausal state    Primary osteoarthritis of left knee    Restless legs syndrome    Short-term memory loss    Simple renal cyst    Stage 3a chronic kidney disease    Statin intolerance    Tinnitus of both ears    Vitamin D deficiency    Pulmonary hypertension    Therapeutic drug monitoring               Follow Up:   Return in about 1 week (around 9/17/2024) for Next scheduled follow up with lab prior.

## 2024-09-30 ENCOUNTER — TELEPHONE (OUTPATIENT)
Dept: NEUROLOGY | Facility: CLINIC | Age: 84
End: 2024-09-30
Payer: MEDICARE

## 2024-09-30 NOTE — TELEPHONE ENCOUNTER
Caller: Jian Vora    Relationship: Emergency Contact    Best call back number: [phone]    What orders are you requesting (i.e. lab or imaging): SPEECH THERAPY (PT WAS NEVER SCHEDULED FOR THIS TREATMENT) REFERRAL WAS SENT BACK OVER THE SUMMER     PHYSICAL THERAPY   [facility]    In what timeframe would the patient need to come in:   AS SOON AS POSSIBLE    Where will you receive your lab/imaging services:     Additional notes:   PLEASE PUT IN ORDERS AS REQUESTED ABOVE

## 2024-10-01 NOTE — TELEPHONE ENCOUNTER
Lacy placed a order for Speech Therapy on 4/30 and patient was sent to [facility]. On 7/30 sent placed another speech referral, which was sent to [facility]. On 8/23 Renu with [facility] speech scheduling called patient and left a voice mail to call back to schedule. Unfortunately, [facility] only treats patients for Physical Therapy not Speech. Would patient like to be referred back to [facility] or to [facility].

## 2024-10-01 NOTE — TELEPHONE ENCOUNTER
Returned call  No answer  Left detailed vm requesting a return call    HUB if pt calls back please find out if they would like a referral to [facility] or [facility].  Thank you

## 2024-10-08 RX ORDER — CARBIDOPA AND LEVODOPA 25; 100 MG/1; MG/1
1 TABLET ORAL 3 TIMES DAILY
Qty: 90 TABLET | Refills: 0 | Status: SHIPPED | OUTPATIENT
Start: 2024-10-08

## 2024-10-14 RX ORDER — ESTRADIOL 1 MG/1
1 TABLET ORAL DAILY
Qty: 90 TABLET | Refills: 0 | Status: SHIPPED | OUTPATIENT
Start: 2024-10-14

## 2024-10-29 ENCOUNTER — OFFICE VISIT (OUTPATIENT)
Dept: NEUROLOGY | Facility: CLINIC | Age: 84
End: 2024-10-29
Payer: MEDICARE

## 2024-10-29 VITALS
HEART RATE: 78 BPM | WEIGHT: 130 LBS | BODY MASS INDEX: 25.39 KG/M2 | SYSTOLIC BLOOD PRESSURE: 122 MMHG | OXYGEN SATURATION: 97 % | DIASTOLIC BLOOD PRESSURE: 74 MMHG

## 2024-10-29 DIAGNOSIS — R20.2 NUMBNESS AND TINGLING IN BOTH HANDS: ICD-10-CM

## 2024-10-29 DIAGNOSIS — R20.0 NUMBNESS AND TINGLING IN BOTH HANDS: ICD-10-CM

## 2024-10-29 DIAGNOSIS — G20.A1 PARKINSON'S DISEASE WITHOUT DYSKINESIA OR FLUCTUATING MANIFESTATIONS: Primary | ICD-10-CM

## 2024-10-29 DIAGNOSIS — Z91.81 AT HIGH RISK FOR FALLS: ICD-10-CM

## 2024-10-29 DIAGNOSIS — G31.84 MCI (MILD COGNITIVE IMPAIRMENT): ICD-10-CM

## 2024-10-29 RX ORDER — METHYLPHENIDATE HYDROCHLORIDE 18 MG/1
18 TABLET ORAL EVERY MORNING
COMMUNITY
Start: 2024-10-05

## 2024-10-29 RX ORDER — CARBIDOPA AND LEVODOPA 25; 100 MG/1; MG/1
1 TABLET ORAL 3 TIMES DAILY
Qty: 90 TABLET | Refills: 2 | Status: SHIPPED | OUTPATIENT
Start: 2024-10-29

## 2024-10-29 RX ORDER — CYCLOSPORINE 0.5 MG/ML
1 EMULSION OPHTHALMIC EVERY 12 HOURS
COMMUNITY
Start: 2024-10-24

## 2024-10-29 RX ORDER — CARBIDOPA AND LEVODOPA 50; 200 MG/1; MG/1
1 TABLET, EXTENDED RELEASE ORAL NIGHTLY
Qty: 30 TABLET | Refills: 2 | Status: SHIPPED | OUTPATIENT
Start: 2024-10-29

## 2024-10-29 NOTE — PROGRESS NOTES
NAME: Sachi Vora   : 1940    Chief Complaint   Patient presents with    Parkinson's Disease        HPI:  Sachi Vora is a 84 y.o.  right-handed female who I am seeing in follow-up regarding Parkinson's disease.  She is accompanied by her  who adds to the history.  I last saw her on 2024, with the following history taken from that note with additions/modifications as indicated:    Patient was initially seen by Dr. Barboza in  regarding balance problems and falls.  She denied any dizziness but states that she was just fall backwards.  She also reported some short-term memory loss.  Her  reported that she shuffles but she did not think so.  She was found to have left ankle dorsiflexor weakness and toe extensor weakness on exam and an EMG was ordered.  Dr. Barboza performed an EMG on 2022 with the following impression:     Borderline study. There was no evidence of peripheral neuropathy or peroneal neuropathy on either side. There were no needle exam changes in the leg muscles but the paraspinals showed a few positive sharp waves which could go along with bilateral lumbosacral radiculopathies. The patient has had epidural steroid injections but no nerve ablations.      An MRI of the lumbar spine was obtained which did not show significant problems at the L5 level.       The patient had progressive problems with balance and falls and return to the office in .  Her family reported shuffling gait, occasional mild resting tremor, poor writing that got smaller and is illegible, as well as, some reduced volume in her voice and the pitch seems a little higher.  Parkinson's disease was suspected and she was trialed on carbidopa/levodopa 10/100 mg 1 tablet 3 times a day with some improvements which was further escalated to 25/100 mg 3 times a day.      History interim    Patient continues to take carbidopa/levodopa 25/100 mg 1 tablet 3 times a day without any side effects specifically no  nausea.  Patient states that her symptoms may be a little worse.  She currently takes her medication at 10/2/9.  She reports worsening shuffling gait, turning over in bed, more fatigued and difficulty swallowing.  She states that her nose has been running like a faucet.  She denies any increased drooling.  She states she still has not heard from [facility] speech therapy or [facility].  Per the notes, [facility] called patient but no answer so they left a voicemail.  She continues to have a softer hoarse voice.  She states that her tremors remain about the same as well as her left leg involuntary movements.  They did not notice a change in the movements after increasing carbidopa/levodopa.  She denies any freezing or festination's.  She denies any recent falls.  She states that her gait is unsteady and she has slow movements.  She completed physical therapy in the summer which patient and  both states was helpful.  She denies any trouble sleeping but reports occasional vivid dreams.  She denies any hallucinations or mood changes.  She denies any change in her memory and continues to take donepezil 5 mg nightly per psychiatry.  She continues to take Effexor per PCP.  She continues to report numbness and tingling in her fingers but denies any painful symptoms or any symptoms in her feet.      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 Weir.  I extensively reviewed the documentation from the emergency room visit as noted below.  I reviewed the history with the patient and she is describing episodes of dizziness described as a spinning sensation of her body and this seems to be precipitated by movement although it does not occur if she rolls over in bed.  If she stands up and st    Benign essential hypertension 04/08/2016    Bilateral sensorineural hearing loss 03/31/2011 03/31/2011--etiology  "reveals reverse \"cookie bite\" type of hearing loss for both ears of mild/moderate degree, mostly sensorineural.  Speech discrimination 100% on the right, 96% on the left.    Carotid artery plaque, 10/03/2014--mild bilateral carotid artery plaque. 07/25/2012    10/03/2014--[facility] screening revealed mild bilateral carotid plaque, negative for atrial fibrillation, negative for AAA, negative for PAD, osteoporosis screen revealed osteopenia.  Body mass index was 25 and considered to be moderate risk.  07/25/2012--vascular screen negative for carotid plaque, negative for abdominal aneurysm, negative for PAD Description: 10/03/2014--[facility] screening revealed mild bilateral carotid plaque, negative for atrial fibrillation, negative for AAA, negative for PAD, osteoporosis screen revealed osteopenia.  Body mass index was 25 and considered to be moderate risk.  07/25/2012--vascular screen negative for carotid plaque, negative for abdominal aneurysm, negative for PAD    Chronic anemia 08/23/2016 06/13/2017--colonoscopy revealed melanosis.  Biopsied.  There was friability with contact bleeding in the ascending colon.  Biopsied.  Pathology returned consistent with melanosis coli.  06/13/2017--EGD revealed normal esophagus.  Biopsies taken.  There was a medium-sized hiatal hernia.  Localized mild inflammation and linear erosions were found in the prepyloric region.  Biopsied.  Examined duodenum was normal.  Random biopsies taken.  Pathology of the gastroesophageal junction was unremarkable as was the gastric fundus.  Prepyloric biopsy revealed minimal chronic inflammation and reactive change.  H pylori negative.  Duodenal biopsies are negative.  04/12/2017--hemoglobin low at 10.8, hematocrit is normal at 35.7.  Iron sulfate 325 mg per day initiated.  08/23/2016--routine follow-up.  Patient continues to have epigastric abdominal pain believed to be related to reflux with possible esophageal spasm.  Hemoglobin noted to be low " at 10.6 with a hematocrit low at 33.7 and RDW elevated at 16.2.  Homocysti    Chronic fatigue 04/21/2016 06/14/2017--overnight oximetry revealed oxygen desaturation index of only 0.44.  There were only 10 total desaturations during the period of testing which lasted 6 hours and 46 minutes.  05/31/2017--patient seen in follow-up and reports she continues to have chronic fatigue as well as hypersomnolence.  Once again, she is on multiple medications that are undoubtedly contributing to this problem including clonazepam and hydrocodone but I doubt that these could be discontinued.  Her CBC back in April revealed a low hemoglobin of 10.8 but hematocrit was normal at 35.7.  Thyroid function tests were normal and CMP normal.  Overnight oximetry test ordered.  Repeat CBC.  Iron studies.  Sedimentation rate and CRP.  04/11/2017--patient seen in follow-up and her blood pressure is now at a reasonable level at 120/64.  She reports she still feels somewhat fatigued but she is much better.  Patient has not been doing any regular exercise and I do think that that would be helpful to reduce her feeling of fatigue.  She is    Chronic gastric ulcer 04/14/2014    02/15/2017--patient seen in follow-up in nearly 6 months later.  She has complaints of not feeling well all over.  She has complaints of diffuse myalgias and possibly tendinopathy related to Levaquin that I called in prior to her going to [place] for vacation.  She reports that 3 or 4 days after starting the Levaquin she began to have the musculoskeletal symptoms and she reports that she continues to have them presently.  Symptoms are worse involving her left calf area.  Examination reveals significant tenderness involving the left calf and the left calf seems a little larger than the right.  She also has complaints of shortness of breath but no chest pain.  She is complaining of double vision and generalized weakness and fatigue.  She was complaining of chronic  fatigue at the last visit back in September and I ordered an overnight oximetry test but apparently this was never done for reasons that are not clear to me.  Her current oxygen saturation is 94% and normally it is 100%.  Plan is as follows: Extensi    Chronic gastritis 11/19/2001    02/15/2017--patient seen in follow-up in nearly 6 months later.  She has complaints of not feeling well all over.  She has complaints of diffuse myalgias and possibly tendinopathy related to Levaquin that I called in prior to her going to [place] for vacation.  She reports that 3 or 4 days after starting the Levaquin she began to have the musculoskeletal symptoms and she reports that she continues to have them presently.  Symptoms are worse involving her left calf area.  Examination reveals significant tenderness involving the left calf and the left calf seems a little larger than the right.  She also has complaints of shortness of breath but no chest pain.  She is complaining of double vision and generalized weakness and fatigue.  She was complaining of chronic fatigue at the last visit back in September and I ordered an overnight oximetry test but apparently this was never done for reasons that are not clear to me.  Her current oxygen saturation is 94% and normally it is 100%.  Plan is as follows: Extensi    Chronic hoarseness 06/08/2020    September 15, 2020--patient presents with complaints of swelling of the soft tissues of the left side of the neck and this is associated with pain and discomfort, particularly when she turns her head rotating to the left.  She also notices hoarseness and feels that her voice has changed.  On exam there is definite prominence of the left sternocleidomastoid muscle and there may be some shotty lymph    Chronic lower back pain 01/31/2006 08/11/2014--MRI of the lumbar spine reveals the conus terminates at L2 and is normal.  Cauda equina unremarkable.  Stable to moderate degenerative disc disease  at L5-S1.  No acute fracture or pars defect is demonstrated.  Small synovial cysts are seen posterior to the L4-L5 facet.  The perivertebral soft tissues are unremarkable.  T12-L1, L1-L2, L2-L3 are negative.  L3-L4 a broad-based disc bulge resulting in mild bilateral neural foraminal narrowing.  L4-L5 reveals a broad-based disc bulge facet disease resulting in mild bilateral neural foraminal narrowing.  L5-S1 reveals a broad-based disc bulge, posterior osseous slipping, and facet disease resulting in mild to moderate bilateral neural foraminal narrowing.  Assessment is stable mild to moderate degenerative spondylosis.  Small synovial cysts are seen posterior to the L4-L5 facets.  08/11/2014--MRI of the thoracic spine reveals mild to moderate thoracic kyphosis.  No fracture.  At T5--T6 there is a moderate sized left paracentral disc protrusion which i    Chronic migraine 11/03/2009 01/18/2010--MRI of the brain performed for headaches and memory loss.  Mild small vessel disease in the cerebral and central pontine white matter.  There is an ovoid, somewhat pancake-shaped area of signal abnormality in the anterior inferior right temporal lobe subcortical to the juxtacortical white matter that measures 1.2 x 1 cm and anterior posterior and medial lateral dimension but only measures 3 mm in cranial caudal dimension.  I suspect that this is a benign cyst or a cystic area of encephalomalacia.  The remainder of the MRI of the head is within normal limits.  11/03/2009--CT scan of the brain without contrast performed for headache after a fall.  Mild diffuse atrophy.  No acute abnormality noted.; Description: Patient has had a long history of migraine headaches.  MRI and CT scan of the brain have been essentially negative.    Chronic otitis externa 04/08/2016    Chronic renal insufficiency, stage II (mild), creatinine 1.12 11/14/2015 11/14/2015--serum creatinine mildly elevated at 1.12.    Depression with anxiety  04/08/2016    Frequent falls 02/05/2021 February 5, 2021--patient seen in follow-up by Dr. Weir.  I extensively reviewed the documentation from the emergency room visit as noted below.  I reviewed the history with the patient and she is describing episodes of dizziness described as a spinning sensation of her body and this seems to be precipitated by movement although it does not occur if she rolls over in bed.  If she stands up and st    Functional murmur, 07/15/2015--normal echocardiogram. 07/15/2015    07/15/2015--echocardiogram reveals normal left ventricular size and function with ejection fraction 55%.  Grade 1 diastolic dysfunction, abnormal relaxation pattern.  Trace tricuspid regurgitation.  Estimated right ventricular systolic pressure is 25 mmHg which is normal.    Gastroesophageal reflux disease without esophagitis 06/06/2019    Generalized anxiety disorder 04/11/2017    Glaucoma     History of Acute deep vein thrombosis (DVT) of distal end of left lower extremity 02/15/2017    03/21/2017 patient seen in follow-up and she is tolerating the Eliquis well without signs or symptoms of bleeding.  Her calf swelling and tenderness is better but not totally resolved.  I suspect that the DVT is chronic and may not resolve at all.  I will order a repeat venous study and then proceed from there.  02/23/2017--repeat Doppler venous study of the left lower extremity reveals a chronic left lower extremity DVT in the posterior tibial.  All other left sided veins appeared normal.  Fluid collection in the left calf noted.  02/17/2017--patient seen in follow-up and reports her left lower extremity symptoms are about the same.  She continues to have complaints of profound fatigue which I think is multifactorial including underlying depression that is not in remission.  Review the results of the CTA of the chest including the possible thyroid lesion.  I do not think this is contributing to any of her symptoms of fatigue  particularly given the fact that her thyroid function tests are normal.  I expla    History of palpitations 12/07/2011    Patient has had multiple admissions to the hospital for complaints of chest pain and heart palpitations. She meant admitted at least on 3 occasions. She has had at least 3 stress Cardiolite and 1 stress ECG, the last Cardiolite being performed 12/07/2011 which was negative. Patient is also had Holter monitors which have been unremarkable.    HIstory of Schatzki's ring 02/28/2012 02/28/2012--air-contrast upper GI revealed small to moderate sized reducible sliding hiatal herniation of the upper stomach with some demonstrated gastroesophageal reflux. No esophageal, gastric, or duodenal mass or mucosal ulceration was seen.  11/03/2008--EGD performed for evaluation of iron deficiency anemia revealed hiatal hernia without evidence of reflux, prepyloric antritis and    Hyperlipidemia 04/08/2016    Hypersomnolence 05/05/2016 06/14/2017--overnight oximetry revealed oxygen desaturation index of only 0.44.  There were only 10 total desaturations during the period of testing which lasted 6 hours and 46 minutes.  05/31/2017--patient seen in follow-up and reports she continues to have chronic fatigue as well as hypersomnolence.  Once again, she is on multiple medications that are undoubtedly contributing to this problem including clonazepam and hydrocodone but I doubt that these could be discontinued.  Her CBC back in April revealed a low hemoglobin of 10.8 but hematocrit was normal at 35.7.  Thyroid function tests were normal and CMP normal.  Overnight oximetry test ordered.  Repeat CBC.  Iron studies.  Sedimentation rate and CRP.  04/11/2017--patient seen in follow-up and her blood pressure is now at a reasonable level at 120/64.  She reports she still feels somewhat fatigued but she is much better.  Patient has not been doing any regular exercise and I do think that that would be helpful to reduce her  feeling of fatigue.  She is    Menopausal state 04/08/2016    Multinodular goiter 02/17/2017 02/21/2017--thyroid ultrasound reveals multinodular thyroid with largest nodule measuring on the order of 1.6 cm in greatest dimension.  02/17/2017--patient seen in follow-up for DVT and CTA of the chest.  An incidental finding on the CTA of the chest reveals a 1.7 cm lesion in the right lobe of thyroid.  Note that thyroid function tests are currently normal.  Ultrasound of the thyroid ordered.    Osteoporosis, 03/29/2011--lumbar spine -2.8.  Right femoral neck -2.4.  Left femoral neck -2.4.  Patient receives Reclast infusions. 02/09/2009 04/19/2017--Reclast infusion.  04/05/2016--Reclast infusion.  06/04/2012--Reclast infusion.  05/26/2011--Reclast infusion.  03/29/2011--DEXA scan revealed a lumbar T score of -2.8, right femoral neck T score -2.4, left femoral neck T score -2.4.  Osteoporosis of the lumbar spine and severe osteopenia of the hips bilaterally.  Patient has been intolerant to Fosamax because of gastritis and gastroesophageal reflux.  04/01/2009--treatment for osteoporosis begun with Fosamax.  02/09/2009--DEXA scan revealed lumbar spine T score of -3.3.  Left femoral neck T score -2.5.  Right hip T score -2.6.  Osteoporosis.     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  CT scan of the abdomen and pelvis reveals a left renal cyst measuring 5.1 x 4.9 cm.  There were subcentimeter hypodense renal lesions that are too small to further characterize and are presumably related to cysts.  Recommend attention to follow up.  Distal dilated pancreatic duct noted.  The common bile duct is the upper limits of normal in size.  The ampullary region is not well evaluated.  Comparison with prior imaging is recommended if available.  If there is no prior film available for comparison,  and ERCP or MRCP could be performed to further evaluate.  Small hiatal hernia noted.  03/05/2012--CT scan of the abdomen with contrast, pancreatic protocol.  This reveals some fullness of the pancreatic ductal system and apparent pancreatic divisum with separate entrance of the accessory pancreatic duct extending into the duodenum distal to the main pancreatic duct.  There is fullness of the duct diffusely but similar to the previous s    Parkinson's disease with dyskinesia and fluctuating manifestations 12/14/2023 December 19, 2023--MRI of the brain without contrast reveals stable findings compared to the prior study dated May 28, 2021.  There are moderate changes of chronic small vessel ischemic phenomena.  Additionally there are findings consistent with an end of the insulin large perivascular space within the anterior portion of the right temporal lobe measuring 1.7 x 1.2 cm in greatest axial dimensions.    Pedal edema 06/29/2015 06/29/2015--patient presents with a 4-6 week history of exertional dyspnea that comes on with activity such as climbing stairs or walking her dog up an incline.  No chest pain.  Relieved with rest.  No cough.  She does have complaints of her feet and legs swelling that is particularly worse at the end of the day and not improved overnight.  No orthopnea or PND.  Chest exam reveals faint rales at the bases.  Otherwise clear.  Heart is regular and I do not appreciate a heart murmur.  Chest x-ray PA and lateral ordered.  Echocardiogram ordered.    Pulmonary hypertension 04/18/2019    Restless legs syndrome 04/08/2016    Short-term memory loss 05/04/2018 05/04/2018--patient reports development of problems with her memory over the past several months and is concerned about possibility of Alzheimer's.  No other neurologic symptoms other than occasional sensation of being off balance.  We will not evaluate that problem at the present time unless it gets worse.  I we will however send  her for neuropsychological testing regarding memory loss.      Simple renal cyst 07/20/2009 09/29/2014--patient was again evaluated by the urologist for a renal cyst.  CT scan of the abdomen and pelvis reveals a left renal cyst measuring 5.1 x 4.9 cm.  There were subcentimeter hypodense renal lesions that are too small to further characterize and are presumably related to cysts.  Recommend attention to follow up.  Distal dilated pancreatic duct noted.  The common bile duct is the upper limits of normal in size.  The ampullary region is not well evaluated.  Comparison with prior imaging is recommended if available.  If there is no prior film available for comparison, and ERCP or MRCP could be performed to further evaluate.  Small hiatal hernia noted.  07/20/2009--patient was noted to have a left renal mass consistent with a cyst.  This was evaluated by the urologist 07/20/2009.    Stage 3a chronic kidney disease 11/14/2015 11/14/2015--serum creatinine mildly elevated at 1.12.      Statin intolerance 10/30/2017    Tinnitus of both ears 09/30/2019 September 30, 2019--patient presents with a several year history of bilateral tinnitus, right greater than left.  It seems to be getting worse and now is associated with fluctuating hearing.  She occasionally will have worsening hearing after she coughs or sneezes.  On exam she has evidence of old otitis media scars, particularly on the right.  There is no acute infection present and there is no a    Tremor of left hand 12/14/2023    Vitamin D deficiency 04/08/2016         Past Surgical History:   Procedure Laterality Date    APPENDECTOMY  1965    1965    CATARACT EXTRACTION Bilateral Remote    Remote bilateral cataract extirpation with intraocular lens implantation.    CHOLECYSTECTOMY WITH INTRAOPERATIVE CHOLANGIOGRAM N/A 01/21/2019 01/21/2019--laparoscopic paraesophageal hernia repair with fundoplication.    COLONOSCOPY  11/03/2008 2008--normal colonoscopy     COLONOSCOPY  2001 2001--normal colonoscopy.    COLONOSCOPY N/A 06/13/2017 06/13/2017--colonoscopy revealed melanosis.  Biopsied.  There was friability with contact bleeding in the ascending colon.  Biopsied.  Pathology returned consistent with melanosis coli.    ENDOSCOPY  10/08/2014    02/28/2012--air-contrast upper GI revealed small to moderate sized reducible sliding hiatal herniation of the upper stomach with some demonstrated gastroesophageal reflux. No esophageal, gastric, or duodenal mass or mucosal ulceration was seen. 11/03/2008--EGD performed for evaluation of iron deficiency anemia revealed hiatal hernia without evidence of reflux, prepyloric antritis and    ENDOSCOPY  11/03/2008 11/03/2008--EGD performed for evaluation of iron deficiency anemia revealed hiatal hernia without evidence of reflux, prepyloric antritis and    ENDOSCOPY  11/19/2001 11/19/2001--EGD revealed a very tortuous distal esophagus with a Schatzki's ring. No ulcer or erosions. No Dorado's mucosa. Stomach revealed patchy erythema and erosions in the antrum. Biopsy. Normal pylorus with no obstruction. Normal duodenum with no ulcers. The Schatzki's ring was dilated with a Rhodes dilator.;    ENDOSCOPY N/A 09/27/2016 09/27/2016--EGD revealed a normal oropharynx, esophagus, and medium sized hiatal hernia.  Nonbleeding gastric ulcer with no stigmata of bleeding.  Biopsy.  Gastritis.  Biopsy.  Normal examined duodenum.  Biopsied.  Pathology returned mild to moderate chronic active gastritis with ulceration from the stomach antral ulcer biopsy.  Gastroesophageal junction biopsy revealed minimal mixed inflammation.    ENDOSCOPY N/A 06/13/2017 06/13/2017--colonoscopy revealed melanosis.  Biopsied.  There was friability with contact bleeding in the ascending colon.  Biopsied.  Pathology returned consistent with melanosis coli.    ERCP N/A 01/22/2019 01/22/2019--ERCP with sphincterotomy and balloon stone extraction.     ESOPHAGEAL DILATATION  11/19/2001 11/19/2001--EGD revealed a very tortuous distal esophagus with a Schatzki's ring. No ulcer or erosions. No Dorado's mucosa. Stomach revealed patchy erythema and erosions in the antrum. Biopsy. Normal pylorus with no obstruction. Normal duodenum with no ulcers. The Schatzki's ring was dilated with a Rhodes dilator.;     ESOPHAGEAL MANOMETRY N/A 12/18/2018    Procedure: ESOPHAGEAL MANOMETRY;  Surgeon: Cecilia, Nurse Performed;  Location: [facility] ENDOSCOPY;  Service: Gastroenterology    ESOPHAGOSCOPY N/A 01/21/2019    Procedure: FLEXIBLE ESOPHAGOSCOPY;  Surgeon: Reji Johnson MD;  Location: [facility] MAIN OR;  Service: General    HIATAL HERNIA REPAIR N/A 01/21/2019    Procedure: Laparoscopic paraesophageal hernia repair with toupee fundoplication;  Surgeon: Reji Johnson MD;  Location: [facility] MAIN OR;  Service: General    INCONTINENCE SURGERY  1979 1979--a bladder tack procedure for urinary stress incontinence    KNEE ARTHROSCOPY Right 12/12/2011 12/12/2011--right knee arthroscopy with partial lateral and medial meniscectomies.    SKIN SURGERY  05/25/2010 05/25/2010--skin lesion excised from right lower extremity. 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    TONSILLECTOMY  1953    TOTAL ABDOMINAL HYSTERECTOMY WITH SALPINGO OOPHORECTOMY  1974 1974--total abdominal hysterectomy.           Current Outpatient Medications:     Brexpiprazole (Rexulti) 1 MG tablet, Take 2 mg by mouth Daily., Disp: , Rfl:     buPROPion XL (WELLBUTRIN XL) 300 MG 24 hr tablet, Take 1 tablet by mouth Every Morning., Disp: , Rfl:     carbidopa-levodopa (SINEMET)  MG per tablet, Take 1 tablet by mouth 3 (Three) Times a Day., Disp: 90 tablet, Rfl: 2    Cholecalciferol (vitamin D3) 125 MCG (5000 UT) capsule capsule, 1 by mouth daily as directed, Disp: 30 capsule, Rfl:     cycloSPORINE (RESTASIS) 0.05 % ophthalmic emulsion, Apply 1 drop to eye(s) as directed  by provider Every 12 (Twelve) Hours., Disp: , Rfl:     diphenhydrAMINE-APAP, sleep, (Tylenol PM Extra Strength)  MG/30ML liquid, Tylenol PM Extra Strength, Disp: , Rfl:     donepezil (ARICEPT) 5 MG tablet, Take 1 tablet by mouth every night at bedtime., Disp: , Rfl:     estradiol (ESTRACE) 1 MG tablet, Take 1 tablet by mouth once daily, Disp: 90 tablet, Rfl: 0    fluticasone (FLONASE) 50 MCG/ACT nasal spray, 2 sprays into the nostril(s) as directed by provider Daily., Disp: 18 mL, Rfl: 6    HYDROcodone-acetaminophen (NORCO)  MG per tablet, Take 1 tablet by mouth Every 6 (Six) Hours As Needed for Moderate Pain., Disp: , Rfl:     loratadine (Claritin) 10 MG tablet, Take 1 tablet by mouth Daily., Disp: 90 tablet, Rfl: 1    methylphenidate 18 MG CR tablet, Take 1 tablet by mouth Every Morning, Disp: , Rfl:     Misc Natural Products (COLON CLEANSE PO), Take  by mouth., Disp: , Rfl:     spironolactone (ALDACTONE) 100 MG tablet, Take a half a tablet (50 mg) a day for high blood pressure and leg swelling, Disp: 30 tablet, Rfl: 1    traZODone (DESYREL) 50 MG tablet, Take 0.5-1 tablets by mouth Every Night., Disp: , Rfl:     venlafaxine XR (EFFEXOR-XR) 150 MG 24 hr capsule, Take 1 capsule by mouth Every Morning., Disp: , Rfl:     venlafaxine XR (EFFEXOR-XR) 75 MG 24 hr capsule, Take one 75 mg capsule venlafaxine along with one 150 mg capsule daily for depression.  Total daily dose is 225 mg., Disp: , Rfl:     vitamin E 400 UNIT capsule, Take 1 capsule by mouth Daily., Disp: , Rfl:     carbidopa-levodopa CR (SINEMET CR)  MG per CR tablet, Take 1 tablet by mouth Every Night., Disp: 30 tablet, Rfl: 2      Family History   Problem Relation Age of Onset    Heart attack Mother         dies age 47 from heart attack    Heart disease Mother     Bleeding Disorder Mother     Heart disease Father     Ovarian cancer Maternal Grandmother     Heart attack Maternal Aunt         dies age 60 from heart attack    Malig  Hyperthermia Neg Hx     Breast cancer Neg Hx          Social History     Socioeconomic History    Marital status:      Spouse name: ander    Number of children: 4    Years of education: 14    Highest education level: Associate degree: academic program   Tobacco Use    Smoking status: Never    Smokeless tobacco: Never    Tobacco comments:     None   Vaping Use    Vaping status: Never Used   Substance and Sexual Activity    Alcohol use: No    Drug use: No    Sexual activity: Not Currently     Partners: Male     Birth control/protection: Diaphragm, Birth control pill, Hysterectomy         Allergies   Allergen Reactions    Penicillin G Anaphylaxis    Statins Nausea And Vomiting    Tetanus Toxoid Itching    Morphine Hallucinations    Penicillins Itching    Tetanus Toxoids Itching         Pain Scale: 0/10        ROS:  Review of Systems   Constitutional:  Positive for fatigue.   Musculoskeletal:  Positive for gait problem.   Neurological:  Positive for tremors, speech difficulty, weakness and numbness. Negative for dizziness, seizures, syncope, facial asymmetry, light-headedness and headaches.   Psychiatric/Behavioral:  Negative for agitation, behavioral problems, confusion, decreased concentration, dysphoric mood, hallucinations, self-injury, sleep disturbance and suicidal ideas. The patient is not nervous/anxious and is not hyperactive.        I have reviewed and agree with the above ROS completed by medical assistant.    Physical Exam:  Vitals:    10/29/24 1518   BP: 122/74   Pulse: 78   SpO2: 97%   Weight: 59 kg (130 lb)     There were no vitals filed for this visit.     Orthostatic BP:       Physical Exam  General: Well-developed white female no acute distress  HEENT: Normocephalic no evidence of trauma  Neck: Supple.    Heart: Regular rate and rhythm  Extremities: Radial pulses strong and simultaneous.  No pedal edema.      Neurological Exam:   Mental Status: Awake, alert, oriented to person, place and time.   Conversant without evidence of an affective disorder, thought disorder, delusions or hallucinations.  Attention span and concentration are normal.  HCF: No aphasia, apraxia or dysarthria.  Recent and remote memory intact.  Knowledge of recent events intact.  CN: I:   II: Visual fields full without left inattention   III, IV, VI: Eye movements intact without nystagmus or ptosis.  Pupils equal round and reactive to light.   V,VII: Light touch and pinprick intact all 3 divisions of V.  Facial muscles symmetrical.   VIII: Hearing intact to finger rub   IX,X: Soft palate elevates symmetrically   XI: Sternomastoid and trapezius are strong.   XII: Tongue midline without atrophy or fasciculations    Motor: Normal bulk in the upper and lower extremities.  Mild cogwheeling bilateral arms    Power testing: Mild weakness of APB muscles and interosseous bilaterally but good strength in all muscles tested in the arms and legs    Reflexes: Upper extremities:        Lower extremities:    Sensory: Light touch:        Pinprick:        Vibration:        Position:        Temperature:    Cerebellar: Finger-to-nose: Slow.  Minimal postural and endpoint tremor.  No resting tremor on exam today           Rapid movement: Slow           Heel-to-shin:    Gait and Station: Comes to stand pushing off the chair.  Reduced step length and armswing worse on the right.  No tremor with ambulation.  No freezing or festination's.  No drift.      Results:    Lab Results   Component Value Date    GLUCOSE 86 09/10/2024    BUN 17 09/10/2024    CREATININE 1.03 (H) 09/10/2024    EGFRIFNONA 45 (L) 11/17/2021    EGFRIFAFRI 51 (L) 03/09/2020    BCR 17 09/10/2024    CO2 24 09/10/2024    CALCIUM 9.3 09/10/2024    PROTENTOTREF 6.3 09/10/2024    ALBUMIN 4.0 09/10/2024    LABIL2 1.8 03/19/2024    AST 17 09/10/2024    ALT 6 09/10/2024     Lab Results   Component Value Date    WBC 10.8 09/10/2024    HGB 13.7 09/10/2024    HCT 43.1 09/10/2024    MCV 93 09/10/2024      09/10/2024     Lab Results   Component Value Date    RPR Non-Reactive 10/14/2021     Lab Results   Component Value Date    TSH 1.160 09/10/2024     Lab Results   Component Value Date    VOOPZIIS45 451 06/07/2023     Lab Results   Component Value Date    FOLATE 7.50 10/14/2021     Lab Results   Component Value Date    SEDRATE 8 06/07/2023     Lab Results   Component Value Date    CRP 0.77 (H) 10/22/2020     Lab Results   Component Value Date    LDL 89 08/17/2016     Lab Results   Component Value Date    TRIG 127 09/10/2024         Assessment:    1.  Parkinson's disease-patient reports a little worse.  Discussed with patient and  changing times of immediate release dosages to 10/2/6 and adding controlled release 50/200 mg 1 tablet nightly.  Will refer back to physical therapy for strengthening and gait training, they would like to return to [facility].  Provided number to [facility] to schedule speech therapy and sent a message to our schedulers to see about [facility] therapy.  Patient's  states she is not good at answering the phone so I switched the contact number to his so we can get this scheduled for her swallowing.  2.  Numbness and tingling fingertips-stable.  Denies any symptoms in her feet.  3.  MCI-stable  4.  High falls risk-no recent falls             Assessment and Plan   Diagnoses and all orders for this visit:    1. Parkinson's disease without dyskinesia or fluctuating manifestations (Primary)  -     carbidopa-levodopa CR (SINEMET CR)  MG per CR tablet; Take 1 tablet by mouth Every Night.  Dispense: 30 tablet; Refill: 2  -     Ambulatory Referral to Physical Therapy for Evaluation & Treatment  -     carbidopa-levodopa (SINEMET)  MG per tablet; Take 1 tablet by mouth 3 (Three) Times a Day.  Dispense: 90 tablet; Refill: 2    2. MCI (mild cognitive impairment)    3. At high risk for falls  -     Ambulatory Referral to Physical Therapy for Evaluation &  Treatment    4. Numbness and tingling in both hands        Ideal targets for risk factor control would be Blood pressure < 130/80, B12 > 500, TSH in normal range and LDL < 70; HbA1c < 6.5 and smoking cessation if applicable. Call 911 for stroke symptoms.  Recommend 150 minutes of physical activity weekly.  Limit alcohol intake.  Follow-up in 3 months or sooner if needed      Avoid taking your carbidopa/levodopa with food (particularly protein).  Take one hour before or 2 hours after meals.  - Side effects include nausea and dizziness upon standing.  - At higher doses, may cause excessive movements called dyskinesias, confusion, or hallucinations.     Check out the Parkinson's Foundation at [email or web address].org, the [employer] at [employer].org, or [email or web address]    Consider increasing exercise with yoga, dance, garrison chi, boxing, or music therapy.  Some [facility] gyms offer a free Parkinson's clinic.  Call your local [facility] to see if it is available.  Rock steady boxing is also a great boxing based fitness curriculum for Parkinson's disease.  [facility] offers [employer]ing. [employer] [facility].     Consider cognitive exercise as well such as puzzles, word search, Sudoku, etc.     For constipation, increase water intake, eat fruits and vegetables, whole grains, exercise, consider polythylene glycol (Miralax) or bisacodyl suppository, or abdominal massage.          [employer] [facility]     Nilam Shearer  [address] [place] [address]  [phone], [phone]  [email or web address]     In-Person: Monday and Wednesday, 9:30 AM  [address] [address]     Zoom: Monday, Wednesday, Friday 9:30 AM  ID# [phone]  Website: [employer]  Facebook: [employer] [employer]  YouTube: [employer] [place]       Time: Spent 55 minutes in total encounter time. This included time for chart review, obtaining history, performing pertinent physical examination, updating medical  information, ordering tests/referrals, discussion of diagnosis, medical management, counseling, and encounter documentation.        LESLI Charles          Much of this encounter note was dictated utilizing Dragon dictation which is an electronic transcription/translation of spoken language to printed text. The electronic translation of spoken language may permit erroneous, or at times, nonsensical words or phrases to be inadvertently transcribed; Although I have reviewed the note for such errors, some may still exist

## 2024-11-05 ENCOUNTER — OFFICE VISIT (OUTPATIENT)
Dept: INTERNAL MEDICINE | Facility: CLINIC | Age: 84
End: 2024-11-05
Payer: MEDICARE

## 2024-11-05 VITALS
HEART RATE: 76 BPM | HEIGHT: 60 IN | WEIGHT: 128 LBS | SYSTOLIC BLOOD PRESSURE: 128 MMHG | TEMPERATURE: 97 F | RESPIRATION RATE: 16 BRPM | OXYGEN SATURATION: 97 % | BODY MASS INDEX: 25.13 KG/M2 | DIASTOLIC BLOOD PRESSURE: 72 MMHG

## 2024-11-05 DIAGNOSIS — E55.9 VITAMIN D DEFICIENCY: Chronic | ICD-10-CM

## 2024-11-05 DIAGNOSIS — H90.3 BILATERAL SENSORINEURAL HEARING LOSS: Chronic | ICD-10-CM

## 2024-11-05 DIAGNOSIS — R49.0 CHRONIC HOARSENESS: Chronic | ICD-10-CM

## 2024-11-05 DIAGNOSIS — R41.3 SHORT-TERM MEMORY LOSS: Chronic | ICD-10-CM

## 2024-11-05 DIAGNOSIS — G25.81 RESTLESS LEGS SYNDROME: Chronic | ICD-10-CM

## 2024-11-05 DIAGNOSIS — E26.9 HYPERALDOSTERONISM: Chronic | ICD-10-CM

## 2024-11-05 DIAGNOSIS — R29.6 FREQUENT FALLS: Chronic | ICD-10-CM

## 2024-11-05 DIAGNOSIS — Z78.0 POSTMENOPAUSAL STATE: Chronic | ICD-10-CM

## 2024-11-05 DIAGNOSIS — H93.13 TINNITUS OF BOTH EARS: Chronic | ICD-10-CM

## 2024-11-05 DIAGNOSIS — K21.9 GASTROESOPHAGEAL REFLUX DISEASE WITHOUT ESOPHAGITIS: Chronic | ICD-10-CM

## 2024-11-05 DIAGNOSIS — G20.B2 PARKINSON'S DISEASE WITH DYSKINESIA AND FLUCTUATING MANIFESTATIONS: Chronic | ICD-10-CM

## 2024-11-05 DIAGNOSIS — R26.89 BALANCE DISORDER: Chronic | ICD-10-CM

## 2024-11-05 DIAGNOSIS — M81.0 AGE-RELATED OSTEOPOROSIS WITHOUT CURRENT PATHOLOGICAL FRACTURE: Chronic | ICD-10-CM

## 2024-11-05 DIAGNOSIS — I65.23 ATHEROSCLEROSIS OF BOTH CAROTID ARTERIES: Chronic | ICD-10-CM

## 2024-11-05 DIAGNOSIS — Z78.9 STATIN INTOLERANCE: Chronic | ICD-10-CM

## 2024-11-05 DIAGNOSIS — E78.2 MIXED HYPERLIPIDEMIA: Chronic | ICD-10-CM

## 2024-11-05 DIAGNOSIS — I27.20 PULMONARY HYPERTENSION: Chronic | ICD-10-CM

## 2024-11-05 DIAGNOSIS — D50.0 IRON DEFICIENCY ANEMIA DUE TO CHRONIC BLOOD LOSS: Chronic | ICD-10-CM

## 2024-11-05 DIAGNOSIS — R60.0 BILATERAL LOWER EXTREMITY EDEMA: Chronic | ICD-10-CM

## 2024-11-05 DIAGNOSIS — Z51.81 THERAPEUTIC DRUG MONITORING: ICD-10-CM

## 2024-11-05 DIAGNOSIS — F41.1 GENERALIZED ANXIETY DISORDER: Chronic | ICD-10-CM

## 2024-11-05 DIAGNOSIS — E04.2 MULTINODULAR GOITER: Chronic | ICD-10-CM

## 2024-11-05 DIAGNOSIS — R53.82 CHRONIC FATIGUE: Chronic | ICD-10-CM

## 2024-11-05 DIAGNOSIS — G43.709 CHRONIC MIGRAINE W/O AURA W/O STATUS MIGRAINOSUS, NOT INTRACTABLE: Chronic | ICD-10-CM

## 2024-11-05 DIAGNOSIS — K25.7 CHRONIC GASTRIC ULCER WITHOUT HEMORRHAGE AND WITHOUT PERFORATION: Chronic | ICD-10-CM

## 2024-11-05 DIAGNOSIS — K86.89 DILATION OF PANCREATIC DUCT: Chronic | ICD-10-CM

## 2024-11-05 DIAGNOSIS — G47.10 HYPERSOMNOLENCE: Chronic | ICD-10-CM

## 2024-11-05 DIAGNOSIS — F45.42 PAIN DISORDER ASSOCIATED WITH PSYCHOLOGICAL FACTORS: Chronic | ICD-10-CM

## 2024-11-05 DIAGNOSIS — I10 BENIGN ESSENTIAL HYPERTENSION: Chronic | ICD-10-CM

## 2024-11-05 DIAGNOSIS — N28.1 SIMPLE RENAL CYST: Chronic | ICD-10-CM

## 2024-11-05 DIAGNOSIS — M17.12 PRIMARY OSTEOARTHRITIS OF LEFT KNEE: Chronic | ICD-10-CM

## 2024-11-05 DIAGNOSIS — D64.9 CHRONIC ANEMIA: Chronic | ICD-10-CM

## 2024-11-05 DIAGNOSIS — E87.6 HYPOKALEMIA: Chronic | ICD-10-CM

## 2024-11-05 DIAGNOSIS — K29.20 CHRONIC ALCOHOLIC GASTRITIS WITHOUT HEMORRHAGE: Chronic | ICD-10-CM

## 2024-11-05 DIAGNOSIS — N18.31 STAGE 3A CHRONIC KIDNEY DISEASE: Primary | Chronic | ICD-10-CM

## 2024-11-05 PROCEDURE — 3074F SYST BP LT 130 MM HG: CPT | Performed by: INTERNAL MEDICINE

## 2024-11-05 PROCEDURE — 1159F MED LIST DOCD IN RCRD: CPT | Performed by: INTERNAL MEDICINE

## 2024-11-05 PROCEDURE — 1160F RVW MEDS BY RX/DR IN RCRD: CPT | Performed by: INTERNAL MEDICINE

## 2024-11-05 PROCEDURE — 3078F DIAST BP <80 MM HG: CPT | Performed by: INTERNAL MEDICINE

## 2024-11-05 PROCEDURE — 99214 OFFICE O/P EST MOD 30 MIN: CPT | Performed by: INTERNAL MEDICINE

## 2024-11-05 PROCEDURE — 1126F AMNT PAIN NOTED NONE PRSNT: CPT | Performed by: INTERNAL MEDICINE

## 2024-11-05 NOTE — PROGRESS NOTES
11/05/2024    Patient Information  Sachi Vora                                                                                          [address] [place] [address]      1940  [unfilled]  There is no work phone number on file.    Chief Complaint:     Follow-up chronic medical problems.  Recent blood work.  No new acute complaints.    History of Present Illness:    Patient with a multitude of chronic medical problems as noted below in assessment plan presents today for a follow-up.  She had lab work a while back we need to review.  Her past medical history reviewed and updated were necessary including health maintenance parameters.  This reveals she will be up-to-date or else accounted for after today's visit.    Review of Systems   Eyes: Negative.    Cardiovascular: Negative.    Respiratory: Negative.     Endocrine: Negative.    Hematologic/Lymphatic: Negative.    Skin: Negative.    Musculoskeletal:  Positive for arthritis, back pain and joint pain.   Gastrointestinal: Negative.    Genitourinary: Negative.    Neurological:  Positive for difficulty with concentration, disturbances in coordination, headaches, loss of balance, numbness, paresthesias, tremors and weakness. Negative for aphonia and brief paralysis.   Psychiatric/Behavioral:  Positive for depression and memory loss. The patient has insomnia and is nervous/anxious.    Allergic/Immunologic: Negative.        Active Problems:    Patient Active Problem List   Diagnosis    Osteoporosis, 03/29/2011--lumbar spine -2.8.  Right femoral neck -2.4.  Left femoral neck -2.4.  Patient receives Reclast infusions.    Therapeutic drug monitoring    Simple renal cyst    Benign essential hypertension    Carotid artery plaque, 04/02/2018--mild right ICA plaque, normal left ICA 10/03/2014--mild bilateral carotid artery plaque.    Chronic gastritis    Chronic lower back pain    Chronic otitis externa    Stage 3a chronic kidney disease     "Depression with anxiety    Functional murmur, 07/15/2015--normal echocardiogram.    Hyperlipidemia    Chronic migraine w/o aura w/o status migrainosus, not intractable    Pancreatic Duct Dilation    Bilateral lower extremity edema    Restless legs syndrome    Bilateral sensorineural hearing loss    Vitamin D deficiency    Chronic gastric ulcer    Chronic fatigue    Hypersomnolence    Chronic anemia    Multinodular goiter    Generalized anxiety disorder    Iron deficiency anemia    Statin intolerance    Postmenopausal state    Short-term memory loss    Pulmonary hypertension    Gastroesophageal reflux disease without esophagitis    Tinnitus of both ears    Chronic hoarseness    Pain disorder associated with psychological factors    Frequent falls    Balance disorder, related to Parkinson's    Hyperaldosteronism    Hypokalemia    Chronic pain of left knee    Primary osteoarthritis of left knee    Parkinson's disease with dyskinesia and fluctuating manifestations         Past Medical History:   Diagnosis Date    Balance disorder, related to Parkinson's 02/05/2021 February 5, 2021--patient seen in follow-up by Dr. Weir.  I extensively reviewed the documentation from the emergency room visit as noted below.  I reviewed the history with the patient and she is describing episodes of dizziness described as a spinning sensation of her body and this seems to be precipitated by movement although it does not occur if she rolls over in bed.  If she stands up and st    Benign essential hypertension 04/08/2016    Bilateral sensorineural hearing loss 03/31/2011 03/31/2011--etiology reveals reverse \"cookie bite\" type of hearing loss for both ears of mild/moderate degree, mostly sensorineural.  Speech discrimination 100% on the right, 96% on the left.    Carotid artery plaque, 10/03/2014--mild bilateral carotid artery plaque. 07/25/2012    10/03/2014--[facility] screening revealed mild bilateral carotid plaque, negative for " atrial fibrillation, negative for AAA, negative for PAD, osteoporosis screen revealed osteopenia.  Body mass index was 25 and considered to be moderate risk.  07/25/2012--vascular screen negative for carotid plaque, negative for abdominal aneurysm, negative for PAD Description: 10/03/2014--[facility] screening revealed mild bilateral carotid plaque, negative for atrial fibrillation, negative for AAA, negative for PAD, osteoporosis screen revealed osteopenia.  Body mass index was 25 and considered to be moderate risk.  07/25/2012--vascular screen negative for carotid plaque, negative for abdominal aneurysm, negative for PAD    Chronic anemia 08/23/2016 06/13/2017--colonoscopy revealed melanosis.  Biopsied.  There was friability with contact bleeding in the ascending colon.  Biopsied.  Pathology returned consistent with melanosis coli.  06/13/2017--EGD revealed normal esophagus.  Biopsies taken.  There was a medium-sized hiatal hernia.  Localized mild inflammation and linear erosions were found in the prepyloric region.  Biopsied.  Examined duodenum was normal.  Random biopsies taken.  Pathology of the gastroesophageal junction was unremarkable as was the gastric fundus.  Prepyloric biopsy revealed minimal chronic inflammation and reactive change.  H pylori negative.  Duodenal biopsies are negative.  04/12/2017--hemoglobin low at 10.8, hematocrit is normal at 35.7.  Iron sulfate 325 mg per day initiated.  08/23/2016--routine follow-up.  Patient continues to have epigastric abdominal pain believed to be related to reflux with possible esophageal spasm.  Hemoglobin noted to be low at 10.6 with a hematocrit low at 33.7 and RDW elevated at 16.2.  Homocysti    Chronic fatigue 04/21/2016 06/14/2017--overnight oximetry revealed oxygen desaturation index of only 0.44.  There were only 10 total desaturations during the period of testing which lasted 6 hours and 46 minutes.  05/31/2017--patient seen in follow-up and reports  she continues to have chronic fatigue as well as hypersomnolence.  Once again, she is on multiple medications that are undoubtedly contributing to this problem including clonazepam and hydrocodone but I doubt that these could be discontinued.  Her CBC back in April revealed a low hemoglobin of 10.8 but hematocrit was normal at 35.7.  Thyroid function tests were normal and CMP normal.  Overnight oximetry test ordered.  Repeat CBC.  Iron studies.  Sedimentation rate and CRP.  04/11/2017--patient seen in follow-up and her blood pressure is now at a reasonable level at 120/64.  She reports she still feels somewhat fatigued but she is much better.  Patient has not been doing any regular exercise and I do think that that would be helpful to reduce her feeling of fatigue.  She is    Chronic gastric ulcer 04/14/2014    02/15/2017--patient seen in follow-up in nearly 6 months later.  She has complaints of not feeling well all over.  She has complaints of diffuse myalgias and possibly tendinopathy related to Levaquin that I called in prior to her going to [place] for vacation.  She reports that 3 or 4 days after starting the Levaquin she began to have the musculoskeletal symptoms and she reports that she continues to have them presently.  Symptoms are worse involving her left calf area.  Examination reveals significant tenderness involving the left calf and the left calf seems a little larger than the right.  She also has complaints of shortness of breath but no chest pain.  She is complaining of double vision and generalized weakness and fatigue.  She was complaining of chronic fatigue at the last visit back in September and I ordered an overnight oximetry test but apparently this was never done for reasons that are not clear to me.  Her current oxygen saturation is 94% and normally it is 100%.  Plan is as follows: Extensi    Chronic gastritis 11/19/2001    02/15/2017--patient seen in follow-up in nearly 6 months later.   She has complaints of not feeling well all over.  She has complaints of diffuse myalgias and possibly tendinopathy related to Levaquin that I called in prior to her going to [place] for vacation.  She reports that 3 or 4 days after starting the Levaquin she began to have the musculoskeletal symptoms and she reports that she continues to have them presently.  Symptoms are worse involving her left calf area.  Examination reveals significant tenderness involving the left calf and the left calf seems a little larger than the right.  She also has complaints of shortness of breath but no chest pain.  She is complaining of double vision and generalized weakness and fatigue.  She was complaining of chronic fatigue at the last visit back in September and I ordered an overnight oximetry test but apparently this was never done for reasons that are not clear to me.  Her current oxygen saturation is 94% and normally it is 100%.  Plan is as follows: Extensi    Chronic hoarseness 06/08/2020    September 15, 2020--patient presents with complaints of swelling of the soft tissues of the left side of the neck and this is associated with pain and discomfort, particularly when she turns her head rotating to the left.  She also notices hoarseness and feels that her voice has changed.  On exam there is definite prominence of the left sternocleidomastoid muscle and there may be some shotty lymph    Chronic lower back pain 01/31/2006 08/11/2014--MRI of the lumbar spine reveals the conus terminates at L2 and is normal.  Cauda equina unremarkable.  Stable to moderate degenerative disc disease at L5-S1.  No acute fracture or pars defect is demonstrated.  Small synovial cysts are seen posterior to the L4-L5 facet.  The perivertebral soft tissues are unremarkable.  T12-L1, L1-L2, L2-L3 are negative.  L3-L4 a broad-based disc bulge resulting in mild bilateral neural foraminal narrowing.  L4-L5 reveals a broad-based disc bulge facet disease  resulting in mild bilateral neural foraminal narrowing.  L5-S1 reveals a broad-based disc bulge, posterior osseous slipping, and facet disease resulting in mild to moderate bilateral neural foraminal narrowing.  Assessment is stable mild to moderate degenerative spondylosis.  Small synovial cysts are seen posterior to the L4-L5 facets.  08/11/2014--MRI of the thoracic spine reveals mild to moderate thoracic kyphosis.  No fracture.  At T5--T6 there is a moderate sized left paracentral disc protrusion which i    Chronic migraine 11/03/2009 01/18/2010--MRI of the brain performed for headaches and memory loss.  Mild small vessel disease in the cerebral and central pontine white matter.  There is an ovoid, somewhat pancake-shaped area of signal abnormality in the anterior inferior right temporal lobe subcortical to the juxtacortical white matter that measures 1.2 x 1 cm and anterior posterior and medial lateral dimension but only measures 3 mm in cranial caudal dimension.  I suspect that this is a benign cyst or a cystic area of encephalomalacia.  The remainder of the MRI of the head is within normal limits.  11/03/2009--CT scan of the brain without contrast performed for headache after a fall.  Mild diffuse atrophy.  No acute abnormality noted.; Description: Patient has had a long history of migraine headaches.  MRI and CT scan of the brain have been essentially negative.    Chronic otitis externa 04/08/2016    Chronic renal insufficiency, stage II (mild), creatinine 1.12 11/14/2015 11/14/2015--serum creatinine mildly elevated at 1.12.    Depression with anxiety 04/08/2016    Frequent falls 02/05/2021 February 5, 2021--patient seen in follow-up by Dr. Weir.  I extensively reviewed the documentation from the emergency room visit as noted below.  I reviewed the history with the patient and she is describing episodes of dizziness described as a spinning sensation of her body and this seems to be precipitated by  movement although it does not occur if she rolls over in bed.  If she stands up and st    Functional murmur, 07/15/2015--normal echocardiogram. 07/15/2015    07/15/2015--echocardiogram reveals normal left ventricular size and function with ejection fraction 55%.  Grade 1 diastolic dysfunction, abnormal relaxation pattern.  Trace tricuspid regurgitation.  Estimated right ventricular systolic pressure is 25 mmHg which is normal.    Gastroesophageal reflux disease without esophagitis 06/06/2019    Generalized anxiety disorder 04/11/2017    Glaucoma     History of Acute deep vein thrombosis (DVT) of distal end of left lower extremity 02/15/2017    03/21/2017 patient seen in follow-up and she is tolerating the Eliquis well without signs or symptoms of bleeding.  Her calf swelling and tenderness is better but not totally resolved.  I suspect that the DVT is chronic and may not resolve at all.  I will order a repeat venous study and then proceed from there.  02/23/2017--repeat Doppler venous study of the left lower extremity reveals a chronic left lower extremity DVT in the posterior tibial.  All other left sided veins appeared normal.  Fluid collection in the left calf noted.  02/17/2017--patient seen in follow-up and reports her left lower extremity symptoms are about the same.  She continues to have complaints of profound fatigue which I think is multifactorial including underlying depression that is not in remission.  Review the results of the CTA of the chest including the possible thyroid lesion.  I do not think this is contributing to any of her symptoms of fatigue particularly given the fact that her thyroid function tests are normal.  I expla    History of palpitations 12/07/2011    Patient has had multiple admissions to the hospital for complaints of chest pain and heart palpitations. She meant admitted at least on 3 occasions. She has had at least 3 stress Cardiolite and 1 stress ECG, the last Cardiolite being  performed 12/07/2011 which was negative. Patient is also had Holter monitors which have been unremarkable.    HIstory of Schatzki's ring 02/28/2012 02/28/2012--air-contrast upper GI revealed small to moderate sized reducible sliding hiatal herniation of the upper stomach with some demonstrated gastroesophageal reflux. No esophageal, gastric, or duodenal mass or mucosal ulceration was seen.  11/03/2008--EGD performed for evaluation of iron deficiency anemia revealed hiatal hernia without evidence of reflux, prepyloric antritis and    Hyperlipidemia 04/08/2016    Hypersomnolence 05/05/2016 06/14/2017--overnight oximetry revealed oxygen desaturation index of only 0.44.  There were only 10 total desaturations during the period of testing which lasted 6 hours and 46 minutes.  05/31/2017--patient seen in follow-up and reports she continues to have chronic fatigue as well as hypersomnolence.  Once again, she is on multiple medications that are undoubtedly contributing to this problem including clonazepam and hydrocodone but I doubt that these could be discontinued.  Her CBC back in April revealed a low hemoglobin of 10.8 but hematocrit was normal at 35.7.  Thyroid function tests were normal and CMP normal.  Overnight oximetry test ordered.  Repeat CBC.  Iron studies.  Sedimentation rate and CRP.  04/11/2017--patient seen in follow-up and her blood pressure is now at a reasonable level at 120/64.  She reports she still feels somewhat fatigued but she is much better.  Patient has not been doing any regular exercise and I do think that that would be helpful to reduce her feeling of fatigue.  She is    Menopausal state 04/08/2016    Multinodular goiter 02/17/2017 02/21/2017--thyroid ultrasound reveals multinodular thyroid with largest nodule measuring on the order of 1.6 cm in greatest dimension.  02/17/2017--patient seen in follow-up for DVT and CTA of the chest.  An incidental finding on the CTA of the chest reveals  a 1.7 cm lesion in the right lobe of thyroid.  Note that thyroid function tests are currently normal.  Ultrasound of the thyroid ordered.    Osteoporosis, 03/29/2011--lumbar spine -2.8.  Right femoral neck -2.4.  Left femoral neck -2.4.  Patient receives Reclast infusions. 02/09/2009 04/19/2017--Reclast infusion.  04/05/2016--Reclast infusion.  06/04/2012--Reclast infusion.  05/26/2011--Reclast infusion.  03/29/2011--DEXA scan revealed a lumbar T score of -2.8, right femoral neck T score -2.4, left femoral neck T score -2.4.  Osteoporosis of the lumbar spine and severe osteopenia of the hips bilaterally.  Patient has been intolerant to Fosamax because of gastritis and gastroesophageal reflux.  04/01/2009--treatment for osteoporosis begun with Fosamax.  02/09/2009--DEXA scan revealed lumbar spine T score of -3.3.  Left femoral neck T score -2.5.  Right hip T score -2.6.  Osteoporosis.     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  CT scan of the abdomen and pelvis reveals a left renal cyst measuring 5.1 x 4.9 cm.  There were subcentimeter hypodense renal lesions that are too small to further characterize and are presumably related to cysts.  Recommend attention to follow up.  Distal dilated pancreatic duct noted.  The common bile duct is the upper limits of normal in size.  The ampullary region is not well evaluated.  Comparison with prior imaging is recommended if available.  If there is no prior film available for comparison, and ERCP or MRCP could be performed to further evaluate.  Small hiatal hernia noted.  03/05/2012--CT scan of the abdomen with contrast, pancreatic protocol.  This reveals some fullness of the pancreatic ductal system and apparent pancreatic divisum with separate entrance of the accessory pancreatic duct extending into the duodenum distal to the main  pancreatic duct.  There is fullness of the duct diffusely but similar to the previous s    Parkinson's disease with dyskinesia and fluctuating manifestations 12/14/2023 December 19, 2023--MRI of the brain without contrast reveals stable findings compared to the prior study dated May 28, 2021.  There are moderate changes of chronic small vessel ischemic phenomena.  Additionally there are findings consistent with an end of the insulin large perivascular space within the anterior portion of the right temporal lobe measuring 1.7 x 1.2 cm in greatest axial dimensions.    Pedal edema 06/29/2015 06/29/2015--patient presents with a 4-6 week history of exertional dyspnea that comes on with activity such as climbing stairs or walking her dog up an incline.  No chest pain.  Relieved with rest.  No cough.  She does have complaints of her feet and legs swelling that is particularly worse at the end of the day and not improved overnight.  No orthopnea or PND.  Chest exam reveals faint rales at the bases.  Otherwise clear.  Heart is regular and I do not appreciate a heart murmur.  Chest x-ray PA and lateral ordered.  Echocardiogram ordered.    Pulmonary hypertension 04/18/2019    Restless legs syndrome 04/08/2016    Short-term memory loss 05/04/2018 05/04/2018--patient reports development of problems with her memory over the past several months and is concerned about possibility of Alzheimer's.  No other neurologic symptoms other than occasional sensation of being off balance.  We will not evaluate that problem at the present time unless it gets worse.  I we will however send her for neuropsychological testing regarding memory loss.      Simple renal cyst 07/20/2009 09/29/2014--patient was again evaluated by the urologist for a renal cyst.  CT scan of the abdomen and pelvis reveals a left renal cyst measuring 5.1 x 4.9 cm.  There were subcentimeter hypodense renal lesions that are too small to further characterize and  are presumably related to cysts.  Recommend attention to follow up.  Distal dilated pancreatic duct noted.  The common bile duct is the upper limits of normal in size.  The ampullary region is not well evaluated.  Comparison with prior imaging is recommended if available.  If there is no prior film available for comparison, and ERCP or MRCP could be performed to further evaluate.  Small hiatal hernia noted.  07/20/2009--patient was noted to have a left renal mass consistent with a cyst.  This was evaluated by the urologist 07/20/2009.    Stage 3a chronic kidney disease 11/14/2015 11/14/2015--serum creatinine mildly elevated at 1.12.      Statin intolerance 10/30/2017    Tinnitus of both ears 09/30/2019 September 30, 2019--patient presents with a several year history of bilateral tinnitus, right greater than left.  It seems to be getting worse and now is associated with fluctuating hearing.  She occasionally will have worsening hearing after she coughs or sneezes.  On exam she has evidence of old otitis media scars, particularly on the right.  There is no acute infection present and there is no a    Tremor of left hand 12/14/2023    Vitamin D deficiency 04/08/2016         Past Surgical History:   Procedure Laterality Date    APPENDECTOMY  1965    1965    CATARACT EXTRACTION Bilateral Remote    Remote bilateral cataract extirpation with intraocular lens implantation.    CHOLECYSTECTOMY WITH INTRAOPERATIVE CHOLANGIOGRAM N/A 01/21/2019 01/21/2019--laparoscopic paraesophageal hernia repair with fundoplication.    COLONOSCOPY  11/03/2008 2008--normal colonoscopy    COLONOSCOPY  2001 2001--normal colonoscopy.    COLONOSCOPY N/A 06/13/2017 06/13/2017--colonoscopy revealed melanosis.  Biopsied.  There was friability with contact bleeding in the ascending colon.  Biopsied.  Pathology returned consistent with melanosis coli.    ENDOSCOPY  10/08/2014    02/28/2012--air-contrast upper GI revealed small to  moderate sized reducible sliding hiatal herniation of the upper stomach with some demonstrated gastroesophageal reflux. No esophageal, gastric, or duodenal mass or mucosal ulceration was seen. 11/03/2008--EGD performed for evaluation of iron deficiency anemia revealed hiatal hernia without evidence of reflux, prepyloric antritis and    ENDOSCOPY  11/03/2008 11/03/2008--EGD performed for evaluation of iron deficiency anemia revealed hiatal hernia without evidence of reflux, prepyloric antritis and    ENDOSCOPY  11/19/2001 11/19/2001--EGD revealed a very tortuous distal esophagus with a Schatzki's ring. No ulcer or erosions. No Dorado's mucosa. Stomach revealed patchy erythema and erosions in the antrum. Biopsy. Normal pylorus with no obstruction. Normal duodenum with no ulcers. The Schatzki's ring was dilated with a Rhodes dilator.;    ENDOSCOPY N/A 09/27/2016 09/27/2016--EGD revealed a normal oropharynx, esophagus, and medium sized hiatal hernia.  Nonbleeding gastric ulcer with no stigmata of bleeding.  Biopsy.  Gastritis.  Biopsy.  Normal examined duodenum.  Biopsied.  Pathology returned mild to moderate chronic active gastritis with ulceration from the stomach antral ulcer biopsy.  Gastroesophageal junction biopsy revealed minimal mixed inflammation.    ENDOSCOPY N/A 06/13/2017 06/13/2017--colonoscopy revealed melanosis.  Biopsied.  There was friability with contact bleeding in the ascending colon.  Biopsied.  Pathology returned consistent with melanosis coli.    ERCP N/A 01/22/2019 01/22/2019--ERCP with sphincterotomy and balloon stone extraction.    ESOPHAGEAL DILATATION  11/19/2001 11/19/2001--EGD revealed a very tortuous distal esophagus with a Schatzki's ring. No ulcer or erosions. No Dorado's mucosa. Stomach revealed patchy erythema and erosions in the antrum. Biopsy. Normal pylorus with no obstruction. Normal duodenum with no ulcers. The Schatzki's ring was dilated with a Rhodes  dilator.;     ESOPHAGEAL MANOMETRY N/A 12/18/2018    Procedure: ESOPHAGEAL MANOMETRY;  Surgeon: Cecilia, Nurse Performed;  Location: [facility] ENDOSCOPY;  Service: Gastroenterology    ESOPHAGOSCOPY N/A 01/21/2019    Procedure: FLEXIBLE ESOPHAGOSCOPY;  Surgeon: Reji Johnson MD;  Location: [facility] MAIN OR;  Service: General    HIATAL HERNIA REPAIR N/A 01/21/2019    Procedure: Laparoscopic paraesophageal hernia repair with toupee fundoplication;  Surgeon: Reji Johnson MD;  Location: [facility] MAIN OR;  Service: General    INCONTINENCE SURGERY  1979 1979--a bladder tack procedure for urinary stress incontinence    KNEE ARTHROSCOPY Right 12/12/2011 12/12/2011--right knee arthroscopy with partial lateral and medial meniscectomies.    SKIN SURGERY  05/25/2010 05/25/2010--skin lesion excised from right lower extremity. 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    TONSILLECTOMY  1953    TOTAL ABDOMINAL HYSTERECTOMY WITH SALPINGO OOPHORECTOMY  1974 1974--total abdominal hysterectomy.         Allergies   Allergen Reactions    Penicillin G Anaphylaxis    Statins Nausea And Vomiting    Tetanus Toxoid Itching    Morphine Hallucinations    Penicillins Itching    Tetanus Toxoids Itching           Current Outpatient Medications:     Brexpiprazole (Rexulti) 1 MG tablet, Take 2 mg by mouth Daily., Disp: , Rfl:     buPROPion XL (WELLBUTRIN XL) 300 MG 24 hr tablet, Take 1 tablet by mouth Every Morning., Disp: , Rfl:     carbidopa-levodopa (SINEMET)  MG per tablet, Take 1 tablet by mouth 3 (Three) Times a Day., Disp: 90 tablet, Rfl: 2    carbidopa-levodopa CR (SINEMET CR)  MG per CR tablet, Take 1 tablet by mouth Every Night., Disp: 30 tablet, Rfl: 2    Cholecalciferol (vitamin D3) 125 MCG (5000 UT) capsule capsule, 1 by mouth daily as directed, Disp: 30 capsule, Rfl:     cycloSPORINE (RESTASIS) 0.05 % ophthalmic emulsion, Apply 1 drop to eye(s) as directed by provider Every 12  (Twelve) Hours., Disp: , Rfl:     diphenhydrAMINE-APAP, sleep, (Tylenol PM Extra Strength)  MG/30ML liquid, Tylenol PM Extra Strength, Disp: , Rfl:     donepezil (ARICEPT) 5 MG tablet, Take 1 tablet by mouth every night at bedtime., Disp: , Rfl:     estradiol (ESTRACE) 1 MG tablet, Take 1 tablet by mouth once daily, Disp: 90 tablet, Rfl: 0    fluticasone (FLONASE) 50 MCG/ACT nasal spray, 2 sprays into the nostril(s) as directed by provider Daily., Disp: 18 mL, Rfl: 6    HYDROcodone-acetaminophen (NORCO)  MG per tablet, Take 1 tablet by mouth Every 6 (Six) Hours As Needed for Moderate Pain., Disp: , Rfl:     loratadine (Claritin) 10 MG tablet, Take 1 tablet by mouth Daily., Disp: 90 tablet, Rfl: 1    methylphenidate 18 MG CR tablet, Take 1 tablet by mouth Every Morning, Disp: , Rfl:     Misc Natural Products (COLON CLEANSE PO), Take  by mouth., Disp: , Rfl:     spironolactone (ALDACTONE) 100 MG tablet, Take a half a tablet (50 mg) a day for high blood pressure and leg swelling, Disp: 30 tablet, Rfl: 1    traZODone (DESYREL) 50 MG tablet, Take 0.5-1 tablets by mouth Every Night., Disp: , Rfl:     venlafaxine XR (EFFEXOR-XR) 150 MG 24 hr capsule, Take 1 capsule by mouth Every Morning., Disp: , Rfl:     venlafaxine XR (EFFEXOR-XR) 75 MG 24 hr capsule, Take one 75 mg capsule venlafaxine along with one 150 mg capsule daily for depression.  Total daily dose is 225 mg., Disp: , Rfl:     vitamin E 400 UNIT capsule, Take 1 capsule by mouth Daily., Disp: , Rfl:       Family History   Problem Relation Age of Onset    Heart attack Mother         dies age 47 from heart attack    Heart disease Mother     Bleeding Disorder Mother     Heart disease Father     Ovarian cancer Maternal Grandmother     Heart attack Maternal Aunt         dies age 60 from heart attack    Malig Hyperthermia Neg Hx     Breast cancer Neg Hx          Social History     Socioeconomic History    Marital status:      Spouse name: ander     "Number of children: 4    Years of education: 14    Highest education level: Associate degree: academic program   Tobacco Use    Smoking status: Never    Smokeless tobacco: Never    Tobacco comments:     None   Vaping Use    Vaping status: Never Used   Substance and Sexual Activity    Alcohol use: No    Drug use: No    Sexual activity: Not Currently     Partners: Male     Birth control/protection: Diaphragm, Birth control pill, Hysterectomy         Vitals:    11/05/24 0859   BP: 128/72   Pulse: 76   Resp: 16   Temp: 97 °F (36.1 °C)   TempSrc: Oral   SpO2: 97%   Weight: 58.1 kg (128 lb)   Height: 152.4 cm (60\")        Body mass index is 25 kg/m².      Physical Exam:    General: Alert and oriented x 3.  No acute distress.  Normal affect.  HEENT: Pupils equal, round, reactive to light; extraocular movements intact; sclerae nonicteric; pharynx, ear canals and TMs normal.  Neck: Without JVD, thyromegaly, bruit, or adenopathy.  Lungs: Clear to auscultation in all fields.  Heart: Regular rate and rhythm without murmur, rub, gallop, or click.  Abdomen: Soft, nontender, without hepatosplenomegaly or hernia.  Bowel sounds normal.  : Deferred.  Rectal: Deferred.  Extremities: Without clubbing, cyanosis, edema, or pulse deficit.  Neurologic: Intact without focal deficit.  Patient ambulates without the assistance of walker and has an obviously ataxic gait.  Occasional tremor noted.  Blunted facies.  Skin: Without significant lesion.  Musculoskeletal: Unremarkable.    Lab/other results:    CBC normal.  CMP normal except creatinine 1.03 with an estimated GFR 55.  Total cholesterol 213, triglycerides 127, LDL particle number excellent 911, HDL particle number excellent at 55.5.  Thyroid function tests are normal.  Vitamin D normal at 47.4.    Assessment/Plan:     Diagnosis Plan   1. Stage 3a chronic kidney disease  Comprehensive Metabolic Panel      2. Benign essential hypertension  Comprehensive Metabolic Panel      3. " Hyperlipidemia  Comprehensive Metabolic Panel    NMR LipoProfile      4. Vitamin D deficiency  Vitamin D,25-Hydroxy      5. Chronic anemia  CBC (No Diff)      6. Multinodular goiter  TSH    T4, Free    T3, Free      7. Iron deficiency anemia due to chronic blood loss        8. Hypokalemia  Comprehensive Metabolic Panel      9. Parkinson's disease with dyskinesia and fluctuating manifestations        10. Balance disorder, related to Parkinson's        11. Frequent falls        12. Pain disorder associated with psychological factors        13. Chronic gastric ulcer without hemorrhage and without perforation        14. Chronic migraine w/o aura w/o status migrainosus, not intractable        15. Pancreatic Duct Dilation        16. Bilateral lower extremity edema        17. Restless legs syndrome        18. Bilateral sensorineural hearing loss        19. Chronic alcoholic gastritis without hemorrhage        20. Carotid artery plaque, 04/02/2018--mild right ICA plaque, normal left ICA 10/03/2014--mild bilateral carotid artery plaque.        21. Osteoporosis, 03/29/2011--lumbar spine -2.8.  Right femoral neck -2.4.  Left femoral neck -2.4.  Patient receives Reclast infusions.        22. Postmenopausal state        23. Statin intolerance        24. Generalized anxiety disorder        25. Short-term memory loss        26. Pulmonary hypertension        27. Tinnitus of both ears        28. Chronic hoarseness        29. Hyperaldosteronism        30. Primary osteoarthritis of left knee        31. Gastroesophageal reflux disease without esophagitis        32. Hypersomnolence        33. Chronic fatigue        34. Simple renal cyst        35. Therapeutic drug monitoring          Patient with several chronic medical problems as noted above and she seems to be doing well and perhaps even better in many ways including her parkinsonism.  She is followed closely by the neurologist.  Her cholesterol profile looks good even though the  "total cholesterol is little elevated her NMR profile is very good.    Plan is as follows: No change in current medical regimen.  Patient will follow-up after September 10, 2025 for a wellness visit or follow-up as needed.    This patient has a PCP that is the continuing focal point for all health care services, and the patient sees this physician to be evaluated for chronic stable medical problems. The inherent complexity that this code () captures is not in the clinical condition itself, but rather the cognitition of the continued responsibility of being the focal point for all needed services for this patient.\"     Procedures        "

## 2024-11-14 ENCOUNTER — HOSPITAL ENCOUNTER (OUTPATIENT)
Dept: SPEECH THERAPY | Facility: HOSPITAL | Age: 84
Discharge: HOME | End: 2024-11-14
Payer: MEDICARE

## 2024-11-14 DIAGNOSIS — G20.A1 PARKINSON'S DISEASE, UNSPECIFIED WHETHER DYSKINESIA PRESENT, UNSPECIFIED WHETHER MANIFESTATIONS FLUCTUATE: Primary | ICD-10-CM

## 2024-11-14 DIAGNOSIS — R47.1 DYSARTHRIA: ICD-10-CM

## 2024-11-14 PROCEDURE — 92610 EVALUATE SWALLOWING FUNCTION: CPT | Performed by: SPEECH-LANGUAGE PATHOLOGIST

## 2024-11-14 PROCEDURE — 92524 BEHAVRAL QUALIT ANALYS VOICE: CPT | Performed by: SPEECH-LANGUAGE PATHOLOGIST

## 2024-11-14 NOTE — THERAPY EVALUATION
Outpatient Speech Language Pathology   Adult Speech Language Cognitive Initial Evaluation  [facility]     Patient Name: Sachi Vora  : 1940  MRN: [id number]  Today's Date: 2024        Visit Date: 2024   Patient Active Problem List   Diagnosis    Osteoporosis, 2011--lumbar spine -2.8.  Right femoral neck -2.4.  Left femoral neck -2.4.  Patient receives Reclast infusions.    Therapeutic drug monitoring    Simple renal cyst    Benign essential hypertension    Carotid artery plaque, 2018--mild right ICA plaque, normal left ICA 10/03/2014--mild bilateral carotid artery plaque.    Chronic gastritis    Chronic lower back pain    Chronic otitis externa    Stage 3a chronic kidney disease    Depression with anxiety    Functional murmur, 07/15/2015--normal echocardiogram.    Hyperlipidemia    Chronic migraine w/o aura w/o status migrainosus, not intractable    Pancreatic Duct Dilation    Bilateral lower extremity edema    Restless legs syndrome    Bilateral sensorineural hearing loss    Vitamin D deficiency    Chronic gastric ulcer    Chronic fatigue    Hypersomnolence    Chronic anemia    Multinodular goiter    Generalized anxiety disorder    Iron deficiency anemia    Statin intolerance    Postmenopausal state    Short-term memory loss    Pulmonary hypertension    Gastroesophageal reflux disease without esophagitis    Tinnitus of both ears    Chronic hoarseness    Pain disorder associated with psychological factors    Frequent falls    Balance disorder, related to Parkinson's    Hyperaldosteronism    Hypokalemia    Chronic pain of left knee    Primary osteoarthritis of left knee    Parkinson's disease with dyskinesia and fluctuating manifestations        Past Medical History:   Diagnosis Date    Balance disorder, related to Parkinson's 2021--patient seen in follow-up by Dr. Weir.  I extensively reviewed the documentation from the emergency room visit as noted  "below.  I reviewed the history with the patient and she is describing episodes of dizziness described as a spinning sensation of her body and this seems to be precipitated by movement although it does not occur if she rolls over in bed.  If she stands up and st    Benign essential hypertension 04/08/2016    Bilateral sensorineural hearing loss 03/31/2011 03/31/2011--etiology reveals reverse \"cookie bite\" type of hearing loss for both ears of mild/moderate degree, mostly sensorineural.  Speech discrimination 100% on the right, 96% on the left.    Carotid artery plaque, 10/03/2014--mild bilateral carotid artery plaque. 07/25/2012    10/03/2014--[facility] screening revealed mild bilateral carotid plaque, negative for atrial fibrillation, negative for AAA, negative for PAD, osteoporosis screen revealed osteopenia.  Body mass index was 25 and considered to be moderate risk.  07/25/2012--vascular screen negative for carotid plaque, negative for abdominal aneurysm, negative for PAD Description: 10/03/2014--[facility] screening revealed mild bilateral carotid plaque, negative for atrial fibrillation, negative for AAA, negative for PAD, osteoporosis screen revealed osteopenia.  Body mass index was 25 and considered to be moderate risk.  07/25/2012--vascular screen negative for carotid plaque, negative for abdominal aneurysm, negative for PAD    Chronic anemia 08/23/2016 06/13/2017--colonoscopy revealed melanosis.  Biopsied.  There was friability with contact bleeding in the ascending colon.  Biopsied.  Pathology returned consistent with melanosis coli.  06/13/2017--EGD revealed normal esophagus.  Biopsies taken.  There was a medium-sized hiatal hernia.  Localized mild inflammation and linear erosions were found in the prepyloric region.  Biopsied.  Examined duodenum was normal.  Random biopsies taken.  Pathology of the gastroesophageal junction was unremarkable as was the gastric fundus.  Prepyloric biopsy revealed " minimal chronic inflammation and reactive change.  H pylori negative.  Duodenal biopsies are negative.  04/12/2017--hemoglobin low at 10.8, hematocrit is normal at 35.7.  Iron sulfate 325 mg per day initiated.  08/23/2016--routine follow-up.  Patient continues to have epigastric abdominal pain believed to be related to reflux with possible esophageal spasm.  Hemoglobin noted to be low at 10.6 with a hematocrit low at 33.7 and RDW elevated at 16.2.  Homocysti    Chronic fatigue 04/21/2016 06/14/2017--overnight oximetry revealed oxygen desaturation index of only 0.44.  There were only 10 total desaturations during the period of testing which lasted 6 hours and 46 minutes.  05/31/2017--patient seen in follow-up and reports she continues to have chronic fatigue as well as hypersomnolence.  Once again, she is on multiple medications that are undoubtedly contributing to this problem including clonazepam and hydrocodone but I doubt that these could be discontinued.  Her CBC back in April revealed a low hemoglobin of 10.8 but hematocrit was normal at 35.7.  Thyroid function tests were normal and CMP normal.  Overnight oximetry test ordered.  Repeat CBC.  Iron studies.  Sedimentation rate and CRP.  04/11/2017--patient seen in follow-up and her blood pressure is now at a reasonable level at 120/64.  She reports she still feels somewhat fatigued but she is much better.  Patient has not been doing any regular exercise and I do think that that would be helpful to reduce her feeling of fatigue.  She is    Chronic gastric ulcer 04/14/2014    02/15/2017--patient seen in follow-up in nearly 6 months later.  She has complaints of not feeling well all over.  She has complaints of diffuse myalgias and possibly tendinopathy related to Levaquin that I called in prior to her going to [place] for vacation.  She reports that 3 or 4 days after starting the Levaquin she began to have the musculoskeletal symptoms and she reports that she  continues to have them presently.  Symptoms are worse involving her left calf area.  Examination reveals significant tenderness involving the left calf and the left calf seems a little larger than the right.  She also has complaints of shortness of breath but no chest pain.  She is complaining of double vision and generalized weakness and fatigue.  She was complaining of chronic fatigue at the last visit back in September and I ordered an overnight oximetry test but apparently this was never done for reasons that are not clear to me.  Her current oxygen saturation is 94% and normally it is 100%.  Plan is as follows: Extensi    Chronic gastritis 11/19/2001    02/15/2017--patient seen in follow-up in nearly 6 months later.  She has complaints of not feeling well all over.  She has complaints of diffuse myalgias and possibly tendinopathy related to Levaquin that I called in prior to her going to [place] for vacation.  She reports that 3 or 4 days after starting the Levaquin she began to have the musculoskeletal symptoms and she reports that she continues to have them presently.  Symptoms are worse involving her left calf area.  Examination reveals significant tenderness involving the left calf and the left calf seems a little larger than the right.  She also has complaints of shortness of breath but no chest pain.  She is complaining of double vision and generalized weakness and fatigue.  She was complaining of chronic fatigue at the last visit back in September and I ordered an overnight oximetry test but apparently this was never done for reasons that are not clear to me.  Her current oxygen saturation is 94% and normally it is 100%.  Plan is as follows: Extensi    Chronic hoarseness 06/08/2020    September 15, 2020--patient presents with complaints of swelling of the soft tissues of the left side of the neck and this is associated with pain and discomfort, particularly when she turns her head rotating to the left.   She also notices hoarseness and feels that her voice has changed.  On exam there is definite prominence of the left sternocleidomastoid muscle and there may be some shotty lymph    Chronic lower back pain 01/31/2006 08/11/2014--MRI of the lumbar spine reveals the conus terminates at L2 and is normal.  Cauda equina unremarkable.  Stable to moderate degenerative disc disease at L5-S1.  No acute fracture or pars defect is demonstrated.  Small synovial cysts are seen posterior to the L4-L5 facet.  The perivertebral soft tissues are unremarkable.  T12-L1, L1-L2, L2-L3 are negative.  L3-L4 a broad-based disc bulge resulting in mild bilateral neural foraminal narrowing.  L4-L5 reveals a broad-based disc bulge facet disease resulting in mild bilateral neural foraminal narrowing.  L5-S1 reveals a broad-based disc bulge, posterior osseous slipping, and facet disease resulting in mild to moderate bilateral neural foraminal narrowing.  Assessment is stable mild to moderate degenerative spondylosis.  Small synovial cysts are seen posterior to the L4-L5 facets.  08/11/2014--MRI of the thoracic spine reveals mild to moderate thoracic kyphosis.  No fracture.  At T5--T6 there is a moderate sized left paracentral disc protrusion which i    Chronic migraine 11/03/2009 01/18/2010--MRI of the brain performed for headaches and memory loss.  Mild small vessel disease in the cerebral and central pontine white matter.  There is an ovoid, somewhat pancake-shaped area of signal abnormality in the anterior inferior right temporal lobe subcortical to the juxtacortical white matter that measures 1.2 x 1 cm and anterior posterior and medial lateral dimension but only measures 3 mm in cranial caudal dimension.  I suspect that this is a benign cyst or a cystic area of encephalomalacia.  The remainder of the MRI of the head is within normal limits.  11/03/2009--CT scan of the brain without contrast performed for headache after a fall.  Mild  diffuse atrophy.  No acute abnormality noted.; Description: Patient has had a long history of migraine headaches.  MRI and CT scan of the brain have been essentially negative.    Chronic otitis externa 04/08/2016    Chronic renal insufficiency, stage II (mild), creatinine 1.12 11/14/2015 11/14/2015--serum creatinine mildly elevated at 1.12.    Depression with anxiety 04/08/2016    Frequent falls 02/05/2021 February 5, 2021--patient seen in follow-up by Dr. Weir.  I extensively reviewed the documentation from the emergency room visit as noted below.  I reviewed the history with the patient and she is describing episodes of dizziness described as a spinning sensation of her body and this seems to be precipitated by movement although it does not occur if she rolls over in bed.  If she stands up and st    Functional murmur, 07/15/2015--normal echocardiogram. 07/15/2015    07/15/2015--echocardiogram reveals normal left ventricular size and function with ejection fraction 55%.  Grade 1 diastolic dysfunction, abnormal relaxation pattern.  Trace tricuspid regurgitation.  Estimated right ventricular systolic pressure is 25 mmHg which is normal.    Gastroesophageal reflux disease without esophagitis 06/06/2019    Generalized anxiety disorder 04/11/2017    Glaucoma     History of Acute deep vein thrombosis (DVT) of distal end of left lower extremity 02/15/2017    03/21/2017 patient seen in follow-up and she is tolerating the Eliquis well without signs or symptoms of bleeding.  Her calf swelling and tenderness is better but not totally resolved.  I suspect that the DVT is chronic and may not resolve at all.  I will order a repeat venous study and then proceed from there.  02/23/2017--repeat Doppler venous study of the left lower extremity reveals a chronic left lower extremity DVT in the posterior tibial.  All other left sided veins appeared normal.  Fluid collection in the left calf noted.  02/17/2017--patient seen in  follow-up and reports her left lower extremity symptoms are about the same.  She continues to have complaints of profound fatigue which I think is multifactorial including underlying depression that is not in remission.  Review the results of the CTA of the chest including the possible thyroid lesion.  I do not think this is contributing to any of her symptoms of fatigue particularly given the fact that her thyroid function tests are normal.  I expla    History of palpitations 12/07/2011    Patient has had multiple admissions to the hospital for complaints of chest pain and heart palpitations. She meant admitted at least on 3 occasions. She has had at least 3 stress Cardiolite and 1 stress ECG, the last Cardiolite being performed 12/07/2011 which was negative. Patient is also had Holter monitors which have been unremarkable.    HIstory of Schatzki's ring 02/28/2012 02/28/2012--air-contrast upper GI revealed small to moderate sized reducible sliding hiatal herniation of the upper stomach with some demonstrated gastroesophageal reflux. No esophageal, gastric, or duodenal mass or mucosal ulceration was seen.  11/03/2008--EGD performed for evaluation of iron deficiency anemia revealed hiatal hernia without evidence of reflux, prepyloric antritis and    Hyperlipidemia 04/08/2016    Hypersomnolence 05/05/2016 06/14/2017--overnight oximetry revealed oxygen desaturation index of only 0.44.  There were only 10 total desaturations during the period of testing which lasted 6 hours and 46 minutes.  05/31/2017--patient seen in follow-up and reports she continues to have chronic fatigue as well as hypersomnolence.  Once again, she is on multiple medications that are undoubtedly contributing to this problem including clonazepam and hydrocodone but I doubt that these could be discontinued.  Her CBC back in April revealed a low hemoglobin of 10.8 but hematocrit was normal at 35.7.  Thyroid function tests were normal and CMP  normal.  Overnight oximetry test ordered.  Repeat CBC.  Iron studies.  Sedimentation rate and CRP.  04/11/2017--patient seen in follow-up and her blood pressure is now at a reasonable level at 120/64.  She reports she still feels somewhat fatigued but she is much better.  Patient has not been doing any regular exercise and I do think that that would be helpful to reduce her feeling of fatigue.  She is    Menopausal state 04/08/2016    Multinodular goiter 02/17/2017 02/21/2017--thyroid ultrasound reveals multinodular thyroid with largest nodule measuring on the order of 1.6 cm in greatest dimension.  02/17/2017--patient seen in follow-up for DVT and CTA of the chest.  An incidental finding on the CTA of the chest reveals a 1.7 cm lesion in the right lobe of thyroid.  Note that thyroid function tests are currently normal.  Ultrasound of the thyroid ordered.    Osteoporosis, 03/29/2011--lumbar spine -2.8.  Right femoral neck -2.4.  Left femoral neck -2.4.  Patient receives Reclast infusions. 02/09/2009 04/19/2017--Reclast infusion.  04/05/2016--Reclast infusion.  06/04/2012--Reclast infusion.  05/26/2011--Reclast infusion.  03/29/2011--DEXA scan revealed a lumbar T score of -2.8, right femoral neck T score -2.4, left femoral neck T score -2.4.  Osteoporosis of the lumbar spine and severe osteopenia of the hips bilaterally.  Patient has been intolerant to Fosamax because of gastritis and gastroesophageal reflux.  04/01/2009--treatment for osteoporosis begun with Fosamax.  02/09/2009--DEXA scan revealed lumbar spine T score of -3.3.  Left femoral neck T score -2.5.  Right hip T score -2.6.  Osteoporosis.     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  CT scan of the abdomen and pelvis reveals a left renal cyst measuring 5.1 x 4.9 cm.  There were subcentimeter hypodense renal  lesions that are too small to further characterize and are presumably related to cysts.  Recommend attention to follow up.  Distal dilated pancreatic duct noted.  The common bile duct is the upper limits of normal in size.  The ampullary region is not well evaluated.  Comparison with prior imaging is recommended if available.  If there is no prior film available for comparison, and ERCP or MRCP could be performed to further evaluate.  Small hiatal hernia noted.  03/05/2012--CT scan of the abdomen with contrast, pancreatic protocol.  This reveals some fullness of the pancreatic ductal system and apparent pancreatic divisum with separate entrance of the accessory pancreatic duct extending into the duodenum distal to the main pancreatic duct.  There is fullness of the duct diffusely but similar to the previous s    Parkinson's disease with dyskinesia and fluctuating manifestations 12/14/2023 December 19, 2023--MRI of the brain without contrast reveals stable findings compared to the prior study dated May 28, 2021.  There are moderate changes of chronic small vessel ischemic phenomena.  Additionally there are findings consistent with an end of the insulin large perivascular space within the anterior portion of the right temporal lobe measuring 1.7 x 1.2 cm in greatest axial dimensions.    Pedal edema 06/29/2015 06/29/2015--patient presents with a 4-6 week history of exertional dyspnea that comes on with activity such as climbing stairs or walking her dog up an incline.  No chest pain.  Relieved with rest.  No cough.  She does have complaints of her feet and legs swelling that is particularly worse at the end of the day and not improved overnight.  No orthopnea or PND.  Chest exam reveals faint rales at the bases.  Otherwise clear.  Heart is regular and I do not appreciate a heart murmur.  Chest x-ray PA and lateral ordered.  Echocardiogram ordered.    Pulmonary hypertension 04/18/2019    Restless legs syndrome  04/08/2016    Short-term memory loss 05/04/2018 05/04/2018--patient reports development of problems with her memory over the past several months and is concerned about possibility of Alzheimer's.  No other neurologic symptoms other than occasional sensation of being off balance.  We will not evaluate that problem at the present time unless it gets worse.  I we will however send her for neuropsychological testing regarding memory loss.      Simple renal cyst 07/20/2009 09/29/2014--patient was again evaluated by the urologist for a renal cyst.  CT scan of the abdomen and pelvis reveals a left renal cyst measuring 5.1 x 4.9 cm.  There were subcentimeter hypodense renal lesions that are too small to further characterize and are presumably related to cysts.  Recommend attention to follow up.  Distal dilated pancreatic duct noted.  The common bile duct is the upper limits of normal in size.  The ampullary region is not well evaluated.  Comparison with prior imaging is recommended if available.  If there is no prior film available for comparison, and ERCP or MRCP could be performed to further evaluate.  Small hiatal hernia noted.  07/20/2009--patient was noted to have a left renal mass consistent with a cyst.  This was evaluated by the urologist 07/20/2009.    Stage 3a chronic kidney disease 11/14/2015 11/14/2015--serum creatinine mildly elevated at 1.12.      Statin intolerance 10/30/2017    Tinnitus of both ears 09/30/2019 September 30, 2019--patient presents with a several year history of bilateral tinnitus, right greater than left.  It seems to be getting worse and now is associated with fluctuating hearing.  She occasionally will have worsening hearing after she coughs or sneezes.  On exam she has evidence of old otitis media scars, particularly on the right.  There is no acute infection present and there is no a    Tremor of left hand 12/14/2023    Vitamin D deficiency 04/08/2016        Past Surgical  History:   Procedure Laterality Date    APPENDECTOMY  1965 1965    CATARACT EXTRACTION Bilateral Remote    Remote bilateral cataract extirpation with intraocular lens implantation.    CHOLECYSTECTOMY WITH INTRAOPERATIVE CHOLANGIOGRAM N/A 01/21/2019 01/21/2019--laparoscopic paraesophageal hernia repair with fundoplication.    COLONOSCOPY  11/03/2008 2008--normal colonoscopy    COLONOSCOPY  2001 2001--normal colonoscopy.    COLONOSCOPY N/A 06/13/2017 06/13/2017--colonoscopy revealed melanosis.  Biopsied.  There was friability with contact bleeding in the ascending colon.  Biopsied.  Pathology returned consistent with melanosis coli.    ENDOSCOPY  10/08/2014    02/28/2012--air-contrast upper GI revealed small to moderate sized reducible sliding hiatal herniation of the upper stomach with some demonstrated gastroesophageal reflux. No esophageal, gastric, or duodenal mass or mucosal ulceration was seen. 11/03/2008--EGD performed for evaluation of iron deficiency anemia revealed hiatal hernia without evidence of reflux, prepyloric antritis and    ENDOSCOPY  11/03/2008 11/03/2008--EGD performed for evaluation of iron deficiency anemia revealed hiatal hernia without evidence of reflux, prepyloric antritis and    ENDOSCOPY  11/19/2001 11/19/2001--EGD revealed a very tortuous distal esophagus with a Schatzki's ring. No ulcer or erosions. No Dorado's mucosa. Stomach revealed patchy erythema and erosions in the antrum. Biopsy. Normal pylorus with no obstruction. Normal duodenum with no ulcers. The Schatzki's ring was dilated with a Rhodes dilator.;    ENDOSCOPY N/A 09/27/2016 09/27/2016--EGD revealed a normal oropharynx, esophagus, and medium sized hiatal hernia.  Nonbleeding gastric ulcer with no stigmata of bleeding.  Biopsy.  Gastritis.  Biopsy.  Normal examined duodenum.  Biopsied.  Pathology returned mild to moderate chronic active gastritis with ulceration from the stomach antral ulcer biopsy.   Gastroesophageal junction biopsy revealed minimal mixed inflammation.    ENDOSCOPY N/A 06/13/2017 06/13/2017--colonoscopy revealed melanosis.  Biopsied.  There was friability with contact bleeding in the ascending colon.  Biopsied.  Pathology returned consistent with melanosis coli.    ERCP N/A 01/22/2019 01/22/2019--ERCP with sphincterotomy and balloon stone extraction.    ESOPHAGEAL DILATATION  11/19/2001 11/19/2001--EGD revealed a very tortuous distal esophagus with a Schatzki's ring. No ulcer or erosions. No Dorado's mucosa. Stomach revealed patchy erythema and erosions in the antrum. Biopsy. Normal pylorus with no obstruction. Normal duodenum with no ulcers. The Schatzki's ring was dilated with a Rhodes dilator.;     ESOPHAGEAL MANOMETRY N/A 12/18/2018    Procedure: ESOPHAGEAL MANOMETRY;  Surgeon: Cecilia, Nurse Performed;  Location: [facility] ENDOSCOPY;  Service: Gastroenterology    ESOPHAGOSCOPY N/A 01/21/2019    Procedure: FLEXIBLE ESOPHAGOSCOPY;  Surgeon: Reji Johnson MD;  Location: [facility] OR;  Service: General    HIATAL HERNIA REPAIR N/A 01/21/2019    Procedure: Laparoscopic paraesophageal hernia repair with toupee fundoplication;  Surgeon: Reji Johnson MD;  Location: [facility] OR;  Service: General    INCONTINENCE SURGERY  1979 1979--a bladder tack procedure for urinary stress incontinence    KNEE ARTHROSCOPY Right 12/12/2011 12/12/2011--right knee arthroscopy with partial lateral and medial meniscectomies.    SKIN SURGERY  05/25/2010 05/25/2010--skin lesion excised from right lower extremity. 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    TONSILLECTOMY  1953    TOTAL ABDOMINAL HYSTERECTOMY WITH SALPINGO OOPHORECTOMY  1974 1974--total abdominal hysterectomy.         Visit Dx:    ICD-10-CM ICD-9-CM   1. Parkinson's disease, unspecified whether dyskinesia present, unspecified whether manifestations fluctuate  G20.A1 332.0   2. Dysarthria   R47.1 784.51            OP SLP Assessment/Plan - 11/14/24 1521          SLP Assessment    Functional Problems Swallowing;Voice;Speech Language- Adult/Cognition  -KA    Clinical Impression: Speech Language-Adult/Congnition Mild-Moderate:;Dysarthria- Hypokinetic  -KA    Clinical Impression: Swallowing WNL  -KA    Impact on Function- Voice Difficulty communicating;Difficulty communicating in an emergency;Restrictions in personal and social life;Difficulty participating in avocational activities  -KA    Clinical Impression- Voice Mild moderate voice disorder  -KA    Please refer to paper survey for additional self-reported information Yes  -KA    Please refer to items scanned into chart for additional diagnostic informaiton and handouts as provided by clinician Yes  -KA    Prognosis Good (comment)  -KA    Patient/caregiver participated in establishment of treatment plan and goals Yes  -KA    Patient would benefit from skilled therapy intervention Yes  -KA       SLP Plan    Frequency x1 a week  -KA    Duration 4 to 6 weeks  -KA    Planned CPT's? SLP INDIVIDUAL SPEECH THERAPY: 00277  -KA    Expected Duration of Therapy Session (SLP Eval) 45  -KA              User Key  (r) = Recorded By, (t) = Taken By, (c) = Cosigned By      Initials Name Provider Type    Arvind Mendieta SLP Speech and Language Pathologist                     SLP SLC Evaluation - 11/14/24 1400          General Information    Prior Level of Function-Communication WFL  -KA    Patient's Goals for Discharge other (see comments)   improve voice -KA       Motor Speech Assessment/Intervention    Motor Speech Function mild impairment;moderate impairment  -KA    Characteristics Consistent with Dysarthria slow rate;decreased intensity;decreased articulation  -KA    Motor Speech, Comment Patient demonstrates mild to moderate dysarthria characterized by decreased loudness, slow rate and some decreased articulation at times. Pt also demonstrates hoarse vocal  "quality. Pt demonstrates decreased pitch range and loudness in pitch glides. In MPT time pt averaged 20 seconds which is WNL average range. SLP began education on diaphramatic breathing exercises, pitch glides and EMST  -KA       Cursory Voice Assessment/Intervention    Quality and Resonance (Voice) hoarse;mild impairment;moderate impairment  -KA    Voice, Comment see above  -KA       SLP Evaluation Clinical Impressions    SLP Diagnosis mild-moderate;dysarthria  -KA    Rehab Potential/Prognosis good  -KA    [facility] [place] Met yes  -KA    Functional Impact difficulty communicating wants, needs;difficulty communicating in an emergency;difficulty in expressing complex messages;restrictions in personal and social life  -KA       Recommendations    Therapy Frequency (SLP SLC) 1 day per week  -KA    Predicted Duration Therapy Intervention (Days) until discharge  -KA              User Key  (r) = Recorded By, (t) = Taken By, (c) = Cosigned By      Initials Name Provider Type    Arvind Mendieta, SLP Speech and Language Pathologist                   SLP Adult Swallow Evaluation       Row Name 11/14/24 1400       Rehab Evaluation    Document Type evaluation  -KA    Subjective Information no complaints  -KA    Patient Observations alert;cooperative  -KA    Patient Effort good  -KA    Symptoms Noted During/After Treatment none  -KA       General Information    Patient Profile Reviewed yes  -KA    Pertinent History Of Current Problem Patient referred for Parkinsons disease. Patient had office visit on 10/29 with Neuro APRN Lacy Bonilla with note saying some difficulty with swallowing and \"I switched the contact number to his so we can get this scheduled for swallowing.\" Pt voices today her only complaint is voice and decreased volume and increase in hoarseness. Patient denies dysphagia symptoms including coughing, choking and globus sensation. Patient denies hx of PNA.  -KA    Current Method of " Nutrition regular textures;thin liquids  -KA    Precautions/Limitations, Vision WFL  -KA    Precautions/Limitations, Hearing WFL  -KA    Prior Level of Function-Communication WFL  -KA    Prior Level of Function-Swallowing no diet consistency restrictions;safe, efficient swallowing in all situations  -KA    Plans/Goals Discussed with patient;spouse/S.O.;agreed upon  -KA    Barriers to Rehab none identified  -KA    Patient's Goals for Discharge --  improve voice  -KA       Pain    Pretreatment Pain Rating 0/10 - no pain  -KA    Posttreatment Pain Rating 0/10 - no pain  -KA       Oral Motor Structure and Function    Dentition Assessment natural, present and adequate  -KA    Secretion Management WNL/WFL  -KA    Mucosal Quality moist, healthy  -KA       Oral Musculature and Cranial Nerve Assessment    Oral Motor General Assessment WFL  -KA       Clinical Swallow Eval    Clinical Swallow Evaluation Summary Patient demonstrated no overt s/s of pen/asp with thins via cup, puree, mechanical soft mixed and regular solids. Mastication WNL with no oral residue.  -KA       SLP Evaluation Clinical Impression    SLP Swallowing Diagnosis swallow WFL/no suspected pharyngeal impairment  -KA    Functional Impact no impact on function  -KA    Swallow Criteria for Skilled Therapeutic Interventions Met no problems identified which require skilled intervention  -KA       Recommendations    Therapy Frequency (Swallow) 1 day per week;other (see comments)  for voice  -KA    SLP Diet Recommendation regular textures;thin liquids  -KA    Recommended Precautions and Strategies upright posture during/after eating;small bites of food and sips of liquid  -KA    Oral Care Recommendations Oral Care BID/PRN  -KA    SLP Rec. for Method of Medication Administration meds whole;as tolerated  -KA    Monitor for Signs of Aspiration yes;notify SLP if any concerns  -KA    Anticipated Discharge Disposition (SLP) home  -KA              User Key  (r) = Recorded  "By, (t) = Taken By, (c) = Cosigned By      Initials Name Provider Type    Arvind Mendieta SLP Speech and Language Pathologist                                  OP SLP Education       Row Name 11/14/24 1522       Education    Barriers to Learning No barriers identified  -LISA    Education Provided Described results of evaluation;Patient expressed understanding of evaluation  -LISA    Assessed Learning needs;Learning motivation  -LISA    Learning Motivation Strong  -KA    Learning Method Explanation  -KA    Teaching Response Verbalized understanding  -LISA              User Key  (r) = Recorded By, (t) = Taken By, (c) = Cosigned By      Initials Name Effective Dates    Arvind Mendieta SLP 01/05/24 -                    SLP OP Goals       Row Name 11/14/24 1500          Goal Type Needed    Goal Type Needed Voice;Dysarthria  -LISA        Dysarthria Goals     Dysarthria LTG's Patient will be able to communicate verbally in an efficient manner so that messages are communicated in timely way with familiar/unfamiliar listeners  -KA     Patient will be able to communicate verbally in an efficient manner  so that messages are communicated in timely way with familiar/unfamiliar listeners 90%:;without cues  -KA     Status: Patient will be able to communicate verbally in an efficient manner  so that messages are communicated in timely way with familiar/unfamiliar listeners New  -KA      Dysarthria STG's Patient will increase strength and power  of articulators so they can move more quickly and accurately to improve speech intelligibility by;Patient will apply compensatory strategies to improve intelligibility of speech during in spontaneous speech  -KA     Patient will apply compensatory strategies to improve intelligibility of speech during in spontaneous speech 90%:;without cues  -KA     Status: Patient will apply compensatory strategies to improve intelligibility of speech during in spontaneous speech New  -KA     \"Patient will " "increase strength and power  of articulators so they can move more quickly and accurately to improve speech intelligibility by completing lip exercises\" 90%:;without cues  -KA     Status: Patient will increase strength and power  of articulators so they can move more quickly and accurately to improve speech intelligibility by completing lip exercises New  -KA        Voice Goals    Voice LTG's Patient will be able to use voice in vocational and avocational activities without functional limitations due to hoarseness.  -KA     Patient will be able to use voice in vocational and avocational activities without functional limitations due to hoarseness. 90%:;without cues  -KA     Status: Patient will be able to use voice in vocational and avocational activities without functional limitations due to hoarseness. New  -KA     Voice STG's Pt will demonstrate an understanding of the structures and functions of the phonatory/respiratory tract (respiration, phonation, resonance and articulation);Patient will be able to produce his/her optimal pitch;Patient will identify and discriminate vocal abuse behaviors in situations that promote vocal abuse and misuse as well as identify good vocal hygiene practices, alternatives, and situations  -KA     Patient will identify and discriminate vocal abuse behaviors in situations that promote vocal abuse and misuse as well as identify good vocal hygiene practices, alternatives, and situations 90%:;without cues  -KA     Status: Patient will identify and discriminate vocal abuse behaviors in situations that promote vocal abuse and misuse as well as identify good vocal hygiene practices, alternatives, and situations New  -KA     Patient will be able to produce his/her optimal pitch 90%:;without cues  -KA     Status: Patient will be able to produce his/her optimal pitch New  -KA     Pt will demonstrate an understanding of the structures and functions of the phonatory/respiratory tract " (respiration, phonation, resonance and articulation) 90%:;without cues  -KA     Status: Pt will demonstrate an understanding of the structures and functions of the phonatory/respiratory tract (respiration, phonation, resonance and articulation) New  -KA               User Key  (r) = Recorded By, (t) = Taken By, (c) = Cosigned By      Initials Name Provider Type    Arvind Mendieta SLP Speech and Language Pathologist                           Time Calculation:   SLP Start Time: 1330  SLP Stop Time: 1415  SLP Time Calculation (min): 45 min  Untimed Charges  55723-HZ Eval Oral Pharyng Swallow Minutes: 15  Total Minutes  Untimed Charges Total Minutes: 15   Total Minutes: 15    Therapy Charges for Today       Code Description Service Date Service Provider Modifiers Qty    10595522210 HC ST EVAL ORAL PHARYNG SWALLOW 1 2024 Arvind Alaniz SLP GN 1    [id number] HC ST BEHAV QUALT VOICE AND RESONC 2 2024 Arvind Alaniz SLP GN 1                     JAZMIN Machado  2024   and Outpatient Speech Language Pathology   Adult Swallow Initial Evaluation  [facility]     Patient Name: Sachi Vora  : 1940  MRN: [id number]  Today's Date: 2024         Visit Date: 2024   Patient Active Problem List   Diagnosis    Osteoporosis, 2011--lumbar spine -2.8.  Right femoral neck -2.4.  Left femoral neck -2.4.  Patient receives Reclast infusions.    Therapeutic drug monitoring    Simple renal cyst    Benign essential hypertension    Carotid artery plaque, 2018--mild right ICA plaque, normal left ICA 10/03/2014--mild bilateral carotid artery plaque.    Chronic gastritis    Chronic lower back pain    Chronic otitis externa    Stage 3a chronic kidney disease    Depression with anxiety    Functional murmur, 07/15/2015--normal echocardiogram.    Hyperlipidemia    Chronic migraine w/o aura w/o status migrainosus, not intractable    Pancreatic Duct Dilation    Bilateral lower extremity edema  "   Restless legs syndrome    Bilateral sensorineural hearing loss    Vitamin D deficiency    Chronic gastric ulcer    Chronic fatigue    Hypersomnolence    Chronic anemia    Multinodular goiter    Generalized anxiety disorder    Iron deficiency anemia    Statin intolerance    Postmenopausal state    Short-term memory loss    Pulmonary hypertension    Gastroesophageal reflux disease without esophagitis    Tinnitus of both ears    Chronic hoarseness    Pain disorder associated with psychological factors    Frequent falls    Balance disorder, related to Parkinson's    Hyperaldosteronism    Hypokalemia    Chronic pain of left knee    Primary osteoarthritis of left knee    Parkinson's disease with dyskinesia and fluctuating manifestations        Past Medical History:   Diagnosis Date    Balance disorder, related to Parkinson's 02/05/2021 February 5, 2021--patient seen in follow-up by Dr. Weir.  I extensively reviewed the documentation from the emergency room visit as noted below.  I reviewed the history with the patient and she is describing episodes of dizziness described as a spinning sensation of her body and this seems to be precipitated by movement although it does not occur if she rolls over in bed.  If she stands up and st    Benign essential hypertension 04/08/2016    Bilateral sensorineural hearing loss 03/31/2011 03/31/2011--etiology reveals reverse \"cookie bite\" type of hearing loss for both ears of mild/moderate degree, mostly sensorineural.  Speech discrimination 100% on the right, 96% on the left.    Carotid artery plaque, 10/03/2014--mild bilateral carotid artery plaque. 07/25/2012    10/03/2014--[facility] screening revealed mild bilateral carotid plaque, negative for atrial fibrillation, negative for AAA, negative for PAD, osteoporosis screen revealed osteopenia.  Body mass index was 25 and considered to be moderate risk.  07/25/2012--vascular screen negative for carotid plaque, negative for " abdominal aneurysm, negative for PAD Description: 10/03/2014--[facility] screening revealed mild bilateral carotid plaque, negative for atrial fibrillation, negative for AAA, negative for PAD, osteoporosis screen revealed osteopenia.  Body mass index was 25 and considered to be moderate risk.  07/25/2012--vascular screen negative for carotid plaque, negative for abdominal aneurysm, negative for PAD    Chronic anemia 08/23/2016 06/13/2017--colonoscopy revealed melanosis.  Biopsied.  There was friability with contact bleeding in the ascending colon.  Biopsied.  Pathology returned consistent with melanosis coli.  06/13/2017--EGD revealed normal esophagus.  Biopsies taken.  There was a medium-sized hiatal hernia.  Localized mild inflammation and linear erosions were found in the prepyloric region.  Biopsied.  Examined duodenum was normal.  Random biopsies taken.  Pathology of the gastroesophageal junction was unremarkable as was the gastric fundus.  Prepyloric biopsy revealed minimal chronic inflammation and reactive change.  H pylori negative.  Duodenal biopsies are negative.  04/12/2017--hemoglobin low at 10.8, hematocrit is normal at 35.7.  Iron sulfate 325 mg per day initiated.  08/23/2016--routine follow-up.  Patient continues to have epigastric abdominal pain believed to be related to reflux with possible esophageal spasm.  Hemoglobin noted to be low at 10.6 with a hematocrit low at 33.7 and RDW elevated at 16.2.  Homocysti    Chronic fatigue 04/21/2016 06/14/2017--overnight oximetry revealed oxygen desaturation index of only 0.44.  There were only 10 total desaturations during the period of testing which lasted 6 hours and 46 minutes.  05/31/2017--patient seen in follow-up and reports she continues to have chronic fatigue as well as hypersomnolence.  Once again, she is on multiple medications that are undoubtedly contributing to this problem including clonazepam and hydrocodone but I doubt that these could be  discontinued.  Her CBC back in April revealed a low hemoglobin of 10.8 but hematocrit was normal at 35.7.  Thyroid function tests were normal and CMP normal.  Overnight oximetry test ordered.  Repeat CBC.  Iron studies.  Sedimentation rate and CRP.  04/11/2017--patient seen in follow-up and her blood pressure is now at a reasonable level at 120/64.  She reports she still feels somewhat fatigued but she is much better.  Patient has not been doing any regular exercise and I do think that that would be helpful to reduce her feeling of fatigue.  She is    Chronic gastric ulcer 04/14/2014    02/15/2017--patient seen in follow-up in nearly 6 months later.  She has complaints of not feeling well all over.  She has complaints of diffuse myalgias and possibly tendinopathy related to Levaquin that I called in prior to her going to [place] for vacation.  She reports that 3 or 4 days after starting the Levaquin she began to have the musculoskeletal symptoms and she reports that she continues to have them presently.  Symptoms are worse involving her left calf area.  Examination reveals significant tenderness involving the left calf and the left calf seems a little larger than the right.  She also has complaints of shortness of breath but no chest pain.  She is complaining of double vision and generalized weakness and fatigue.  She was complaining of chronic fatigue at the last visit back in September and I ordered an overnight oximetry test but apparently this was never done for reasons that are not clear to me.  Her current oxygen saturation is 94% and normally it is 100%.  Plan is as follows: Extensi    Chronic gastritis 11/19/2001    02/15/2017--patient seen in follow-up in nearly 6 months later.  She has complaints of not feeling well all over.  She has complaints of diffuse myalgias and possibly tendinopathy related to Levaquin that I called in prior to her going to [place] for vacation.  She reports that 3 or 4 days  after starting the Levaquin she began to have the musculoskeletal symptoms and she reports that she continues to have them presently.  Symptoms are worse involving her left calf area.  Examination reveals significant tenderness involving the left calf and the left calf seems a little larger than the right.  She also has complaints of shortness of breath but no chest pain.  She is complaining of double vision and generalized weakness and fatigue.  She was complaining of chronic fatigue at the last visit back in September and I ordered an overnight oximetry test but apparently this was never done for reasons that are not clear to me.  Her current oxygen saturation is 94% and normally it is 100%.  Plan is as follows: Extensi    Chronic hoarseness 06/08/2020    September 15, 2020--patient presents with complaints of swelling of the soft tissues of the left side of the neck and this is associated with pain and discomfort, particularly when she turns her head rotating to the left.  She also notices hoarseness and feels that her voice has changed.  On exam there is definite prominence of the left sternocleidomastoid muscle and there may be some shotty lymph    Chronic lower back pain 01/31/2006 08/11/2014--MRI of the lumbar spine reveals the conus terminates at L2 and is normal.  Cauda equina unremarkable.  Stable to moderate degenerative disc disease at L5-S1.  No acute fracture or pars defect is demonstrated.  Small synovial cysts are seen posterior to the L4-L5 facet.  The perivertebral soft tissues are unremarkable.  T12-L1, L1-L2, L2-L3 are negative.  L3-L4 a broad-based disc bulge resulting in mild bilateral neural foraminal narrowing.  L4-L5 reveals a broad-based disc bulge facet disease resulting in mild bilateral neural foraminal narrowing.  L5-S1 reveals a broad-based disc bulge, posterior osseous slipping, and facet disease resulting in mild to moderate bilateral neural foraminal narrowing.  Assessment is  stable mild to moderate degenerative spondylosis.  Small synovial cysts are seen posterior to the L4-L5 facets.  08/11/2014--MRI of the thoracic spine reveals mild to moderate thoracic kyphosis.  No fracture.  At T5--T6 there is a moderate sized left paracentral disc protrusion which i    Chronic migraine 11/03/2009 01/18/2010--MRI of the brain performed for headaches and memory loss.  Mild small vessel disease in the cerebral and central pontine white matter.  There is an ovoid, somewhat pancake-shaped area of signal abnormality in the anterior inferior right temporal lobe subcortical to the juxtacortical white matter that measures 1.2 x 1 cm and anterior posterior and medial lateral dimension but only measures 3 mm in cranial caudal dimension.  I suspect that this is a benign cyst or a cystic area of encephalomalacia.  The remainder of the MRI of the head is within normal limits.  11/03/2009--CT scan of the brain without contrast performed for headache after a fall.  Mild diffuse atrophy.  No acute abnormality noted.; Description: Patient has had a long history of migraine headaches.  MRI and CT scan of the brain have been essentially negative.    Chronic otitis externa 04/08/2016    Chronic renal insufficiency, stage II (mild), creatinine 1.12 11/14/2015 11/14/2015--serum creatinine mildly elevated at 1.12.    Depression with anxiety 04/08/2016    Frequent falls 02/05/2021 February 5, 2021--patient seen in follow-up by Dr. Weir.  I extensively reviewed the documentation from the emergency room visit as noted below.  I reviewed the history with the patient and she is describing episodes of dizziness described as a spinning sensation of her body and this seems to be precipitated by movement although it does not occur if she rolls over in bed.  If she stands up and st    Functional murmur, 07/15/2015--normal echocardiogram. 07/15/2015    07/15/2015--echocardiogram reveals normal left ventricular size and  function with ejection fraction 55%.  Grade 1 diastolic dysfunction, abnormal relaxation pattern.  Trace tricuspid regurgitation.  Estimated right ventricular systolic pressure is 25 mmHg which is normal.    Gastroesophageal reflux disease without esophagitis 06/06/2019    Generalized anxiety disorder 04/11/2017    Glaucoma     History of Acute deep vein thrombosis (DVT) of distal end of left lower extremity 02/15/2017    03/21/2017 patient seen in follow-up and she is tolerating the Eliquis well without signs or symptoms of bleeding.  Her calf swelling and tenderness is better but not totally resolved.  I suspect that the DVT is chronic and may not resolve at all.  I will order a repeat venous study and then proceed from there.  02/23/2017--repeat Doppler venous study of the left lower extremity reveals a chronic left lower extremity DVT in the posterior tibial.  All other left sided veins appeared normal.  Fluid collection in the left calf noted.  02/17/2017--patient seen in follow-up and reports her left lower extremity symptoms are about the same.  She continues to have complaints of profound fatigue which I think is multifactorial including underlying depression that is not in remission.  Review the results of the CTA of the chest including the possible thyroid lesion.  I do not think this is contributing to any of her symptoms of fatigue particularly given the fact that her thyroid function tests are normal.  I expla    History of palpitations 12/07/2011    Patient has had multiple admissions to the hospital for complaints of chest pain and heart palpitations. She meant admitted at least on 3 occasions. She has had at least 3 stress Cardiolite and 1 stress ECG, the last Cardiolite being performed 12/07/2011 which was negative. Patient is also had Holter monitors which have been unremarkable.    HIstory of Schatzki's ring 02/28/2012 02/28/2012--air-contrast upper GI revealed small to moderate sized reducible  sliding hiatal herniation of the upper stomach with some demonstrated gastroesophageal reflux. No esophageal, gastric, or duodenal mass or mucosal ulceration was seen.  11/03/2008--EGD performed for evaluation of iron deficiency anemia revealed hiatal hernia without evidence of reflux, prepyloric antritis and    Hyperlipidemia 04/08/2016    Hypersomnolence 05/05/2016 06/14/2017--overnight oximetry revealed oxygen desaturation index of only 0.44.  There were only 10 total desaturations during the period of testing which lasted 6 hours and 46 minutes.  05/31/2017--patient seen in follow-up and reports she continues to have chronic fatigue as well as hypersomnolence.  Once again, she is on multiple medications that are undoubtedly contributing to this problem including clonazepam and hydrocodone but I doubt that these could be discontinued.  Her CBC back in April revealed a low hemoglobin of 10.8 but hematocrit was normal at 35.7.  Thyroid function tests were normal and CMP normal.  Overnight oximetry test ordered.  Repeat CBC.  Iron studies.  Sedimentation rate and CRP.  04/11/2017--patient seen in follow-up and her blood pressure is now at a reasonable level at 120/64.  She reports she still feels somewhat fatigued but she is much better.  Patient has not been doing any regular exercise and I do think that that would be helpful to reduce her feeling of fatigue.  She is    Menopausal state 04/08/2016    Multinodular goiter 02/17/2017 02/21/2017--thyroid ultrasound reveals multinodular thyroid with largest nodule measuring on the order of 1.6 cm in greatest dimension.  02/17/2017--patient seen in follow-up for DVT and CTA of the chest.  An incidental finding on the CTA of the chest reveals a 1.7 cm lesion in the right lobe of thyroid.  Note that thyroid function tests are currently normal.  Ultrasound of the thyroid ordered.    Osteoporosis, 03/29/2011--lumbar spine -2.8.  Right femoral neck -2.4.  Left femoral  neck -2.4.  Patient receives Reclast infusions. 02/09/2009 04/19/2017--Reclast infusion.  04/05/2016--Reclast infusion.  06/04/2012--Reclast infusion.  05/26/2011--Reclast infusion.  03/29/2011--DEXA scan revealed a lumbar T score of -2.8, right femoral neck T score -2.4, left femoral neck T score -2.4.  Osteoporosis of the lumbar spine and severe osteopenia of the hips bilaterally.  Patient has been intolerant to Fosamax because of gastritis and gastroesophageal reflux.  04/01/2009--treatment for osteoporosis begun with Fosamax.  02/09/2009--DEXA scan revealed lumbar spine T score of -3.3.  Left femoral neck T score -2.5.  Right hip T score -2.6.  Osteoporosis.     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  CT scan of the abdomen and pelvis reveals a left renal cyst measuring 5.1 x 4.9 cm.  There were subcentimeter hypodense renal lesions that are too small to further characterize and are presumably related to cysts.  Recommend attention to follow up.  Distal dilated pancreatic duct noted.  The common bile duct is the upper limits of normal in size.  The ampullary region is not well evaluated.  Comparison with prior imaging is recommended if available.  If there is no prior film available for comparison, and ERCP or MRCP could be performed to further evaluate.  Small hiatal hernia noted.  03/05/2012--CT scan of the abdomen with contrast, pancreatic protocol.  This reveals some fullness of the pancreatic ductal system and apparent pancreatic divisum with separate entrance of the accessory pancreatic duct extending into the duodenum distal to the main pancreatic duct.  There is fullness of the duct diffusely but similar to the previous s    Parkinson's disease with dyskinesia and fluctuating manifestations 12/14/2023 December 19, 2023--MRI of the brain without contrast  reveals stable findings compared to the prior study dated May 28, 2021.  There are moderate changes of chronic small vessel ischemic phenomena.  Additionally there are findings consistent with an end of the insulin large perivascular space within the anterior portion of the right temporal lobe measuring 1.7 x 1.2 cm in greatest axial dimensions.    Pedal edema 06/29/2015 06/29/2015--patient presents with a 4-6 week history of exertional dyspnea that comes on with activity such as climbing stairs or walking her dog up an incline.  No chest pain.  Relieved with rest.  No cough.  She does have complaints of her feet and legs swelling that is particularly worse at the end of the day and not improved overnight.  No orthopnea or PND.  Chest exam reveals faint rales at the bases.  Otherwise clear.  Heart is regular and I do not appreciate a heart murmur.  Chest x-ray PA and lateral ordered.  Echocardiogram ordered.    Pulmonary hypertension 04/18/2019    Restless legs syndrome 04/08/2016    Short-term memory loss 05/04/2018 05/04/2018--patient reports development of problems with her memory over the past several months and is concerned about possibility of Alzheimer's.  No other neurologic symptoms other than occasional sensation of being off balance.  We will not evaluate that problem at the present time unless it gets worse.  I we will however send her for neuropsychological testing regarding memory loss.      Simple renal cyst 07/20/2009 09/29/2014--patient was again evaluated by the urologist for a renal cyst.  CT scan of the abdomen and pelvis reveals a left renal cyst measuring 5.1 x 4.9 cm.  There were subcentimeter hypodense renal lesions that are too small to further characterize and are presumably related to cysts.  Recommend attention to follow up.  Distal dilated pancreatic duct noted.  The common bile duct is the upper limits of normal in size.  The ampullary region is not well evaluated.  Comparison  with prior imaging is recommended if available.  If there is no prior film available for comparison, and ERCP or MRCP could be performed to further evaluate.  Small hiatal hernia noted.  07/20/2009--patient was noted to have a left renal mass consistent with a cyst.  This was evaluated by the urologist 07/20/2009.    Stage 3a chronic kidney disease 11/14/2015 11/14/2015--serum creatinine mildly elevated at 1.12.      Statin intolerance 10/30/2017    Tinnitus of both ears 09/30/2019 September 30, 2019--patient presents with a several year history of bilateral tinnitus, right greater than left.  It seems to be getting worse and now is associated with fluctuating hearing.  She occasionally will have worsening hearing after she coughs or sneezes.  On exam she has evidence of old otitis media scars, particularly on the right.  There is no acute infection present and there is no a    Tremor of left hand 12/14/2023    Vitamin D deficiency 04/08/2016        Past Surgical History:   Procedure Laterality Date    APPENDECTOMY  1965    1965    CATARACT EXTRACTION Bilateral Remote    Remote bilateral cataract extirpation with intraocular lens implantation.    CHOLECYSTECTOMY WITH INTRAOPERATIVE CHOLANGIOGRAM N/A 01/21/2019 01/21/2019--laparoscopic paraesophageal hernia repair with fundoplication.    COLONOSCOPY  11/03/2008 2008--normal colonoscopy    COLONOSCOPY  2001 2001--normal colonoscopy.    COLONOSCOPY N/A 06/13/2017 06/13/2017--colonoscopy revealed melanosis.  Biopsied.  There was friability with contact bleeding in the ascending colon.  Biopsied.  Pathology returned consistent with melanosis coli.    ENDOSCOPY  10/08/2014    02/28/2012--air-contrast upper GI revealed small to moderate sized reducible sliding hiatal herniation of the upper stomach with some demonstrated gastroesophageal reflux. No esophageal, gastric, or duodenal mass or mucosal ulceration was seen. 11/03/2008--EGD performed for  evaluation of iron deficiency anemia revealed hiatal hernia without evidence of reflux, prepyloric antritis and    ENDOSCOPY  11/03/2008 11/03/2008--EGD performed for evaluation of iron deficiency anemia revealed hiatal hernia without evidence of reflux, prepyloric antritis and    ENDOSCOPY  11/19/2001 11/19/2001--EGD revealed a very tortuous distal esophagus with a Schatzki's ring. No ulcer or erosions. No Dorado's mucosa. Stomach revealed patchy erythema and erosions in the antrum. Biopsy. Normal pylorus with no obstruction. Normal duodenum with no ulcers. The Schatzki's ring was dilated with a Rhodes dilator.;    ENDOSCOPY N/A 09/27/2016 09/27/2016--EGD revealed a normal oropharynx, esophagus, and medium sized hiatal hernia.  Nonbleeding gastric ulcer with no stigmata of bleeding.  Biopsy.  Gastritis.  Biopsy.  Normal examined duodenum.  Biopsied.  Pathology returned mild to moderate chronic active gastritis with ulceration from the stomach antral ulcer biopsy.  Gastroesophageal junction biopsy revealed minimal mixed inflammation.    ENDOSCOPY N/A 06/13/2017 06/13/2017--colonoscopy revealed melanosis.  Biopsied.  There was friability with contact bleeding in the ascending colon.  Biopsied.  Pathology returned consistent with melanosis coli.    ERCP N/A 01/22/2019 01/22/2019--ERCP with sphincterotomy and balloon stone extraction.    ESOPHAGEAL DILATATION  11/19/2001 11/19/2001--EGD revealed a very tortuous distal esophagus with a Schatzki's ring. No ulcer or erosions. No Dorado's mucosa. Stomach revealed patchy erythema and erosions in the antrum. Biopsy. Normal pylorus with no obstruction. Normal duodenum with no ulcers. The Schatzki's ring was dilated with a Rhodes dilator.;     ESOPHAGEAL MANOMETRY N/A 12/18/2018    Procedure: ESOPHAGEAL MANOMETRY;  Surgeon: Endo, Nurse Performed;  Location: [facility] ENDOSCOPY;  Service: Gastroenterology    ESOPHAGOSCOPY N/A 01/21/2019    Procedure: FLEXIBLE  ESOPHAGOSCOPY;  Surgeon: Reji Johnson MD;  Location: [facility] MAIN OR;  Service: General    HIATAL HERNIA REPAIR N/A 01/21/2019    Procedure: Laparoscopic paraesophageal hernia repair with toupee fundoplication;  Surgeon: Reji Johnson MD;  Location: [facility] MAIN OR;  Service: General    INCONTINENCE SURGERY  1979 1979--a bladder tack procedure for urinary stress incontinence    KNEE ARTHROSCOPY Right 12/12/2011 12/12/2011--right knee arthroscopy with partial lateral and medial meniscectomies.    SKIN SURGERY  05/25/2010 05/25/2010--skin lesion excised from right lower extremity. 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    TONSILLECTOMY  1953    TOTAL ABDOMINAL HYSTERECTOMY WITH SALPINGO OOPHORECTOMY  1974 1974--total abdominal hysterectomy.         Visit Dx:     ICD-10-CM ICD-9-CM   1. Parkinson's disease, unspecified whether dyskinesia present, unspecified whether manifestations fluctuate  G20.A1 332.0   2. Dysarthria  R47.1 784.51            OP SLP Assessment/Plan - 11/14/24 1521          SLP Assessment    Functional Problems Swallowing;Voice;Speech Language- Adult/Cognition  -KA    Clinical Impression: Speech Language-Adult/Congnition Mild-Moderate:;Dysarthria- Hypokinetic  -KA    Clinical Impression: Swallowing WNL  -KA    Impact on Function- Voice Difficulty communicating;Difficulty communicating in an emergency;Restrictions in personal and social life;Difficulty participating in avocational activities  -KA    Clinical Impression- Voice Mild moderate voice disorder  -KA    Please refer to paper survey for additional self-reported information Yes  -KA    Please refer to items scanned into chart for additional diagnostic informaiton and handouts as provided by clinician Yes  -KA    Prognosis Good (comment)  -KA    Patient/caregiver participated in establishment of treatment plan and goals Yes  -KA    Patient would benefit from skilled therapy intervention  "Yes  -KA       SLP Plan    Frequency x1 a week  -KA    Duration 4 to 6 weeks  -KA    Planned CPT's? SLP INDIVIDUAL SPEECH THERAPY: 15349  -KA    Expected Duration of Therapy Session (SLP Huyen) 45  -KA              User Key  (r) = Recorded By, (t) = Taken By, (c) = Cosigned By      Initials Name Provider Type    Arvind Mendieta, SLP Speech and Language Pathologist                     SLP Adult Swallow Evaluation       Row Name 11/14/24 1400       Rehab Evaluation    Document Type evaluation  -KA    Subjective Information no complaints  -KA    Patient Observations alert;cooperative  -KA    Patient Effort good  -KA    Symptoms Noted During/After Treatment none  -KA       General Information    Patient Profile Reviewed yes  -KA    Pertinent History Of Current Problem Patient referred for Parkinsons disease. Patient had office visit on 10/29 with Neuro APRN Lacy Bonilla with note saying some difficulty with swallowing and \"I switched the contact number to his so we can get this scheduled for swallowing.\" Pt voices today her only complaint is voice and decreased volume and increase in hoarseness. Patient denies dysphagia symptoms including coughing, choking and globus sensation. Patient denies hx of PNA.  -KA    Current Method of Nutrition regular textures;thin liquids  -KA    Precautions/Limitations, Vision WFL  -KA    Precautions/Limitations, Hearing WFL  -KA    Prior Level of Function-Communication WFL  -KA    Prior Level of Function-Swallowing no diet consistency restrictions;safe, efficient swallowing in all situations  -KA    Plans/Goals Discussed with patient;spouse/S.O.;agreed upon  -KA    Barriers to Rehab none identified  -KA    Patient's Goals for Discharge --  improve voice  -KA       Pain    Pretreatment Pain Rating 0/10 - no pain  -KA    Posttreatment Pain Rating 0/10 - no pain  -KA       Oral Motor Structure and Function    Dentition Assessment natural, present and adequate  -KA    Secretion Management " WNL/WFL  -KA    Mucosal Quality moist, healthy  -KA       Oral Musculature and Cranial Nerve Assessment    Oral Motor General Assessment WFL  -KA       Clinical Swallow Eval    Clinical Swallow Evaluation Summary Patient demonstrated no overt s/s of pen/asp with thins via cup, puree, mechanical soft mixed and regular solids. Mastication WNL with no oral residue.  -KA       SLP Evaluation Clinical Impression    SLP Swallowing Diagnosis swallow WFL/no suspected pharyngeal impairment  -KA    Functional Impact no impact on function  -KA    Swallow Criteria for Skilled Therapeutic Interventions Met no problems identified which require skilled intervention  -KA       Recommendations    Therapy Frequency (Swallow) 1 day per week;other (see comments)  for voice  -KA    SLP Diet Recommendation regular textures;thin liquids  -KA    Recommended Precautions and Strategies upright posture during/after eating;small bites of food and sips of liquid  -KA    Oral Care Recommendations Oral Care BID/PRN  -KA    SLP Rec. for Method of Medication Administration meds whole;as tolerated  -KA    Monitor for Signs of Aspiration yes;notify SLP if any concerns  -KA    Anticipated Discharge Disposition (SLP) home  -KA              User Key  (r) = Recorded By, (t) = Taken By, (c) = Cosigned By      Initials Name Provider Type    Arvind Mendieta, SLP Speech and Language Pathologist                     SLP SLC Evaluation - 11/14/24 1400          General Information    Prior Level of Function-Communication WFL  -KA    Patient's Goals for Discharge other (see comments)   improve voice -KA       Motor Speech Assessment/Intervention    Motor Speech Function mild impairment;moderate impairment  -KA    Characteristics Consistent with Dysarthria slow rate;decreased intensity;decreased articulation  -KA    Motor Speech, Comment Patient demonstrates mild to moderate dysarthria characterized by decreased loudness, slow rate and some decreased  articulation at times. Pt also demonstrates hoarse vocal quality. Pt demonstrates decreased pitch range and loudness in pitch glides. In MPT time pt averaged 20 seconds which is WNL average range. SLP began education on diaphramatic breathing exercises, pitch glides and EMST  -KA       Cursory Voice Assessment/Intervention    Quality and Resonance (Voice) hoarse;mild impairment;moderate impairment  -LISA    Voice, Comment see above  -KA       SLP Evaluation Clinical Impressions    SLP Diagnosis mild-moderate;dysarthria  -KA    Rehab Potential/Prognosis good  -KA    [facility] Criteria for Skilled Therapy Interventions Met yes  -KA    Functional Impact difficulty communicating wants, needs;difficulty communicating in an emergency;difficulty in expressing complex messages;restrictions in personal and social life  -KA       Recommendations    Therapy Frequency (SLP SLC) 1 day per week  -KA    Predicted Duration Therapy Intervention (Days) until discharge  -KA              User Key  (r) = Recorded By, (t) = Taken By, (c) = Cosigned By      Initials Name Provider Type    Arvind Mendieta SLP Speech and Language Pathologist                               OP SLP Education       Row Name 11/14/24 6502       Education    Barriers to Learning No barriers identified  -KA    Education Provided Described results of evaluation;Patient expressed understanding of evaluation  -KA    Assessed Learning needs;Learning motivation  -KA    Learning Motivation Strong  -KA    Learning Method Explanation  -KA    Teaching Response Verbalized understanding  -KA              User Key  (r) = Recorded By, (t) = Taken By, (c) = Cosigned By      Initials Name Effective Dates    Arvind Mendieta SLP 01/05/24 -                    SLP OP Goals       Row Name 11/14/24 1500          Goal Type Needed    Goal Type Needed Voice;Dysarthria  -LISA        Dysarthria Goals     Dysarthria LTG's Patient will be able to communicate verbally in an efficient manner so that  "messages are communicated in timely way with familiar/unfamiliar listeners  -KA     Patient will be able to communicate verbally in an efficient manner  so that messages are communicated in timely way with familiar/unfamiliar listeners 90%:;without cues  -KA     Status: Patient will be able to communicate verbally in an efficient manner  so that messages are communicated in timely way with familiar/unfamiliar listeners New  -KA      Dysarthria STG's Patient will increase strength and power  of articulators so they can move more quickly and accurately to improve speech intelligibility by;Patient will apply compensatory strategies to improve intelligibility of speech during in spontaneous speech  -KA     Patient will apply compensatory strategies to improve intelligibility of speech during in spontaneous speech 90%:;without cues  -KA     Status: Patient will apply compensatory strategies to improve intelligibility of speech during in spontaneous speech New  -KA     \"Patient will increase strength and power  of articulators so they can move more quickly and accurately to improve speech intelligibility by completing lip exercises\" 90%:;without cues  -KA     Status: Patient will increase strength and power  of articulators so they can move more quickly and accurately to improve speech intelligibility by completing lip exercises New  -KA        Voice Goals    Voice LTG's Patient will be able to use voice in vocational and avocational activities without functional limitations due to hoarseness.  -KA     Patient will be able to use voice in vocational and avocational activities without functional limitations due to hoarseness. 90%:;without cues  -KA     Status: Patient will be able to use voice in vocational and avocational activities without functional limitations due to hoarseness. New  -KA     Voice STG's Pt will demonstrate an understanding of the structures and functions of the phonatory/respiratory tract (respiration, " phonation, resonance and articulation);Patient will be able to produce his/her optimal pitch;Patient will identify and discriminate vocal abuse behaviors in situations that promote vocal abuse and misuse as well as identify good vocal hygiene practices, alternatives, and situations  -KA     Patient will identify and discriminate vocal abuse behaviors in situations that promote vocal abuse and misuse as well as identify good vocal hygiene practices, alternatives, and situations 90%:;without cues  -KA     Status: Patient will identify and discriminate vocal abuse behaviors in situations that promote vocal abuse and misuse as well as identify good vocal hygiene practices, alternatives, and situations New  -KA     Patient will be able to produce his/her optimal pitch 90%:;without cues  -KA     Status: Patient will be able to produce his/her optimal pitch New  -KA     Pt will demonstrate an understanding of the structures and functions of the phonatory/respiratory tract (respiration, phonation, resonance and articulation) 90%:;without cues  -KA     Status: Pt will demonstrate an understanding of the structures and functions of the phonatory/respiratory tract (respiration, phonation, resonance and articulation) New  -LISA               User Key  (r) = Recorded By, (t) = Taken By, (c) = Cosigned By      Initials Name Provider Type    Arvind Mendieta SLP Speech and Language Pathologist                             Time Calculation:   SLP Start Time: 1330  SLP Stop Time: 1415  SLP Time Calculation (min): 45 min  Untimed Charges  52022-DP Eval Oral Pharyng Swallow Minutes: 15  Total Minutes  Untimed Charges Total Minutes: 15   Total Minutes: 15    Therapy Charges for Today       Code Description Service Date Service Provider Modifiers Qty    57590199187 HC ST EVAL ORAL PHARYNG SWALLOW 1 11/14/2024 Arvind Alaniz, SLP GN 1    [id number] HC ST BEHAV QUALT VOICE AND RESONC 2 11/14/2024 Arvind Alaniz SLP GN 1                      Arvind Alaniz, SLP  11/14/2024

## 2024-11-19 ENCOUNTER — HOSPITAL ENCOUNTER (OUTPATIENT)
Dept: SPEECH THERAPY | Facility: HOSPITAL | Age: 84
Discharge: HOME | End: 2024-11-19
Payer: MEDICARE

## 2024-11-19 DIAGNOSIS — R47.1 DYSARTHRIA: ICD-10-CM

## 2024-11-19 DIAGNOSIS — G20.A1 PARKINSON'S DISEASE, UNSPECIFIED WHETHER DYSKINESIA PRESENT, UNSPECIFIED WHETHER MANIFESTATIONS FLUCTUATE: Primary | ICD-10-CM

## 2024-11-19 PROCEDURE — 92507 TX SP LANG VOICE COMM INDIV: CPT | Performed by: SPEECH-LANGUAGE PATHOLOGIST

## 2024-11-19 NOTE — THERAPY TREATMENT NOTE
Outpatient Speech Language Pathology   Adult Speech Language Cognitive Treatment Note  [facility]     Patient Name: Sachi Vora  : 1940  MRN: [id number]  Today's Date: 2024         Visit Date: 2024   Patient Active Problem List   Diagnosis    Osteoporosis, 2011--lumbar spine -2.8.  Right femoral neck -2.4.  Left femoral neck -2.4.  Patient receives Reclast infusions.    Therapeutic drug monitoring    Simple renal cyst    Benign essential hypertension    Carotid artery plaque, 2018--mild right ICA plaque, normal left ICA 10/03/2014--mild bilateral carotid artery plaque.    Chronic gastritis    Chronic lower back pain    Chronic otitis externa    Stage 3a chronic kidney disease    Depression with anxiety    Functional murmur, 07/15/2015--normal echocardiogram.    Hyperlipidemia    Chronic migraine w/o aura w/o status migrainosus, not intractable    Pancreatic Duct Dilation    Bilateral lower extremity edema    Restless legs syndrome    Bilateral sensorineural hearing loss    Vitamin D deficiency    Chronic gastric ulcer    Chronic fatigue    Hypersomnolence    Chronic anemia    Multinodular goiter    Generalized anxiety disorder    Iron deficiency anemia    Statin intolerance    Postmenopausal state    Short-term memory loss    Pulmonary hypertension    Gastroesophageal reflux disease without esophagitis    Tinnitus of both ears    Chronic hoarseness    Pain disorder associated with psychological factors    Frequent falls    Balance disorder, related to Parkinson's    Hyperaldosteronism    Hypokalemia    Chronic pain of left knee    Primary osteoarthritis of left knee    Parkinson's disease with dyskinesia and fluctuating manifestations          Visit Dx:    ICD-10-CM ICD-9-CM   1. Parkinson's disease, unspecified whether dyskinesia present, unspecified whether manifestations fluctuate  G20.A1 332.0   2. Dysarthria  R47.1 784.51                              SLP OP Goals       Row  Name 11/19/24 4283          Subjective Comments    Subjective Comments Patient is pleasant and cooperative  -KA        Subjective Pain    Able to rate subjective pain? yes  -KA     Pre-Treatment Pain Level 0  -KA     Post-Treatment Pain Level 0  -KA        Voice Goals    Patient will identify and discriminate vocal abuse behaviors in situations that promote vocal abuse and misuse as well as identify good vocal hygiene practices, alternatives, and situations 90%:;without cues  -KA     Status: Patient will identify and discriminate vocal abuse behaviors in situations that promote vocal abuse and misuse as well as identify good vocal hygiene practices, alternatives, and situations New  -KA     Comments: Patient will identify and discriminate vocal abuse behaviors in situations that promote vocal abuse and misuse as well as identify good vocal hygiene practices, alternatives, and situations Education completed on vocal hygiene, factors that contribute to voice problems and discussed importance of increasing water, reducing phrase length to 4 to 6 words when speaking, supressing coughing or throat clearing with a blow cough or swallow and inhaling before speaking. Additional dos and donts reviewed with pt and handouts provided.  -KA     Patient will be able to produce his/her optimal pitch 90%:;without cues  -KA     Status: Patient will be able to produce his/her optimal pitch New  -KA     Comments: Patient will be able to produce his/her optimal pitch Pt trained on EMST 75 lite today pt performed 5 sets of 5 breaths at [id number] with min cues.  -KA     Pt will demonstrate an understanding of the structures and functions of the phonatory/respiratory tract (respiration, phonation, resonance and articulation) 90%:;without cues  -KA     Status: Pt will demonstrate an understanding of the structures and functions of the phonatory/respiratory tract (respiration, phonation, resonance and articulation) New  -KA     Comments: Pt  will demonstrate an understanding of the structures and functions of the phonatory/respiratory tract (respiration, phonation, resonance and articulation) Pt performed 3 sets each of vocal function exercises (pitch glides and sustained /e/ ) with min cues with handouts provided.  -LISA               User Key  (r) = Recorded By, (t) = Taken By, (c) = Cosigned By      Initials Name Provider Type    Arvind Mendieta SLP Speech and Language Pathologist                           Time Calculation:   SLP Start Time: 1330  SLP Stop Time: 1415  SLP Time Calculation (min): 45 min  Untimed Charges  44402-IJ Treatment/ST Modification Prosth Aug Alter : 45  Total Minutes  Untimed Charges Total Minutes: 45   Total Minutes: 45    Therapy Charges for Today       Code Description Service Date Service Provider Modifiers Qty    28416017739  ST TREATMENT SPEECH 3 11/19/2024 Arvind Alaniz, SLP GN 1                     JAZMIN Machado  11/19/2024

## 2024-11-26 ENCOUNTER — HOSPITAL ENCOUNTER (OUTPATIENT)
Dept: SPEECH THERAPY | Facility: HOSPITAL | Age: 84
Discharge: HOME | End: 2024-11-26
Admitting: INTERNAL MEDICINE
Payer: MEDICARE

## 2024-11-26 DIAGNOSIS — R47.1 DYSARTHRIA: Primary | ICD-10-CM

## 2024-11-26 DIAGNOSIS — G20.A1 PARKINSON'S DISEASE, UNSPECIFIED WHETHER DYSKINESIA PRESENT, UNSPECIFIED WHETHER MANIFESTATIONS FLUCTUATE: ICD-10-CM

## 2024-11-26 PROCEDURE — 92507 TX SP LANG VOICE COMM INDIV: CPT

## 2024-11-26 NOTE — THERAPY TREATMENT NOTE
Outpatient Speech Language Pathology   Adult Speech Language Cognitive Treatment Note  [facility]     Patient Name: Sachi Vora  : 1940  MRN: [id number]  Today's Date: 2024         Visit Date: 2024   Patient Active Problem List   Diagnosis    Osteoporosis, 2011--lumbar spine -2.8.  Right femoral neck -2.4.  Left femoral neck -2.4.  Patient receives Reclast infusions.    Therapeutic drug monitoring    Simple renal cyst    Benign essential hypertension    Carotid artery plaque, 2018--mild right ICA plaque, normal left ICA 10/03/2014--mild bilateral carotid artery plaque.    Chronic gastritis    Chronic lower back pain    Chronic otitis externa    Stage 3a chronic kidney disease    Depression with anxiety    Functional murmur, 07/15/2015--normal echocardiogram.    Hyperlipidemia    Chronic migraine w/o aura w/o status migrainosus, not intractable    Pancreatic Duct Dilation    Bilateral lower extremity edema    Restless legs syndrome    Bilateral sensorineural hearing loss    Vitamin D deficiency    Chronic gastric ulcer    Chronic fatigue    Hypersomnolence    Chronic anemia    Multinodular goiter    Generalized anxiety disorder    Iron deficiency anemia    Statin intolerance    Postmenopausal state    Short-term memory loss    Pulmonary hypertension    Gastroesophageal reflux disease without esophagitis    Tinnitus of both ears    Chronic hoarseness    Pain disorder associated with psychological factors    Frequent falls    Balance disorder, related to Parkinson's    Hyperaldosteronism    Hypokalemia    Chronic pain of left knee    Primary osteoarthritis of left knee    Parkinson's disease with dyskinesia and fluctuating manifestations          Visit Dx:    ICD-10-CM ICD-9-CM   1. Dysarthria  R47.1 784.51   2. Parkinson's disease, unspecified whether dyskinesia present, unspecified whether manifestations fluctuate  G20.A1 332.0        OP SLP Assessment/Plan - 24 1601        "   SLP Assessment    Functional Problems Speech Language- Adult/Cognition;Voice  -HF    Clinical Impression: Speech Language-Adult/Congnition Mild-Moderate:;Dysarthria- Mixed  -HF    Impact on Function- Voice Restrictions in personal and social life  -HF    Clinical Impression- Voice Mild voice disorder  -HF    Prognosis Good (comment)  -HF    Patient/caregiver participated in establishment of treatment plan and goals Yes  -HF    Patient would benefit from skilled therapy intervention Yes  -HF       SLP Plan    Frequency 1x/week  -HF    Duration 4-6 weeks  -HF              User Key  (r) = Recorded By, (t) = Taken By, (c) = Cosigned By      Initials Name Provider Type    HF Kinsey Sarabia, SLP Speech and Language Pathologist                                       SLP OP Goals       Row Name 11/26/24 1500          Subjective Comments    Subjective Comments Pt is pleasant and cooperative. She reports she forgot to bring her EMST device with her to therapy today.  -HF        Dysarthria Goals    Patient will apply compensatory strategies to improve intelligibility of speech during in spontaneous speech 90%:;without cues  -HF     Status: Patient will apply compensatory strategies to improve intelligibility of speech during in spontaneous speech New  -HF     Comments: Patient will apply compensatory strategies to improve intelligibility of speech during in spontaneous speech At end of session, SLP briefly intorduced clear speech strategies including diaphragmatic breathing, over articulation, and increased loudness to improve intelligibility. Pt demonstrated adequate understanding with use of strategies at the short phrase level with mod verbal cues.  -HF     \"Patient will increase strength and power  of articulators so they can move more quickly and accurately to improve speech intelligibility by completing lip exercises\" 90%:;without cues  -HF     Status: Patient will increase strength and power  of articulators so they " can move more quickly and accurately to improve speech intelligibility by completing lip exercises New  -HF     Comments: Patient will increase strength and power  of articulators so they can move more quickly and accurately to improve speech intelligibility by completing lip exercises Introduced OME. Pt completed 3 reps of each exercise given verbal and visual cues. Reported fatigue with jaw exercises. Able to resume and complete exercises given short break.  -HF        Voice Goals    Patient will be able to produce his/her optimal pitch 90%:;without cues  -HF     Status: Patient will be able to produce his/her optimal pitch New  -HF     Comments: Patient will be able to produce his/her optimal pitch Pt did not bring EMST with her to therapy today. SLP provided pt with a post-it note as a reminder to complete EMST at home.  -HF     Pt will demonstrate an understanding of the structures and functions of the phonatory/respiratory tract (respiration, phonation, resonance and articulation) 90%:;without cues  -HF     Status: Pt will demonstrate an understanding of the structures and functions of the phonatory/respiratory tract (respiration, phonation, resonance and articulation) New  -HF     Comments: Pt will demonstrate an understanding of the structures and functions of the phonatory/respiratory tract (respiration, phonation, resonance and articulation) Pt performed 3 sets each of vocal function exercises (pitch glides, sustained /e/, glottal attacks ) with min cues with handouts provided. Pt performed diaphragmatic breathing exercises at the short paraphraph level (3-4 sentences) with visual cues for where to stop, take a new breath, and reset.  -HF               User Key  (r) = Recorded By, (t) = Taken By, (c) = Cosigned By      Initials Name Provider Type    HF Kinsey Sarabia SLP Speech and Language Pathologist                   OP SLP Education       Row Name 11/26/24 5129       Education    Barriers to Learning  No barriers identified  -HF    Education Provided Patient demonstrated recommended strategies  -HF    Assessed Learning needs;Learning motivation;Learning readiness  -HF    Learning Motivation Strong  -HF    Learning Method Explanation  -HF    Teaching Response Verbalized understanding  -HF              User Key  (r) = Recorded By, (t) = Taken By, (c) = Cosigned By      Initials Name Effective Dates    HF Kinsey Sarabia SLP 08/01/23 -                          Time Calculation:   SLP Start Time: 1245  SLP Stop Time: 1330  SLP Time Calculation (min): 45 min    Therapy Charges for Today       Code Description Service Date Service Provider Modifiers Qty    [id number]  ST TREATMENT SPEECH 3 11/26/2024 Kinsey Sarabia SLP GN 1                     JAZMIN Wang  11/26/2024

## 2024-12-05 ENCOUNTER — HOSPITAL ENCOUNTER (OUTPATIENT)
Dept: SPEECH THERAPY | Facility: HOSPITAL | Age: 84
Discharge: HOME | End: 2024-12-05
Payer: MEDICARE

## 2024-12-05 DIAGNOSIS — R47.1 DYSARTHRIA: Primary | ICD-10-CM

## 2024-12-05 DIAGNOSIS — G20.A1 PARKINSON'S DISEASE, UNSPECIFIED WHETHER DYSKINESIA PRESENT, UNSPECIFIED WHETHER MANIFESTATIONS FLUCTUATE: ICD-10-CM

## 2024-12-05 PROCEDURE — 92507 TX SP LANG VOICE COMM INDIV: CPT | Performed by: SPEECH-LANGUAGE PATHOLOGIST

## 2024-12-05 NOTE — THERAPY TREATMENT NOTE
Outpatient Speech Language Pathology   Adult Speech Language Cognitive Treatment Note  [facility]     Patient Name: Sachi Vora  : 1940  MRN: [id number]  Today's Date: 2024         Visit Date: 2024   Patient Active Problem List   Diagnosis    Osteoporosis, 2011--lumbar spine -2.8.  Right femoral neck -2.4.  Left femoral neck -2.4.  Patient receives Reclast infusions.    Therapeutic drug monitoring    Simple renal cyst    Benign essential hypertension    Carotid artery plaque, 2018--mild right ICA plaque, normal left ICA 10/03/2014--mild bilateral carotid artery plaque.    Chronic gastritis    Chronic lower back pain    Chronic otitis externa    Stage 3a chronic kidney disease    Depression with anxiety    Functional murmur, 07/15/2015--normal echocardiogram.    Hyperlipidemia    Chronic migraine w/o aura w/o status migrainosus, not intractable    Pancreatic Duct Dilation    Bilateral lower extremity edema    Restless legs syndrome    Bilateral sensorineural hearing loss    Vitamin D deficiency    Chronic gastric ulcer    Chronic fatigue    Hypersomnolence    Chronic anemia    Multinodular goiter    Generalized anxiety disorder    Iron deficiency anemia    Statin intolerance    Postmenopausal state    Short-term memory loss    Pulmonary hypertension    Gastroesophageal reflux disease without esophagitis    Tinnitus of both ears    Chronic hoarseness    Pain disorder associated with psychological factors    Frequent falls    Balance disorder, related to Parkinson's    Hyperaldosteronism    Hypokalemia    Chronic pain of left knee    Primary osteoarthritis of left knee    Parkinson's disease with dyskinesia and fluctuating manifestations          Visit Dx:    ICD-10-CM ICD-9-CM   1. Dysarthria  R47.1 784.51   2. Parkinson's disease, unspecified whether dyskinesia present, unspecified whether manifestations fluctuate  G20.A1 332.0                              SLP OP Goals       Row  "Name 12/05/24 1400          Goal Type Needed    Goal Type Needed Voice;Dysarthria  -KA        Dysarthria Goals     Dysarthria LTG's Patient will be able to engage in speech at the conversational level using appropriate prosody so that speech is understood by familiar/unfamiliar listeners  -KA     Patient will be able to engage in speech at the conversational level using appropriate prosody so that speech is understood by familiar/unfamiliar listeners 90%:;without cues  -KA     Status: Patient will be able to engage in speech at the conversational level using appropriate prosody so that speech is understood by familiar/unfamiliar listeners New  -KA      Dysarthria STG's Patient will apply compensatory strategies to improve intelligibility of speech during in spontaneous speech;Patient will increase strength and power  of articulators so they can move more quickly and accurately to improve speech intelligibility by  -KA     Patient will apply compensatory strategies to improve intelligibility of speech during in spontaneous speech 90%:;without cues  -KA     Status: Patient will apply compensatory strategies to improve intelligibility of speech during in spontaneous speech Progressing as expected  -KA     Comments: Patient will apply compensatory strategies to improve intelligibility of speech during in spontaneous speech SLP educated pt on use of overarticulation and clear speech when conversing  -KA     \"Patient will increase strength and power  of articulators so they can move more quickly and accurately to improve speech intelligibility by completing lip exercises\" 90%:;without cues  -KA     Status: Patient will increase strength and power  of articulators so they can move more quickly and accurately to improve speech intelligibility by completing lip exercises Progressing as expected  -KA     Comments: Patient will increase strength and power  of articulators so they can move more quickly and accurately to improve " speech intelligibility by completing lip exercises Pt performed 5 sets of 5 repetitions with labial and lingual ROM exercises (retraction, lateralization, protrusion and elevation) with min cues. Pt demonstrated difficulty maintaining lingual elevation for 5 seconds.  -KA        Voice Goals    Voice LTG's Patient will be able to engage in speech at the conversational level in all settings, using functional phonation, acceptable habitual pitch and balanced tone focus.  -KA     Patient will be able to engage in speech at the conversational level in all settings, using functional phonation, acceptable habitual pitch and balanced tone focus. 90%:;without cues  -KA     Status: Patient will be able to engage in speech at the conversational level in all settings, using functional phonation, acceptable habitual pitch and balanced tone focus. Progressing as expected  -KA     Voice STG's Patient will be able to produce his/her optimal pitch;Pt will demonstrate an understanding of the structures and functions of the phonatory/respiratory tract (respiration, phonation, resonance and articulation)  -KA     Patient will be able to produce his/her optimal pitch 90%:;without cues  -KA     Status: Patient will be able to produce his/her optimal pitch Progressing as expected  -KA     Comments: Patient will be able to produce his/her optimal pitch Pt did not bring EMST with her to therapy today. SLP provided pt with a post-it note as a reminder to complete EMST at home.  -KA     Pt will demonstrate an understanding of the structures and functions of the phonatory/respiratory tract (respiration, phonation, resonance and articulation) 90%:;without cues  -KA     Status: Pt will demonstrate an understanding of the structures and functions of the phonatory/respiratory tract (respiration, phonation, resonance and articulation) Progressing as expected  -KA     Comments: Pt will demonstrate an understanding of the structures and functions of  the phonatory/respiratory tract (respiration, phonation, resonance and articulation) Pt performed 3 sets each of vocal function exercises (pitch glides, sustained /e/,  with min cues with handouts provided. Pt performed diaphragmatic breathing exercises at the short paraphraph level (3-4 sentences) with min to mod verbal/visual cues for where to stop, take a new breath, and reset. Pt paused on average after every 6 to 8 words. Pt is demonstrating improvement with loudness and maintaining loudness.  -LISA               User Key  (r) = Recorded By, (t) = Taken By, (c) = Cosigned By      Initials Name Provider Type    Arvind Mendieta SLP Speech and Language Pathologist                           Time Calculation:   SLP Start Time: 1330  SLP Stop Time: 1410  SLP Time Calculation (min): 40 min  Untimed Charges  96377-WH Treatment/ST Modification Prosth Aug Alter : 40  Total Minutes  Untimed Charges Total Minutes: 40   Total Minutes: 40    Therapy Charges for Today       Code Description Service Date Service Provider Modifiers Qty    75227137168 HC ST TREATMENT SPEECH 3 12/5/2024 Arvind Alaniz SLP KX, GN 1                     JAZMIN Machado  12/5/2024

## 2024-12-26 DIAGNOSIS — E26.9 HYPERALDOSTERONISM: Chronic | ICD-10-CM

## 2024-12-26 DIAGNOSIS — I10 BENIGN ESSENTIAL HYPERTENSION: Chronic | ICD-10-CM

## 2024-12-26 RX ORDER — SPIRONOLACTONE 100 MG/1
TABLET, FILM COATED ORAL
Qty: 30 TABLET | Refills: 0 | Status: SHIPPED | OUTPATIENT
Start: 2024-12-26

## 2025-01-02 ENCOUNTER — HOSPITAL ENCOUNTER (OUTPATIENT)
Dept: SPEECH THERAPY | Facility: HOSPITAL | Age: 85
Discharge: HOME | End: 2025-01-02
Payer: MEDICARE

## 2025-01-02 DIAGNOSIS — R47.1 DYSARTHRIA: Primary | ICD-10-CM

## 2025-01-02 DIAGNOSIS — G20.A1 PARKINSON'S DISEASE, UNSPECIFIED WHETHER DYSKINESIA PRESENT, UNSPECIFIED WHETHER MANIFESTATIONS FLUCTUATE: ICD-10-CM

## 2025-01-02 PROCEDURE — 92507 TX SP LANG VOICE COMM INDIV: CPT

## 2025-01-02 NOTE — THERAPY DISCHARGE NOTE
"Speech Language Pathology Discharge Summary  [facility]       Patient Name: Sachi Vora  : 1940  MRN: [id number]    Today's Date: 2025       SLP OP Goals       Row Name 25 1400          Subjective Comments    Subjective Comments Patient is pleasant and cooperative. Reports she would like today to be her last speech therapy appt. She feels her speech and voice have made notable improvements since initial evaluation.  -CR        Subjective Pain    Able to rate subjective pain? yes  -CR     Pre-Treatment Pain Level 0  -CR     Post-Treatment Pain Level 0  -CR        Dysarthria Goals    Patient will be able to engage in speech at the conversational level using appropriate prosody so that speech is understood by familiar/unfamiliar listeners 90%:;without cues  -CR     Status: Patient will be able to engage in speech at the conversational level using appropriate prosody so that speech is understood by familiar/unfamiliar listeners Achieved  -CR     Comments: Patient will be able to engage in speech at the conversational level using appropriate prosody so that speech is understood by familiar/unfamiliar listeners Speech 100% intelligible upon date of discharge.  -CR     Patient will apply compensatory strategies to improve intelligibility of speech during in spontaneous speech 90%:;without cues  -CR     Status: Patient will apply compensatory strategies to improve intelligibility of speech during in spontaneous speech Achieved  -CR     Comments: Patient will apply compensatory strategies to improve intelligibility of speech during in spontaneous speech Pt utilized overarticulation and diaphragmatic breathing/chunking during structured reading and unstructured conversation.  -CR     \"Patient will increase strength and power  of articulators so they can move more quickly and accurately to improve speech intelligibility by completing lip exercises\" 90%:;without cues  -CR     Status: Patient will increase " strength and power  of articulators so they can move more quickly and accurately to improve speech intelligibility by completing lip exercises Achieved  -CR     Comments: Patient will increase strength and power  of articulators so they can move more quickly and accurately to improve speech intelligibility by completing lip exercises Reviewed all oral motor exercises and encouraged pt to continue performing at home.  -CR        Voice Goals    Patient will be able to engage in speech at the conversational level in all settings, using functional phonation, acceptable habitual pitch and balanced tone focus. 90%:;without cues  -CR     Status: Patient will be able to engage in speech at the conversational level in all settings, using functional phonation, acceptable habitual pitch and balanced tone focus. Achieved  -CR     Comments: Patient will be able to engage in speech at the conversational level in all settings, using functional phonation, acceptable habitual pitch and balanced tone focus. --  -CR     Patient will be able to produce his/her optimal pitch 90%:;without cues  -CR     Status: Patient will be able to produce his/her optimal pitch Achieved  -CR     Comments: Patient will be able to produce his/her optimal pitch Pt brought EMST75 to appt this date. Pt completed 5 sets of 5 reps of EMST75 at [id number] with no failed attempts. Clinician provided pt with written education re: EMST use and encouraged pt to complete as part of HEP to strengthen and maintain breath support. All questions answered. Pt utilized diaphragmatic breathing  while reading multi-paragraph stories with MIN fading to NO cues. Good self-monitoring/corrections observed.  -CR     Pt will demonstrate an understanding of the structures and functions of the phonatory/respiratory tract (respiration, phonation, resonance and articulation) 90%:;without cues  -CR     Status: Pt will demonstrate an understanding of the structures and functions of the  phonatory/respiratory tract (respiration, phonation, resonance and articulation) Achieved  -CR     Comments: Pt will demonstrate an understanding of the structures and functions of the phonatory/respiratory tract (respiration, phonation, resonance and articulation) Reviewed vocal function exercises and provided pt with written handout to complete as part of HEP. Maximum phonation time across 3 attempts averaged at 21 seconds.  -CR               User Key  (r) = Recorded By, (t) = Taken By, (c) = Cosigned By      Initials Name Provider Type    Analia Perez SLP Speech and Language Pathologist                    OP SLP Discharge Summary  Date of Discharge: 01/02/25  Reason for Discharge: all goals and outcomes met, no further needs identified  Progress Toward Achieving Short/long Term Goals: all goals met within established timelines  Discharge Destination: home  Discharge Instructions: Complete home exercise program (EMST75 and vocal function exercises).          Time Calculation:   Untimed Charges  69093-SM Treatment/ST Modification Prosth Aug Alter : 45  Total Minutes  Untimed Charges Total Minutes: 45   Total Minutes: 45    Therapy Charges for Today       Code Description Service Date Service Provider Modifiers Qty    [id number]  ST TREATMENT SPEECH 3 1/2/2025 Analia Cordero SLP GN 1                    JAZMIN Scott  1/2/2025

## 2025-01-13 DIAGNOSIS — G20.A1 PARKINSON'S DISEASE WITHOUT DYSKINESIA OR FLUCTUATING MANIFESTATIONS: ICD-10-CM

## 2025-01-13 RX ORDER — CARBIDOPA AND LEVODOPA 50; 200 MG/1; MG/1
1 TABLET, EXTENDED RELEASE ORAL NIGHTLY
Qty: 90 TABLET | Refills: 0 | Status: SHIPPED | OUTPATIENT
Start: 2025-01-13

## 2025-01-13 RX ORDER — ESTRADIOL 1 MG/1
1 TABLET ORAL DAILY
Qty: 90 TABLET | Refills: 0 | Status: SHIPPED | OUTPATIENT
Start: 2025-01-13

## 2025-01-26 DIAGNOSIS — G20.A1 PARKINSON'S DISEASE WITHOUT DYSKINESIA OR FLUCTUATING MANIFESTATIONS: ICD-10-CM

## 2025-01-27 RX ORDER — CARBIDOPA AND LEVODOPA 50; 200 MG/1; MG/1
1 TABLET, EXTENDED RELEASE ORAL NIGHTLY
Qty: 90 TABLET | Refills: 3 | Status: SHIPPED | OUTPATIENT
Start: 2025-01-27

## 2025-01-28 DIAGNOSIS — G20.A1 PARKINSON'S DISEASE WITHOUT DYSKINESIA OR FLUCTUATING MANIFESTATIONS: ICD-10-CM

## 2025-01-28 RX ORDER — CARBIDOPA AND LEVODOPA 25; 100 MG/1; MG/1
1 TABLET ORAL 3 TIMES DAILY
Qty: 270 TABLET | Refills: 0 | Status: SHIPPED | OUTPATIENT
Start: 2025-01-28

## 2025-02-04 ENCOUNTER — OFFICE VISIT (OUTPATIENT)
Dept: NEUROLOGY | Facility: CLINIC | Age: 85
End: 2025-02-04
Payer: MEDICARE

## 2025-02-04 VITALS
HEIGHT: 60 IN | SYSTOLIC BLOOD PRESSURE: 108 MMHG | OXYGEN SATURATION: 97 % | DIASTOLIC BLOOD PRESSURE: 72 MMHG | BODY MASS INDEX: 23.95 KG/M2 | WEIGHT: 122 LBS | HEART RATE: 74 BPM

## 2025-02-04 DIAGNOSIS — G31.84 MCI (MILD COGNITIVE IMPAIRMENT): ICD-10-CM

## 2025-02-04 DIAGNOSIS — Z91.81 AT HIGH RISK FOR FALLS: ICD-10-CM

## 2025-02-04 DIAGNOSIS — G60.8 SENSORY PERIPHERAL NEUROPATHY: ICD-10-CM

## 2025-02-04 DIAGNOSIS — G20.B1 PARKINSON'S DISEASE WITH DYSKINESIA WITHOUT FLUCTUATING MANIFESTATIONS: Primary | ICD-10-CM

## 2025-02-04 RX ORDER — AMANTADINE HYDROCHLORIDE 100 MG/1
100 CAPSULE, GELATIN COATED ORAL 2 TIMES DAILY
Qty: 60 CAPSULE | Refills: 2 | Status: SHIPPED | OUTPATIENT
Start: 2025-02-04

## 2025-02-04 RX ORDER — CARBIDOPA AND LEVODOPA 25; 100 MG/1; MG/1
1 TABLET ORAL 3 TIMES DAILY
Qty: 270 TABLET | Refills: 1 | Status: SHIPPED | OUTPATIENT
Start: 2025-02-04

## 2025-02-04 NOTE — PROGRESS NOTES
NAME: Sachi Vora   : 1940    Chief Complaint   Patient presents with    Parkinson's disease without dyskinesia or fluctuating manif        HPI:  Sachi Vora is a 84 y.o.  right-handed female who I am seeing in follow-up regarding Parkinson's disease.  She is accompanied by her  who adds to the history.  She has a past medical history of hypertension, hyperlipidemia, GERD and depression/anxiety.  I last saw her on 10/29/2024, with the following history taken from that note with additions/modifications as indicated:    Patient was initially seen by Dr. Barboza in  regarding balance problems and falls.  She denied any dizziness but states that she was just fall backwards.  She also reported some short-term memory loss.  Her  reported that she shuffles but she did not think so.  She was found to have left ankle dorsiflexor weakness and toe extensor weakness on exam and an EMG was ordered.  Dr. Barboza performed an EMG on 2022 with the following impression:     Borderline study. There was no evidence of peripheral neuropathy or peroneal neuropathy on either side. There were no needle exam changes in the leg muscles but the paraspinals showed a few positive sharp waves which could go along with bilateral lumbosacral radiculopathies. The patient has had epidural steroid injections but no nerve ablations.      An MRI of the lumbar spine was obtained which did not show significant problems at the L5 level.       The patient had progressive problems with balance and falls and return to the office in .  Her family reported shuffling gait, occasional mild resting tremor, poor writing that got smaller and is illegible, as well as, some reduced volume in her voice and the pitch seems a little higher.  Parkinson's disease was suspected and she was trialed on carbidopa/levodopa 10/100 mg 1 tablet 3 times a day with some improvements which was further escalated to 25/100 mg 3 times a day.       History interim    Patient's  states he noticed her sleep is better and she is not moving as much through the night after starting carbidopa/levodopa controlled release 50/200 mg 1 tablet at bedtime.  She continues to take carbidopa/levodopa IR 25/101 tablet 3 times a day without any nausea.  Patient states that her left lower extremity involuntary movement may be a little worse with the added controlled release dose.  Has been reports it is easier for her to get in and out of a chair as well as getting out of bed in the morning.  She denies any change in her resting tremors.  She continues to have a shuffling gait but denies any freezing or festination's.  She reports 2 falls both in the bathroom.  1 fall she was sitting on the bathtub putting her socks on and fell backwards into the tub.  She denied any injury.  She denies any drooling but states that her nose runs like a faucet.  She noticed improvement in her speech after completing speech therapy and continues the exercises at home.  She completed physical therapy and states that her balance and gait have also improved.  She denies any change in her memory and continues to take donepezil 5 mg nightly without any side effects.  She denies any trouble sleeping, vivid dreams or hallucinations.  Her  states that her mood is stable but she gets frustrated at times.  He specifically denies any agitation or irritability.  She reports numbness and tingling in her fingers but denies any symptoms in her feet.      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 Weir.  I extensively reviewed the documentation from the emergency room visit as noted below.  I reviewed the history with the patient and she is describing episodes of dizziness described as a spinning sensation of her body and this seems to be precipitated by movement although it does not occur  "if she rolls over in bed.  If she stands up and st    Benign essential hypertension 04/08/2016    Bilateral sensorineural hearing loss 03/31/2011 03/31/2011--etiology reveals reverse \"cookie bite\" type of hearing loss for both ears of mild/moderate degree, mostly sensorineural.  Speech discrimination 100% on the right, 96% on the left.    Carotid artery plaque, 10/03/2014--mild bilateral carotid artery plaque. 07/25/2012    10/03/2014--[facility] screening revealed mild bilateral carotid plaque, negative for atrial fibrillation, negative for AAA, negative for PAD, osteoporosis screen revealed osteopenia.  Body mass index was 25 and considered to be moderate risk.  07/25/2012--vascular screen negative for carotid plaque, negative for abdominal aneurysm, negative for PAD Description: 10/03/2014--[facility] screening revealed mild bilateral carotid plaque, negative for atrial fibrillation, negative for AAA, negative for PAD, osteoporosis screen revealed osteopenia.  Body mass index was 25 and considered to be moderate risk.  07/25/2012--vascular screen negative for carotid plaque, negative for abdominal aneurysm, negative for PAD    Chronic anemia 08/23/2016 06/13/2017--colonoscopy revealed melanosis.  Biopsied.  There was friability with contact bleeding in the ascending colon.  Biopsied.  Pathology returned consistent with melanosis coli.  06/13/2017--EGD revealed normal esophagus.  Biopsies taken.  There was a medium-sized hiatal hernia.  Localized mild inflammation and linear erosions were found in the prepyloric region.  Biopsied.  Examined duodenum was normal.  Random biopsies taken.  Pathology of the gastroesophageal junction was unremarkable as was the gastric fundus.  Prepyloric biopsy revealed minimal chronic inflammation and reactive change.  H pylori negative.  Duodenal biopsies are negative.  04/12/2017--hemoglobin low at 10.8, hematocrit is normal at 35.7.  Iron sulfate 325 mg per day initiated.  " 08/23/2016--routine follow-up.  Patient continues to have epigastric abdominal pain believed to be related to reflux with possible esophageal spasm.  Hemoglobin noted to be low at 10.6 with a hematocrit low at 33.7 and RDW elevated at 16.2.  Homocysti    Chronic fatigue 04/21/2016 06/14/2017--overnight oximetry revealed oxygen desaturation index of only 0.44.  There were only 10 total desaturations during the period of testing which lasted 6 hours and 46 minutes.  05/31/2017--patient seen in follow-up and reports she continues to have chronic fatigue as well as hypersomnolence.  Once again, she is on multiple medications that are undoubtedly contributing to this problem including clonazepam and hydrocodone but I doubt that these could be discontinued.  Her CBC back in April revealed a low hemoglobin of 10.8 but hematocrit was normal at 35.7.  Thyroid function tests were normal and CMP normal.  Overnight oximetry test ordered.  Repeat CBC.  Iron studies.  Sedimentation rate and CRP.  04/11/2017--patient seen in follow-up and her blood pressure is now at a reasonable level at 120/64.  She reports she still feels somewhat fatigued but she is much better.  Patient has not been doing any regular exercise and I do think that that would be helpful to reduce her feeling of fatigue.  She is    Chronic gastric ulcer 04/14/2014    02/15/2017--patient seen in follow-up in nearly 6 months later.  She has complaints of not feeling well all over.  She has complaints of diffuse myalgias and possibly tendinopathy related to Levaquin that I called in prior to her going to [place] for vacation.  She reports that 3 or 4 days after starting the Levaquin she began to have the musculoskeletal symptoms and she reports that she continues to have them presently.  Symptoms are worse involving her left calf area.  Examination reveals significant tenderness involving the left calf and the left calf seems a little larger than the right.   She also has complaints of shortness of breath but no chest pain.  She is complaining of double vision and generalized weakness and fatigue.  She was complaining of chronic fatigue at the last visit back in September and I ordered an overnight oximetry test but apparently this was never done for reasons that are not clear to me.  Her current oxygen saturation is 94% and normally it is 100%.  Plan is as follows: Extensi    Chronic gastritis 11/19/2001    02/15/2017--patient seen in follow-up in nearly 6 months later.  She has complaints of not feeling well all over.  She has complaints of diffuse myalgias and possibly tendinopathy related to Levaquin that I called in prior to her going to [place] for vacation.  She reports that 3 or 4 days after starting the Levaquin she began to have the musculoskeletal symptoms and she reports that she continues to have them presently.  Symptoms are worse involving her left calf area.  Examination reveals significant tenderness involving the left calf and the left calf seems a little larger than the right.  She also has complaints of shortness of breath but no chest pain.  She is complaining of double vision and generalized weakness and fatigue.  She was complaining of chronic fatigue at the last visit back in September and I ordered an overnight oximetry test but apparently this was never done for reasons that are not clear to me.  Her current oxygen saturation is 94% and normally it is 100%.  Plan is as follows: Extensi    Chronic hoarseness 06/08/2020    September 15, 2020--patient presents with complaints of swelling of the soft tissues of the left side of the neck and this is associated with pain and discomfort, particularly when she turns her head rotating to the left.  She also notices hoarseness and feels that her voice has changed.  On exam there is definite prominence of the left sternocleidomastoid muscle and there may be some shotty lymph    Chronic lower back pain  01/31/2006 08/11/2014--MRI of the lumbar spine reveals the conus terminates at L2 and is normal.  Cauda equina unremarkable.  Stable to moderate degenerative disc disease at L5-S1.  No acute fracture or pars defect is demonstrated.  Small synovial cysts are seen posterior to the L4-L5 facet.  The perivertebral soft tissues are unremarkable.  T12-L1, L1-L2, L2-L3 are negative.  L3-L4 a broad-based disc bulge resulting in mild bilateral neural foraminal narrowing.  L4-L5 reveals a broad-based disc bulge facet disease resulting in mild bilateral neural foraminal narrowing.  L5-S1 reveals a broad-based disc bulge, posterior osseous slipping, and facet disease resulting in mild to moderate bilateral neural foraminal narrowing.  Assessment is stable mild to moderate degenerative spondylosis.  Small synovial cysts are seen posterior to the L4-L5 facets.  08/11/2014--MRI of the thoracic spine reveals mild to moderate thoracic kyphosis.  No fracture.  At T5--T6 there is a moderate sized left paracentral disc protrusion which i    Chronic migraine 11/03/2009 01/18/2010--MRI of the brain performed for headaches and memory loss.  Mild small vessel disease in the cerebral and central pontine white matter.  There is an ovoid, somewhat pancake-shaped area of signal abnormality in the anterior inferior right temporal lobe subcortical to the juxtacortical white matter that measures 1.2 x 1 cm and anterior posterior and medial lateral dimension but only measures 3 mm in cranial caudal dimension.  I suspect that this is a benign cyst or a cystic area of encephalomalacia.  The remainder of the MRI of the head is within normal limits.  11/03/2009--CT scan of the brain without contrast performed for headache after a fall.  Mild diffuse atrophy.  No acute abnormality noted.; Description: Patient has had a long history of migraine headaches.  MRI and CT scan of the brain have been essentially negative.    Chronic otitis externa  04/08/2016    Chronic renal insufficiency, stage II (mild), creatinine 1.12 11/14/2015 11/14/2015--serum creatinine mildly elevated at 1.12.    Depression with anxiety 04/08/2016    Frequent falls 02/05/2021 February 5, 2021--patient seen in follow-up by Dr. Weir.  I extensively reviewed the documentation from the emergency room visit as noted below.  I reviewed the history with the patient and she is describing episodes of dizziness described as a spinning sensation of her body and this seems to be precipitated by movement although it does not occur if she rolls over in bed.  If she stands up and st    Functional murmur, 07/15/2015--normal echocardiogram. 07/15/2015    07/15/2015--echocardiogram reveals normal left ventricular size and function with ejection fraction 55%.  Grade 1 diastolic dysfunction, abnormal relaxation pattern.  Trace tricuspid regurgitation.  Estimated right ventricular systolic pressure is 25 mmHg which is normal.    Gastroesophageal reflux disease without esophagitis 06/06/2019    Generalized anxiety disorder 04/11/2017    Glaucoma     History of Acute deep vein thrombosis (DVT) of distal end of left lower extremity 02/15/2017    03/21/2017 patient seen in follow-up and she is tolerating the Eliquis well without signs or symptoms of bleeding.  Her calf swelling and tenderness is better but not totally resolved.  I suspect that the DVT is chronic and may not resolve at all.  I will order a repeat venous study and then proceed from there.  02/23/2017--repeat Doppler venous study of the left lower extremity reveals a chronic left lower extremity DVT in the posterior tibial.  All other left sided veins appeared normal.  Fluid collection in the left calf noted.  02/17/2017--patient seen in follow-up and reports her left lower extremity symptoms are about the same.  She continues to have complaints of profound fatigue which I think is multifactorial including underlying depression that is  not in remission.  Review the results of the CTA of the chest including the possible thyroid lesion.  I do not think this is contributing to any of her symptoms of fatigue particularly given the fact that her thyroid function tests are normal.  I expla    History of palpitations 12/07/2011    Patient has had multiple admissions to the hospital for complaints of chest pain and heart palpitations. She meant admitted at least on 3 occasions. She has had at least 3 stress Cardiolite and 1 stress ECG, the last Cardiolite being performed 12/07/2011 which was negative. Patient is also had Holter monitors which have been unremarkable.    HIstory of Schatzki's ring 02/28/2012 02/28/2012--air-contrast upper GI revealed small to moderate sized reducible sliding hiatal herniation of the upper stomach with some demonstrated gastroesophageal reflux. No esophageal, gastric, or duodenal mass or mucosal ulceration was seen.  11/03/2008--EGD performed for evaluation of iron deficiency anemia revealed hiatal hernia without evidence of reflux, prepyloric antritis and    Hyperlipidemia 04/08/2016    Hypersomnolence 05/05/2016 06/14/2017--overnight oximetry revealed oxygen desaturation index of only 0.44.  There were only 10 total desaturations during the period of testing which lasted 6 hours and 46 minutes.  05/31/2017--patient seen in follow-up and reports she continues to have chronic fatigue as well as hypersomnolence.  Once again, she is on multiple medications that are undoubtedly contributing to this problem including clonazepam and hydrocodone but I doubt that these could be discontinued.  Her CBC back in April revealed a low hemoglobin of 10.8 but hematocrit was normal at 35.7.  Thyroid function tests were normal and CMP normal.  Overnight oximetry test ordered.  Repeat CBC.  Iron studies.  Sedimentation rate and CRP.  04/11/2017--patient seen in follow-up and her blood pressure is now at a reasonable level at 120/64.   She reports she still feels somewhat fatigued but she is much better.  Patient has not been doing any regular exercise and I do think that that would be helpful to reduce her feeling of fatigue.  She is    Menopausal state 04/08/2016    Multinodular goiter 02/17/2017 02/21/2017--thyroid ultrasound reveals multinodular thyroid with largest nodule measuring on the order of 1.6 cm in greatest dimension.  02/17/2017--patient seen in follow-up for DVT and CTA of the chest.  An incidental finding on the CTA of the chest reveals a 1.7 cm lesion in the right lobe of thyroid.  Note that thyroid function tests are currently normal.  Ultrasound of the thyroid ordered.    Osteoporosis, 03/29/2011--lumbar spine -2.8.  Right femoral neck -2.4.  Left femoral neck -2.4.  Patient receives Reclast infusions. 02/09/2009 04/19/2017--Reclast infusion.  04/05/2016--Reclast infusion.  06/04/2012--Reclast infusion.  05/26/2011--Reclast infusion.  03/29/2011--DEXA scan revealed a lumbar T score of -2.8, right femoral neck T score -2.4, left femoral neck T score -2.4.  Osteoporosis of the lumbar spine and severe osteopenia of the hips bilaterally.  Patient has been intolerant to Fosamax because of gastritis and gastroesophageal reflux.  04/01/2009--treatment for osteoporosis begun with Fosamax.  02/09/2009--DEXA scan revealed lumbar spine T score of -3.3.  Left femoral neck T score -2.5.  Right hip T score -2.6.  Osteoporosis.     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  CT scan of the abdomen and pelvis reveals a left renal cyst measuring 5.1 x 4.9 cm.  There were subcentimeter hypodense renal lesions that are too small to further characterize and are presumably related to cysts.  Recommend attention to follow up.  Distal dilated pancreatic duct noted.  The common bile duct is the upper limits of  normal in size.  The ampullary region is not well evaluated.  Comparison with prior imaging is recommended if available.  If there is no prior film available for comparison, and ERCP or MRCP could be performed to further evaluate.  Small hiatal hernia noted.  03/05/2012--CT scan of the abdomen with contrast, pancreatic protocol.  This reveals some fullness of the pancreatic ductal system and apparent pancreatic divisum with separate entrance of the accessory pancreatic duct extending into the duodenum distal to the main pancreatic duct.  There is fullness of the duct diffusely but similar to the previous s    Parkinson's disease with dyskinesia and fluctuating manifestations 12/14/2023 December 19, 2023--MRI of the brain without contrast reveals stable findings compared to the prior study dated May 28, 2021.  There are moderate changes of chronic small vessel ischemic phenomena.  Additionally there are findings consistent with an end of the insulin large perivascular space within the anterior portion of the right temporal lobe measuring 1.7 x 1.2 cm in greatest axial dimensions.    Pedal edema 06/29/2015 06/29/2015--patient presents with a 4-6 week history of exertional dyspnea that comes on with activity such as climbing stairs or walking her dog up an incline.  No chest pain.  Relieved with rest.  No cough.  She does have complaints of her feet and legs swelling that is particularly worse at the end of the day and not improved overnight.  No orthopnea or PND.  Chest exam reveals faint rales at the bases.  Otherwise clear.  Heart is regular and I do not appreciate a heart murmur.  Chest x-ray PA and lateral ordered.  Echocardiogram ordered.    Pulmonary hypertension 04/18/2019    Restless legs syndrome 04/08/2016    Short-term memory loss 05/04/2018 05/04/2018--patient reports development of problems with her memory over the past several months and is concerned about possibility of Alzheimer's.  No other  neurologic symptoms other than occasional sensation of being off balance.  We will not evaluate that problem at the present time unless it gets worse.  I we will however send her for neuropsychological testing regarding memory loss.      Simple renal cyst 07/20/2009 09/29/2014--patient was again evaluated by the urologist for a renal cyst.  CT scan of the abdomen and pelvis reveals a left renal cyst measuring 5.1 x 4.9 cm.  There were subcentimeter hypodense renal lesions that are too small to further characterize and are presumably related to cysts.  Recommend attention to follow up.  Distal dilated pancreatic duct noted.  The common bile duct is the upper limits of normal in size.  The ampullary region is not well evaluated.  Comparison with prior imaging is recommended if available.  If there is no prior film available for comparison, and ERCP or MRCP could be performed to further evaluate.  Small hiatal hernia noted.  07/20/2009--patient was noted to have a left renal mass consistent with a cyst.  This was evaluated by the urologist 07/20/2009.    Stage 3a chronic kidney disease 11/14/2015 11/14/2015--serum creatinine mildly elevated at 1.12.      Statin intolerance 10/30/2017    Tinnitus of both ears 09/30/2019 September 30, 2019--patient presents with a several year history of bilateral tinnitus, right greater than left.  It seems to be getting worse and now is associated with fluctuating hearing.  She occasionally will have worsening hearing after she coughs or sneezes.  On exam she has evidence of old otitis media scars, particularly on the right.  There is no acute infection present and there is no a    Tremor of left hand 12/14/2023    Vitamin D deficiency 04/08/2016         Past Surgical History:   Procedure Laterality Date    APPENDECTOMY  1965 1965    CATARACT EXTRACTION Bilateral Remote    Remote bilateral cataract extirpation with intraocular lens implantation.    CHOLECYSTECTOMY WITH  INTRAOPERATIVE CHOLANGIOGRAM N/A 01/21/2019 01/21/2019--laparoscopic paraesophageal hernia repair with fundoplication.    COLONOSCOPY  11/03/2008 2008--normal colonoscopy    COLONOSCOPY  2001 2001--normal colonoscopy.    COLONOSCOPY N/A 06/13/2017 06/13/2017--colonoscopy revealed melanosis.  Biopsied.  There was friability with contact bleeding in the ascending colon.  Biopsied.  Pathology returned consistent with melanosis coli.    ENDOSCOPY  10/08/2014    02/28/2012--air-contrast upper GI revealed small to moderate sized reducible sliding hiatal herniation of the upper stomach with some demonstrated gastroesophageal reflux. No esophageal, gastric, or duodenal mass or mucosal ulceration was seen. 11/03/2008--EGD performed for evaluation of iron deficiency anemia revealed hiatal hernia without evidence of reflux, prepyloric antritis and    ENDOSCOPY  11/03/2008 11/03/2008--EGD performed for evaluation of iron deficiency anemia revealed hiatal hernia without evidence of reflux, prepyloric antritis and    ENDOSCOPY  11/19/2001 11/19/2001--EGD revealed a very tortuous distal esophagus with a Schatzki's ring. No ulcer or erosions. No Dorado's mucosa. Stomach revealed patchy erythema and erosions in the antrum. Biopsy. Normal pylorus with no obstruction. Normal duodenum with no ulcers. The Schatzki's ring was dilated with a Rhodes dilator.;    ENDOSCOPY N/A 09/27/2016 09/27/2016--EGD revealed a normal oropharynx, esophagus, and medium sized hiatal hernia.  Nonbleeding gastric ulcer with no stigmata of bleeding.  Biopsy.  Gastritis.  Biopsy.  Normal examined duodenum.  Biopsied.  Pathology returned mild to moderate chronic active gastritis with ulceration from the stomach antral ulcer biopsy.  Gastroesophageal junction biopsy revealed minimal mixed inflammation.    ENDOSCOPY N/A 06/13/2017 06/13/2017--colonoscopy revealed melanosis.  Biopsied.  There was friability with contact bleeding in the  ascending colon.  Biopsied.  Pathology returned consistent with melanosis coli.    ERCP N/A 01/22/2019 01/22/2019--ERCP with sphincterotomy and balloon stone extraction.    ESOPHAGEAL DILATATION  11/19/2001 11/19/2001--EGD revealed a very tortuous distal esophagus with a Schatzki's ring. No ulcer or erosions. No Dorado's mucosa. Stomach revealed patchy erythema and erosions in the antrum. Biopsy. Normal pylorus with no obstruction. Normal duodenum with no ulcers. The Schatzki's ring was dilated with a Rhodes dilator.;     ESOPHAGEAL MANOMETRY N/A 12/18/2018    Procedure: ESOPHAGEAL MANOMETRY;  Surgeon: Cecilia, Nurse Performed;  Location: [facility] ENDOSCOPY;  Service: Gastroenterology    ESOPHAGOSCOPY N/A 01/21/2019    Procedure: FLEXIBLE ESOPHAGOSCOPY;  Surgeon: Reji Johnson MD;  Location: [facility] OR;  Service: General    HIATAL HERNIA REPAIR N/A 01/21/2019    Procedure: Laparoscopic paraesophageal hernia repair with toupee fundoplication;  Surgeon: Reji Johnson MD;  Location: [facility] OR;  Service: General    INCONTINENCE SURGERY  1979 1979--a bladder tack procedure for urinary stress incontinence    KNEE ARTHROSCOPY Right 12/12/2011 12/12/2011--right knee arthroscopy with partial lateral and medial meniscectomies.    SKIN SURGERY  05/25/2010 05/25/2010--skin lesion excised from right lower extremity. 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    TONSILLECTOMY  1953    TOTAL ABDOMINAL HYSTERECTOMY WITH SALPINGO OOPHORECTOMY  1974 1974--total abdominal hysterectomy.           Current Outpatient Medications:     Brexpiprazole (Rexulti) 1 MG tablet, Take 2 mg by mouth Daily., Disp: , Rfl:     buPROPion XL (WELLBUTRIN XL) 300 MG 24 hr tablet, Take 1 tablet by mouth Every Morning., Disp: , Rfl:     carbidopa-levodopa (SINEMET)  MG per tablet, Take 1 tablet by mouth 3 (Three) Times a Day., Disp: 270 tablet, Rfl: 1    carbidopa-levodopa CR (SINEMET  CR)  MG per CR tablet, TAKE 1 TABLET BY MOUTH ONCE DAILY AT NIGHT, Disp: 90 tablet, Rfl: 3    Cholecalciferol (vitamin D3) 125 MCG (5000 UT) capsule capsule, 1 by mouth daily as directed, Disp: 30 capsule, Rfl:     cycloSPORINE (RESTASIS) 0.05 % ophthalmic emulsion, Apply 1 drop to eye(s) as directed by provider Every 12 (Twelve) Hours., Disp: , Rfl:     diphenhydrAMINE-APAP, sleep, (Tylenol PM Extra Strength)  MG/30ML liquid, Tylenol PM Extra Strength, Disp: , Rfl:     donepezil (ARICEPT) 5 MG tablet, Take 1 tablet by mouth every night at bedtime., Disp: , Rfl:     estradiol (ESTRACE) 1 MG tablet, Take 1 tablet by mouth once daily, Disp: 90 tablet, Rfl: 0    fluticasone (FLONASE) 50 MCG/ACT nasal spray, 2 sprays into the nostril(s) as directed by provider Daily., Disp: 18 mL, Rfl: 6    HYDROcodone-acetaminophen (NORCO)  MG per tablet, Take 1 tablet by mouth Every 6 (Six) Hours As Needed for Moderate Pain., Disp: , Rfl:     methylphenidate 18 MG CR tablet, Take 1 tablet by mouth Every Morning, Disp: , Rfl:     Misc Natural Products (COLON CLEANSE PO), Take  by mouth., Disp: , Rfl:     spironolactone (ALDACTONE) 100 MG tablet, Take 1/2 (one-half) tablet by mouth once daily, Disp: 30 tablet, Rfl: 0    traZODone (DESYREL) 50 MG tablet, Take 0.5-1 tablets by mouth Every Night., Disp: , Rfl:     venlafaxine XR (EFFEXOR-XR) 150 MG 24 hr capsule, Take 1 capsule by mouth Every Morning., Disp: , Rfl:     venlafaxine XR (EFFEXOR-XR) 75 MG 24 hr capsule, Take one 75 mg capsule venlafaxine along with one 150 mg capsule daily for depression.  Total daily dose is 225 mg., Disp: , Rfl:     vitamin E 400 UNIT capsule, Take 1 capsule by mouth Daily., Disp: , Rfl:     amantadine (SYMMETREL) 100 MG capsule, Take 1 capsule by mouth 2 (Two) Times a Day., Disp: 60 capsule, Rfl: 2    loratadine (Claritin) 10 MG tablet, Take 1 tablet by mouth Daily. (Patient not taking: Reported on 2/4/2025), Disp: 90 tablet, Rfl:  "1      Family History   Problem Relation Age of Onset    Heart attack Mother         dies age 47 from heart attack    Heart disease Mother     Bleeding Disorder Mother     Heart disease Father     Ovarian cancer Maternal Grandmother     Heart attack Maternal Aunt         dies age 60 from heart attack    Malig Hyperthermia Neg Hx     Breast cancer Neg Hx          Social History     Socioeconomic History    Marital status:      Spouse name: ander    Number of children: 4    Years of education: 14    Highest education level: Associate degree: academic program   Tobacco Use    Smoking status: Never    Smokeless tobacco: Never    Tobacco comments:     None   Vaping Use    Vaping status: Never Used   Substance and Sexual Activity    Alcohol use: No    Drug use: No    Sexual activity: Not Currently     Partners: Male     Birth control/protection: Diaphragm, Birth control pill, Hysterectomy         Allergies   Allergen Reactions    Penicillin G Anaphylaxis    Statins Nausea And Vomiting    Tetanus Toxoid Itching    Morphine Hallucinations    Penicillins Itching    Tetanus Toxoids Itching         Pain Scale: 0/10 currently        ROS:  Review of Systems   HENT:  Positive for rhinorrhea and voice change. Negative for drooling.    Musculoskeletal:  Positive for gait problem.   Neurological:  Positive for tremors and speech difficulty. Negative for dizziness and weakness.       I have reviewed and agree with the above ROS complete by medical assistant.    Physical Exam:  Vitals:    02/04/25 1411   BP: 108/72   Pulse: 74   SpO2: 97%   Weight: 55.3 kg (122 lb)   Height: 152.4 cm (60\")       Orthostatic BP:       Physical Exam  General: Well-developed white female no acute distress  HEENT: Normocephalic no evidence of trauma.  Hypomimia and reduced lid blink  Neck: Supple.    Heart: Regular rate and rhythm  Extremities: Radial pulses strong and simultaneous.  No pedal edema.      Neurological Exam:   Mental Status: Awake, " alert, oriented to person, place and time.  Conversant without evidence of an affective disorder, thought disorder, delusions or hallucinations.  Attention span and concentration are normal.  HCF: No aphasia, apraxia or dysarthria.  Hypophonia.  Recent and remote memory intact.  Knowledge of recent events intact.  CN: I:   II: Visual fields full without left inattention   III, IV, VI: Eye movements intact without nystagmus or ptosis.  Pupils equal round and reactive to light.   V,VII: Light touch and pinprick intact all 3 divisions of V.  Facial muscles symmetrical.   VIII: Hearing intact to finger rub   IX,X: Soft palate elevates symmetrically   XI: Sternomastoid and trapezius are strong.   XII: Tongue midline without atrophy or fasciculations    Motor: Normal bulk in the upper and lower extremities.  Minimal cogwheeling bilaterally    Power testing: Pretty good strength in all muscles tested in the arms and legs    Reflexes: Upper extremities:        Lower extremities:        Toe signs:        Clonus:     Sensory: Light touch:        Pinprick:        Vibration:        Position:        Temperature:    Cerebellar: Finger-to-nose: Slow.  Minimal postural and endpoint tremor.  No resting tremor on exam           Rapid movement: Slow           Heel-to-shin:    Gait and Station: Comes to stand pushing off the chair.  Somewhat reduced step length and reduced arm swing worse on the right.  No tremor with ambulation.  No freezing or festination's.        Results:    Lab Results   Component Value Date    GLUCOSE 86 09/10/2024    BUN 17 09/10/2024    CREATININE 1.03 (H) 09/10/2024    EGFRIFNONA 45 (L) 11/17/2021    EGFRIFAFRI 51 (L) 03/09/2020    BCR 17 09/10/2024    CO2 24 09/10/2024    CALCIUM 9.3 09/10/2024    PROTENTOTREF 6.3 09/10/2024    ALBUMIN 4.0 09/10/2024    LABIL2 1.8 03/19/2024    AST 17 09/10/2024    ALT 6 09/10/2024     Lab Results   Component Value Date    WBC 10.8 09/10/2024    HGB 13.7 09/10/2024    HCT  43.1 09/10/2024    MCV 93 09/10/2024     09/10/2024     Lab Results   Component Value Date    RPR Non-Reactive 10/14/2021     Lab Results   Component Value Date    TSH 1.160 09/10/2024     Lab Results   Component Value Date    HYEGEFDG42 451 06/07/2023     Lab Results   Component Value Date    FOLATE 7.50 10/14/2021     Lab Results   Component Value Date    SEDRATE 8 06/07/2023     Lab Results   Component Value Date    CRP 0.77 (H) 10/22/2020         Assessment:    1.  Parkinson's disease-less rigidity on current regimen carbidopa/levodopa IR and CR dosing.  They have noticed some worsening dyskinesia in the left lower extremity with increased dosage. We can try reducing carbidopa/levodopa dosage or adding amantadine.  Discussed amantadine for dyskinesia and side effects.  Patient not having any agitation, irritability or hallucinations currently but this would be need to be monitored with addition of amantadine.  Patient and  state understanding.  Told him to call me if she develops any side effects.  Patient reports better gait and balance after PT and improved speech with speech therapy.  2.  MCI-stable on donepezil 5 mg nightly  3.  Sensory peripheral neuropathy-stable  4.  High falls risk           Assessment and Plan   Diagnoses and all orders for this visit:    1. Parkinson's disease with dyskinesia without fluctuating manifestations (Primary)  -     amantadine (SYMMETREL) 100 MG capsule; Take 1 capsule by mouth 2 (Two) Times a Day.  Dispense: 60 capsule; Refill: 2  -     carbidopa-levodopa (SINEMET)  MG per tablet; Take 1 tablet by mouth 3 (Three) Times a Day.  Dispense: 270 tablet; Refill: 1    2. MCI (mild cognitive impairment)    3. At high risk for falls    4. Sensory peripheral neuropathy        Ideal targets for risk factor control would be Blood pressure < 130/80, B12 > 500, TSH in normal range and LDL < 70; HbA1c < 6.5 and smoking cessation if applicable. Call 911 for stroke  symptoms.  Recommend 150 minutes of physical activity weekly.  Limit alcohol intake.  Continue carbidopa/levodopa IR 25/100 mg 1 tablet 3 times a day in addition to CR 50/200 mg 1 tablet before bed.  Trial amantadine at 100 mg twice a day.  Side effects reviewed  Continue donepezil 5 mg nightly per psychiatrist  Continue physical therapy and speech therapy exercises at home  Follow-up in 4 weeks or sooner if needed      Time: Spent 40 minutes in total encounter time. This included time for chart review, obtaining history, performing pertinent physical examination, updating medical information, ordering tests/referrals, discussion of diagnosis, medical management, counseling, and encounter documentation.        LESLI Charles          Much of this encounter note was dictated utilizing Dragon dictation which is an electronic transcription/translation of spoken language to printed text. The electronic translation of spoken language may permit erroneous, or at times, nonsensical words or phrases to be inadvertently transcribed; Although I have reviewed the note for such errors, some may still exist.    Portions of this assessment have been copied from previous documentation which has been thoroughly reviewed and updated as appropriate.

## 2025-02-18 ENCOUNTER — OFFICE VISIT (OUTPATIENT)
Dept: INTERNAL MEDICINE | Facility: CLINIC | Age: 85
End: 2025-02-18
Payer: MEDICARE

## 2025-02-18 ENCOUNTER — HOSPITAL ENCOUNTER (OUTPATIENT)
Dept: GENERAL RADIOLOGY | Facility: HOSPITAL | Age: 85
Discharge: HOME | End: 2025-02-18
Payer: MEDICARE

## 2025-02-18 VITALS
DIASTOLIC BLOOD PRESSURE: 70 MMHG | WEIGHT: 129 LBS | HEIGHT: 60 IN | OXYGEN SATURATION: 98 % | TEMPERATURE: 97.7 F | BODY MASS INDEX: 25.32 KG/M2 | HEART RATE: 75 BPM | RESPIRATION RATE: 16 BRPM | SYSTOLIC BLOOD PRESSURE: 128 MMHG

## 2025-02-18 DIAGNOSIS — R10.13 EPIGASTRIC ABDOMINAL PAIN: Primary | ICD-10-CM

## 2025-02-18 DIAGNOSIS — K29.30 CHRONIC SUPERFICIAL GASTRITIS, PRESENCE OF BLEEDING UNSPECIFIED: Chronic | ICD-10-CM

## 2025-02-18 DIAGNOSIS — G89.29 CHRONIC MID BACK PAIN: ICD-10-CM

## 2025-02-18 DIAGNOSIS — M54.50 CHRONIC BILATERAL LOW BACK PAIN WITHOUT SCIATICA: Chronic | ICD-10-CM

## 2025-02-18 DIAGNOSIS — G89.29 CHRONIC BILATERAL LOW BACK PAIN WITHOUT SCIATICA: Chronic | ICD-10-CM

## 2025-02-18 DIAGNOSIS — M54.9 CHRONIC MID BACK PAIN: ICD-10-CM

## 2025-02-18 DIAGNOSIS — K21.9 GASTROESOPHAGEAL REFLUX DISEASE WITHOUT ESOPHAGITIS: Chronic | ICD-10-CM

## 2025-02-18 PROCEDURE — 72110 X-RAY EXAM L-2 SPINE 4/>VWS: CPT

## 2025-02-18 PROCEDURE — 3074F SYST BP LT 130 MM HG: CPT | Performed by: INTERNAL MEDICINE

## 2025-02-18 PROCEDURE — 1126F AMNT PAIN NOTED NONE PRSNT: CPT | Performed by: INTERNAL MEDICINE

## 2025-02-18 PROCEDURE — 1159F MED LIST DOCD IN RCRD: CPT | Performed by: INTERNAL MEDICINE

## 2025-02-18 PROCEDURE — 3078F DIAST BP <80 MM HG: CPT | Performed by: INTERNAL MEDICINE

## 2025-02-18 PROCEDURE — 99214 OFFICE O/P EST MOD 30 MIN: CPT | Performed by: INTERNAL MEDICINE

## 2025-02-18 PROCEDURE — 1160F RVW MEDS BY RX/DR IN RCRD: CPT | Performed by: INTERNAL MEDICINE

## 2025-02-18 PROCEDURE — 72072 X-RAY EXAM THORAC SPINE 3VWS: CPT

## 2025-02-18 RX ORDER — OMEPRAZOLE 40 MG/1
CAPSULE, DELAYED RELEASE ORAL
Qty: 30 CAPSULE | Refills: 3 | Status: SHIPPED | OUTPATIENT
Start: 2025-02-18

## 2025-02-18 NOTE — PROGRESS NOTES
02/18/2025    Patient Information  Sachi Vora                                                                                          [address] [place] [address]      1940  [unfilled]  There is no work phone number on file.    Chief Complaint:     Complaining of epigastric abdominal pain and complaining of back pain.    History of Present Illness:    Patient with several chronic medical problems had a previous history of epigastric abdominal pain likely related to reflux presents today with approximately 1 week history of intermittent epigastric abdominal pain that does not radiate.  She has had a cholecystectomy in the past.  There is no nausea or vomiting.  Food sometimes seems to make the symptoms better and sometimes makes it worse.  Tums may help some but not significantly.  She has had some diarrhea which is not new.  There is been no fever or chills.  Is currently not taking any PPI therapy.    Patient also has chronic lower and mid back pain and is being treated by pain management.  She had physical therapy but that was last year.  Does not appear to be any sciatica associated with this.  The pain is worse with activity and prolonged standing.  She takes hydrocodone through pain management.  She is seeing a nurse practitioner who prescribes the hydrocodone and this dose has been cut from 6 a day down to 2/day.  Previous nurse practitioner left the practice.  She has not seen Dr. Ovalles for some time and has had no interventions.  Patient also has osteoporosis and history of compression fracture.  She is also having mid back pain which she has had previously.  Has not had x-rays in a while.    Review of Systems   Constitutional: Negative.   HENT: Negative.     Eyes: Negative.    Cardiovascular: Negative.    Respiratory: Negative.     Endocrine: Negative.    Hematologic/Lymphatic: Negative.    Skin: Negative.    Musculoskeletal: Negative.    Gastrointestinal:  Positive for  abdominal pain and diarrhea. Negative for nausea and vomiting.   Genitourinary: Negative.    Neurological: Negative.    Psychiatric/Behavioral: Negative.     Allergic/Immunologic: Negative.        Active Problems:    Patient Active Problem List   Diagnosis    Osteoporosis, 03/29/2011--lumbar spine -2.8.  Right femoral neck -2.4.  Left femoral neck -2.4.  Patient receives Reclast infusions.    Therapeutic drug monitoring    Simple renal cyst    Benign essential hypertension    Carotid artery plaque, 04/02/2018--mild right ICA plaque, normal left ICA 10/03/2014--mild bilateral carotid artery plaque.    Chronic gastritis    Chronic lower back pain    Chronic otitis externa    Stage 3a chronic kidney disease    Depression with anxiety    Functional murmur, 07/15/2015--normal echocardiogram.    Hyperlipidemia    Chronic migraine w/o aura w/o status migrainosus, not intractable    Pancreatic Duct Dilation    Bilateral lower extremity edema    Restless legs syndrome    Bilateral sensorineural hearing loss    Vitamin D deficiency    Chronic gastric ulcer    Chronic fatigue    Hypersomnolence    Chronic anemia    Multinodular goiter    Generalized anxiety disorder    Iron deficiency anemia    Statin intolerance    Postmenopausal state    Short-term memory loss    Epigastric abdominal pain    Pulmonary hypertension    Gastroesophageal reflux disease without esophagitis    Tinnitus of both ears    Chronic hoarseness    Pain disorder associated with psychological factors    Frequent falls    Balance disorder, related to Parkinson's    Hyperaldosteronism    Hypokalemia    Chronic pain of left knee    Primary osteoarthritis of left knee    Parkinson's disease with dyskinesia and fluctuating manifestations    Chronic mid back pain         Past Medical History:   Diagnosis Date    Balance disorder, related to Parkinson's 02/05/2021 February 5, 2021--patient seen in follow-up by Dr. Weir.  I extensively reviewed the  "documentation from the emergency room visit as noted below.  I reviewed the history with the patient and she is describing episodes of dizziness described as a spinning sensation of her body and this seems to be precipitated by movement although it does not occur if she rolls over in bed.  If she stands up and st    Benign essential hypertension 04/08/2016    Bilateral sensorineural hearing loss 03/31/2011 03/31/2011--etiology reveals reverse \"cookie bite\" type of hearing loss for both ears of mild/moderate degree, mostly sensorineural.  Speech discrimination 100% on the right, 96% on the left.    Carotid artery plaque, 10/03/2014--mild bilateral carotid artery plaque. 07/25/2012    10/03/2014--[facility] screening revealed mild bilateral carotid plaque, negative for atrial fibrillation, negative for AAA, negative for PAD, osteoporosis screen revealed osteopenia.  Body mass index was 25 and considered to be moderate risk.  07/25/2012--vascular screen negative for carotid plaque, negative for abdominal aneurysm, negative for PAD Description: 10/03/2014--[facility] screening revealed mild bilateral carotid plaque, negative for atrial fibrillation, negative for AAA, negative for PAD, osteoporosis screen revealed osteopenia.  Body mass index was 25 and considered to be moderate risk.  07/25/2012--vascular screen negative for carotid plaque, negative for abdominal aneurysm, negative for PAD    Chronic anemia 08/23/2016 06/13/2017--colonoscopy revealed melanosis.  Biopsied.  There was friability with contact bleeding in the ascending colon.  Biopsied.  Pathology returned consistent with melanosis coli.  06/13/2017--EGD revealed normal esophagus.  Biopsies taken.  There was a medium-sized hiatal hernia.  Localized mild inflammation and linear erosions were found in the prepyloric region.  Biopsied.  Examined duodenum was normal.  Random biopsies taken.  Pathology of the gastroesophageal junction was unremarkable as was " the gastric fundus.  Prepyloric biopsy revealed minimal chronic inflammation and reactive change.  H pylori negative.  Duodenal biopsies are negative.  04/12/2017--hemoglobin low at 10.8, hematocrit is normal at 35.7.  Iron sulfate 325 mg per day initiated.  08/23/2016--routine follow-up.  Patient continues to have epigastric abdominal pain believed to be related to reflux with possible esophageal spasm.  Hemoglobin noted to be low at 10.6 with a hematocrit low at 33.7 and RDW elevated at 16.2.  Homocysti    Chronic fatigue 04/21/2016 06/14/2017--overnight oximetry revealed oxygen desaturation index of only 0.44.  There were only 10 total desaturations during the period of testing which lasted 6 hours and 46 minutes.  05/31/2017--patient seen in follow-up and reports she continues to have chronic fatigue as well as hypersomnolence.  Once again, she is on multiple medications that are undoubtedly contributing to this problem including clonazepam and hydrocodone but I doubt that these could be discontinued.  Her CBC back in April revealed a low hemoglobin of 10.8 but hematocrit was normal at 35.7.  Thyroid function tests were normal and CMP normal.  Overnight oximetry test ordered.  Repeat CBC.  Iron studies.  Sedimentation rate and CRP.  04/11/2017--patient seen in follow-up and her blood pressure is now at a reasonable level at 120/64.  She reports she still feels somewhat fatigued but she is much better.  Patient has not been doing any regular exercise and I do think that that would be helpful to reduce her feeling of fatigue.  She is    Chronic gastric ulcer 04/14/2014    02/15/2017--patient seen in follow-up in nearly 6 months later.  She has complaints of not feeling well all over.  She has complaints of diffuse myalgias and possibly tendinopathy related to Levaquin that I called in prior to her going to [place] for vacation.  She reports that 3 or 4 days after starting the Levaquin she began to have the  musculoskeletal symptoms and she reports that she continues to have them presently.  Symptoms are worse involving her left calf area.  Examination reveals significant tenderness involving the left calf and the left calf seems a little larger than the right.  She also has complaints of shortness of breath but no chest pain.  She is complaining of double vision and generalized weakness and fatigue.  She was complaining of chronic fatigue at the last visit back in September and I ordered an overnight oximetry test but apparently this was never done for reasons that are not clear to me.  Her current oxygen saturation is 94% and normally it is 100%.  Plan is as follows: Extensi    Chronic gastritis 11/19/2001    02/15/2017--patient seen in follow-up in nearly 6 months later.  She has complaints of not feeling well all over.  She has complaints of diffuse myalgias and possibly tendinopathy related to Levaquin that I called in prior to her going to [place] for vacation.  She reports that 3 or 4 days after starting the Levaquin she began to have the musculoskeletal symptoms and she reports that she continues to have them presently.  Symptoms are worse involving her left calf area.  Examination reveals significant tenderness involving the left calf and the left calf seems a little larger than the right.  She also has complaints of shortness of breath but no chest pain.  She is complaining of double vision and generalized weakness and fatigue.  She was complaining of chronic fatigue at the last visit back in September and I ordered an overnight oximetry test but apparently this was never done for reasons that are not clear to me.  Her current oxygen saturation is 94% and normally it is 100%.  Plan is as follows: Extensi    Chronic hoarseness 06/08/2020    September 15, 2020--patient presents with complaints of swelling of the soft tissues of the left side of the neck and this is associated with pain and discomfort,  particularly when she turns her head rotating to the left.  She also notices hoarseness and feels that her voice has changed.  On exam there is definite prominence of the left sternocleidomastoid muscle and there may be some shotty lymph    Chronic lower back pain 01/31/2006 08/11/2014--MRI of the lumbar spine reveals the conus terminates at L2 and is normal.  Cauda equina unremarkable.  Stable to moderate degenerative disc disease at L5-S1.  No acute fracture or pars defect is demonstrated.  Small synovial cysts are seen posterior to the L4-L5 facet.  The perivertebral soft tissues are unremarkable.  T12-L1, L1-L2, L2-L3 are negative.  L3-L4 a broad-based disc bulge resulting in mild bilateral neural foraminal narrowing.  L4-L5 reveals a broad-based disc bulge facet disease resulting in mild bilateral neural foraminal narrowing.  L5-S1 reveals a broad-based disc bulge, posterior osseous slipping, and facet disease resulting in mild to moderate bilateral neural foraminal narrowing.  Assessment is stable mild to moderate degenerative spondylosis.  Small synovial cysts are seen posterior to the L4-L5 facets.  08/11/2014--MRI of the thoracic spine reveals mild to moderate thoracic kyphosis.  No fracture.  At T5--T6 there is a moderate sized left paracentral disc protrusion which i    Chronic migraine 11/03/2009 01/18/2010--MRI of the brain performed for headaches and memory loss.  Mild small vessel disease in the cerebral and central pontine white matter.  There is an ovoid, somewhat pancake-shaped area of signal abnormality in the anterior inferior right temporal lobe subcortical to the juxtacortical white matter that measures 1.2 x 1 cm and anterior posterior and medial lateral dimension but only measures 3 mm in cranial caudal dimension.  I suspect that this is a benign cyst or a cystic area of encephalomalacia.  The remainder of the MRI of the head is within normal limits.  11/03/2009--CT scan of the brain  without contrast performed for headache after a fall.  Mild diffuse atrophy.  No acute abnormality noted.; Description: Patient has had a long history of migraine headaches.  MRI and CT scan of the brain have been essentially negative.    Chronic otitis externa 04/08/2016    Chronic renal insufficiency, stage II (mild), creatinine 1.12 11/14/2015 11/14/2015--serum creatinine mildly elevated at 1.12.    Depression with anxiety 04/08/2016    Frequent falls 02/05/2021 February 5, 2021--patient seen in follow-up by Dr. Weir.  I extensively reviewed the documentation from the emergency room visit as noted below.  I reviewed the history with the patient and she is describing episodes of dizziness described as a spinning sensation of her body and this seems to be precipitated by movement although it does not occur if she rolls over in bed.  If she stands up and st    Functional murmur, 07/15/2015--normal echocardiogram. 07/15/2015    07/15/2015--echocardiogram reveals normal left ventricular size and function with ejection fraction 55%.  Grade 1 diastolic dysfunction, abnormal relaxation pattern.  Trace tricuspid regurgitation.  Estimated right ventricular systolic pressure is 25 mmHg which is normal.    Gastroesophageal reflux disease without esophagitis 06/06/2019    Generalized anxiety disorder 04/11/2017    Glaucoma     History of Acute deep vein thrombosis (DVT) of distal end of left lower extremity 02/15/2017    03/21/2017 patient seen in follow-up and she is tolerating the Eliquis well without signs or symptoms of bleeding.  Her calf swelling and tenderness is better but not totally resolved.  I suspect that the DVT is chronic and may not resolve at all.  I will order a repeat venous study and then proceed from there.  02/23/2017--repeat Doppler venous study of the left lower extremity reveals a chronic left lower extremity DVT in the posterior tibial.  All other left sided veins appeared normal.  Fluid  collection in the left calf noted.  02/17/2017--patient seen in follow-up and reports her left lower extremity symptoms are about the same.  She continues to have complaints of profound fatigue which I think is multifactorial including underlying depression that is not in remission.  Review the results of the CTA of the chest including the possible thyroid lesion.  I do not think this is contributing to any of her symptoms of fatigue particularly given the fact that her thyroid function tests are normal.  I expla    History of palpitations 12/07/2011    Patient has had multiple admissions to the hospital for complaints of chest pain and heart palpitations. She meant admitted at least on 3 occasions. She has had at least 3 stress Cardiolite and 1 stress ECG, the last Cardiolite being performed 12/07/2011 which was negative. Patient is also had Holter monitors which have been unremarkable.    HIstory of Schatzki's ring 02/28/2012 02/28/2012--air-contrast upper GI revealed small to moderate sized reducible sliding hiatal herniation of the upper stomach with some demonstrated gastroesophageal reflux. No esophageal, gastric, or duodenal mass or mucosal ulceration was seen.  11/03/2008--EGD performed for evaluation of iron deficiency anemia revealed hiatal hernia without evidence of reflux, prepyloric antritis and    Hyperlipidemia 04/08/2016    Hypersomnolence 05/05/2016 06/14/2017--overnight oximetry revealed oxygen desaturation index of only 0.44.  There were only 10 total desaturations during the period of testing which lasted 6 hours and 46 minutes.  05/31/2017--patient seen in follow-up and reports she continues to have chronic fatigue as well as hypersomnolence.  Once again, she is on multiple medications that are undoubtedly contributing to this problem including clonazepam and hydrocodone but I doubt that these could be discontinued.  Her CBC back in April revealed a low hemoglobin of 10.8 but hematocrit  was normal at 35.7.  Thyroid function tests were normal and CMP normal.  Overnight oximetry test ordered.  Repeat CBC.  Iron studies.  Sedimentation rate and CRP.  04/11/2017--patient seen in follow-up and her blood pressure is now at a reasonable level at 120/64.  She reports she still feels somewhat fatigued but she is much better.  Patient has not been doing any regular exercise and I do think that that would be helpful to reduce her feeling of fatigue.  She is    Menopausal state 04/08/2016    Multinodular goiter 02/17/2017 02/21/2017--thyroid ultrasound reveals multinodular thyroid with largest nodule measuring on the order of 1.6 cm in greatest dimension.  02/17/2017--patient seen in follow-up for DVT and CTA of the chest.  An incidental finding on the CTA of the chest reveals a 1.7 cm lesion in the right lobe of thyroid.  Note that thyroid function tests are currently normal.  Ultrasound of the thyroid ordered.    Osteoporosis, 03/29/2011--lumbar spine -2.8.  Right femoral neck -2.4.  Left femoral neck -2.4.  Patient receives Reclast infusions. 02/09/2009 04/19/2017--Reclast infusion.  04/05/2016--Reclast infusion.  06/04/2012--Reclast infusion.  05/26/2011--Reclast infusion.  03/29/2011--DEXA scan revealed a lumbar T score of -2.8, right femoral neck T score -2.4, left femoral neck T score -2.4.  Osteoporosis of the lumbar spine and severe osteopenia of the hips bilaterally.  Patient has been intolerant to Fosamax because of gastritis and gastroesophageal reflux.  04/01/2009--treatment for osteoporosis begun with Fosamax.  02/09/2009--DEXA scan revealed lumbar spine T score of -3.3.  Left femoral neck T score -2.5.  Right hip T score -2.6.  Osteoporosis.     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  CT scan of the abdomen and pelvis reveals a left renal cyst  measuring 5.1 x 4.9 cm.  There were subcentimeter hypodense renal lesions that are too small to further characterize and are presumably related to cysts.  Recommend attention to follow up.  Distal dilated pancreatic duct noted.  The common bile duct is the upper limits of normal in size.  The ampullary region is not well evaluated.  Comparison with prior imaging is recommended if available.  If there is no prior film available for comparison, and ERCP or MRCP could be performed to further evaluate.  Small hiatal hernia noted.  03/05/2012--CT scan of the abdomen with contrast, pancreatic protocol.  This reveals some fullness of the pancreatic ductal system and apparent pancreatic divisum with separate entrance of the accessory pancreatic duct extending into the duodenum distal to the main pancreatic duct.  There is fullness of the duct diffusely but similar to the previous s    Parkinson's disease with dyskinesia and fluctuating manifestations 12/14/2023 December 19, 2023--MRI of the brain without contrast reveals stable findings compared to the prior study dated May 28, 2021.  There are moderate changes of chronic small vessel ischemic phenomena.  Additionally there are findings consistent with an end of the insulin large perivascular space within the anterior portion of the right temporal lobe measuring 1.7 x 1.2 cm in greatest axial dimensions.    Pedal edema 06/29/2015 06/29/2015--patient presents with a 4-6 week history of exertional dyspnea that comes on with activity such as climbing stairs or walking her dog up an incline.  No chest pain.  Relieved with rest.  No cough.  She does have complaints of her feet and legs swelling that is particularly worse at the end of the day and not improved overnight.  No orthopnea or PND.  Chest exam reveals faint rales at the bases.  Otherwise clear.  Heart is regular and I do not appreciate a heart murmur.  Chest x-ray PA and lateral ordered.  Echocardiogram ordered.     Pulmonary hypertension 04/18/2019    Restless legs syndrome 04/08/2016    Short-term memory loss 05/04/2018 05/04/2018--patient reports development of problems with her memory over the past several months and is concerned about possibility of Alzheimer's.  No other neurologic symptoms other than occasional sensation of being off balance.  We will not evaluate that problem at the present time unless it gets worse.  I we will however send her for neuropsychological testing regarding memory loss.      Simple renal cyst 07/20/2009 09/29/2014--patient was again evaluated by the urologist for a renal cyst.  CT scan of the abdomen and pelvis reveals a left renal cyst measuring 5.1 x 4.9 cm.  There were subcentimeter hypodense renal lesions that are too small to further characterize and are presumably related to cysts.  Recommend attention to follow up.  Distal dilated pancreatic duct noted.  The common bile duct is the upper limits of normal in size.  The ampullary region is not well evaluated.  Comparison with prior imaging is recommended if available.  If there is no prior film available for comparison, and ERCP or MRCP could be performed to further evaluate.  Small hiatal hernia noted.  07/20/2009--patient was noted to have a left renal mass consistent with a cyst.  This was evaluated by the urologist 07/20/2009.    Stage 3a chronic kidney disease 11/14/2015 11/14/2015--serum creatinine mildly elevated at 1.12.      Statin intolerance 10/30/2017    Tinnitus of both ears 09/30/2019 September 30, 2019--patient presents with a several year history of bilateral tinnitus, right greater than left.  It seems to be getting worse and now is associated with fluctuating hearing.  She occasionally will have worsening hearing after she coughs or sneezes.  On exam she has evidence of old otitis media scars, particularly on the right.  There is no acute infection present and there is no a    Tremor of left hand  12/14/2023    Vitamin D deficiency 04/08/2016         Past Surgical History:   Procedure Laterality Date    APPENDECTOMY  1965    1965    CATARACT EXTRACTION Bilateral Remote    Remote bilateral cataract extirpation with intraocular lens implantation.    CHOLECYSTECTOMY WITH INTRAOPERATIVE CHOLANGIOGRAM N/A 01/21/2019 01/21/2019--laparoscopic paraesophageal hernia repair with fundoplication.    COLONOSCOPY  11/03/2008 2008--normal colonoscopy    COLONOSCOPY  2001 2001--normal colonoscopy.    COLONOSCOPY N/A 06/13/2017 06/13/2017--colonoscopy revealed melanosis.  Biopsied.  There was friability with contact bleeding in the ascending colon.  Biopsied.  Pathology returned consistent with melanosis coli.    ENDOSCOPY  10/08/2014    02/28/2012--air-contrast upper GI revealed small to moderate sized reducible sliding hiatal herniation of the upper stomach with some demonstrated gastroesophageal reflux. No esophageal, gastric, or duodenal mass or mucosal ulceration was seen. 11/03/2008--EGD performed for evaluation of iron deficiency anemia revealed hiatal hernia without evidence of reflux, prepyloric antritis and    ENDOSCOPY  11/03/2008 11/03/2008--EGD performed for evaluation of iron deficiency anemia revealed hiatal hernia without evidence of reflux, prepyloric antritis and    ENDOSCOPY  11/19/2001 11/19/2001--EGD revealed a very tortuous distal esophagus with a Schatzki's ring. No ulcer or erosions. No Dorado's mucosa. Stomach revealed patchy erythema and erosions in the antrum. Biopsy. Normal pylorus with no obstruction. Normal duodenum with no ulcers. The Schatzki's ring was dilated with a Rhodes dilator.;    ENDOSCOPY N/A 09/27/2016 09/27/2016--EGD revealed a normal oropharynx, esophagus, and medium sized hiatal hernia.  Nonbleeding gastric ulcer with no stigmata of bleeding.  Biopsy.  Gastritis.  Biopsy.  Normal examined duodenum.  Biopsied.  Pathology returned mild to moderate chronic  active gastritis with ulceration from the stomach antral ulcer biopsy.  Gastroesophageal junction biopsy revealed minimal mixed inflammation.    ENDOSCOPY N/A 06/13/2017 06/13/2017--colonoscopy revealed melanosis.  Biopsied.  There was friability with contact bleeding in the ascending colon.  Biopsied.  Pathology returned consistent with melanosis coli.    ERCP N/A 01/22/2019 01/22/2019--ERCP with sphincterotomy and balloon stone extraction.    ESOPHAGEAL DILATATION  11/19/2001 11/19/2001--EGD revealed a very tortuous distal esophagus with a Schatzki's ring. No ulcer or erosions. No Dorado's mucosa. Stomach revealed patchy erythema and erosions in the antrum. Biopsy. Normal pylorus with no obstruction. Normal duodenum with no ulcers. The Schatzki's ring was dilated with a Rhodes dilator.;     ESOPHAGEAL MANOMETRY N/A 12/18/2018    Procedure: ESOPHAGEAL MANOMETRY;  Surgeon: Cecilia, Nurse Performed;  Location: [facility] ENDOSCOPY;  Service: Gastroenterology    ESOPHAGOSCOPY N/A 01/21/2019    Procedure: FLEXIBLE ESOPHAGOSCOPY;  Surgeon: Reji Johnson MD;  Location: [facility] OR;  Service: General    HIATAL HERNIA REPAIR N/A 01/21/2019    Procedure: Laparoscopic paraesophageal hernia repair with toupee fundoplication;  Surgeon: Reji Johnson MD;  Location: [facility] OR;  Service: General    INCONTINENCE SURGERY  1979 1979--a bladder tack procedure for urinary stress incontinence    KNEE ARTHROSCOPY Right 12/12/2011 12/12/2011--right knee arthroscopy with partial lateral and medial meniscectomies.    SKIN SURGERY  05/25/2010 05/25/2010--skin lesion excised from right lower extremity. 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    TONSILLECTOMY  1953    TOTAL ABDOMINAL HYSTERECTOMY WITH SALPINGO OOPHORECTOMY  1974 1974--total abdominal hysterectomy.         Allergies   Allergen Reactions    Penicillin G Anaphylaxis    Statins Nausea And Vomiting    Tetanus  Toxoid Itching    Morphine Hallucinations    Penicillins Itching    Tetanus Toxoids Itching           Current Outpatient Medications:     amantadine (SYMMETREL) 100 MG capsule, Take 1 capsule by mouth 2 (Two) Times a Day., Disp: 60 capsule, Rfl: 2    Brexpiprazole (Rexulti) 1 MG tablet, Take 2 mg by mouth Daily., Disp: , Rfl:     buPROPion XL (WELLBUTRIN XL) 300 MG 24 hr tablet, Take 1 tablet by mouth Every Morning., Disp: , Rfl:     carbidopa-levodopa (SINEMET)  MG per tablet, Take 1 tablet by mouth 3 (Three) Times a Day., Disp: 270 tablet, Rfl: 1    carbidopa-levodopa CR (SINEMET CR)  MG per CR tablet, TAKE 1 TABLET BY MOUTH ONCE DAILY AT NIGHT, Disp: 90 tablet, Rfl: 3    Cholecalciferol (vitamin D3) 125 MCG (5000 UT) capsule capsule, 1 by mouth daily as directed, Disp: 30 capsule, Rfl:     cycloSPORINE (RESTASIS) 0.05 % ophthalmic emulsion, Apply 1 drop to eye(s) as directed by provider Every 12 (Twelve) Hours., Disp: , Rfl:     diphenhydrAMINE-APAP, sleep, (Tylenol PM Extra Strength)  MG/30ML liquid, Tylenol PM Extra Strength, Disp: , Rfl:     donepezil (ARICEPT) 5 MG tablet, Take 1 tablet by mouth every night at bedtime., Disp: , Rfl:     estradiol (ESTRACE) 1 MG tablet, Take 1 tablet by mouth once daily, Disp: 90 tablet, Rfl: 0    fluticasone (FLONASE) 50 MCG/ACT nasal spray, 2 sprays into the nostril(s) as directed by provider Daily., Disp: 18 mL, Rfl: 6    HYDROcodone-acetaminophen (NORCO)  MG per tablet, Take 1 tablet by mouth Every 6 (Six) Hours As Needed for Moderate Pain., Disp: , Rfl:     loratadine (Claritin) 10 MG tablet, Take 1 tablet by mouth Daily., Disp: 90 tablet, Rfl: 1    methylphenidate 18 MG CR tablet, Take 1 tablet by mouth Every Morning, Disp: , Rfl:     Misc Natural Products (COLON CLEANSE PO), Take  by mouth., Disp: , Rfl:     spironolactone (ALDACTONE) 100 MG tablet, Take 1/2 (one-half) tablet by mouth once daily, Disp: 30 tablet, Rfl: 0    traZODone (DESYREL) 50  "MG tablet, Take 0.5-1 tablets by mouth Every Night., Disp: , Rfl:     venlafaxine XR (EFFEXOR-XR) 150 MG 24 hr capsule, Take 1 capsule by mouth Every Morning., Disp: , Rfl:     venlafaxine XR (EFFEXOR-XR) 75 MG 24 hr capsule, Take one 75 mg capsule venlafaxine along with one 150 mg capsule daily for depression.  Total daily dose is 225 mg., Disp: , Rfl:     vitamin E 400 UNIT capsule, Take 1 capsule by mouth Daily., Disp: , Rfl:     omeprazole (priLOSEC) 40 MG capsule, Take 1 (40 mg) daily 1/2-hour to 1 hour prior to a meal, Disp: 30 capsule, Rfl: 3      Family History   Problem Relation Age of Onset    Heart attack Mother         dies age 47 from heart attack    Heart disease Mother     Bleeding Disorder Mother     Heart disease Father     Ovarian cancer Maternal Grandmother     Heart attack Maternal Aunt         dies age 60 from heart attack    Malig Hyperthermia Neg Hx     Breast cancer Neg Hx          Social History     Socioeconomic History    Marital status:      Spouse name: ander    Number of children: 4    Years of education: 14    Highest education level: Associate degree: academic program   Tobacco Use    Smoking status: Never    Smokeless tobacco: Never    Tobacco comments:     None   Vaping Use    Vaping status: Never Used   Substance and Sexual Activity    Alcohol use: No    Drug use: No    Sexual activity: Not Currently     Partners: Male     Birth control/protection: Diaphragm, Birth control pill, Hysterectomy         Vitals:    02/18/25 0902   BP: 128/70   Pulse: 75   Resp: 16   Temp: 97.7 °F (36.5 °C)   TempSrc: Oral   SpO2: 98%   Weight: 58.5 kg (129 lb)   Height: 152.4 cm (60\")        Body mass index is 25.19 kg/m².      Physical Exam:    General: Alert and oriented x 3.  No acute distress.  Normal affect.  HEENT: Pupils equal, round, reactive to light; extraocular movements intact; sclerae nonicteric; pharynx, ear canals and TMs normal.  Neck: Without JVD, thyromegaly, bruit, or " adenopathy.  Lungs: Clear to auscultation in all fields.  Heart: Regular rate and rhythm without murmur, rub, gallop, or click.  Abdomen: Soft, nontender, without hepatosplenomegaly or hernia.  Bowel sounds normal.  : Deferred.  Rectal: Deferred.  Extremities: Without clubbing, cyanosis, edema, or pulse deficit.  Neurologic: Intact without focal deficit.  Normal station and gait observed during ingress and egress from the examination room.  Skin: Without significant lesion.  Musculoskeletal: No definite tenderness to percussion over the spinous processes  .  Lab/other results:      Assessment/Plan:     Diagnosis Plan   1. Epigastric abdominal pain  omeprazole (priLOSEC) 40 MG capsule      2. Chronic bilateral low back pain without sciatica  Ambulatory Referral to Pain Management    XR Spine Lumbar Complete 4+VW      3. Chronic mid back pain  Ambulatory Referral to Pain Management    XR spine thoracic 3 vw      4. Chronic superficial gastritis, presence of bleeding unspecified  omeprazole (priLOSEC) 40 MG capsule      5. Gastroesophageal reflux disease without esophagitis  omeprazole (priLOSEC) 40 MG capsule        Patient presents with epigastric abdominal pain which may possibly be related to reflux or peptic acid disease.  Patient was recently started on amantadine and certainly it could be a side effect of that although that is not reported to be common.  She has worsening lower and mid back pain and has osteoporosis and we need to rule out a new compression fracture.  Patient sees pain management and her hydrocodone has been cut back by the nurse practitioner.  I think the best thing for her to do would be to see Dr. Ovalles for consideration of epidural injection and adjustment of her hydrocodone    Plan is as follows: X-ray lumbar and thoracic spine.  Start omeprazole 40 mg, 1/2 to 1-hour prior to the largest meal of the day.  Patient needs to contact me if this does not improve or resolve her symptoms over  the next couple of weeks.  If her symptoms should worsen or there are signs of rectal bleeding such as dark tarry stools then they need to contact me.  I put in a referral for her to see Dr. Ovalles for possible epidural injections and adjustment of her hydrocodone dose.      Procedures

## 2025-02-26 DIAGNOSIS — J30.9 ALLERGIC RHINITIS, UNSPECIFIED SEASONALITY, UNSPECIFIED TRIGGER: ICD-10-CM

## 2025-02-26 RX ORDER — LORATADINE 10 MG/1
10 TABLET ORAL DAILY
Qty: 90 TABLET | Refills: 0 | Status: SHIPPED | OUTPATIENT
Start: 2025-02-26

## 2025-03-04 ENCOUNTER — OFFICE VISIT (OUTPATIENT)
Dept: NEUROLOGY | Facility: CLINIC | Age: 85
End: 2025-03-04
Payer: MEDICARE

## 2025-03-04 VITALS
DIASTOLIC BLOOD PRESSURE: 66 MMHG | OXYGEN SATURATION: 98 % | HEIGHT: 60 IN | BODY MASS INDEX: 25.13 KG/M2 | WEIGHT: 128 LBS | SYSTOLIC BLOOD PRESSURE: 118 MMHG

## 2025-03-04 DIAGNOSIS — G31.84 MCI (MILD COGNITIVE IMPAIRMENT): ICD-10-CM

## 2025-03-04 DIAGNOSIS — Z91.81 AT HIGH RISK FOR FALLS: ICD-10-CM

## 2025-03-04 DIAGNOSIS — G20.B1 PARKINSON'S DISEASE WITH DYSKINESIA WITHOUT FLUCTUATING MANIFESTATIONS: Primary | ICD-10-CM

## 2025-03-04 DIAGNOSIS — G60.8 SENSORY PERIPHERAL NEUROPATHY: ICD-10-CM

## 2025-03-04 RX ORDER — CARBIDOPA AND LEVODOPA 25; 100 MG/1; MG/1
1 TABLET, EXTENDED RELEASE ORAL 3 TIMES DAILY
Qty: 90 TABLET | Refills: 2 | Status: SHIPPED | OUTPATIENT
Start: 2025-03-04

## 2025-03-04 NOTE — PROGRESS NOTES
NAME: Sachi Vora   : 1940    Chief Complaint   Patient presents with    Parkinson's Disease        HPI:  Sachi Vora is a 84 y.o. right-handed female who I am seeing in follow-up regarding Parkinson's disease.  She is accompanied by her  who adds to the history.  She has a past medical history of hypertension, hyperlipidemia, GERD and depression/anxiety.  I last saw her on 2025, with the following history taken from that note with additions/modifications as indicated:    Patient was initially seen by Dr. Barboza in  regarding balance problems and falls.  She denied any dizziness but states that she was just fall backwards.  She also reported some short-term memory loss.  Her  reported that she shuffles but she did not think so.  She was found to have left ankle dorsiflexor weakness and toe extensor weakness on exam and an EMG was ordered.  Dr. Barboza performed an EMG on 2022 with the following impression:     Borderline study. There was no evidence of peripheral neuropathy or peroneal neuropathy on either side. There were no needle exam changes in the leg muscles but the paraspinals showed a few positive sharp waves which could go along with bilateral lumbosacral radiculopathies. The patient has had epidural steroid injections but no nerve ablations.  An MRI of the lumbar spine was obtained which did not show significant problems at the L5 level.       The patient had progressive problems with balance and falls and return to the office in .  Her family reported shuffling gait, occasional mild resting tremor, poor writing that got smaller and is illegible, as well as, some reduced volume in her voice and the pitch seems a little higher.  Parkinson's disease was suspected and she was trialed on carbidopa/levodopa 10/100 mg 1 tablet 3 times a day with some improvements which was further escalated to 25/100 mg 3 times a day.  Carbidopa/levodopa CR 50/200 mg 1 tablet at bedtime was  added which helped with patient getting in and out of chair and bed in the morning.    She denies any change in her memory and continues to take donepezil 5 mg nightly per psychiatry. She continues to take Effexor per PCP. She continues to report numbness and tingling in her fingers but denies any painful symptoms or any symptoms in her feet.     History interim    Patient and  states they have not seen any improvement in the left leg involuntary movements after starting amantadine.  She denies any side effects specifically no hallucinations or agitation.  She continues to take carbidopa/levodopa IR 1 tablet 3 times a day and CR 50/200 mg at night.  Initially the additional controlled release dose was helping her get in and out of chairs and bed easier but recently she feels like she is having a hard time.  She continues to report a shuffling gait but denies any freezing or festination's.  She feels like she is stiffer.  She denies any recent falls.  She continues to report a runny nose but denies any drooling.  She continues to do speech therapy exercises at home.  She denies any trouble swallowing.  She denies any hallucinations, vivid dreams or mood changes.  She reports occasional dizziness if she turns too fast or stands up too fast.  She has not been very active or getting any exercise recently.      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 Weir.  I extensively reviewed the documentation from the emergency room visit as noted below.  I reviewed the history with the patient and she is describing episodes of dizziness described as a spinning sensation of her body and this seems to be precipitated by movement although it does not occur if she rolls over in bed.  If she stands up and st    Benign essential hypertension 04/08/2016    Bilateral sensorineural hearing loss 03/31/2011     "03/31/2011--etiology reveals reverse \"cookie bite\" type of hearing loss for both ears of mild/moderate degree, mostly sensorineural.  Speech discrimination 100% on the right, 96% on the left.    Carotid artery plaque, 10/03/2014--mild bilateral carotid artery plaque. 07/25/2012    10/03/2014--[facility] screening revealed mild bilateral carotid plaque, negative for atrial fibrillation, negative for AAA, negative for PAD, osteoporosis screen revealed osteopenia.  Body mass index was 25 and considered to be moderate risk.  07/25/2012--vascular screen negative for carotid plaque, negative for abdominal aneurysm, negative for PAD Description: 10/03/2014--[facility] screening revealed mild bilateral carotid plaque, negative for atrial fibrillation, negative for AAA, negative for PAD, osteoporosis screen revealed osteopenia.  Body mass index was 25 and considered to be moderate risk.  07/25/2012--vascular screen negative for carotid plaque, negative for abdominal aneurysm, negative for PAD    Chronic anemia 08/23/2016 06/13/2017--colonoscopy revealed melanosis.  Biopsied.  There was friability with contact bleeding in the ascending colon.  Biopsied.  Pathology returned consistent with melanosis coli.  06/13/2017--EGD revealed normal esophagus.  Biopsies taken.  There was a medium-sized hiatal hernia.  Localized mild inflammation and linear erosions were found in the prepyloric region.  Biopsied.  Examined duodenum was normal.  Random biopsies taken.  Pathology of the gastroesophageal junction was unremarkable as was the gastric fundus.  Prepyloric biopsy revealed minimal chronic inflammation and reactive change.  H pylori negative.  Duodenal biopsies are negative.  04/12/2017--hemoglobin low at 10.8, hematocrit is normal at 35.7.  Iron sulfate 325 mg per day initiated.  08/23/2016--routine follow-up.  Patient continues to have epigastric abdominal pain believed to be related to reflux with possible esophageal spasm.  " Hemoglobin noted to be low at 10.6 with a hematocrit low at 33.7 and RDW elevated at 16.2.  Homocysti    Chronic fatigue 04/21/2016 06/14/2017--overnight oximetry revealed oxygen desaturation index of only 0.44.  There were only 10 total desaturations during the period of testing which lasted 6 hours and 46 minutes.  05/31/2017--patient seen in follow-up and reports she continues to have chronic fatigue as well as hypersomnolence.  Once again, she is on multiple medications that are undoubtedly contributing to this problem including clonazepam and hydrocodone but I doubt that these could be discontinued.  Her CBC back in April revealed a low hemoglobin of 10.8 but hematocrit was normal at 35.7.  Thyroid function tests were normal and CMP normal.  Overnight oximetry test ordered.  Repeat CBC.  Iron studies.  Sedimentation rate and CRP.  04/11/2017--patient seen in follow-up and her blood pressure is now at a reasonable level at 120/64.  She reports she still feels somewhat fatigued but she is much better.  Patient has not been doing any regular exercise and I do think that that would be helpful to reduce her feeling of fatigue.  She is    Chronic gastric ulcer 04/14/2014    02/15/2017--patient seen in follow-up in nearly 6 months later.  She has complaints of not feeling well all over.  She has complaints of diffuse myalgias and possibly tendinopathy related to Levaquin that I called in prior to her going to [place] for vacation.  She reports that 3 or 4 days after starting the Levaquin she began to have the musculoskeletal symptoms and she reports that she continues to have them presently.  Symptoms are worse involving her left calf area.  Examination reveals significant tenderness involving the left calf and the left calf seems a little larger than the right.  She also has complaints of shortness of breath but no chest pain.  She is complaining of double vision and generalized weakness and fatigue.  She was  complaining of chronic fatigue at the last visit back in September and I ordered an overnight oximetry test but apparently this was never done for reasons that are not clear to me.  Her current oxygen saturation is 94% and normally it is 100%.  Plan is as follows: Extensi    Chronic gastritis 11/19/2001    02/15/2017--patient seen in follow-up in nearly 6 months later.  She has complaints of not feeling well all over.  She has complaints of diffuse myalgias and possibly tendinopathy related to Levaquin that I called in prior to her going to [place] for vacation.  She reports that 3 or 4 days after starting the Levaquin she began to have the musculoskeletal symptoms and she reports that she continues to have them presently.  Symptoms are worse involving her left calf area.  Examination reveals significant tenderness involving the left calf and the left calf seems a little larger than the right.  She also has complaints of shortness of breath but no chest pain.  She is complaining of double vision and generalized weakness and fatigue.  She was complaining of chronic fatigue at the last visit back in September and I ordered an overnight oximetry test but apparently this was never done for reasons that are not clear to me.  Her current oxygen saturation is 94% and normally it is 100%.  Plan is as follows: Extensi    Chronic hoarseness 06/08/2020    September 15, 2020--patient presents with complaints of swelling of the soft tissues of the left side of the neck and this is associated with pain and discomfort, particularly when she turns her head rotating to the left.  She also notices hoarseness and feels that her voice has changed.  On exam there is definite prominence of the left sternocleidomastoid muscle and there may be some shotty lymph    Chronic lower back pain 01/31/2006 08/11/2014--MRI of the lumbar spine reveals the conus terminates at L2 and is normal.  Cauda equina unremarkable.  Stable to moderate  degenerative disc disease at L5-S1.  No acute fracture or pars defect is demonstrated.  Small synovial cysts are seen posterior to the L4-L5 facet.  The perivertebral soft tissues are unremarkable.  T12-L1, L1-L2, L2-L3 are negative.  L3-L4 a broad-based disc bulge resulting in mild bilateral neural foraminal narrowing.  L4-L5 reveals a broad-based disc bulge facet disease resulting in mild bilateral neural foraminal narrowing.  L5-S1 reveals a broad-based disc bulge, posterior osseous slipping, and facet disease resulting in mild to moderate bilateral neural foraminal narrowing.  Assessment is stable mild to moderate degenerative spondylosis.  Small synovial cysts are seen posterior to the L4-L5 facets.  08/11/2014--MRI of the thoracic spine reveals mild to moderate thoracic kyphosis.  No fracture.  At T5--T6 there is a moderate sized left paracentral disc protrusion which i    Chronic migraine 11/03/2009 01/18/2010--MRI of the brain performed for headaches and memory loss.  Mild small vessel disease in the cerebral and central pontine white matter.  There is an ovoid, somewhat pancake-shaped area of signal abnormality in the anterior inferior right temporal lobe subcortical to the juxtacortical white matter that measures 1.2 x 1 cm and anterior posterior and medial lateral dimension but only measures 3 mm in cranial caudal dimension.  I suspect that this is a benign cyst or a cystic area of encephalomalacia.  The remainder of the MRI of the head is within normal limits.  11/03/2009--CT scan of the brain without contrast performed for headache after a fall.  Mild diffuse atrophy.  No acute abnormality noted.; Description: Patient has had a long history of migraine headaches.  MRI and CT scan of the brain have been essentially negative.    Chronic otitis externa 04/08/2016    Chronic renal insufficiency, stage II (mild), creatinine 1.12 11/14/2015 11/14/2015--serum creatinine mildly elevated at 1.12.     Depression with anxiety 04/08/2016    Frequent falls 02/05/2021 February 5, 2021--patient seen in follow-up by Dr. Weir.  I extensively reviewed the documentation from the emergency room visit as noted below.  I reviewed the history with the patient and she is describing episodes of dizziness described as a spinning sensation of her body and this seems to be precipitated by movement although it does not occur if she rolls over in bed.  If she stands up and st    Functional murmur, 07/15/2015--normal echocardiogram. 07/15/2015    07/15/2015--echocardiogram reveals normal left ventricular size and function with ejection fraction 55%.  Grade 1 diastolic dysfunction, abnormal relaxation pattern.  Trace tricuspid regurgitation.  Estimated right ventricular systolic pressure is 25 mmHg which is normal.    Gastroesophageal reflux disease without esophagitis 06/06/2019    Generalized anxiety disorder 04/11/2017    Glaucoma     History of Acute deep vein thrombosis (DVT) of distal end of left lower extremity 02/15/2017    03/21/2017 patient seen in follow-up and she is tolerating the Eliquis well without signs or symptoms of bleeding.  Her calf swelling and tenderness is better but not totally resolved.  I suspect that the DVT is chronic and may not resolve at all.  I will order a repeat venous study and then proceed from there.  02/23/2017--repeat Doppler venous study of the left lower extremity reveals a chronic left lower extremity DVT in the posterior tibial.  All other left sided veins appeared normal.  Fluid collection in the left calf noted.  02/17/2017--patient seen in follow-up and reports her left lower extremity symptoms are about the same.  She continues to have complaints of profound fatigue which I think is multifactorial including underlying depression that is not in remission.  Review the results of the CTA of the chest including the possible thyroid lesion.  I do not think this is contributing to any of  her symptoms of fatigue particularly given the fact that her thyroid function tests are normal.  I expla    History of palpitations 12/07/2011    Patient has had multiple admissions to the hospital for complaints of chest pain and heart palpitations. She meant admitted at least on 3 occasions. She has had at least 3 stress Cardiolite and 1 stress ECG, the last Cardiolite being performed 12/07/2011 which was negative. Patient is also had Holter monitors which have been unremarkable.    HIstory of Schatzki's ring 02/28/2012 02/28/2012--air-contrast upper GI revealed small to moderate sized reducible sliding hiatal herniation of the upper stomach with some demonstrated gastroesophageal reflux. No esophageal, gastric, or duodenal mass or mucosal ulceration was seen.  11/03/2008--EGD performed for evaluation of iron deficiency anemia revealed hiatal hernia without evidence of reflux, prepyloric antritis and    Hyperlipidemia 04/08/2016    Hypersomnolence 05/05/2016 06/14/2017--overnight oximetry revealed oxygen desaturation index of only 0.44.  There were only 10 total desaturations during the period of testing which lasted 6 hours and 46 minutes.  05/31/2017--patient seen in follow-up and reports she continues to have chronic fatigue as well as hypersomnolence.  Once again, she is on multiple medications that are undoubtedly contributing to this problem including clonazepam and hydrocodone but I doubt that these could be discontinued.  Her CBC back in April revealed a low hemoglobin of 10.8 but hematocrit was normal at 35.7.  Thyroid function tests were normal and CMP normal.  Overnight oximetry test ordered.  Repeat CBC.  Iron studies.  Sedimentation rate and CRP.  04/11/2017--patient seen in follow-up and her blood pressure is now at a reasonable level at 120/64.  She reports she still feels somewhat fatigued but she is much better.  Patient has not been doing any regular exercise and I do think that that would  be helpful to reduce her feeling of fatigue.  She is    Menopausal state 04/08/2016    Multinodular goiter 02/17/2017 02/21/2017--thyroid ultrasound reveals multinodular thyroid with largest nodule measuring on the order of 1.6 cm in greatest dimension.  02/17/2017--patient seen in follow-up for DVT and CTA of the chest.  An incidental finding on the CTA of the chest reveals a 1.7 cm lesion in the right lobe of thyroid.  Note that thyroid function tests are currently normal.  Ultrasound of the thyroid ordered.    Osteoporosis, 03/29/2011--lumbar spine -2.8.  Right femoral neck -2.4.  Left femoral neck -2.4.  Patient receives Reclast infusions. 02/09/2009 04/19/2017--Reclast infusion.  04/05/2016--Reclast infusion.  06/04/2012--Reclast infusion.  05/26/2011--Reclast infusion.  03/29/2011--DEXA scan revealed a lumbar T score of -2.8, right femoral neck T score -2.4, left femoral neck T score -2.4.  Osteoporosis of the lumbar spine and severe osteopenia of the hips bilaterally.  Patient has been intolerant to Fosamax because of gastritis and gastroesophageal reflux.  04/01/2009--treatment for osteoporosis begun with Fosamax.  02/09/2009--DEXA scan revealed lumbar spine T score of -3.3.  Left femoral neck T score -2.5.  Right hip T score -2.6.  Osteoporosis.     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  CT scan of the abdomen and pelvis reveals a left renal cyst measuring 5.1 x 4.9 cm.  There were subcentimeter hypodense renal lesions that are too small to further characterize and are presumably related to cysts.  Recommend attention to follow up.  Distal dilated pancreatic duct noted.  The common bile duct is the upper limits of normal in size.  The ampullary region is not well evaluated.  Comparison with prior imaging is recommended if available.  If there is no prior film  available for comparison, and ERCP or MRCP could be performed to further evaluate.  Small hiatal hernia noted.  03/05/2012--CT scan of the abdomen with contrast, pancreatic protocol.  This reveals some fullness of the pancreatic ductal system and apparent pancreatic divisum with separate entrance of the accessory pancreatic duct extending into the duodenum distal to the main pancreatic duct.  There is fullness of the duct diffusely but similar to the previous s    Parkinson's disease with dyskinesia and fluctuating manifestations 12/14/2023 December 19, 2023--MRI of the brain without contrast reveals stable findings compared to the prior study dated May 28, 2021.  There are moderate changes of chronic small vessel ischemic phenomena.  Additionally there are findings consistent with an end of the insulin large perivascular space within the anterior portion of the right temporal lobe measuring 1.7 x 1.2 cm in greatest axial dimensions.    Pedal edema 06/29/2015 06/29/2015--patient presents with a 4-6 week history of exertional dyspnea that comes on with activity such as climbing stairs or walking her dog up an incline.  No chest pain.  Relieved with rest.  No cough.  She does have complaints of her feet and legs swelling that is particularly worse at the end of the day and not improved overnight.  No orthopnea or PND.  Chest exam reveals faint rales at the bases.  Otherwise clear.  Heart is regular and I do not appreciate a heart murmur.  Chest x-ray PA and lateral ordered.  Echocardiogram ordered.    Pulmonary hypertension 04/18/2019    Restless legs syndrome 04/08/2016    Short-term memory loss 05/04/2018 05/04/2018--patient reports development of problems with her memory over the past several months and is concerned about possibility of Alzheimer's.  No other neurologic symptoms other than occasional sensation of being off balance.  We will not evaluate that problem at the present time unless it gets  worse.  I we will however send her for neuropsychological testing regarding memory loss.      Simple renal cyst 07/20/2009 09/29/2014--patient was again evaluated by the urologist for a renal cyst.  CT scan of the abdomen and pelvis reveals a left renal cyst measuring 5.1 x 4.9 cm.  There were subcentimeter hypodense renal lesions that are too small to further characterize and are presumably related to cysts.  Recommend attention to follow up.  Distal dilated pancreatic duct noted.  The common bile duct is the upper limits of normal in size.  The ampullary region is not well evaluated.  Comparison with prior imaging is recommended if available.  If there is no prior film available for comparison, and ERCP or MRCP could be performed to further evaluate.  Small hiatal hernia noted.  07/20/2009--patient was noted to have a left renal mass consistent with a cyst.  This was evaluated by the urologist 07/20/2009.    Stage 3a chronic kidney disease 11/14/2015 11/14/2015--serum creatinine mildly elevated at 1.12.      Statin intolerance 10/30/2017    Tinnitus of both ears 09/30/2019 September 30, 2019--patient presents with a several year history of bilateral tinnitus, right greater than left.  It seems to be getting worse and now is associated with fluctuating hearing.  She occasionally will have worsening hearing after she coughs or sneezes.  On exam she has evidence of old otitis media scars, particularly on the right.  There is no acute infection present and there is no a    Tremor of left hand 12/14/2023    Vitamin D deficiency 04/08/2016         Past Surgical History:   Procedure Laterality Date    APPENDECTOMY  1965    1965    CATARACT EXTRACTION Bilateral Remote    Remote bilateral cataract extirpation with intraocular lens implantation.    CHOLECYSTECTOMY WITH INTRAOPERATIVE CHOLANGIOGRAM N/A 01/21/2019 01/21/2019--laparoscopic paraesophageal hernia repair with fundoplication.    COLONOSCOPY  11/03/2008     2008--normal colonoscopy    COLONOSCOPY  2001 2001--normal colonoscopy.    COLONOSCOPY N/A 06/13/2017 06/13/2017--colonoscopy revealed melanosis.  Biopsied.  There was friability with contact bleeding in the ascending colon.  Biopsied.  Pathology returned consistent with melanosis coli.    ENDOSCOPY  10/08/2014    02/28/2012--air-contrast upper GI revealed small to moderate sized reducible sliding hiatal herniation of the upper stomach with some demonstrated gastroesophageal reflux. No esophageal, gastric, or duodenal mass or mucosal ulceration was seen. 11/03/2008--EGD performed for evaluation of iron deficiency anemia revealed hiatal hernia without evidence of reflux, prepyloric antritis and    ENDOSCOPY  11/03/2008 11/03/2008--EGD performed for evaluation of iron deficiency anemia revealed hiatal hernia without evidence of reflux, prepyloric antritis and    ENDOSCOPY  11/19/2001 11/19/2001--EGD revealed a very tortuous distal esophagus with a Schatzki's ring. No ulcer or erosions. No Dorado's mucosa. Stomach revealed patchy erythema and erosions in the antrum. Biopsy. Normal pylorus with no obstruction. Normal duodenum with no ulcers. The Schatzki's ring was dilated with a Rhodes dilator.;    ENDOSCOPY N/A 09/27/2016 09/27/2016--EGD revealed a normal oropharynx, esophagus, and medium sized hiatal hernia.  Nonbleeding gastric ulcer with no stigmata of bleeding.  Biopsy.  Gastritis.  Biopsy.  Normal examined duodenum.  Biopsied.  Pathology returned mild to moderate chronic active gastritis with ulceration from the stomach antral ulcer biopsy.  Gastroesophageal junction biopsy revealed minimal mixed inflammation.    ENDOSCOPY N/A 06/13/2017 06/13/2017--colonoscopy revealed melanosis.  Biopsied.  There was friability with contact bleeding in the ascending colon.  Biopsied.  Pathology returned consistent with melanosis coli.    ERCP N/A 01/22/2019 01/22/2019--ERCP with sphincterotomy and  balloon stone extraction.    ESOPHAGEAL DILATATION  11/19/2001 11/19/2001--EGD revealed a very tortuous distal esophagus with a Schatzki's ring. No ulcer or erosions. No Dorado's mucosa. Stomach revealed patchy erythema and erosions in the antrum. Biopsy. Normal pylorus with no obstruction. Normal duodenum with no ulcers. The Schatzki's ring was dilated with a Rhodes dilator.;     ESOPHAGEAL MANOMETRY N/A 12/18/2018    Procedure: ESOPHAGEAL MANOMETRY;  Surgeon: Cecilia, Nurse Performed;  Location: [facility] ENDOSCOPY;  Service: Gastroenterology    ESOPHAGOSCOPY N/A 01/21/2019    Procedure: FLEXIBLE ESOPHAGOSCOPY;  Surgeon: Reji Johnson MD;  [facility] [facility];  Service: General    HIATAL HERNIA REPAIR N/A 01/21/2019    Procedure: Laparoscopic paraesophageal hernia repair with toupee fundoplication;  Surgeon: Reji Johnson MD;  Location: [facility] OR;  Service: General    INCONTINENCE SURGERY  1979 1979--a bladder tack procedure for urinary stress incontinence    KNEE ARTHROSCOPY Right 12/12/2011 12/12/2011--right knee arthroscopy with partial lateral and medial meniscectomies.    SKIN SURGERY  05/25/2010 05/25/2010--skin lesion excised from right lower extremity. 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    TONSILLECTOMY  1953    TOTAL ABDOMINAL HYSTERECTOMY WITH SALPINGO OOPHORECTOMY  1974 1974--total abdominal hysterectomy.           Current Outpatient Medications:     Brexpiprazole (Rexulti) 1 MG tablet, Take 2 mg by mouth Daily., Disp: , Rfl:     buPROPion XL (WELLBUTRIN XL) 300 MG 24 hr tablet, Take 1 tablet by mouth Every Morning., Disp: , Rfl:     carbidopa-levodopa CR (SINEMET CR)  MG per CR tablet, TAKE 1 TABLET BY MOUTH ONCE DAILY AT NIGHT, Disp: 90 tablet, Rfl: 3    carbidopa-levodopa ER (SINEMET CR)  MG per tablet, Take 1 tablet by mouth 3 (Three) Times a Day., Disp: 90 tablet, Rfl: 2    Cholecalciferol (vitamin D3) 125 MCG  (5000 UT) capsule capsule, 1 by mouth daily as directed, Disp: 30 capsule, Rfl:     cycloSPORINE (RESTASIS) 0.05 % ophthalmic emulsion, Apply 1 drop to eye(s) as directed by provider Every 12 (Twelve) Hours., Disp: , Rfl:     diphenhydrAMINE-APAP, sleep, (Tylenol PM Extra Strength)  MG/30ML liquid, Tylenol PM Extra Strength, Disp: , Rfl:     donepezil (ARICEPT) 5 MG tablet, Take 1 tablet by mouth every night at bedtime., Disp: , Rfl:     EQ All Day Allergy Relief 10 MG tablet, Take 1 tablet by mouth once daily, Disp: 90 tablet, Rfl: 0    estradiol (ESTRACE) 1 MG tablet, Take 1 tablet by mouth once daily, Disp: 90 tablet, Rfl: 0    fluticasone (FLONASE) 50 MCG/ACT nasal spray, 2 sprays into the nostril(s) as directed by provider Daily., Disp: 18 mL, Rfl: 6    HYDROcodone-acetaminophen (NORCO)  MG per tablet, Take 1 tablet by mouth Every 6 (Six) Hours As Needed for Moderate Pain., Disp: , Rfl:     methylphenidate 18 MG CR tablet, Take 1 tablet by mouth Every Morning, Disp: , Rfl:     Misc Natural Products (COLON CLEANSE PO), Take  by mouth., Disp: , Rfl:     omeprazole (priLOSEC) 40 MG capsule, Take 1 (40 mg) daily 1/2-hour to 1 hour prior to a meal, Disp: 30 capsule, Rfl: 3    spironolactone (ALDACTONE) 100 MG tablet, Take 1/2 (one-half) tablet by mouth once daily, Disp: 30 tablet, Rfl: 0    traZODone (DESYREL) 50 MG tablet, Take 0.5-1 tablets by mouth Every Night., Disp: , Rfl:     venlafaxine XR (EFFEXOR-XR) 150 MG 24 hr capsule, Take 1 capsule by mouth Every Morning., Disp: , Rfl:     venlafaxine XR (EFFEXOR-XR) 75 MG 24 hr capsule, Take one 75 mg capsule venlafaxine along with one 150 mg capsule daily for depression.  Total daily dose is 225 mg., Disp: , Rfl:     vitamin E 400 UNIT capsule, Take 1 capsule by mouth Daily., Disp: , Rfl:       Family History   Problem Relation Age of Onset    Heart attack Mother         dies age 47 from heart attack    Heart disease Mother     Bleeding Disorder Mother   "   Heart disease Father     Ovarian cancer Maternal Grandmother     Heart attack Maternal Aunt         dies age 60 from heart attack    Malig Hyperthermia Neg Hx     Breast cancer Neg Hx          Social History     Socioeconomic History    Marital status:      Spouse name: ander    Number of children: 4    Years of education: 14    Highest education level: Associate degree: academic program   Tobacco Use    Smoking status: Never    Smokeless tobacco: Never    Tobacco comments:     None   Vaping Use    Vaping status: Never Used   Substance and Sexual Activity    Alcohol use: No    Drug use: No    Sexual activity: Not Currently     Partners: Male     Birth control/protection: Diaphragm, Birth control pill, Hysterectomy         Allergies   Allergen Reactions    Penicillin G Anaphylaxis    Statins Nausea And Vomiting    Tetanus Toxoid Itching    Morphine Hallucinations    Penicillins Itching    Tetanus Toxoids Itching         Pain Scale: 0/10 currently        ROS:  Review of Systems   HENT:  Positive for rhinorrhea. Negative for drooling and trouble swallowing.    Musculoskeletal:  Positive for gait problem.   Neurological:  Positive for dizziness, tremors and speech difficulty. Negative for seizures, syncope, facial asymmetry, weakness, light-headedness, numbness and headaches.       I have reviewed and agree with the above ROS complete by medical assistant    Physical Exam:  Vitals:    03/04/25 1445   BP: 118/66   SpO2: 98%   Weight: 58.1 kg (128 lb)   Height: 152.4 cm (60\")       Orthostatic BP:       Physical Exam  General: Well-developed white female no acute distress  HEENT: Normocephalic no evidence of trauma.  Hypomimia and reduced lid blink  Neck: Supple.    Heart: Regular rate and rhythm  Extremities: Radial pulses strong and simultaneous.      Neurological Exam:   Mental Status: Awake, alert, oriented to person, place and time.  Conversant without evidence of an affective disorder, thought disorder, " delusions or hallucinations.  Attention span and concentration are normal.  HCF: No aphasia, apraxia or dysarthria.  Hypophonia.  Recent and remote memory intact.  Knowledge of recent events intact.  CN: I:   II: Visual fields full without left inattention   III, IV, VI: Eye movements intact without nystagmus or ptosis.  Pupils equal round and reactive to light.   V,VII: Light touch and pinprick intact all 3 divisions of V.  Facial muscles symmetrical.   VIII: Hearing intact to finger rub   IX,X: Soft palate elevates symmetrically   XI: Sternomastoid and trapezius are strong.   XII: Tongue midline without atrophy or fasciculations    Motor: Normal tone and bulk in the upper and lower extremities.  Minimal cogwheeling right arm mild cogwheeling left arm.    Power testing: Good strength in all muscles tested in the arms and legs    Reflexes: Upper extremities:        Lower extremities:        Toe signs:        Clonus:     Sensory: Light touch:        Pinprick:        Vibration:        Position:        Temperature:    Cerebellar: Finger-to-nose: Slow.  Minimal postural tremor.  No resting tremor.  Intermittent jaw tremor           Rapid movement: Slow and reduced amplitude           Heel-to-shin:    Gait and Station: Comes to stand pushing off the chair.  Reduced step length.  Almost no arm swing.  No tremor with ambulation.  Slightly flexed forward posture at the hips.  No freezing or festination's.      Results:    Lab Results   Component Value Date    GLUCOSE 86 09/10/2024    BUN 17 09/10/2024    CREATININE 1.03 (H) 09/10/2024    EGFRIFNONA 45 (L) 11/17/2021    EGFRIFAFRI 51 (L) 03/09/2020    BCR 17 09/10/2024    CO2 24 09/10/2024    CALCIUM 9.3 09/10/2024    ALBUMIN 4.0 09/10/2024    AST 17 09/10/2024    ALT 6 09/10/2024     Lab Results   Component Value Date    WBC 10.8 09/10/2024    HGB 13.7 09/10/2024    HCT 43.1 09/10/2024    MCV 93 09/10/2024     09/10/2024     Lab Results   Component Value Date    RPR  Non-Reactive 10/14/2021     Lab Results   Component Value Date    TSH 1.160 09/10/2024     Lab Results   Component Value Date    PQENGLQD49 451 06/07/2023     Lab Results   Component Value Date    FOLATE 7.50 10/14/2021     Lab Results   Component Value Date    SEDRATE 8 06/07/2023     Lab Results   Component Value Date    CRP 0.77 (H) 10/22/2020         Assessment:    1.  Parkinson's disease-increased rigidity and bradykinesia.  No change in left lower extremity dyskinesia on amantadine.  Discussed switching C/L IR to CR 25/100 3 times a day and continuing CR 50/200 nightly.  Will see if slower release of levodopa helps dyskinesia but I am concerned she may become more rigid and slow.  Told patient to call me if she has any worsening symptoms or side effects.  [facility] agree[facility].  Also discussed possibility of referring to movement disorder specialist to discuss surgical options including DBS and focused ultrasound.  2.  MCI-stable on donepezil 5 mg nightly  3.  Sensory peripheral neuropathy-stable  4.  High falls risk-no new falls in last 4 weeks.         Assessment and Plan   Diagnoses and all orders for this visit:    1. Parkinson's disease with dyskinesia without fluctuating manifestations (Primary)  -     carbidopa-levodopa ER (SINEMET CR)  MG per tablet; Take 1 tablet by mouth 3 (Three) Times a Day.  Dispense: 90 tablet; Refill: 2  -     Ambulatory Referral to Physical Therapy for Evaluation & Treatment    2. MCI (mild cognitive impairment)    3. At high risk for falls    4. Sensory peripheral neuropathy        Ideal targets for risk factor control would be Blood pressure < 130/80, B12 > 500, TSH in normal range and LDL < 70; HbA1c < 6.5 and smoking cessation if applicable. Call 911 for stroke symptoms.  Recommend 150 minutes of physical activity weekly.  Limit alcohol intake.  Discontinue amantadine and carbidopa/levodopa IR  Trial carbidopa/levodopa CR  25/100 mg 3 times a day in addition to CR 50/200 mg nightly  Referral to [facility]'s clinic for physical therapy  Follow-up in 6 weeks or sooner if needed      Avoid taking your carbidopa/levodopa with food (particularly protein).  Take one hour before or 2 hours after meals.  - Side effects include nausea and dizziness upon standing.  - At higher doses, may cause excessive movements called dyskinesias, confusion, or hallucinations.     Check out the Parkinson's Foundation at [email or web address], the [employer] at [email or web address], or [email or web address]    Consider increasing exercise with yoga, dance, garrison chi, boxing, or music therapy.    Some [facility] gyms offer a free Parkinson's clinic.  Call your local [facility] to see if it is available.  Rock steady boxing is also a great boxing based fitness curriculum for Parkinson's disease.  [facility] offers [employer] boxing.   [employer] [facility] for speech.    [facility] offers a free Parkinson's exercise class.     Consider cognitive exercise as well such as puzzles, word search, Sudoku, etc.     For constipation, increase water intake, eat fruits and vegetables, whole grains, exercise, consider polythylene glycol (Miralax) or bisacodyl suppository, or abdominal massage.      For sleep and vivid dreams, consider melatonin 5-10 mg over-the-counter supplement.    ROCK STEADY BOXING     Nilam Shearer  [address] [place] [address]  [phone], [phone]  [email or web address]     In-Person: Monday and Wednesday, 9:30 AM  [address] [address]     Zoom: Monday, Wednesday, Friday 9:30 AM  ID# [phone]  Website: [employer]  Facebook: [employer] [facility]  YouTube: [employer] [place]       Time: Spent 45 minutes in total encounter time. This included time for chart review, obtaining history, performing pertinent physical examination, updating medical information, ordering tests/referrals, discussion  of diagnosis, medical management, counseling, and encounter documentation.        LESLI Charles          Much of this encounter note was dictated utilizing Dragon dictation which is an electronic transcription/translation of spoken language to printed text. The electronic translation of spoken language may permit erroneous, or at times, nonsensical words or phrases to be inadvertently transcribed; Although I have reviewed the note for such errors, some may still exist.    Portions of this assessment have been copied from previous documentation which has been thoroughly reviewed and updated as appropriate.

## 2025-04-10 RX ORDER — ESTRADIOL 1 MG/1
1 TABLET ORAL DAILY
Qty: 90 TABLET | Refills: 1 | Status: SHIPPED | OUTPATIENT
Start: 2025-04-10

## 2025-04-21 NOTE — PROGRESS NOTES
NAME: Sachi Vora   : 1940    Chief Complaint   Patient presents with    Parkinson's Disease        HPI:  Sachi Vora is a 84 y.o. right-handed female who I am seeing in follow-up regarding Parkinson's disease.  She is accompanied by her daughter who adds to the history.  She has a past medical history of hypertension, hyperlipidemia, GERD and depression/anxiety.  I last saw her on 3/4/2025, with the following history taken from that note with additions/modifications as indicated:    Patient was initially seen by Dr. Barboza in  regarding balance problems and falls.  She denied any dizziness but states that she was just fall backwards.  She also reported some short-term memory loss.  Her  reported that she shuffles but she did not think so.  She was found to have left ankle dorsiflexor weakness and toe extensor weakness on exam and an EMG was ordered.  Dr. Barboza performed an EMG on 2022 with the following impression:     Borderline study. There was no evidence of peripheral neuropathy or peroneal neuropathy on either side. There were no needle exam changes in the leg muscles but the paraspinals showed a few positive sharp waves which could go along with bilateral lumbosacral radiculopathies. The patient has had epidural steroid injections but no nerve ablations.  An MRI of the lumbar spine was obtained which did not show significant problems at the L5 level.       The patient had progressive problems with balance and falls and return to the office in .  Her family reported shuffling gait, occasional mild resting tremor, poor writing that got smaller and is illegible, as well as, some reduced volume in her voice and the pitch seems a little higher.  Parkinson's disease was suspected and she was trialed on carbidopa/levodopa 10/100 mg 1 tablet 3 times a day with some improvements which was further escalated to 25/100 mg 3 times a day.  Carbidopa/levodopa CR 50/200 mg 1 tablet at bedtime  was added which helped with patient getting in and out of chair and bed in the morning.  Patient and  did not seen any improvement in the left leg involuntary movements after starting amantadine.      She denies any change in her memory and continues to take donepezil 5 mg nightly per psychiatry. She continues to take Effexor per PCP. She continues to report numbness and tingling in her fingers but denies any painful symptoms or any symptoms in her feet.     History interim    Patient has noticed worsening dyskinesia, now in both legs with switching from carbidopa/levodopa IR to CR 25/100 mg 3 times a day in addition to 50/200 mg 1 tablet at night.  She denies any nausea.  She states getting out of a chair is a little easier.  She continues to report a shuffling gait with occasional festination's but denies any freezing.  She reports feeling unsteady on her feet but denies any recent falls.  She does not notice much resting tremor.  She continues to report some bradykinesia and rigidity.  She has not been working on her speech therapy exercises at home but states that she has a book.  She tried to schedule physical therapy at [facility] but they said that they were booked out for several weeks and would call when they had an opening.  Patient states they have not called yet.  She reports vivid dreams but denies acting out of her dreams.  She denies any hallucinations.  She states that her memory is stable and she continues to take 5 mg nightly per psychiatry.  She reports some diarrhea at times but not sure if it is related to the medicine.  She reports occasional word finding difficulties.  She states that her biggest concern is runny nose.  She reports occasional dizziness when she first stands up after sitting for a while.  She states she recently got new glasses.  She states she was having vertical diplopia and now has prism glasses.  She states that its helped a little bit but she still gets double  "vision when watching TV.  She is also using eyedrops for dryness which she states are expensive.  She denies any change in the numbness and tingling in her fingers.      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 Weir.  I extensively reviewed the documentation from the emergency room visit as noted below.  I reviewed the history with the patient and she is describing episodes of dizziness described as a spinning sensation of her body and this seems to be precipitated by movement although it does not occur if she rolls over in bed.  If she stands up and st    Benign essential hypertension 04/08/2016    Bilateral sensorineural hearing loss 03/31/2011 03/31/2011--etiology reveals reverse \"cookie bite\" type of hearing loss for both ears of mild/moderate degree, mostly sensorineural.  Speech discrimination 100% on the right, 96% on the left.    Carotid artery plaque, 10/03/2014--mild bilateral carotid artery plaque. 07/25/2012    10/03/2014--[facility] screening revealed mild bilateral carotid plaque, negative for atrial fibrillation, negative for AAA, negative for PAD, osteoporosis screen revealed osteopenia.  Body mass index was 25 and considered to be moderate risk.  07/25/2012--vascular screen negative for carotid plaque, negative for abdominal aneurysm, negative for PAD Description: 10/03/2014--[facility] screening revealed mild bilateral carotid plaque, negative for atrial fibrillation, negative for AAA, negative for PAD, osteoporosis screen revealed osteopenia.  Body mass index was 25 and considered to be moderate risk.  07/25/2012--vascular screen negative for carotid plaque, negative for abdominal aneurysm, negative for PAD    Chronic anemia 08/23/2016 06/13/2017--colonoscopy revealed melanosis.  Biopsied.  There was friability with contact bleeding in the ascending colon.  Biopsied.  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  06/13/2017--EGD revealed normal esophagus.  Biopsies taken.  There was a medium-sized hiatal hernia.  Localized mild inflammation and linear erosions were found in the prepyloric region.  Biopsied.  Examined duodenum was normal.  Random biopsies taken.  Pathology of the gastroesophageal junction was unremarkable as was the gastric fundus.  Prepyloric biopsy revealed minimal chronic inflammation and reactive change.  H pylori negative.  Duodenal biopsies are negative.  04/12/2017--hemoglobin low at 10.8, hematocrit is normal at 35.7.  Iron sulfate 325 mg per day initiated.  08/23/2016--routine follow-up.  Patient continues to have epigastric abdominal pain believed to be related to reflux with possible esophageal spasm.  Hemoglobin noted to be low at 10.6 with a hematocrit low at 33.7 and RDW elevated at 16.2.  Homocysti    Chronic fatigue 04/21/2016 06/14/2017--overnight oximetry revealed oxygen desaturation index of only 0.44.  There were only 10 total desaturations during the period of testing which lasted 6 hours and 46 minutes.  05/31/2017--patient seen in follow-up and reports she continues to have chronic fatigue as well as hypersomnolence.  Once again, she is on multiple medications that are undoubtedly contributing to this problem including clonazepam and hydrocodone but I doubt that these could be discontinued.  Her CBC back in April revealed a low hemoglobin of 10.8 but hematocrit was normal at 35.7.  Thyroid function tests were normal and CMP normal.  Overnight oximetry test ordered.  Repeat CBC.  Iron studies.  Sedimentation rate and CRP.  04/11/2017--patient seen in follow-up and her blood pressure is now at a reasonable level at 120/64.  She reports she still feels somewhat fatigued but she is much better.  Patient has not been doing any regular exercise and I do think that that would be helpful to reduce her feeling of fatigue.  She is    Chronic gastric ulcer  04/14/2014    02/15/2017--patient seen in follow-up in nearly 6 months later.  She has complaints of not feeling well all over.  She has complaints of diffuse myalgias and possibly tendinopathy related to Levaquin that I called in prior to her going to [place] for vacation.  She reports that 3 or 4 days after starting the Levaquin she began to have the musculoskeletal symptoms and she reports that she continues to have them presently.  Symptoms are worse involving her left calf area.  Examination reveals significant tenderness involving the left calf and the left calf seems a little larger than the right.  She also has complaints of shortness of breath but no chest pain.  She is complaining of double vision and generalized weakness and fatigue.  She was complaining of chronic fatigue at the last visit back in September and I ordered an overnight oximetry test but apparently this was never done for reasons that are not clear to me.  Her current oxygen saturation is 94% and normally it is 100%.  Plan is as follows: Extensi    Chronic gastritis 11/19/2001    02/15/2017--patient seen in follow-up in nearly 6 months later.  She has complaints of not feeling well all over.  She has complaints of diffuse myalgias and possibly tendinopathy related to Levaquin that I called in prior to her going to [place] for vacation.  She reports that 3 or 4 days after starting the Levaquin she began to have the musculoskeletal symptoms and she reports that she continues to have them presently.  Symptoms are worse involving her left calf area.  Examination reveals significant tenderness involving the left calf and the left calf seems a little larger than the right.  She also has complaints of shortness of breath but no chest pain.  She is complaining of double vision and generalized weakness and fatigue.  She was complaining of chronic fatigue at the last visit back in September and I ordered an overnight oximetry test but apparently  this was never done for reasons that are not clear to me.  Her current oxygen saturation is 94% and normally it is 100%.  Plan is as follows: Extensi    Chronic hoarseness 06/08/2020    September 15, 2020--patient presents with complaints of swelling of the soft tissues of the left side of the neck and this is associated with pain and discomfort, particularly when she turns her head rotating to the left.  She also notices hoarseness and feels that her voice has changed.  On exam there is definite prominence of the left sternocleidomastoid muscle and there may be some shotty lymph    Chronic lower back pain 01/31/2006 08/11/2014--MRI of the lumbar spine reveals the conus terminates at L2 and is normal.  Cauda equina unremarkable.  Stable to moderate degenerative disc disease at L5-S1.  No acute fracture or pars defect is demonstrated.  Small synovial cysts are seen posterior to the L4-L5 facet.  The perivertebral soft tissues are unremarkable.  T12-L1, L1-L2, L2-L3 are negative.  L3-L4 a broad-based disc bulge resulting in mild bilateral neural foraminal narrowing.  L4-L5 reveals a broad-based disc bulge facet disease resulting in mild bilateral neural foraminal narrowing.  L5-S1 reveals a broad-based disc bulge, posterior osseous slipping, and facet disease resulting in mild to moderate bilateral neural foraminal narrowing.  Assessment is stable mild to moderate degenerative spondylosis.  Small synovial cysts are seen posterior to the L4-L5 facets.  08/11/2014--MRI of the thoracic spine reveals mild to moderate thoracic kyphosis.  No fracture.  At T5--T6 there is a moderate sized left paracentral disc protrusion which i    Chronic migraine 11/03/2009 01/18/2010--MRI of the brain performed for headaches and memory loss.  Mild small vessel disease in the cerebral and central pontine white matter.  There is an ovoid, somewhat pancake-shaped area of signal abnormality in the anterior inferior right temporal lobe  subcortical to the juxtacortical white matter that measures 1.2 x 1 cm and anterior posterior and medial lateral dimension but only measures 3 mm in cranial caudal dimension.  I suspect that this is a benign cyst or a cystic area of encephalomalacia.  The remainder of the MRI of the head is within normal limits.  11/03/2009--CT scan of the brain without contrast performed for headache after a fall.  Mild diffuse atrophy.  No acute abnormality noted.; Description: Patient has had a long history of migraine headaches.  MRI and CT scan of the brain have been essentially negative.    Chronic otitis externa 04/08/2016    Chronic renal insufficiency, stage II (mild), creatinine 1.12 11/14/2015 11/14/2015--serum creatinine mildly elevated at 1.12.    Depression with anxiety 04/08/2016    Frequent falls 02/05/2021 February 5, 2021--patient seen in follow-up by Dr. Weir.  I extensively reviewed the documentation from the emergency room visit as noted below.  I reviewed the history with the patient and she is describing episodes of dizziness described as a spinning sensation of her body and this seems to be precipitated by movement although it does not occur if she rolls over in bed.  If she stands up and st    Functional murmur, 07/15/2015--normal echocardiogram. 07/15/2015    07/15/2015--echocardiogram reveals normal left ventricular size and function with ejection fraction 55%.  Grade 1 diastolic dysfunction, abnormal relaxation pattern.  Trace tricuspid regurgitation.  Estimated right ventricular systolic pressure is 25 mmHg which is normal.    Gastroesophageal reflux disease without esophagitis 06/06/2019    Generalized anxiety disorder 04/11/2017    Glaucoma     History of Acute deep vein thrombosis (DVT) of distal end of left lower extremity 02/15/2017    03/21/2017 patient seen in follow-up and she is tolerating the Eliquis well without signs or symptoms of bleeding.  Her calf swelling and tenderness is better  but not totally resolved.  I suspect that the DVT is chronic and may not resolve at all.  I will order a repeat venous study and then proceed from there.  02/23/2017--repeat Doppler venous study of the left lower extremity reveals a chronic left lower extremity DVT in the posterior tibial.  All other left sided veins appeared normal.  Fluid collection in the left calf noted.  02/17/2017--patient seen in follow-up and reports her left lower extremity symptoms are about the same.  She continues to have complaints of profound fatigue which I think is multifactorial including underlying depression that is not in remission.  Review the results of the CTA of the chest including the possible thyroid lesion.  I do not think this is contributing to any of her symptoms of fatigue particularly given the fact that her thyroid function tests are normal.  I expla    History of palpitations 12/07/2011    Patient has had multiple admissions to the hospital for complaints of chest pain and heart palpitations. She meant admitted at least on 3 occasions. She has had at least 3 stress Cardiolite and 1 stress ECG, the last Cardiolite being performed 12/07/2011 which was negative. Patient is also had Holter monitors which have been unremarkable.    HIstory of Schatzki's ring 02/28/2012 02/28/2012--air-contrast upper GI revealed small to moderate sized reducible sliding hiatal herniation of the upper stomach with some demonstrated gastroesophageal reflux. No esophageal, gastric, or duodenal mass or mucosal ulceration was seen.  11/03/2008--EGD performed for evaluation of iron deficiency anemia revealed hiatal hernia without evidence of reflux, prepyloric antritis and    Hyperlipidemia 04/08/2016    Hypersomnolence 05/05/2016 06/14/2017--overnight oximetry revealed oxygen desaturation index of only 0.44.  There were only 10 total desaturations during the period of testing which lasted 6 hours and 46 minutes.  05/31/2017--patient seen  in follow-up and reports she continues to have chronic fatigue as well as hypersomnolence.  Once again, she is on multiple medications that are undoubtedly contributing to this problem including clonazepam and hydrocodone but I doubt that these could be discontinued.  Her CBC back in April revealed a low hemoglobin of 10.8 but hematocrit was normal at 35.7.  Thyroid function tests were normal and CMP normal.  Overnight oximetry test ordered.  Repeat CBC.  Iron studies.  Sedimentation rate and CRP.  04/11/2017--patient seen in follow-up and her blood pressure is now at a reasonable level at 120/64.  She reports she still feels somewhat fatigued but she is much better.  Patient has not been doing any regular exercise and I do think that that would be helpful to reduce her feeling of fatigue.  She is    Menopausal state 04/08/2016    Multinodular goiter 02/17/2017 02/21/2017--thyroid ultrasound reveals multinodular thyroid with largest nodule measuring on the order of 1.6 cm in greatest dimension.  02/17/2017--patient seen in follow-up for DVT and CTA of the chest.  An incidental finding on the CTA of the chest reveals a 1.7 cm lesion in the right lobe of thyroid.  Note that thyroid function tests are currently normal.  Ultrasound of the thyroid ordered.    Osteoporosis, 03/29/2011--lumbar spine -2.8.  Right femoral neck -2.4.  Left femoral neck -2.4.  Patient receives Reclast infusions. 02/09/2009 04/19/2017--Reclast infusion.  04/05/2016--Reclast infusion.  06/04/2012--Reclast infusion.  05/26/2011--Reclast infusion.  03/29/2011--DEXA scan revealed a lumbar T score of -2.8, right femoral neck T score -2.4, left femoral neck T score -2.4.  Osteoporosis of the lumbar spine and severe osteopenia of the hips bilaterally.  Patient has been intolerant to Fosamax because of gastritis and gastroesophageal reflux.  04/01/2009--treatment for osteoporosis begun with Fosamax.  02/09/2009--DEXA scan revealed lumbar spine T  score of -3.3.  Left femoral neck T score -2.5.  Right hip T score -2.6.  Osteoporosis.     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  CT scan of the abdomen and pelvis reveals a left renal cyst measuring 5.1 x 4.9 cm.  There were subcentimeter hypodense renal lesions that are too small to further characterize and are presumably related to cysts.  Recommend attention to follow up.  Distal dilated pancreatic duct noted.  The common bile duct is the upper limits of normal in size.  The ampullary region is not well evaluated.  Comparison with prior imaging is recommended if available.  If there is no prior film available for comparison, and ERCP or MRCP could be performed to further evaluate.  Small hiatal hernia noted.  03/05/2012--CT scan of the abdomen with contrast, pancreatic protocol.  This reveals some fullness of the pancreatic ductal system and apparent pancreatic divisum with separate entrance of the accessory pancreatic duct extending into the duodenum distal to the main pancreatic duct.  There is fullness of the duct diffusely but similar to the previous s    Parkinson's disease with dyskinesia and fluctuating manifestations 12/14/2023 December 19, 2023--MRI of the brain without contrast reveals stable findings compared to the prior study dated May 28, 2021.  There are moderate changes of chronic small vessel ischemic phenomena.  Additionally there are findings consistent with an end of the insulin large perivascular space within the anterior portion of the right temporal lobe measuring 1.7 x 1.2 cm in greatest axial dimensions.    Pedal edema 06/29/2015 06/29/2015--patient presents with a 4-6 week history of exertional dyspnea that comes on with activity such as climbing stairs or walking her dog up an incline.  No chest pain.  Relieved with rest.  No cough.  She  does have complaints of her feet and legs swelling that is particularly worse at the end of the day and not improved overnight.  No orthopnea or PND.  Chest exam reveals faint rales at the bases.  Otherwise clear.  Heart is regular and I do not appreciate a heart murmur.  Chest x-ray PA and lateral ordered.  Echocardiogram ordered.    Pulmonary hypertension 04/18/2019    Restless legs syndrome 04/08/2016    Short-term memory loss 05/04/2018 05/04/2018--patient reports development of problems with her memory over the past several months and is concerned about possibility of Alzheimer's.  No other neurologic symptoms other than occasional sensation of being off balance.  We will not evaluate that problem at the present time unless it gets worse.  I we will however send her for neuropsychological testing regarding memory loss.      Simple renal cyst 07/20/2009 09/29/2014--patient was again evaluated by the urologist for a renal cyst.  CT scan of the abdomen and pelvis reveals a left renal cyst measuring 5.1 x 4.9 cm.  There were subcentimeter hypodense renal lesions that are too small to further characterize and are presumably related to cysts.  Recommend attention to follow up.  Distal dilated pancreatic duct noted.  The common bile duct is the upper limits of normal in size.  The ampullary region is not well evaluated.  Comparison with prior imaging is recommended if available.  If there is no prior film available for comparison, and ERCP or MRCP could be performed to further evaluate.  Small hiatal hernia noted.  07/20/2009--patient was noted to have a left renal mass consistent with a cyst.  This was evaluated by the urologist 07/20/2009.    Stage 3a chronic kidney disease 11/14/2015 11/14/2015--serum creatinine mildly elevated at 1.12.      Statin intolerance 10/30/2017    Tinnitus of both ears 09/30/2019 September 30, 2019--patient presents with a several year history of bilateral tinnitus, right  greater than left.  It seems to be getting worse and now is associated with fluctuating hearing.  She occasionally will have worsening hearing after she coughs or sneezes.  On exam she has evidence of old otitis media scars, particularly on the right.  There is no acute infection present and there is no a    Tremor of left hand 12/14/2023    Vitamin D deficiency 04/08/2016         Past Surgical History:   Procedure Laterality Date    APPENDECTOMY  1965    1965    CATARACT EXTRACTION Bilateral Remote    Remote bilateral cataract extirpation with intraocular lens implantation.    CHOLECYSTECTOMY WITH INTRAOPERATIVE CHOLANGIOGRAM N/A 01/21/2019 01/21/2019--laparoscopic paraesophageal hernia repair with fundoplication.    COLONOSCOPY  11/03/2008 2008--normal colonoscopy    COLONOSCOPY  2001 2001--normal colonoscopy.    COLONOSCOPY N/A 06/13/2017 06/13/2017--colonoscopy revealed melanosis.  Biopsied.  There was friability with contact bleeding in the ascending colon.  Biopsied.  Pathology returned consistent with melanosis coli.    ENDOSCOPY  10/08/2014    02/28/2012--air-contrast upper GI revealed small to moderate sized reducible sliding hiatal herniation of the upper stomach with some demonstrated gastroesophageal reflux. No esophageal, gastric, or duodenal mass or mucosal ulceration was seen. 11/03/2008--EGD performed for evaluation of iron deficiency anemia revealed hiatal hernia without evidence of reflux, prepyloric antritis and    ENDOSCOPY  11/03/2008 11/03/2008--EGD performed for evaluation of iron deficiency anemia revealed hiatal hernia without evidence of reflux, prepyloric antritis and    ENDOSCOPY  11/19/2001 11/19/2001--EGD revealed a very tortuous distal esophagus with a Schatzki's ring. No ulcer or erosions. No Dorado's mucosa. Stomach revealed patchy erythema and erosions in the antrum. Biopsy. Normal pylorus with no obstruction. Normal duodenum with no ulcers. The Schatzki's ring  was dilated with a Rhodes dilator.;    ENDOSCOPY N/A 09/27/2016 09/27/2016--EGD revealed a normal oropharynx, esophagus, and medium sized hiatal hernia.  Nonbleeding gastric ulcer with no stigmata of bleeding.  Biopsy.  Gastritis.  Biopsy.  Normal examined duodenum.  Biopsied.  Pathology returned mild to moderate chronic active gastritis with ulceration from the stomach antral ulcer biopsy.  Gastroesophageal junction biopsy revealed minimal mixed inflammation.    ENDOSCOPY N/A 06/13/2017 06/13/2017--colonoscopy revealed melanosis.  Biopsied.  There was friability with contact bleeding in the ascending colon.  Biopsied.  Pathology returned consistent with melanosis coli.    ERCP N/A 01/22/2019 01/22/2019--ERCP with sphincterotomy and balloon stone extraction.    ESOPHAGEAL DILATATION  11/19/2001 11/19/2001--EGD revealed a very tortuous distal esophagus with a Schatzki's ring. No ulcer or erosions. No Dorado's mucosa. Stomach revealed patchy erythema and erosions in the antrum. Biopsy. Normal pylorus with no obstruction. Normal duodenum with no ulcers. The Schatzki's ring was dilated with a Rhodes dilator.;     ESOPHAGEAL MANOMETRY N/A 12/18/2018    Procedure: ESOPHAGEAL MANOMETRY;  Surgeon: Cecilia, Nurse Performed;  Location: [facility] ENDOSCOPY;  Service: Gastroenterology    ESOPHAGOSCOPY N/A 01/21/2019    Procedure: FLEXIBLE ESOPHAGOSCOPY;  Surgeon: Reji Johnson MD;  Location: [facility] OR;  Service: General    HIATAL HERNIA REPAIR N/A 01/21/2019    Procedure: Laparoscopic paraesophageal hernia repair with toupee fundoplication;  Surgeon: Reji Johnson MD;  Location: [facility] OR;  Service: General    INCONTINENCE SURGERY  1979 1979--a bladder tack procedure for urinary stress incontinence    KNEE ARTHROSCOPY Right 12/12/2011 12/12/2011--right knee arthroscopy with partial lateral and medial meniscectomies.    SKIN SURGERY  05/25/2010 05/25/2010--skin lesion excised  from right lower extremity. 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    TONSILLECTOMY  1953    TOTAL ABDOMINAL HYSTERECTOMY WITH SALPINGO OOPHORECTOMY  1974 1974--total abdominal hysterectomy.           Current Outpatient Medications:     Brexpiprazole (Rexulti) 1 MG tablet, Take 2 mg by mouth Daily., Disp: , Rfl:     buPROPion XL (WELLBUTRIN XL) 300 MG 24 hr tablet, Take 1 tablet by mouth Every Morning., Disp: , Rfl:     carbidopa-levodopa CR (SINEMET CR)  MG per CR tablet, TAKE 1 TABLET BY MOUTH ONCE DAILY AT NIGHT, Disp: 90 tablet, Rfl: 3    Cholecalciferol (vitamin D3) 125 MCG (5000 UT) capsule capsule, 1 by mouth daily as directed, Disp: 30 capsule, Rfl:     diphenhydrAMINE-APAP, sleep, (Tylenol PM Extra Strength)  MG/30ML liquid, Tylenol PM Extra Strength, Disp: , Rfl:     donepezil (ARICEPT) 5 MG tablet, Take 1 tablet by mouth every night at bedtime., Disp: , Rfl:     estradiol (ESTRACE) 1 MG tablet, Take 1 tablet by mouth once daily, Disp: 90 tablet, Rfl: 1    fluticasone (FLONASE) 50 MCG/ACT nasal spray, 2 sprays into the nostril(s) as directed by provider Daily., Disp: 18 mL, Rfl: 6    HYDROcodone-acetaminophen (NORCO)  MG per tablet, Take 1 tablet by mouth Every 6 (Six) Hours As Needed for Moderate Pain., Disp: , Rfl:     lisinopril (PRINIVIL,ZESTRIL) 2.5 MG tablet, , Disp: , Rfl:     methylphenidate 18 MG CR tablet, Take 1 tablet by mouth Every Morning, Disp: , Rfl:     Miebo 1.338 GM/ML solution, instill 1 drop by ophthalmic route 2-4 times every day into both eyes, Disp: , Rfl:     Misc Natural Products (COLON CLEANSE PO), Take  by mouth., Disp: , Rfl:     omeprazole (priLOSEC) 40 MG capsule, Take 1 (40 mg) daily 1/2-hour to 1 hour prior to a meal, Disp: 30 capsule, Rfl: 3    spironolactone (ALDACTONE) 100 MG tablet, Take 1/2 (one-half) tablet by mouth once daily, Disp: 30 tablet, Rfl: 0    traZODone (DESYREL) 50 MG tablet, Take 0.5-1 tablets by mouth Every  Night., Disp: , Rfl:     venlafaxine XR (EFFEXOR-XR) 150 MG 24 hr capsule, Take 1 capsule by mouth Every Morning., Disp: , Rfl:     venlafaxine XR (EFFEXOR-XR) 75 MG 24 hr capsule, Take one 75 mg capsule venlafaxine along with one 150 mg capsule daily for depression.  Total daily dose is 225 mg., Disp: , Rfl:     vitamin E 400 UNIT capsule, Take 1 capsule by mouth Daily., Disp: , Rfl:     carbidopa-levodopa (SINEMET)  MG per tablet, Take 1 tablet by mouth 3 (Three) Times a Day., Disp: 90 tablet, Rfl: 2    cycloSPORINE (RESTASIS) 0.05 % ophthalmic emulsion, Apply 1 drop to eye(s) as directed by provider Every 12 (Twelve) Hours. (Patient not taking: Reported on 4/22/2025), Disp: , Rfl:     EQ All Day Allergy Relief 10 MG tablet, Take 1 tablet by mouth once daily (Patient not taking: Reported on 4/22/2025), Disp: 90 tablet, Rfl: 0      Family History   Problem Relation Age of Onset    Heart attack Mother         dies age 47 from heart attack    Heart disease Mother     Bleeding Disorder Mother     Heart disease Father     Ovarian cancer Maternal Grandmother     Heart attack Maternal Aunt         dies age 60 from heart attack    Malig Hyperthermia Neg Hx     Breast cancer Neg Hx          Social History     Socioeconomic History    Marital status:      Spouse name: ander    Number of children: 4    Years of education: 14    Highest education level: Associate degree: academic program   Tobacco Use    Smoking status: Never    Smokeless tobacco: Never    Tobacco comments:     None   Vaping Use    Vaping status: Never Used   Substance and Sexual Activity    Alcohol use: No    Drug use: No    Sexual activity: Not Currently     Partners: Male     Birth control/protection: Diaphragm, Birth control pill, Hysterectomy         Allergies   Allergen Reactions    Penicillin G Anaphylaxis    Statins Nausea And Vomiting    Tetanus Toxoid Itching    Morphine Hallucinations    Penicillins Itching    Tetanus Toxoids Itching  "        Pain Scale: 0/10        ROS:  Review of Systems   HENT:  Positive for rhinorrhea. Negative for drooling and trouble swallowing.    Eyes:  Positive for visual disturbance.   Musculoskeletal:  Positive for gait problem.   Neurological:  Positive for dizziness, tremors, weakness and numbness.     Physical Exam:  Vitals:    04/22/25 1502   BP: 124/76   Pulse: 86   SpO2: 98%   Weight: 59 kg (130 lb)   Height: 152.4 cm (60\")       Orthostatic BP:       Physical Exam  General: Well-developed white female no acute distress  HEENT: Normocephalic no evidence of trauma.  Hypomimia and reduced lid blink  Neck: Supple.    Heart: Regular rate and rhythm  Extremities: Radial pulses strong and simultaneous.  No pedal edema.  Bilateral lower extremity dyskinesia      Neurological Exam:   Mental Status: Awake, alert, oriented to person, place and time.  Conversant without evidence of an affective disorder, thought disorder, delusions or hallucinations.  Attention span and concentration are normal.  HCF: No aphasia, apraxia or dysarthria.  Hypophonia.  Recent and remote memory intact.  Knowledge of recent events intact.  CN: I:   II: Visual fields full without left inattention   III, IV, VI: Eye movements intact without nystagmus or ptosis.  Pupils equal round and reactive to light.   V,VII: Light touch and pinprick intact all 3 divisions of V.  Facial muscles symmetrical.   VIII: Hearing intact to finger rub   IX,X: Soft palate elevates symmetrically   XI: Sternomastoid and trapezius are strong.   XII: Tongue midline without atrophy or fasciculations    Motor: Normal bulk in the upper and lower extremities.  Minimal cogwheeling right arm, mild cogwheeling left arm    Power testing: Good strength in all muscles tested in the arms and legs    Reflexes: Upper extremities:        Lower extremities:        Toe signs:        Clonus:     Sensory: Light touch:        Pinprick:        Vibration:        Position:        " Temperature:    Cerebellar: Finger-to-nose: Slow.  Very minimal postural tremor.  No resting tremor.  Intermittent jaw tremor.           Rapid movement: Slow and reduced amplitude           Heel-to-shin:    Gait and Station: Comes to stand pushing off the chair.  Reduced step length.  Almost no arm swing.  No tremor activated with ambulation.  Turns en bloc.  No freezing or festination's.  No drift.      Results:    Lab Results   Component Value Date    GLUCOSE 86 09/10/2024    BUN 17 09/10/2024    CREATININE 1.03 (H) 09/10/2024    EGFRIFNONA 45 (L) 11/17/2021    EGFRIFAFRI 51 (L) 03/09/2020    BCR 17 09/10/2024    CO2 24 09/10/2024    CALCIUM 9.3 09/10/2024    ALBUMIN 4.0 09/10/2024    AST 17 09/10/2024    ALT 6 09/10/2024     Lab Results   Component Value Date    WBC 10.8 09/10/2024    HGB 13.7 09/10/2024    HCT 43.1 09/10/2024    MCV 93 09/10/2024     09/10/2024     Lab Results   Component Value Date    RPR Non-Reactive 10/14/2021     Lab Results   Component Value Date    TSH 1.160 09/10/2024     Lab Results   Component Value Date    AWGDXKAB49 451 06/07/2023     Lab Results   Component Value Date    FOLATE 7.50 10/14/2021     Lab Results   Component Value Date    SEDRATE 8 06/07/2023     Lab Results   Component Value Date    CRP 0.77 (H) 10/22/2020         Assessment:    1.  Parkinson's disease-worsening dyskinesia when switching from carbidopa/levodopa IR to CR 3 times a day.  Previously tried amantadine which was not effective.  Discussed with patient switching back to carbidopa/levodopa IR 25/100 mg 1 tablet 3 times a day in addition to CR 50/200 mg 1 tablet at bedtime.  Also discussed referral to movement disorder specialist to discuss if patient is a surgical candidate for DBS or focused ultrasound.  Patient in agreement.  2.  MCI-stable on donepezil 5 mg nightly per psychiatry  3.  Sensory peripheral neuropathy-no change  4.  High falls risk-no recent falls           Assessment and Plan   Diagnoses  and all orders for this visit:    1. Parkinson's disease with dyskinesia without fluctuating manifestations (Primary)  -     carbidopa-levodopa (SINEMET)  MG per tablet; Take 1 tablet by mouth 3 (Three) Times a Day.  Dispense: 90 tablet; Refill: 2  -     Ambulatory Referral to Neurology    2. MCI (mild cognitive impairment)    3. At high risk for falls    4. Sensory peripheral neuropathy        Ideal targets for risk factor control would be Blood pressure < 130/80, B12 > 500, TSH in normal range and LDL < 70; HbA1c < 6.5 and smoking cessation if applicable. Call 911 for stroke symptoms.  Recommend 150 minutes of physical activity weekly.  Limit alcohol intake.  Discontinue carbidopa/levodopa CR 25/100 mg  Restart carbidopa/levodopa IR 25/100 mg 1 tablet 3 times a day  Continue carbidopa/levodopa CR 50/200 mg 1 tablet at bedtime  Call [facility] Parkinson's clinic for physical therapy appointment  Referral to [facility]  Follow-up in 2 months or sooner if needed    Avoid taking your carbidopa/levodopa with food (particularly protein).  Take one hour before or 2 hours after meals.  - Side effects include nausea and dizziness upon standing.  - At higher doses, may cause excessive movements called dyskinesias, confusion, or hallucinations.     Check out the Parkinson's Foundation at [email or web address], the [employer] at [email or web address], or [email or web address]    Consider increasing exercise with yoga, dance, garrison chi, boxing, or music therapy.    Some [facility] gyms offer a free Parkinson's clinic.  Call your local [facility] to see if it is available.  Rock steady boxing is also a great boxing based fitness curriculum for Parkinson's disease.  [facility] offers [employer] boxing.   [employer] [facility] for speech.    [facility] offers a free Parkinson's exercise class.     Consider cognitive exercise as well such as puzzles, word search, Sudoku, etc.     For constipation, increase  water intake, eat fruits and vegetables, whole grains, exercise, consider polythylene glycol (Miralax) or bisacodyl suppository, or abdominal massage.      For sleep and vivid dreams, consider melatonin 5-10 mg over-the-counter supplement.    ROCK KENIA Shearer  [address] [place] [address]  [phone], [phone]  [email or web address]     In-Person: Monday and Wednesday, 9:30 AM  [facility]  [address] [address]     Zoom: Monday, Wednesday, Friday 9:30 AM  ID# [phone]  Website: [employer]  Facebook: [facility]  YouTube: [employer]       Time: Spent 60 minutes in total encounter time. This included time for chart review, obtaining history, performing pertinent physical examination, updating medical information, ordering tests/referrals, discussion of diagnosis, medical management, counseling, and encounter documentation.        LESLI Charles          Much of this encounter note was dictated utilizing Dragon dictation which is an electronic transcription/translation of spoken language to printed text. The electronic translation of spoken language may permit erroneous, or at times, nonsensical words or phrases to be inadvertently transcribed; Although I have reviewed the note for such errors, some may still exist.    Portions of this assessment have been copied from previous documentation which has been thoroughly reviewed and updated as appropriate.

## 2025-04-22 ENCOUNTER — HOSPITAL ENCOUNTER (EMERGENCY)
Facility: HOSPITAL | Age: 85
Discharge: HOME | End: 2025-04-23
Attending: EMERGENCY MEDICINE
Payer: MEDICARE

## 2025-04-22 ENCOUNTER — APPOINTMENT (OUTPATIENT)
Dept: GENERAL RADIOLOGY | Facility: HOSPITAL | Age: 85
End: 2025-04-22
Payer: MEDICARE

## 2025-04-22 ENCOUNTER — OFFICE VISIT (OUTPATIENT)
Dept: NEUROLOGY | Facility: CLINIC | Age: 85
End: 2025-04-22
Payer: MEDICARE

## 2025-04-22 VITALS
HEIGHT: 60 IN | SYSTOLIC BLOOD PRESSURE: 124 MMHG | WEIGHT: 130 LBS | HEART RATE: 86 BPM | BODY MASS INDEX: 25.52 KG/M2 | DIASTOLIC BLOOD PRESSURE: 76 MMHG | OXYGEN SATURATION: 98 %

## 2025-04-22 VITALS
TEMPERATURE: 98 F | DIASTOLIC BLOOD PRESSURE: 81 MMHG | HEIGHT: 60 IN | HEART RATE: 79 BPM | SYSTOLIC BLOOD PRESSURE: 147 MMHG | RESPIRATION RATE: 16 BRPM | OXYGEN SATURATION: 96 % | WEIGHT: 130 LBS | BODY MASS INDEX: 25.52 KG/M2

## 2025-04-22 DIAGNOSIS — S61.412A LACERATION OF LEFT HAND WITHOUT FOREIGN BODY, INITIAL ENCOUNTER: Primary | ICD-10-CM

## 2025-04-22 DIAGNOSIS — G20.B1 PARKINSON'S DISEASE WITH DYSKINESIA WITHOUT FLUCTUATING MANIFESTATIONS: Primary | ICD-10-CM

## 2025-04-22 DIAGNOSIS — G31.84 MCI (MILD COGNITIVE IMPAIRMENT): ICD-10-CM

## 2025-04-22 DIAGNOSIS — G60.8 SENSORY PERIPHERAL NEUROPATHY: ICD-10-CM

## 2025-04-22 DIAGNOSIS — Z91.81 AT HIGH RISK FOR FALLS: ICD-10-CM

## 2025-04-22 PROCEDURE — 99283 EMERGENCY DEPT VISIT LOW MDM: CPT

## 2025-04-22 PROCEDURE — 25010000002 TETANUS-DIPHTH-ACELL PERTUSSIS 5-2.5-18.5 LF-MCG/0.5 SUSPENSION PREFILLED SYRINGE: Performed by: EMERGENCY MEDICINE

## 2025-04-22 PROCEDURE — 3074F SYST BP LT 130 MM HG: CPT

## 2025-04-22 PROCEDURE — 99284 EMERGENCY DEPT VISIT MOD MDM: CPT | Performed by: EMERGENCY MEDICINE

## 2025-04-22 PROCEDURE — 3078F DIAST BP <80 MM HG: CPT

## 2025-04-22 PROCEDURE — 1159F MED LIST DOCD IN RCRD: CPT

## 2025-04-22 PROCEDURE — 90471 IMMUNIZATION ADMIN: CPT | Performed by: EMERGENCY MEDICINE

## 2025-04-22 PROCEDURE — 12001 RPR S/N/AX/GEN/TRNK 2.5CM/<: CPT | Performed by: EMERGENCY MEDICINE

## 2025-04-22 PROCEDURE — 25010000002 LIDOCAINE PF 1% 1 % SOLUTION: Performed by: EMERGENCY MEDICINE

## 2025-04-22 PROCEDURE — 90715 TDAP VACCINE 7 YRS/> IM: CPT | Performed by: EMERGENCY MEDICINE

## 2025-04-22 PROCEDURE — 1160F RVW MEDS BY RX/DR IN RCRD: CPT

## 2025-04-22 PROCEDURE — 99215 OFFICE O/P EST HI 40 MIN: CPT

## 2025-04-22 PROCEDURE — 73130 X-RAY EXAM OF HAND: CPT

## 2025-04-22 RX ORDER — CARBIDOPA AND LEVODOPA 25; 100 MG/1; MG/1
1 TABLET ORAL 3 TIMES DAILY
Qty: 90 TABLET | Refills: 2 | Status: SHIPPED | OUTPATIENT
Start: 2025-04-22

## 2025-04-22 RX ORDER — PERFLUOROHEXYLOCTANE 1 MG/MG
SOLUTION OPHTHALMIC
COMMUNITY
Start: 2025-03-26

## 2025-04-22 RX ORDER — LIDOCAINE HYDROCHLORIDE 10 MG/ML
10 INJECTION, SOLUTION EPIDURAL; INFILTRATION; INTRACAUDAL; PERINEURAL ONCE
Status: COMPLETED | OUTPATIENT
Start: 2025-04-22 | End: 2025-04-22

## 2025-04-22 RX ORDER — LISINOPRIL 2.5 MG/1
TABLET ORAL
COMMUNITY

## 2025-04-22 RX ORDER — LIDOCAINE HYDROCHLORIDE AND EPINEPHRINE 10; 10 MG/ML; UG/ML
10 INJECTION, SOLUTION INFILTRATION; PERINEURAL ONCE
Status: DISCONTINUED | OUTPATIENT
Start: 2025-04-22 | End: 2025-04-22

## 2025-04-22 RX ORDER — AMANTADINE HYDROCHLORIDE 100 MG/1
1 CAPSULE, GELATIN COATED ORAL EVERY 12 HOURS SCHEDULED
COMMUNITY
Start: 2025-03-30 | End: 2025-04-22 | Stop reason: SINTOL

## 2025-04-22 RX ADMIN — TETANUS TOXOID, REDUCED DIPHTHERIA TOXOID AND ACELLULAR PERTUSSIS VACCINE, ADSORBED 0.5 ML: 5; 2.5; 8; 8; 2.5 SUSPENSION INTRAMUSCULAR at 23:10

## 2025-04-22 RX ADMIN — LIDOCAINE HYDROCHLORIDE 10 ML: 10 INJECTION, SOLUTION EPIDURAL; INFILTRATION; INTRACAUDAL; PERINEURAL at 23:12

## 2025-04-23 NOTE — [facility] PROVIDER NOTE
"Subjective   History of Present Illness  84-year-old female patient with chief complaint of left hand laceration.  Patient states around 9:30 PM she was at home.  Patient has history of Parkinson's.  Patient states she tripped over the rug and landed on her right hand against the cabinet.  Patient states her ring lacerated her pinky.  Denies loss of sensation or loss of range of motion to her right pinky.  Patient states last tetanus shot is unknown.  Tetanus is listed as an allergy on patient's chart.  She states she received tetanus as a child which caused a rash.  Patient is requesting tetanus shot update        Review of Systems   Musculoskeletal:  Positive for arthralgias.   All other systems reviewed and are negative.      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 Weir.  I extensively reviewed the documentation from the emergency room visit as noted below.  I reviewed the history with the patient and she is describing episodes of dizziness described as a spinning sensation of her body and this seems to be precipitated by movement although it does not occur if she rolls over in bed.  If she stands up and st    Benign essential hypertension 04/08/2016    Bilateral sensorineural hearing loss 03/31/2011 03/31/2011--etiology reveals reverse \"cookie bite\" type of hearing loss for both ears of mild/moderate degree, mostly sensorineural.  Speech discrimination 100% on the right, 96% on the left.    Carotid artery plaque, 10/03/2014--mild bilateral carotid artery plaque. 07/25/2012    10/03/2014--[facility] screening revealed mild bilateral carotid plaque, negative for atrial fibrillation, negative for AAA, negative for PAD, osteoporosis screen revealed osteopenia.  Body mass index was 25 and considered to be moderate risk.  07/25/2012--vascular screen negative for carotid plaque, negative for abdominal aneurysm, " negative for PAD Description: 10/03/2014--[facility] screening revealed mild bilateral carotid plaque, negative for atrial fibrillation, negative for AAA, negative for PAD, osteoporosis screen revealed osteopenia.  Body mass index was 25 and considered to be moderate risk.  07/25/2012--vascular screen negative for carotid plaque, negative for abdominal aneurysm, negative for PAD    Chronic anemia 08/23/2016 06/13/2017--colonoscopy revealed melanosis.  Biopsied.  There was friability with contact bleeding in the ascending colon.  Biopsied.  Pathology returned consistent with melanosis coli.  06/13/2017--EGD revealed normal esophagus.  Biopsies taken.  There was a medium-sized hiatal hernia.  Localized mild inflammation and linear erosions were found in the prepyloric region.  Biopsied.  Examined duodenum was normal.  Random biopsies taken.  Pathology of the gastroesophageal junction was unremarkable as was the gastric fundus.  Prepyloric biopsy revealed minimal chronic inflammation and reactive change.  H pylori negative.  Duodenal biopsies are negative.  04/12/2017--hemoglobin low at 10.8, hematocrit is normal at 35.7.  Iron sulfate 325 mg per day initiated.  08/23/2016--routine follow-up.  Patient continues to have epigastric abdominal pain believed to be related to reflux with possible esophageal spasm.  Hemoglobin noted to be low at 10.6 with a hematocrit low at 33.7 and RDW elevated at 16.2.  Homocysti    Chronic fatigue 04/21/2016 06/14/2017--overnight oximetry revealed oxygen desaturation index of only 0.44.  There were only 10 total desaturations during the period of testing which lasted 6 hours and 46 minutes.  05/31/2017--patient seen in follow-up and reports she continues to have chronic fatigue as well as hypersomnolence.  Once again, she is on multiple medications that are undoubtedly contributing to this problem including clonazepam and hydrocodone but I doubt that these could be discontinued.  Her  CBC back in April revealed a low hemoglobin of 10.8 but hematocrit was normal at 35.7.  Thyroid function tests were normal and CMP normal.  Overnight oximetry test ordered.  Repeat CBC.  Iron studies.  Sedimentation rate and CRP.  04/11/2017--patient seen in follow-up and her blood pressure is now at a reasonable level at 120/64.  She reports she still feels somewhat fatigued but she is much better.  Patient has not been doing any regular exercise and I do think that that would be helpful to reduce her feeling of fatigue.  She is    Chronic gastric ulcer 04/14/2014    02/15/2017--patient seen in follow-up in nearly 6 months later.  She has complaints of not feeling well all over.  She has complaints of diffuse myalgias and possibly tendinopathy related to Levaquin that I called in prior to her going to [employer] for vacation.  She reports that 3 or 4 days after starting the Levaquin she began to have the musculoskeletal symptoms and she reports that she continues to have them presently.  Symptoms are worse involving her left calf area.  Examination reveals significant tenderness involving the left calf and the left calf seems a little larger than the right.  She also has complaints of shortness of breath but no chest pain.  She is complaining of double vision and generalized weakness and fatigue.  She was complaining of chronic fatigue at the last visit back in September and I ordered an overnight oximetry test but apparently this was never done for reasons that are not clear to me.  Her current oxygen saturation is 94% and normally it is 100%.  Plan is as follows: Extensi    Chronic gastritis 11/19/2001    02/15/2017--patient seen in follow-up in nearly 6 months later.  She has complaints of not feeling well all over.  She has complaints of diffuse myalgias and possibly tendinopathy related to Levaquin that I called in prior to her going to [employer] for vacation.  She reports that 3 or 4 days after starting the  Levaquin she began to have the musculoskeletal symptoms and she reports that she continues to have them presently.  Symptoms are worse involving her left calf area.  Examination reveals significant tenderness involving the left calf and the left calf seems a little larger than the right.  She also has complaints of shortness of breath but no chest pain.  She is complaining of double vision and generalized weakness and fatigue.  She was complaining of chronic fatigue at the last visit back in September and I ordered an overnight oximetry test but apparently this was never done for reasons that are not clear to me.  Her current oxygen saturation is 94% and normally it is 100%.  Plan is as follows: Extensi    Chronic hoarseness 06/08/2020    September 15, 2020--patient presents with complaints of swelling of the soft tissues of the left side of the neck and this is associated with pain and discomfort, particularly when she turns her head rotating to the left.  She also notices hoarseness and feels that her voice has changed.  On exam there is definite prominence of the left sternocleidomastoid muscle and there may be some shotty lymph    Chronic lower back pain 01/31/2006 08/11/2014--MRI of the lumbar spine reveals the conus terminates at L2 and is normal.  Cauda equina unremarkable.  Stable to moderate degenerative disc disease at L5-S1.  No acute fracture or pars defect is demonstrated.  Small synovial cysts are seen posterior to the L4-L5 facet.  The perivertebral soft tissues are unremarkable.  T12-L1, L1-L2, L2-L3 are negative.  L3-L4 a broad-based disc bulge resulting in mild bilateral neural foraminal narrowing.  L4-L5 reveals a broad-based disc bulge facet disease resulting in mild bilateral neural foraminal narrowing.  L5-S1 reveals a broad-based disc bulge, posterior osseous slipping, and facet disease resulting in mild to moderate bilateral neural foraminal narrowing.  Assessment is stable mild to  moderate degenerative spondylosis.  Small synovial cysts are seen posterior to the L4-L5 facets.  08/11/2014--MRI of the thoracic spine reveals mild to moderate thoracic kyphosis.  No fracture.  At T5--T6 there is a moderate sized left paracentral disc protrusion which i    Chronic migraine 11/03/2009 01/18/2010--MRI of the brain performed for headaches and memory loss.  Mild small vessel disease in the cerebral and central pontine white matter.  There is an ovoid, somewhat pancake-shaped area of signal abnormality in the anterior inferior right temporal lobe subcortical to the juxtacortical white matter that measures 1.2 x 1 cm and anterior posterior and medial lateral dimension but only measures 3 mm in cranial caudal dimension.  I suspect that this is a benign cyst or a cystic area of encephalomalacia.  The remainder of the MRI of the head is within normal limits.  11/03/2009--CT scan of the brain without contrast performed for headache after a fall.  Mild diffuse atrophy.  No acute abnormality noted.; Description: Patient has had a long history of migraine headaches.  MRI and CT scan of the brain have been essentially negative.    Chronic otitis externa 04/08/2016    Chronic renal insufficiency, stage II (mild), creatinine 1.12 11/14/2015 11/14/2015--serum creatinine mildly elevated at 1.12.    Depression with anxiety 04/08/2016    Frequent falls 02/05/2021 February 5, 2021--patient seen in follow-up by Dr. Weir.  I extensively reviewed the documentation from the emergency room visit as noted below.  I reviewed the history with the patient and she is describing episodes of dizziness described as a spinning sensation of her body and this seems to be precipitated by movement although it does not occur if she rolls over in bed.  If she stands up and st    Functional murmur, 07/15/2015--normal echocardiogram. 07/15/2015    07/15/2015--echocardiogram reveals normal left ventricular size and function with  ejection fraction 55%.  Grade 1 diastolic dysfunction, abnormal relaxation pattern.  Trace tricuspid regurgitation.  Estimated right ventricular systolic pressure is 25 mmHg which is normal.    Gastroesophageal reflux disease without esophagitis 06/06/2019    Generalized anxiety disorder 04/11/2017    Glaucoma     History of Acute deep vein thrombosis (DVT) of distal end of left lower extremity 02/15/2017    03/21/2017 patient seen in follow-up and she is tolerating the Eliquis well without signs or symptoms of bleeding.  Her calf swelling and tenderness is better but not totally resolved.  I suspect that the DVT is chronic and may not resolve at all.  I will order a repeat venous study and then proceed from there.  02/23/2017--repeat Doppler venous study of the left lower extremity reveals a chronic left lower extremity DVT in the posterior tibial.  All other left sided veins appeared normal.  Fluid collection in the left calf noted.  02/17/2017--patient seen in follow-up and reports her left lower extremity symptoms are about the same.  She continues to have complaints of profound fatigue which I think is multifactorial including underlying depression that is not in remission.  Review the results of the CTA of the chest including the possible thyroid lesion.  I do not think this is contributing to any of her symptoms of fatigue particularly given the fact that her thyroid function tests are normal.  I expla    History of palpitations 12/07/2011    Patient has had multiple admissions to the hospital for complaints of chest pain and heart palpitations. She meant admitted at least on 3 occasions. She has had at least 3 stress Cardiolite and 1 stress ECG, the last Cardiolite being performed 12/07/2011 which was negative. Patient is also had Holter monitors which have been unremarkable.    HIstory of Schatzki's ring 02/28/2012 02/28/2012--air-contrast upper GI revealed small to moderate sized reducible sliding  hiatal herniation of the upper stomach with some demonstrated gastroesophageal reflux. No esophageal, gastric, or duodenal mass or mucosal ulceration was seen.  11/03/2008--EGD performed for evaluation of iron deficiency anemia revealed hiatal hernia without evidence of reflux, prepyloric antritis and    Hyperlipidemia 04/08/2016    Hypersomnolence 05/05/2016 06/14/2017--overnight oximetry revealed oxygen desaturation index of only 0.44.  There were only 10 total desaturations during the period of testing which lasted 6 hours and 46 minutes.  05/31/2017--patient seen in follow-up and reports she continues to have chronic fatigue as well as hypersomnolence.  Once again, she is on multiple medications that are undoubtedly contributing to this problem including clonazepam and hydrocodone but I doubt that these could be discontinued.  Her CBC back in April revealed a low hemoglobin of 10.8 but hematocrit was normal at 35.7.  Thyroid function tests were normal and CMP normal.  Overnight oximetry test ordered.  Repeat CBC.  Iron studies.  Sedimentation rate and CRP.  04/11/2017--patient seen in follow-up and her blood pressure is now at a reasonable level at 120/64.  She reports she still feels somewhat fatigued but she is much better.  Patient has not been doing any regular exercise and I do think that that would be helpful to reduce her feeling of fatigue.  She is    Menopausal state 04/08/2016    Multinodular goiter 02/17/2017 02/21/2017--thyroid ultrasound reveals multinodular thyroid with largest nodule measuring on the order of 1.6 cm in greatest dimension.  02/17/2017--patient seen in follow-up for DVT and CTA of the chest.  An incidental finding on the CTA of the chest reveals a 1.7 cm lesion in the right lobe of thyroid.  Note that thyroid function tests are currently normal.  Ultrasound of the thyroid ordered.    Osteoporosis, 03/29/2011--lumbar spine -2.8.  Right femoral neck -2.4.  Left femoral neck  -2.4.  Patient receives Reclast infusions. 02/09/2009 04/19/2017--Reclast infusion.  04/05/2016--Reclast infusion.  06/04/2012--Reclast infusion.  05/26/2011--Reclast infusion.  03/29/2011--DEXA scan revealed a lumbar T score of -2.8, right femoral neck T score -2.4, left femoral neck T score -2.4.  Osteoporosis of the lumbar spine and severe osteopenia of the hips bilaterally.  Patient has been intolerant to Fosamax because of gastritis and gastroesophageal reflux.  04/01/2009--treatment for osteoporosis begun with Fosamax.  02/09/2009--DEXA scan revealed lumbar spine T score of -3.3.  Left femoral neck T score -2.5.  Right hip T score -2.6.  Osteoporosis.     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  CT scan of the abdomen and pelvis reveals a left renal cyst measuring 5.1 x 4.9 cm.  There were subcentimeter hypodense renal lesions that are too small to further characterize and are presumably related to cysts.  Recommend attention to follow up.  Distal dilated pancreatic duct noted.  The common bile duct is the upper limits of normal in size.  The ampullary region is not well evaluated.  Comparison with prior imaging is recommended if available.  If there is no prior film available for comparison, and ERCP or MRCP could be performed to further evaluate.  Small hiatal hernia noted.  03/05/2012--CT scan of the abdomen with contrast, pancreatic protocol.  This reveals some fullness of the pancreatic ductal system and apparent pancreatic divisum with separate entrance of the accessory pancreatic duct extending into the duodenum distal to the main pancreatic duct.  There is fullness of the duct diffusely but similar to the previous s    Parkinson's disease with dyskinesia and fluctuating manifestations 12/14/2023 December 19, 2023--MRI of the brain without contrast reveals  stable findings compared to the prior study dated May 28, 2021.  There are moderate changes of chronic small vessel ischemic phenomena.  Additionally there are findings consistent with an end of the insulin large perivascular space within the anterior portion of the right temporal lobe measuring 1.7 x 1.2 cm in greatest axial dimensions.    Pedal edema 06/29/2015 06/29/2015--patient presents with a 4-6 week history of exertional dyspnea that comes on with activity such as climbing stairs or walking her dog up an incline.  No chest pain.  Relieved with rest.  No cough.  She does have complaints of her feet and legs swelling that is particularly worse at the end of the day and not improved overnight.  No orthopnea or PND.  Chest exam reveals faint rales at the bases.  Otherwise clear.  Heart is regular and I do not appreciate a heart murmur.  Chest x-ray PA and lateral ordered.  Echocardiogram ordered.    Pulmonary hypertension 04/18/2019    Restless legs syndrome 04/08/2016    Short-term memory loss 05/04/2018 05/04/2018--patient reports development of problems with her memory over the past several months and is concerned about possibility of Alzheimer's.  No other neurologic symptoms other than occasional sensation of being off balance.  We will not evaluate that problem at the present time unless it gets worse.  I we will however send her for neuropsychological testing regarding memory loss.      Simple renal cyst 07/20/2009 09/29/2014--patient was again evaluated by the urologist for a renal cyst.  CT scan of the abdomen and pelvis reveals a left renal cyst measuring 5.1 x 4.9 cm.  There were subcentimeter hypodense renal lesions that are too small to further characterize and are presumably related to cysts.  Recommend attention to follow up.  Distal dilated pancreatic duct noted.  The common bile duct is the upper limits of normal in size.  The ampullary region is not well evaluated.  Comparison with  prior imaging is recommended if available.  If there is no prior film available for comparison, and ERCP or MRCP could be performed to further evaluate.  Small hiatal hernia noted.  07/20/2009--patient was noted to have a left renal mass consistent with a cyst.  This was evaluated by the urologist 07/20/2009.    Stage 3a chronic kidney disease 11/14/2015 11/14/2015--serum creatinine mildly elevated at 1.12.      Statin intolerance 10/30/2017    Tinnitus of both ears 09/30/2019 September 30, 2019--patient presents with a several year history of bilateral tinnitus, right greater than left.  It seems to be getting worse and now is associated with fluctuating hearing.  She occasionally will have worsening hearing after she coughs or sneezes.  On exam she has evidence of old otitis media scars, particularly on the right.  There is no acute infection present and there is no a    Tremor of left hand 12/14/2023    Vitamin D deficiency 04/08/2016       Allergies   Allergen Reactions    Penicillin G Anaphylaxis    Statins Nausea And Vomiting    Tetanus Toxoid Itching    Morphine Hallucinations    Penicillins Itching    Tetanus Toxoids Itching       Past Surgical History:   Procedure Laterality Date    APPENDECTOMY  1965    1965    CATARACT EXTRACTION Bilateral Remote    Remote bilateral cataract extirpation with intraocular lens implantation.    CHOLECYSTECTOMY WITH INTRAOPERATIVE CHOLANGIOGRAM N/A 01/21/2019 01/21/2019--laparoscopic paraesophageal hernia repair with fundoplication.    COLONOSCOPY  11/03/2008 2008--normal colonoscopy    COLONOSCOPY  2001 2001--normal colonoscopy.    COLONOSCOPY N/A 06/13/2017 06/13/2017--colonoscopy revealed melanosis.  Biopsied.  There was friability with contact bleeding in the ascending colon.  Biopsied.  Pathology returned consistent with melanosis coli.    ENDOSCOPY  10/08/2014    02/28/2012--air-contrast upper GI revealed small to moderate sized reducible sliding  hiatal herniation of the upper stomach with some demonstrated gastroesophageal reflux. No esophageal, gastric, or duodenal mass or mucosal ulceration was seen. 11/03/2008--EGD performed for evaluation of iron deficiency anemia revealed hiatal hernia without evidence of reflux, prepyloric antritis and    ENDOSCOPY  11/03/2008 11/03/2008--EGD performed for evaluation of iron deficiency anemia revealed hiatal hernia without evidence of reflux, prepyloric antritis and    ENDOSCOPY  11/19/2001 11/19/2001--EGD revealed a very tortuous distal esophagus with a Schatzki's ring. No ulcer or erosions. No Dorado's mucosa. Stomach revealed patchy erythema and erosions in the antrum. Biopsy. Normal pylorus with no obstruction. Normal duodenum with no ulcers. The Schatzki's ring was dilated with a Rhodes dilator.;    ENDOSCOPY N/A 09/27/2016 09/27/2016--EGD revealed a normal oropharynx, esophagus, and medium sized hiatal hernia.  Nonbleeding gastric ulcer with no stigmata of bleeding.  Biopsy.  Gastritis.  Biopsy.  Normal examined duodenum.  Biopsied.  Pathology returned mild to moderate chronic active gastritis with ulceration from the stomach antral ulcer biopsy.  Gastroesophageal junction biopsy revealed minimal mixed inflammation.    ENDOSCOPY N/A 06/13/2017 06/13/2017--colonoscopy revealed melanosis.  Biopsied.  There was friability with contact bleeding in the ascending colon.  Biopsied.  Pathology returned consistent with melanosis coli.    ERCP N/A 01/22/2019 01/22/2019--ERCP with sphincterotomy and balloon stone extraction.    ESOPHAGEAL DILATATION  11/19/2001 11/19/2001--EGD revealed a very tortuous distal esophagus with a Schatzki's ring. No ulcer or erosions. No Dorado's mucosa. Stomach revealed patchy erythema and erosions in the antrum. Biopsy. Normal pylorus with no obstruction. Normal duodenum with no ulcers. The Schatzki's ring was dilated with a Rhodes dilator.;     ESOPHAGEAL MANOMETRY N/A  12/18/2018    Procedure: ESOPHAGEAL MANOMETRY;  Surgeon: Cecilia, Nurse Performed;  Location: [facility] ENDOSCOPY;  Service: Gastroenterology    ESOPHAGOSCOPY N/A 01/21/2019    Procedure: FLEXIBLE ESOPHAGOSCOPY;  Surgeon: Reji Johnson MD;  Location: [facility] MAIN OR;  Service: General    HIATAL HERNIA REPAIR N/A 01/21/2019    Procedure: Laparoscopic paraesophageal hernia repair with toupee fundoplication;  Surgeon: Reji Johnson MD;  Location: [facility] MAIN OR;  Service: General    INCONTINENCE SURGERY  1979 1979--a bladder tack procedure for urinary stress incontinence    KNEE ARTHROSCOPY Right 12/12/2011 12/12/2011--right knee arthroscopy with partial lateral and medial meniscectomies.    SKIN SURGERY  05/25/2010 05/25/2010--skin lesion excised from right lower extremity. 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    TONSILLECTOMY  1953    TOTAL ABDOMINAL HYSTERECTOMY WITH SALPINGO OOPHORECTOMY  1974 1974--total abdominal hysterectomy.       Family History   Problem Relation Age of Onset    Heart attack Mother         dies age 47 from heart attack    Heart disease Mother     Bleeding Disorder Mother     Heart disease Father     Ovarian cancer Maternal Grandmother     Heart attack Maternal Aunt         dies age 60 from heart attack    Malig Hyperthermia Neg Hx     Breast cancer Neg Hx        Social History     Socioeconomic History    Marital status:      Spouse name: ander    Number of children: 4    Years of education: 14    Highest education level: Associate degree: academic program   Tobacco Use    Smoking status: Never    Smokeless tobacco: Never    Tobacco comments:     None   Vaping Use    Vaping status: Never Used   Substance and Sexual Activity    Alcohol use: No    Drug use: No    Sexual activity: Not Currently     Partners: Male     Birth control/protection: Diaphragm, Birth control pill, Hysterectomy           Objective   Physical Exam  Vitals and  nursing note reviewed.   Constitutional:       General: She is not in acute distress.     Appearance: Normal appearance. She is not toxic-appearing.   HENT:      Head: Normocephalic and atraumatic.      Right Ear: Tympanic membrane normal.      Left Ear: Tympanic membrane normal.      Nose: Nose normal.      Mouth/Throat:      Mouth: Mucous membranes are moist.   Eyes:      Extraocular Movements: Extraocular movements intact.      Conjunctiva/sclera: Conjunctivae normal.   Cardiovascular:      Rate and Rhythm: Normal rate and regular rhythm.      Heart sounds: Normal heart sounds.   Pulmonary:      Breath sounds: Normal breath sounds.   Abdominal:      Palpations: Abdomen is soft.      Tenderness: There is no abdominal tenderness. There is no guarding or rebound.   Musculoskeletal:         General: Normal range of motion.      Cervical back: Normal range of motion. No tenderness.      Comments: Laceration at the webbing between the right 4th and 5th finger.  No obvious tendon involvement noted.  Sensation and motor intact to the right pinky and right ring finger   Skin:     General: Skin is warm.   Neurological:      General: No focal deficit present.      Mental Status: She is alert and oriented to person, place, and time.   Psychiatric:         Mood and Affect: Mood normal.         Behavior: Behavior normal.         Judgment: Judgment normal.         Laceration Repair    Date/Time: 4/22/2025 11:28 PM    Performed by: Geovani Lew DO  Authorized by: Geovani Lew DO    Consent:     Consent obtained:  Verbal    Consent given by:  Patient    Risks, benefits, and alternatives were discussed: yes      Risks discussed:  Infection    Alternatives discussed:  No treatment  Universal protocol:     Imaging studies available: yes      Patient identity confirmed:  Verbally with patient  Anesthesia:     Anesthesia method:  Local infiltration    Local anesthetic:  Lidocaine 1% w/o epi  Laceration details:     Location:  Hand     Hand location:  R palm    Length (cm):  2  Pre-procedure details:     Preparation:  Patient was prepped and draped in usual sterile fashion  Exploration:     Hemostasis achieved with:  Direct pressure  Treatment:     Area cleansed with:  Chlorhexidine and saline    Amount of cleaning:  Standard    Irrigation solution:  Sterile saline    Visualized foreign bodies/material removed: no    Skin repair:     Repair method:  Sutures    Suture size:  3-0    Suture material:  Nylon    Suture technique:  Simple interrupted    Number of sutures:  11  Repair type:     Repair type:  Simple  Post-procedure details:     Dressing:  Non-adherent dressing             ED Course                                           Medical Decision Making  No obvious tendon laceration noted to the left hand.  Sutures placed into the left hand without any difficulty.    Problems Addressed:  Laceration of left hand without foreign body, initial encounter: complicated acute illness or injury    Amount and/or Complexity of Data Reviewed  Radiology: ordered.     Details: XR Hand 3+ View Left  Result Date: 4/22/2025  XR HAND 3+ VW LEFT-   HISTORY:   pinky lac  TECHNIQUE: 3 views of the left hand.  FINDINGS:  Osteopenia and degenerative change, no evidence of acute fracture, dislocation, or radiopaque foreign body.       No evidence of acute bony abnormality or radiopaque foreign body.  This report was finalized on 4/22/2025 11:56 PM by Dr. Reyes Ro M.D on Workstation: [id number]          Risk  Prescription drug management.        Final diagnoses:   Laceration of left hand without foreign body, initial encounter       ED Disposition  ED Disposition       ED Disposition   Discharge    Condition   Stable    Comment   --               Cruzito Weir MD  [id number] [address]  [phone]    Schedule an appointment as soon as possible for a visit in 1 day  Follow-up with your primary care doctor in 10 to 12 days  for removal of sutures to your hand.  Apply Neosporin ointment to your wound 3-4 times a day.  Return for any worsening conditions         Medication List      No changes were made to your prescriptions during this visit.

## 2025-04-24 DIAGNOSIS — E26.9 HYPERALDOSTERONISM: Chronic | ICD-10-CM

## 2025-04-24 DIAGNOSIS — I10 BENIGN ESSENTIAL HYPERTENSION: Chronic | ICD-10-CM

## 2025-04-24 RX ORDER — SPIRONOLACTONE 100 MG/1
50 TABLET, FILM COATED ORAL DAILY
Qty: 30 TABLET | Refills: 0 | Status: SHIPPED | OUTPATIENT
Start: 2025-04-24

## 2025-05-06 ENCOUNTER — OFFICE VISIT (OUTPATIENT)
Dept: INTERNAL MEDICINE | Facility: CLINIC | Age: 85
End: 2025-05-06
Payer: MEDICARE

## 2025-05-06 VITALS
HEART RATE: 73 BPM | RESPIRATION RATE: 16 BRPM | BODY MASS INDEX: 25.72 KG/M2 | OXYGEN SATURATION: 97 % | DIASTOLIC BLOOD PRESSURE: 76 MMHG | WEIGHT: 131 LBS | TEMPERATURE: 97.7 F | SYSTOLIC BLOOD PRESSURE: 128 MMHG | HEIGHT: 60 IN

## 2025-05-06 DIAGNOSIS — S61.412S LACERATION OF LEFT HAND WITHOUT FOREIGN BODY, SEQUELA: Primary | ICD-10-CM

## 2025-05-06 PROBLEM — S61.412A LACERATION OF LEFT HAND WITHOUT FOREIGN BODY: Status: ACTIVE | Noted: 2025-05-06

## 2025-05-06 NOTE — PROGRESS NOTES
"Chief Complaint  Suture / Staple Removal    Subjective        Sachi Vora presents to [facility]  Suture / Staple Removal      Patient appointment to remove interrupted sutures left hand base of fifth finger placed approximately 2 weeks ago  Objective   Vital Signs:  /76   Pulse 73   Temp 97.7 °F (36.5 °C) (Oral)   Resp 16   Ht 152.4 cm (60\")   Wt 59.4 kg (131 lb)   SpO2 97%   BMI 25.58 kg/m²   Estimated body mass index is 25.58 kg/m² as calculated from the following:    Height as of this encounter: 152.4 cm (60\").    Weight as of this encounter: 59.4 kg (131 lb).            Physical Exam  Vitals and nursing note reviewed.   Cardiovascular:      Rate and Rhythm: Normal rate.   Skin:     Comments: 9 interrupted sutures left hand   Neurological:      Mental Status: She is alert.   Psychiatric:         Mood and Affect: Mood normal.         Behavior: Behavior normal.         Thought Content: Thought content normal.         Judgment: Judgment normal.        Result Review :                Assessment and Plan   Diagnoses and all orders for this visit:    1. Laceration of left hand without foreign body, sequela (Primary)    Removed 9 interrupted sutures dressed with bacitracin and nonadherent gauze  Traction for patient is to change dressing daily after about 4 or 5 days can leave open to air  Use bacitracin daily for 1 week         Follow Up   Return if symptoms worsen or fail to improve, for Recheck.  Patient was given instructions and counseling regarding her condition or for health maintenance advice. Please see specific information pulled into the AVS if appropriate.             "

## 2025-05-27 DIAGNOSIS — J30.9 ALLERGIC RHINITIS, UNSPECIFIED SEASONALITY, UNSPECIFIED TRIGGER: ICD-10-CM

## 2025-05-28 RX ORDER — LORATADINE 10 MG/1
10 TABLET ORAL DAILY
Qty: 90 TABLET | Refills: 0 | Status: SHIPPED | OUTPATIENT
Start: 2025-05-28

## 2025-05-28 NOTE — TELEPHONE ENCOUNTER
Rx Refill Note  Requested Prescriptions     Pending Prescriptions Disp Refills    EQ All Day Allergy Relief 10 MG tablet [Pharmacy Med Name: EQ All Day Allergy Relief 10 MG Oral Tablet] 90 tablet 0     Sig: Take 1 tablet by mouth once daily      Last office visit with prescribing clinician: 5/6/25  Last telemedicine visit with prescribing clinician: Visit date not found   Next office visit with prescribing clinician: Visit date not found       Lyudmila Maki  05/28/25, 09:52 EDT

## 2025-06-02 DIAGNOSIS — K21.9 GASTROESOPHAGEAL REFLUX DISEASE WITHOUT ESOPHAGITIS: Chronic | ICD-10-CM

## 2025-06-02 DIAGNOSIS — K29.30 CHRONIC SUPERFICIAL GASTRITIS, PRESENCE OF BLEEDING UNSPECIFIED: Chronic | ICD-10-CM

## 2025-06-02 DIAGNOSIS — R10.13 EPIGASTRIC ABDOMINAL PAIN: ICD-10-CM

## 2025-06-02 RX ORDER — OMEPRAZOLE 40 MG/1
CAPSULE, DELAYED RELEASE ORAL
Qty: 30 CAPSULE | Refills: 0 | Status: SHIPPED | OUTPATIENT
Start: 2025-06-02

## 2025-06-21 DIAGNOSIS — I10 BENIGN ESSENTIAL HYPERTENSION: Chronic | ICD-10-CM

## 2025-06-21 DIAGNOSIS — E26.9 HYPERALDOSTERONISM: Chronic | ICD-10-CM

## 2025-06-23 RX ORDER — SPIRONOLACTONE 100 MG/1
50 TABLET, FILM COATED ORAL DAILY
Qty: 30 TABLET | Refills: 3 | Status: SHIPPED | OUTPATIENT
Start: 2025-06-23

## 2025-06-26 ENCOUNTER — TRANSCRIBE ORDERS (OUTPATIENT)
Dept: ADMINISTRATIVE | Facility: HOSPITAL | Age: 85
End: 2025-06-26
Payer: COMMERCIAL

## 2025-06-26 DIAGNOSIS — Z12.31 VISIT FOR SCREENING MAMMOGRAM: Primary | ICD-10-CM

## 2025-07-01 ENCOUNTER — OFFICE VISIT (OUTPATIENT)
Dept: NEUROLOGY | Facility: CLINIC | Age: 85
End: 2025-07-01
Payer: MEDICARE

## 2025-07-01 VITALS
OXYGEN SATURATION: 96 % | SYSTOLIC BLOOD PRESSURE: 118 MMHG | WEIGHT: 128 LBS | BODY MASS INDEX: 25 KG/M2 | DIASTOLIC BLOOD PRESSURE: 60 MMHG | HEART RATE: 73 BPM

## 2025-07-01 DIAGNOSIS — G20.B1 PARKINSON'S DISEASE WITH DYSKINESIA WITHOUT FLUCTUATING MANIFESTATIONS: ICD-10-CM

## 2025-07-01 PROBLEM — L57.9 SKIN CHANGES DUE TO CHRONIC EXPOSURE TO NONIONIZING RADIATION: Status: ACTIVE | Noted: 2023-02-09

## 2025-07-01 PROBLEM — L82.1 SEBORRHEIC KERATOSIS: Status: ACTIVE | Noted: 2023-02-09

## 2025-07-01 PROBLEM — D18.01 HEMANGIOMA OF SKIN AND SUBCUTANEOUS TISSUE: Status: ACTIVE | Noted: 2023-02-09

## 2025-07-01 PROBLEM — D48.5 NEOPLASM OF UNCERTAIN BEHAVIOR OF SKIN: Status: ACTIVE | Noted: 2023-02-09

## 2025-07-01 PROBLEM — D22.5 MELANOCYTIC NEVUS OF TRUNK: Status: ACTIVE | Noted: 2025-06-10

## 2025-07-01 PROBLEM — M19.90 ARTHRITIS: Status: ACTIVE | Noted: 2024-02-15

## 2025-07-01 PROCEDURE — 1160F RVW MEDS BY RX/DR IN RCRD: CPT | Performed by: PSYCHIATRY & NEUROLOGY

## 2025-07-01 PROCEDURE — 3078F DIAST BP <80 MM HG: CPT | Performed by: PSYCHIATRY & NEUROLOGY

## 2025-07-01 PROCEDURE — 1159F MED LIST DOCD IN RCRD: CPT | Performed by: PSYCHIATRY & NEUROLOGY

## 2025-07-01 PROCEDURE — 99214 OFFICE O/P EST MOD 30 MIN: CPT | Performed by: PSYCHIATRY & NEUROLOGY

## 2025-07-01 PROCEDURE — 3074F SYST BP LT 130 MM HG: CPT | Performed by: PSYCHIATRY & NEUROLOGY

## 2025-07-01 RX ORDER — CARBIDOPA AND LEVODOPA 25; 100 MG/1; MG/1
1 TABLET ORAL 3 TIMES DAILY
Qty: 270 TABLET | Refills: 1 | Status: SHIPPED | OUTPATIENT
Start: 2025-07-01

## 2025-07-01 RX ORDER — AMANTADINE HYDROCHLORIDE 100 MG/1
100 CAPSULE, GELATIN COATED ORAL 2 TIMES DAILY
COMMUNITY

## 2025-07-01 NOTE — PROGRESS NOTES
CC: Parkinson's disease with dyskinesia; MCI and chronic fatigue    HPI:  Sachi Vora is a  84 y.o.  right-handed white female who I am seeing in follow-up with Parkinson's disease with dyskinesias.  She was seen last by Lacy Bonilla, NP/20 2/2025 with the following history taken from that note with additions/modifications as indicated:    I saw the patient initially in 2021 regarding balance problems and falls.  She denied any dizziness but states that she was just fall backwards.  She also reported some short-term memory loss.  Her  reported that she shuffles but she did not think so.  She had left ankle dorsiflexor weakness and toe extensor weakness on exam and an EMG performed 2/16/2022 showed:     Borderline study. There was no evidence of peripheral neuropathy or peroneal neuropathy on either side. There were no needle exam changes in the leg muscles but the paraspinals showed a few positive sharp waves which could go along with bilateral lumbosacral radiculopathies. The patient has had epidural steroid injections but no nerve ablations.  An MRI of the lumbar spine was obtained which did not show significant problems at the L5 level.       The patient had progressive problems with balance and falls and returned to the office in 2024.  Her family reported shuffling gait, occasional mild resting tremor, poor writing that got smaller and is illegible, as well as, some reduced volume in her voice and the pitch seems a little higher.  Parkinson's disease was suspected and she was trialed on carbidopa/levodopa 10/100 mg 1 tablet 3 times a day with some improvements which was further escalated to 25/100 mg 3 times a day.  Carbidopa/levodopa CR 50/200 mg 1 tablet at bedtime was added which helped with patient getting in and out of chair and bed in the morning.  She developed involuntary leg movements such as repetitively crossing and crossing the legs.  No improvement seen in the left leg involuntary  movements after starting amantadine.      She denies any change in her memory and continues to take donepezil 5 mg nightly per psychiatry. She continues to take Effexor per PCP. She continues to report numbness and tingling in her fingers but denies any painful symptoms or any symptoms in her feet.      Patient has noticed worsening dyskinesia, now in both legs with switching from carbidopa/levodopa IR to CR 25/100 mg 3 times a day in addition to 50/200 mg 1 tablet at night.  She denies any nausea.  She states getting out of a chair is a little easier.  She continues to report a shuffling gait with occasional festination's but denies any freezing.  She reports feeling unsteady and  reports 2 falls in the last 4 months.  He does not allow her to go up or down stairs by herself.   she continues to report some bradykinesia and rigidity without tremor.  She has not been working on her speech therapy exercises at home but states that she has a book.  She tried to schedule physical therapy at [facility] but they said that they were booked out for several weeks and would call when they had an opening.  Patient states they have not called yet.  She reports vivid dreams but denies acting out of her dreams.  She denies any hallucinations.  She states that her memory is stable and she continues to take 5 mg donepezil nightly per psychiatry.  She reports some diarrhea at times but not sure if it is related to the medicine.  She reports occasional word finding difficulties.  She states that her biggest concern is runny nose.  She reports occasional dizziness when she first stands up after sitting for a while.  She states she recently got new glasses.  She states she was having vertical diplopia and now has prism glasses.  She states that its helped a little bit but she still gets double vision when watching TV.  She is also using eyedrops for dryness which she states are expensive.  She denies any change in the  "numbness and tingling in her fingers.    She or her  do not notice much variability regarding her mobility throughout the day.  Her  notes if she misses a dose though he can tell because she becomes slower.    The patient/ relate no further communication from [facility] and no communication from [facility] was received.  I reviewed orders noting that these were ordered in March and April.              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 Weir.  I extensively reviewed the documentation from the emergency room visit as noted below.  I reviewed the history with the patient and she is describing episodes of dizziness described as a spinning sensation of her body and this seems to be precipitated by movement although it does not occur if she rolls over in bed.  If she stands up and st    Benign essential hypertension 04/08/2016    Bilateral sensorineural hearing loss 03/31/2011 03/31/2011--etiology reveals reverse \"cookie bite\" type of hearing loss for both ears of mild/moderate degree, mostly sensorineural.  Speech discrimination 100% on the right, 96% on the left.    Carotid artery plaque, 10/03/2014--mild bilateral carotid artery plaque. 07/25/2012    10/03/2014--[facility] screening revealed mild bilateral carotid plaque, negative for atrial fibrillation, negative for AAA, negative for PAD, osteoporosis screen revealed osteopenia.  Body mass index was 25 and considered to be moderate risk.  07/25/2012--vascular screen negative for carotid plaque, negative for abdominal aneurysm, negative for PAD Description: 10/03/2014--[facility] screening revealed mild bilateral carotid plaque, negative for atrial fibrillation, negative for AAA, negative for PAD, osteoporosis screen revealed osteopenia.  Body mass index was 25 and considered to be moderate risk.  " 07/25/2012--vascular screen negative for carotid plaque, negative for abdominal aneurysm, negative for PAD    Chronic anemia 08/23/2016 06/13/2017--colonoscopy revealed melanosis.  Biopsied.  There was friability with contact bleeding in the ascending colon.  Biopsied.  Pathology returned consistent with melanosis coli.  06/13/2017--EGD revealed normal esophagus.  Biopsies taken.  There was a medium-sized hiatal hernia.  Localized mild inflammation and linear erosions were found in the prepyloric region.  Biopsied.  Examined duodenum was normal.  Random biopsies taken.  Pathology of the gastroesophageal junction was unremarkable as was the gastric fundus.  Prepyloric biopsy revealed minimal chronic inflammation and reactive change.  H pylori negative.  Duodenal biopsies are negative.  04/12/2017--hemoglobin low at 10.8, hematocrit is normal at 35.7.  Iron sulfate 325 mg per day initiated.  08/23/2016--routine follow-up.  Patient continues to have epigastric abdominal pain believed to be related to reflux with possible esophageal spasm.  Hemoglobin noted to be low at 10.6 with a hematocrit low at 33.7 and RDW elevated at 16.2.  Homocysti    Chronic fatigue 04/21/2016 06/14/2017--overnight oximetry revealed oxygen desaturation index of only 0.44.  There were only 10 total desaturations during the period of testing which lasted 6 hours and 46 minutes.  05/31/2017--patient seen in follow-up and reports she continues to have chronic fatigue as well as hypersomnolence.  Once again, she is on multiple medications that are undoubtedly contributing to this problem including clonazepam and hydrocodone but I doubt that these could be discontinued.  Her CBC back in April revealed a low hemoglobin of 10.8 but hematocrit was normal at 35.7.  Thyroid function tests were normal and CMP normal.  Overnight oximetry test ordered.  Repeat CBC.  Iron studies.  Sedimentation rate and CRP.  04/11/2017--patient seen in follow-up and  her blood pressure is now at a reasonable level at 120/64.  She reports she still feels somewhat fatigued but she is much better.  Patient has not been doing any regular exercise and I do think that that would be helpful to reduce her feeling of fatigue.  She is    Chronic gastric ulcer 04/14/2014    02/15/2017--patient seen in follow-up in nearly 6 months later.  She has complaints of not feeling well all over.  She has complaints of diffuse myalgias and possibly tendinopathy related to Levaquin that I called in prior to her going to [place] for vacation.  She reports that 3 or 4 days after starting the Levaquin she began to have the musculoskeletal symptoms and she reports that she continues to have them presently.  Symptoms are worse involving her left calf area.  Examination reveals significant tenderness involving the left calf and the left calf seems a little larger than the right.  She also has complaints of shortness of breath but no chest pain.  She is complaining of double vision and generalized weakness and fatigue.  She was complaining of chronic fatigue at the last visit back in September and I ordered an overnight oximetry test but apparently this was never done for reasons that are not clear to me.  Her current oxygen saturation is 94% and normally it is 100%.  Plan is as follows: Extensi    Chronic gastritis 11/19/2001    02/15/2017--patient seen in follow-up in nearly 6 months later.  She has complaints of not feeling well all over.  She has complaints of diffuse myalgias and possibly tendinopathy related to Levaquin that I called in prior to her going to [place] for vacation.  She reports that 3 or 4 days after starting the Levaquin she began to have the musculoskeletal symptoms and she reports that she continues to have them presently.  Symptoms are worse involving her left calf area.  Examination reveals significant tenderness involving the left calf and the left calf seems a little larger  than the right.  She also has complaints of shortness of breath but no chest pain.  She is complaining of double vision and generalized weakness and fatigue.  She was complaining of chronic fatigue at the last visit back in September and I ordered an overnight oximetry test but apparently this was never done for reasons that are not clear to me.  Her current oxygen saturation is 94% and normally it is 100%.  Plan is as follows: Extensi    Chronic hoarseness 06/08/2020    September 15, 2020--patient presents with complaints of swelling of the soft tissues of the left side of the neck and this is associated with pain and discomfort, particularly when she turns her head rotating to the left.  She also notices hoarseness and feels that her voice has changed.  On exam there is definite prominence of the left sternocleidomastoid muscle and there may be some shotty lymph    Chronic lower back pain 01/31/2006 08/11/2014--MRI of the lumbar spine reveals the conus terminates at L2 and is normal.  Cauda equina unremarkable.  Stable to moderate degenerative disc disease at L5-S1.  No acute fracture or pars defect is demonstrated.  Small synovial cysts are seen posterior to the L4-L5 facet.  The perivertebral soft tissues are unremarkable.  T12-L1, L1-L2, L2-L3 are negative.  L3-L4 a broad-based disc bulge resulting in mild bilateral neural foraminal narrowing.  L4-L5 reveals a broad-based disc bulge facet disease resulting in mild bilateral neural foraminal narrowing.  L5-S1 reveals a broad-based disc bulge, posterior osseous slipping, and facet disease resulting in mild to moderate bilateral neural foraminal narrowing.  Assessment is stable mild to moderate degenerative spondylosis.  Small synovial cysts are seen posterior to the L4-L5 facets.  08/11/2014--MRI of the thoracic spine reveals mild to moderate thoracic kyphosis.  No fracture.  At T5--T6 there is a moderate sized left paracentral disc protrusion which i     Chronic migraine 11/03/2009 01/18/2010--MRI of the brain performed for headaches and memory loss.  Mild small vessel disease in the cerebral and central pontine white matter.  There is an ovoid, somewhat pancake-shaped area of signal abnormality in the anterior inferior right temporal lobe subcortical to the juxtacortical white matter that measures 1.2 x 1 cm and anterior posterior and medial lateral dimension but only measures 3 mm in cranial caudal dimension.  I suspect that this is a benign cyst or a cystic area of encephalomalacia.  The remainder of the MRI of the head is within normal limits.  11/03/2009--CT scan of the brain without contrast performed for headache after a fall.  Mild diffuse atrophy.  No acute abnormality noted.; Description: Patient has had a long history of migraine headaches.  MRI and CT scan of the brain have been essentially negative.    Chronic otitis externa 04/08/2016    Chronic renal insufficiency, stage II (mild), creatinine 1.12 11/14/2015 11/14/2015--serum creatinine mildly elevated at 1.12.    Depression with anxiety 04/08/2016    Frequent falls 02/05/2021 February 5, 2021--patient seen in follow-up by Dr. Weir.  I extensively reviewed the documentation from the emergency room visit as noted below.  I reviewed the history with the patient and she is describing episodes of dizziness described as a spinning sensation of her body and this seems to be precipitated by movement although it does not occur if she rolls over in bed.  If she stands up and st    Functional murmur, 07/15/2015--normal echocardiogram. 07/15/2015    07/15/2015--echocardiogram reveals normal left ventricular size and function with ejection fraction 55%.  Grade 1 diastolic dysfunction, abnormal relaxation pattern.  Trace tricuspid regurgitation.  Estimated right ventricular systolic pressure is 25 mmHg which is normal.    Gastroesophageal reflux disease without esophagitis 06/06/2019    Generalized  anxiety disorder 04/11/2017    Glaucoma     History of Acute deep vein thrombosis (DVT) of distal end of left lower extremity 02/15/2017    03/21/2017 patient seen in follow-up and she is tolerating the Eliquis well without signs or symptoms of bleeding.  Her calf swelling and tenderness is better but not totally resolved.  I suspect that the DVT is chronic and may not resolve at all.  I will order a repeat venous study and then proceed from there.  02/23/2017--repeat Doppler venous study of the left lower extremity reveals a chronic left lower extremity DVT in the posterior tibial.  All other left sided veins appeared normal.  Fluid collection in the left calf noted.  02/17/2017--patient seen in follow-up and reports her left lower extremity symptoms are about the same.  She continues to have complaints of profound fatigue which I think is multifactorial including underlying depression that is not in remission.  Review the results of the CTA of the chest including the possible thyroid lesion.  I do not think this is contributing to any of her symptoms of fatigue particularly given the fact that her thyroid function tests are normal.  I expla    History of palpitations 12/07/2011    Patient has had multiple admissions to the hospital for complaints of chest pain and heart palpitations. She meant admitted at least on 3 occasions. She has had at least 3 stress Cardiolite and 1 stress ECG, the last Cardiolite being performed 12/07/2011 which was negative. Patient is also had Holter monitors which have been unremarkable.    HIstory of Schatzki's ring 02/28/2012 02/28/2012--air-contrast upper GI revealed small to moderate sized reducible sliding hiatal herniation of the upper stomach with some demonstrated gastroesophageal reflux. No esophageal, gastric, or duodenal mass or mucosal ulceration was seen.  11/03/2008--EGD performed for evaluation of iron deficiency anemia revealed hiatal hernia without evidence of  reflux, prepyloric antritis and    Hyperlipidemia 04/08/2016    Hypersomnolence 05/05/2016 06/14/2017--overnight oximetry revealed oxygen desaturation index of only 0.44.  There were only 10 total desaturations during the period of testing which lasted 6 hours and 46 minutes.  05/31/2017--patient seen in follow-up and reports she continues to have chronic fatigue as well as hypersomnolence.  Once again, she is on multiple medications that are undoubtedly contributing to this problem including clonazepam and hydrocodone but I doubt that these could be discontinued.  Her CBC back in April revealed a low hemoglobin of 10.8 but hematocrit was normal at 35.7.  Thyroid function tests were normal and CMP normal.  Overnight oximetry test ordered.  Repeat CBC.  Iron studies.  Sedimentation rate and CRP.  04/11/2017--patient seen in follow-up and her blood pressure is now at a reasonable level at 120/64.  She reports she still feels somewhat fatigued but she is much better.  Patient has not been doing any regular exercise and I do think that that would be helpful to reduce her feeling of fatigue.  She is    Menopausal state 04/08/2016    Multinodular goiter 02/17/2017 02/21/2017--thyroid ultrasound reveals multinodular thyroid with largest nodule measuring on the order of 1.6 cm in greatest dimension.  02/17/2017--patient seen in follow-up for DVT and CTA of the chest.  An incidental finding on the CTA of the chest reveals a 1.7 cm lesion in the right lobe of thyroid.  Note that thyroid function tests are currently normal.  Ultrasound of the thyroid ordered.    Osteoporosis, 03/29/2011--lumbar spine -2.8.  Right femoral neck -2.4.  Left femoral neck -2.4.  Patient receives Reclast infusions. 02/09/2009 04/19/2017--Reclast infusion.  04/05/2016--Reclast infusion.  06/04/2012--Reclast infusion.  05/26/2011--Reclast infusion.  03/29/2011--DEXA scan revealed a lumbar T score of -2.8, right femoral neck T score -2.4, left  femoral neck T score -2.4.  Osteoporosis of the lumbar spine and severe osteopenia of the hips bilaterally.  Patient has been intolerant to Fosamax because of gastritis and gastroesophageal reflux.  04/01/2009--treatment for osteoporosis begun with Fosamax.  02/09/2009--DEXA scan revealed lumbar spine T score of -3.3.  Left femoral neck T score -2.5.  Right hip T score -2.6.  Osteoporosis.     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  CT scan of the abdomen and pelvis reveals a left renal cyst measuring 5.1 x 4.9 cm.  There were subcentimeter hypodense renal lesions that are too small to further characterize and are presumably related to cysts.  Recommend attention to follow up.  Distal dilated pancreatic duct noted.  The common bile duct is the upper limits of normal in size.  The ampullary region is not well evaluated.  Comparison with prior imaging is recommended if available.  If there is no prior film available for comparison, and ERCP or MRCP could be performed to further evaluate.  Small hiatal hernia noted.  03/05/2012--CT scan of the abdomen with contrast, pancreatic protocol.  This reveals some fullness of the pancreatic ductal system and apparent pancreatic divisum with separate entrance of the accessory pancreatic duct extending into the duodenum distal to the main pancreatic duct.  There is fullness of the duct diffusely but similar to the previous s    Parkinson's disease with dyskinesia and fluctuating manifestations 12/14/2023 December 19, 2023--MRI of the brain without contrast reveals stable findings compared to the prior study dated May 28, 2021.  There are moderate changes of chronic small vessel ischemic phenomena.  Additionally there are findings consistent with an end of the insulin large perivascular space within the anterior portion of the right  temporal lobe measuring 1.7 x 1.2 cm in greatest axial dimensions.    Pedal edema 06/29/2015 06/29/2015--patient presents with a 4-6 week history of exertional dyspnea that comes on with activity such as climbing stairs or walking her dog up an incline.  No chest pain.  Relieved with rest.  No cough.  She does have complaints of her feet and legs swelling that is particularly worse at the end of the day and not improved overnight.  No orthopnea or PND.  Chest exam reveals faint rales at the bases.  Otherwise clear.  Heart is regular and I do not appreciate a heart murmur.  Chest x-ray PA and lateral ordered.  Echocardiogram ordered.    Pulmonary hypertension 04/18/2019    Restless legs syndrome 04/08/2016    Short-term memory loss 05/04/2018 05/04/2018--patient reports development of problems with her memory over the past several months and is concerned about possibility of Alzheimer's.  No other neurologic symptoms other than occasional sensation of being off balance.  We will not evaluate that problem at the present time unless it gets worse.  I we will however send her for neuropsychological testing regarding memory loss.      Simple renal cyst 07/20/2009 09/29/2014--patient was again evaluated by the urologist for a renal cyst.  CT scan of the abdomen and pelvis reveals a left renal cyst measuring 5.1 x 4.9 cm.  There were subcentimeter hypodense renal lesions that are too small to further characterize and are presumably related to cysts.  Recommend attention to follow up.  Distal dilated pancreatic duct noted.  The common bile duct is the upper limits of normal in size.  The ampullary region is not well evaluated.  Comparison with prior imaging is recommended if available.  If there is no prior film available for comparison, and ERCP or MRCP could be performed to further evaluate.  Small hiatal hernia noted.  07/20/2009--patient was noted to have a left renal mass consistent with a cyst.  This was  evaluated by the urologist 07/20/2009.    Stage 3a chronic kidney disease 11/14/2015 11/14/2015--serum creatinine mildly elevated at 1.12.      Statin intolerance 10/30/2017    Tinnitus of both ears 09/30/2019 September 30, 2019--patient presents with a several year history of bilateral tinnitus, right greater than left.  It seems to be getting worse and now is associated with fluctuating hearing.  She occasionally will have worsening hearing after she coughs or sneezes.  On exam she has evidence of old otitis media scars, particularly on the right.  There is no acute infection present and there is no a    Tremor of left hand 12/14/2023    Vitamin D deficiency 04/08/2016         Past Surgical History:   Procedure Laterality Date    APPENDECTOMY  1965 1965    CATARACT EXTRACTION Bilateral Remote    Remote bilateral cataract extirpation with intraocular lens implantation.    CHOLECYSTECTOMY WITH INTRAOPERATIVE CHOLANGIOGRAM N/A 01/21/2019 01/21/2019--laparoscopic paraesophageal hernia repair with fundoplication.    COLONOSCOPY  11/03/2008 2008--normal colonoscopy    COLONOSCOPY  2001 2001--normal colonoscopy.    COLONOSCOPY N/A 06/13/2017 06/13/2017--colonoscopy revealed melanosis.  Biopsied.  There was friability with contact bleeding in the ascending colon.  Biopsied.  Pathology returned consistent with melanosis coli.    ENDOSCOPY  10/08/2014    02/28/2012--air-contrast upper GI revealed small to moderate sized reducible sliding hiatal herniation of the upper stomach with some demonstrated gastroesophageal reflux. No esophageal, gastric, or duodenal mass or mucosal ulceration was seen. 11/03/2008--EGD performed for evaluation of iron deficiency anemia revealed hiatal hernia without evidence of reflux, prepyloric antritis and    ENDOSCOPY  11/03/2008 11/03/2008--EGD performed for evaluation of iron deficiency anemia revealed hiatal hernia without evidence of reflux, prepyloric antritis and     ENDOSCOPY  11/19/2001 11/19/2001--EGD revealed a very tortuous distal esophagus with a Schatzki's ring. No ulcer or erosions. No Dorado's mucosa. Stomach revealed patchy erythema and erosions in the antrum. Biopsy. Normal pylorus with no obstruction. Normal duodenum with no ulcers. The Schatzki's ring was dilated with a Rhodes dilator.;    ENDOSCOPY N/A 09/27/2016 09/27/2016--EGD revealed a normal oropharynx, esophagus, and medium sized hiatal hernia.  Nonbleeding gastric ulcer with no stigmata of bleeding.  Biopsy.  Gastritis.  Biopsy.  Normal examined duodenum.  Biopsied.  Pathology returned mild to moderate chronic active gastritis with ulceration from the stomach antral ulcer biopsy.  Gastroesophageal junction biopsy revealed minimal mixed inflammation.    ENDOSCOPY N/A 06/13/2017 06/13/2017--colonoscopy revealed melanosis.  Biopsied.  There was friability with contact bleeding in the ascending colon.  Biopsied.  Pathology returned consistent with melanosis coli.    ERCP N/A 01/22/2019 01/22/2019--ERCP with sphincterotomy and balloon stone extraction.    ESOPHAGEAL DILATATION  11/19/2001 11/19/2001--EGD revealed a very tortuous distal esophagus with a Schatzki's ring. No ulcer or erosions. No Dorado's mucosa. Stomach revealed patchy erythema and erosions in the antrum. Biopsy. Normal pylorus with no obstruction. Normal duodenum with no ulcers. The Schatzki's ring was dilated with a Rhodes dilator.;     ESOPHAGEAL MANOMETRY N/A 12/18/2018    Procedure: ESOPHAGEAL MANOMETRY;  Surgeon: Cecilia, Nurse Performed;  Location: [facility] ENDOSCOPY;  Service: Gastroenterology    ESOPHAGOSCOPY N/A 01/21/2019    Procedure: FLEXIBLE ESOPHAGOSCOPY;  Surgeon: Reji Johnson MD;  Location: [facility] MAIN OR;  Service: General    HIATAL HERNIA REPAIR N/A 01/21/2019    Procedure: Laparoscopic paraesophageal hernia repair with toupee fundoplication;  Surgeon: Reji Johnson MD;  Location: [facility]  MAIN OR;  Service: General    INCONTINENCE SURGERY  1979 1979--a bladder tack procedure for urinary stress incontinence    KNEE ARTHROSCOPY Right 12/12/2011 12/12/2011--right knee arthroscopy with partial lateral and medial meniscectomies.    SKIN SURGERY  05/25/2010 05/25/2010--skin lesion excised from right lower extremity. 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    TONSILLECTOMY  1953    TOTAL ABDOMINAL HYSTERECTOMY WITH SALPINGO OOPHORECTOMY  1974 1974--total abdominal hysterectomy.           Current Outpatient Medications:     Brexpiprazole (Rexulti) 1 MG tablet, Take 2 mg by mouth Daily., Disp: , Rfl:     buPROPion XL (WELLBUTRIN XL) 300 MG 24 hr tablet, Take 1 tablet by mouth Every Morning., Disp: , Rfl:     carbidopa-levodopa (SINEMET)  MG per tablet, Take 1 tablet by mouth 3 (Three) Times a Day., Disp: 270 tablet, Rfl: 1    carbidopa-levodopa CR (SINEMET CR)  MG per CR tablet, TAKE 1 TABLET BY MOUTH ONCE DAILY AT NIGHT, Disp: 90 tablet, Rfl: 3    Cholecalciferol (vitamin D3) 125 MCG (5000 UT) capsule capsule, 1 by mouth daily as directed, Disp: 30 capsule, Rfl:     diphenhydrAMINE-APAP, sleep, (Tylenol PM Extra Strength)  MG/30ML liquid, Tylenol PM Extra Strength, Disp: , Rfl:     donepezil (ARICEPT) 5 MG tablet, Take 1 tablet by mouth every night at bedtime., Disp: , Rfl:     EQ All Day Allergy Relief 10 MG tablet, Take 1 tablet by mouth once daily, Disp: 90 tablet, Rfl: 0    estradiol (ESTRACE) 1 MG tablet, Take 1 tablet by mouth once daily, Disp: 90 tablet, Rfl: 1    HYDROcodone-acetaminophen (NORCO)  MG per tablet, Take 1 tablet by mouth Every 6 (Six) Hours As Needed for Moderate Pain., Disp: , Rfl:     lisinopril (PRINIVIL,ZESTRIL) 2.5 MG tablet, , Disp: , Rfl:     methylphenidate 18 MG CR tablet, Take 1 tablet by mouth Every Morning, Disp: , Rfl:     Misc Natural Products (COLON CLEANSE PO), Take  by mouth., Disp: , Rfl:     omeprazole  (priLOSEC) 40 MG capsule, TAKE 1 CAPSULE BY MOUTH 30 MINUTES TO 1 HOUR PRIOR TO A MEAL, Disp: 30 capsule, Rfl: 0    Polyethylene Glycol 400 (BLINK TEARS OP), Apply  to eye(s) as directed by provider., Disp: , Rfl:     spironolactone (ALDACTONE) 100 MG tablet, Take 1/2 (one-half) tablet by mouth once daily, Disp: 30 tablet, Rfl: 3    traZODone (DESYREL) 50 MG tablet, Take 0.5-1 tablets by mouth Every Night., Disp: , Rfl:     venlafaxine XR (EFFEXOR-XR) 150 MG 24 hr capsule, Take 1 capsule by mouth Every Morning., Disp: , Rfl:     venlafaxine XR (EFFEXOR-XR) 75 MG 24 hr capsule, Take one 75 mg capsule venlafaxine along with one 150 mg capsule daily for depression.  Total daily dose is 225 mg., Disp: , Rfl:     vitamin E 400 UNIT capsule, Take 1 capsule by mouth Daily., Disp: , Rfl:     amantadine (SYMMETREL) 100 MG capsule, Take 1 capsule by mouth 2 (Two) Times a Day., Disp: , Rfl:       Family History   Problem Relation Age of Onset    Heart attack Mother         dies age 47 from heart attack    Heart disease Mother     Bleeding Disorder Mother     Heart disease Father     Ovarian cancer Maternal Grandmother     Heart attack Maternal Aunt         dies age 60 from heart attack    Malig Hyperthermia Neg Hx     Breast cancer Neg Hx          Social History     Socioeconomic History    Marital status:      Spouse name: ander    Number of children: 4    Years of education: 14    Highest education level: Associate degree: academic program   Tobacco Use    Smoking status: Never    Smokeless tobacco: Never    Tobacco comments:     None   Vaping Use    Vaping status: Never Used   Substance and Sexual Activity    Alcohol use: No    Drug use: No    Sexual activity: Not Currently     Partners: Male     Birth control/protection: Diaphragm, Birth control pill, Hysterectomy         Allergies   Allergen Reactions    Penicillin G Anaphylaxis    Statins Nausea And Vomiting    Tetanus Toxoid Itching    Morphine Hallucinations     Penicillins Itching    Tetanus Toxoids Itching         Pain Scale: 5/10        ROS:  Review of Systems   Musculoskeletal:  Positive for gait problem.   Neurological:  Positive for tremors, speech difficulty, weakness and light-headedness. Negative for dizziness, seizures, syncope, facial asymmetry, numbness and headaches.   Psychiatric/Behavioral:  Positive for agitation and confusion. Negative for behavioral problems, decreased concentration, dysphoric mood, hallucinations, self-injury, sleep disturbance and suicidal ideas. The patient is nervous/anxious. The patient is not hyperactive.          I have reviewed and agree with the above ROS completed by the medical assistant.      Physical Exam:  Vitals:    07/01/25 1102   BP: 118/60   Pulse: 73   SpO2: 96%   Weight: 58.1 kg (128 lb)     Orthostatic BP:    Body mass index is 25 kg/m².    Physical Exam  General: Well-developed white female no acute distress mildly masked face.  HEENT: Normocephalic no evidence of trauma.  Neck: Supple  Heart: Regular rate and rhythm  Extremities: No pedal edema      Neurological Exam:   Mental Status: Awake, alert, oriented 10/10 on MMSE.  Conversant without evidence of an affective disorder, thought disorder, delusions or hallucinations.  Attention span and concentration are normal.  HCF: No aphasia, apraxia or dysarthria.  Immediate recall 3/3; delayed recall 2/3 in 3 minutes.  Knowledge of recent events intact.  MMSE score 29/30.  Clock draw 4/4.  Animal naming 15 animals in 1 minute  CN: I:   II: Visual fields full without left inattention   III, IV, VI: Eye movements intact without nystagmus or ptosis.  Pupils equal  round and reactive to light.   V,VII: Light touch and pinprick intact all 3 divisions of V.  Facial muscles symmetrical.   VIII: Hearing intact to finger rub   IX,X: Soft palate elevates symmetrically   XI: Sternomastoid and trapezius are strong.   XII: Tongue midline without atrophy or fasciculations  Motor:  "Minimal increased tone at both elbows and normal bulk in the upper and lower extremities   Power testing: No drift of outstretched arms  Reflexes: Upper extremities:        Lower extremities:        Toe signs:  Sensory: Light touch:        Pinprick:        Vibration:        Position:    Cerebellar: Finger-to-nose: Intact but mildly bradykinetic.  No tremor noted           Rapid movement: Pretty good           Heel-to-shin:  Gait and Station: Comes to stand using both hands on that arm chairs.  She has a notably flexed forward posture at the hip and neck.  Step length is reduced.  Armswing is reduced.  No tremor noted while ambulating    Results:      Lab Results   Component Value Date    GLUCOSE 86 09/10/2024    BUN 17 09/10/2024    CREATININE 1.03 (H) 09/10/2024    EGFRIFNONA 45 (L) 11/17/2021    EGFRIFAFRI 51 (L) 03/09/2020    BCR 17 09/10/2024    CO2 24 09/10/2024    CALCIUM 9.3 09/10/2024    ALBUMIN 4.0 09/10/2024    AST 17 09/10/2024    ALT 6 09/10/2024       Lab Results   Component Value Date    WBC 10.8 09/10/2024    HGB 13.7 09/10/2024    HCT 43.1 09/10/2024    MCV 93 09/10/2024     09/10/2024         .  Lab Results   Component Value Date    RPR Non-Reactive 10/14/2021         Lab Results   Component Value Date    TSH 1.160 09/10/2024         Lab Results   Component Value Date    LKNFREOS86 451 06/07/2023         Lab Results   Component Value Date    FOLATE 7.50 10/14/2021         No results found for: \"HGBA1C\"      Lab Results   Component Value Date    GLUCOSE 86 09/10/2024    BUN 17 09/10/2024    CREATININE 1.03 (H) 09/10/2024    EGFRIFNONA 45 (L) 11/17/2021    EGFRIFAFRI 51 (L) 03/09/2020    BCR 17 09/10/2024    K 4.3 09/10/2024    CO2 24 09/10/2024    CALCIUM 9.3 09/10/2024    ALBUMIN 4.0 09/10/2024    AST 17 09/10/2024    ALT 6 09/10/2024         Lab Results   Component Value Date    WBC 10.8 09/10/2024    HGB 13.7 09/10/2024    HCT 43.1 09/10/2024    MCV 93 09/10/2024     09/10/2024 "             Assessment:   1.  Parkinson's disease-stable.  Still some bradykinesia and flexed forward posture.  She indicates she would like to go back to her watercolors but because of her slow bradykinetic movements her abilities are clearly suboptimal  2.  MCI-MMSE score is an improvement at 29/30 compared to previous study early in 2024  3.  Dyskinesia-she continues to have some dyskinesias in the legs but they are not particularly bothersome to her.  4.  Daytime fatigability-appropriate questioning negative for clear evidence for sleep apnea, RLS or periodic limb movements.  Although she dreams she does not act out in the dream.    Plan:  1.  To continue carbidopa/levodopa, 25/100, 1 tablet 3 times daily +1 tablet of 50/200 taken near bedtime  2.  Will investigate the consult to the [facility] as well as to [place] movement disorder [facility]  3.  To follow-up with Lacy Bonilla NP in 6 months            Time: 30 minutes          Dictated utilizing Dragon dictation.

## 2025-07-18 ENCOUNTER — HOSPITAL ENCOUNTER (EMERGENCY)
Facility: HOSPITAL | Age: 85
Discharge: HOME | End: 2025-07-19
Attending: EMERGENCY MEDICINE
Payer: MEDICARE

## 2025-07-18 ENCOUNTER — APPOINTMENT (OUTPATIENT)
Dept: GENERAL RADIOLOGY | Facility: HOSPITAL | Age: 85
End: 2025-07-18
Payer: MEDICARE

## 2025-07-18 DIAGNOSIS — R06.00 DYSPNEA, UNSPECIFIED TYPE: Primary | ICD-10-CM

## 2025-07-18 PROCEDURE — 71045 X-RAY EXAM CHEST 1 VIEW: CPT

## 2025-07-18 PROCEDURE — 93005 ELECTROCARDIOGRAM TRACING: CPT | Performed by: EMERGENCY MEDICINE

## 2025-07-18 PROCEDURE — 99284 EMERGENCY DEPT VISIT MOD MDM: CPT

## 2025-07-19 VITALS
WEIGHT: 128.09 LBS | HEIGHT: 60 IN | TEMPERATURE: 98.1 F | DIASTOLIC BLOOD PRESSURE: 50 MMHG | SYSTOLIC BLOOD PRESSURE: 116 MMHG | BODY MASS INDEX: 25.15 KG/M2 | RESPIRATION RATE: 21 BRPM | HEART RATE: 69 BPM | OXYGEN SATURATION: 98 %

## 2025-07-19 LAB
ALBUMIN SERPL-MCNC: 3.7 G/DL (ref 3.5–5.2)
ALBUMIN/GLOB SERPL: 1.5 G/DL
ALP SERPL-CCNC: 63 U/L (ref 39–117)
ALT SERPL W P-5'-P-CCNC: <5 U/L (ref 1–33)
ANION GAP SERPL CALCULATED.3IONS-SCNC: 11 MMOL/L (ref 5–15)
AST SERPL-CCNC: 14 U/L (ref 1–32)
B PARAPERT DNA SPEC QL NAA+PROBE: NOT DETECTED
B PERT DNA SPEC QL NAA+PROBE: NOT DETECTED
BASOPHILS # BLD AUTO: 0.05 10*3/MM3 (ref 0–0.2)
BASOPHILS NFR BLD AUTO: 0.7 % (ref 0–1.5)
BILIRUB SERPL-MCNC: <0.2 MG/DL (ref 0–1.2)
BUN SERPL-MCNC: 18 MG/DL (ref 8–23)
BUN/CREAT SERPL: 13.4 (ref 7–25)
C PNEUM DNA NPH QL NAA+NON-PROBE: NOT DETECTED
CALCIUM SPEC-SCNC: 8.8 MG/DL (ref 8.6–10.5)
CHLORIDE SERPL-SCNC: 104 MMOL/L (ref 98–107)
CO2 SERPL-SCNC: 21 MMOL/L (ref 22–29)
CREAT SERPL-MCNC: 1.34 MG/DL (ref 0.57–1)
DEPRECATED RDW RBC AUTO: 47 FL (ref 37–54)
EGFRCR SERPLBLD CKD-EPI 2021: 39.7 ML/MIN/1.73
EOSINOPHIL # BLD AUTO: 0.11 10*3/MM3 (ref 0–0.4)
EOSINOPHIL NFR BLD AUTO: 1.4 % (ref 0.3–6.2)
ERYTHROCYTE [DISTWIDTH] IN BLOOD BY AUTOMATED COUNT: 13.7 % (ref 12.3–15.4)
FLUAV SUBTYP SPEC NAA+PROBE: NOT DETECTED
FLUBV RNA NPH QL NAA+NON-PROBE: NOT DETECTED
GEN 5 1HR TROPONIN T REFLEX: 29 NG/L
GLOBULIN UR ELPH-MCNC: 2.5 GM/DL
GLUCOSE SERPL-MCNC: 92 MG/DL (ref 65–99)
HADV DNA SPEC NAA+PROBE: NOT DETECTED
HCOV 229E RNA SPEC QL NAA+PROBE: NOT DETECTED
HCOV HKU1 RNA SPEC QL NAA+PROBE: NOT DETECTED
HCOV NL63 RNA SPEC QL NAA+PROBE: NOT DETECTED
HCOV OC43 RNA SPEC QL NAA+PROBE: NOT DETECTED
HCT VFR BLD AUTO: 36.5 % (ref 34–46.6)
HGB BLD-MCNC: 11.8 G/DL (ref 12–15.9)
HMPV RNA NPH QL NAA+NON-PROBE: NOT DETECTED
HPIV1 RNA ISLT QL NAA+PROBE: NOT DETECTED
HPIV2 RNA SPEC QL NAA+PROBE: NOT DETECTED
HPIV3 RNA NPH QL NAA+PROBE: NOT DETECTED
HPIV4 P GENE NPH QL NAA+PROBE: NOT DETECTED
IMM GRANULOCYTES # BLD AUTO: 0.06 10*3/MM3 (ref 0–0.05)
IMM GRANULOCYTES NFR BLD AUTO: 0.8 % (ref 0–0.5)
LYMPHOCYTES # BLD AUTO: 2.95 10*3/MM3 (ref 0.7–3.1)
LYMPHOCYTES NFR BLD AUTO: 38.5 % (ref 19.6–45.3)
M PNEUMO IGG SER IA-ACNC: NOT DETECTED
MCH RBC QN AUTO: 30.3 PG (ref 26.6–33)
MCHC RBC AUTO-ENTMCNC: 32.3 G/DL (ref 31.5–35.7)
MCV RBC AUTO: 93.8 FL (ref 79–97)
MONOCYTES # BLD AUTO: 0.8 10*3/MM3 (ref 0.1–0.9)
MONOCYTES NFR BLD AUTO: 10.4 % (ref 5–12)
NEUTROPHILS NFR BLD AUTO: 3.7 10*3/MM3 (ref 1.7–7)
NEUTROPHILS NFR BLD AUTO: 48.2 % (ref 42.7–76)
NRBC BLD AUTO-RTO: 0 /100 WBC (ref 0–0.2)
NT-PROBNP SERPL-MCNC: 177 PG/ML (ref 0–1800)
PLATELET # BLD AUTO: 251 10*3/MM3 (ref 140–450)
PMV BLD AUTO: 9.5 FL (ref 6–12)
POTASSIUM SERPL-SCNC: 4 MMOL/L (ref 3.5–5.2)
PROT SERPL-MCNC: 6.2 G/DL (ref 6–8.5)
QT INTERVAL: 387 MS
QTC INTERVAL: 429 MS
RBC # BLD AUTO: 3.89 10*6/MM3 (ref 3.77–5.28)
RHINOVIRUS RNA SPEC NAA+PROBE: NOT DETECTED
RSV RNA NPH QL NAA+NON-PROBE: NOT DETECTED
SARS-COV-2 RNA NPH QL NAA+NON-PROBE: NOT DETECTED
SODIUM SERPL-SCNC: 136 MMOL/L (ref 136–145)
TROPONIN T % DELTA: 0
TROPONIN T NUMERIC DELTA: 0 NG/L
TROPONIN T SERPL HS-MCNC: 29 NG/L
WBC NRBC COR # BLD AUTO: 7.67 10*3/MM3 (ref 3.4–10.8)

## 2025-07-19 PROCEDURE — 36415 COLL VENOUS BLD VENIPUNCTURE: CPT

## 2025-07-19 PROCEDURE — 93005 ELECTROCARDIOGRAM TRACING: CPT

## 2025-07-19 PROCEDURE — 85025 COMPLETE CBC W/AUTO DIFF WBC: CPT | Performed by: EMERGENCY MEDICINE

## 2025-07-19 PROCEDURE — 84484 ASSAY OF TROPONIN QUANT: CPT | Performed by: EMERGENCY MEDICINE

## 2025-07-19 PROCEDURE — 83880 ASSAY OF NATRIURETIC PEPTIDE: CPT | Performed by: EMERGENCY MEDICINE

## 2025-07-19 PROCEDURE — 80053 COMPREHEN METABOLIC PANEL: CPT | Performed by: EMERGENCY MEDICINE

## 2025-07-19 PROCEDURE — 0202U NFCT DS 22 TRGT SARS-COV-2: CPT | Performed by: PHYSICIAN ASSISTANT

## 2025-07-19 RX ORDER — ALBUTEROL SULFATE 90 UG/1
2 INHALANT RESPIRATORY (INHALATION) EVERY 4 HOURS PRN
Qty: 8 G | Refills: 0 | Status: SHIPPED | OUTPATIENT
Start: 2025-07-19

## 2025-07-19 NOTE — ED NOTES
Pt arrived via [facility] from home w/ c/o SOB. Pt reports sitting in the chair @ home & felt like she couldn't catch her breath. Pt denies any other symptoms @ this time.

## 2025-07-19 NOTE — DISCHARGE INSTRUCTIONS
Follow up with primary care provider.  Use the albuterol inhaler every 4 hours as needed to help with shortness of breath.  Return to emergency department for any worsening symptoms.

## 2025-07-19 NOTE — ED PROVIDER NOTES
MD ATTESTATION NOTE  I supervised care provided by the APC. We have discussed this patient's history, physical exam, and treatment plan. I have reviewed the APC's note and I agree with the APC's findings and plan of care.   SHARED VISIT: This visit was performed by BOTH a physician and an APC. The substantive portion of the medical decision making was performed by this attesting physician who made or approved the management plan and takes responsibility for patient management. All studies in the APC note (if performed) were independently interpreted by me.   I have personally had a face to face encounter with the patient.     PCP: Cruzito Weir MD  Patient Care Team:  Cruzito Weir MD as PCP - General (Internal Medicine)     Sachi Vora is a 84 y.o. female who presents to the ED c/o shortness of breath.  Patient states that she felt short of breath earlier at home.  She felt like she could not catch her breath.  She had no associated chest pain, cough, fever, leg swelling.  No history of DVT or PE.  No history of cardiac or pulmonary disease.    On exam:  General: NAD.  Head: NCAT.  ENT: nares patent, no scleral icterus  Neck: Supple, trachea midline.  Cardiac: regular rate and rhythm.  Lungs: normal effort, clear to auscultation bilaterally  Abdomen: Soft, nondistended, NTTP, no rebound tenderness, no guarding or rigidity.   MS: Moves all extremities well, no peripheral edema  Neuro: alert, MAEW, follows commands  Psych: calm, cooperative  Skin: Warm, dry.    Medical Decision Making:  After the initial H&P, I discussed pertinent information from history and physical exam with patient/family.  Discussed differential diagnosis.  Discussed plan for ED evaluation/work-up/treatment.  All questions answered.  Patient/family is agreeable with plan.    ED Course as of 07/20/25 1317   Sat Jul 19, 2025   0009 XR Chest 1 View  My independent interpretation of the imaging study is no lobar infiltrate or pneumothorax  [AB]   0023 EKG interpreted by myself  Time: 0009  Rate: 74  Rhythm: NSR  No ST elevation or depression  Normal intervals  Normal morphology [AB]   1950 That has resolved upon arrival to the emergency department upon my initial evaluation, patient requesting discharge home.  She was worked up today with labs, EKG, chest x-ray, viral RPP.  Entirety of workup is benign.  She is not hypoxic and in no respiratory distress.  Unclear etiology for her symptoms.  Will have her follow-up closely with primary care provider.  Discussed ED return precautions.  She is otherwise well-appearing, hemodynamically stable, and therefore appropriate for discharge. [MP]      ED Course User Index  [AB] Ronald Kirk MD  [MP] Nadine Trent PA-C       Diagnosis  Final diagnoses:   Dyspnea, unspecified type        Ronald Kirk MD  07/19/25 0426       Ronald Kirk MD  07/20/25 1317

## 2025-07-19 NOTE — ED PROVIDER NOTES
EMERGENCY DEPARTMENT ENCOUNTER  Room Number:  05/05  PCP: Cruzito Weir MD  Independent Historians: Historian: Patient, Family, and EMS      HPI:  Chief Complaint: had concerns including Shortness of Breath.     A complete HPI/ROS/PMH/PSH/SH/FH are unobtainable due to: EM_Unobtainable History : None    Chronic or social conditions impacting patient care (Social Determinants of Health): None      Context: Sachi Vora is a 84 y.o. female with a medical history of osteoporosis, hypertension, gastritis, CKD, hyperlipidemia, anxiety, depression, vitamin D deficiency, Parkinson's disease who presents to the ED c/o acute dyspnea.  Patient reports earlier this evening she was sitting in a chair at home.  Reports like she was having trouble catching her breath.  Reports she had some mild tightness in her chest but no pressure or pain.  No recent fever or cough.  No recent vomiting or diarrhea.  No leg pain or swelling.  No known sick contacts.  No recent travel.  Patient is not anticoagulated.  No other systemic complaints at this time.      Review of prior external notes (non-ED) -and- Review of prior external test results outside of this encounter: Patient seen in office by neurology on 7/1/2025 for Parkinson's disease.  Reviewed assessment and plan.  Patient will continue carbidopa levodopa, will refer to movement disorder clinic. Reviewed labs collected on 9/10/2024.  CBC with hemoglobin 13.7, CMP with creatinine 1.03.    Prescription drug monitoring program review:     EM_[place] : N/A    PAST MEDICAL HISTORY  Active Ambulatory Problems     Diagnosis Date Noted    Osteoporosis, 03/29/2011--lumbar spine -2.8.  Right femoral neck -2.4.  Left femoral neck -2.4.  Patient receives Reclast infusions. 02/09/2009    Therapeutic drug monitoring 03/23/2016    Simple renal cyst 07/20/2009    Benign essential hypertension 04/08/2016    Carotid artery plaque, 04/02/2018--mild right ICA plaque, normal left ICA 10/03/2014--mild  bilateral carotid artery plaque. 07/25/2012    Chronic gastritis 11/19/2001    Chronic lower back pain 01/31/2006    Chronic otitis externa 04/08/2016    Stage 3a chronic kidney disease 11/14/2015    Depression with anxiety 04/08/2016    Functional murmur, 07/15/2015--normal echocardiogram. 07/15/2015    Hyperlipidemia 04/08/2016    Chronic migraine w/o aura w/o status migrainosus, not intractable 11/03/2009    Pancreatic Duct Dilation 11/30/2001    Bilateral lower extremity edema 06/29/2015    Restless legs syndrome 04/08/2016    Bilateral sensorineural hearing loss 03/31/2011    Vitamin D deficiency 04/08/2016    Chronic gastric ulcer 04/14/2014    Chronic fatigue 04/21/2016    Hypersomnolence 05/05/2016    Chronic anemia 08/23/2016    Multinodular goiter 02/17/2017    Generalized anxiety disorder 04/11/2017    Iron deficiency anemia 06/01/2017    Statin intolerance 10/30/2017    Postmenopausal state 04/18/2018    Short-term memory loss 05/04/2018    Epigastric abdominal pain 09/06/2018    Pulmonary hypertension 04/18/2019    Gastroesophageal reflux disease without esophagitis 06/06/2019    Tinnitus of both ears 09/30/2019    Chronic hoarseness 06/08/2020    Disorder of pigmentation 10/10/2012    Frequent falls 02/05/2021    Balance disorder, related to Parkinson's 02/05/2021    Pathological fracture of sacral vertebra 05/27/2021    Hyperaldosteronism 07/21/2022    Hypokalemia 07/21/2022    Chronic pain of left knee 11/16/2022    Osteoarthritis of left knee 11/22/2022    Parkinson's disease with dyskinesia and fluctuating manifestations 12/14/2023    Chronic mid back pain 02/18/2025    Laceration of left hand without foreign body 05/06/2025    Arthritis 02/15/2024    Hemangioma of skin and subcutaneous tissue 02/09/2023    Melanocytic nevus of trunk 06/10/2025    Neoplasm of uncertain behavior of skin 02/09/2023    Other long term (current) drug therapy 12/04/2013    Seborrheic keratosis 02/09/2023    Skin changes  due to chronic exposure to nonionizing radiation 02/09/2023     Resolved Ambulatory Problems     Diagnosis Date Noted    History of Dyspnea on exertion 04/08/2016    Gastroesophageal reflux disease with esophagitis 11/19/2001    Headache 04/08/2016    Menopausal state 04/08/2016    Pancreatic divisum 11/30/2001    Urinary hesitancy 11/20/2015    History of Urinary urgency 11/20/2015    History of bone density study 04/21/2016    History of chest pain 04/21/2016    HIstory of Schatzki's ring 04/21/2016    History of Substernal precordial chest pain 05/05/2016    History of cardiovascular stress test 05/13/2016    Pain of left calf 02/15/2017    Tenderness of left calf 02/15/2017    Calf swelling 02/15/2017    Shortness of breath 02/15/2017    History of Acute deep vein thrombosis (DVT) of distal end of left lower extremity 02/15/2017    History of mammogram 04/05/2017    Osteopenia 04/18/2018    Abnormal weight gain 05/04/2018    Paraesophageal hernia 01/15/2019    Calculus of gallbladder without cholecystitis without obstruction 01/15/2019    Chronic cholecystitis 02/04/2019    Hospital discharge follow-up 02/04/2019    Leukocytosis 02/09/2019    Right hip pain 02/09/2019    ELIS (acute kidney injury) 02/09/2019    Dehydration 02/09/2019    Status post cholecystectomy 02/09/2019    Precordial pain 02/12/2019    Dysfunction of both eustachian tubes 09/30/2019    Neck swelling 09/15/2020    Benign paroxysmal positional vertigo due to bilateral vestibular disorder 02/05/2021    Fracture of proximal phalanx of toe of left foot, 7/15/2021--nondisplaced, third and fourth proximal phalanges/toe. 07/16/2021    Acute bronchitis with bronchospasm 04/27/2022    Closed head injury 08/02/2022    Chronic hoarseness 09/05/2023    Tremor of left hand 12/14/2023     Past Medical History:   Diagnosis Date    Chronic migraine 11/03/2009    Chronic renal insufficiency, stage II (mild), creatinine 1.12 11/14/2015    Glaucoma     History  of palpitations 12/07/2011    Hyperlipidemia 04/08/2016    Osteoporosis, 03/29/2011--lumbar spine -2.8.  Right femoral neck -2.4.  Left femoral neck -2.4.  Patient receives Reclast infusions. 02/09/2009    Pain disorder associated with psychological factors 10/10/2012    Pedal edema 06/29/2015         PAST SURGICAL HISTORY  Past Surgical History:   Procedure Laterality Date    APPENDECTOMY  1965    1965    CATARACT EXTRACTION Bilateral Remote    Remote bilateral cataract extirpation with intraocular lens implantation.    CHOLECYSTECTOMY WITH INTRAOPERATIVE CHOLANGIOGRAM N/A 01/21/2019 01/21/2019--laparoscopic paraesophageal hernia repair with fundoplication.    COLONOSCOPY  11/03/2008 2008--normal colonoscopy    COLONOSCOPY  2001 2001--normal colonoscopy.    COLONOSCOPY N/A 06/13/2017 06/13/2017--colonoscopy revealed melanosis.  Biopsied.  There was friability with contact bleeding in the ascending colon.  Biopsied.  Pathology returned consistent with melanosis coli.    ENDOSCOPY  10/08/2014    02/28/2012--air-contrast upper GI revealed small to moderate sized reducible sliding hiatal herniation of the upper stomach with some demonstrated gastroesophageal reflux. No esophageal, gastric, or duodenal mass or mucosal ulceration was seen. 11/03/2008--EGD performed for evaluation of iron deficiency anemia revealed hiatal hernia without evidence of reflux, prepyloric antritis and    ENDOSCOPY  11/03/2008 11/03/2008--EGD performed for evaluation of iron deficiency anemia revealed hiatal hernia without evidence of reflux, prepyloric antritis and    ENDOSCOPY  11/19/2001 11/19/2001--EGD revealed a very tortuous distal esophagus with a Schatzki's ring. No ulcer or erosions. No Dorado's mucosa. Stomach revealed patchy erythema and erosions in the antrum. Biopsy. Normal pylorus with no obstruction. Normal duodenum with no ulcers. The Schatzki's ring was dilated with a Rhodes dilator.;    ENDOSCOPY N/A  09/27/2016 09/27/2016--EGD revealed a normal oropharynx, esophagus, and medium sized hiatal hernia.  Nonbleeding gastric ulcer with no stigmata of bleeding.  Biopsy.  Gastritis.  Biopsy.  Normal examined duodenum.  Biopsied.  Pathology returned mild to moderate chronic active gastritis with ulceration from the stomach antral ulcer biopsy.  Gastroesophageal junction biopsy revealed minimal mixed inflammation.    ENDOSCOPY N/A 06/13/2017 06/13/2017--colonoscopy revealed melanosis.  Biopsied.  There was friability with contact bleeding in the ascending colon.  Biopsied.  Pathology returned consistent with melanosis coli.    ERCP N/A 01/22/2019 01/22/2019--ERCP with sphincterotomy and balloon stone extraction.    ESOPHAGEAL DILATATION  11/19/2001 11/19/2001--EGD revealed a very tortuous distal esophagus with a Schatzki's ring. No ulcer or erosions. No Dorado's mucosa. Stomach revealed patchy erythema and erosions in the antrum. Biopsy. Normal pylorus with no obstruction. Normal duodenum with no ulcers. The Schatzki's ring was dilated with a Rhodes dilator.;     ESOPHAGEAL MANOMETRY N/A 12/18/2018    Procedure: ESOPHAGEAL MANOMETRY;  Surgeon: Cecilia, Nurse Performed;  Location: [facility] ENDOSCOPY;  Service: Gastroenterology    ESOPHAGOSCOPY N/A 01/21/2019    Procedure: FLEXIBLE ESOPHAGOSCOPY;  Surgeon: Reji Johnson MD;  Location: [facility] OR;  Service: General    HIATAL HERNIA REPAIR N/A 01/21/2019    Procedure: Laparoscopic paraesophageal hernia repair with toupee fundoplication;  Surgeon: Reji Johnson MD;  Location: [facility] OR;  Service: General    INCONTINENCE SURGERY  1979 1979--a bladder tack procedure for urinary stress incontinence    KNEE ARTHROSCOPY Right 12/12/2011 12/12/2011--right knee arthroscopy with partial lateral and medial meniscectomies.    SKIN SURGERY  05/25/2010 05/25/2010--skin lesion excised from right lower extremity. Pathology unknown but  patient thinks it was a form of cancer.    TONSILLECTOMY  1953    TOTAL ABDOMINAL HYSTERECTOMY WITH SALPINGO OOPHORECTOMY  1974 1974--total abdominal hysterectomy.         FAMILY HISTORY  Family History   Problem Relation Age of Onset    Heart attack Mother         dies age 47 from heart attack    Heart disease Mother     Bleeding Disorder Mother     Heart disease Father     Ovarian cancer Maternal Grandmother     Heart attack Maternal Aunt         dies age 60 from heart attack    Malig Hyperthermia Neg Hx     Breast cancer Neg Hx          SOCIAL HISTORY  Social History     Socioeconomic History    Marital status:      Spouse name: ander    Number of children: 4    Years of education: 14    Highest education level: Associate degree: academic program   Tobacco Use    Smoking status: Never    Smokeless tobacco: Never    Tobacco comments:     None   Vaping Use    Vaping status: Never Used   Substance and Sexual Activity    Alcohol use: No    Drug use: No    Sexual activity: Not Currently     Partners: Male     Birth control/protection: Diaphragm, Birth control pill, Hysterectomy         ALLERGIES  Penicillin g, Statins, Tetanus toxoid, Morphine, Penicillins, and Tetanus toxoids      REVIEW OF SYSTEMS  Included in HPI  All systems reviewed and negative except for those discussed in HPI.      PHYSICAL EXAM    I have reviewed the triage vital signs and nursing notes.    ED Triage Vitals [07/18/25 2313]   Temp Heart Rate Resp BP SpO2   98.1 °F (36.7 °C) 81 18 136/84 96 %      Temp src Heart Rate Source Patient Position BP Location FiO2 (%)   Oral Monitor Lying Right arm --       Physical Exam  Constitutional:       General: She is not in acute distress.     Appearance: She is well-developed.   HENT:      Head: Normocephalic and atraumatic.   Eyes:      Extraocular Movements: Extraocular movements intact.   Cardiovascular:      Rate and Rhythm: Normal rate and regular rhythm.      Heart sounds: Normal heart  sounds.      Comments: Distal pulses intact  Pulmonary:      Effort: Pulmonary effort is normal. No respiratory distress.      Breath sounds: Normal breath sounds.   Abdominal:      General: There is no distension.   Musculoskeletal:      Right lower leg: No edema.      Left lower leg: No edema.   Skin:     General: Skin is warm.   Neurological:      General: No focal deficit present.      Mental Status: She is alert and oriented to person, place, and time.   Psychiatric:         Mood and Affect: Mood normal.             LAB RESULTS  Recent Results (from the past 24 hours)   ECG 12 Lead Dyspnea    Collection Time: 07/19/25 12:09 AM   Result Value Ref Range    QT Interval 387 ms    QTC Interval 429 ms   Comprehensive Metabolic Panel    Collection Time: 07/19/25 12:14 AM    Specimen: Blood   Result Value Ref Range    Glucose 92 65 - 99 mg/dL    BUN 18.0 8.0 - 23.0 mg/dL    Creatinine 1.34 (H) 0.57 - 1.00 mg/dL    Sodium 136 136 - 145 mmol/L    Potassium 4.0 3.5 - 5.2 mmol/L    Chloride 104 98 - 107 mmol/L    CO2 21.0 (L) 22.0 - 29.0 mmol/L    Calcium 8.8 8.6 - 10.5 mg/dL    Total Protein 6.2 6.0 - 8.5 g/dL    Albumin 3.7 3.5 - 5.2 g/dL    ALT (SGPT) <5 1 - 33 U/L    AST (SGOT) 14 1 - 32 U/L    Alkaline Phosphatase 63 39 - 117 U/L    Total Bilirubin <0.2 0.0 - 1.2 mg/dL    Globulin 2.5 gm/dL    A/G Ratio 1.5 g/dL    BUN/Creatinine Ratio 13.4 7.0 - 25.0    Anion Gap 11.0 5.0 - 15.0 mmol/L    eGFR 39.7 (L) >60.0 mL/min/1.73   BNP    Collection Time: 07/19/25 12:14 AM    Specimen: Blood   Result Value Ref Range    proBNP 177.0 0.0 - 1,800.0 pg/mL   High Sensitivity Troponin T    Collection Time: 07/19/25 12:14 AM    Specimen: Blood   Result Value Ref Range    HS Troponin T 29 (H) <14 ng/L   CBC Auto Differential    Collection Time: 07/19/25 12:14 AM    Specimen: Blood   Result Value Ref Range    WBC 7.67 3.40 - 10.80 10*3/mm3    RBC 3.89 3.77 - 5.28 10*6/mm3    Hemoglobin 11.8 (L) 12.0 - 15.9 g/dL    Hematocrit 36.5 34.0  - 46.6 %    MCV 93.8 79.0 - 97.0 fL    MCH 30.3 26.6 - 33.0 pg    MCHC 32.3 31.5 - 35.7 g/dL    RDW 13.7 12.3 - 15.4 %    RDW-SD 47.0 37.0 - 54.0 fl    MPV 9.5 6.0 - 12.0 fL    Platelets 251 140 - 450 10*3/mm3    Neutrophil % 48.2 42.7 - 76.0 %    Lymphocyte % 38.5 19.6 - 45.3 %    Monocyte % 10.4 5.0 - 12.0 %    Eosinophil % 1.4 0.3 - 6.2 %    Basophil % 0.7 0.0 - 1.5 %    Immature Grans % 0.8 (H) 0.0 - 0.5 %    Neutrophils, Absolute 3.70 1.70 - 7.00 10*3/mm3    Lymphocytes, Absolute 2.95 0.70 - 3.10 10*3/mm3    Monocytes, Absolute 0.80 0.10 - 0.90 10*3/mm3    Eosinophils, Absolute 0.11 0.00 - 0.40 10*3/mm3    Basophils, Absolute 0.05 0.00 - 0.20 10*3/mm3    Immature Grans, Absolute 0.06 (H) 0.00 - 0.05 10*3/mm3    nRBC 0.0 0.0 - 0.2 /100 WBC   Respiratory Panel PCR w/COVID-19(SARS-CoV-2) INDU/HOLLI/KIRSTIE/PAD/COR/HUSAM In-House, NP Swab in UTM/VTM, 2 HR TAT - Swab, Nasopharynx    Collection Time: 07/19/25 12:56 AM    Specimen: Nasopharynx; Swab   Result Value Ref Range    ADENOVIRUS, PCR Not Detected Not Detected    Coronavirus 229E Not Detected Not Detected    Coronavirus HKU1 Not Detected Not Detected    Coronavirus NL63 Not Detected Not Detected    Coronavirus OC43 Not Detected Not Detected    COVID19 Not Detected Not Detected - Ref. Range    Human Metapneumovirus Not Detected Not Detected    Human Rhinovirus/Enterovirus Not Detected Not Detected    Influenza A PCR Not Detected Not Detected    Influenza B PCR Not Detected Not Detected    Parainfluenza Virus 1 Not Detected Not Detected    Parainfluenza Virus 2 Not Detected Not Detected    Parainfluenza Virus 3 Not Detected Not Detected    Parainfluenza Virus 4 Not Detected Not Detected    RSV, PCR Not Detected Not Detected    Bordetella pertussis pcr Not Detected Not Detected    Bordetella parapertussis PCR Not Detected Not Detected    Chlamydophila pneumoniae PCR Not Detected Not Detected    Mycoplasma pneumo by PCR Not Detected Not Detected   High Sensitivity Troponin  T 1Hr    Collection Time: 07/19/25  2:06 AM    Specimen: Blood   Result Value Ref Range    HS Troponin T 29 (H) <14 ng/L    Troponin T Numeric Delta 0 ng/L    Troponin T % Delta 0 Abnormal if >/= 20%         RADIOLOGY  XR Chest 1 View  Result Date: 7/19/2025  SINGLE VIEW OF THE CHEST  HISTORY: Dyspnea  COMPARISON: March 11, 2022  FINDINGS: Heart size is within normal limits. There is tortuosity of the aorta. No pneumothorax or pleural effusion is seen. No acute infiltrates are identified.      No acute findings.  This report was finalized on 7/19/2025 12:15 AM by Dr. Donna Hill M.D on Workstation: [id number]          MEDICATIONS GIVEN IN ER  Medications - No data to display      ORDERS PLACED DURING THIS VISIT:  Orders Placed This Encounter   Procedures    Respiratory Panel PCR w/COVID-19(SARS-CoV-2) INDU/HOLLI/KIRSTIE/PAD/COR/HUSAM In-House, NP Swab in UTM/VTM, 2 HR TAT - Swab, Nasopharynx    XR Chest 1 View    Comprehensive Metabolic Panel    BNP    High Sensitivity Troponin T    CBC Auto Differential    High Sensitivity Troponin T 1Hr    ECG 12 Lead Dyspnea    CBC & Differential         OUTPATIENT MEDICATION MANAGEMENT:  No current Epic-ordered facility-administered medications on file.     Current Outpatient Medications Ordered in Epic   Medication Sig Dispense Refill    albuterol sulfate  (90 Base) MCG/ACT inhaler Inhale 2 puffs Every 4 (Four) Hours As Needed for Wheezing. 8 g 0    amantadine (SYMMETREL) 100 MG capsule Take 1 capsule by mouth 2 (Two) Times a Day.      Brexpiprazole (Rexulti) 1 MG tablet Take 2 mg by mouth Daily.      buPROPion XL (WELLBUTRIN XL) 300 MG 24 hr tablet Take 1 tablet by mouth Every Morning.      carbidopa-levodopa (SINEMET)  MG per tablet Take 1 tablet by mouth 3 (Three) Times a Day. 270 tablet 1    carbidopa-levodopa CR (SINEMET CR)  MG per CR tablet TAKE 1 TABLET BY MOUTH ONCE DAILY AT NIGHT 90 tablet 3    Cholecalciferol (vitamin D3) 125 MCG (5000 UT) capsule  capsule 1 by mouth daily as directed 30 capsule     diphenhydrAMINE-APAP, sleep, (Tylenol PM Extra Strength)  MG/30ML liquid Tylenol PM Extra Strength      donepezil (ARICEPT) 5 MG tablet Take 1 tablet by mouth every night at bedtime.      EQ All Day Allergy Relief 10 MG tablet Take 1 tablet by mouth once daily 90 tablet 0    estradiol (ESTRACE) 1 MG tablet Take 1 tablet by mouth once daily 90 tablet 1    HYDROcodone-acetaminophen (NORCO)  MG per tablet Take 1 tablet by mouth Every 6 (Six) Hours As Needed for Moderate Pain.      lisinopril (PRINIVIL,ZESTRIL) 2.5 MG tablet       methylphenidate 18 MG CR tablet Take 1 tablet by mouth Every Morning      Misc Natural Products (COLON CLEANSE PO) Take  by mouth.      omeprazole (priLOSEC) 40 MG capsule TAKE 1 CAPSULE BY MOUTH 30 MINUTES TO 1 HOUR PRIOR TO A MEAL 30 capsule 0    Polyethylene Glycol 400 (BLINK TEARS OP) Apply  to eye(s) as directed by provider.      spironolactone (ALDACTONE) 100 MG tablet Take 1/2 (one-half) tablet by mouth once daily 30 tablet 3    traZODone (DESYREL) 50 MG tablet Take 0.5-1 tablets by mouth Every Night.      venlafaxine XR (EFFEXOR-XR) 150 MG 24 hr capsule Take 1 capsule by mouth Every Morning.      venlafaxine XR (EFFEXOR-XR) 75 MG 24 hr capsule Take one 75 mg capsule venlafaxine along with one 150 mg capsule daily for depression.  Total daily dose is 225 mg.      vitamin E 400 UNIT capsule Take 1 capsule by mouth Daily.             PROGRESS, DATA ANALYSIS, CONSULTS, AND MEDICAL DECISION MAKING  All labs have been independently interpreted by me.  All radiology studies have been reviewed by me. All EKG's have been independently viewed and interpreted by me.  Discussion below represents my analysis of pertinent findings related to patient's condition, differential diagnosis, treatment plan and final disposition.    Differential diagnosis includes but is not limited to viral respiratory infection, flash pulmonary edema,  anxiety.        ED Course as of 07/19/25 1951   Sat Jul 19, 2025   0009 XR Chest 1 View  My independent interpretation of the imaging study is no lobar infiltrate or pneumothorax [AB]   0023 EKG interpreted by myself  Time: 0009  Rate: 74  Rhythm: NSR  No ST elevation or depression  Normal intervals  Normal morphology [AB]   1950 That has resolved upon arrival to the emergency department upon my initial evaluation, patient requesting discharge home.  She was worked up today with labs, EKG, chest x-ray, viral RPP.  Entirety of workup is benign.  She is not hypoxic and in no respiratory distress.  Unclear etiology for her symptoms.  Will have her follow-up closely with primary care provider.  Discussed ED return precautions.  She is otherwise well-appearing, hemodynamically stable, and therefore appropriate for discharge. [MP]      ED Course User Index  [AB] Ronald Kirk MD  [MP] Nadine Trent PA-C             AS OF 19:50 EDT VITALS:    BP - 116/50  HR - 69  TEMP - 98.1 °F (36.7 °C) (Oral)  O2 SATS - 98%    COMPLEXITY OF CARE  Admission was considered but after careful review of the patient's presentation, physical examination, diagnostic results, and response to treatment the patient may be safely discharged with outpatient follow-up.      DIAGNOSIS  Final diagnoses:   Dyspnea, unspecified type         DISPOSITION  ED Disposition       ED Disposition   Discharge    Condition   Stable    Comment   --                Please note that portions of this document were completed with a voice recognition program.    Note Disclaimer: At [facility], we believe that sharing information builds trust and better relationships. You are receiving this note because you recently visited [facility]. It is possible you will see health information before a provider has talked with you about it. This kind of information can be easy to misunderstand. To help you fully understand what it means for your health, we urge you to  discuss this note with your provider.     Nadine Trent PA-C  07/19/25 1951

## 2025-07-22 ENCOUNTER — HOSPITAL ENCOUNTER (OUTPATIENT)
Dept: MAMMOGRAPHY | Facility: HOSPITAL | Age: 85
Discharge: HOME | End: 2025-07-22
Admitting: INTERNAL MEDICINE
Payer: MEDICARE

## 2025-07-22 DIAGNOSIS — Z12.31 VISIT FOR SCREENING MAMMOGRAM: ICD-10-CM

## 2025-07-22 PROCEDURE — 77067 SCR MAMMO BI INCL CAD: CPT

## 2025-07-22 PROCEDURE — 77063 BREAST TOMOSYNTHESIS BI: CPT

## 2025-07-23 ENCOUNTER — OFFICE VISIT (OUTPATIENT)
Dept: INTERNAL MEDICINE | Facility: CLINIC | Age: 85
End: 2025-07-23
Payer: MEDICARE

## 2025-07-23 VITALS
HEART RATE: 81 BPM | DIASTOLIC BLOOD PRESSURE: 68 MMHG | SYSTOLIC BLOOD PRESSURE: 124 MMHG | HEIGHT: 60 IN | TEMPERATURE: 98.3 F | OXYGEN SATURATION: 97 % | WEIGHT: 132 LBS | RESPIRATION RATE: 16 BRPM | BODY MASS INDEX: 25.91 KG/M2

## 2025-07-23 DIAGNOSIS — Z78.9 STATIN INTOLERANCE: Chronic | ICD-10-CM

## 2025-07-23 DIAGNOSIS — D50.0 IRON DEFICIENCY ANEMIA DUE TO CHRONIC BLOOD LOSS: Chronic | ICD-10-CM

## 2025-07-23 DIAGNOSIS — I27.20 PULMONARY HYPERTENSION: Chronic | ICD-10-CM

## 2025-07-23 DIAGNOSIS — Z09 HOSPITAL DISCHARGE FOLLOW-UP: Primary | ICD-10-CM

## 2025-07-23 DIAGNOSIS — G20.B2 PARKINSON'S DISEASE WITH DYSKINESIA AND FLUCTUATING MANIFESTATIONS: Chronic | ICD-10-CM

## 2025-07-23 DIAGNOSIS — R26.89 BALANCE DISORDER: Chronic | ICD-10-CM

## 2025-07-23 DIAGNOSIS — R06.02 SHORTNESS OF BREATH: ICD-10-CM

## 2025-07-23 DIAGNOSIS — I10 BENIGN ESSENTIAL HYPERTENSION: Chronic | ICD-10-CM

## 2025-07-23 DIAGNOSIS — Z51.81 THERAPEUTIC DRUG MONITORING: ICD-10-CM

## 2025-07-23 DIAGNOSIS — R53.82 CHRONIC FATIGUE: Chronic | ICD-10-CM

## 2025-07-23 DIAGNOSIS — F41.8 DEPRESSION WITH ANXIETY: Chronic | ICD-10-CM

## 2025-07-23 DIAGNOSIS — K25.7 CHRONIC GASTRIC ULCER, UNSPECIFIED WHETHER GASTRIC ULCER HEMORRHAGE OR PERFORATION PRESENT: Chronic | ICD-10-CM

## 2025-07-23 DIAGNOSIS — E04.2 MULTINODULAR GOITER: Chronic | ICD-10-CM

## 2025-07-23 DIAGNOSIS — G25.81 RESTLESS LEGS SYNDROME: Chronic | ICD-10-CM

## 2025-07-23 DIAGNOSIS — M54.41 CHRONIC BILATERAL LOW BACK PAIN WITH RIGHT-SIDED SCIATICA: ICD-10-CM

## 2025-07-23 DIAGNOSIS — Z78.0 POSTMENOPAUSAL STATE: Chronic | ICD-10-CM

## 2025-07-23 DIAGNOSIS — M81.0 AGE-RELATED OSTEOPOROSIS WITHOUT CURRENT PATHOLOGICAL FRACTURE: Chronic | ICD-10-CM

## 2025-07-23 DIAGNOSIS — R60.0 BILATERAL LOWER EXTREMITY EDEMA: Chronic | ICD-10-CM

## 2025-07-23 DIAGNOSIS — E78.2 MIXED HYPERLIPIDEMIA: Chronic | ICD-10-CM

## 2025-07-23 DIAGNOSIS — K21.9 GASTROESOPHAGEAL REFLUX DISEASE WITHOUT ESOPHAGITIS: Chronic | ICD-10-CM

## 2025-07-23 DIAGNOSIS — N18.31 STAGE 3A CHRONIC KIDNEY DISEASE: Chronic | ICD-10-CM

## 2025-07-23 DIAGNOSIS — G89.29 CHRONIC BILATERAL LOW BACK PAIN WITH RIGHT-SIDED SCIATICA: ICD-10-CM

## 2025-07-23 DIAGNOSIS — D64.9 CHRONIC ANEMIA: Chronic | ICD-10-CM

## 2025-07-23 DIAGNOSIS — G43.709 CHRONIC MIGRAINE W/O AURA W/O STATUS MIGRAINOSUS, NOT INTRACTABLE: Chronic | ICD-10-CM

## 2025-07-23 DIAGNOSIS — F41.1 GENERALIZED ANXIETY DISORDER: Chronic | ICD-10-CM

## 2025-07-23 DIAGNOSIS — E26.9 HYPERALDOSTERONISM: Chronic | ICD-10-CM

## 2025-07-23 DIAGNOSIS — R29.6 FREQUENT FALLS: Chronic | ICD-10-CM

## 2025-07-23 DIAGNOSIS — E87.6 HYPOKALEMIA: Chronic | ICD-10-CM

## 2025-07-23 DIAGNOSIS — R41.3 SHORT-TERM MEMORY LOSS: Chronic | ICD-10-CM

## 2025-07-23 DIAGNOSIS — E55.9 VITAMIN D DEFICIENCY: Chronic | ICD-10-CM

## 2025-07-23 DIAGNOSIS — I65.23 ATHEROSCLEROSIS OF BOTH CAROTID ARTERIES: Chronic | ICD-10-CM

## 2025-07-23 PROBLEM — M84.48XA PATHOLOGICAL FRACTURE OF SACRAL VERTEBRA: Status: RESOLVED | Noted: 2021-05-27 | Resolved: 2025-07-23

## 2025-07-23 PROBLEM — R10.13 EPIGASTRIC ABDOMINAL PAIN: Status: RESOLVED | Noted: 2018-09-06 | Resolved: 2025-07-23

## 2025-07-23 PROBLEM — D48.5 NEOPLASM OF UNCERTAIN BEHAVIOR OF SKIN: Status: RESOLVED | Noted: 2023-02-09 | Resolved: 2025-07-23

## 2025-07-23 PROBLEM — S61.412A LACERATION OF LEFT HAND WITHOUT FOREIGN BODY: Status: RESOLVED | Noted: 2025-05-06 | Resolved: 2025-07-23

## 2025-07-23 RX ORDER — PREDNISONE 10 MG/1
TABLET ORAL
Qty: 46 TABLET | Refills: 0 | Status: SHIPPED | OUTPATIENT
Start: 2025-07-23

## 2025-07-23 NOTE — PROGRESS NOTES
07/23/2025    Patient Information  Sachi Vora                                                                                          [address] [place] [address]      1940  [unfilled]  There is no work phone number on file.    Chief Complaint:     Transition of care, follow-up from hospital visit.    History of Present Illness:    Patient with multitude of chronic medical problems some of which are listed below presents today for follow-up after being evaluated in the emergency room on July 19, 2025.  She presented to the emergency room with complaints of shortness of breath.  This started earlier at home.  She felt like she just could not catch her breath.  She had no associated chest pain, cough, fever, leg swelling, history of DVT or pulmonary embolism, no history of cardiac or pulmonary disease.  On exam there were no significant findings.  She had regular rate and rhythm and clear lungs.  She had a workup which we will review down below.  Currently she is not having any problems.    Review of Systems   Constitutional: Negative.   HENT: Negative.     Eyes: Negative.    Cardiovascular: Negative.    Respiratory: Negative.     Endocrine: Negative.    Hematologic/Lymphatic: Negative.    Skin: Negative.    Musculoskeletal: Negative.    Gastrointestinal: Negative.    Genitourinary: Negative.    Neurological: Negative.    Psychiatric/Behavioral: Negative.     Allergic/Immunologic: Negative.        Active Problems:    Patient Active Problem List   Diagnosis    Osteoporosis, 03/29/2011--lumbar spine -2.8.  Right femoral neck -2.4.  Left femoral neck -2.4.  Patient receives Reclast infusions.    Therapeutic drug monitoring    Simple renal cyst    Benign essential hypertension    Carotid artery plaque, 04/02/2018--mild right ICA plaque, normal left ICA 10/03/2014--mild bilateral carotid artery plaque.    Chronic gastritis    Chronic bilateral low back pain with right-sided sciatica     "Chronic otitis externa    Stage 3a chronic kidney disease    Depression with anxiety    Functional murmur, 07/15/2015--normal echocardiogram.    Hyperlipidemia    Chronic migraine w/o aura w/o status migrainosus, not intractable    Pancreatic Duct Dilation    Bilateral lower extremity edema    Restless legs syndrome    Bilateral sensorineural hearing loss    Vitamin D deficiency    Chronic gastric ulcer    Chronic fatigue    Hypersomnolence    Chronic anemia    Multinodular goiter    Generalized anxiety disorder    Iron deficiency anemia    Statin intolerance    Postmenopausal state    Short-term memory loss    Pulmonary hypertension    Gastroesophageal reflux disease without esophagitis    Tinnitus of both ears    Chronic hoarseness    Frequent falls    Balance disorder, related to Parkinson's    Hyperaldosteronism    Hypokalemia    Chronic pain of left knee    Osteoarthritis of left knee    Parkinson's disease with dyskinesia and fluctuating manifestations    Chronic mid back pain    Hemangioma of skin and subcutaneous tissue    Melanocytic nevus of trunk    Other long term (current) drug therapy    Seborrheic keratosis    Skin changes due to chronic exposure to nonionizing radiation    Shortness of breath    Hospital discharge follow-up         Past Medical History:   Diagnosis Date    Balance disorder, related to Parkinson's 02/05/2021 February 5, 2021--patient seen in follow-up by Dr. Weir.  I extensively reviewed the documentation from the emergency room visit as noted below.  I reviewed the history with the patient and she is describing episodes of dizziness described as a spinning sensation of her body and this seems to be precipitated by movement although it does not occur if she rolls over in bed.  If she stands up and st    Benign essential hypertension 04/08/2016    Bilateral sensorineural hearing loss 03/31/2011 03/31/2011--etiology reveals reverse \"cookie bite\" type of hearing loss for both ears " of mild/moderate degree, mostly sensorineural.  Speech discrimination 100% on the right, 96% on the left.    Carotid artery plaque, 10/03/2014--mild bilateral carotid artery plaque. 07/25/2012    10/03/2014--[facility] screening revealed mild bilateral carotid plaque, negative for atrial fibrillation, negative for AAA, negative for PAD, osteoporosis screen revealed osteopenia.  Body mass index was 25 and considered to be moderate risk.  07/25/2012--vascular screen negative for carotid plaque, negative for abdominal aneurysm, negative for PAD Description: 10/03/2014--[facility] screening revealed mild bilateral carotid plaque, negative for atrial fibrillation, negative for AAA, negative for PAD, osteoporosis screen revealed osteopenia.  Body mass index was 25 and considered to be moderate risk.  07/25/2012--vascular screen negative for carotid plaque, negative for abdominal aneurysm, negative for PAD    Chronic anemia 08/23/2016 06/13/2017--colonoscopy revealed melanosis.  Biopsied.  There was friability with contact bleeding in the ascending colon.  Biopsied.  Pathology returned consistent with melanosis coli.  06/13/2017--EGD revealed normal esophagus.  Biopsies taken.  There was a medium-sized hiatal hernia.  Localized mild inflammation and linear erosions were found in the prepyloric region.  Biopsied.  Examined duodenum was normal.  Random biopsies taken.  Pathology of the gastroesophageal junction was unremarkable as was the gastric fundus.  Prepyloric biopsy revealed minimal chronic inflammation and reactive change.  H pylori negative.  Duodenal biopsies are negative.  04/12/2017--hemoglobin low at 10.8, hematocrit is normal at 35.7.  Iron sulfate 325 mg per day initiated.  08/23/2016--routine follow-up.  Patient continues to have epigastric abdominal pain believed to be related to reflux with possible esophageal spasm.  Hemoglobin noted to be low at 10.6 with a hematocrit low at 33.7 and RDW elevated at 16.2.   Homocysti    Chronic fatigue 04/21/2016 06/14/2017--overnight oximetry revealed oxygen desaturation index of only 0.44.  There were only 10 total desaturations during the period of testing which lasted 6 hours and 46 minutes.  05/31/2017--patient seen in follow-up and reports she continues to have chronic fatigue as well as hypersomnolence.  Once again, she is on multiple medications that are undoubtedly contributing to this problem including clonazepam and hydrocodone but I doubt that these could be discontinued.  Her CBC back in April revealed a low hemoglobin of 10.8 but hematocrit was normal at 35.7.  Thyroid function tests were normal and CMP normal.  Overnight oximetry test ordered.  Repeat CBC.  Iron studies.  Sedimentation rate and CRP.  04/11/2017--patient seen in follow-up and her blood pressure is now at a reasonable level at 120/64.  She reports she still feels somewhat fatigued but she is much better.  Patient has not been doing any regular exercise and I do think that that would be helpful to reduce her feeling of fatigue.  She is    Chronic gastric ulcer 04/14/2014    02/15/2017--patient seen in follow-up in nearly 6 months later.  She has complaints of not feeling well all over.  She has complaints of diffuse myalgias and possibly tendinopathy related to Levaquin that I called in prior to her going to [place] for vacation.  She reports that 3 or 4 days after starting the Levaquin she began to have the musculoskeletal symptoms and she reports that she continues to have them presently.  Symptoms are worse involving her left calf area.  Examination reveals significant tenderness involving the left calf and the left calf seems a little larger than the right.  She also has complaints of shortness of breath but no chest pain.  She is complaining of double vision and generalized weakness and fatigue.  She was complaining of chronic fatigue at the last visit back in September and I ordered an overnight  oximetry test but apparently this was never done for reasons that are not clear to me.  Her current oxygen saturation is 94% and normally it is 100%.  Plan is as follows: Extensi    Chronic gastritis 11/19/2001    02/15/2017--patient seen in follow-up in nearly 6 months later.  She has complaints of not feeling well all over.  She has complaints of diffuse myalgias and possibly tendinopathy related to Levaquin that I called in prior to her going to [place] for vacation.  She reports that 3 or 4 days after starting the Levaquin she began to have the musculoskeletal symptoms and she reports that she continues to have them presently.  Symptoms are worse involving her left calf area.  Examination reveals significant tenderness involving the left calf and the left calf seems a little larger than the right.  She also has complaints of shortness of breath but no chest pain.  She is complaining of double vision and generalized weakness and fatigue.  She was complaining of chronic fatigue at the last visit back in September and I ordered an overnight oximetry test but apparently this was never done for reasons that are not clear to me.  Her current oxygen saturation is 94% and normally it is 100%.  Plan is as follows: Extensi    Chronic hoarseness 06/08/2020    September 15, 2020--patient presents with complaints of swelling of the soft tissues of the left side of the neck and this is associated with pain and discomfort, particularly when she turns her head rotating to the left.  She also notices hoarseness and feels that her voice has changed.  On exam there is definite prominence of the left sternocleidomastoid muscle and there may be some shotty lymph    Chronic lower back pain 01/31/2006 08/11/2014--MRI of the lumbar spine reveals the conus terminates at L2 and is normal.  Cauda equina unremarkable.  Stable to moderate degenerative disc disease at L5-S1.  No acute fracture or pars defect is demonstrated.  Small  synovial cysts are seen posterior to the L4-L5 facet.  The perivertebral soft tissues are unremarkable.  T12-L1, L1-L2, L2-L3 are negative.  L3-L4 a broad-based disc bulge resulting in mild bilateral neural foraminal narrowing.  L4-L5 reveals a broad-based disc bulge facet disease resulting in mild bilateral neural foraminal narrowing.  L5-S1 reveals a broad-based disc bulge, posterior osseous slipping, and facet disease resulting in mild to moderate bilateral neural foraminal narrowing.  Assessment is stable mild to moderate degenerative spondylosis.  Small synovial cysts are seen posterior to the L4-L5 facets.  08/11/2014--MRI of the thoracic spine reveals mild to moderate thoracic kyphosis.  No fracture.  At T5--T6 there is a moderate sized left paracentral disc protrusion which i    Chronic migraine 11/03/2009 01/18/2010--MRI of the brain performed for headaches and memory loss.  Mild small vessel disease in the cerebral and central pontine white matter.  There is an ovoid, somewhat pancake-shaped area of signal abnormality in the anterior inferior right temporal lobe subcortical to the juxtacortical white matter that measures 1.2 x 1 cm and anterior posterior and medial lateral dimension but only measures 3 mm in cranial caudal dimension.  I suspect that this is a benign cyst or a cystic area of encephalomalacia.  The remainder of the MRI of the head is within normal limits.  11/03/2009--CT scan of the brain without contrast performed for headache after a fall.  Mild diffuse atrophy.  No acute abnormality noted.; Description: Patient has had a long history of migraine headaches.  MRI and CT scan of the brain have been essentially negative.    Chronic otitis externa 04/08/2016    Chronic renal insufficiency, stage II (mild), creatinine 1.12 11/14/2015 11/14/2015--serum creatinine mildly elevated at 1.12.    Depression with anxiety 04/08/2016    Frequent falls 02/05/2021 February 5, 2021--patient seen in  follow-up by Dr. Weir.  I extensively reviewed the documentation from the emergency room visit as noted below.  I reviewed the history with the patient and she is describing episodes of dizziness described as a spinning sensation of her body and this seems to be precipitated by movement although it does not occur if she rolls over in bed.  If she stands up and st    Functional murmur, 07/15/2015--normal echocardiogram. 07/15/2015    07/15/2015--echocardiogram reveals normal left ventricular size and function with ejection fraction 55%.  Grade 1 diastolic dysfunction, abnormal relaxation pattern.  Trace tricuspid regurgitation.  Estimated right ventricular systolic pressure is 25 mmHg which is normal.    Gastroesophageal reflux disease without esophagitis 06/06/2019    Generalized anxiety disorder 04/11/2017    History of Acute deep vein thrombosis (DVT) of distal end of left lower extremity 02/15/2017    03/21/2017 patient seen in follow-up and she is tolerating the Eliquis well without signs or symptoms of bleeding.  Her calf swelling and tenderness is better but not totally resolved.  I suspect that the DVT is chronic and may not resolve at all.  I will order a repeat venous study and then proceed from there.  02/23/2017--repeat Doppler venous study of the left lower extremity reveals a chronic left lower extremity DVT in the posterior tibial.  All other left sided veins appeared normal.  Fluid collection in the left calf noted.  02/17/2017--patient seen in follow-up and reports her left lower extremity symptoms are about the same.  She continues to have complaints of profound fatigue which I think is multifactorial including underlying depression that is not in remission.  Review the results of the CTA of the chest including the possible thyroid lesion.  I do not think this is contributing to any of her symptoms of fatigue particularly given the fact that her thyroid function tests are normal.  I expla     History of palpitations 12/07/2011    Patient has had multiple admissions to the hospital for complaints of chest pain and heart palpitations. She meant admitted at least on 3 occasions. She has had at least 3 stress Cardiolite and 1 stress ECG, the last Cardiolite being performed 12/07/2011 which was negative. Patient is also had Holter monitors which have been unremarkable.    HIstory of Schatzki's ring 02/28/2012 02/28/2012--air-contrast upper GI revealed small to moderate sized reducible sliding hiatal herniation of the upper stomach with some demonstrated gastroesophageal reflux. No esophageal, gastric, or duodenal mass or mucosal ulceration was seen.  11/03/2008--EGD performed for evaluation of iron deficiency anemia revealed hiatal hernia without evidence of reflux, prepyloric antritis and    Hyperlipidemia 04/08/2016    Hypersomnolence 05/05/2016 06/14/2017--overnight oximetry revealed oxygen desaturation index of only 0.44.  There were only 10 total desaturations during the period of testing which lasted 6 hours and 46 minutes.  05/31/2017--patient seen in follow-up and reports she continues to have chronic fatigue as well as hypersomnolence.  Once again, she is on multiple medications that are undoubtedly contributing to this problem including clonazepam and hydrocodone but I doubt that these could be discontinued.  Her CBC back in April revealed a low hemoglobin of 10.8 but hematocrit was normal at 35.7.  Thyroid function tests were normal and CMP normal.  Overnight oximetry test ordered.  Repeat CBC.  Iron studies.  Sedimentation rate and CRP.  04/11/2017--patient seen in follow-up and her blood pressure is now at a reasonable level at 120/64.  She reports she still feels somewhat fatigued but she is much better.  Patient has not been doing any regular exercise and I do think that that would be helpful to reduce her feeling of fatigue.  She is    Menopausal state 04/08/2016    Multinodular goiter  02/17/2017 02/21/2017--thyroid ultrasound reveals multinodular thyroid with largest nodule measuring on the order of 1.6 cm in greatest dimension.  02/17/2017--patient seen in follow-up for DVT and CTA of the chest.  An incidental finding on the CTA of the chest reveals a 1.7 cm lesion in the right lobe of thyroid.  Note that thyroid function tests are currently normal.  Ultrasound of the thyroid ordered.    Osteoporosis, 03/29/2011--lumbar spine -2.8.  Right femoral neck -2.4.  Left femoral neck -2.4.  Patient receives Reclast infusions. 02/09/2009 04/19/2017--Reclast infusion.  04/05/2016--Reclast infusion.  06/04/2012--Reclast infusion.  05/26/2011--Reclast infusion.  03/29/2011--DEXA scan revealed a lumbar T score of -2.8, right femoral neck T score -2.4, left femoral neck T score -2.4.  Osteoporosis of the lumbar spine and severe osteopenia of the hips bilaterally.  Patient has been intolerant to Fosamax because of gastritis and gastroesophageal reflux.  04/01/2009--treatment for osteoporosis begun with Fosamax.  02/09/2009--DEXA scan revealed lumbar spine T score of -3.3.  Left femoral neck T score -2.5.  Right hip T score -2.6.  Osteoporosis.     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  CT scan of the abdomen and pelvis reveals a left renal cyst measuring 5.1 x 4.9 cm.  There were subcentimeter hypodense renal lesions that are too small to further characterize and are presumably related to cysts.  Recommend attention to follow up.  Distal dilated pancreatic duct noted.  The common bile duct is the upper limits of normal in size.  The ampullary region is not well evaluated.  Comparison with prior imaging is recommended if available.  If there is no prior film available for comparison, and ERCP or MRCP could be performed to further evaluate.  Small hiatal hernia  noted.  03/05/2012--CT scan of the abdomen with contrast, pancreatic protocol.  This reveals some fullness of the pancreatic ductal system and apparent pancreatic divisum with separate entrance of the accessory pancreatic duct extending into the duodenum distal to the main pancreatic duct.  There is fullness of the duct diffusely but similar to the previous s    Parkinson's disease with dyskinesia and fluctuating manifestations 12/14/2023 December 19, 2023--MRI of the brain without contrast reveals stable findings compared to the prior study dated May 28, 2021.  There are moderate changes of chronic small vessel ischemic phenomena.  Additionally there are findings consistent with an end of the insulin large perivascular space within the anterior portion of the right temporal lobe measuring 1.7 x 1.2 cm in greatest axial dimensions.    Pedal edema 06/29/2015 06/29/2015--patient presents with a 4-6 week history of exertional dyspnea that comes on with activity such as climbing stairs or walking her dog up an incline.  No chest pain.  Relieved with rest.  No cough.  She does have complaints of her feet and legs swelling that is particularly worse at the end of the day and not improved overnight.  No orthopnea or PND.  Chest exam reveals faint rales at the bases.  Otherwise clear.  Heart is regular and I do not appreciate a heart murmur.  Chest x-ray PA and lateral ordered.  Echocardiogram ordered.    Pulmonary hypertension 04/18/2019    Restless legs syndrome 04/08/2016    Short-term memory loss 05/04/2018 05/04/2018--patient reports development of problems with her memory over the past several months and is concerned about possibility of Alzheimer's.  No other neurologic symptoms other than occasional sensation of being off balance.  We will not evaluate that problem at the present time unless it gets worse.  I we will however send her for neuropsychological testing regarding memory loss.      Simple renal  cyst 07/20/2009 09/29/2014--patient was again evaluated by the urologist for a renal cyst.  CT scan of the abdomen and pelvis reveals a left renal cyst measuring 5.1 x 4.9 cm.  There were subcentimeter hypodense renal lesions that are too small to further characterize and are presumably related to cysts.  Recommend attention to follow up.  Distal dilated pancreatic duct noted.  The common bile duct is the upper limits of normal in size.  The ampullary region is not well evaluated.  Comparison with prior imaging is recommended if available.  If there is no prior film available for comparison, and ERCP or MRCP could be performed to further evaluate.  Small hiatal hernia noted.  07/20/2009--patient was noted to have a left renal mass consistent with a cyst.  This was evaluated by the urologist 07/20/2009.    Stage 3a chronic kidney disease 11/14/2015 11/14/2015--serum creatinine mildly elevated at 1.12.      Statin intolerance 10/30/2017    Tinnitus of both ears 09/30/2019 September 30, 2019--patient presents with a several year history of bilateral tinnitus, right greater than left.  It seems to be getting worse and now is associated with fluctuating hearing.  She occasionally will have worsening hearing after she coughs or sneezes.  On exam she has evidence of old otitis media scars, particularly on the right.  There is no acute infection present and there is no a    Tremor of left hand 12/14/2023    Vitamin D deficiency 04/08/2016         Past Surgical History:   Procedure Laterality Date    APPENDECTOMY  1965    1965    CATARACT EXTRACTION Bilateral Remote    Remote bilateral cataract extirpation with intraocular lens implantation.    CHOLECYSTECTOMY WITH INTRAOPERATIVE CHOLANGIOGRAM N/A 01/21/2019 01/21/2019--laparoscopic paraesophageal hernia repair with fundoplication.    COLONOSCOPY  11/03/2008 2008--normal colonoscopy    COLONOSCOPY  2001 2001--normal colonoscopy.    COLONOSCOPY N/A  06/13/2017 06/13/2017--colonoscopy revealed melanosis.  Biopsied.  There was friability with contact bleeding in the ascending colon.  Biopsied.  Pathology returned consistent with melanosis coli.    ENDOSCOPY  10/08/2014    02/28/2012--air-contrast upper GI revealed small to moderate sized reducible sliding hiatal herniation of the upper stomach with some demonstrated gastroesophageal reflux. No esophageal, gastric, or duodenal mass or mucosal ulceration was seen. 11/03/2008--EGD performed for evaluation of iron deficiency anemia revealed hiatal hernia without evidence of reflux, prepyloric antritis and    ENDOSCOPY  11/03/2008 11/03/2008--EGD performed for evaluation of iron deficiency anemia revealed hiatal hernia without evidence of reflux, prepyloric antritis and    ENDOSCOPY  11/19/2001 11/19/2001--EGD revealed a very tortuous distal esophagus with a Schatzki's ring. No ulcer or erosions. No Dorado's mucosa. Stomach revealed patchy erythema and erosions in the antrum. Biopsy. Normal pylorus with no obstruction. Normal duodenum with no ulcers. The Schatzki's ring was dilated with a Rhodes dilator.;    ENDOSCOPY N/A 09/27/2016 09/27/2016--EGD revealed a normal oropharynx, esophagus, and medium sized hiatal hernia.  Nonbleeding gastric ulcer with no stigmata of bleeding.  Biopsy.  Gastritis.  Biopsy.  Normal examined duodenum.  Biopsied.  Pathology returned mild to moderate chronic active gastritis with ulceration from the stomach antral ulcer biopsy.  Gastroesophageal junction biopsy revealed minimal mixed inflammation.    ENDOSCOPY N/A 06/13/2017 06/13/2017--colonoscopy revealed melanosis.  Biopsied.  There was friability with contact bleeding in the ascending colon.  Biopsied.  Pathology returned consistent with melanosis coli.    ERCP N/A 01/22/2019 01/22/2019--ERCP with sphincterotomy and balloon stone extraction.    ESOPHAGEAL DILATATION  11/19/2001 11/19/2001--EGD revealed a very  tortuous distal esophagus with a Schatzki's ring. No ulcer or erosions. No Dorado's mucosa. Stomach revealed patchy erythema and erosions in the antrum. Biopsy. Normal pylorus with no obstruction. Normal duodenum with no ulcers. The Schatzki's ring was dilated with a Rhodes dilator.;     ESOPHAGEAL MANOMETRY N/A 12/18/2018    Procedure: ESOPHAGEAL MANOMETRY;  Surgeon: Cecilia, Nurse Performed;  Location: [facility] ENDOSCOPY;  Service: Gastroenterology    ESOPHAGOSCOPY N/A 01/21/2019    Procedure: FLEXIBLE ESOPHAGOSCOPY;  Surgeon: Reji Johnson MD;  Location: [facility] MAIN OR;  Service: General    HIATAL HERNIA REPAIR N/A 01/21/2019    Procedure: Laparoscopic paraesophageal hernia repair with toupee fundoplication;  Surgeon: Reji Johnson MD;  Location: [facility] MAIN OR;  Service: General    INCONTINENCE SURGERY  1979 1979--a bladder tack procedure for urinary stress incontinence    KNEE ARTHROSCOPY Right 12/12/2011 12/12/2011--right knee arthroscopy with partial lateral and medial meniscectomies.    SKIN SURGERY  05/25/2010 05/25/2010--skin lesion excised from right lower extremity. 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    TONSILLECTOMY  1953    TOTAL ABDOMINAL HYSTERECTOMY WITH SALPINGO OOPHORECTOMY  1974 1974--total abdominal hysterectomy.         Allergies   Allergen Reactions    Penicillin G Anaphylaxis    Statins Nausea And Vomiting    Tetanus Toxoid Itching    Morphine Hallucinations    Penicillins Itching    Tetanus Toxoids Itching           Current Outpatient Medications:     albuterol sulfate  (90 Base) MCG/ACT inhaler, Inhale 2 puffs Every 4 (Four) Hours As Needed for Wheezing., Disp: 8 g, Rfl: 0    amantadine (SYMMETREL) 100 MG capsule, Take 1 capsule by mouth 2 (Two) Times a Day., Disp: , Rfl:     Brexpiprazole (Rexulti) 1 MG tablet, Take 2 mg by mouth Daily., Disp: , Rfl:     buPROPion XL (WELLBUTRIN XL) 300 MG 24 hr tablet, Take 1 tablet by  mouth Every Morning., Disp: , Rfl:     carbidopa-levodopa (SINEMET)  MG per tablet, Take 1 tablet by mouth 3 (Three) Times a Day., Disp: 270 tablet, Rfl: 1    carbidopa-levodopa CR (SINEMET CR)  MG per CR tablet, TAKE 1 TABLET BY MOUTH ONCE DAILY AT NIGHT, Disp: 90 tablet, Rfl: 3    Cholecalciferol (vitamin D3) 125 MCG (5000 UT) capsule capsule, 1 by mouth daily as directed, Disp: 30 capsule, Rfl:     diphenhydrAMINE-APAP, sleep, (Tylenol PM Extra Strength)  MG/30ML liquid, Tylenol PM Extra Strength, Disp: , Rfl:     donepezil (ARICEPT) 5 MG tablet, Take 1 tablet by mouth every night at bedtime., Disp: , Rfl:     EQ All Day Allergy Relief 10 MG tablet, Take 1 tablet by mouth once daily, Disp: 90 tablet, Rfl: 0    estradiol (ESTRACE) 1 MG tablet, Take 1 tablet by mouth once daily, Disp: 90 tablet, Rfl: 1    HYDROcodone-acetaminophen (NORCO)  MG per tablet, Take 1 tablet by mouth Every 6 (Six) Hours As Needed for Moderate Pain., Disp: , Rfl:     lisinopril (PRINIVIL,ZESTRIL) 2.5 MG tablet, , Disp: , Rfl:     methylphenidate 18 MG CR tablet, Take 1 tablet by mouth Every Morning, Disp: , Rfl:     Misc Natural Products (COLON CLEANSE PO), Take  by mouth., Disp: , Rfl:     omeprazole (priLOSEC) 40 MG capsule, TAKE 1 CAPSULE BY MOUTH 30 MINUTES TO 1 HOUR PRIOR TO A MEAL, Disp: 30 capsule, Rfl: 0    Polyethylene Glycol 400 (BLINK TEARS OP), Apply  to eye(s) as directed by provider., Disp: , Rfl:     spironolactone (ALDACTONE) 100 MG tablet, Take 1/2 (one-half) tablet by mouth once daily, Disp: 30 tablet, Rfl: 3    traZODone (DESYREL) 50 MG tablet, Take 0.5-1 tablets by mouth Every Night., Disp: , Rfl:     venlafaxine XR (EFFEXOR-XR) 150 MG 24 hr capsule, Take 1 capsule by mouth Every Morning., Disp: , Rfl:     venlafaxine XR (EFFEXOR-XR) 75 MG 24 hr capsule, Take one 75 mg capsule venlafaxine along with one 150 mg capsule daily for depression.  Total daily dose is 225 mg., Disp: , Rfl:     vitamin E  "400 UNIT capsule, Take 1 capsule by mouth Daily., Disp: , Rfl:     predniSONE (DELTASONE) 10 MG tablet, 5 by mouth daily 5 days, then 4 daily 2 days, 3 daily 2 days, 2 daily 2 days, 1 daily 2 days, one half daily 2 days and then discontinue., Disp: 46 tablet, Rfl: 0      Family History   Problem Relation Age of Onset    Heart attack Mother         dies age 47 from heart attack    Heart disease Mother     Bleeding Disorder Mother     Heart disease Father     Ovarian cancer Maternal Grandmother     Heart attack Maternal Aunt         dies age 60 from heart attack    Malig Hyperthermia Neg Hx     Breast cancer Neg Hx          Social History     Socioeconomic History    Marital status:      Spouse name: ander    Number of children: 4    Years of education: 14    Highest education level: Associate degree: academic program   Tobacco Use    Smoking status: Never    Smokeless tobacco: Never    Tobacco comments:     None   Vaping Use    Vaping status: Never Used   Substance and Sexual Activity    Alcohol use: No    Drug use: No    Sexual activity: Not Currently     Partners: Male     Birth control/protection: Diaphragm, None, Birth control pill, Hysterectomy         Vitals:    07/23/25 1203   BP: 124/68   Pulse: 81   Resp: 16   Temp: 98.3 °F (36.8 °C)   TempSrc: Oral   SpO2: 97%   Weight: 59.9 kg (132 lb)   Height: 152.4 cm (60\")        Body mass index is 25.78 kg/m².      Physical Exam:    General: Alert and oriented x 2.  No acute distress.  Normal affect.  HEENT: Pupils equal, round, reactive to light; extraocular movements intact; sclerae nonicteric; pharynx, ear canals and TMs normal.  Neck: Without JVD, thyromegaly, bruit, or adenopathy.  Lungs: Clear to auscultation in all fields.  Heart: Regular rate and rhythm without murmur, rub, gallop, or click.  Abdomen: Soft, nontender, without hepatosplenomegaly or hernia.  Bowel sounds normal.  : Deferred.  Rectal: Deferred.  Extremities: Without clubbing, cyanosis, " edema, or pulse deficit.  Neurologic: Intact without focal deficit.  Normal station and gait observed during ingress and egress from the examination room.  Skin: Without significant lesion.  Musculoskeletal: Unremarkable.    Lab/other results:    Chest x-ray PA and lateral showed no active disease.  Troponin was mildly elevated x 2 both at 29.  Respiratory panel was negative.  BNP normal at 177.  CMP normal except creatinine 1.34 and estimated GFR 39.7.  CBC with differential revealed a low hemoglobin 11.8 but hematocrit normal at 36.5.  Electrocardiogram revealed sinus rhythm.  PVC present.  Abnormal R wave progression and early transition.    Assessment/Plan:     Diagnosis Plan   1. Hospital discharge follow-up        2. Shortness of breath        3. Parkinson's disease with dyskinesia and fluctuating manifestations        4. Hypokalemia        5. Hyperaldosteronism        6. Balance disorder, related to Parkinson's        7. Frequent falls        8. Pulmonary hypertension        9. Iron deficiency anemia due to chronic blood loss        10. Multinodular goiter        11. Generalized anxiety disorder        12. Statin intolerance        13. Postmenopausal state        14. Short-term memory loss        15. Chronic fatigue        16. Chronic gastric ulcer, unspecified whether gastric ulcer hemorrhage or perforation present        17. Vitamin D deficiency        18. Restless legs syndrome        19. Bilateral lower extremity edema        20. Chronic migraine w/o aura w/o status migrainosus, not intractable        21. Hyperlipidemia        22. Depression with anxiety        23. Stage 3a chronic kidney disease        24. Benign essential hypertension        25. Osteoporosis, 03/29/2011--lumbar spine -2.8.  Right femoral neck -2.4.  Left femoral neck -2.4.  Patient receives Reclast infusions.        26. Carotid artery plaque, 04/02/2018--mild right ICA plaque, normal left ICA 10/03/2014--mild bilateral carotid artery  plaque.        27. Chronic anemia        28. Gastroesophageal reflux disease without esophagitis        29. Therapeutic drug monitoring        30. Chronic bilateral low back pain with right-sided sciatica  predniSONE (DELTASONE) 10 MG tablet        Current outpatient and discharge medications have been reconciled for the patient.  Reviewed by: Cruzito Weir MD     Patient with multitude of chronic medical problems as noted above.  She is seen in hospital follow-up/transition of care where she was evaluated emergency room with complaints of shortness of breath.  Her shortness of breath is totally resolved.  She has had no further symptoms since being discharged from the emergency room.  I reviewed the evaluation performed in the emergency room documented above and there is not really anything there that would explain the symptoms.  Patient does have some anxiety and it could be representation of exacerbation of this although this is not totally clear.  I do not think we need to proceed with any further workup at this time.  She was discharged with an albuterol metered-dose inhaler which she has used periodically but she had really does not help any.    Addendum: Patient has a new complaint which we will deal with today.  She has degenerative disc disease and chronic lower back pain and reports over the past 6 weeks she has had pain which radiates down her medial thigh to below the knee.  The back pain seems to fluctuate and correlates with the pain.  Pain is worse with certain movements including standing up and arising from a seated position.  There is no numbness or tingling.  I reviewed the previous x-rays of the lumbar and thoracic spine.  Patient does not have an orthopedic doctor regarding this problem.  I discussed options with the patient and her  including orthopedic referral or we could treated empirically with a round of prednisone and see if this resolves her issues.  The prednisone could  aggravate any anxiety but I think it would be worth this risk.  They have opted for a round of prednisone which I have ordered.  If her symptoms persist they need to contact me for further instructions.    Procedures

## 2025-07-28 NOTE — PROGRESS NOTES
09/06/2018    Patient Information  Sachi Vora                                                                                          [address] [place] [address]      1940  [phone]      Chief Complaint:     Complaining of upper abdominal pain.    History of Present Illness:    Patient with a history of gastroesophageal reflux, chronic gastritis, pancreatic duct dilatation, chronic gastric ulcer, chronic fatigue, chronic anemia and iron deficiency anemia.  She presents today with complaints of epigastric abdominal pain as described below.  Past medical history reviewed and updated where necessary including health maintenance parameters.  This reveals she is up-to-date with the exception of the new shingles vaccination.  She can receive that at the local drug store along with hepatitis A vaccination.  She also needs her influenza vaccination which we will give today here in the office.    The history regarding epigastric abdominal pain:    09/06/2018--patient with history of gastroesophageal reflux presents with a 2 to three-week history of epigastric abdominal pain that is somewhat poorly described but the best description is that of a dull ache.  This is despite taking omeprazole 40 mg per day which she has been on for some time.  The pain is intermittent and definitely is precipitated by eating.  The type of food that she makes no difference.  If she drinks milk that tends to ease it somewhat but then the symptoms returned quickly.  No nausea, vomiting, or associated diarrhea.  Review of the chart reveals patient had an EGD about one year ago which revealed a medium-sized hiatal hernia, normal esophagus, localized mild inflammation and linear erosions found in the prepyloric region of the stomach, and normal duodenum.  She had a CT scan of the abdomen and pelvis about a year ago which revealed hepatic and renal cysts as described. Moderate-sized hiatal hernia. Otherwise  unremarkable CT scan of the abdomen and pelvis. No acute process is identified.  Examination today reveals epigastric tenderness but no definite peritoneal signs.  Given the persistence and severity of the symptoms despite taking PPI therapy, repeat endoscopy and CT scan is indicated.  We will discontinue omeprazole and start samples of Dexilant 60 mg by mouth daily before the first meal.  I will also check lab work including CBC with differential, CMP, lipase.    Review of Systems   Constitution: Negative.   HENT: Negative.    Eyes: Negative.    Cardiovascular: Negative.    Respiratory: Negative.    Endocrine: Negative.    Hematologic/Lymphatic: Negative.    Skin: Negative.    Musculoskeletal: Positive for arthritis and back pain.   Gastrointestinal: Positive for abdominal pain and heartburn. Negative for hematochezia, melena, nausea and vomiting.   Genitourinary: Negative.    Neurological: Negative.    Psychiatric/Behavioral: Positive for depression.   Allergic/Immunologic: Negative.        Active Problems:    Patient Active Problem List   Diagnosis   • Osteoporosis, 03/29/2011--lumbar spine -2.8.  Right femoral neck -2.4.  Left femoral neck -2.4.  Patient receives Reclast infusions.   • Therapeutic drug monitoring   • Simple renal cyst   • Benign essential hypertension   • Carotid artery plaque, 04/02/2018--mild right ICA plaque, normal left ICA 10/03/2014--mild bilateral carotid artery plaque.   • Chronic gastritis   • Chronic lower back pain   • Chronic otitis externa   • Chronic renal insufficiency, stage II (mild), creatinine 1.12   • Depression with anxiety   • Gastroesophageal reflux disease with esophagitis   • Functional murmur, 07/15/2015--normal echocardiogram.   • Hyperlipidemia   • Menopausal state   • Chronic migraine   • Pancreatic Duct Dilation   • Pedal edema   • Restless legs syndrome   • Bilateral sensorineural hearing loss   • Vitamin D deficiency   • Chronic gastric ulcer   • Chronic fatigue  "  • Hypersomnolence   • Chronic anemia   • Multinodular goiter   • Generalized anxiety disorder   • Iron deficiency anemia   • Statin intolerance   • Postmenopausal state   • Abnormal weight gain   • Memory loss   • Epigastric abdominal pain         Past Medical History:   Diagnosis Date   • Benign essential hypertension 4/8/2016   • Bilateral sensorineural hearing loss 3/31/2011    03/31/2011--etiology reveals reverse \"cookie bite\" type of hearing loss for both ears of mild/moderate degree, mostly sensorineural.  Speech discrimination 100% on the right, 96% on the left.   • Carotid artery plaque, 10/03/2014--mild bilateral carotid artery plaque. 7/25/2012    10/03/2014--[facility] screening revealed mild bilateral carotid plaque, negative for atrial fibrillation, negative for AAA, negative for PAD, osteoporosis screen revealed osteopenia.  Body mass index was 25 and considered to be moderate risk.  07/25/2012--vascular screen negative for carotid plaque, negative for abdominal aneurysm, negative for PAD Description: 10/03/2014--[facility] screening revealed mild bilateral carotid plaque, negative for atrial fibrillation, negative for AAA, negative for PAD, osteoporosis screen revealed osteopenia.  Body mass index was 25 and considered to be moderate risk.  07/25/2012--vascular screen negative for carotid plaque, negative for abdominal aneurysm, negative for PAD   • Chronic anemia 8/23/2016 06/13/2017--colonoscopy revealed melanosis.  Biopsied.  There was friability with contact bleeding in the ascending colon.  Biopsied.  Pathology returned consistent with melanosis coli.  06/13/2017--EGD revealed normal esophagus.  Biopsies taken.  There was a medium-sized hiatal hernia.  Localized mild inflammation and linear erosions were found in the prepyloric region.  Biopsied.  Examined duodenum was normal.  Random biopsies taken.  Pathology of the gastroesophageal junction was unremarkable as was the gastric fundus.  " Prepyloric biopsy revealed minimal chronic inflammation and reactive change.  H pylori negative.  Duodenal biopsies are negative.  04/12/2017--hemoglobin low at 10.8, hematocrit is normal at 35.7.  Iron sulfate 325 mg per day initiated.  08/23/2016--routine follow-up.  Patient continues to have epigastric abdominal pain believed to be related to reflux with possible esophageal spasm.  Hemoglobin noted to be low at 10.6 with a hematocrit low at 33.7 and RDW elevated at 16.2.  Homocysti   • Chronic fatigue 4/21/2016 06/14/2017--overnight oximetry revealed oxygen desaturation index of only 0.44.  There were only 10 total desaturations during the period of testing which lasted 6 hours and 46 minutes.  05/31/2017--patient seen in follow-up and reports she continues to have chronic fatigue as well as hypersomnolence.  Once again, she is on multiple medications that are undoubtedly contributing to this problem including clonazepam and hydrocodone but I doubt that these could be discontinued.  Her CBC back in April revealed a low hemoglobin of 10.8 but hematocrit was normal at 35.7.  Thyroid function tests were normal and CMP normal.  Overnight oximetry test ordered.  Repeat CBC.  Iron studies.  Sedimentation rate and CRP.  04/11/2017--patient seen in follow-up and her blood pressure is now at a reasonable level at 120/64.  She reports she still feels somewhat fatigued but she is much better.  Patient has not been doing any regular exercise and I do think that that would be helpful to reduce her feeling of fatigue.  She is   • Chronic gastric ulcer 4/14/2014    02/15/2017--patient seen in follow-up in nearly 6 months later.  She has complaints of not feeling well all over.  She has complaints of diffuse myalgias and possibly tendinopathy related to Levaquin that I called in prior to her going to [place] for vacation.  She reports that 3 or 4 days after starting the Levaquin she began to have the musculoskeletal  symptoms and she reports that she continues to have them presently.  Symptoms are worse involving her left calf area.  Examination reveals significant tenderness involving the left calf and the left calf seems a little larger than the right.  She also has complaints of shortness of breath but no chest pain.  She is complaining of double vision and generalized weakness and fatigue.  She was complaining of chronic fatigue at the last visit back in September and I ordered an overnight oximetry test but apparently this was never done for reasons that are not clear to me.  Her current oxygen saturation is 94% and normally it is 100%.  Plan is as follows: Petersi   • Chronic gastritis 11/19/2001    02/15/2017--patient seen in follow-up in nearly 6 months later.  She has complaints of not feeling well all over.  She has complaints of diffuse myalgias and possibly tendinopathy related to Levaquin that I called in prior to her going to [place] for vacation.  She reports that 3 or 4 days after starting the Levaquin she began to have the musculoskeletal symptoms and she reports that she continues to have them presently.  Symptoms are worse involving her left calf area.  Examination reveals significant tenderness involving the left calf and the left calf seems a little larger than the right.  She also has complaints of shortness of breath but no chest pain.  She is complaining of double vision and generalized weakness and fatigue.  She was complaining of chronic fatigue at the last visit back in September and I ordered an overnight oximetry test but apparently this was never done for reasons that are not clear to me.  Her current oxygen saturation is 94% and normally it is 100%.  Plan is as follows: Petersi   • Chronic lower back pain 1/31/2006 08/11/2014--MRI of the lumbar spine reveals the conus terminates at L2 and is normal.  Cauda equina unremarkable.  Stable to moderate degenerative disc disease at L5-S1.  No acute  fracture or pars defect is demonstrated.  Small synovial cysts are seen posterior to the L4-L5 facet.  The perivertebral soft tissues are unremarkable.  T12-L1, L1-L2, L2-L3 are negative.  L3-L4 a broad-based disc bulge resulting in mild bilateral neural foraminal narrowing.  L4-L5 reveals a broad-based disc bulge facet disease resulting in mild bilateral neural foraminal narrowing.  L5-S1 reveals a broad-based disc bulge, posterior osseous slipping, and facet disease resulting in mild to moderate bilateral neural foraminal narrowing.  Assessment is stable mild to moderate degenerative spondylosis.  Small synovial cysts are seen posterior to the L4-L5 facets.  08/11/2014--MRI of the thoracic spine reveals mild to moderate thoracic kyphosis.  No fracture.  At T5--T6 there is a moderate sized left paracentral disc protrusion which i   • Chronic migraine 11/3/2009    01/18/2010--MRI of the brain performed for headaches and memory loss.  Mild small vessel disease in the cerebral and central pontine white matter.  There is an ovoid, somewhat pancake-shaped area of signal abnormality in the anterior inferior right temporal lobe subcortical to the juxtacortical white matter that measures 1.2 x 1 cm and anterior posterior and medial lateral dimension but only measures 3 mm in cranial caudal dimension.  I suspect that this is a benign cyst or a cystic area of encephalomalacia.  The remainder of the MRI of the head is within normal limits.  11/03/2009--CT scan of the brain without contrast performed for headache after a fall.  Mild diffuse atrophy.  No acute abnormality noted.; Description: Patient has had a long history of migraine headaches.  MRI and CT scan of the brain have been essentially negative.   • Chronic otitis externa 4/8/2016   • Chronic renal insufficiency, stage II (mild), creatinine 1.12 11/14/2015 11/14/2015--serum creatinine mildly elevated at 1.12.   • Depression with anxiety 4/8/2016   • Functional  murmur, 07/15/2015--normal echocardiogram. 7/15/2015    07/15/2015--echocardiogram reveals normal left ventricular size and function with ejection fraction 55%.  Grade 1 diastolic dysfunction, abnormal relaxation pattern.  Trace tricuspid regurgitation.  Estimated right ventricular systolic pressure is 25 mmHg which is normal.   • Gastroesophageal reflux disease with esophagitis 11/19/2001 06/13/2017--EGD revealed normal esophagus.  Biopsies taken.  There was a medium-sized hiatal hernia.  Localized mild inflammation and linear erosions were found in the prepyloric region.  Biopsied.  Examined duodenum was normal.  Random biopsies taken.  Pathology of the gastroesophageal junction was unremarkable as was the gastric fundus.  Prepyloric biopsy revealed minimal chronic inflammation and reactive change.  H pylori negative.  Duodenal biopsies are negative.  11/03/2014--patient seen in follow-up and reports her epigastric pain/chest pain has resolved.  I reviewed the results of the studies with her.  It does not appear that her problem is related to biliary tract disease.  She does have reflux and although the recent upper GI revealed minimal reflux, I do think her symptoms are related to esophageal reflux with esophageal spasm.  10/21/2014--air-contrast upper GI series revealed trace upper laryngeal penetration.  Persistent small partially reducible sliding hiatal hernia the upper stomach with    • Generalized anxiety disorder 4/11/2017   • History of Acute deep vein thrombosis (DVT) of distal end of left lower extremity 2/15/2017    03/21/2017 patient seen in follow-up and she is tolerating the Eliquis well without signs or symptoms of bleeding.  Her calf swelling and tenderness is better but not totally resolved.  I suspect that the DVT is chronic and may not resolve at all.  I will order a repeat venous study and then proceed from there.  02/23/2017--repeat Doppler venous study of the left lower extremity reveals  a chronic left lower extremity DVT in the posterior tibial.  All other left sided veins appeared normal.  Fluid collection in the left calf noted.  02/17/2017--patient seen in follow-up and reports her left lower extremity symptoms are about the same.  She continues to have complaints of profound fatigue which I think is multifactorial including underlying depression that is not in remission.  Review the results of the CTA of the chest including the possible thyroid lesion.  I do not think this is contributing to any of her symptoms of fatigue particularly given the fact that her thyroid function tests are normal.  I expla   • History of palpitations 12/07/2011    Patient has had multiple admissions to the hospital for complaints of chest pain and heart palpitations. She meant admitted at least on 3 occasions. She has had at least 3 stress Cardiolite and 1 stress ECG, the last Cardiolite being performed 12/07/2011 which was negative. Patient is also had Holter monitors which have been unremarkable.   • HIstory of Schatzki's ring 02/28/2012 02/28/2012--air-contrast upper GI revealed small to moderate sized reducible sliding hiatal herniation of the upper stomach with some demonstrated gastroesophageal reflux. No esophageal, gastric, or duodenal mass or mucosal ulceration was seen.  11/03/2008--EGD performed for evaluation of iron deficiency anemia revealed hiatal hernia without evidence of reflux, prepyloric antritis and   • Hyperlipidemia 4/8/2016   • Hypersomnolence 5/5/2016 06/14/2017--overnight oximetry revealed oxygen desaturation index of only 0.44.  There were only 10 total desaturations during the period of testing which lasted 6 hours and 46 minutes.  05/31/2017--patient seen in follow-up and reports she continues to have chronic fatigue as well as hypersomnolence.  Once again, she is on multiple medications that are undoubtedly contributing to this problem including clonazepam and hydrocodone but I  doubt that these could be discontinued.  Her CBC back in April revealed a low hemoglobin of 10.8 but hematocrit was normal at 35.7.  Thyroid function tests were normal and CMP normal.  Overnight oximetry test ordered.  Repeat CBC.  Iron studies.  Sedimentation rate and CRP.  04/11/2017--patient seen in follow-up and her blood pressure is now at a reasonable level at 120/64.  She reports she still feels somewhat fatigued but she is much better.  Patient has not been doing any regular exercise and I do think that that would be helpful to reduce her feeling of fatigue.  She is   • Menopausal state 4/8/2016   • Multinodular goiter 2/17/2017 02/21/2017--thyroid ultrasound reveals multinodular thyroid with largest nodule measuring on the order of 1.6 cm in greatest dimension.  02/17/2017--patient seen in follow-up for DVT and CTA of the chest.  An incidental finding on the CTA of the chest reveals a 1.7 cm lesion in the right lobe of thyroid.  Note that thyroid function tests are currently normal.  Ultrasound of the thyroid ordered.   • Osteoporosis, 03/29/2011--lumbar spine -2.8.  Right femoral neck -2.4.  Left femoral neck -2.4.  Patient receives Reclast infusions. 2/9/2009 04/19/2017--Reclast infusion.  04/05/2016--Reclast infusion.  06/04/2012--Reclast infusion.  05/26/2011--Reclast infusion.  03/29/2011--DEXA scan revealed a lumbar T score of -2.8, right femoral neck T score -2.4, left femoral neck T score -2.4.  Osteoporosis of the lumbar spine and severe osteopenia of the hips bilaterally.  Patient has been intolerant to Fosamax because of gastritis and gastroesophageal reflux.  04/01/2009--treatment for osteoporosis begun with Fosamax.  02/09/2009--DEXA scan revealed lumbar spine T score of -3.3.  Left femoral neck T score -2.5.  Right hip T score -2.6.  Osteoporosis.    • Pancreatic Duct Dilation 11/30/2001 09/29/2014--patient was again evaluated by the urologist for a renal cyst.  CT scan of the abdomen  and pelvis reveals a left renal cyst measuring 5.1 x 4.9 cm.  There were subcentimeter hypodense renal lesions that are too small to further characterize and are presumably related to cysts.  Recommend attention to follow up.  Distal dilated pancreatic duct noted.  The common bile duct is the upper limits of normal in size.  The ampullary region is not well evaluated.  Comparison with prior imaging is recommended if available.  If there is no prior film available for comparison, and ERCP or MRCP could be performed to further evaluate.  Small hiatal hernia noted.  03/05/2012--CT scan of the abdomen with contrast, pancreatic protocol.  This reveals some fullness of the pancreatic ductal system and apparent pancreatic divisum with separate entrance of the accessory pancreatic duct extending into the duodenum distal to the main pancreatic duct.  There is fullness of the duct diffusely but similar to the previous s   • Pedal edema 6/29/2015 06/29/2015--patient presents with a 4-6 week history of exertional dyspnea that comes on with activity such as climbing stairs or walking her dog up an incline.  No chest pain.  Relieved with rest.  No cough.  She does have complaints of her feet and legs swelling that is particularly worse at the end of the day and not improved overnight.  No orthopnea or PND.  Chest exam reveals faint rales at the bases.  Otherwise clear.  Heart is regular and I do not appreciate a heart murmur.  Chest x-ray PA and lateral ordered.  Echocardiogram ordered.   • Restless legs syndrome 4/8/2016   • Simple renal cyst 7/20/2009 09/29/2014--patient was again evaluated by the urologist for a renal cyst.  CT scan of the abdomen and pelvis reveals a left renal cyst measuring 5.1 x 4.9 cm.  There were subcentimeter hypodense renal lesions that are too small to further characterize and are presumably related to cysts.  Recommend attention to follow up.  Distal dilated pancreatic duct noted.  The common  bile duct is the upper limits of normal in size.  The ampullary region is not well evaluated.  Comparison with prior imaging is recommended if available.  If there is no prior film available for comparison, and ERCP or MRCP could be performed to further evaluate.  Small hiatal hernia noted.  07/20/2009--patient was noted to have a left renal mass consistent with a cyst.  This was evaluated by the urologist 07/20/2009.   • Statin intolerance 10/30/2017   • Vitamin D deficiency 4/8/2016         Past Surgical History:   Procedure Laterality Date   • APPENDECTOMY  1965    1965   • CATARACT EXTRACTION Bilateral Remote    Remote bilateral cataract extirpation with intraocular lens implantation.   • COLONOSCOPY  11/03/2008 2008--normal colonoscopy   • COLONOSCOPY  2001 2001--normal colonoscopy.   • COLONOSCOPY N/A 6/13/2017 06/13/2017--colonoscopy revealed melanosis.  Biopsied.  There was friability with contact bleeding in the ascending colon.  Biopsied.  Pathology returned consistent with melanosis coli.   • ENDOSCOPY  10/08/2014    02/28/2012--air-contrast upper GI revealed small to moderate sized reducible sliding hiatal herniation of the upper stomach with some demonstrated gastroesophageal reflux. No esophageal, gastric, or duodenal mass or mucosal ulceration was seen. 11/03/2008--EGD performed for evaluation of iron deficiency anemia revealed hiatal hernia without evidence of reflux, prepyloric antritis and   • ENDOSCOPY  11/03/2008 11/03/2008--EGD performed for evaluation of iron deficiency anemia revealed hiatal hernia without evidence of reflux, prepyloric antritis and   • ENDOSCOPY  11/19/2001 11/19/2001--EGD revealed a very tortuous distal esophagus with a Schatzki's ring. No ulcer or erosions. No Dorado's mucosa. Stomach revealed patchy erythema and erosions in the antrum. Biopsy. Normal pylorus with no obstruction. Normal duodenum with no ulcers. The Schatzki's ring was dilated with a Rhodes  dilator.;   • ENDOSCOPY N/A 9/27/2016 09/27/2016--EGD revealed a normal oropharynx, esophagus, and medium sized hiatal hernia.  Nonbleeding gastric ulcer with no stigmata of bleeding.  Biopsy.  Gastritis.  Biopsy.  Normal examined duodenum.  Biopsied.  Pathology returned mild to moderate chronic active gastritis with ulceration from the stomach antral ulcer biopsy.  Gastroesophageal junction biopsy revealed minimal mixed inflammation.   • ENDOSCOPY N/A 6/13/2017 06/13/2017--colonoscopy revealed melanosis.  Biopsied.  There was friability with contact bleeding in the ascending colon.  Biopsied.  Pathology returned consistent with melanosis coli.   • ESOPHAGEAL DILATATION  11/19/2001 11/19/2001--EGD revealed a very tortuous distal esophagus with a Schatzki's ring. No ulcer or erosions. No Dorado's mucosa. Stomach revealed patchy erythema and erosions in the antrum. Biopsy. Normal pylorus with no obstruction. Normal duodenum with no ulcers. The Schatzki's ring was dilated with a Rhodes dilator.;    • INCONTINENCE SURGERY  1979 1979--a bladder tack procedure for urinary stress incontinence   • KNEE ARTHROSCOPY Right 12/12/2011 12/12/2011--right knee arthroscopy with partial lateral and medial meniscectomies.   • SKIN SURGERY  05/25/2010 05/25/2010--skin lesion excised from right lower extremity. 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   • TOTAL ABDOMINAL HYSTERECTOMY  1974 1974--total abdominal hysterectomy.         Allergies   Allergen Reactions   • Penicillins Itching   • Tetanus Toxoids Itching           Current Outpatient Prescriptions:   •  aspirin 81 MG EC tablet, Take 81 mg by mouth Daily., Disp: , Rfl:   •  buPROPion XL (WELLBUTRIN XL) 150 MG 24 hr tablet, 1 tablet Daily., Disp: , Rfl: 0  •  calcium citrate (CALCITRATE) 950 MG tablet, 2 by mouth daily, Disp: 60 tablet, Rfl: 1  •  clonazePAM (KlonoPIN) 1 MG tablet, TAKE 1/2- 1 TABLET BY MOUTH TWICE DAILY AS NEEDED,  Disp: 60 tablet, Rfl: 5  •  cycloSPORINE (RESTASIS) 0.05 % ophthalmic emulsion, 1 drop 2 (Two) Times a Day., Disp: , Rfl:   •  diclofenac (VOLTAREN) 1 % gel gel, APPLY 4 GM QID PRN TO AFFECTED JOINTS, Disp: , Rfl: 5  •  diphenhydrAMINE-acetaminophen (TYLENOL PM EXTRA STRENGTH)  MG tablet per tablet, Take 1 tablet by mouth At Night As Needed for Sleep., Disp: , Rfl:   •  estradiol (ESTRACE) 1 MG tablet, TAKE 1 TABLET BY MOUTH DAILY, Disp: 90 tablet, Rfl: 1  •  gabapentin (NEURONTIN) 100 MG capsule, 1 capsule Daily., Disp: , Rfl: 2  •  Ginkgo Biloba (GINKOBA PO), Take 120 mg by mouth Daily., Disp: , Rfl:   •  HYDROcodone-acetaminophen (NORCO)  MG per tablet, Take 1 tablet by mouth every 6 (six) hours as needed for moderate pain (4-6)., Disp: , Rfl:   •  omeprazole (priLOSEC) 40 MG capsule, TAKE 1 CAPSULE DAILY BEFORETHE FIRST MEAL, Disp: 90 capsule, Rfl: 0  •  topiramate (TOPAMAX) 100 MG tablet, TAKE 1 TABLET DAILY, Disp: 90 tablet, Rfl: 3  •  triamterene-hydrochlorothiazide (DYAZIDE) 37.5-25 MG per capsule, TAKE ONE CAPSULE BY MOUTH DAILY FOR BLOOD PRESSURE AND LEG SWELLING, Disp: 90 capsule, Rfl: 2  •  venlafaxine (EFFEXOR) 75 MG tablet, Take 75 mg by mouth Daily., Disp: , Rfl:   •  venlafaxine XR (EFFEXOR-XR) 150 MG 24 hr capsule, Take 150 mg by mouth Every Night., Disp: , Rfl:       Family History   Problem Relation Age of Onset   • Heart attack Mother         dies age 47 from heart attack   • Heart disease Mother    • Bleeding Disorder Mother    • Heart attack Maternal Aunt         dies age 60 from heart attack   • Ovarian cancer Maternal Grandmother    • Heart disease Father          Social History     Social History   • Marital status:      Spouse name: N/A   • Number of children: N/A   • Years of education: N/A     Occupational History   • homemaker      Social History Main Topics   • Smoking status: Never Smoker   • Smokeless tobacco: Never Used   • Alcohol use No   • Drug use: No   •  "Sexual activity: Yes     Partners: Male     Other Topics Concern   • Not on file     Social History Narrative   • No narrative on file         Vitals:    09/06/18 1159   BP: 140/80   Pulse: 90   SpO2: 98%   Height: 153.7 cm (60.51\")          Physical Exam:    General: Alert and oriented x 3.  No acute distress.  Normal affect.  HEENT: Pupils equal, round, reactive to light; extraocular movements intact; sclerae nonicteric; pharynx, ear canals and TMs normal.  Neck: Without JVD, thyromegaly, bruit, or adenopathy.  Lungs: Clear to auscultation in all fields.  Heart: Regular rate and rhythm without murmur, rub, gallop, or click.  Abdomen: Soft, but there is tenderness in the epigastrium without hepatosplenomegaly or hernia.  No peritoneal signs.  Bowel sounds normal.  : Deferred.  Rectal: Deferred.  Extremities: Without clubbing, cyanosis, edema, or pulse deficit.  Neurologic: Intact without focal deficit.  Normal station and gait observed during ingress and egress from the examination room.  Skin: Without significant lesion.  Musculoskeletal: Unremarkable.      Lab/other results:    I reviewed patient's most recent CT scan of the abdomen and pelvis as well as her most recent EGD and pathology report.    Assessment/Plan:     Diagnosis Plan   1. Epigastric abdominal pain     2. Gastroesophageal reflux disease with esophagitis     3. Chronic gastritis with bleeding, unspecified gastritis type     4. Pancreatic Duct Dilation     5. Chronic gastric ulcer without hemorrhage and without perforation     6. Chronic fatigue     7. Chronic anemia     8. Iron deficiency anemia due to chronic blood loss     9. Chronic renal insufficiency, stage II (mild), creatinine 1.12     10. Benign essential hypertension         Patient presents with return of epigastric abdominal pain despite PPI therapy with omeprazole.  Differential diagnosis is quite broad but given her previous history of chronic gastritis with bleeding as well as " chronic gastric ulcer, repeat visualization with EGD is indicated.  Medication change is also indicated.  Also given her history of pancreatic duct dilatation and possible pancreatic division, repeat imaging with CT scan of the abdomen also indicated.  Given her chronic anemia and iron deficiency anemia as well as chronic renal insufficiency, lab work indicated as well.  Her blood pressure slightly elevated today but this is only borderline and does not require medication adjustment.    Plan is as follows: Discontinue omeprazole and start Dexilant 60 mg by mouth daily before the first meal.  Samples given.  Schedule EGD as soon as possible.  CT scan of the abdomen and pelvis with contrast.  High-dose influenza vaccination given today.  Reviewed the signs and symptoms of gastrointestinal bleeding.  Patient needs follow-up with me after the results of the studies are known.  She should contact me for any worsening symptoms.      Procedures         labs unremarkable okay for d/c with pcp follow up

## (undated) DEVICE — CATH IV INSYTE AUTOGARD 14G 1 1/2IN ORNG

## (undated) DEVICE — DRP C/ARM 41X74IN

## (undated) DEVICE — JACKSON-PRATT 100CC BULB RESERVOIR: Brand: CARDINAL HEALTH

## (undated) DEVICE — SUT VIC 0/0 UR6 27IN DYED J603H

## (undated) DEVICE — ENDOPOUCH RETRIEVER SPECIMEN RETRIEVAL BAGS: Brand: ENDOPOUCH RETRIEVER

## (undated) DEVICE — THE TORRENT IRRIGATION SCOPE CONNECTOR IS USED WITH THE TORRENT IRRIGATION TUBING TO PROVIDE IRRIGATION FLUIDS SUCH AS STERILE WATER DURING GASTROINTESTINAL ENDOSCOPIC PROCEDURES WHEN USED IN CONJUNCTION WITH AN IRRIGATION PUMP (OR ELECTROSURGICAL UNIT).: Brand: TORRENT

## (undated) DEVICE — STPLR SKIN VISISTAT WD 35CT

## (undated) DEVICE — LAPAROSCOPIC GAS CONDITIONING DEVICE.: Brand: INSUFLOW

## (undated) DEVICE — MARKR SKIN W/RULR AND LBL

## (undated) DEVICE — GLV SURG BIOGEL LTX PF 7 1/2

## (undated) DEVICE — LEGGINGS, PAIR, CLEAR, STERILE: Brand: MEDLINE

## (undated) DEVICE — STPCK 3WY D201 DISCOFIX

## (undated) DEVICE — DRSNG SURESITE WNDW 4X4.5

## (undated) DEVICE — HARMONIC ACE +7 LAPAROSCOPIC SHEARS ADVANCED HEMOSTASIS 5MM DIAMETER 36CM SHAFT LENGTH  FOR USE WITH GRAY HAND PIECE ONLY: Brand: HARMONIC ACE

## (undated) DEVICE — APPL CHLORAPREP W/TINT 26ML ORNG

## (undated) DEVICE — CANN NASL CO2 TRULINK W/O2 A/

## (undated) DEVICE — LAPAROSCOPIC SMOKE ELIMINATION DEVICE: Brand: PNEUVIEW XE

## (undated) DEVICE — ENDOCUT SCISSOR TIP, DISPOSABLE: Brand: RENEW

## (undated) DEVICE — APPL HEMO SURG ARISTA/AH/FLEXITIP XL 38CM

## (undated) DEVICE — ENDOPATH XCEL BLADELESS TROCARS WITH STABILITY SLEEVES: Brand: ENDOPATH XCEL

## (undated) DEVICE — DRN PENRS 3/8X18IN LTX STRL

## (undated) DEVICE — SPNG GZ WOVN 4X4IN 12PLY 10/BX STRL

## (undated) DEVICE — SUT MNCRYL PLS ANTIB UD 4/0 PS2 18IN

## (undated) DEVICE — SILK SINGLE STITCH RELOAD: Brand: SOFSILK

## (undated) DEVICE — CATH CHOLANG 4.5F18IN BRGNDY

## (undated) DEVICE — LOU LAP CHOLE: Brand: MEDLINE INDUSTRIES, INC.

## (undated) DEVICE — TUBING, SUCTION, 1/4" X 10', STRAIGHT: Brand: MEDLINE

## (undated) DEVICE — EXTENSION SET, MALE LUER LOCK ADAPTER WITH RETRACTABLE COLLAR

## (undated) DEVICE — Device: Brand: DEFENDO AIR/WATER/SUCTION AND BIOPSY VALVE

## (undated) DEVICE — DISPOSABLE GRASPER CARTRIDGE: Brand: DIRECT DRIVE REPOSABLE GRASPERS

## (undated) DEVICE — VISUALIZATION SYSTEM: Brand: CLEARIFY

## (undated) DEVICE — DRAPE,REIN 53X77,STERILE: Brand: MEDLINE

## (undated) DEVICE — ENDOPATH XCEL UNIVERSAL TROCAR STABLILITY SLEEVES: Brand: ENDOPATH XCEL

## (undated) DEVICE — BITEBLOCK OMNI BLOC

## (undated) DEVICE — Device

## (undated) DEVICE — TOTAL TRAY, 16FR 10ML SIL FOLEY, URN: Brand: MEDLINE

## (undated) DEVICE — TRIPLE LUMEN NEEDLE KNIFE: Brand: RX NEEDLE KNIFE XL

## (undated) DEVICE — SHEATH CATH MANOSHIELD ESOPH

## (undated) DEVICE — SINGLE-USE BIOPSY FORCEPS: Brand: RADIAL JAW 4

## (undated) DEVICE — ADHS SKIN DERMABOND TOP ADVANCED

## (undated) DEVICE — SUTURING DEVICE: Brand: ENDO STITCH

## (undated) DEVICE — RETRIEVAL BALLOON CATHETER: Brand: EXTRACTOR™ PRO RX

## (undated) DEVICE — SPHINCTEROTOME: Brand: HYDRATOME RX 44

## (undated) DEVICE — SYS DEL STNT OASIS 1ACTION INTRO 6F 10 320CM

## (undated) DEVICE — SOL NACL 0.9PCT 1000ML

## (undated) DEVICE — ERBE NESSY®PLATE 170 SPLIT; 168CM²; CABLE 3M: Brand: ERBE

## (undated) DEVICE — DEV LK WIREGUIDE FUSN OLYMP SCP

## (undated) DEVICE — BANDAGE,GAUZE,BULKEE II,4.5"X4.1YD,STRL: Brand: MEDLINE

## (undated) DEVICE — 1842 FOAM BLOCK NEEDLE COUNTER: Brand: DEVON

## (undated) DEVICE — PDS II VLT 0 107CM AG ST3: Brand: ENDOLOOP